# Patient Record
Sex: FEMALE | Race: WHITE | Employment: OTHER | URBAN - METROPOLITAN AREA
[De-identification: names, ages, dates, MRNs, and addresses within clinical notes are randomized per-mention and may not be internally consistent; named-entity substitution may affect disease eponyms.]

---

## 2017-01-16 ENCOUNTER — TELEPHONE (OUTPATIENT)
Dept: INTERNAL MEDICINE CLINIC | Age: 62
End: 2017-01-16

## 2017-01-18 ENCOUNTER — OFFICE VISIT (OUTPATIENT)
Dept: INTERNAL MEDICINE CLINIC | Age: 62
End: 2017-01-18

## 2017-01-18 VITALS
WEIGHT: 158 LBS | HEART RATE: 80 BPM | SYSTOLIC BLOOD PRESSURE: 110 MMHG | HEIGHT: 62 IN | DIASTOLIC BLOOD PRESSURE: 78 MMHG | BODY MASS INDEX: 29.08 KG/M2

## 2017-01-18 DIAGNOSIS — E11.42 TYPE 2 DIABETES MELLITUS WITH DIABETIC POLYNEUROPATHY, WITHOUT LONG-TERM CURRENT USE OF INSULIN (HCC): Primary | ICD-10-CM

## 2017-01-18 DIAGNOSIS — F33.41 MAJOR DEPRESSIVE DISORDER, RECURRENT, IN PARTIAL REMISSION (HCC): ICD-10-CM

## 2017-01-18 DIAGNOSIS — E78.2 MIXED HYPERLIPIDEMIA: ICD-10-CM

## 2017-01-18 PROCEDURE — 1036F TOBACCO NON-USER: CPT | Performed by: INTERNAL MEDICINE

## 2017-01-18 PROCEDURE — G8419 CALC BMI OUT NRM PARAM NOF/U: HCPCS | Performed by: INTERNAL MEDICINE

## 2017-01-18 PROCEDURE — 99214 OFFICE O/P EST MOD 30 MIN: CPT | Performed by: INTERNAL MEDICINE

## 2017-01-18 PROCEDURE — G8484 FLU IMMUNIZE NO ADMIN: HCPCS | Performed by: INTERNAL MEDICINE

## 2017-01-18 PROCEDURE — 3044F HG A1C LEVEL LT 7.0%: CPT | Performed by: INTERNAL MEDICINE

## 2017-01-18 PROCEDURE — 3017F COLORECTAL CA SCREEN DOC REV: CPT | Performed by: INTERNAL MEDICINE

## 2017-01-18 PROCEDURE — 3014F SCREEN MAMMO DOC REV: CPT | Performed by: INTERNAL MEDICINE

## 2017-01-18 PROCEDURE — G8427 DOCREV CUR MEDS BY ELIG CLIN: HCPCS | Performed by: INTERNAL MEDICINE

## 2017-01-18 RX ORDER — TRAZODONE HYDROCHLORIDE 100 MG/1
200 TABLET ORAL NIGHTLY
Status: SHIPPED | COMMUNITY
Start: 2017-01-18 | End: 2017-03-22 | Stop reason: CLARIF

## 2017-01-18 RX ORDER — LANOLIN ALCOHOL/MO/W.PET/CERES
6 CREAM (GRAM) TOPICAL DAILY
COMMUNITY
Start: 2017-01-18 | End: 2017-03-22 | Stop reason: CLARIF

## 2017-01-18 ASSESSMENT — ENCOUNTER SYMPTOMS: SHORTNESS OF BREATH: 0

## 2017-03-13 ENCOUNTER — TELEPHONE (OUTPATIENT)
Dept: INTERNAL MEDICINE CLINIC | Age: 62
End: 2017-03-13

## 2017-03-22 ENCOUNTER — OFFICE VISIT (OUTPATIENT)
Dept: INTERNAL MEDICINE CLINIC | Age: 62
End: 2017-03-22

## 2017-03-22 VITALS
WEIGHT: 172.8 LBS | OXYGEN SATURATION: 98 % | HEART RATE: 76 BPM | SYSTOLIC BLOOD PRESSURE: 110 MMHG | BODY MASS INDEX: 31.8 KG/M2 | HEIGHT: 62 IN | DIASTOLIC BLOOD PRESSURE: 80 MMHG

## 2017-03-22 DIAGNOSIS — E11.42 TYPE 2 DIABETES MELLITUS WITH DIABETIC POLYNEUROPATHY, WITHOUT LONG-TERM CURRENT USE OF INSULIN (HCC): Primary | ICD-10-CM

## 2017-03-22 DIAGNOSIS — E78.2 MIXED HYPERLIPIDEMIA: ICD-10-CM

## 2017-03-22 DIAGNOSIS — F33.1 MODERATE EPISODE OF RECURRENT MAJOR DEPRESSIVE DISORDER (HCC): ICD-10-CM

## 2017-03-22 PROCEDURE — 3044F HG A1C LEVEL LT 7.0%: CPT | Performed by: INTERNAL MEDICINE

## 2017-03-22 PROCEDURE — 1036F TOBACCO NON-USER: CPT | Performed by: INTERNAL MEDICINE

## 2017-03-22 PROCEDURE — 99214 OFFICE O/P EST MOD 30 MIN: CPT | Performed by: INTERNAL MEDICINE

## 2017-03-22 PROCEDURE — G8417 CALC BMI ABV UP PARAM F/U: HCPCS | Performed by: INTERNAL MEDICINE

## 2017-03-22 PROCEDURE — 3014F SCREEN MAMMO DOC REV: CPT | Performed by: INTERNAL MEDICINE

## 2017-03-22 PROCEDURE — 3017F COLORECTAL CA SCREEN DOC REV: CPT | Performed by: INTERNAL MEDICINE

## 2017-03-22 PROCEDURE — G8427 DOCREV CUR MEDS BY ELIG CLIN: HCPCS | Performed by: INTERNAL MEDICINE

## 2017-03-22 PROCEDURE — G8484 FLU IMMUNIZE NO ADMIN: HCPCS | Performed by: INTERNAL MEDICINE

## 2017-03-22 RX ORDER — TRAZODONE HYDROCHLORIDE 100 MG/1
200 TABLET ORAL NIGHTLY
COMMUNITY
Start: 2017-02-23 | End: 2017-08-24

## 2017-03-22 RX ORDER — LINACLOTIDE 145 UG/1
145 CAPSULE, GELATIN COATED ORAL DAILY
Refills: 0 | COMMUNITY
Start: 2017-02-17 | End: 2017-06-06

## 2017-03-22 RX ORDER — TOPIRAMATE 50 MG/1
50 TABLET, FILM COATED ORAL 2 TIMES DAILY
COMMUNITY
Start: 2017-03-22 | End: 2021-12-13

## 2017-03-22 RX ORDER — DIPHENHYDRAMINE HCL 25 MG
25 CAPSULE ORAL NIGHTLY PRN
COMMUNITY
End: 2017-06-06

## 2017-03-22 ASSESSMENT — PATIENT HEALTH QUESTIONNAIRE - PHQ9
SUM OF ALL RESPONSES TO PHQ QUESTIONS 1-9: 0
SUM OF ALL RESPONSES TO PHQ9 QUESTIONS 1 & 2: 0
2. FEELING DOWN, DEPRESSED OR HOPELESS: 0
2. FEELING DOWN, DEPRESSED OR HOPELESS: 0
SUM OF ALL RESPONSES TO PHQ QUESTIONS 1-9: 0
SUM OF ALL RESPONSES TO PHQ9 QUESTIONS 1 & 2: 0
1. LITTLE INTEREST OR PLEASURE IN DOING THINGS: 0
1. LITTLE INTEREST OR PLEASURE IN DOING THINGS: 0

## 2017-03-22 ASSESSMENT — ENCOUNTER SYMPTOMS
BACK PAIN: 1
SHORTNESS OF BREATH: 0

## 2017-03-23 DIAGNOSIS — E11.42 TYPE 2 DIABETES MELLITUS WITH DIABETIC POLYNEUROPATHY, WITHOUT LONG-TERM CURRENT USE OF INSULIN (HCC): ICD-10-CM

## 2017-03-23 DIAGNOSIS — E78.2 MIXED HYPERLIPIDEMIA: ICD-10-CM

## 2017-03-23 LAB
A/G RATIO: 2 (ref 1.1–2.2)
ALBUMIN SERPL-MCNC: 4.1 G/DL (ref 3.4–5)
ALP BLD-CCNC: 76 U/L (ref 40–129)
ALT SERPL-CCNC: 24 U/L (ref 10–40)
ANION GAP SERPL CALCULATED.3IONS-SCNC: 14 MMOL/L (ref 3–16)
AST SERPL-CCNC: 20 U/L (ref 15–37)
BILIRUB SERPL-MCNC: 0.4 MG/DL (ref 0–1)
BUN BLDV-MCNC: 18 MG/DL (ref 7–20)
CALCIUM SERPL-MCNC: 9.5 MG/DL (ref 8.3–10.6)
CHLORIDE BLD-SCNC: 110 MMOL/L (ref 99–110)
CHOLESTEROL, TOTAL: 181 MG/DL (ref 0–199)
CO2: 22 MMOL/L (ref 21–32)
CREAT SERPL-MCNC: 0.6 MG/DL (ref 0.6–1.2)
GFR AFRICAN AMERICAN: >60
GFR NON-AFRICAN AMERICAN: >60
GLOBULIN: 2.1 G/DL
GLUCOSE BLD-MCNC: 105 MG/DL (ref 70–99)
HDLC SERPL-MCNC: 66 MG/DL (ref 40–60)
LDL CHOLESTEROL CALCULATED: 92 MG/DL
POTASSIUM SERPL-SCNC: 4.3 MMOL/L (ref 3.5–5.1)
SODIUM BLD-SCNC: 146 MMOL/L (ref 136–145)
TOTAL PROTEIN: 6.2 G/DL (ref 6.4–8.2)
TRIGL SERPL-MCNC: 114 MG/DL (ref 0–150)
VLDLC SERPL CALC-MCNC: 23 MG/DL

## 2017-03-24 LAB
ESTIMATED AVERAGE GLUCOSE: 122.6 MG/DL
HBA1C MFR BLD: 5.9 %

## 2017-03-28 ENCOUNTER — TELEPHONE (OUTPATIENT)
Dept: INTERNAL MEDICINE CLINIC | Age: 62
End: 2017-03-28

## 2017-03-31 ENCOUNTER — TELEPHONE (OUTPATIENT)
Dept: INTERNAL MEDICINE CLINIC | Age: 62
End: 2017-03-31

## 2017-04-03 ENCOUNTER — OFFICE VISIT (OUTPATIENT)
Dept: INTERNAL MEDICINE CLINIC | Age: 62
End: 2017-04-03

## 2017-04-03 VITALS
DIASTOLIC BLOOD PRESSURE: 70 MMHG | SYSTOLIC BLOOD PRESSURE: 108 MMHG | OXYGEN SATURATION: 95 % | HEIGHT: 62 IN | HEART RATE: 85 BPM | TEMPERATURE: 98.1 F | BODY MASS INDEX: 30.91 KG/M2 | WEIGHT: 168 LBS

## 2017-04-03 DIAGNOSIS — F51.01 PRIMARY INSOMNIA: ICD-10-CM

## 2017-04-03 DIAGNOSIS — R42 DIZZINESS: Primary | ICD-10-CM

## 2017-04-03 PROCEDURE — 3017F COLORECTAL CA SCREEN DOC REV: CPT | Performed by: INTERNAL MEDICINE

## 2017-04-03 PROCEDURE — G8427 DOCREV CUR MEDS BY ELIG CLIN: HCPCS | Performed by: INTERNAL MEDICINE

## 2017-04-03 PROCEDURE — G8417 CALC BMI ABV UP PARAM F/U: HCPCS | Performed by: INTERNAL MEDICINE

## 2017-04-03 PROCEDURE — 3014F SCREEN MAMMO DOC REV: CPT | Performed by: INTERNAL MEDICINE

## 2017-04-03 PROCEDURE — 99213 OFFICE O/P EST LOW 20 MIN: CPT | Performed by: INTERNAL MEDICINE

## 2017-04-03 PROCEDURE — 1036F TOBACCO NON-USER: CPT | Performed by: INTERNAL MEDICINE

## 2017-04-03 RX ORDER — MECLIZINE HCL 12.5 MG/1
12.5-25 TABLET ORAL 3 TIMES DAILY PRN
Qty: 30 TABLET | Refills: 1 | Status: SHIPPED | OUTPATIENT
Start: 2017-04-03 | End: 2017-04-13

## 2017-04-03 ASSESSMENT — ENCOUNTER SYMPTOMS: SHORTNESS OF BREATH: 0

## 2017-04-20 ENCOUNTER — OFFICE VISIT (OUTPATIENT)
Dept: INTERNAL MEDICINE CLINIC | Age: 62
End: 2017-04-20

## 2017-04-20 VITALS
HEIGHT: 62 IN | DIASTOLIC BLOOD PRESSURE: 80 MMHG | HEART RATE: 72 BPM | SYSTOLIC BLOOD PRESSURE: 126 MMHG | WEIGHT: 166 LBS | BODY MASS INDEX: 30.55 KG/M2

## 2017-04-20 DIAGNOSIS — M54.2 NECK PAIN: Primary | ICD-10-CM

## 2017-04-20 DIAGNOSIS — G43.009 MIGRAINE WITHOUT AURA AND WITHOUT STATUS MIGRAINOSUS, NOT INTRACTABLE: ICD-10-CM

## 2017-04-20 PROCEDURE — 3014F SCREEN MAMMO DOC REV: CPT | Performed by: INTERNAL MEDICINE

## 2017-04-20 PROCEDURE — G8417 CALC BMI ABV UP PARAM F/U: HCPCS | Performed by: INTERNAL MEDICINE

## 2017-04-20 PROCEDURE — G8427 DOCREV CUR MEDS BY ELIG CLIN: HCPCS | Performed by: INTERNAL MEDICINE

## 2017-04-20 PROCEDURE — 3017F COLORECTAL CA SCREEN DOC REV: CPT | Performed by: INTERNAL MEDICINE

## 2017-04-20 PROCEDURE — 1036F TOBACCO NON-USER: CPT | Performed by: INTERNAL MEDICINE

## 2017-04-20 PROCEDURE — 99213 OFFICE O/P EST LOW 20 MIN: CPT | Performed by: INTERNAL MEDICINE

## 2017-04-20 RX ORDER — OMEPRAZOLE 20 MG/1
20 CAPSULE, DELAYED RELEASE ORAL 2 TIMES DAILY
COMMUNITY
End: 2017-06-06

## 2017-04-20 ASSESSMENT — ENCOUNTER SYMPTOMS
BACK PAIN: 1
SHORTNESS OF BREATH: 0

## 2017-04-24 ENCOUNTER — TELEPHONE (OUTPATIENT)
Dept: INTERNAL MEDICINE CLINIC | Age: 62
End: 2017-04-24

## 2017-05-02 DIAGNOSIS — M54.2 NECK PAIN: Primary | ICD-10-CM

## 2017-05-03 ENCOUNTER — OFFICE VISIT (OUTPATIENT)
Dept: INTERNAL MEDICINE CLINIC | Age: 62
End: 2017-05-03

## 2017-05-03 ENCOUNTER — TELEPHONE (OUTPATIENT)
Dept: INTERNAL MEDICINE CLINIC | Age: 62
End: 2017-05-03

## 2017-05-03 VITALS
SYSTOLIC BLOOD PRESSURE: 124 MMHG | OXYGEN SATURATION: 98 % | BODY MASS INDEX: 30.18 KG/M2 | DIASTOLIC BLOOD PRESSURE: 78 MMHG | TEMPERATURE: 98.3 F | WEIGHT: 164 LBS | HEART RATE: 80 BPM | HEIGHT: 62 IN

## 2017-05-03 DIAGNOSIS — Z01.818 PRE-OP EXAM: Primary | ICD-10-CM

## 2017-05-03 DIAGNOSIS — H26.9 CATARACT: ICD-10-CM

## 2017-05-03 PROCEDURE — 3014F SCREEN MAMMO DOC REV: CPT | Performed by: INTERNAL MEDICINE

## 2017-05-03 PROCEDURE — G8417 CALC BMI ABV UP PARAM F/U: HCPCS | Performed by: INTERNAL MEDICINE

## 2017-05-03 PROCEDURE — 99214 OFFICE O/P EST MOD 30 MIN: CPT | Performed by: INTERNAL MEDICINE

## 2017-05-03 PROCEDURE — 1036F TOBACCO NON-USER: CPT | Performed by: INTERNAL MEDICINE

## 2017-05-03 PROCEDURE — 93000 ELECTROCARDIOGRAM COMPLETE: CPT | Performed by: INTERNAL MEDICINE

## 2017-05-03 PROCEDURE — 3017F COLORECTAL CA SCREEN DOC REV: CPT | Performed by: INTERNAL MEDICINE

## 2017-05-03 PROCEDURE — G8427 DOCREV CUR MEDS BY ELIG CLIN: HCPCS | Performed by: INTERNAL MEDICINE

## 2017-05-03 RX ORDER — LOTEPREDNOL ETABONATE AND TOBRAMYCIN 5; 3 MG/ML; MG/ML
SUSPENSION/ DROPS OPHTHALMIC
COMMUNITY
Start: 2017-04-03 | End: 2017-06-06

## 2017-05-03 ASSESSMENT — ENCOUNTER SYMPTOMS
BACK PAIN: 1
SHORTNESS OF BREATH: 0

## 2017-05-09 ENCOUNTER — TELEPHONE (OUTPATIENT)
Dept: INTERNAL MEDICINE CLINIC | Age: 62
End: 2017-05-09

## 2017-05-19 ENCOUNTER — OFFICE VISIT (OUTPATIENT)
Dept: ORTHOPEDIC SURGERY | Age: 62
End: 2017-05-19

## 2017-05-19 VITALS — BODY MASS INDEX: 30.18 KG/M2 | WEIGHT: 164.02 LBS | HEIGHT: 62 IN

## 2017-05-19 DIAGNOSIS — M79.18 MYOFACIAL MUSCLE PAIN: ICD-10-CM

## 2017-05-19 DIAGNOSIS — G89.29 CHRONIC RIGHT SHOULDER PAIN: ICD-10-CM

## 2017-05-19 DIAGNOSIS — M25.511 CHRONIC RIGHT SHOULDER PAIN: ICD-10-CM

## 2017-05-19 DIAGNOSIS — M48.02 CERVICAL STENOSIS OF SPINE: ICD-10-CM

## 2017-05-19 DIAGNOSIS — M47.812 SPONDYLOSIS OF CERVICAL REGION WITHOUT MYELOPATHY OR RADICULOPATHY: Primary | ICD-10-CM

## 2017-05-19 DIAGNOSIS — R51.9 HEADACHE, UNSPECIFIED HEADACHE TYPE: ICD-10-CM

## 2017-05-19 PROCEDURE — 1036F TOBACCO NON-USER: CPT | Performed by: PHYSICAL MEDICINE & REHABILITATION

## 2017-05-19 PROCEDURE — 3017F COLORECTAL CA SCREEN DOC REV: CPT | Performed by: PHYSICAL MEDICINE & REHABILITATION

## 2017-05-19 PROCEDURE — G8417 CALC BMI ABV UP PARAM F/U: HCPCS | Performed by: PHYSICAL MEDICINE & REHABILITATION

## 2017-05-19 PROCEDURE — 20553 NJX 1/MLT TRIGGER POINTS 3/>: CPT | Performed by: PHYSICAL MEDICINE & REHABILITATION

## 2017-05-19 PROCEDURE — 99214 OFFICE O/P EST MOD 30 MIN: CPT | Performed by: PHYSICAL MEDICINE & REHABILITATION

## 2017-05-19 PROCEDURE — G8427 DOCREV CUR MEDS BY ELIG CLIN: HCPCS | Performed by: PHYSICAL MEDICINE & REHABILITATION

## 2017-05-19 PROCEDURE — 3014F SCREEN MAMMO DOC REV: CPT | Performed by: PHYSICAL MEDICINE & REHABILITATION

## 2017-05-19 RX ORDER — TIZANIDINE 4 MG/1
4 TABLET ORAL NIGHTLY
Qty: 30 TABLET | Refills: 0 | Status: SHIPPED | OUTPATIENT
Start: 2017-05-19 | End: 2017-06-18

## 2017-05-31 RX ORDER — PRAVASTATIN SODIUM 40 MG
TABLET ORAL
Qty: 180 TABLET | Refills: 0 | Status: SHIPPED | OUTPATIENT
Start: 2017-05-31 | End: 2017-08-24 | Stop reason: SDUPTHER

## 2017-05-31 RX ORDER — GABAPENTIN 300 MG/1
CAPSULE ORAL
Qty: 180 CAPSULE | Refills: 0 | Status: SHIPPED | OUTPATIENT
Start: 2017-05-31 | End: 2017-08-07 | Stop reason: SDUPTHER

## 2017-05-31 RX ORDER — PIOGLITAZONEHYDROCHLORIDE 30 MG/1
TABLET ORAL
Qty: 90 TABLET | Refills: 0 | Status: SHIPPED | OUTPATIENT
Start: 2017-05-31 | End: 2017-06-05 | Stop reason: SDUPTHER

## 2017-06-05 RX ORDER — PIOGLITAZONEHYDROCHLORIDE 30 MG/1
TABLET ORAL
Qty: 7 TABLET | Refills: 0 | Status: SHIPPED | OUTPATIENT
Start: 2017-06-05 | End: 2017-08-24 | Stop reason: SDUPTHER

## 2017-06-05 RX ORDER — OMEPRAZOLE 40 MG/1
CAPSULE, DELAYED RELEASE ORAL
Qty: 180 CAPSULE | Refills: 0 | Status: SHIPPED | OUTPATIENT
Start: 2017-06-05 | End: 2017-08-24 | Stop reason: SDUPTHER

## 2017-06-06 ENCOUNTER — OFFICE VISIT (OUTPATIENT)
Dept: ORTHOPEDIC SURGERY | Age: 62
End: 2017-06-06

## 2017-06-06 ENCOUNTER — HOSPITAL ENCOUNTER (OUTPATIENT)
Dept: GENERAL RADIOLOGY | Facility: MEDICAL CENTER | Age: 62
Discharge: HOME | End: 2017-06-06
Attending: ORTHOPAEDIC SURGERY | Admitting: ORTHOPAEDIC SURGERY

## 2017-06-06 VITALS
HEART RATE: 75 BPM | BODY MASS INDEX: 29.26 KG/M2 | DIASTOLIC BLOOD PRESSURE: 76 MMHG | WEIGHT: 159 LBS | SYSTOLIC BLOOD PRESSURE: 126 MMHG | HEIGHT: 62 IN

## 2017-06-06 DIAGNOSIS — G89.29 CHRONIC RIGHT SHOULDER PAIN: Primary | ICD-10-CM

## 2017-06-06 DIAGNOSIS — M75.81 TENDINITIS OF RIGHT ROTATOR CUFF: ICD-10-CM

## 2017-06-06 DIAGNOSIS — M54.2 PAIN, NECK: ICD-10-CM

## 2017-06-06 DIAGNOSIS — G89.29 CHRONIC RIGHT SHOULDER PAIN: ICD-10-CM

## 2017-06-06 DIAGNOSIS — M25.511 CHRONIC RIGHT SHOULDER PAIN: Primary | ICD-10-CM

## 2017-06-06 DIAGNOSIS — M19.011 ARTHRITIS OF RIGHT ACROMIOCLAVICULAR JOINT: ICD-10-CM

## 2017-06-06 DIAGNOSIS — Z98.890 HISTORY OF ARTHROSCOPY OF RIGHT SHOULDER: ICD-10-CM

## 2017-06-06 DIAGNOSIS — M48.02 CERVICAL STENOSIS OF SPINE: ICD-10-CM

## 2017-06-06 DIAGNOSIS — M75.51 BURSITIS OF RIGHT SHOULDER: ICD-10-CM

## 2017-06-06 DIAGNOSIS — M25.511 CHRONIC RIGHT SHOULDER PAIN: ICD-10-CM

## 2017-06-06 PROBLEM — M75.80 ROTATOR CUFF TENDINITIS: Status: ACTIVE | Noted: 2017-06-06

## 2017-06-06 PROCEDURE — 99214 OFFICE O/P EST MOD 30 MIN: CPT | Performed by: ORTHOPAEDIC SURGERY

## 2017-06-06 PROCEDURE — 3014F SCREEN MAMMO DOC REV: CPT | Performed by: ORTHOPAEDIC SURGERY

## 2017-06-06 PROCEDURE — G8427 DOCREV CUR MEDS BY ELIG CLIN: HCPCS | Performed by: ORTHOPAEDIC SURGERY

## 2017-06-06 PROCEDURE — 73030 X-RAY EXAM OF SHOULDER: CPT | Performed by: ORTHOPAEDIC SURGERY

## 2017-06-06 PROCEDURE — G8417 CALC BMI ABV UP PARAM F/U: HCPCS | Performed by: ORTHOPAEDIC SURGERY

## 2017-06-06 PROCEDURE — 3017F COLORECTAL CA SCREEN DOC REV: CPT | Performed by: ORTHOPAEDIC SURGERY

## 2017-06-06 PROCEDURE — 1036F TOBACCO NON-USER: CPT | Performed by: ORTHOPAEDIC SURGERY

## 2017-06-06 RX ORDER — MELOXICAM 15 MG/1
15 TABLET ORAL DAILY
Qty: 30 TABLET | Refills: 2 | Status: SHIPPED | OUTPATIENT
Start: 2017-06-06 | End: 2017-07-24 | Stop reason: ALTCHOICE

## 2017-06-19 ENCOUNTER — CARE COORDINATION (OUTPATIENT)
Dept: CASE MANAGEMENT | Age: 62
End: 2017-06-19

## 2017-06-20 ENCOUNTER — TELEPHONE (OUTPATIENT)
Dept: INTERNAL MEDICINE CLINIC | Age: 62
End: 2017-06-20

## 2017-07-13 ENCOUNTER — OFFICE VISIT (OUTPATIENT)
Dept: INTERNAL MEDICINE CLINIC | Age: 62
End: 2017-07-13

## 2017-07-13 VITALS
HEART RATE: 89 BPM | HEIGHT: 62 IN | RESPIRATION RATE: 12 BRPM | TEMPERATURE: 98.2 F | BODY MASS INDEX: 29.96 KG/M2 | SYSTOLIC BLOOD PRESSURE: 130 MMHG | WEIGHT: 162.8 LBS | OXYGEN SATURATION: 97 % | DIASTOLIC BLOOD PRESSURE: 70 MMHG

## 2017-07-13 DIAGNOSIS — D64.9 ANEMIA, UNSPECIFIED TYPE: ICD-10-CM

## 2017-07-13 DIAGNOSIS — R53.83 FATIGUE, UNSPECIFIED TYPE: ICD-10-CM

## 2017-07-13 DIAGNOSIS — R68.89 COLD INTOLERANCE: ICD-10-CM

## 2017-07-13 DIAGNOSIS — R51.9 FREQUENT HEADACHES: Primary | ICD-10-CM

## 2017-07-13 PROCEDURE — G8427 DOCREV CUR MEDS BY ELIG CLIN: HCPCS | Performed by: INTERNAL MEDICINE

## 2017-07-13 PROCEDURE — G8417 CALC BMI ABV UP PARAM F/U: HCPCS | Performed by: INTERNAL MEDICINE

## 2017-07-13 PROCEDURE — 1036F TOBACCO NON-USER: CPT | Performed by: INTERNAL MEDICINE

## 2017-07-13 PROCEDURE — 99214 OFFICE O/P EST MOD 30 MIN: CPT | Performed by: INTERNAL MEDICINE

## 2017-07-13 PROCEDURE — 3014F SCREEN MAMMO DOC REV: CPT | Performed by: INTERNAL MEDICINE

## 2017-07-13 PROCEDURE — 3017F COLORECTAL CA SCREEN DOC REV: CPT | Performed by: INTERNAL MEDICINE

## 2017-07-13 RX ORDER — TIZANIDINE 4 MG/1
4 TABLET ORAL
COMMUNITY
Start: 2017-05-19 | End: 2017-07-17 | Stop reason: ALTCHOICE

## 2017-07-13 RX ORDER — MELOXICAM 7.5 MG/1
7.5 TABLET ORAL DAILY
Qty: 15 TABLET | Refills: 0 | Status: SHIPPED | OUTPATIENT
Start: 2017-07-13 | End: 2017-07-17 | Stop reason: ALTCHOICE

## 2017-07-13 RX ORDER — BUTALBITAL, ACETAMINOPHEN AND CAFFEINE 50; 325; 40 MG/1; MG/1; MG/1
TABLET ORAL
Refills: 0 | COMMUNITY
Start: 2017-07-08 | End: 2021-12-13

## 2017-07-13 RX ORDER — AMITRIPTYLINE HYDROCHLORIDE 10 MG/1
10 TABLET, FILM COATED ORAL NIGHTLY
Qty: 15 TABLET | Refills: 0 | Status: SHIPPED | OUTPATIENT
Start: 2017-07-13 | End: 2017-07-24 | Stop reason: ALTCHOICE

## 2017-07-14 DIAGNOSIS — R53.83 FATIGUE, UNSPECIFIED TYPE: ICD-10-CM

## 2017-07-14 DIAGNOSIS — D64.9 ANEMIA, UNSPECIFIED TYPE: ICD-10-CM

## 2017-07-14 DIAGNOSIS — R68.89 COLD INTOLERANCE: ICD-10-CM

## 2017-07-14 LAB
A/G RATIO: 1.9 (ref 1.1–2.2)
ALBUMIN SERPL-MCNC: 4.3 G/DL (ref 3.4–5)
ALP BLD-CCNC: 64 U/L (ref 40–129)
ALT SERPL-CCNC: 17 U/L (ref 10–40)
ANION GAP SERPL CALCULATED.3IONS-SCNC: 13 MMOL/L (ref 3–16)
AST SERPL-CCNC: 16 U/L (ref 15–37)
BASOPHILS ABSOLUTE: 0 K/UL (ref 0–0.2)
BASOPHILS RELATIVE PERCENT: 0.8 %
BILIRUB SERPL-MCNC: 0.3 MG/DL (ref 0–1)
BUN BLDV-MCNC: 18 MG/DL (ref 7–20)
CALCIUM SERPL-MCNC: 9.6 MG/DL (ref 8.3–10.6)
CHLORIDE BLD-SCNC: 108 MMOL/L (ref 99–110)
CO2: 23 MMOL/L (ref 21–32)
CREAT SERPL-MCNC: 0.6 MG/DL (ref 0.6–1.2)
EOSINOPHILS ABSOLUTE: 0.1 K/UL (ref 0–0.6)
EOSINOPHILS RELATIVE PERCENT: 1.2 %
FERRITIN: 31.8 NG/ML (ref 15–150)
GFR AFRICAN AMERICAN: >60
GFR NON-AFRICAN AMERICAN: >60
GLOBULIN: 2.3 G/DL
GLUCOSE BLD-MCNC: 133 MG/DL (ref 70–99)
HCT VFR BLD CALC: 41.4 % (ref 36–48)
HEMOGLOBIN: 13.5 G/DL (ref 12–16)
IRON SATURATION: 25 % (ref 15–50)
IRON: 80 UG/DL (ref 37–145)
LYMPHOCYTES ABSOLUTE: 2.1 K/UL (ref 1–5.1)
LYMPHOCYTES RELATIVE PERCENT: 39.7 %
MCH RBC QN AUTO: 29.7 PG (ref 26–34)
MCHC RBC AUTO-ENTMCNC: 32.6 G/DL (ref 31–36)
MCV RBC AUTO: 91.2 FL (ref 80–100)
MONOCYTES ABSOLUTE: 0.4 K/UL (ref 0–1.3)
MONOCYTES RELATIVE PERCENT: 7.4 %
NEUTROPHILS ABSOLUTE: 2.7 K/UL (ref 1.7–7.7)
NEUTROPHILS RELATIVE PERCENT: 50.9 %
PDW BLD-RTO: 15.4 % (ref 12.4–15.4)
PLATELET # BLD: 176 K/UL (ref 135–450)
PMV BLD AUTO: 9.6 FL (ref 5–10.5)
POTASSIUM SERPL-SCNC: 4 MMOL/L (ref 3.5–5.1)
RBC # BLD: 4.53 M/UL (ref 4–5.2)
SODIUM BLD-SCNC: 144 MMOL/L (ref 136–145)
TOTAL IRON BINDING CAPACITY: 315 UG/DL (ref 260–445)
TOTAL PROTEIN: 6.6 G/DL (ref 6.4–8.2)
TSH SERPL DL<=0.05 MIU/L-ACNC: 1.13 UIU/ML (ref 0.27–4.2)
VITAMIN B-12: 504 PG/ML (ref 211–911)
WBC # BLD: 5.4 K/UL (ref 4–11)

## 2017-07-17 ENCOUNTER — TELEPHONE (OUTPATIENT)
Dept: INTERNAL MEDICINE CLINIC | Age: 62
End: 2017-07-17

## 2017-07-17 DIAGNOSIS — R51.9 FREQUENT HEADACHES: Primary | ICD-10-CM

## 2017-07-17 RX ORDER — OXYCODONE HYDROCHLORIDE 5 MG/1
5 TABLET ORAL EVERY 4 HOURS PRN
COMMUNITY
End: 2021-12-13

## 2017-07-18 ENCOUNTER — TELEPHONE (OUTPATIENT)
Dept: INTERNAL MEDICINE CLINIC | Age: 62
End: 2017-07-18

## 2017-07-18 ASSESSMENT — ENCOUNTER SYMPTOMS
COUGH: 0
SORE THROAT: 0
SHORTNESS OF BREATH: 0
CHEST TIGHTNESS: 0
RHINORRHEA: 0
WHEEZING: 0

## 2017-07-24 ENCOUNTER — OFFICE VISIT (OUTPATIENT)
Dept: INTERNAL MEDICINE CLINIC | Age: 62
End: 2017-07-24

## 2017-07-24 VITALS
SYSTOLIC BLOOD PRESSURE: 100 MMHG | HEIGHT: 62 IN | DIASTOLIC BLOOD PRESSURE: 60 MMHG | BODY MASS INDEX: 30.51 KG/M2 | WEIGHT: 165.8 LBS

## 2017-07-24 DIAGNOSIS — G43.009 MIGRAINE WITHOUT AURA AND WITHOUT STATUS MIGRAINOSUS, NOT INTRACTABLE: Primary | ICD-10-CM

## 2017-07-24 PROCEDURE — G8427 DOCREV CUR MEDS BY ELIG CLIN: HCPCS | Performed by: INTERNAL MEDICINE

## 2017-07-24 PROCEDURE — G8417 CALC BMI ABV UP PARAM F/U: HCPCS | Performed by: INTERNAL MEDICINE

## 2017-07-24 PROCEDURE — 3017F COLORECTAL CA SCREEN DOC REV: CPT | Performed by: INTERNAL MEDICINE

## 2017-07-24 PROCEDURE — 1036F TOBACCO NON-USER: CPT | Performed by: INTERNAL MEDICINE

## 2017-07-24 PROCEDURE — 3014F SCREEN MAMMO DOC REV: CPT | Performed by: INTERNAL MEDICINE

## 2017-07-24 PROCEDURE — 99213 OFFICE O/P EST LOW 20 MIN: CPT | Performed by: INTERNAL MEDICINE

## 2017-07-24 RX ORDER — LAMOTRIGINE 25 MG/1
TABLET ORAL
Refills: 0 | COMMUNITY
Start: 2017-07-21 | End: 2017-08-03 | Stop reason: SINTOL

## 2017-07-24 RX ORDER — RIZATRIPTAN BENZOATE 5 MG/1
5 TABLET ORAL
Qty: 12 TABLET | Refills: 0 | Status: SHIPPED | OUTPATIENT
Start: 2017-07-24 | End: 2021-12-13

## 2017-07-24 ASSESSMENT — ENCOUNTER SYMPTOMS
CHEST TIGHTNESS: 0
RHINORRHEA: 0
SORE THROAT: 0
SHORTNESS OF BREATH: 0
NAUSEA: 1
COUGH: 0
PHOTOPHOBIA: 1
WHEEZING: 0

## 2017-07-27 DIAGNOSIS — E11.42 TYPE 2 DIABETES MELLITUS WITH DIABETIC POLYNEUROPATHY, WITHOUT LONG-TERM CURRENT USE OF INSULIN (HCC): Primary | ICD-10-CM

## 2017-07-27 RX ORDER — GLUCOSAMINE HCL/CHONDROITIN SU 500-400 MG
1 CAPSULE ORAL DAILY
Qty: 100 STRIP | Refills: 3 | Status: SHIPPED | OUTPATIENT
Start: 2017-07-27 | End: 2021-12-01 | Stop reason: SDUPTHER

## 2017-07-27 RX ORDER — BLOOD-GLUCOSE METER
EACH MISCELLANEOUS
Qty: 1 DEVICE | Refills: 0 | Status: SHIPPED | OUTPATIENT
Start: 2017-07-27 | End: 2021-11-30

## 2017-07-27 RX ORDER — LANCETS 30 GAUGE
1 EACH MISCELLANEOUS DAILY
Qty: 100 EACH | Refills: 3 | Status: SHIPPED | OUTPATIENT
Start: 2017-07-27 | End: 2021-11-30 | Stop reason: SDUPTHER

## 2017-07-31 ENCOUNTER — TELEPHONE (OUTPATIENT)
Dept: INTERNAL MEDICINE CLINIC | Age: 62
End: 2017-07-31

## 2017-08-03 ENCOUNTER — PROCEDURE VISIT (OUTPATIENT)
Dept: INTERNAL MEDICINE CLINIC | Age: 62
End: 2017-08-03

## 2017-08-03 VITALS
BODY MASS INDEX: 29.45 KG/M2 | HEART RATE: 96 BPM | DIASTOLIC BLOOD PRESSURE: 80 MMHG | WEIGHT: 161 LBS | SYSTOLIC BLOOD PRESSURE: 122 MMHG

## 2017-08-03 DIAGNOSIS — R51.9 FREQUENT HEADACHES: Primary | ICD-10-CM

## 2017-08-03 PROCEDURE — 99213 OFFICE O/P EST LOW 20 MIN: CPT | Performed by: INTERNAL MEDICINE

## 2017-08-03 PROCEDURE — G8427 DOCREV CUR MEDS BY ELIG CLIN: HCPCS | Performed by: INTERNAL MEDICINE

## 2017-08-03 PROCEDURE — 1036F TOBACCO NON-USER: CPT | Performed by: INTERNAL MEDICINE

## 2017-08-03 PROCEDURE — G8417 CALC BMI ABV UP PARAM F/U: HCPCS | Performed by: INTERNAL MEDICINE

## 2017-08-03 PROCEDURE — 3017F COLORECTAL CA SCREEN DOC REV: CPT | Performed by: INTERNAL MEDICINE

## 2017-08-03 PROCEDURE — 3014F SCREEN MAMMO DOC REV: CPT | Performed by: INTERNAL MEDICINE

## 2017-08-03 RX ORDER — AMITRIPTYLINE HYDROCHLORIDE 25 MG/1
25 TABLET, FILM COATED ORAL NIGHTLY
COMMUNITY
Start: 2017-07-26 | End: 2017-08-17 | Stop reason: SDUPTHER

## 2017-08-07 ENCOUNTER — PROCEDURE VISIT (OUTPATIENT)
Dept: INTERNAL MEDICINE CLINIC | Age: 62
End: 2017-08-07

## 2017-08-07 ENCOUNTER — TELEPHONE (OUTPATIENT)
Dept: INTERNAL MEDICINE CLINIC | Age: 62
End: 2017-08-07

## 2017-08-07 VITALS
SYSTOLIC BLOOD PRESSURE: 118 MMHG | WEIGHT: 162 LBS | HEIGHT: 62 IN | BODY MASS INDEX: 29.81 KG/M2 | DIASTOLIC BLOOD PRESSURE: 64 MMHG | OXYGEN SATURATION: 98 % | HEART RATE: 96 BPM

## 2017-08-07 DIAGNOSIS — R51.9 FREQUENT HEADACHES: Primary | ICD-10-CM

## 2017-08-07 PROCEDURE — G8417 CALC BMI ABV UP PARAM F/U: HCPCS | Performed by: INTERNAL MEDICINE

## 2017-08-07 PROCEDURE — G8427 DOCREV CUR MEDS BY ELIG CLIN: HCPCS | Performed by: INTERNAL MEDICINE

## 2017-08-07 PROCEDURE — 99213 OFFICE O/P EST LOW 20 MIN: CPT | Performed by: INTERNAL MEDICINE

## 2017-08-07 PROCEDURE — 3014F SCREEN MAMMO DOC REV: CPT | Performed by: INTERNAL MEDICINE

## 2017-08-07 PROCEDURE — 1036F TOBACCO NON-USER: CPT | Performed by: INTERNAL MEDICINE

## 2017-08-07 PROCEDURE — 3017F COLORECTAL CA SCREEN DOC REV: CPT | Performed by: INTERNAL MEDICINE

## 2017-08-07 RX ORDER — GABAPENTIN 300 MG/1
300 CAPSULE ORAL 3 TIMES DAILY
Qty: 270 CAPSULE | Refills: 0 | Status: SHIPPED | OUTPATIENT
Start: 2017-08-07 | End: 2017-08-31 | Stop reason: SDUPTHER

## 2017-08-09 ENCOUNTER — TELEPHONE (OUTPATIENT)
Dept: INTERNAL MEDICINE CLINIC | Age: 62
End: 2017-08-09

## 2017-08-14 ENCOUNTER — OFFICE VISIT (OUTPATIENT)
Dept: INTERNAL MEDICINE CLINIC | Age: 62
End: 2017-08-14

## 2017-08-14 VITALS
HEART RATE: 86 BPM | DIASTOLIC BLOOD PRESSURE: 76 MMHG | OXYGEN SATURATION: 98 % | WEIGHT: 161.6 LBS | BODY MASS INDEX: 29.74 KG/M2 | TEMPERATURE: 98.1 F | RESPIRATION RATE: 12 BRPM | HEIGHT: 62 IN | SYSTOLIC BLOOD PRESSURE: 122 MMHG

## 2017-08-14 DIAGNOSIS — E11.42 TYPE 2 DIABETES MELLITUS WITH DIABETIC POLYNEUROPATHY, WITHOUT LONG-TERM CURRENT USE OF INSULIN (HCC): Primary | ICD-10-CM

## 2017-08-14 DIAGNOSIS — E78.2 MIXED HYPERLIPIDEMIA: ICD-10-CM

## 2017-08-14 DIAGNOSIS — R51.9 FREQUENT HEADACHES: ICD-10-CM

## 2017-08-14 DIAGNOSIS — Z23 NEED FOR ZOSTAVAX ADMINISTRATION: ICD-10-CM

## 2017-08-14 DIAGNOSIS — K21.9 GASTROESOPHAGEAL REFLUX DISEASE, ESOPHAGITIS PRESENCE NOT SPECIFIED: ICD-10-CM

## 2017-08-14 LAB
CREATININE URINE: 78.4 MG/DL (ref 28–259)
HBA1C MFR BLD: 5.3 %
MICROALBUMIN UR-MCNC: <1.2 MG/DL
MICROALBUMIN/CREAT UR-RTO: NORMAL MG/G (ref 0–30)

## 2017-08-14 PROCEDURE — 1036F TOBACCO NON-USER: CPT | Performed by: INTERNAL MEDICINE

## 2017-08-14 PROCEDURE — 99214 OFFICE O/P EST MOD 30 MIN: CPT | Performed by: INTERNAL MEDICINE

## 2017-08-14 PROCEDURE — 3017F COLORECTAL CA SCREEN DOC REV: CPT | Performed by: INTERNAL MEDICINE

## 2017-08-14 PROCEDURE — 3014F SCREEN MAMMO DOC REV: CPT | Performed by: INTERNAL MEDICINE

## 2017-08-14 PROCEDURE — 83036 HEMOGLOBIN GLYCOSYLATED A1C: CPT | Performed by: INTERNAL MEDICINE

## 2017-08-14 PROCEDURE — G8427 DOCREV CUR MEDS BY ELIG CLIN: HCPCS | Performed by: INTERNAL MEDICINE

## 2017-08-14 PROCEDURE — 3046F HEMOGLOBIN A1C LEVEL >9.0%: CPT | Performed by: INTERNAL MEDICINE

## 2017-08-14 PROCEDURE — G8417 CALC BMI ABV UP PARAM F/U: HCPCS | Performed by: INTERNAL MEDICINE

## 2017-08-14 RX ORDER — MULTIVITAMIN WITH IRON
250 TABLET ORAL DAILY
COMMUNITY
End: 2021-12-13

## 2017-08-15 ENCOUNTER — TELEPHONE (OUTPATIENT)
Dept: INTERNAL MEDICINE CLINIC | Age: 62
End: 2017-08-15

## 2017-08-16 ENCOUNTER — TELEPHONE (OUTPATIENT)
Dept: INTERNAL MEDICINE CLINIC | Age: 62
End: 2017-08-16

## 2017-08-16 DIAGNOSIS — R51.9 FREQUENT HEADACHES: Primary | ICD-10-CM

## 2017-08-17 ENCOUNTER — TELEPHONE (OUTPATIENT)
Dept: INTERNAL MEDICINE CLINIC | Age: 62
End: 2017-08-17

## 2017-08-17 RX ORDER — AMITRIPTYLINE HYDROCHLORIDE 25 MG/1
25 TABLET, FILM COATED ORAL NIGHTLY
Qty: 60 TABLET | Refills: 1 | Status: SHIPPED | OUTPATIENT
Start: 2017-08-17 | End: 2017-08-24 | Stop reason: DRUGHIGH

## 2017-08-17 ASSESSMENT — ENCOUNTER SYMPTOMS
ABDOMINAL PAIN: 0
WHEEZING: 0
SORE THROAT: 0
RHINORRHEA: 0
SHORTNESS OF BREATH: 0
CHEST TIGHTNESS: 0
VOMITING: 0
SINUS PRESSURE: 0
DIARRHEA: 0
COUGH: 0
CONSTIPATION: 0
BLOOD IN STOOL: 0
NAUSEA: 0

## 2017-08-24 ENCOUNTER — CARE COORDINATION (OUTPATIENT)
Dept: CARE COORDINATION | Age: 62
End: 2017-08-24

## 2017-08-24 ENCOUNTER — PROCEDURE VISIT (OUTPATIENT)
Dept: INTERNAL MEDICINE CLINIC | Age: 62
End: 2017-08-24

## 2017-08-24 VITALS
OXYGEN SATURATION: 97 % | HEART RATE: 82 BPM | DIASTOLIC BLOOD PRESSURE: 78 MMHG | WEIGHT: 156 LBS | HEIGHT: 62 IN | SYSTOLIC BLOOD PRESSURE: 124 MMHG | BODY MASS INDEX: 28.71 KG/M2

## 2017-08-24 DIAGNOSIS — R51.9 FREQUENT HEADACHES: ICD-10-CM

## 2017-08-24 PROCEDURE — 3014F SCREEN MAMMO DOC REV: CPT | Performed by: INTERNAL MEDICINE

## 2017-08-24 PROCEDURE — 1036F TOBACCO NON-USER: CPT | Performed by: INTERNAL MEDICINE

## 2017-08-24 PROCEDURE — 3017F COLORECTAL CA SCREEN DOC REV: CPT | Performed by: INTERNAL MEDICINE

## 2017-08-24 PROCEDURE — 99213 OFFICE O/P EST LOW 20 MIN: CPT | Performed by: INTERNAL MEDICINE

## 2017-08-24 PROCEDURE — G8427 DOCREV CUR MEDS BY ELIG CLIN: HCPCS | Performed by: INTERNAL MEDICINE

## 2017-08-24 PROCEDURE — G8417 CALC BMI ABV UP PARAM F/U: HCPCS | Performed by: INTERNAL MEDICINE

## 2017-08-24 RX ORDER — PRAVASTATIN SODIUM 40 MG
TABLET ORAL
Qty: 180 TABLET | Refills: 0 | Status: SHIPPED | OUTPATIENT
Start: 2017-08-24 | End: 2017-08-31 | Stop reason: SDUPTHER

## 2017-08-24 RX ORDER — OMEPRAZOLE 40 MG/1
CAPSULE, DELAYED RELEASE ORAL
Qty: 180 CAPSULE | Refills: 0 | Status: SHIPPED | OUTPATIENT
Start: 2017-08-24 | End: 2021-12-13

## 2017-08-24 RX ORDER — AMITRIPTYLINE HYDROCHLORIDE 25 MG/1
50 TABLET, FILM COATED ORAL NIGHTLY
Qty: 60 TABLET | Refills: 1 | Status: SHIPPED
Start: 2017-08-24 | End: 2017-08-31 | Stop reason: SDUPTHER

## 2017-08-24 RX ORDER — PIOGLITAZONEHYDROCHLORIDE 30 MG/1
TABLET ORAL
Qty: 90 TABLET | Refills: 0 | Status: SHIPPED | OUTPATIENT
Start: 2017-08-24 | End: 2017-08-31 | Stop reason: SDUPTHER

## 2017-08-24 RX ORDER — GABAPENTIN 300 MG/1
300 CAPSULE ORAL 3 TIMES DAILY
Qty: 270 CAPSULE | Refills: 0 | Status: CANCELLED | OUTPATIENT
Start: 2017-08-24

## 2017-08-24 RX ORDER — AMITRIPTYLINE HYDROCHLORIDE 25 MG/1
25 TABLET, FILM COATED ORAL NIGHTLY
Qty: 90 TABLET | Refills: 0 | Status: CANCELLED | OUTPATIENT
Start: 2017-08-24

## 2017-08-24 RX ORDER — BUTALBITAL, ACETAMINOPHEN AND CAFFEINE 50; 325; 40 MG/1; MG/1; MG/1
TABLET ORAL
Qty: 180 TABLET | Refills: 0 | Status: CANCELLED | OUTPATIENT
Start: 2017-08-24

## 2017-08-24 ASSESSMENT — ENCOUNTER SYMPTOMS: SHORTNESS OF BREATH: 0

## 2017-08-28 PROBLEM — T88.7XXA MEDICATION SIDE EFFECT: Status: ACTIVE | Noted: 2017-08-28

## 2017-08-28 PROBLEM — M48.02 CERVICAL SPINAL STENOSIS: Status: ACTIVE | Noted: 2017-08-28

## 2017-08-28 PROBLEM — M54.81 CERVICO-OCCIPITAL NEURALGIA: Status: ACTIVE | Noted: 2017-08-28

## 2017-08-28 PROBLEM — R51.9 CHRONIC DAILY HEADACHE: Status: ACTIVE | Noted: 2017-08-28

## 2017-08-28 PROBLEM — E55.9 VITAMIN D DEFICIENCY: Status: ACTIVE | Noted: 2017-08-28

## 2017-08-28 PROBLEM — G43.901 STATUS MIGRAINOSUS: Status: ACTIVE | Noted: 2017-08-28

## 2017-08-28 PROBLEM — F51.04 CHRONIC INSOMNIA: Status: ACTIVE | Noted: 2017-08-28

## 2017-08-28 PROBLEM — M47.22 OSTEOARTHRITIS OF SPINE WITH RADICULOPATHY, CERVICAL REGION: Status: ACTIVE | Noted: 2017-08-28

## 2017-08-28 PROBLEM — G44.86 CERVICOGENIC HEADACHE: Status: ACTIVE | Noted: 2017-08-28

## 2017-08-30 PROBLEM — G43.719 INTRACTABLE CHRONIC MIGRAINE WITHOUT AURA: Status: ACTIVE | Noted: 2017-08-30

## 2017-08-31 ENCOUNTER — PROCEDURE VISIT (OUTPATIENT)
Dept: INTERNAL MEDICINE CLINIC | Age: 62
End: 2017-08-31

## 2017-08-31 VITALS
SYSTOLIC BLOOD PRESSURE: 106 MMHG | WEIGHT: 159 LBS | DIASTOLIC BLOOD PRESSURE: 70 MMHG | HEIGHT: 62 IN | HEART RATE: 84 BPM | BODY MASS INDEX: 29.26 KG/M2

## 2017-08-31 DIAGNOSIS — M54.81 CERVICO-OCCIPITAL NEURALGIA: ICD-10-CM

## 2017-08-31 DIAGNOSIS — R51.9 FREQUENT HEADACHES: ICD-10-CM

## 2017-08-31 DIAGNOSIS — G43.901 STATUS MIGRAINOSUS: ICD-10-CM

## 2017-08-31 DIAGNOSIS — E78.2 MIXED HYPERLIPIDEMIA: ICD-10-CM

## 2017-08-31 DIAGNOSIS — E11.42 TYPE 2 DIABETES MELLITUS WITH DIABETIC POLYNEUROPATHY, WITHOUT LONG-TERM CURRENT USE OF INSULIN (HCC): Primary | ICD-10-CM

## 2017-08-31 DIAGNOSIS — R51.9 FREQUENT HEADACHES: Primary | ICD-10-CM

## 2017-08-31 PROCEDURE — 3017F COLORECTAL CA SCREEN DOC REV: CPT | Performed by: INTERNAL MEDICINE

## 2017-08-31 PROCEDURE — G8427 DOCREV CUR MEDS BY ELIG CLIN: HCPCS | Performed by: INTERNAL MEDICINE

## 2017-08-31 PROCEDURE — 1036F TOBACCO NON-USER: CPT | Performed by: INTERNAL MEDICINE

## 2017-08-31 PROCEDURE — G8417 CALC BMI ABV UP PARAM F/U: HCPCS | Performed by: INTERNAL MEDICINE

## 2017-08-31 PROCEDURE — 99213 OFFICE O/P EST LOW 20 MIN: CPT | Performed by: INTERNAL MEDICINE

## 2017-08-31 PROCEDURE — 3014F SCREEN MAMMO DOC REV: CPT | Performed by: INTERNAL MEDICINE

## 2017-08-31 RX ORDER — PIOGLITAZONEHYDROCHLORIDE 30 MG/1
TABLET ORAL
Qty: 90 TABLET | Refills: 0 | Status: SHIPPED | OUTPATIENT
Start: 2017-08-31 | End: 2021-12-13

## 2017-08-31 RX ORDER — GABAPENTIN 300 MG/1
300 CAPSULE ORAL 3 TIMES DAILY
Qty: 270 CAPSULE | Refills: 0 | Status: SHIPPED | OUTPATIENT
Start: 2017-08-31 | End: 2017-09-08 | Stop reason: SDUPTHER

## 2017-08-31 RX ORDER — AMITRIPTYLINE HYDROCHLORIDE 25 MG/1
50 TABLET, FILM COATED ORAL NIGHTLY
Qty: 180 TABLET | Refills: 0 | Status: SHIPPED | OUTPATIENT
Start: 2017-08-31 | End: 2021-12-13

## 2017-08-31 RX ORDER — METHOCARBAMOL 500 MG/1
250-500 TABLET, FILM COATED ORAL 4 TIMES DAILY
Qty: 360 TABLET | Refills: 0 | Status: CANCELLED | OUTPATIENT
Start: 2017-08-31

## 2017-08-31 RX ORDER — PRAVASTATIN SODIUM 40 MG
TABLET ORAL
Qty: 180 TABLET | Refills: 0 | Status: SHIPPED | OUTPATIENT
Start: 2017-08-31 | End: 2021-12-13

## 2017-09-07 ENCOUNTER — PROCEDURE VISIT (OUTPATIENT)
Dept: INTERNAL MEDICINE CLINIC | Age: 62
End: 2017-09-07

## 2017-09-07 VITALS
HEIGHT: 62 IN | DIASTOLIC BLOOD PRESSURE: 78 MMHG | SYSTOLIC BLOOD PRESSURE: 132 MMHG | WEIGHT: 156 LBS | HEART RATE: 91 BPM | BODY MASS INDEX: 28.71 KG/M2

## 2017-09-07 DIAGNOSIS — M54.81 CERVICO-OCCIPITAL NEURALGIA: ICD-10-CM

## 2017-09-07 DIAGNOSIS — R51.9 FREQUENT HEADACHES: Primary | ICD-10-CM

## 2017-09-07 PROCEDURE — 3014F SCREEN MAMMO DOC REV: CPT | Performed by: INTERNAL MEDICINE

## 2017-09-07 PROCEDURE — G8427 DOCREV CUR MEDS BY ELIG CLIN: HCPCS | Performed by: INTERNAL MEDICINE

## 2017-09-07 PROCEDURE — 99213 OFFICE O/P EST LOW 20 MIN: CPT | Performed by: INTERNAL MEDICINE

## 2017-09-07 PROCEDURE — 1036F TOBACCO NON-USER: CPT | Performed by: INTERNAL MEDICINE

## 2017-09-07 PROCEDURE — 3017F COLORECTAL CA SCREEN DOC REV: CPT | Performed by: INTERNAL MEDICINE

## 2017-09-07 PROCEDURE — G8417 CALC BMI ABV UP PARAM F/U: HCPCS | Performed by: INTERNAL MEDICINE

## 2017-09-07 ASSESSMENT — ENCOUNTER SYMPTOMS: SHORTNESS OF BREATH: 0

## 2017-09-11 ENCOUNTER — CARE COORDINATION (OUTPATIENT)
Dept: CARE COORDINATION | Age: 62
End: 2017-09-11

## 2017-09-14 ENCOUNTER — PROCEDURE VISIT (OUTPATIENT)
Dept: INTERNAL MEDICINE CLINIC | Age: 62
End: 2017-09-14

## 2017-09-14 VITALS
HEART RATE: 94 BPM | SYSTOLIC BLOOD PRESSURE: 132 MMHG | DIASTOLIC BLOOD PRESSURE: 68 MMHG | WEIGHT: 163 LBS | OXYGEN SATURATION: 98 % | BODY MASS INDEX: 30 KG/M2 | HEIGHT: 62 IN

## 2017-09-14 DIAGNOSIS — R51.9 FREQUENT HEADACHES: Primary | ICD-10-CM

## 2017-09-14 PROCEDURE — 3014F SCREEN MAMMO DOC REV: CPT | Performed by: INTERNAL MEDICINE

## 2017-09-14 PROCEDURE — G8417 CALC BMI ABV UP PARAM F/U: HCPCS | Performed by: INTERNAL MEDICINE

## 2017-09-14 PROCEDURE — 1036F TOBACCO NON-USER: CPT | Performed by: INTERNAL MEDICINE

## 2017-09-14 PROCEDURE — 99213 OFFICE O/P EST LOW 20 MIN: CPT | Performed by: INTERNAL MEDICINE

## 2017-09-14 PROCEDURE — G8427 DOCREV CUR MEDS BY ELIG CLIN: HCPCS | Performed by: INTERNAL MEDICINE

## 2017-09-14 PROCEDURE — 3017F COLORECTAL CA SCREEN DOC REV: CPT | Performed by: INTERNAL MEDICINE

## 2017-09-14 ASSESSMENT — ENCOUNTER SYMPTOMS: SHORTNESS OF BREATH: 0

## 2017-10-26 ENCOUNTER — TELEPHONE (OUTPATIENT)
Dept: INTERNAL MEDICINE CLINIC | Age: 62
End: 2017-10-26

## 2017-12-06 ENCOUNTER — TELEPHONE (OUTPATIENT)
Dept: INTERNAL MEDICINE CLINIC | Age: 62
End: 2017-12-06

## 2017-12-26 DIAGNOSIS — E11.42 TYPE 2 DIABETES MELLITUS WITH DIABETIC POLYNEUROPATHY, WITHOUT LONG-TERM CURRENT USE OF INSULIN (HCC): ICD-10-CM

## 2017-12-26 DIAGNOSIS — E78.2 MIXED HYPERLIPIDEMIA: ICD-10-CM

## 2017-12-26 RX ORDER — PIOGLITAZONEHYDROCHLORIDE 30 MG/1
TABLET ORAL
Qty: 90 TABLET | Refills: 0 | Status: CANCELLED | OUTPATIENT
Start: 2017-12-26

## 2017-12-26 RX ORDER — PRAVASTATIN SODIUM 40 MG
TABLET ORAL
Qty: 180 TABLET | Refills: 0 | Status: CANCELLED | OUTPATIENT
Start: 2017-12-26

## 2017-12-26 NOTE — TELEPHONE ENCOUNTER
Call [facility] pharmacy regarding refills. This patient moved to [facility].

## 2017-12-27 ENCOUNTER — TELEPHONE (OUTPATIENT)
Dept: INTERNAL MEDICINE CLINIC | Age: 62
End: 2017-12-27

## 2018-01-02 ENCOUNTER — TELEPHONE (OUTPATIENT)
Dept: INTERNAL MEDICINE CLINIC | Age: 63
End: 2018-01-02

## 2018-01-10 NOTE — TELEPHONE ENCOUNTER
Byron Jordan from [facility] on home health plan of care paperwork   Wants to talk to Jo-Ann

## 2018-01-22 ENCOUNTER — CARE COORDINATION (OUTPATIENT)
Dept: CARE COORDINATION | Age: 63
End: 2018-01-22

## 2018-03-16 ENCOUNTER — TELEPHONE (OUTPATIENT)
Dept: INTERNAL MEDICINE CLINIC | Age: 63
End: 2018-03-16

## 2018-03-16 DIAGNOSIS — M51.36 DEGENERATION, INTERVERTEBRAL DISC, LUMBAR: Primary | ICD-10-CM

## 2018-03-16 PROCEDURE — G0180 MD CERTIFICATION HHA PATIENT: HCPCS | Performed by: INTERNAL MEDICINE

## 2018-03-29 ENCOUNTER — TELEPHONE (OUTPATIENT)
Dept: INTERNAL MEDICINE CLINIC | Age: 63
End: 2018-03-29

## 2018-03-29 DIAGNOSIS — R26.2 DIFFICULTY WALKING: Primary | ICD-10-CM

## 2018-03-29 PROCEDURE — G0180 MD CERTIFICATION HHA PATIENT: HCPCS | Performed by: INTERNAL MEDICINE

## 2019-09-23 ENCOUNTER — OFFICE VISIT (OUTPATIENT)
Dept: PRIMARY CARE CLINIC | Age: 64
End: 2019-09-23
Payer: MEDICARE

## 2019-09-23 VITALS
HEART RATE: 72 BPM | OXYGEN SATURATION: 98 % | WEIGHT: 138 LBS | DIASTOLIC BLOOD PRESSURE: 76 MMHG | BODY MASS INDEX: 25.4 KG/M2 | SYSTOLIC BLOOD PRESSURE: 124 MMHG | HEIGHT: 62 IN

## 2019-09-23 DIAGNOSIS — K52.9 CHRONIC DIARRHEA: Chronic | ICD-10-CM

## 2019-09-23 DIAGNOSIS — R53.1 WEAKNESS: Primary | ICD-10-CM

## 2019-09-23 DIAGNOSIS — R53.1 WEAKNESS: ICD-10-CM

## 2019-09-23 DIAGNOSIS — F33.1 MODERATE EPISODE OF RECURRENT MAJOR DEPRESSIVE DISORDER (HCC): ICD-10-CM

## 2019-09-23 DIAGNOSIS — D50.9 IRON DEFICIENCY ANEMIA, UNSPECIFIED IRON DEFICIENCY ANEMIA TYPE: ICD-10-CM

## 2019-09-23 DIAGNOSIS — E11.42 TYPE 2 DIABETES MELLITUS WITH DIABETIC POLYNEUROPATHY, WITHOUT LONG-TERM CURRENT USE OF INSULIN (HCC): ICD-10-CM

## 2019-09-23 LAB
A/G RATIO: 2.4 (ref 1.1–2.2)
ALBUMIN SERPL-MCNC: 4.5 G/DL (ref 3.4–5)
ALP BLD-CCNC: 74 U/L (ref 40–129)
ALT SERPL-CCNC: 20 U/L (ref 10–40)
ANION GAP SERPL CALCULATED.3IONS-SCNC: 14 MMOL/L (ref 3–16)
AST SERPL-CCNC: 19 U/L (ref 15–37)
BASOPHILS ABSOLUTE: 0.1 K/UL (ref 0–0.2)
BASOPHILS RELATIVE PERCENT: 0.8 %
BILIRUB SERPL-MCNC: 0.5 MG/DL (ref 0–1)
BUN BLDV-MCNC: 16 MG/DL (ref 7–20)
CALCIUM SERPL-MCNC: 9.2 MG/DL (ref 8.3–10.6)
CHLORIDE BLD-SCNC: 105 MMOL/L (ref 99–110)
CO2: 24 MMOL/L (ref 21–32)
CREAT SERPL-MCNC: 0.8 MG/DL (ref 0.6–1.2)
EOSINOPHILS ABSOLUTE: 0.1 K/UL (ref 0–0.6)
EOSINOPHILS RELATIVE PERCENT: 0.9 %
GFR AFRICAN AMERICAN: >60
GFR NON-AFRICAN AMERICAN: >60
GLOBULIN: 1.9 G/DL
GLUCOSE BLD-MCNC: 154 MG/DL (ref 70–99)
HCT VFR BLD CALC: 39.4 % (ref 36–48)
HEMOGLOBIN: 13.1 G/DL (ref 12–16)
IRON SATURATION: 23 % (ref 15–50)
IRON: 87 UG/DL (ref 37–145)
LIPASE: 43 U/L (ref 13–60)
LYMPHOCYTES ABSOLUTE: 1.6 K/UL (ref 1–5.1)
LYMPHOCYTES RELATIVE PERCENT: 24.9 %
MCH RBC QN AUTO: 28.5 PG (ref 26–34)
MCHC RBC AUTO-ENTMCNC: 33.3 G/DL (ref 31–36)
MCV RBC AUTO: 85.7 FL (ref 80–100)
MONOCYTES ABSOLUTE: 0.4 K/UL (ref 0–1.3)
MONOCYTES RELATIVE PERCENT: 6.1 %
NEUTROPHILS ABSOLUTE: 4.4 K/UL (ref 1.7–7.7)
NEUTROPHILS RELATIVE PERCENT: 67.3 %
PDW BLD-RTO: 14.2 % (ref 12.4–15.4)
PLATELET # BLD: 196 K/UL (ref 135–450)
PMV BLD AUTO: 9.8 FL (ref 5–10.5)
POTASSIUM SERPL-SCNC: 4.1 MMOL/L (ref 3.5–5.1)
RBC # BLD: 4.59 M/UL (ref 4–5.2)
SODIUM BLD-SCNC: 143 MMOL/L (ref 136–145)
TOTAL IRON BINDING CAPACITY: 379 UG/DL (ref 260–445)
TOTAL PROTEIN: 6.4 G/DL (ref 6.4–8.2)
TSH SERPL DL<=0.05 MIU/L-ACNC: 1.67 UIU/ML (ref 0.27–4.2)
WBC # BLD: 6.5 K/UL (ref 4–11)

## 2019-09-23 PROCEDURE — G8419 CALC BMI OUT NRM PARAM NOF/U: HCPCS | Performed by: INTERNAL MEDICINE

## 2019-09-23 PROCEDURE — 4004F PT TOBACCO SCREEN RCVD TLK: CPT | Performed by: INTERNAL MEDICINE

## 2019-09-23 PROCEDURE — 3017F COLORECTAL CA SCREEN DOC REV: CPT | Performed by: INTERNAL MEDICINE

## 2019-09-23 PROCEDURE — 3046F HEMOGLOBIN A1C LEVEL >9.0%: CPT | Performed by: INTERNAL MEDICINE

## 2019-09-23 PROCEDURE — 99214 OFFICE O/P EST MOD 30 MIN: CPT | Performed by: INTERNAL MEDICINE

## 2019-09-23 PROCEDURE — G8427 DOCREV CUR MEDS BY ELIG CLIN: HCPCS | Performed by: INTERNAL MEDICINE

## 2019-09-23 PROCEDURE — 2022F DILAT RTA XM EVC RTNOPTHY: CPT | Performed by: INTERNAL MEDICINE

## 2019-09-23 RX ORDER — LORAZEPAM 2 MG/1
2 TABLET ORAL EVERY 6 HOURS PRN
COMMUNITY
End: 2021-12-13

## 2019-09-23 RX ORDER — ATORVASTATIN CALCIUM 20 MG/1
20 TABLET, FILM COATED ORAL DAILY
COMMUNITY
End: 2021-11-29 | Stop reason: SDUPTHER

## 2019-09-23 RX ORDER — PANTOPRAZOLE SODIUM 40 MG/1
40 TABLET, DELAYED RELEASE ORAL DAILY
COMMUNITY
End: 2021-11-29 | Stop reason: SDUPTHER

## 2019-09-23 RX ORDER — CLONIDINE 0.1 MG/24H
1 PATCH, EXTENDED RELEASE TRANSDERMAL WEEKLY
COMMUNITY
End: 2021-11-29 | Stop reason: SDUPTHER

## 2019-09-23 NOTE — ASSESSMENT & PLAN NOTE
Now on actos and trulicity,  Has been under good control,  Patient is compliant w medications, no side effects, effective, provides adequate symptom relief. No new symptoms or problems as noted by patient. The problem is stable, no changes noted by patient. Will consider monitoring labs and refill medications as appropriate. Patient counseled and will continue current plan.

## 2019-09-24 LAB
ESTIMATED AVERAGE GLUCOSE: 119.8 MG/DL
FOLATE: 6.91 NG/ML (ref 4.78–24.2)
HBA1C MFR BLD: 5.8 %
VITAMIN B-12: 314 PG/ML (ref 211–911)

## 2021-08-11 LAB — DIABETIC RETINOPATHY: NEGATIVE

## 2021-08-14 LAB
CHOLESTEROL, TOTAL: NORMAL
CHOLESTEROL/HDL RATIO: NORMAL
CREATININE, URINE: NORMAL
HDLC SERPL-MCNC: NORMAL MG/DL
LDL CHOLESTEROL CALCULATED: NORMAL
MICROALBUMIN/CREAT 24H UR: NORMAL MG/G{CREAT}
MICROALBUMIN/CREAT UR-RTO: NORMAL
NONHDLC SERPL-MCNC: NORMAL MG/DL
TRIGL SERPL-MCNC: NORMAL MG/DL
VLDLC SERPL CALC-MCNC: NORMAL MG/DL

## 2021-08-16 LAB
ALBUMIN SERPL-MCNC: NORMAL G/DL
ALP BLD-CCNC: NORMAL U/L
ALT SERPL-CCNC: NORMAL U/L
ANION GAP SERPL CALCULATED.3IONS-SCNC: NORMAL MMOL/L
ANTIBODY: NORMAL
AST SERPL-CCNC: NORMAL U/L
AVERAGE GLUCOSE: NORMAL
BASOPHILS ABSOLUTE: NORMAL
BASOPHILS RELATIVE PERCENT: NORMAL
BILIRUB SERPL-MCNC: NORMAL MG/DL
BUN BLDV-MCNC: NORMAL MG/DL
CALCIUM SERPL-MCNC: NORMAL MG/DL
CHLORIDE BLD-SCNC: NORMAL MMOL/L
CO2: NORMAL
CREAT SERPL-MCNC: NORMAL MG/DL
EOSINOPHILS ABSOLUTE: NORMAL
EOSINOPHILS RELATIVE PERCENT: NORMAL
GFR CALCULATED: NORMAL
GLUCOSE BLD-MCNC: NORMAL MG/DL
HBA1C MFR BLD: NORMAL %
HCT VFR BLD CALC: NORMAL %
HEMOGLOBIN: NORMAL
LYMPHOCYTES ABSOLUTE: NORMAL
LYMPHOCYTES RELATIVE PERCENT: NORMAL
MCH RBC QN AUTO: NORMAL PG
MCHC RBC AUTO-ENTMCNC: NORMAL G/DL
MCV RBC AUTO: NORMAL FL
MONOCYTES ABSOLUTE: NORMAL
MONOCYTES RELATIVE PERCENT: NORMAL
NEUTROPHILS ABSOLUTE: NORMAL
NEUTROPHILS RELATIVE PERCENT: NORMAL
PLATELET # BLD: NORMAL 10*3/UL
PMV BLD AUTO: NORMAL FL
POTASSIUM SERPL-SCNC: NORMAL MMOL/L
RBC # BLD: NORMAL 10*6/UL
SODIUM BLD-SCNC: NORMAL MMOL/L
TOTAL PROTEIN: NORMAL
WBC # BLD: NORMAL 10*3/UL

## 2021-11-15 ENCOUNTER — TELEPHONE (OUTPATIENT)
Dept: PRIMARY CARE CLINIC | Age: 66
End: 2021-11-15

## 2021-11-15 NOTE — TELEPHONE ENCOUNTER
----- Message from Hiren Gunter MA sent at 11/15/2021 12:12 PM EST -----  Subject: Appointment Request    Reason for Call: Routine Existing Condition Follow Up (Diabetes)    QUESTIONS  Type of Appointment? Established Patient  Reason for appointment request? No appointments available during search  Additional Information for Provider? Requesting appt for flu shot, med   refill and flu shot. First avail is 12/28/21. Please email. Pt is in [facility]   now and will not be home until 11/19/21 late. Will call 11/22/21 for appt   time. does not have a phone # yet.   ---------------------------------------------------------------------------  --------------  [address]  What is the best way for the office to contact you? Do not leave any   message, patient will call back for answer  Preferred Call Back Phone Number? [phone]  ---------------------------------------------------------------------------  --------------  SCRIPT ANSWERS  Relationship to Patient? Self  (Is the patient requesting to be seen urgently for their symptoms?)? No  Is this follow up request related to routine Diabetes Management? Yes  Are you having any new concerns about your existing condition? No  Have you been diagnosed with, awaiting test results for, or told that you   are suspected of having COVID-19 (Coronavirus)? (If patient has tested   negative or was tested as a requirement for work, school, or travel and   not based on symptoms, answer no)? No  Within the past two weeks have you developed any of the following symptoms   (answer no if symptoms have been present longer than 2 weeks or began   more than 2 weeks ago)? Fever or Chills, Cough, Shortness of breath or   difficulty breathing, Loss of taste or smell, Sore throat, Nasal   congestion, Sneezing or runny nose, Fatigue or generalized body aches   (answer no if pain is specific to a body part e.g. back pain), Diarrhea,   Headache?  No  Have you had close contact with someone with COVID-19 in the last 14 days? No  (Service Expert  click yes below to proceed with Etece As Usual   Scheduling)?  Yes

## 2021-11-22 ENCOUNTER — TELEPHONE (OUTPATIENT)
Dept: PRIMARY CARE CLINIC | Age: 66
End: 2021-11-22

## 2021-11-22 NOTE — TELEPHONE ENCOUNTER
----- Message from Magdaleno Wade sent at 11/22/2021 10:50 AM EST -----  Subject: Refill Request    QUESTIONS  Name of Medication? Other - Catapres patch 2.5 mg  Patient-reported dosage and instructions? once a week  How many days do you have left? 3  [facility]erred Pharmacy? [facility] phone number (if available)? [phone]  Additional Information for Provider? patient scheduled for next available   1/4/2022 for med refills. she would like to come sooner if possible to   discuss meds that were prescribed to her while in [facility]  ---------------------------------------------------------------------------  --------------,  Name of Medication? Other - Trulicity  Patient-reported dosage and instructions? once a week  How many days do you have left? 2  Preferred Pharmacy[facility] phone number (if available)? [phone]  ---------------------------------------------------------------------------  --------------  Corry KUMAR  What is the best way for the office to contact you? OK to leave message on   voicemail  Preferred Call Back Phone Number?  [phone]

## 2021-11-22 NOTE — TELEPHONE ENCOUNTER
Are you able to get her in sooner for a new patient appointment with Zeferino Bah.  She hasn't been seen since 2017

## 2021-11-23 NOTE — TELEPHONE ENCOUNTER
appt scheduled for 12/13/21 NP with Dr. Tracie Landeros, advised pt that that was the earliest we had available. If she wanted sooner, she would need to establish with an other provider. Pt does not want to do that. Pt states that she will be out of one of her medications by then. Pt states that the last time she was seen she seen Dr. Adonis Pemberton. I reviewed pt chart and she seen Dr. Adonis Pemberton in 2019.    I spoke to Dr. Adonis Pemberton and he states that he will see her next week to refill her medications and she can keep appt with Dr. Tracie Landeros to establish on 12.13.21    appt scheduled with Adonis Pemberton for 11/29/21

## 2021-11-29 ENCOUNTER — TELEPHONE (OUTPATIENT)
Dept: PRIMARY CARE CLINIC | Age: 66
End: 2021-11-29

## 2021-11-29 ENCOUNTER — OFFICE VISIT (OUTPATIENT)
Dept: PRIMARY CARE CLINIC | Age: 66
End: 2021-11-29
Payer: MEDICARE

## 2021-11-29 VITALS
SYSTOLIC BLOOD PRESSURE: 114 MMHG | DIASTOLIC BLOOD PRESSURE: 68 MMHG | WEIGHT: 138 LBS | BODY MASS INDEX: 25.24 KG/M2 | TEMPERATURE: 96.8 F | HEART RATE: 91 BPM | OXYGEN SATURATION: 99 % | RESPIRATION RATE: 14 BRPM

## 2021-11-29 DIAGNOSIS — E78.2 MIXED HYPERLIPIDEMIA: Chronic | ICD-10-CM

## 2021-11-29 DIAGNOSIS — E11.42 TYPE 2 DIABETES MELLITUS WITH DIABETIC POLYNEUROPATHY, WITHOUT LONG-TERM CURRENT USE OF INSULIN (HCC): Primary | ICD-10-CM

## 2021-11-29 DIAGNOSIS — Z23 NEEDS FLU SHOT: ICD-10-CM

## 2021-11-29 DIAGNOSIS — K21.00 GASTROESOPHAGEAL REFLUX DISEASE WITH ESOPHAGITIS WITHOUT HEMORRHAGE: ICD-10-CM

## 2021-11-29 DIAGNOSIS — Z23 NEED FOR VACCINATION FOR PNEUMOCOCCUS: ICD-10-CM

## 2021-11-29 DIAGNOSIS — M54.81 CERVICO-OCCIPITAL NEURALGIA: ICD-10-CM

## 2021-11-29 DIAGNOSIS — I10 ESSENTIAL HYPERTENSION: Chronic | ICD-10-CM

## 2021-11-29 DIAGNOSIS — F33.1 MODERATE EPISODE OF RECURRENT MAJOR DEPRESSIVE DISORDER (HCC): ICD-10-CM

## 2021-11-29 DIAGNOSIS — G43.719 INTRACTABLE CHRONIC MIGRAINE WITHOUT AURA AND WITHOUT STATUS MIGRAINOSUS: ICD-10-CM

## 2021-11-29 LAB — HBA1C MFR BLD: 5.8 %

## 2021-11-29 PROCEDURE — 90694 VACC AIIV4 NO PRSRV 0.5ML IM: CPT | Performed by: INTERNAL MEDICINE

## 2021-11-29 PROCEDURE — 83036 HEMOGLOBIN GLYCOSYLATED A1C: CPT | Performed by: INTERNAL MEDICINE

## 2021-11-29 PROCEDURE — 1036F TOBACCO NON-USER: CPT | Performed by: INTERNAL MEDICINE

## 2021-11-29 PROCEDURE — 99214 OFFICE O/P EST MOD 30 MIN: CPT | Performed by: INTERNAL MEDICINE

## 2021-11-29 PROCEDURE — G8484 FLU IMMUNIZE NO ADMIN: HCPCS | Performed by: INTERNAL MEDICINE

## 2021-11-29 PROCEDURE — G8417 CALC BMI ABV UP PARAM F/U: HCPCS | Performed by: INTERNAL MEDICINE

## 2021-11-29 PROCEDURE — G0008 ADMIN INFLUENZA VIRUS VAC: HCPCS | Performed by: INTERNAL MEDICINE

## 2021-11-29 PROCEDURE — 3046F HEMOGLOBIN A1C LEVEL >9.0%: CPT | Performed by: INTERNAL MEDICINE

## 2021-11-29 PROCEDURE — 1123F ACP DISCUSS/DSCN MKR DOCD: CPT | Performed by: INTERNAL MEDICINE

## 2021-11-29 PROCEDURE — 2022F DILAT RTA XM EVC RTNOPTHY: CPT | Performed by: INTERNAL MEDICINE

## 2021-11-29 PROCEDURE — G8399 PT W/DXA RESULTS DOCUMENT: HCPCS | Performed by: INTERNAL MEDICINE

## 2021-11-29 PROCEDURE — G0009 ADMIN PNEUMOCOCCAL VACCINE: HCPCS | Performed by: INTERNAL MEDICINE

## 2021-11-29 PROCEDURE — 4040F PNEUMOC VAC/ADMIN/RCVD: CPT | Performed by: INTERNAL MEDICINE

## 2021-11-29 PROCEDURE — 90732 PPSV23 VACC 2 YRS+ SUBQ/IM: CPT | Performed by: INTERNAL MEDICINE

## 2021-11-29 PROCEDURE — 3017F COLORECTAL CA SCREEN DOC REV: CPT | Performed by: INTERNAL MEDICINE

## 2021-11-29 PROCEDURE — 1090F PRES/ABSN URINE INCON ASSESS: CPT | Performed by: INTERNAL MEDICINE

## 2021-11-29 PROCEDURE — G8427 DOCREV CUR MEDS BY ELIG CLIN: HCPCS | Performed by: INTERNAL MEDICINE

## 2021-11-29 RX ORDER — GABAPENTIN 300 MG/1
300 CAPSULE ORAL 4 TIMES DAILY
Qty: 360 CAPSULE | Refills: 1 | Status: SHIPPED | OUTPATIENT
Start: 2021-11-29 | End: 2021-12-13

## 2021-11-29 RX ORDER — PANTOPRAZOLE SODIUM 40 MG/1
40 TABLET, DELAYED RELEASE ORAL DAILY
Qty: 30 TABLET | Refills: 3 | Status: SHIPPED | OUTPATIENT
Start: 2021-11-29 | End: 2021-12-23 | Stop reason: SDUPTHER

## 2021-11-29 RX ORDER — BLOOD SUGAR DIAGNOSTIC
100 STRIP MISCELLANEOUS 2 TIMES DAILY
COMMUNITY
End: 2021-11-29 | Stop reason: SDUPTHER

## 2021-11-29 RX ORDER — DULAGLUTIDE 1.5 MG/.5ML
1.5 INJECTION, SOLUTION SUBCUTANEOUS WEEKLY
Qty: 5 PEN | Refills: 3 | Status: SHIPPED | OUTPATIENT
Start: 2021-11-29 | End: 2021-12-13 | Stop reason: DRUGHIGH

## 2021-11-29 RX ORDER — ATORVASTATIN CALCIUM 20 MG/1
20 TABLET, FILM COATED ORAL DAILY
Qty: 30 TABLET | Refills: 3 | Status: SHIPPED | OUTPATIENT
Start: 2021-11-29 | End: 2021-12-23 | Stop reason: SDUPTHER

## 2021-11-29 RX ORDER — BLOOD-GLUCOSE METER
1 KIT MISCELLANEOUS DAILY
Qty: 1 KIT | Refills: 0 | Status: SHIPPED | OUTPATIENT
Start: 2021-11-29 | End: 2022-05-24

## 2021-11-29 RX ORDER — CLONIDINE 0.1 MG/24H
1 PATCH, EXTENDED RELEASE TRANSDERMAL WEEKLY
Qty: 4 PATCH | Refills: 5 | Status: SHIPPED | OUTPATIENT
Start: 2021-11-29 | End: 2021-12-13 | Stop reason: SDUPTHER

## 2021-11-29 SDOH — ECONOMIC STABILITY: HOUSING INSECURITY
IN THE LAST 12 MONTHS, WAS THERE A TIME WHEN YOU DID NOT HAVE A STEADY PLACE TO SLEEP OR SLEPT IN A SHELTER (INCLUDING NOW)?: NO

## 2021-11-29 SDOH — ECONOMIC STABILITY: TRANSPORTATION INSECURITY
IN THE PAST 12 MONTHS, HAS LACK OF TRANSPORTATION KEPT YOU FROM MEETINGS, WORK, OR FROM GETTING THINGS NEEDED FOR DAILY LIVING?: NO

## 2021-11-29 SDOH — ECONOMIC STABILITY: INCOME INSECURITY: IN THE LAST 12 MONTHS, WAS THERE A TIME WHEN YOU WERE NOT ABLE TO PAY THE MORTGAGE OR RENT ON TIME?: NO

## 2021-11-29 SDOH — HEALTH STABILITY: PHYSICAL HEALTH: ON AVERAGE, HOW MANY DAYS PER WEEK DO YOU ENGAGE IN MODERATE TO STRENUOUS EXERCISE (LIKE A BRISK WALK)?: 3 DAYS

## 2021-11-29 SDOH — ECONOMIC STABILITY: FOOD INSECURITY: WITHIN THE PAST 12 MONTHS, THE FOOD YOU BOUGHT JUST DIDN'T LAST AND YOU DIDN'T HAVE MONEY TO GET MORE.: NEVER TRUE

## 2021-11-29 SDOH — ECONOMIC STABILITY: HOUSING INSECURITY: IN THE LAST 12 MONTHS, HOW MANY PLACES HAVE YOU LIVED?: 1

## 2021-11-29 SDOH — ECONOMIC STABILITY: FOOD INSECURITY: WITHIN THE PAST 12 MONTHS, YOU WORRIED THAT YOUR FOOD WOULD RUN OUT BEFORE YOU GOT MONEY TO BUY MORE.: NEVER TRUE

## 2021-11-29 SDOH — HEALTH STABILITY: PHYSICAL HEALTH: ON AVERAGE, HOW MANY MINUTES DO YOU ENGAGE IN EXERCISE AT THIS LEVEL?: 30 MIN

## 2021-11-29 SDOH — ECONOMIC STABILITY: TRANSPORTATION INSECURITY
IN THE PAST 12 MONTHS, HAS THE LACK OF TRANSPORTATION KEPT YOU FROM MEDICAL APPOINTMENTS OR FROM GETTING MEDICATIONS?: NO

## 2021-11-29 ASSESSMENT — LIFESTYLE VARIABLES: HOW OFTEN DO YOU HAVE A DRINK CONTAINING ALCOHOL: NEVER

## 2021-11-29 ASSESSMENT — SOCIAL DETERMINANTS OF HEALTH (SDOH)
IN A TYPICAL WEEK, HOW MANY TIMES DO YOU TALK ON THE PHONE WITH FAMILY, FRIENDS, OR NEIGHBORS?: THREE TIMES A WEEK
HOW OFTEN DO YOU GET TOGETHER WITH FRIENDS OR RELATIVES?: THREE TIMES A WEEK
HOW HARD IS IT FOR YOU TO PAY FOR THE VERY BASICS LIKE FOOD, HOUSING, MEDICAL CARE, AND HEATING?: NOT HARD AT ALL
HOW OFTEN DO YOU ATTENT MEETINGS OF THE CLUB OR ORGANIZATION YOU BELONG TO?: NEVER
HOW OFTEN DO YOU ATTEND CHURCH OR RELIGIOUS SERVICES?: NEVER
DO YOU BELONG TO ANY CLUBS OR ORGANIZATIONS SUCH AS CHURCH GROUPS UNIONS, FRATERNAL OR ATHLETIC GROUPS, OR SCHOOL GROUPS?: NO

## 2021-11-29 NOTE — PROGRESS NOTES
Subjective:      Patient ID: Garvin Habermann is a 73 y.o. female. HPI  Established patient here for a visit to manage acute and chronic medical conditions as detailed below. Type 2 diabetes mellitus with diabetic polyneuropathy, without long-term current use of insulin ([facility] [address]  This problem was reviewed in detail. It is under control and stable. Will check blood work. Patient is compliant w medications, no side effects, effective, provides adequate symptom relief. No new symptoms or problems as noted by patient. The problem is stable, no changes noted by patient. Will consider monitoring labs and refill medications as appropriate. Patient counseled and will continue current plan. Essential hypertension  This is a chronic problem. The problem is well controlled. Patient monitors readings regularly. Pertinent negatives include no chest pain, focal sensory loss, focal weakness, leg pain, myalgias or shortness of breath. No headaches or chest pain. Takes medications regularly. Blood pressure has been stable, blood work was reviewed, and advised patient to continue the current instructions or medications. GERD (Gastroesophageal Reflux Disease)  ON OMEPRAZOLE,  Patient is compliant w medications, no side effects, effective, provides adequate symptom relief. No new symptoms or problems as noted by patient. The problem is stable, no changes noted by patient. Will consider monitoring labs and refill medications as appropriate. Patient counseled and will continue current plan. Mixed hyperlipidemia  This has been a long standing problem, takes lipitor      Monitors diet and tries to follow a low fat diet. Has  been reasonably  compliant w exercise. Lipids have been stable, The problem is controlled. Recent lipid tests were reviewed and are normal. Pertinent negatives include no chest pain, focal sensory loss, focal weakness, leg pain, myalgias or shortness of breath.   Advised patient to continue the current instructions or medications. Intractable chronic migraine without aura  On gabapentin,  Patient is compliant w medications, no side effects, effective, provides adequate symptom relief. No new symptoms or problems as noted by patient. The problem is stable, no changes noted by patient. Will consider monitoring labs and refill medications as appropriate. Patient counseled and will continue current plan. Review of Systems  ROS: No unusual headaches or allergy symptoms or blurred vision. No prolonged cough. No flushing or facial pain or chest pain,dizziness, dyspnea, palpitations, or chest pain on exertion. No syncope. No nausea or vommitting or diarrhea. No jaundice or abdominal pain, change in bowel habits, black or bloody stools. No dysuria or hematuria or frequency of urination. No myalgias or muscle pain. No numbness, weakness, or tingling. No falls, or loss of consciousness. No weight loss or back pain. No falls. No paresthesias. No joint swelling or redness. No joint pain. No recent weight loss. No focal weakness or sensory deficits or paresthesias, No confusion or altered sensorium. No hematemesis. No hearing loss. No siezures. All other systems were reviewed, and review was negative. Objective:   Physical Exam  /68 (Site: Left Upper Arm, Position: Sitting, Cuff Size: Medium Adult)   Pulse 91   Temp 96.8 °F (36 °C)   Resp 14   Wt 138 lb (62.6 kg)   SpO2 99%   BMI 25.24 kg/m²    The physical exam reveals a patient who appears well, alert and oriented x 3, pleasant, cooperative. Vitals are as noted. Head is atraumatic and normocephalic. Eyes reveal normal conjunctiva, cornea normal, pupils are equal and rective to light. Nasal mucosa is normal. Throat is normal without exudates. Ears reveal normal tympanic membranes. Neck is supple and free of adenopathy, or masses. No thyromegaly. No jugular venous distension. Lungs are clear to auscultation, no rales or rhonchi noted. Heart sounds are regular , no murmurs, clicks, gallops or rubs. Abdomen is soft, no tenderness, masses or organomegaly. Bowel sounds are normally heard. Pelvis: normal. Extremities are normal. Peripheral pulses are normal. Screening neurological exam is normal without focal findings. Cranial nerves are intact, reflexes are symmetrical and muscle strength eaqual. Skin is normal without suspicious lesions noted. Assessment:      Type 2 diabetes mellitus with diabetic polyneuropathy, without long-term current use of insulin ([address]  This problem was reviewed in detail. It is under control and stable. Will check blood work. Patient is compliant w medications, no side effects, effective, provides adequate symptom relief. No new symptoms or problems as noted by patient. The problem is stable, no changes noted by patient. Will consider monitoring labs and refill medications as appropriate. Patient counseled and will continue current plan. Essential hypertension  This is a chronic problem. The problem is well controlled. Patient monitors readings regularly. Pertinent negatives include no chest pain, focal sensory loss, focal weakness, leg pain, myalgias or shortness of breath. No headaches or chest pain. Takes medications regularly. Blood pressure has been stable, blood work was reviewed, and advised patient to continue the current instructions or medications. GERD (Gastroesophageal Reflux Disease)  ON OMEPRAZOLE,  Patient is compliant w medications, no side effects, effective, provides adequate symptom relief. No new symptoms or problems as noted by patient. The problem is stable, no changes noted by patient. Will consider monitoring labs and refill medications as appropriate. Patient counseled and will continue current plan. Mixed hyperlipidemia  This has been a long standing problem, takes lipitor      Monitors diet and tries to follow a low fat diet. Has  been reasonably  compliant w exercise. Lipids have been stable, The problem is controlled. Recent lipid tests were reviewed and are normal. Pertinent negatives include no chest pain, focal sensory loss, focal weakness, leg pain, myalgias or shortness of breath. Advised patient to continue the current instructions or medications. Intractable chronic migraine without aura  On gabapentin,  Patient is compliant w medications, no side effects, effective, provides adequate symptom relief. No new symptoms or problems as noted by patient. The problem is stable, no changes noted by patient. Will consider monitoring labs and refill medications as appropriate. Patient counseled and will continue current plan. Plan:      Refill meds,  She needs f/u with her PCP shortly.          Laurie Renee MD

## 2021-11-29 NOTE — ASSESSMENT & PLAN NOTE
This problem was reviewed in detail. It is under control and stable. Will check blood work. Patient is compliant w medications, no side effects, effective, provides adequate symptom relief. No new symptoms or problems as noted by patient. The problem is stable, no changes noted by patient. Will consider monitoring labs and refill medications as appropriate. Patient counseled and will continue current plan.

## 2021-11-29 NOTE — TELEPHONE ENCOUNTER
Medication:   Requested Prescriptions     Pending Prescriptions Disp Refills    Lancets MISC 100 each 3     Si each by Other route daily 250.00    blood glucose test strips (GLUCOSE METER TEST) strip 100 each 1     Si each by In Vitro route 2 times daily Diabetes   e11.9       Last Filled:      Patient Phone Number: [phone] (home)     Last appt: 2021   Next appt: 2021    Last Labs DM:   Lab Results   Component Value Date    LABA1C 5.8 2021       Last OARRS:   RX Monitoring 2017   Periodic Controlled Substance Monitoring Possible medication side effects, risk of tolerance and/or dependence, and alternative treatments discussed; No signs of potential drug abuse or diversion identified. Preferred Pharmacy: [facility] [address], [address] [facility] [phone]  [facility] [address] [place] [address]  Phone: [phone] Fax: [phone] [facility], [address] [place], [place] [address] [facility] [phone] [facility] [phone]  [facility]  [address] [place] [address]  Phone: [phone] Fax: [phone]    [facility] [address], [address] [facility] [phone] f [phone]  [address] [place] [address]  Phone: [phone] Fax: [phone]    [facility], [address] [facility] [phone] - f [phone]  [facility] [address]   [facility] [address]  Phone: [phone] Fax: [phone]

## 2021-11-29 NOTE — TELEPHONE ENCOUNTER
[facility]  Calling to inform patient needed    [employer] Brand: Meter, Lancets, Test Strips also mentioned patient is testing once a day

## 2021-11-29 NOTE — TELEPHONE ENCOUNTER
Pharmacy called and is still waiting for medication for the following for lancets, and test strips.  Please just send generic scripts because medicare won't cover the freestyle      Thank you

## 2021-11-29 NOTE — ASSESSMENT & PLAN NOTE
ON OMEPRAZOLE,  Patient is compliant w medications, no side effects, effective, provides adequate symptom relief. No new symptoms or problems as noted by patient. The problem is stable, no changes noted by patient. Will consider monitoring labs and refill medications as appropriate. Patient counseled and will continue current plan.

## 2021-11-30 ENCOUNTER — TELEPHONE (OUTPATIENT)
Dept: PRIMARY CARE CLINIC | Age: 66
End: 2021-11-30

## 2021-11-30 RX ORDER — LANCETS 30 GAUGE
1 EACH MISCELLANEOUS DAILY
Qty: 100 EACH | Refills: 3 | Status: SHIPPED | OUTPATIENT
Start: 2021-11-30 | End: 2021-12-01 | Stop reason: SDUPTHER

## 2021-11-30 RX ORDER — BLOOD-GLUCOSE METER
EACH MISCELLANEOUS
Qty: 1 EACH | Refills: 0 | Status: SHIPPED | OUTPATIENT
Start: 2021-11-30 | End: 2021-12-01 | Stop reason: SDUPTHER

## 2021-11-30 RX ORDER — BLOOD SUGAR DIAGNOSTIC
100 STRIP MISCELLANEOUS 2 TIMES DAILY
Qty: 100 EACH | Refills: 1 | Status: SHIPPED | OUTPATIENT
Start: 2021-11-30

## 2021-11-30 NOTE — TELEPHONE ENCOUNTER
Received a call from [facility] pharmacy on behalf of patient   They are trying to get a prescription equivalent to a medication pt was taking in [facility] called Monse Short for her blood pressure pt is also on a patch call cataplesanttsi 2.5mg     According to pharmacist the closes drug to the [facility]. with the same active metabolite is spironolactone     Please review

## 2021-11-30 NOTE — TELEPHONE ENCOUNTER
Already gave her a script for clonidine patches. Regarding Brandon Pierce, there is no equivalent here. I suggest she can figure that out with her PCP.

## 2021-12-01 ENCOUNTER — TELEPHONE (OUTPATIENT)
Dept: PRIMARY CARE CLINIC | Age: 66
End: 2021-12-01

## 2021-12-01 DIAGNOSIS — E11.42 TYPE 2 DIABETES MELLITUS WITH DIABETIC POLYNEUROPATHY, WITHOUT LONG-TERM CURRENT USE OF INSULIN (HCC): ICD-10-CM

## 2021-12-01 RX ORDER — BLOOD-GLUCOSE METER
EACH MISCELLANEOUS
Qty: 1 EACH | Refills: 0 | Status: SHIPPED | OUTPATIENT
Start: 2021-12-01

## 2021-12-01 RX ORDER — GLUCOSAMINE HCL/CHONDROITIN SU 500-400 MG
CAPSULE ORAL DAILY
Qty: 100 STRIP | Refills: 0 | Status: SHIPPED | OUTPATIENT
Start: 2021-12-01 | End: 2022-03-07 | Stop reason: SDUPTHER

## 2021-12-01 RX ORDER — LANCETS 30 GAUGE
1 EACH MISCELLANEOUS DAILY
Qty: 100 EACH | Refills: 0 | Status: SHIPPED | OUTPATIENT
Start: 2021-12-01 | End: 2022-03-07 | Stop reason: SDUPTHER

## 2021-12-01 NOTE — TELEPHONE ENCOUNTER
Pt voices concerns of a localize allergic reaction    Pt states she got a flu and Pneumonia vaccine yesterday, she is not sure which vacine she is having the allergic reaction to, but her R arm is currently red and painful to move, she also voices concerns of fatigue as well   Please advise

## 2021-12-01 NOTE — TELEPHONE ENCOUNTER
Reynold Mascorro needs new Rx's for diabetic supplies that has the Dx code on them. I have pended the order.

## 2021-12-13 ENCOUNTER — OFFICE VISIT (OUTPATIENT)
Dept: PRIMARY CARE CLINIC | Age: 66
End: 2021-12-13
Payer: MEDICARE

## 2021-12-13 VITALS
WEIGHT: 140.2 LBS | HEIGHT: 62 IN | HEART RATE: 86 BPM | SYSTOLIC BLOOD PRESSURE: 124 MMHG | DIASTOLIC BLOOD PRESSURE: 76 MMHG | BODY MASS INDEX: 25.8 KG/M2 | RESPIRATION RATE: 16 BRPM | OXYGEN SATURATION: 98 % | TEMPERATURE: 97.2 F

## 2021-12-13 DIAGNOSIS — J34.89 INTERNAL NASAL LESION: ICD-10-CM

## 2021-12-13 DIAGNOSIS — E11.42 TYPE 2 DIABETES MELLITUS WITH DIABETIC POLYNEUROPATHY, WITHOUT LONG-TERM CURRENT USE OF INSULIN (HCC): Primary | ICD-10-CM

## 2021-12-13 DIAGNOSIS — I10 ESSENTIAL HYPERTENSION: ICD-10-CM

## 2021-12-13 DIAGNOSIS — J45.30 MILD PERSISTENT ASTHMA WITHOUT COMPLICATION: ICD-10-CM

## 2021-12-13 DIAGNOSIS — Z12.31 ENCOUNTER FOR SCREENING MAMMOGRAM FOR BREAST CANCER: ICD-10-CM

## 2021-12-13 DIAGNOSIS — E78.2 MIXED HYPERLIPIDEMIA: ICD-10-CM

## 2021-12-13 DIAGNOSIS — H53.8 BLURRY VISION, LEFT EYE: ICD-10-CM

## 2021-12-13 DIAGNOSIS — M54.81 CERVICO-OCCIPITAL NEURALGIA: ICD-10-CM

## 2021-12-13 PROCEDURE — 2022F DILAT RTA XM EVC RTNOPTHY: CPT | Performed by: INTERNAL MEDICINE

## 2021-12-13 PROCEDURE — 1036F TOBACCO NON-USER: CPT | Performed by: INTERNAL MEDICINE

## 2021-12-13 PROCEDURE — 3017F COLORECTAL CA SCREEN DOC REV: CPT | Performed by: INTERNAL MEDICINE

## 2021-12-13 PROCEDURE — 1123F ACP DISCUSS/DSCN MKR DOCD: CPT | Performed by: INTERNAL MEDICINE

## 2021-12-13 PROCEDURE — 4040F PNEUMOC VAC/ADMIN/RCVD: CPT | Performed by: INTERNAL MEDICINE

## 2021-12-13 PROCEDURE — G8484 FLU IMMUNIZE NO ADMIN: HCPCS | Performed by: INTERNAL MEDICINE

## 2021-12-13 PROCEDURE — G8427 DOCREV CUR MEDS BY ELIG CLIN: HCPCS | Performed by: INTERNAL MEDICINE

## 2021-12-13 PROCEDURE — 99214 OFFICE O/P EST MOD 30 MIN: CPT | Performed by: INTERNAL MEDICINE

## 2021-12-13 PROCEDURE — 1090F PRES/ABSN URINE INCON ASSESS: CPT | Performed by: INTERNAL MEDICINE

## 2021-12-13 PROCEDURE — G8417 CALC BMI ABV UP PARAM F/U: HCPCS | Performed by: INTERNAL MEDICINE

## 2021-12-13 PROCEDURE — G8399 PT W/DXA RESULTS DOCUMENT: HCPCS | Performed by: INTERNAL MEDICINE

## 2021-12-13 RX ORDER — CLONIDINE 0.1 MG/24H
1 PATCH, EXTENDED RELEASE TRANSDERMAL WEEKLY
Qty: 12 PATCH | Refills: 1 | Status: SHIPPED | OUTPATIENT
Start: 2021-12-13 | End: 2022-05-09

## 2021-12-13 RX ORDER — DULAGLUTIDE 0.75 MG/.5ML
0.75 INJECTION, SOLUTION SUBCUTANEOUS WEEKLY
Qty: 2 ML | Refills: 0 | Status: SHIPPED | OUTPATIENT
Start: 2021-12-13 | End: 2022-01-05

## 2021-12-13 RX ORDER — BUDESONIDE AND FORMOTEROL FUMARATE DIHYDRATE 160; 4.5 UG/1; UG/1
2 AEROSOL RESPIRATORY (INHALATION) 2 TIMES DAILY
Qty: 30.6 G | Refills: 1 | Status: SHIPPED | OUTPATIENT
Start: 2021-12-13 | End: 2022-05-16 | Stop reason: SDUPTHER

## 2021-12-13 RX ORDER — GABAPENTIN 300 MG/1
300 CAPSULE ORAL DAILY
COMMUNITY
Start: 2021-12-13 | End: 2022-05-24 | Stop reason: SDUPTHER

## 2021-12-13 NOTE — PROGRESS NOTES
Yasemin Gonzalez   Date ofBirth:  1955    Date of Visit:  12/13/2021    Chief Complaint   Patient presents with    Establish Care    Headache    Diabetes    Other     Requesting Blood work       HPI  Patient decreases salt   No added salt but used to  Decrease carbohydrates  Pasta no more than 70 gram per day  Bread 70-80- gram 2 times per day      Chronic headaches    Wound in nose for a little while  Left nostril  Vision blurry  Neosporin doesn't hep  Sob with hurrying  Review of Systems    Allergies   Allergen Reactions    Aspirin      bruising    Darvocet [Propoxyphene N-Acetaminophen] Hives    Effexor [Venlafaxine Hydrochloride] Hives    Hydrocodone Hives and Itching    Keflex [Cephalexin] Itching     Face redness.  Paxil Cr [Paroxetine Hcl Er] Itching     Outpatient Medications Marked as Taking for the 12/13/21 encounter (Office Visit) with Gary Sinhg MD   Medication Sig Dispense Refill    Blood Glucose Monitoring Suppl (TRUE METRIX METER) DMITRIY Test blood sugar daily and PRN E11.9 1 each 0    blood glucose monitor strips by Other route daily E11.9 100 strip 0    Lancets MISC 1 each by Other route daily E11.9 100 each 0    blood glucose test strips (GLUCOSE METER TEST) strip 100 each by In Vitro route 2 times daily Diabetes   e11.9 100 each 1    Dulaglutide (TRULICITY) 1.5 MP/3.0TG SOPN Inject 1.5 mg into the skin once a week (Patient taking differently: Inject 0.75 mg into the skin once a week ) 5 pen 3    cloNIDine (CATAPRES-TTS-1) 0.1 MG/24HR PTWK Place 1 patch onto the skin once a week 4 patch 5    gabapentin (NEURONTIN) 300 MG capsule Take 1 capsule by mouth 4 times daily for 30 days. Take 1 capsule by mouth 4 times daily (Patient taking differently: Take 300 mg by mouth.  Take 1 capsule by mouth daily) 360 capsule 1    pantoprazole (PROTONIX) 40 MG tablet Take 1 tablet by mouth daily Take 40 mg by mouth daily 30 tablet 3    atorvastatin (LIPITOR) 20 MG tablet Take 1 tablet by mouth daily Take 20 mg by mouth daily 30 tablet 3    diclofenac sodium (VOLTAREN) 1 % GEL Apply 2 g topically 4 times daily 45 g 0    amitriptyline (ELAVIL) 25 MG tablet Take 2 tablets by mouth nightly 180 tablet 0    Bisacodyl (DULCOLAX PO) Take 1 tablet by mouth      butalbital-acetaminophen-caffeine (FIORICET, ESGIC) -40 MG per tablet TAKE 1 TO 2 TABLETS BY MOUTH EVERY 6 HOURS AS NEEDED  0    beclomethasone (QVAR) 80 MCG/ACT inhaler Inhale 2 puffs into the lungs 2 times daily. Vitals:    12/13/21 1646   BP: 124/76   Pulse: 86   Resp: 16   Temp: 97.2 °F (36.2 °C)   SpO2: 98%   Weight: 140 lb 3.2 oz (63.6 kg)   Height: 5' 2\" (1.575 m)     Body mass index is 25.64 kg/m². Physical Exam      No results found for this visit on 12/13/21. Lab Review   Office Visit on 11/29/2021   Component Date Value    Hemoglobin A1C 11/29/2021 5.8          Assessment/Plan     1. Type 2 diabetes mellitus with diabetic polyneuropathy, without long-term current use of insulin (HCC)    - Dulaglutide (TRULICITY) 7.77 XW/7.6WH SOPN; Inject 0.75 mg into the skin once a week  Dispense: 2 mL; Refill: 0  - Comprehensive Metabolic Panel; Future  - CEFERINO - Yfn Vigil MD, (Comprehensive Ophthalmology, Cataract Surgery) [facility]    2. Cervico-occipital neuralgia   gabapentin (NEURONTIN) 300 MG capsule; Take 1 capsule by mouth daily. Take 1 capsule by mouth daily    3. Essential hypertension    - cloNIDine (CATAPRES-TTS-1) 0.1 MG/24HR PTWK; Place 1 patch onto the skin once a week  Dispense: 12 patch; Refill: 1  - Comprehensive Metabolic Panel; Future    4. Mixed hyperlipidemia    - Comprehensive Metabolic Panel; Future  - Lipid Panel; Future    5. Mild persistent asthma without complication      6. Encounter for screening mammogram for breast cancer    - STACI DIGITAL SCREEN W OR WO CAD BILATERAL; Future    7.  Blurry vision, left eye    - CEFERINO Vigil MD, (Comprehensive Ophthalmology, Cataract Surgery) Ophthalmology, [facility]    8. Internal nasal lesion    - Britta Burnett DO, Otolaryngology, [facility]      Discussedmedications with patient, who voiced understanding of their use and indications. All questions answered. No follow-ups on file.

## 2021-12-13 NOTE — PATIENT INSTRUCTIONS
-Continue same medications  -Low sodium diet  -Low fat, low cholesterol diet  -low carbohydrate diet  -Regular aerobic exercise  -Have a mammogram done

## 2021-12-14 ENCOUNTER — TELEPHONE (OUTPATIENT)
Dept: PRIMARY CARE CLINIC | Age: 66
End: 2021-12-14

## 2021-12-14 NOTE — TELEPHONE ENCOUNTER
Patient called wanted to verify that her rx were sent to [facility] because they advised her they did not receive anything

## 2021-12-15 DIAGNOSIS — E78.2 MIXED HYPERLIPIDEMIA: ICD-10-CM

## 2021-12-15 DIAGNOSIS — I10 ESSENTIAL HYPERTENSION: ICD-10-CM

## 2021-12-15 DIAGNOSIS — E11.42 TYPE 2 DIABETES MELLITUS WITH DIABETIC POLYNEUROPATHY, WITHOUT LONG-TERM CURRENT USE OF INSULIN (HCC): ICD-10-CM

## 2021-12-15 LAB
A/G RATIO: 2.2 (ref 1.1–2.2)
ALBUMIN SERPL-MCNC: 4.6 G/DL (ref 3.4–5)
ALP BLD-CCNC: 77 U/L (ref 40–129)
ALT SERPL-CCNC: 22 U/L (ref 10–40)
ANION GAP SERPL CALCULATED.3IONS-SCNC: 14 MMOL/L (ref 3–16)
AST SERPL-CCNC: 20 U/L (ref 15–37)
BILIRUB SERPL-MCNC: 0.9 MG/DL (ref 0–1)
BUN BLDV-MCNC: 17 MG/DL (ref 7–20)
CALCIUM SERPL-MCNC: 9.8 MG/DL (ref 8.3–10.6)
CHLORIDE BLD-SCNC: 101 MMOL/L (ref 99–110)
CHOLESTEROL, TOTAL: 146 MG/DL (ref 0–199)
CO2: 23 MMOL/L (ref 21–32)
CREAT SERPL-MCNC: 0.6 MG/DL (ref 0.6–1.2)
GFR AFRICAN AMERICAN: >60
GFR NON-AFRICAN AMERICAN: >60
GLUCOSE BLD-MCNC: 103 MG/DL (ref 70–99)
HDLC SERPL-MCNC: 51 MG/DL (ref 40–60)
LDL CHOLESTEROL CALCULATED: 67 MG/DL
POTASSIUM SERPL-SCNC: 4.7 MMOL/L (ref 3.5–5.1)
SODIUM BLD-SCNC: 138 MMOL/L (ref 136–145)
TOTAL PROTEIN: 6.7 G/DL (ref 6.4–8.2)
TRIGL SERPL-MCNC: 140 MG/DL (ref 0–150)
VLDLC SERPL CALC-MCNC: 28 MG/DL

## 2021-12-20 ENCOUNTER — TELEPHONE (OUTPATIENT)
Dept: PRIMARY CARE CLINIC | Age: 66
End: 2021-12-20

## 2021-12-20 NOTE — TELEPHONE ENCOUNTER
----- Message from Saba Nilda sent at 12/20/2021  3:00 PM EST -----  Subject: Message to Provider    QUESTIONS  Information for Provider? pt is calling stating that she had an   appointment for lab work on Monday and would like to know her result for   her blood work   ---------------------------------------------------------------------------  --------------  [address]  What is the best way for the office to contact you? OK to leave message on   voicemail  Preferred Call Back Phone Number?  [phone]  ---------------------------------------------------------------------------  --------------  SCRIPT ANSWERS  undefined

## 2021-12-21 DIAGNOSIS — K21.00 GASTROESOPHAGEAL REFLUX DISEASE WITH ESOPHAGITIS WITHOUT HEMORRHAGE: ICD-10-CM

## 2021-12-21 DIAGNOSIS — E78.2 MIXED HYPERLIPIDEMIA: Primary | ICD-10-CM

## 2021-12-21 NOTE — TELEPHONE ENCOUNTER
Medication:   Requested Prescriptions     Pending Prescriptions Disp Refills    atorvastatin (LIPITOR) 20 MG tablet 30 tablet 3     Sig: Take 1 tablet by mouth daily Take 20 mg by mouth daily    pantoprazole (PROTONIX) 40 MG tablet 30 tablet 3     Sig: Take 1 tablet by mouth daily Take 40 mg by mouth daily     Last Filled: 11.29.21 11.29.21    Last appt: 12/13/2021   Next appt: 3/14/2022    Last OARRS:   RX Monitoring 8/7/2017   Periodic Controlled Substance Monitoring Possible medication side effects, risk of tolerance and/or dependence, and alternative treatments discussed; No signs of potential drug abuse or diversion identified.

## 2021-12-23 ENCOUNTER — HOSPITAL ENCOUNTER (OUTPATIENT)
Dept: WOMENS IMAGING | Age: 66
Discharge: HOME | End: 2021-12-23
Payer: MEDICARE

## 2021-12-23 DIAGNOSIS — Z12.31 ENCOUNTER FOR SCREENING MAMMOGRAM FOR BREAST CANCER: ICD-10-CM

## 2021-12-23 PROCEDURE — 77067 SCR MAMMO BI INCL CAD: CPT

## 2021-12-23 RX ORDER — ATORVASTATIN CALCIUM 20 MG/1
20 TABLET, FILM COATED ORAL DAILY
Qty: 90 TABLET | Refills: 1 | Status: SHIPPED | OUTPATIENT
Start: 2021-12-23 | End: 2022-01-30 | Stop reason: SDUPTHER

## 2021-12-23 RX ORDER — PANTOPRAZOLE SODIUM 40 MG/1
40 TABLET, DELAYED RELEASE ORAL DAILY
Qty: 90 TABLET | Refills: 1 | Status: SHIPPED | OUTPATIENT
Start: 2021-12-23 | End: 2022-01-30 | Stop reason: SDUPTHER

## 2021-12-26 ENCOUNTER — TELEPHONE (OUTPATIENT)
Dept: PRIMARY CARE CLINIC | Age: 66
End: 2021-12-26

## 2021-12-26 DIAGNOSIS — R82.90 MALODOROUS URINE: ICD-10-CM

## 2021-12-26 DIAGNOSIS — R35.1 NOCTURIA: Primary | ICD-10-CM

## 2021-12-27 NOTE — TELEPHONE ENCOUNTER
Spoke with patient. She states she has urinary frequency multiple times per night and urine odor. Patient has had symptoms for a while. I recommend a urinalysis and urine culture. Patient to have labs done.

## 2021-12-29 ENCOUNTER — TELEPHONE (OUTPATIENT)
Dept: PRIMARY CARE CLINIC | Age: 66
End: 2021-12-29

## 2021-12-29 NOTE — TELEPHONE ENCOUNTER
Pt voices concerns of Ear pain. Pain in her throat , red suzanne around her lips and nose congestion.  No appt available please advise

## 2021-12-30 ENCOUNTER — VIRTUAL VISIT (OUTPATIENT)
Dept: PRIMARY CARE CLINIC | Age: 66
End: 2021-12-30
Payer: MEDICARE

## 2021-12-30 ENCOUNTER — NURSE ONLY (OUTPATIENT)
Dept: PRIMARY CARE CLINIC | Age: 66
End: 2021-12-30

## 2021-12-30 DIAGNOSIS — Z20.822 ENCOUNTER FOR LABORATORY TESTING FOR COVID-19 VIRUS: Primary | ICD-10-CM

## 2021-12-30 DIAGNOSIS — J06.9 UPPER RESPIRATORY TRACT INFECTION, UNSPECIFIED TYPE: Primary | ICD-10-CM

## 2021-12-30 DIAGNOSIS — B00.1 COLD SORE: ICD-10-CM

## 2021-12-30 PROCEDURE — 3017F COLORECTAL CA SCREEN DOC REV: CPT | Performed by: INTERNAL MEDICINE

## 2021-12-30 PROCEDURE — G8399 PT W/DXA RESULTS DOCUMENT: HCPCS | Performed by: INTERNAL MEDICINE

## 2021-12-30 PROCEDURE — 1123F ACP DISCUSS/DSCN MKR DOCD: CPT | Performed by: INTERNAL MEDICINE

## 2021-12-30 PROCEDURE — 1090F PRES/ABSN URINE INCON ASSESS: CPT | Performed by: INTERNAL MEDICINE

## 2021-12-30 PROCEDURE — 4040F PNEUMOC VAC/ADMIN/RCVD: CPT | Performed by: INTERNAL MEDICINE

## 2021-12-30 PROCEDURE — 99213 OFFICE O/P EST LOW 20 MIN: CPT | Performed by: INTERNAL MEDICINE

## 2021-12-30 PROCEDURE — G8427 DOCREV CUR MEDS BY ELIG CLIN: HCPCS | Performed by: INTERNAL MEDICINE

## 2021-12-30 RX ORDER — ACYCLOVIR 400 MG/1
400 TABLET ORAL 2 TIMES DAILY
Qty: 10 TABLET | Refills: 0 | Status: SHIPPED | OUTPATIENT
Start: 2021-12-30 | End: 2022-01-04

## 2021-12-30 RX ORDER — IBUPROFEN 600 MG/1
600 TABLET ORAL 3 TIMES DAILY PRN
Qty: 15 TABLET | Refills: 0 | Status: SHIPPED | OUTPATIENT
Start: 2021-12-30 | End: 2022-03-22 | Stop reason: ALTCHOICE

## 2021-12-30 RX ORDER — FLUTICASONE PROPIONATE 50 MCG
2 SPRAY, SUSPENSION (ML) NASAL DAILY
Qty: 16 G | Refills: 0 | Status: SHIPPED | OUTPATIENT
Start: 2021-12-30 | End: 2022-02-17

## 2021-12-30 ASSESSMENT — ENCOUNTER SYMPTOMS
SHORTNESS OF BREATH: 0
WHEEZING: 0
RHINORRHEA: 1
SORE THROAT: 1
SINUS PAIN: 1
COUGH: 0

## 2021-12-30 NOTE — PROGRESS NOTES
2021    TELEHEALTH EVALUATION -- Audio/Visual (During [address] emergency)    Chief Complaint   Patient presents with    Pharyngitis     begining of last week    Otalgia     yesterday right side       HPI:    Adilia Sutheralnd (:  1955) has requested an audio/video evaluation for the following concern(s):    Cold sore since . Patient has [facility]. Patient states it burned a lot yesterday. Patient states water came out the blister yesterday. Patient states 1 week ago she had throat burning 2 times in the middle of the night. Yesterday she woke up with right ear pain, stuffy nose, and sore throat. Patient states she had runny nose. Arm redness and pain for 1 week after vaccine in right arm on 21. Patient states she had a lot of chills. Cold sore left upper lip        Review of Systems   Constitutional: Positive for chills (on Monday). Negative for fever. HENT: Positive for congestion, ear pain, postnasal drip, rhinorrhea, sinus pain (that is not new) and sore throat. Respiratory: Negative for cough, shortness of breath and wheezing. Prior to Visit Medications    Medication Sig Taking?  Authorizing Provider   fluticasone (FLONASE) 50 MCG/ACT nasal spray 2 sprays by Nasal route daily Yes Mary Erazo MD   ibuprofen (ADVIL;MOTRIN) 600 MG tablet Take 1 tablet by mouth 3 times daily as needed for Pain Yes Mary Erazo MD   acyclovir (ZOVIRAX) 400 MG tablet Take 1 tablet by mouth 2 times daily for 5 days Yes Mary Erazo MD   atorvastatin (LIPITOR) 20 MG tablet Take 1 tablet by mouth daily Take 20 mg by mouth daily Yes Mary Erazo MD   pantoprazole (PROTONIX) 40 MG tablet Take 1 tablet by mouth daily Take 40 mg by mouth daily Yes Mary Erazo MD   Dulaglutide (TRULICITY) 3.83 UX/1.8PY SOPN Inject 0.75 mg into the skin once a week Yes Mary Erazo MD   gabapentin (NEURONTIN) 300 MG capsule Take 1 capsule by mouth daily. Take 1 capsule by mouth daily Yes French Tejeda MD   budesonide-formoterol (SYMBICORT) 160-4.5 MCG/ACT AERO Inhale 2 puffs into the lungs 2 times daily Yes French Tejeda MD   cloNIDine (CATAPRES-TTS-1) 0.1 MG/24HR PTWK Place 1 patch onto the skin once a week Yes French Tejeda MD   Blood Glucose Monitoring Suppl (TRUE METRIX METER) DMITRIY Test blood sugar daily and PRN E11.9 Yes Rk Hayward MD   blood glucose monitor strips by Other route daily E11.9 Yes Rk Hayward MD   Lancets MISC 1 each by Other route daily E11.9 Yes Rk Hayward MD   blood glucose test strips (GLUCOSE METER TEST) strip 100 each by In Vitro route 2 times daily Diabetes   e11.9 Yes French Tejeda MD   diclofenac sodium (VOLTAREN) 1 % GEL Apply 2 g topically 4 times daily Yes Rk Hayward MD   glucose monitoring (FREESTYLE FREEDOM) kit 1 kit by Does not apply route daily Yes Rk Hayward MD   Bisacodyl (DULCOLAX PO) Take 1 tablet by mouth Yes Historical Provider, MD   beclomethasone (QVAR) 80 MCG/ACT inhaler Inhale 2 puffs into the lungs 2 times daily. Yes Historical Provider, MD       Social History     Tobacco Use    Smoking status: Never Smoker    Smokeless tobacco: Never Used   Substance Use Topics    Alcohol use: No    Drug use: No        Allergies   Allergen Reactions    Aspirin      bruising    Darvocet [Propoxyphene N-Acetaminophen] Hives    Effexor [Venlafaxine Hydrochloride] Hives    Hydrocodone Hives and Itching    Keflex [Cephalexin] Itching     Face redness.     Paxil Cr [Paroxetine Hcl Er] Itching   ,   Past Medical History:   Diagnosis Date    Abnormal involuntary movements(781.0)     Anal fissure     Anemia, unspecified     Anxiety     Chronic back pain     Chronic diarrhea 9/23/2019    Depression     Diffuse cystic mastopathy     Encopresis(307.7)     Esophageal reflux     Headache(784.0)     Hepatitis, unspecified     Herpes simplex without mention of complication     Hypertension     Hypogammaglobulinaemia, unspecified     Insomnia, unspecified     Lateral epicondylitis  of elbow     Migraine, unspecified, without mention of intractable migraine without mention of status migrainosus     Mixed hyperlipidemia 7/30/2010    Osteopenia     Pure hypercholesterolemia     Symptomatic menopausal or female climacteric states     Type 2 diabetes mellitus without complication ([facility] [address]     Type II or unspecified type diabetes mellitus without mention of complication, not stated as uncontrolled     Unspecified asthma(493.90)        PHYSICAL EXAMINATION:  [ INSTRUCTIONS:  \"[x]\" Indicates a positive item  \"[]\" Indicates a negative item  -- DELETE ALL ITEMS NOT EXAMINED]  Vital Signs: (As obtained by patient/caregiver or practitioner observation)    Blood pressure-  Heart rate-    Respiratory rate-    Temperature-  Pulse oximetry-   Patient-Reported Vitals 12/30/2021   Patient-Reported Weight 140   Patient-Reported Height 62\"   Patient-Reported Temperature 98.5      Constitutional: [x] Appears well-developed and well-nourished [x] No apparent distress      [] Abnormal-   Mental status  [x] Alert and awake  [x] Oriented to person/place/time [x]Able to follow commands      Eyes:  EOM    [x]  Normal  [] Abnormal-  Sclera  [x]  Normal  [] Abnormal -         Discharge [x]  None visible  [] Abnormal -    HENT:   [x] Normocephalic, atraumatic.   [] Abnormal   [] Mouth/Throat: Mucous membranes are moist.     External Ears [x] Normal  [] Abnormal-     Neck: [x] No visualized mass     Pulmonary/Chest: [x] Respiratory effort normal.  [x] No visualized signs of difficulty breathing or respiratory distress        [] Abnormal-      Musculoskeletal:   [] Normal gait with no signs of ataxia         [x] Normal range of motion of neck        [] Abnormal-       Neurological:        [x] No Facial Asymmetry (Cranial nerve 7 motor function) (limited exam to video visit) [] No gaze palsy        [] Abnormal-         Skin:        [x] No significant exanthematous lesions or discoloration noted on facial skin         [] Abnormal-            Psychiatric:       [x] Normal Affect [] No Hallucinations        [] Abnormal-     Other pertinent observable physical exam findings-     ASSESSMENT/PLAN:  1. Upper respiratory tract infection, unspecified type  - fluticasone (FLONASE) 50 MCG/ACT nasal spray; 2 sprays by Nasal route daily  Dispense: 16 g; Refill: 0  - ibuprofen (ADVIL;MOTRIN) 600 MG tablet; Take 1 tablet by mouth 3 times daily as needed for Pain  Dispense: 15 tablet; Refill: 0   - salt water gargles 3-4 times daily  -Covid-19 test    2. Cold sore  - acyclovir (ZOVIRAX) 400 MG tablet; Take 1 tablet by mouth 2 times daily for 5 days  Dispense: 10 tablet; Refill: 0      No follow-ups on file. Mauriciophu Mario, was evaluated through a synchronous (real-time) audio-video encounter. The patient (or guardian if applicable) is aware that this is a billable service. Verbal consent to proceed has been obtained within the past 12 months. The visit was conducted pursuant to the emergency declaration under the [address] [place] [address] authority and the [employer] and [employer] General Act. Patient identification was verified, and a caregiver was present when appropriate. The patient was located in a state where the provider was credentialed to provide care. Total time spent on this encounter: Not billed by time    --Marylen Life, MD on 12/31/2021 at 9:48 PM    An electronic signature was used to authenticate this note.

## 2021-12-31 PROBLEM — B00.1 COLD SORE: Status: ACTIVE | Noted: 2021-12-31

## 2021-12-31 PROBLEM — J06.9 UPPER RESPIRATORY TRACT INFECTION: Status: ACTIVE | Noted: 2021-12-31

## 2021-12-31 LAB — SARS-COV-2: NOT DETECTED

## 2022-01-01 NOTE — PATIENT INSTRUCTIONS
1. Upper respiratory tract infection, unspecified type  - fluticasone (FLONASE) 50 MCG/ACT nasal spray; 2 sprays by Nasal route daily  Dispense: 16 g; Refill: 0  - ibuprofen (ADVIL;MOTRIN) 600 MG tablet; Take 1 tablet by mouth 3 times daily as needed for Pain  Dispense: 15 tablet; Refill: 0   - salt water gargles 3-4 times daily  -Covid-19 test    2. Cold sore  - acyclovir (ZOVIRAX) 400 MG tablet; Take 1 tablet by mouth 2 times daily for 5 days  Dispense: 10 tablet;  Refill: 0

## 2022-01-14 ENCOUNTER — OFFICE VISIT (OUTPATIENT)
Dept: ENT CLINIC | Age: 67
End: 2022-01-14
Payer: MEDICARE

## 2022-01-14 VITALS — TEMPERATURE: 97.5 F | HEART RATE: 83 BPM | DIASTOLIC BLOOD PRESSURE: 75 MMHG | SYSTOLIC BLOOD PRESSURE: 126 MMHG

## 2022-01-14 DIAGNOSIS — M54.2 NECK PAIN: ICD-10-CM

## 2022-01-14 DIAGNOSIS — R35.1 NOCTURIA: ICD-10-CM

## 2022-01-14 DIAGNOSIS — R82.90 MALODOROUS URINE: ICD-10-CM

## 2022-01-14 DIAGNOSIS — R04.0 EPISTAXIS: ICD-10-CM

## 2022-01-14 DIAGNOSIS — J30.9 ALLERGIC RHINITIS, UNSPECIFIED SEASONALITY, UNSPECIFIED TRIGGER: ICD-10-CM

## 2022-01-14 DIAGNOSIS — J34.2 DNS (DEVIATED NASAL SEPTUM): ICD-10-CM

## 2022-01-14 DIAGNOSIS — J34.89 LESION OF NASAL CAVITY: Primary | ICD-10-CM

## 2022-01-14 LAB
BILIRUBIN URINE: NEGATIVE
BLOOD, URINE: NEGATIVE
CLARITY: CLEAR
COLOR: YELLOW
EPITHELIAL CELLS, UA: 4 /HPF (ref 0–5)
GLUCOSE URINE: NEGATIVE MG/DL
HYALINE CASTS: 2 /LPF (ref 0–8)
KETONES, URINE: NEGATIVE MG/DL
LEUKOCYTE ESTERASE, URINE: ABNORMAL
MICROSCOPIC EXAMINATION: YES
NITRITE, URINE: NEGATIVE
PH UA: 6 (ref 5–8)
PROTEIN UA: NEGATIVE MG/DL
RBC UA: 4 /HPF (ref 0–4)
SPECIFIC GRAVITY UA: 1.02 (ref 1–1.03)
URINE TYPE: ABNORMAL
UROBILINOGEN, URINE: 0.2 E.U./DL
WBC UA: 4 /HPF (ref 0–5)

## 2022-01-14 PROCEDURE — G8417 CALC BMI ABV UP PARAM F/U: HCPCS | Performed by: STUDENT IN AN ORGANIZED HEALTH CARE EDUCATION/TRAINING PROGRAM

## 2022-01-14 PROCEDURE — 3017F COLORECTAL CA SCREEN DOC REV: CPT | Performed by: STUDENT IN AN ORGANIZED HEALTH CARE EDUCATION/TRAINING PROGRAM

## 2022-01-14 PROCEDURE — 99203 OFFICE O/P NEW LOW 30 MIN: CPT | Performed by: STUDENT IN AN ORGANIZED HEALTH CARE EDUCATION/TRAINING PROGRAM

## 2022-01-14 PROCEDURE — 1090F PRES/ABSN URINE INCON ASSESS: CPT | Performed by: STUDENT IN AN ORGANIZED HEALTH CARE EDUCATION/TRAINING PROGRAM

## 2022-01-14 PROCEDURE — 1123F ACP DISCUSS/DSCN MKR DOCD: CPT | Performed by: STUDENT IN AN ORGANIZED HEALTH CARE EDUCATION/TRAINING PROGRAM

## 2022-01-14 PROCEDURE — G8484 FLU IMMUNIZE NO ADMIN: HCPCS | Performed by: STUDENT IN AN ORGANIZED HEALTH CARE EDUCATION/TRAINING PROGRAM

## 2022-01-14 PROCEDURE — 4040F PNEUMOC VAC/ADMIN/RCVD: CPT | Performed by: STUDENT IN AN ORGANIZED HEALTH CARE EDUCATION/TRAINING PROGRAM

## 2022-01-14 PROCEDURE — 1036F TOBACCO NON-USER: CPT | Performed by: STUDENT IN AN ORGANIZED HEALTH CARE EDUCATION/TRAINING PROGRAM

## 2022-01-14 PROCEDURE — G8399 PT W/DXA RESULTS DOCUMENT: HCPCS | Performed by: STUDENT IN AN ORGANIZED HEALTH CARE EDUCATION/TRAINING PROGRAM

## 2022-01-14 PROCEDURE — G8427 DOCREV CUR MEDS BY ELIG CLIN: HCPCS | Performed by: STUDENT IN AN ORGANIZED HEALTH CARE EDUCATION/TRAINING PROGRAM

## 2022-01-14 NOTE — PROGRESS NOTES
[facility] PATIENT HISTORY AND PHYSICAL NOTE      Patient Name: Joana Cifuentes  Medical Record Number:  [id number]  Primary Care Physician:  Shanti Buchanan MD    ChiefComplaint     Chief Complaint   Patient presents with    Other     patient states she had used neosporin on the area in the left nostril with improvement, pain in right ear since she came back from [place] in november, especially with weather change from cold. History of Present Illness     Joana Cifuentes is an 73 y.o. female presenting with nasal issues. Noted left-sided nasal pain approximately 2 weeks ago soon after getting back from [facility]. With some bleeding from the left nasal passage initially. Was seen by PCP who recommend Flonase and placing Neosporin on it. After placing Neosporin on it the pain resolved and she has no longer had any episodes of nosebleeds. .  Has not used Neosporin for the last 1 week. Also had right ear pain a couple weeks ago as well. PCP gave her antibiotics and this is since resolved. No hearing loss. No tinnitus. + seasonal environmental allergies, worse in the fall. + asthma. Former smoker in remote past.     Right neck pain. Set to see spine specialist next week. H&P performed with benefit of in person [facility] .     Past Medical History     Past Medical History:   Diagnosis Date    Abnormal involuntary movements(781.0)     Anal fissure     Anemia, unspecified     Anxiety     Chronic back pain     Chronic diarrhea 9/23/2019    Depression     Diffuse cystic mastopathy     Encopresis(307.7)     Esophageal reflux     Headache(784.0)     Hepatitis, unspecified     Herpes simplex without mention of complication     Hypertension     Hypogammaglobulinaemia, unspecified     Insomnia, unspecified     Lateral epicondylitis  of elbow     Migraine, unspecified, without mention of intractable migraine without mention of status migrainosus     Mixed hyperlipidemia 7/30/2010    Osteopenia     Pure hypercholesterolemia     Symptomatic menopausal or female climacteric states     Type 2 diabetes mellitus without complication (HCC)     Type II or unspecified type diabetes mellitus without mention of complication, not stated as uncontrolled     Unspecified asthma(493.90)        Past Surgical History     Past Surgical History:   Procedure Laterality Date    BREAST BIOPSY      CATARACT REMOVAL Bilateral     DILATION AND CURETTAGE OF UTERUS      x2    ELBOW SURGERY  1/18/11    right    FOOT SURGERY  10/2009,11/2010    right foot    HAND SURGERY  2009    left hand    HYSTERECTOMY  1998    RECTAL SURGERY      SHOULDER ARTHROSCOPY Right 7/07    by Dr. Barbra Savage       Family History     Family History   Problem Relation Age of Onset    Obesity Mother     Stroke Father     Cancer Other     Breast Cancer Daughter        Social History     Social History     Tobacco Use    Smoking status: Never Smoker    Smokeless tobacco: Never Used   Substance Use Topics    Alcohol use: No    Drug use: No        Allergies     Allergies   Allergen Reactions    Aspirin      bruising    Darvocet [Propoxyphene N-Acetaminophen] Hives    Effexor [Venlafaxine Hydrochloride] Hives    Hydrocodone Hives and Itching    Keflex [Cephalexin] Itching     Face redness.     Paxil Cr [Paroxetine Hcl Er] Itching       Medications     Current Outpatient Medications   Medication Sig Dispense Refill    TRULICITY 2.93 MF/6.3GI SOPN INJECT 0.75mg SUBCUTANEOUSLY (UNDER THE SKIN) every week 2 mL 5    fluticasone (FLONASE) 50 MCG/ACT nasal spray 2 sprays by Nasal route daily 16 g 0    ibuprofen (ADVIL;MOTRIN) 600 MG tablet Take 1 tablet by mouth 3 times daily as needed for Pain 15 tablet 0    atorvastatin (LIPITOR) 20 MG tablet Take 1 tablet by mouth daily Take 20 mg by mouth daily 90 tablet 1    pantoprazole (PROTONIX) 40 MG tablet Take 1 tablet by mouth daily Take 40 mg by mouth daily 90 tablet 1    gabapentin (NEURONTIN) 300 MG capsule Take 1 capsule by mouth daily. Take 1 capsule by mouth daily      budesonide-formoterol (SYMBICORT) 160-4.5 MCG/ACT AERO Inhale 2 puffs into the lungs 2 times daily 30.6 g 1    cloNIDine (CATAPRES-TTS-1) 0.1 MG/24HR PTWK Place 1 patch onto the skin once a week 12 patch 1    Blood Glucose Monitoring Suppl (TRUE METRIX METER) DMITRIY Test blood sugar daily and PRN E11.9 1 each 0    blood glucose monitor strips by Other route daily E11.9 100 strip 0    Lancets MISC 1 each by Other route daily E11.9 100 each 0    blood glucose test strips (GLUCOSE METER TEST) strip 100 each by In Vitro route 2 times daily Diabetes   e11.9 100 each 1    diclofenac sodium (VOLTAREN) 1 % GEL Apply 2 g topically 4 times daily 45 g 0    glucose monitoring (FREESTYLE FREEDOM) kit 1 kit by Does not apply route daily 1 kit 0    Bisacodyl (DULCOLAX PO) Take 1 tablet by mouth      beclomethasone (QVAR) 80 MCG/ACT inhaler Inhale 2 puffs into the lungs 2 times daily. No current facility-administered medications for this visit.        Review of Systems     REVIEW OF SYSTEMS  The following systems were reviewed and revealed the following in addition to any already discussed in the HPI:    CONSTITUTIONAL: no weight loss, no fever, no night sweats, no chills  EYES: no vision changes, no blurry vision  EARS: no hearing loss, no otalgia  NOSE: +epistaxis, no rhinorrhea  THROAT: No voice changes, no sore throat, no dysphagia      PhysicalExam     Vitals:    01/14/22 0952   BP: 126/75   Site: Left Upper Arm   Position: Sitting   Cuff Size: Medium Adult   Pulse: 83   Temp: 97.5 °F (36.4 °C)   TempSrc: Temporal       PHYSICAL EXAM  /75 (Site: Left Upper Arm, Position: Sitting, Cuff Size: Medium Adult)   Pulse 83   Temp 97.5 °F (36.4 °C) (Temporal)     GENERAL: No acute distress, alert and oriented, no hoarseness  EYES: EOMI, Anti-icteric  NOSE: On anterior rhinoscopy there is no epistaxis, nasal mucosa moist and normal appearing, no purulent drainage. Nasal septum deviated to the left. No nasal vestibular lesions. No nasal tenderness. EARS: Normal external appearance; on portable otomicroscopy:     -Ad: External auditory canal without stenosis, tympanic membrane clear, no middle ear effusions or retractions.      -As: External auditory canal without stenosis, tympanic membrane clear, no middle ear effusions or retractions. Pneumatic otoscopy: Bilateral tympanic membranes mobile pneumatic otoscopy  FACE: HB 1/6 bilaterally, symmetric appearing, sensation equal bilaterally  ORAL CAVITY: No masses or lesions visualized or palpated, uvula is midline, moist mucous membranes, absent tonsils  NECK: Normal range of motion, no thyromegaly, trachea is midline, no palpable lymphadenopathy or neck masses, no crepitus. Moderate tenderness palpation of right neck musculature. NEURO: Cranial Nerves 2, 3, 4, 5, 6, 7, 11, 12 grossly intact bilaterally     I have performed a head and neck physical exam personally or was physically present during the key or critical portions of the service. Assessment and Plan     1. Lesion of nasal cavity  2. Epistaxis  -Resolved after topical antibiotic use. Patient reassured. 3. DNS (deviated nasal septum)  -Continue Flonase daily    4. Allergic rhinitis, unspecified seasonality, unspecified trigger  -Continue Flonase daily    5. Neck pain  - Likely musculoskeletal.  She has an appointment to see a spine specialist next week. Will defer further work-up. Follow Up     Return if symptoms worsen or fail to improve. Junior Caseydenise   Department of Otolaryngology/Head & Neck Surgery  1/14/22    Medical Decision Making:   The following items were considered in medical decision making:  Independent review of images  Review / order clinical lab tests  Review / order radiology tests  Decision to obtain old records    This note was generated completely or in part utilizing Dragon dictation speech recognition software. Occasionally, words are mistranscribed and despite editing, the text may contain inaccuracies due to incorrect word recognition. If further clarification is needed please contact the office at [phone].

## 2022-01-16 LAB — URINE CULTURE, ROUTINE: NORMAL

## 2022-01-20 ENCOUNTER — TELEPHONE (OUTPATIENT)
Dept: PRIMARY CARE CLINIC | Age: 67
End: 2022-01-20

## 2022-01-20 DIAGNOSIS — R35.1 NOCTURIA: Primary | ICD-10-CM

## 2022-01-20 DIAGNOSIS — R82.90 MALODOROUS URINE: ICD-10-CM

## 2022-01-20 NOTE — TELEPHONE ENCOUNTER
----- Message from Elsy Power sent at 1/20/2022 10:54 AM EST -----  Subject: Results Request    QUESTIONS  Which lab or imaging result is the patient calling about? urine test  Which provider ordered the test?   At what location was the test performed? Date the test was performed? 2022-01-13  Additional Information for Provider? Patient wants to know the results of   the urine test. She also has a headache and pain in her eyes. Please reach   out to patient. She didn't want to schedule an appointment.  ---------------------------------------------------------------------------  --------------  China KUMAR  What is the best way for the office to contact you? OK to leave message on   voicemail  Preferred Call Back Phone Number?  [phone]

## 2022-01-20 NOTE — TELEPHONE ENCOUNTER
----- Message from Johnson Lisa sent at 1/20/2022 10:58 AM EST -----  Subject: Message to Provider    QUESTIONS  Information for Provider? Patient has headache, congestion and chills but   she wants to schedule for the 3rd dose covid vaccine. She received the   [facility] for the 1st and 2nd shots in July. Please reach out to patient.  ---------------------------------------------------------------------------  --------------  CALL BACK INFO  What is the best way for the office to contact you? OK to leave message on   voicemail  Preferred Call Back Phone Number? [phone]  ---------------------------------------------------------------------------  --------------  SCRIPT ANSWERS  Relationship to Patient?  Self

## 2022-01-24 NOTE — TELEPHONE ENCOUNTER
Call patient with results. See result notes. She should not get the vaccine yet if she has symptoms. What symptoms of Covid is she having?  Has she had a Covid test?

## 2022-01-24 NOTE — TELEPHONE ENCOUNTER
Called patient with results. She verbalized understanding. As for covid symptoms she said she only get a headache every once in awhile. Told her to call [facility] to get her booster. Patient states she still gets up in the middle of the night to urinate and urine had an odor. Will call patient back with referral information.

## 2022-01-25 NOTE — TELEPHONE ENCOUNTER
Call patient. I recommend referral to Urology, Dr. Marcie Sharma. Also It is ok to get the Covid booster and she can get it from a pharmacy.

## 2022-01-25 NOTE — TELEPHONE ENCOUNTER
Called patient and told her about the referral to urology. She requested it be mailed.  Also patient notified about covid vaccine yesterday 1.24.22

## 2022-01-28 DIAGNOSIS — E78.2 MIXED HYPERLIPIDEMIA: ICD-10-CM

## 2022-01-28 DIAGNOSIS — E11.42 TYPE 2 DIABETES MELLITUS WITH DIABETIC POLYNEUROPATHY, WITHOUT LONG-TERM CURRENT USE OF INSULIN (HCC): ICD-10-CM

## 2022-01-28 DIAGNOSIS — K21.00 GASTROESOPHAGEAL REFLUX DISEASE WITH ESOPHAGITIS WITHOUT HEMORRHAGE: ICD-10-CM

## 2022-01-28 NOTE — TELEPHONE ENCOUNTER
----- Message from Bernard [place], [place] sent at 1/28/2022  9:03 AM EST -----  Subject: Refill Request    QUESTIONS  Name of Medication? atorvastatin (LIPITOR) 20 MG tablet  Patient-reported dosage and instructions? Take 1 tab daily at night  How many days do you have left? 20  Preferred Pharmacy? [facility] phone number (if available)? [phone]  ---------------------------------------------------------------------------  --------------,  Name of Medication? pantoprazole (PROTONIX) 40 MG tablet  Patient-reported dosage and instructions? Take 1 tab daily in am  How many days do you have left? 20  Preferred Pharmacy? [facility] phone number (if available)? [phone]  ---------------------------------------------------------------------------  --------------,  Name of Medication? TRULICITY 6.98 PV/8.2GM SOPN  Patient-reported dosage and instructions? Take once a week  How many days do you have left? 1  Preferred Pharmacy? [facility] phone number (if available)? [phone]  ---------------------------------------------------------------------------  --------------,  Name of Medication? diclofenac sodium (VOLTAREN) 1 % GEL  Patient-reported dosage and instructions? once a day for muscle pain  How many days do you have left? 20  Preferred Pharmacy? [facility] phone number (if available)? [phone]  ---------------------------------------------------------------------------  --------------  Mariola KUMAR  What is the best way for the office to contact you? OK to leave message on   voicemail  Preferred Call Back Phone Number?  [phone]

## 2022-01-28 NOTE — TELEPHONE ENCOUNTER
Medication:   Requested Prescriptions     Pending Prescriptions Disp Refills    atorvastatin (LIPITOR) 20 MG tablet 90 tablet 1     Sig: Take 1 tablet by mouth daily Take 20 mg by mouth daily    pantoprazole (PROTONIX) 40 MG tablet 90 tablet 1     Sig: Take 1 tablet by mouth daily Take 40 mg by mouth daily    Dulaglutide (TRULICITY) 0.91 VQ/4.3UD SOPN 2 mL 5    diclofenac sodium (VOLTAREN) 1 % GEL 45 g 0     Sig: Apply 2 g topically 4 times daily     Last Filled: 12.23.21 12.23.21 1.5.22 11.29.21    Last appt: 12/13/2021   Next appt: 3/14/2022    Last OARRS:   RX Monitoring 8/7/2017   Periodic Controlled Substance Monitoring Possible medication side effects, risk of tolerance and/or dependence, and alternative treatments discussed; No signs of potential drug abuse or diversion identified.

## 2022-01-30 RX ORDER — PANTOPRAZOLE SODIUM 40 MG/1
40 TABLET, DELAYED RELEASE ORAL DAILY
Qty: 90 TABLET | Refills: 1 | Status: SHIPPED | OUTPATIENT
Start: 2022-01-30 | End: 2022-03-14 | Stop reason: SDUPTHER

## 2022-01-30 RX ORDER — ATORVASTATIN CALCIUM 20 MG/1
20 TABLET, FILM COATED ORAL DAILY
Qty: 90 TABLET | Refills: 1 | Status: SHIPPED | OUTPATIENT
Start: 2022-01-30 | End: 2022-09-06 | Stop reason: SDUPTHER

## 2022-01-30 RX ORDER — DULAGLUTIDE 0.75 MG/.5ML
INJECTION, SOLUTION SUBCUTANEOUS
Qty: 6 ML | Refills: 1 | Status: SHIPPED | OUTPATIENT
Start: 2022-01-30 | End: 2022-05-09

## 2022-01-31 RX ORDER — CETIRIZINE HYDROCHLORIDE 10 MG/1
10 TABLET ORAL DAILY
Qty: 30 TABLET | Refills: 1 | Status: SHIPPED | OUTPATIENT
Start: 2022-01-31 | End: 2022-06-21 | Stop reason: SDUPTHER

## 2022-01-31 NOTE — TELEPHONE ENCOUNTER
----- Message from Husam Erickson sent at 1/31/2022  9:56 AM EST -----  Subject: Message to Provider    QUESTIONS  Information for Provider? pt. needs a Rx for Kestrine 10mg. for her   allergies. Please call pt. and advise  ---------------------------------------------------------------------------  --------------  CALL BACK INFO  What is the best way for the office to contact you? OK to leave message on   voicemail  Preferred Call Back Phone Number? [phone]  ---------------------------------------------------------------------------  --------------  SCRIPT ANSWERS  Relationship to Patient?  Self

## 2022-01-31 NOTE — TELEPHONE ENCOUNTER
Medication:   Requested Prescriptions     Pending Prescriptions Disp Refills    cetirizine (ZYRTEC) 10 MG tablet       Sig: Take 1 tablet by mouth daily       Last appt: 12/30/2021   Next appt: 3/14/2022    Last OARRS:   RX Monitoring 8/7/2017   Periodic Controlled Substance Monitoring Possible medication side effects, risk of tolerance and/or dependence, and alternative treatments discussed; No signs of potential drug abuse or diversion identified.

## 2022-02-09 ENCOUNTER — HOSPITAL ENCOUNTER (OUTPATIENT)
Dept: GENERAL RADIOLOGY | Age: 67
Discharge: HOME | End: 2022-02-09
Payer: MEDICARE

## 2022-02-09 ENCOUNTER — OFFICE VISIT (OUTPATIENT)
Dept: PRIMARY CARE CLINIC | Age: 67
End: 2022-02-09
Payer: MEDICARE

## 2022-02-09 VITALS
SYSTOLIC BLOOD PRESSURE: 100 MMHG | BODY MASS INDEX: 26.68 KG/M2 | WEIGHT: 145 LBS | OXYGEN SATURATION: 97 % | HEIGHT: 62 IN | HEART RATE: 82 BPM | DIASTOLIC BLOOD PRESSURE: 68 MMHG | TEMPERATURE: 98.1 F

## 2022-02-09 DIAGNOSIS — M25.551 RIGHT HIP PAIN: ICD-10-CM

## 2022-02-09 DIAGNOSIS — M48.02 CERVICAL SPINAL STENOSIS: ICD-10-CM

## 2022-02-09 DIAGNOSIS — M47.22 OSTEOARTHRITIS OF SPINE WITH RADICULOPATHY, CERVICAL REGION: ICD-10-CM

## 2022-02-09 DIAGNOSIS — M54.41 CHRONIC RIGHT-SIDED LOW BACK PAIN WITH RIGHT-SIDED SCIATICA: ICD-10-CM

## 2022-02-09 DIAGNOSIS — M81.0 OSTEOPOROSIS, UNSPECIFIED OSTEOPOROSIS TYPE, UNSPECIFIED PATHOLOGICAL FRACTURE PRESENCE: ICD-10-CM

## 2022-02-09 DIAGNOSIS — E11.42 TYPE 2 DIABETES MELLITUS WITH DIABETIC POLYNEUROPATHY, WITHOUT LONG-TERM CURRENT USE OF INSULIN (HCC): ICD-10-CM

## 2022-02-09 DIAGNOSIS — R10.9 FLANK PAIN: ICD-10-CM

## 2022-02-09 DIAGNOSIS — G89.29 CHRONIC RIGHT-SIDED LOW BACK PAIN WITH RIGHT-SIDED SCIATICA: ICD-10-CM

## 2022-02-09 DIAGNOSIS — R82.998 LEUKOCYTES IN URINE: ICD-10-CM

## 2022-02-09 DIAGNOSIS — M25.551 RIGHT HIP PAIN: Primary | ICD-10-CM

## 2022-02-09 PROBLEM — J06.9 UPPER RESPIRATORY TRACT INFECTION: Status: RESOLVED | Noted: 2021-12-31 | Resolved: 2022-02-09

## 2022-02-09 LAB
A/G RATIO: 2 (ref 1.1–2.2)
ALBUMIN SERPL-MCNC: 4.3 G/DL (ref 3.4–5)
ALP BLD-CCNC: 79 U/L (ref 40–129)
ALT SERPL-CCNC: 16 U/L (ref 10–40)
ANION GAP SERPL CALCULATED.3IONS-SCNC: 16 MMOL/L (ref 3–16)
AST SERPL-CCNC: 17 U/L (ref 15–37)
BILIRUB SERPL-MCNC: 0.6 MG/DL (ref 0–1)
BILIRUBIN, POC: NORMAL
BLOOD URINE, POC: NORMAL
BUN BLDV-MCNC: 18 MG/DL (ref 7–20)
CALCIUM SERPL-MCNC: 9.7 MG/DL (ref 8.3–10.6)
CHLORIDE BLD-SCNC: 103 MMOL/L (ref 99–110)
CLARITY, POC: CLEAR
CO2: 22 MMOL/L (ref 21–32)
COLOR, POC: YELLOW
CREAT SERPL-MCNC: 0.7 MG/DL (ref 0.6–1.2)
GFR AFRICAN AMERICAN: >60
GFR NON-AFRICAN AMERICAN: >60
GLUCOSE BLD-MCNC: 95 MG/DL (ref 70–99)
GLUCOSE URINE, POC: NORMAL
HCT VFR BLD CALC: 41.4 % (ref 36–48)
HEMOGLOBIN: 14 G/DL (ref 12–16)
KETONES, POC: NORMAL
LEUKOCYTE EST, POC: NORMAL
MCH RBC QN AUTO: 29.4 PG (ref 26–34)
MCHC RBC AUTO-ENTMCNC: 33.8 G/DL (ref 31–36)
MCV RBC AUTO: 87 FL (ref 80–100)
NITRITE, POC: NORMAL
PDW BLD-RTO: 12.7 % (ref 12.4–15.4)
PH, POC: 5.5
PLATELET # BLD: 205 K/UL (ref 135–450)
PMV BLD AUTO: 9 FL (ref 5–10.5)
POTASSIUM SERPL-SCNC: 4.4 MMOL/L (ref 3.5–5.1)
PROTEIN, POC: NORMAL
RBC # BLD: 4.76 M/UL (ref 4–5.2)
SODIUM BLD-SCNC: 141 MMOL/L (ref 136–145)
SPECIFIC GRAVITY, POC: >=1.03
TOTAL PROTEIN: 6.4 G/DL (ref 6.4–8.2)
UROBILINOGEN, POC: 0.2
WBC # BLD: 5.9 K/UL (ref 4–11)

## 2022-02-09 PROCEDURE — 1036F TOBACCO NON-USER: CPT | Performed by: NURSE PRACTITIONER

## 2022-02-09 PROCEDURE — 73502 X-RAY EXAM HIP UNI 2-3 VIEWS: CPT

## 2022-02-09 PROCEDURE — G8427 DOCREV CUR MEDS BY ELIG CLIN: HCPCS | Performed by: NURSE PRACTITIONER

## 2022-02-09 PROCEDURE — 72100 X-RAY EXAM L-S SPINE 2/3 VWS: CPT

## 2022-02-09 PROCEDURE — 99214 OFFICE O/P EST MOD 30 MIN: CPT | Performed by: NURSE PRACTITIONER

## 2022-02-09 PROCEDURE — 81002 URINALYSIS NONAUTO W/O SCOPE: CPT | Performed by: NURSE PRACTITIONER

## 2022-02-09 PROCEDURE — 3046F HEMOGLOBIN A1C LEVEL >9.0%: CPT | Performed by: NURSE PRACTITIONER

## 2022-02-09 PROCEDURE — G8417 CALC BMI ABV UP PARAM F/U: HCPCS | Performed by: NURSE PRACTITIONER

## 2022-02-09 PROCEDURE — 1090F PRES/ABSN URINE INCON ASSESS: CPT | Performed by: NURSE PRACTITIONER

## 2022-02-09 PROCEDURE — 1123F ACP DISCUSS/DSCN MKR DOCD: CPT | Performed by: NURSE PRACTITIONER

## 2022-02-09 PROCEDURE — 3017F COLORECTAL CA SCREEN DOC REV: CPT | Performed by: NURSE PRACTITIONER

## 2022-02-09 PROCEDURE — 2022F DILAT RTA XM EVC RTNOPTHY: CPT | Performed by: NURSE PRACTITIONER

## 2022-02-09 PROCEDURE — G8399 PT W/DXA RESULTS DOCUMENT: HCPCS | Performed by: NURSE PRACTITIONER

## 2022-02-09 PROCEDURE — G8484 FLU IMMUNIZE NO ADMIN: HCPCS | Performed by: NURSE PRACTITIONER

## 2022-02-09 PROCEDURE — 4040F PNEUMOC VAC/ADMIN/RCVD: CPT | Performed by: NURSE PRACTITIONER

## 2022-02-09 ASSESSMENT — PATIENT HEALTH QUESTIONNAIRE - PHQ9
SUM OF ALL RESPONSES TO PHQ QUESTIONS 1-9: 0
1. LITTLE INTEREST OR PLEASURE IN DOING THINGS: 0
SUM OF ALL RESPONSES TO PHQ QUESTIONS 1-9: 0
9. THOUGHTS THAT YOU WOULD BE BETTER OFF DEAD, OR OF HURTING YOURSELF: 0
SUM OF ALL RESPONSES TO PHQ9 QUESTIONS 1 & 2: 0
5. POOR APPETITE OR OVEREATING: 0
SUM OF ALL RESPONSES TO PHQ QUESTIONS 1-9: 0
2. FEELING DOWN, DEPRESSED OR HOPELESS: 0
4. FEELING TIRED OR HAVING LITTLE ENERGY: 0
SUM OF ALL RESPONSES TO PHQ QUESTIONS 1-9: 0
10. IF YOU CHECKED OFF ANY PROBLEMS, HOW DIFFICULT HAVE THESE PROBLEMS MADE IT FOR YOU TO DO YOUR WORK, TAKE CARE OF THINGS AT HOME, OR GET ALONG WITH OTHER PEOPLE: 0
8. MOVING OR SPEAKING SO SLOWLY THAT OTHER PEOPLE COULD HAVE NOTICED. OR THE OPPOSITE, BEING SO FIGETY OR RESTLESS THAT YOU HAVE BEEN MOVING AROUND A LOT MORE THAN USUAL: 0
6. FEELING BAD ABOUT YOURSELF - OR THAT YOU ARE A FAILURE OR HAVE LET YOURSELF OR YOUR FAMILY DOWN: 0
3. TROUBLE FALLING OR STAYING ASLEEP: 0
7. TROUBLE CONCENTRATING ON THINGS, SUCH AS READING THE NEWSPAPER OR WATCHING TELEVISION: 0

## 2022-02-09 ASSESSMENT — ENCOUNTER SYMPTOMS
COUGH: 0
ABDOMINAL DISTENTION: 0
VOMITING: 0
COLOR CHANGE: 0
RHINORRHEA: 1
SINUS PRESSURE: 0
SORE THROAT: 0
BACK PAIN: 1
ANAL BLEEDING: 0
BLOOD IN STOOL: 0
RECTAL PAIN: 0
SHORTNESS OF BREATH: 0
CHEST TIGHTNESS: 0
TROUBLE SWALLOWING: 0
CONSTIPATION: 0
SINUS PAIN: 0
ABDOMINAL PAIN: 1
NAUSEA: 0
DIARRHEA: 0
WHEEZING: 0

## 2022-02-09 NOTE — PROGRESS NOTES
ENCOUNTER DATE: 2/9/2022     NAME: Eli Ramos   AGE: 73 y.o.    GENDER: female   YOB: 1955    Patient Active Problem List   Diagnosis    Mixed hyperlipidemia    Asthma    Osteoporosis    Lateral epicondylitis    Arthralgia    Type 2 diabetes mellitus with diabetic polyneuropathy, without long-term current use of insulin (HCC)    Hepatitis    Leg pain    GERD (gastroesophageal reflux disease)    Hand pain    B12 deficiency anemia    Hemorrhoids, internal    Severe headache    Encopresis    Tremor    Hot flashes    Fibrocystic disease of breast    Insomnia    Shoulder pain    Malaise and fatigue    Migraine    Neck pain    Chronic back pain    Neuropathy    Major depression ([address]    History of arthroscopy of right shoulder 2008    Removal of ganglion cyst in left wrist    History of elbow surgery Right    Adhesive capsulitis of left shoulder    Tendinitis of left rotator cuff    Left rotator cuff tear    Pain, neck    Cervical stenosis of spinal canal    Arthritis of left acromioclavicular joint    Bursitis of right shoulder    Throat dryness    Right-sided chest wall pain    Dysuria    Acute cystitis without hematuria    Weight loss, abnormal    Moderate episode of recurrent major depressive disorder (HCC)    Arthritis of right acromioclavicular joint    Tendinitis of right rotator cuff    Cervical stenosis of spine    Chronic daily headache    Status migrainosus    Cervico-occipital neuralgia    Cervicogenic headache    Medication side effect    Chronic insomnia    Cervical spinal stenosis    Osteoarthritis of spine with radiculopathy, cervical region    Vitamin D deficiency    Intractable chronic migraine without aura    Chronic diarrhea    Essential hypertension    Cold sore      Allergies   Allergen Reactions    Aspirin      bruising    Darvocet [Propoxyphene N-Acetaminophen] Hives    Effexor [Venlafaxine Hydrochloride] Hives    Hydrocodone Hives and Itching    Keflex [Cephalexin] Itching     Face redness.  Paxil Cr [Paroxetine Hcl Er] Itching     Current Outpatient Medications on File Prior to Visit   Medication Sig Dispense Refill    cetirizine (ZYRTEC) 10 MG tablet Take 1 tablet by mouth daily 30 tablet 1    atorvastatin (LIPITOR) 20 MG tablet Take 1 tablet by mouth daily Take 20 mg by mouth daily 90 tablet 1    pantoprazole (PROTONIX) 40 MG tablet Take 1 tablet by mouth daily Take 40 mg by mouth daily 90 tablet 1    Dulaglutide (TRULICITY) 1.97 PH/8.5WZ SOPN INJECT 0.75mg SUBCUTANEOUSLY (UNDER THE SKIN) every week 6 mL 1    diclofenac sodium (VOLTAREN) 1 % GEL Apply 2 g topically 4 times daily 350 g 1    fluticasone (FLONASE) 50 MCG/ACT nasal spray 2 sprays by Nasal route daily 16 g 0    ibuprofen (ADVIL;MOTRIN) 600 MG tablet Take 1 tablet by mouth 3 times daily as needed for Pain 15 tablet 0    gabapentin (NEURONTIN) 300 MG capsule Take 1 capsule by mouth daily. Take 1 capsule by mouth daily      budesonide-formoterol (SYMBICORT) 160-4.5 MCG/ACT AERO Inhale 2 puffs into the lungs 2 times daily 30.6 g 1    cloNIDine (CATAPRES-TTS-1) 0.1 MG/24HR PTWK Place 1 patch onto the skin once a week 12 patch 1    Blood Glucose Monitoring Suppl (TRUE METRIX METER) DMITRIY Test blood sugar daily and PRN E11.9 1 each 0    blood glucose monitor strips by Other route daily E11.9 100 strip 0    Lancets MISC 1 each by Other route daily E11.9 100 each 0    blood glucose test strips (GLUCOSE METER TEST) strip 100 each by In Vitro route 2 times daily Diabetes   e11.9 100 each 1    glucose monitoring (FREESTYLE FREEDOM) kit 1 kit by Does not apply route daily 1 kit 0    Bisacodyl (DULCOLAX PO) Take 1 tablet by mouth      beclomethasone (QVAR) 80 MCG/ACT inhaler Inhale 2 puffs into the lungs 2 times daily. No current facility-administered medications on file prior to visit.         Social History     Tobacco Use    Smoking status: Never Smoker    Smokeless tobacco: Never Used   Substance Use Topics    Alcohol use: No      Past Surgical History:   Procedure Laterality Date    BREAST BIOPSY      CATARACT REMOVAL Bilateral     DILATION AND CURETTAGE OF UTERUS      x2    ELBOW SURGERY  1/18/11    right    FOOT SURGERY  10/2009,11/2010    right foot    HAND SURGERY  2009    left hand    HYSTERECTOMY  1998    RECTAL SURGERY      SHOULDER ARTHROSCOPY Right 7/07    by Dr. Adali Pritchard  Patient Care Team:  Juan Daniel Amaro MD as PCP - General (Internal Medicine)  Juan Daniel Amaro MD as PCP - [facility] Empaneled Provider    Chief Complaint   Patient presents with    Groin Injury     right  hand  side    Hip Pain        HPI:   Patient is here for a problem visit. Complains of right groin/hip pain intermittently for a few years. Recently worsening. Hurts in right groin, worse this am. Every now and then can feel a lump in her groin and can be tender. Pain extends down right leg and up into right abdomen and around to her right back. Pain is worse with movement. If she lays still with her leg straight, can get relief. Unable to fully extend right leg, but this has been present for years. Has to lift right leg with assistance. Overall feels weak. Hard to get out of bed this am. Hasn't been feeling well the past few days. Glu was low (77) yesterday. No fevers. No n/v/d. No bowel changes. No blood in stool/urine. Does have some slight dysuria. Has chronic neck and shoulder pain. Known spinal stenosis in c spine. Saw ortho in past. Worsening lately. Doesn't want ortho referral at this time. Takes ibp prn, but not often. Also uses voltaren gel prn on neck/shoulder. Does take gabapentin 300mg daily    Has apt with urology next month due to nocturia  No known kidney stones. S/p hysterectomy. ROS:  Review of Systems   Constitutional: Positive for chills and fatigue.  Negative for activity change, appetite change and fever. HENT: Positive for rhinorrhea. Negative for congestion, ear pain, postnasal drip, sinus pressure, sinus pain, sore throat and trouble swallowing. Respiratory: Negative for cough, chest tightness, shortness of breath and wheezing. Cardiovascular: Negative for chest pain, palpitations and leg swelling. Gastrointestinal: Positive for abdominal pain. Negative for abdominal distention, anal bleeding, blood in stool, constipation, diarrhea, nausea, rectal pain and vomiting. Genitourinary: Positive for flank pain. Negative for decreased urine volume, difficulty urinating, dysuria, frequency, hematuria, pelvic pain and urgency. Musculoskeletal: Positive for arthralgias, back pain, gait problem, neck pain and neck stiffness. Negative for myalgias. Skin: Negative for color change, pallor and rash. Neurological: Negative for dizziness, weakness, light-headedness, numbness and headaches. Hematological: Negative for adenopathy. VITALS:  /68   Pulse 82   Temp 98.1 °F (36.7 °C) (Oral)   Ht 5' 2\" (1.575 m)   Wt 145 lb (65.8 kg)   SpO2 97%   BMI 26.52 kg/m²    BP Readings from Last 3 Encounters:   02/09/22 100/68   01/14/22 126/75   12/13/21 124/76     Wt Readings from Last 3 Encounters:   02/09/22 145 lb (65.8 kg)   12/13/21 140 lb 3.2 oz (63.6 kg)   11/29/21 138 lb (62.6 kg)     PE:  Physical Exam  Vitals and nursing note reviewed. Constitutional:       General: She is not in acute distress. Appearance: Normal appearance. She is well-developed and normal weight. She is not diaphoretic. Cardiovascular:      Rate and Rhythm: Normal rate and regular rhythm. Heart sounds: Normal heart sounds. No murmur heard. No friction rub. Pulmonary:      Effort: Pulmonary effort is normal. No respiratory distress. Breath sounds: Normal breath sounds. No wheezing, rhonchi or rales. Abdominal:      General: Abdomen is flat. Bowel sounds are normal. There is no distension. Palpations: Abdomen is soft. There is no mass. Tenderness: There is no abdominal tenderness. There is no right CVA tenderness, left CVA tenderness, guarding or rebound. Hernia: No hernia is present. There is no hernia in the right inguinal area. Musculoskeletal:      Lumbar back: No deformity, spasms, tenderness or bony tenderness. Decreased range of motion. Right hip: Deformity (unable to fully extend leg (pt states this is chronic)) and tenderness present. Decreased range of motion. Decreased strength. Lymphadenopathy:      Lower Body: No right inguinal adenopathy. Skin:     General: Skin is warm and dry. Coloration: Skin is not pale. Findings: No erythema or rash. Neurological:      Mental Status: She is alert and oriented to person, place, and time. Motor: No weakness. Gait: Gait normal.   Psychiatric:         Mood and Affect: Mood normal.         Behavior: Behavior normal.        Results for POC orders placed in visit on 02/09/22   POCT Urinalysis no Micro   Result Value Ref Range    Color, UA yellow     Clarity, UA clear     Glucose, UA POC neg     Bilirubin, UA neg     Ketones, UA neg     Spec Grav, UA >=1.030     Blood, UA POC trace     pH, UA 5.5     Protein, UA POC neg     Urobilinogen, UA 0.2     Leukocytes, UA small     Nitrite, UA neg        ASSESSMENT/PLAN:  1. Right hip pain  Discussed differentials. May be originating from lumbar spine. Check XR  May take IBP. Has rx at home. Declines ortho referral at this time due to lack of transportation. - XR HIP 2-3 VW W PELVIS RIGHT; Future    2. Flank pain  Check urine dip. Trace blood. - POCT Urinalysis no Micro    3. Chronic right-sided low back pain with right-sided sciatica  Check XR. Proceed pending results. - XR LUMBAR SPINE (2-3 VIEWS); Future    4. Osteoporosis, unspecified osteoporosis type, unspecified pathological fracture presence  Check XRs. - XR LUMBAR SPINE (2-3 VIEWS);  Future  - XR HIP 2-3 VW W PELVIS RIGHT; Future    5. Osteoarthritis of spine with radiculopathy, cervical region  Continue current regimen. Ortho referral offered, patient declines at this time. 6. Cervical spinal stenosis  Continue current regimen. Ortho referral offered, patient declines at this time. 7. Type 2 diabetes mellitus with diabetic polyneuropathy, without long-term current use of insulin ([facility] [address]  Check labs due to Complains of malaise and recent lower glu readings. Increase fluids.   - CBC; Future  - Comprehensive Metabolic Panel; Future    8. Leukocytes in urine  - Culture, Urine      Return for apt as scheduled with PCP.      Electronically signed by NIA Donohue CNP on 2/9/2022 at 12:34 PM

## 2022-02-10 LAB — URINE CULTURE, ROUTINE: NORMAL

## 2022-02-15 ENCOUNTER — OFFICE VISIT (OUTPATIENT)
Dept: ORTHOPEDIC SURGERY | Age: 67
End: 2022-02-15
Payer: MEDICARE

## 2022-02-15 VITALS — BODY MASS INDEX: 26.68 KG/M2 | WEIGHT: 145 LBS | HEIGHT: 62 IN

## 2022-02-15 DIAGNOSIS — G89.29 CHRONIC NONINTRACTABLE HEADACHE, UNSPECIFIED HEADACHE TYPE: ICD-10-CM

## 2022-02-15 DIAGNOSIS — M50.30 DDD (DEGENERATIVE DISC DISEASE), CERVICAL: ICD-10-CM

## 2022-02-15 DIAGNOSIS — R51.9 CHRONIC NONINTRACTABLE HEADACHE, UNSPECIFIED HEADACHE TYPE: ICD-10-CM

## 2022-02-15 DIAGNOSIS — M54.12 CERVICAL RADICULITIS: ICD-10-CM

## 2022-02-15 DIAGNOSIS — M54.2 CERVICAL PAIN: Primary | ICD-10-CM

## 2022-02-15 PROCEDURE — 3017F COLORECTAL CA SCREEN DOC REV: CPT | Performed by: PHYSICIAN ASSISTANT

## 2022-02-15 PROCEDURE — 99204 OFFICE O/P NEW MOD 45 MIN: CPT | Performed by: PHYSICIAN ASSISTANT

## 2022-02-15 PROCEDURE — 1090F PRES/ABSN URINE INCON ASSESS: CPT | Performed by: PHYSICIAN ASSISTANT

## 2022-02-15 PROCEDURE — 1036F TOBACCO NON-USER: CPT | Performed by: PHYSICIAN ASSISTANT

## 2022-02-15 PROCEDURE — G8427 DOCREV CUR MEDS BY ELIG CLIN: HCPCS | Performed by: PHYSICIAN ASSISTANT

## 2022-02-15 PROCEDURE — 1123F ACP DISCUSS/DSCN MKR DOCD: CPT | Performed by: PHYSICIAN ASSISTANT

## 2022-02-15 PROCEDURE — 4040F PNEUMOC VAC/ADMIN/RCVD: CPT | Performed by: PHYSICIAN ASSISTANT

## 2022-02-15 PROCEDURE — G8484 FLU IMMUNIZE NO ADMIN: HCPCS | Performed by: PHYSICIAN ASSISTANT

## 2022-02-15 PROCEDURE — G8399 PT W/DXA RESULTS DOCUMENT: HCPCS | Performed by: PHYSICIAN ASSISTANT

## 2022-02-15 PROCEDURE — G8417 CALC BMI ABV UP PARAM F/U: HCPCS | Performed by: PHYSICIAN ASSISTANT

## 2022-02-15 RX ORDER — METHYLPREDNISOLONE 4 MG/1
TABLET ORAL
Qty: 1 KIT | Refills: 0 | Status: SHIPPED | OUTPATIENT
Start: 2022-02-15 | End: 2022-03-14 | Stop reason: CLARIF

## 2022-02-15 NOTE — PROGRESS NOTES
[facility]  Medications:            NSAIDS: Advil            Muscle relaxer:              Steriods:              Neuropathic medications:   Gabapentin 300 daily            Opioids:            Other: Voltaren gel  · Surgery/Consult: No      Past Medical History: Medical history form was reviewed today & scanned into the media tab  Past Medical History:   Diagnosis Date    Abnormal involuntary movements(781.0)     Anal fissure     Anemia, unspecified     Anxiety     Chronic back pain     Chronic diarrhea 9/23/2019    Depression     Diffuse cystic mastopathy     Encopresis(307.7)     Esophageal reflux     Headache(784.0)     Hepatitis, unspecified     Herpes simplex without mention of complication     Hypertension     Hypogammaglobulinaemia, unspecified     Insomnia, unspecified     Lateral epicondylitis  of elbow     Migraine, unspecified, without mention of intractable migraine without mention of status migrainosus     Mixed hyperlipidemia 7/30/2010    Osteopenia     Pure hypercholesterolemia     Symptomatic menopausal or female climacteric states     Type 2 diabetes mellitus without complication ([facility] [address]     Type II or unspecified type diabetes mellitus without mention of complication, not stated as uncontrolled     Unspecified asthma(493.90)       Past Surgical History:     Past Surgical History:   Procedure Laterality Date    BREAST BIOPSY      CATARACT REMOVAL Bilateral     DILATION AND CURETTAGE OF UTERUS      x2    ELBOW SURGERY  1/18/11    right    FOOT SURGERY  10/2009,11/2010    right foot    HAND SURGERY  2009    left hand    HYSTERECTOMY  1998    RECTAL SURGERY      SHOULDER ARTHROSCOPY Right 7/07    by  [facility] [place] [facility]     Current Medications:     Current Outpatient Medications:     cetirizine (ZYRTEC) 10 MG tablet, Take 1 tablet by mouth daily, Disp: 30 tablet, Rfl: 1    atorvastatin (LIPITOR) 20 MG tablet, Take 1 tablet by mouth daily Take 20 mg by mouth daily, Disp: 90 tablet, Rfl: 1    pantoprazole (PROTONIX) 40 MG tablet, Take 1 tablet by mouth daily Take 40 mg by mouth daily, Disp: 90 tablet, Rfl: 1    Dulaglutide (TRULICITY) 7.40 TRAVIS/9.8EQ SOPN, INJECT 0.75mg SUBCUTANEOUSLY (UNDER THE SKIN) every week, Disp: 6 mL, Rfl: 1    diclofenac sodium (VOLTAREN) 1 % GEL, Apply 2 g topically 4 times daily, Disp: 350 g, Rfl: 1    fluticasone (FLONASE) 50 MCG/ACT nasal spray, 2 sprays by Nasal route daily, Disp: 16 g, Rfl: 0    ibuprofen (ADVIL;MOTRIN) 600 MG tablet, Take 1 tablet by mouth 3 times daily as needed for Pain, Disp: 15 tablet, Rfl: 0    gabapentin (NEURONTIN) 300 MG capsule, Take 1 capsule by mouth daily. Take 1 capsule by mouth daily, Disp: , Rfl:     budesonide-formoterol (SYMBICORT) 160-4.5 MCG/ACT AERO, Inhale 2 puffs into the lungs 2 times daily, Disp: 30.6 g, Rfl: 1    cloNIDine (CATAPRES-TTS-1) 0.1 MG/24HR PTWK, Place 1 patch onto the skin once a week, Disp: 12 patch, Rfl: 1    Blood Glucose Monitoring Suppl (TRUE METRIX METER) DMITRIY, Test blood sugar daily and PRN E11.9, Disp: 1 each, Rfl: 0    blood glucose monitor strips, by Other route daily E11.9, Disp: 100 strip, Rfl: 0    Lancets MISC, 1 each by Other route daily E11.9, Disp: 100 each, Rfl: 0    blood glucose test strips (GLUCOSE METER TEST) strip, 100 each by In Vitro route 2 times daily Diabetes   e11.9, Disp: 100 each, Rfl: 1    glucose monitoring (FREESTYLE FREEDOM) kit, 1 kit by Does not apply route daily, Disp: 1 kit, Rfl: 0    Bisacodyl (DULCOLAX PO), Take 1 tablet by mouth, Disp: , Rfl:     beclomethasone (QVAR) 80 MCG/ACT inhaler, Inhale 2 puffs into the lungs 2 times daily. , Disp: , Rfl:   Allergies:  Aspirin, Darvocet [propoxyphene n-acetaminophen], Effexor [venlafaxine hydrochloride], Hydrocodone, Keflex [cephalexin], and Paxil cr [paroxetine hcl er]  Social History:    reports that she has never smoked.  She has never used smokeless tobacco. She reports that she does not drink alcohol and does not extremities. · Reflexes: Bilaterally triceps, biceps and brachioradialis are 1-2+. Clonus absent bilaterally at the feet. · Additional Examinations:       · RIGHT UPPER EXTREMITY:  Inspection/examination of the right upper extremity does not show any tenderness, deformity or injury. Range of motion is full. There is no gross instability. There are no rashes, ulcerations or lesions. Strength and tone are normal.  · LEFT UPPER EXTREMITY: Inspection/examination of the left upper extremity does not show any tenderness, deformity or injury. Range of motion is full. There is no gross instability. There are no rashes, ulcerations or lesions. Strength and tone are normal.    LUMBAR/SACRAL EXAMINATION:      · Range of Motion: Able to sit forward flexed  · Strength:   Strength testing is 5/5 in all muscle groups tested. · Special Tests:   Straight leg raise and crossed SLR negative. Leg length and pelvis level.  0 out of 5 Chay's signs. · Skin: There are no rashes, ulcerations or lesions. · Reflexes: Reflexes are symmetrically 1+ at the patellar and ankle tendons. Clonus absent bilaterally at the feet. · Gait & station: Slightly forward flexed unassisted    Diagnostic Testin view cervical spine 2/15/2022 C5-6 and C6-7 DDD with anterior spurring    Labs reviewed 2022 normal BUN and creatinine    Cervical MRI 2017  Multilevel degenerative changes. C3-C4 small left subarticular disc extrusion indenting the left ventral cord, similar to the    prior study. Mild right C5 foraminal narrowing. Mild-to-moderate C5-C6 central narrowing     MRI brain 2017 no acute intracranial abnormality  MRA neck 2017      Negative MRA of the neck. No MR evidence of underlying flow-limiting stenosis, dissection or    aneurysm.          Impression:  1) Chronic worsening neck pain, right cervical radiculitis  2) C5-6, C6-7 DDD, h/o mild-mod CS C5-6  3) H/o BERNABE  4) Chronic headaches, prior neuro eval  5) DM, diana rodgers      Plan:   1) 4 views cervical spine were obtained today and discussed in detail  2) Cervical MRI WO--assess for progressive stenosis C5-6  3) MDP--DC NSAIDs during, side effects discussed  4) PT for above  5) Neurology referral for h/a--ED if any new or worsening sx  6) F/u to review C MRI.  May consider UE EMG if warranted            Geneva Segura PA-C, MPAS  Board Certified by the [facility] W Dyllan Arroyo

## 2022-02-16 ENCOUNTER — TELEPHONE (OUTPATIENT)
Dept: ORTHOPEDIC SURGERY | Age: 67
End: 2022-02-16

## 2022-02-16 DIAGNOSIS — J06.9 UPPER RESPIRATORY TRACT INFECTION, UNSPECIFIED TYPE: ICD-10-CM

## 2022-02-16 NOTE — TELEPHONE ENCOUNTER
Called & spoke with the patient informing her that I saw she was scheduled for her MRI on 2/17/2022. I informed her I wanted to schedule a f/u for the patient. Patient states this was fine, Patient was scheduled on 2/22/2022 with Missy Lazaro PA-C at the [place] office at 1:30PM. Patient voiced understanding.

## 2022-02-17 ENCOUNTER — HOSPITAL ENCOUNTER (OUTPATIENT)
Dept: MRI IMAGING | Age: 67
Discharge: HOME | End: 2022-02-17
Payer: MEDICARE

## 2022-02-17 DIAGNOSIS — M54.12 CERVICAL RADICULITIS: ICD-10-CM

## 2022-02-17 DIAGNOSIS — M50.30 DDD (DEGENERATIVE DISC DISEASE), CERVICAL: ICD-10-CM

## 2022-02-17 PROCEDURE — 72141 MRI NECK SPINE W/O DYE: CPT

## 2022-02-17 RX ORDER — FLUTICASONE PROPIONATE 50 MCG
SPRAY, SUSPENSION (ML) NASAL
Qty: 16 G | Refills: 2 | Status: SHIPPED | OUTPATIENT
Start: 2022-02-17 | End: 2022-05-24 | Stop reason: SDUPTHER

## 2022-02-17 NOTE — TELEPHONE ENCOUNTER
Medication:   Requested Prescriptions     Pending Prescriptions Disp Refills    fluticasone (FLONASE) 50 MCG/ACT nasal spray [Pharmacy Med Name: fluticasone propionate 50 mcg/actuation nasal spray,suspension] 16 g 0     Sig: INSTILL 2 SPRAYS IN EACH NOSTRIL EVERY DAY     Last Filled: 12.30.21    Last appt: 12.30.21  Next appt: 3/14/2022    Last OARRS:   RX Monitoring 8/7/2017   Periodic Controlled Substance Monitoring Possible medication side effects, risk of tolerance and/or dependence, and alternative treatments discussed; No signs of potential drug abuse or diversion identified.

## 2022-02-18 ENCOUNTER — TELEMEDICINE (OUTPATIENT)
Dept: PRIMARY CARE CLINIC | Age: 67
End: 2022-02-18
Payer: MEDICARE

## 2022-02-18 DIAGNOSIS — R19.7 DIARRHEA, UNSPECIFIED TYPE: Primary | ICD-10-CM

## 2022-02-18 PROCEDURE — 1090F PRES/ABSN URINE INCON ASSESS: CPT | Performed by: INTERNAL MEDICINE

## 2022-02-18 PROCEDURE — 3017F COLORECTAL CA SCREEN DOC REV: CPT | Performed by: INTERNAL MEDICINE

## 2022-02-18 PROCEDURE — 4040F PNEUMOC VAC/ADMIN/RCVD: CPT | Performed by: INTERNAL MEDICINE

## 2022-02-18 PROCEDURE — G8427 DOCREV CUR MEDS BY ELIG CLIN: HCPCS | Performed by: INTERNAL MEDICINE

## 2022-02-18 PROCEDURE — 99213 OFFICE O/P EST LOW 20 MIN: CPT | Performed by: INTERNAL MEDICINE

## 2022-02-18 PROCEDURE — 1123F ACP DISCUSS/DSCN MKR DOCD: CPT | Performed by: INTERNAL MEDICINE

## 2022-02-18 PROCEDURE — G8399 PT W/DXA RESULTS DOCUMENT: HCPCS | Performed by: INTERNAL MEDICINE

## 2022-02-18 NOTE — PROGRESS NOTES
2022    TELEHEALTH EVALUATION -- Audio/Visual (During DKOVZ-04 public health emergency)    Chief Complaint   Patient presents with    Diarrhea     Started about 4am this morning between then and 8am she had 10 episodes. Patient wants you to know that she did start a medrol dose pack recently.  Other     [facility] [address]       HPI:    Karo Galvan (:  1955) has requested an audio/video evaluation for the following concern(s):    Patient states she woke up at 4:00am and felt like she was going to have diarrhea. Patient states she has had 10 BM's between 4:00am and 10:00am. No blood in stool. Patient denies fever, nausea, vomiting, and abdominal pain. Patient states stool is watery only this morning. Patient states she ate spaghetti with sauce, bagel with cream cheese for dinner last night. Patient states for lunch yesterday she had 1 orange at 4:00 clock and 1 orange for breakfast. Patient states the only thing different is she started a steroid pack on 22. Review of Systems   Constitutional: Negative for appetite change, chills and fever. Respiratory: Negative for shortness of breath. Cardiovascular: Negative for chest pain. Gastrointestinal: Positive for diarrhea. Negative for abdominal distention, abdominal pain, blood in stool, constipation, nausea and vomiting. Genitourinary: Negative for dysuria, frequency and hematuria. Musculoskeletal: Negative for back pain. Neurological: Negative for dizziness. Prior to Visit Medications    Medication Sig Taking? Authorizing Provider   fluticasone (FLONASE) 50 MCG/ACT nasal spray INSTILL 2 SPRAYS IN EACH NOSTRIL EVERY DAY Yes Indiana Moss MD   methylPREDNISolone (MEDROL, WHIT,) 4 MG tablet Take by mouth.  Yes Magdalena Cooper PA-C   cetirizine (ZYRTEC) 10 MG tablet Take 1 tablet by mouth daily Yes Indiana Moss MD   atorvastatin (LIPITOR) 20 MG tablet Take 1 tablet by mouth daily Take 20 mg by mouth daily Yes Ana Kirby MD   pantoprazole (PROTONIX) 40 MG tablet Take 1 tablet by mouth daily Take 40 mg by mouth daily Yes Ana Kirby MD   Dulaglutide (TRULICITY) 7.53 GQ/6.5OD SOPN INJECT 0.75mg SUBCUTANEOUSLY (UNDER THE SKIN) every week Yes Ana Kirby MD   diclofenac sodium (VOLTAREN) 1 % GEL Apply 2 g topically 4 times daily Yes Ana Kirby MD   ibuprofen (ADVIL;MOTRIN) 600 MG tablet Take 1 tablet by mouth 3 times daily as needed for Pain Yes Ana Kirby MD   gabapentin (NEURONTIN) 300 MG capsule Take 1 capsule by mouth daily. Take 1 capsule by mouth daily Yes Ana Kirby MD   budesonide-formoterol (SYMBICORT) 160-4.5 MCG/ACT AERO Inhale 2 puffs into the lungs 2 times daily Yes Ana Kirby MD   cloNIDine (CATAPRES-TTS-1) 0.1 MG/24HR PTWK Place 1 patch onto the skin once a week Yes Ana Kirby MD   Blood Glucose Monitoring Suppl (TRUE METRIX METER) DMITRIY Test blood sugar daily and PRN E11.9 Yes Cintia Chandler MD   blood glucose monitor strips by Other route daily E11.9 Yes Cintia Chandler MD   Lancets MISC 1 each by Other route daily E11.9 Yes Cintia Chandler MD   blood glucose test strips (GLUCOSE METER TEST) strip 100 each by In Vitro route 2 times daily Diabetes   e11.9 Yes Ana Kirby MD   glucose monitoring (FREESTYLE FREEDOM) kit 1 kit by Does not apply route daily Yes Cintia Chandler MD   Bisacodyl (DULCOLAX PO) Take 1 tablet by mouth Yes Historical Provider, MD   beclomethasone (QVAR) 80 MCG/ACT inhaler Inhale 2 puffs into the lungs 2 times daily.    Yes Historical Provider, MD       Social History     Tobacco Use    Smoking status: Never Smoker    Smokeless tobacco: Never Used   Substance Use Topics    Alcohol use: No    Drug use: No        Allergies   Allergen Reactions    Aspirin      bruising    Darvocet [Propoxyphene N-Acetaminophen] Hives    Effexor [Venlafaxine Hydrochloride] Hives    Hydrocodone Hives and Itching  Keflex [Cephalexin] Itching     Face redness.  Paxil Cr [Paroxetine Hcl Er] Itching   ,   Past Medical History:   Diagnosis Date    Abnormal involuntary movements(781.0)     Anal fissure     Anemia, unspecified     Anxiety     Chronic back pain     Chronic diarrhea 9/23/2019    Depression     Diffuse cystic mastopathy     Encopresis(307.7)     Esophageal reflux     Headache(784.0)     Hepatitis, unspecified     Herpes simplex without mention of complication     Hypertension     Hypogammaglobulinaemia, unspecified     Insomnia, unspecified     Lateral epicondylitis  of elbow     Migraine, unspecified, without mention of intractable migraine without mention of status migrainosus     Mixed hyperlipidemia 7/30/2010    Osteopenia     Pure hypercholesterolemia     Symptomatic menopausal or female climacteric states     Type 2 diabetes mellitus without complication ([facility] [address]     Type II or unspecified type diabetes mellitus without mention of complication, not stated as uncontrolled     Unspecified asthma(493.90)        PHYSICAL EXAMINATION:  [ INSTRUCTIONS:  \"[x]\" Indicates a positive item  \"[]\" Indicates a negative item  -- DELETE ALL ITEMS NOT EXAMINED]  Vital Signs: (As obtained by patient/caregiver or practitioner observation)    Blood pressure-  Heart rate-    Respiratory rate-    Temperature-  Pulse oximetry-   Patient-Reported Vitals 12/30/2021   Patient-Reported Weight 140   Patient-Reported Height 62\"   Patient-Reported Temperature 98.5        Constitutional: [x] Appears well-developed and well-nourished [x] No apparent distress      [] Abnormal-   Mental status  [x] Alert and awake  [x] Oriented to person/place/time [x]Able to follow commands      Eyes:  EOM    [x]  Normal  [] Abnormal-  Sclera  [x]  Normal  [] Abnormal -         Discharge [x]  None visible  [] Abnormal -    HENT:   [x] Normocephalic, atraumatic.   [] Abnormal   [] Mouth/Throat: Mucous membranes are moist.     External Ears [x] Normal  [] Abnormal-     Neck: [x] No visualized mass     Pulmonary/Chest: [x] Respiratory effort normal.  [x] No visualized signs of difficulty breathing or respiratory distress        [] Abnormal-      Musculoskeletal:   [] Normal gait with no signs of ataxia         [x] Normal range of motion of neck        [] Abnormal-       Neurological:        [] No Facial Asymmetry (Cranial nerve 7 motor function) (limited exam to video visit)          [x] No gaze palsy        [] Abnormal-         Skin:        [x] No significant exanthematous lesions or discoloration noted on facial skin         [] Abnormal-            Psychiatric:       [x] Normal Affect [] No Hallucinations        [] Abnormal-     Other pertinent observable physical exam findings-     ASSESSMENT/PLAN:  1. Diarrhea, unspecified type  -may be due to Medrol dose pack  -stop Medrol dose pack and follow up with Orthopedics regarding and alternative medication  -Over the counter Immodium AD as needed  -stay hydrated  -BRAT (bananas, rice, applesauce, and toast) diet  -call if no improvement      Return if symptoms worsen or fail to improve. Sri De Leon, was evaluated through a synchronous (real-time) audio-video encounter. The patient (or guardian if applicable) is aware that this is a billable service. Verbal consent to proceed has been obtained within the past 12 months. The visit was conducted pursuant to the emergency declaration under the [address] [place] [address] authority and the [employer] Act. Patient identification was verified, and a caregiver was present when appropriate. The patient was located in a state where the provider was credentialed to provide care. Total time spent on this encounter: Not billed by time    --David Cruz MD on 2/28/2022 at 10:46 AM    An electronic signature was used to authenticate this note.

## 2022-02-22 ENCOUNTER — OFFICE VISIT (OUTPATIENT)
Dept: ORTHOPEDIC SURGERY | Age: 67
End: 2022-02-22
Payer: MEDICARE

## 2022-02-22 VITALS — BODY MASS INDEX: 26.68 KG/M2 | HEIGHT: 62 IN | WEIGHT: 145 LBS

## 2022-02-22 DIAGNOSIS — M54.12 CERVICAL RADICULITIS: ICD-10-CM

## 2022-02-22 DIAGNOSIS — Q76.49: Primary | ICD-10-CM

## 2022-02-22 DIAGNOSIS — G89.29 CHRONIC NECK PAIN: ICD-10-CM

## 2022-02-22 DIAGNOSIS — M54.2 CHRONIC NECK PAIN: ICD-10-CM

## 2022-02-22 PROCEDURE — 1036F TOBACCO NON-USER: CPT | Performed by: PHYSICIAN ASSISTANT

## 2022-02-22 PROCEDURE — 3017F COLORECTAL CA SCREEN DOC REV: CPT | Performed by: PHYSICIAN ASSISTANT

## 2022-02-22 PROCEDURE — G8484 FLU IMMUNIZE NO ADMIN: HCPCS | Performed by: PHYSICIAN ASSISTANT

## 2022-02-22 PROCEDURE — G8427 DOCREV CUR MEDS BY ELIG CLIN: HCPCS | Performed by: PHYSICIAN ASSISTANT

## 2022-02-22 PROCEDURE — G8417 CALC BMI ABV UP PARAM F/U: HCPCS | Performed by: PHYSICIAN ASSISTANT

## 2022-02-22 PROCEDURE — 1090F PRES/ABSN URINE INCON ASSESS: CPT | Performed by: PHYSICIAN ASSISTANT

## 2022-02-22 PROCEDURE — 4040F PNEUMOC VAC/ADMIN/RCVD: CPT | Performed by: PHYSICIAN ASSISTANT

## 2022-02-22 PROCEDURE — 99214 OFFICE O/P EST MOD 30 MIN: CPT | Performed by: PHYSICIAN ASSISTANT

## 2022-02-22 PROCEDURE — 1123F ACP DISCUSS/DSCN MKR DOCD: CPT | Performed by: PHYSICIAN ASSISTANT

## 2022-02-22 PROCEDURE — G8399 PT W/DXA RESULTS DOCUMENT: HCPCS | Performed by: PHYSICIAN ASSISTANT
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
## 2022-02-28 ASSESSMENT — ENCOUNTER SYMPTOMS
ABDOMINAL DISTENTION: 0
SHORTNESS OF BREATH: 0
BLOOD IN STOOL: 0
CONSTIPATION: 0
BACK PAIN: 0
DIARRHEA: 1
NAUSEA: 0
ABDOMINAL PAIN: 0
VOMITING: 0

## 2022-02-28 NOTE — PATIENT INSTRUCTIONS
1. Diarrhea, unspecified type  -may be due to Medrol dose pack  -stop Medrol dose pack and follow up with Orthopedics regarding and alternative medication  -Over the counter Immodium AD as needed  -stay hydrated  -BRAT (bananas, rice, applesauce, and toast) diet  -call if no improvement

## 2022-03-07 DIAGNOSIS — E11.42 TYPE 2 DIABETES MELLITUS WITH DIABETIC POLYNEUROPATHY, WITHOUT LONG-TERM CURRENT USE OF INSULIN (HCC): ICD-10-CM

## 2022-03-07 RX ORDER — GLUCOSAMINE HCL/CHONDROITIN SU 500-400 MG
CAPSULE ORAL DAILY
Qty: 100 STRIP | Refills: 0 | Status: SHIPPED | OUTPATIENT
Start: 2022-03-07 | End: 2022-05-24

## 2022-03-07 RX ORDER — LANCETS 30 GAUGE
1 EACH MISCELLANEOUS DAILY
Qty: 100 EACH | Refills: 0 | Status: SHIPPED | OUTPATIENT
Start: 2022-03-07

## 2022-03-07 NOTE — TELEPHONE ENCOUNTER
----- Message from [facility] sent at 3/7/2022  8:45 AM EST -----  Subject: Refill Request    QUESTIONS  Name of Medication? blood glucose monitor strips  Patient-reported dosage and instructions? by Other route daily E11.9  How many days do you have left? 2  Preferred Pharmacy? [address] phone number (if available)? [phone]  ---------------------------------------------------------------------------  --------------,  Name of Medication? Lancets MISC  Patient-reported dosage and instructions? 1 each by Other route daily   E11.9  How many days do you have left? 2  Preferred Pharmacy? [address] phone number (if available)? [phone]  ---------------------------------------------------------------------------  --------------  José Luis KUMAR  What is the best way for the office to contact you? OK to leave message on   voicemail  Preferred Call Back Phone Number?  [phone]

## 2022-03-07 NOTE — TELEPHONE ENCOUNTER
----- Message from [facility] sent at 3/7/2022  8:45 AM EST -----  Subject: Refill Request    QUESTIONS  Name of Medication? blood glucose monitor strips  Patient-reported dosage and instructions? by Other route daily E11.9  How many days do you have left? 2  Preferred Pharmacy? [address] phone number (if available)? [phone]  ---------------------------------------------------------------------------  --------------,  Name of Medication? Lancets MISC  Patient-reported dosage and instructions? 1 each by Other route daily   E11.9  How many days do you have left? 2  Preferred Pharmacy? [address] phone number (if available)? [phone]  ---------------------------------------------------------------------------  --------------  Emiliano KUMAR  What is the best way for the office to contact you? OK to leave message on   voicemail  Preferred Call Back Phone Number?  [phone]

## 2022-03-07 NOTE — TELEPHONE ENCOUNTER
Patient uses truemetrix meter    Medication:   Requested Prescriptions     Pending Prescriptions Disp Refills    blood glucose monitor strips 100 strip 0     Sig: by Other route daily E11.9    Lancets MISC 100 each 0     Si each by Other route daily E11.9     Last Filled: 21    Last appt: 2022   Next appt: 3/14/2022    Last [place]:   RX Monitoring 2017   Periodic Controlled Substance Monitoring Possible medication side effects, risk of tolerance and/or dependence, and alternative treatments discussed; No signs of potential drug abuse or diversion identified.

## 2022-03-10 ENCOUNTER — OFFICE VISIT (OUTPATIENT)
Dept: ORTHOPEDIC SURGERY | Age: 67
End: 2022-03-10
Payer: MEDICARE

## 2022-03-10 VITALS — HEIGHT: 62 IN | BODY MASS INDEX: 26.68 KG/M2 | WEIGHT: 145 LBS

## 2022-03-10 DIAGNOSIS — M47.812 CERVICAL SPONDYLOSIS WITHOUT MYELOPATHY: Primary | ICD-10-CM

## 2022-03-10 PROCEDURE — 4040F PNEUMOC VAC/ADMIN/RCVD: CPT | Performed by: ORTHOPAEDIC SURGERY

## 2022-03-10 PROCEDURE — G8417 CALC BMI ABV UP PARAM F/U: HCPCS | Performed by: ORTHOPAEDIC SURGERY

## 2022-03-10 PROCEDURE — G8484 FLU IMMUNIZE NO ADMIN: HCPCS | Performed by: ORTHOPAEDIC SURGERY

## 2022-03-10 PROCEDURE — 1036F TOBACCO NON-USER: CPT | Performed by: ORTHOPAEDIC SURGERY

## 2022-03-10 PROCEDURE — 3017F COLORECTAL CA SCREEN DOC REV: CPT | Performed by: ORTHOPAEDIC SURGERY

## 2022-03-10 PROCEDURE — 1090F PRES/ABSN URINE INCON ASSESS: CPT | Performed by: ORTHOPAEDIC SURGERY

## 2022-03-10 PROCEDURE — 1123F ACP DISCUSS/DSCN MKR DOCD: CPT | Performed by: ORTHOPAEDIC SURGERY

## 2022-03-10 PROCEDURE — 99214 OFFICE O/P EST MOD 30 MIN: CPT | Performed by: ORTHOPAEDIC SURGERY

## 2022-03-10 PROCEDURE — G8399 PT W/DXA RESULTS DOCUMENT: HCPCS | Performed by: ORTHOPAEDIC SURGERY

## 2022-03-10 PROCEDURE — G8427 DOCREV CUR MEDS BY ELIG CLIN: HCPCS | Performed by: ORTHOPAEDIC SURGERY

## 2022-03-10 NOTE — PROGRESS NOTES
New Patient: CERVICAL SPINE    Referring Provider:  Tim Tai    CHIEF COMPLAINT:    Chief Complaint   Patient presents with    Neck Pain     Patient is referred by Nazanin Nicole. Patient has had neck pain since 1996. Patient has had numerous injections with little to no relief. She has neck pain, right shoulder and bicep pain. HISTORY OF PRESENT ILLNESS:   Ms. Sweta Moran is a pleasant 73 y.o. old male presents today for the evaluation of neck and right arm pain. She notes her symptoms began decades ago. They increased substantially substantially about 2 months ago. She rates the intensity of her pain 7/10. She also notes 7/10 low back and leg pain. Her pain radiates to her right hand. She notes numbness and weakness of her right upper extremity. She denies loss of fine motor control and gait abnormality. Current/Past Treatment:   · Physical Therapy: In the past  · Chiropractic:  In the past  · Injection: Years ago  · Medications: Oral steroids, NSAIDs and gabapentin    Past Medical History:   Past Medical History:   Diagnosis Date    Abnormal involuntary movements(781.0)     Anal fissure     Anemia, unspecified     Anxiety     Chronic back pain     Chronic diarrhea 9/23/2019    Depression     Diffuse cystic mastopathy     Encopresis(307.7)     Esophageal reflux     Headache(784.0)     Hepatitis, unspecified     Herpes simplex without mention of complication     Hypertension     Hypogammaglobulinaemia, unspecified     Insomnia, unspecified     Lateral epicondylitis  of elbow     Migraine, unspecified, without mention of intractable migraine without mention of status migrainosus     Mixed hyperlipidemia 7/30/2010    Osteopenia     Pure hypercholesterolemia     Symptomatic menopausal or female climacteric states     Type 2 diabetes mellitus without complication ([facility] [address]     Type II or unspecified type diabetes mellitus without mention of complication, not stated as uncontrolled     Unspecified asthma(493.90)       Past Surgical History:     Past Surgical History:   Procedure Laterality Date    BREAST BIOPSY      CATARACT REMOVAL Bilateral     DILATION AND CURETTAGE OF UTERUS      x2    ELBOW SURGERY  1/18/11    right    FOOT SURGERY  10/2009,11/2010    right foot    HAND SURGERY  2009    left hand    HYSTERECTOMY  1998    RECTAL SURGERY      SHOULDER ARTHROSCOPY Right 7/07    by Dr. Compa Hall     Current Medications:     Current Outpatient Medications:     blood glucose monitor strips, by Other route daily E11.9, Disp: 100 strip, Rfl: 0    Lancets MISC, 1 each by Other route daily E11.9, Disp: 100 each, Rfl: 0    fluticasone (FLONASE) 50 MCG/ACT nasal spray, INSTILL 2 SPRAYS IN EACH NOSTRIL EVERY DAY, Disp: 16 g, Rfl: 2    methylPREDNISolone (MEDROL, WHIT,) 4 MG tablet, Take by mouth., Disp: 1 kit, Rfl: 0    cetirizine (ZYRTEC) 10 MG tablet, Take 1 tablet by mouth daily, Disp: 30 tablet, Rfl: 1    atorvastatin (LIPITOR) 20 MG tablet, Take 1 tablet by mouth daily Take 20 mg by mouth daily, Disp: 90 tablet, Rfl: 1    pantoprazole (PROTONIX) 40 MG tablet, Take 1 tablet by mouth daily Take 40 mg by mouth daily, Disp: 90 tablet, Rfl: 1    Dulaglutide (TRULICITY) 4.46 JL/6.9GD SOPN, INJECT 0.75mg SUBCUTANEOUSLY (UNDER THE SKIN) every week, Disp: 6 mL, Rfl: 1    diclofenac sodium (VOLTAREN) 1 % GEL, Apply 2 g topically 4 times daily, Disp: 350 g, Rfl: 1    ibuprofen (ADVIL;MOTRIN) 600 MG tablet, Take 1 tablet by mouth 3 times daily as needed for Pain, Disp: 15 tablet, Rfl: 0    gabapentin (NEURONTIN) 300 MG capsule, Take 1 capsule by mouth daily.  Take 1 capsule by mouth daily, Disp: , Rfl:     budesonide-formoterol (SYMBICORT) 160-4.5 MCG/ACT AERO, Inhale 2 puffs into the lungs 2 times daily, Disp: 30.6 g, Rfl: 1    cloNIDine (CATAPRES-TTS-1) 0.1 MG/24HR PTWK, Place 1 patch onto the skin once a week, Disp: 12 patch, Rfl: 1    Blood Glucose Monitoring Suppl (TRUE METRIX METER) DMITRIY, Test blood sugar daily and PRN E11.9, Disp: 1 each, Rfl: 0    blood glucose test strips (GLUCOSE METER TEST) strip, 100 each by In Vitro route 2 times daily Diabetes   e11.9, Disp: 100 each, Rfl: 1    glucose monitoring (FREESTYLE FREEDOM) kit, 1 kit by Does not apply route daily, Disp: 1 kit, Rfl: 0    Bisacodyl (DULCOLAX PO), Take 1 tablet by mouth, Disp: , Rfl:     beclomethasone (QVAR) 80 MCG/ACT inhaler, Inhale 2 puffs into the lungs 2 times daily. , Disp: , Rfl:   Allergies:  Aspirin, Darvocet [propoxyphene n-acetaminophen], Effexor [venlafaxine hydrochloride], Hydrocodone, Keflex [cephalexin], and Paxil cr [paroxetine hcl er]  Social History:    reports that she has never smoked. She has never used smokeless tobacco. She reports that she does not drink alcohol and does not use drugs. Family History:   Family History   Problem Relation Age of Onset    Obesity Mother     Stroke Father     Cancer Other     Breast Cancer Daughter        REVIEW OF SYSTEMS: Full ROS noted & scanned   CONSTITUTIONAL: Denies unexplained weight loss, fevers, chills or fatigue  NEUROLOGICAL: Denies unsteady gait or progressive weakness  MUSCULOSKELETAL: Denies joint swelling or redness  PSYCHOLOGICAL: Denies anxiety, depression   SKIN: Denies skin changes, delayed healing, rash, itching   HEMATOLOGIC: Denies easy bleeding or bruising  ENDOCRINE: Denies excessive thirst, urination, heat/cold  RESPIRATORY: Denies current dyspnea, cough  GI: Denies nausea, vomiting, diarrhea   : Denies bowel or bladder issues       PHYSICAL EXAM:    Vitals: Height 5' 2\" (1.575 m), weight 145 lb (65.8 kg). GENERAL EXAM:  · General Apparence: Patient is adequately groomed with no evidence of malnutrition. · Orientation: The patient is oriented to time, place and person.    · Mood & Affect:The patient's mood and affect are appropriate   · Vascular: Examination reveals no swelling tenderness in upper or lower extremities. Good capillary refill  · Lymphatic: The lymphatic examination bilaterally reveals all areas to be without enlargement or induration  · Sensation: Sensation is intact without deficit  · Coordination/Balance: Good coordination     CERVICAL EXAMINATION:  · Inspection: Local inspection shows no step-off or bruising. Cervical alignment is normal.     · Palpation: No evidence of tenderness at the midline, and trapezius. Paraspinal tenderness is present. There is no step-off or paraspinal spasm. · Range of Motion: Cervical flexion, extension, and rotation are mildly reduced with pain. · Strength: 5/5 bilateral upper extremities   · Special Tests:    ·  Sanchez's negative bilaterally. ·      Cubital and Carpal tunnel Tinel's negative bilaterally. · Skin:There are no rashes, ulcerations or lesions in right & left upper extremities. · Reflexes: Bilaterally triceps, biceps and brachioradialis are 2+. Clonus absent bilaterally at the feet. · Gait & station: normal, patient ambulates without assistance     · Additional Examinations:       · RIGHT UPPER EXTREMITY:  Inspection/examination of the right upper extremity does not show any tenderness, deformity or injury. Range of motion is unremarkable. There is no gross instability. There are no rashes, ulcerations or lesions. Strength and tone are normal.  · LEFT UPPER EXTREMITY: Inspection/examination of the left upper extremity does not show any tenderness, deformity or injury. Range of motion is unremarkable. There is no gross instability. There are no rashes, ulcerations or lesions. Strength and tone are normal.    Diagnostic Testing:  I reviewed MRI images of her cervical spine from 2/17/2022 in the office today.   Those show degenerative changes and OPLL without significant central foraminal or stenosis stenosis    Impression:   Cervical spondylosis, OPLL    Plan:    Discussed treatment options including observation, physical therapy, medications, epidural injections and additional testing. She would like to proceed with possible injections. I do not believe spinal surgery is indicated at this time.

## 2022-03-14 ENCOUNTER — OFFICE VISIT (OUTPATIENT)
Dept: PRIMARY CARE CLINIC | Age: 67
End: 2022-03-14
Payer: MEDICARE

## 2022-03-14 VITALS
HEART RATE: 81 BPM | TEMPERATURE: 98.2 F | RESPIRATION RATE: 16 BRPM | WEIGHT: 146 LBS | BODY MASS INDEX: 26.87 KG/M2 | HEIGHT: 62 IN | OXYGEN SATURATION: 98 % | DIASTOLIC BLOOD PRESSURE: 86 MMHG | SYSTOLIC BLOOD PRESSURE: 130 MMHG

## 2022-03-14 DIAGNOSIS — E11.9 TYPE 2 DIABETES MELLITUS WITHOUT COMPLICATION, WITHOUT LONG-TERM CURRENT USE OF INSULIN (HCC): Primary | ICD-10-CM

## 2022-03-14 DIAGNOSIS — M79.604 CHRONIC PAIN OF RIGHT LOWER EXTREMITY: ICD-10-CM

## 2022-03-14 DIAGNOSIS — K21.00 GASTROESOPHAGEAL REFLUX DISEASE WITH ESOPHAGITIS WITHOUT HEMORRHAGE: ICD-10-CM

## 2022-03-14 DIAGNOSIS — G89.29 CHRONIC PAIN OF RIGHT LOWER EXTREMITY: ICD-10-CM

## 2022-03-14 DIAGNOSIS — F33.1 MODERATE EPISODE OF RECURRENT MAJOR DEPRESSIVE DISORDER (HCC): ICD-10-CM

## 2022-03-14 DIAGNOSIS — I10 ESSENTIAL HYPERTENSION: ICD-10-CM

## 2022-03-14 DIAGNOSIS — E78.2 MIXED HYPERLIPIDEMIA: ICD-10-CM

## 2022-03-14 DIAGNOSIS — Z23 NEED FOR SHINGLES VACCINE: ICD-10-CM

## 2022-03-14 LAB — HBA1C MFR BLD: 5.7 %

## 2022-03-14 PROCEDURE — 83036 HEMOGLOBIN GLYCOSYLATED A1C: CPT | Performed by: INTERNAL MEDICINE

## 2022-03-14 PROCEDURE — 3044F HG A1C LEVEL LT 7.0%: CPT | Performed by: INTERNAL MEDICINE

## 2022-03-14 PROCEDURE — G8427 DOCREV CUR MEDS BY ELIG CLIN: HCPCS | Performed by: INTERNAL MEDICINE

## 2022-03-14 PROCEDURE — 1036F TOBACCO NON-USER: CPT | Performed by: INTERNAL MEDICINE

## 2022-03-14 PROCEDURE — 2022F DILAT RTA XM EVC RTNOPTHY: CPT | Performed by: INTERNAL MEDICINE

## 2022-03-14 PROCEDURE — 1090F PRES/ABSN URINE INCON ASSESS: CPT | Performed by: INTERNAL MEDICINE

## 2022-03-14 PROCEDURE — G8399 PT W/DXA RESULTS DOCUMENT: HCPCS | Performed by: INTERNAL MEDICINE

## 2022-03-14 PROCEDURE — 99214 OFFICE O/P EST MOD 30 MIN: CPT | Performed by: INTERNAL MEDICINE

## 2022-03-14 PROCEDURE — 3017F COLORECTAL CA SCREEN DOC REV: CPT | Performed by: INTERNAL MEDICINE

## 2022-03-14 PROCEDURE — 1123F ACP DISCUSS/DSCN MKR DOCD: CPT | Performed by: INTERNAL MEDICINE

## 2022-03-14 PROCEDURE — 4040F PNEUMOC VAC/ADMIN/RCVD: CPT | Performed by: INTERNAL MEDICINE

## 2022-03-14 PROCEDURE — G8417 CALC BMI ABV UP PARAM F/U: HCPCS | Performed by: INTERNAL MEDICINE

## 2022-03-14 PROCEDURE — G8484 FLU IMMUNIZE NO ADMIN: HCPCS | Performed by: INTERNAL MEDICINE

## 2022-03-14 RX ORDER — ZOSTER VACCINE RECOMBINANT, ADJUVANTED 50 MCG/0.5
0.5 KIT INTRAMUSCULAR SEE ADMIN INSTRUCTIONS
Qty: 0.5 ML | Refills: 0 | Status: SHIPPED | OUTPATIENT
Start: 2022-03-14 | End: 2022-09-10

## 2022-03-14 RX ORDER — PANTOPRAZOLE SODIUM 40 MG/1
40 TABLET, DELAYED RELEASE ORAL DAILY
Qty: 90 TABLET | Refills: 1 | Status: SHIPPED | OUTPATIENT
Start: 2022-03-14 | End: 2022-09-06 | Stop reason: SDUPTHER

## 2022-03-14 RX ORDER — GABAPENTIN 300 MG
300 CAPSULE ORAL NIGHTLY
Qty: 90 CAPSULE | Refills: 0 | Status: SHIPPED | OUTPATIENT
Start: 2022-03-14 | End: 2022-05-09 | Stop reason: SDUPTHER

## 2022-03-14 ASSESSMENT — PATIENT HEALTH QUESTIONNAIRE - PHQ9
2. FEELING DOWN, DEPRESSED OR HOPELESS: 0
6. FEELING BAD ABOUT YOURSELF - OR THAT YOU ARE A FAILURE OR HAVE LET YOURSELF OR YOUR FAMILY DOWN: 0
5. POOR APPETITE OR OVEREATING: 0
SUM OF ALL RESPONSES TO PHQ QUESTIONS 1-9: 0
SUM OF ALL RESPONSES TO PHQ QUESTIONS 1-9: 0
4. FEELING TIRED OR HAVING LITTLE ENERGY: 0
1. LITTLE INTEREST OR PLEASURE IN DOING THINGS: 0
SUM OF ALL RESPONSES TO PHQ QUESTIONS 1-9: 0
8. MOVING OR SPEAKING SO SLOWLY THAT OTHER PEOPLE COULD HAVE NOTICED. OR THE OPPOSITE, BEING SO FIGETY OR RESTLESS THAT YOU HAVE BEEN MOVING AROUND A LOT MORE THAN USUAL: 0
9. THOUGHTS THAT YOU WOULD BE BETTER OFF DEAD, OR OF HURTING YOURSELF: 0
SUM OF ALL RESPONSES TO PHQ9 QUESTIONS 1 & 2: 0
SUM OF ALL RESPONSES TO PHQ QUESTIONS 1-9: 0
10. IF YOU CHECKED OFF ANY PROBLEMS, HOW DIFFICULT HAVE THESE PROBLEMS MADE IT FOR YOU TO DO YOUR WORK, TAKE CARE OF THINGS AT HOME, OR GET ALONG WITH OTHER PEOPLE: 0
SUM OF ALL RESPONSES TO PHQ QUESTIONS 1-9: 0
SUM OF ALL RESPONSES TO PHQ QUESTIONS 1-9: 0
2. FEELING DOWN, DEPRESSED OR HOPELESS: 0
SUM OF ALL RESPONSES TO PHQ QUESTIONS 1-9: 0
SUM OF ALL RESPONSES TO PHQ QUESTIONS 1-9: 0
3. TROUBLE FALLING OR STAYING ASLEEP: 0
7. TROUBLE CONCENTRATING ON THINGS, SUCH AS READING THE NEWSPAPER OR WATCHING TELEVISION: 0

## 2022-03-14 NOTE — PATIENT INSTRUCTIONS
-Continue same medications  -Low fat, low cholesterol diet  -Low sodium diet  -Limit carbohydrates to 45 grams with meals and 15 grams with snacks  -monitor blood sugars  -goal for blood sugar fasting or pre-meal  is   -goal for blood sugar 2 hours after a meal is less than 180  -goal for blood sugar at bedtime is less than 150  -Regular aerobic exercise

## 2022-03-22 PROBLEM — G89.29 CHRONIC PAIN OF RIGHT LOWER EXTREMITY: Status: ACTIVE | Noted: 2022-03-22

## 2022-03-22 PROBLEM — E11.9 TYPE 2 DIABETES MELLITUS WITHOUT COMPLICATION, WITHOUT LONG-TERM CURRENT USE OF INSULIN (HCC): Status: ACTIVE | Noted: 2022-03-22

## 2022-03-22 PROBLEM — M79.604 CHRONIC PAIN OF RIGHT LOWER EXTREMITY: Status: ACTIVE | Noted: 2022-03-22

## 2022-03-22 ASSESSMENT — ENCOUNTER SYMPTOMS
CHEST TIGHTNESS: 0
CONSTIPATION: 0
DIARRHEA: 0
COUGH: 0
SORE THROAT: 0
SHORTNESS OF BREATH: 0
VOMITING: 0
BLOOD IN STOOL: 0
TROUBLE SWALLOWING: 0
ABDOMINAL PAIN: 0
RHINORRHEA: 0
NAUSEA: 0
SINUS PRESSURE: 0
WHEEZING: 0

## 2022-03-23 ENCOUNTER — OFFICE VISIT (OUTPATIENT)
Dept: ORTHOPEDIC SURGERY | Age: 67
End: 2022-03-23
Payer: MEDICARE

## 2022-03-23 ENCOUNTER — TELEPHONE (OUTPATIENT)
Dept: ORTHOPEDIC SURGERY | Age: 67
End: 2022-03-23

## 2022-03-23 VITALS — HEIGHT: 62 IN | BODY MASS INDEX: 26.87 KG/M2 | WEIGHT: 146 LBS

## 2022-03-23 DIAGNOSIS — M54.12 CERVICAL RADICULITIS: ICD-10-CM

## 2022-03-23 DIAGNOSIS — M79.18 CERVICAL MYOFASCIAL PAIN SYNDROME: ICD-10-CM

## 2022-03-23 DIAGNOSIS — M47.812 CERVICAL SPONDYLOSIS WITHOUT MYELOPATHY: ICD-10-CM

## 2022-03-23 DIAGNOSIS — G89.29 CHRONIC NECK PAIN: ICD-10-CM

## 2022-03-23 DIAGNOSIS — M54.2 CHRONIC NECK PAIN: ICD-10-CM

## 2022-03-23 DIAGNOSIS — Q76.49: Primary | ICD-10-CM

## 2022-03-23 PROCEDURE — 4040F PNEUMOC VAC/ADMIN/RCVD: CPT | Performed by: PHYSICIAN ASSISTANT

## 2022-03-23 PROCEDURE — 1123F ACP DISCUSS/DSCN MKR DOCD: CPT | Performed by: PHYSICIAN ASSISTANT

## 2022-03-23 PROCEDURE — G8427 DOCREV CUR MEDS BY ELIG CLIN: HCPCS | Performed by: PHYSICIAN ASSISTANT

## 2022-03-23 PROCEDURE — G8399 PT W/DXA RESULTS DOCUMENT: HCPCS | Performed by: PHYSICIAN ASSISTANT

## 2022-03-23 PROCEDURE — G8484 FLU IMMUNIZE NO ADMIN: HCPCS | Performed by: PHYSICIAN ASSISTANT

## 2022-03-23 PROCEDURE — 1036F TOBACCO NON-USER: CPT | Performed by: PHYSICIAN ASSISTANT

## 2022-03-23 PROCEDURE — 99214 OFFICE O/P EST MOD 30 MIN: CPT | Performed by: PHYSICIAN ASSISTANT

## 2022-03-23 PROCEDURE — 3017F COLORECTAL CA SCREEN DOC REV: CPT | Performed by: PHYSICIAN ASSISTANT

## 2022-03-23 PROCEDURE — 1090F PRES/ABSN URINE INCON ASSESS: CPT | Performed by: PHYSICIAN ASSISTANT

## 2022-03-23 PROCEDURE — G8417 CALC BMI ABV UP PARAM F/U: HCPCS | Performed by: PHYSICIAN ASSISTANT
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
## 2022-03-23 NOTE — TELEPHONE ENCOUNTER
Called & spoke with the patient informing her I was returning her call in regards to getting injections done for her neck. Patient states this is what Dr. Josephine Garcia recommend she do first. Patient was informed that Bo He would like to see her in the office to discuss it further with her.  She was scheduled for a same day appointment at 2:30PM on 3/23/2022 with Vazquez Gray PA-C.

## 2022-03-23 NOTE — TELEPHONE ENCOUNTER
General Question     Subject: PATIENT IS CHECKING ON STATUS OF CORTISONE INJECTION.  PLEASE ADVISE   Patient: Mouna Seats Number: [phone]

## 2022-04-06 ENCOUNTER — TELEPHONE (OUTPATIENT)
Dept: ORTHOPEDIC SURGERY | Age: 67
End: 2022-04-06

## 2022-04-06 NOTE — TELEPHONE ENCOUNTER
Ned Brown from Dr. Daniel Ortega office called me this morning in regards to the patient. She informed me that the patient is declining an epidural injection with Dr. Rossi Swanson. I informed her this would be documented on our end through a telephone encounter. Candis Estrella voiced understanding.

## 2022-05-08 DIAGNOSIS — I10 ESSENTIAL HYPERTENSION: ICD-10-CM

## 2022-05-08 DIAGNOSIS — E11.42 TYPE 2 DIABETES MELLITUS WITH DIABETIC POLYNEUROPATHY, WITHOUT LONG-TERM CURRENT USE OF INSULIN (HCC): ICD-10-CM

## 2022-05-09 DIAGNOSIS — G89.29 CHRONIC PAIN OF RIGHT LOWER EXTREMITY: ICD-10-CM

## 2022-05-09 DIAGNOSIS — M79.604 CHRONIC PAIN OF RIGHT LOWER EXTREMITY: ICD-10-CM

## 2022-05-09 RX ORDER — GABAPENTIN 300 MG
300 CAPSULE ORAL NIGHTLY
Qty: 90 CAPSULE | Refills: 0 | Status: SHIPPED | OUTPATIENT
Start: 2022-05-09 | End: 2022-05-12

## 2022-05-09 RX ORDER — DULAGLUTIDE 0.75 MG/.5ML
INJECTION, SOLUTION SUBCUTANEOUS
Qty: 6 ML | Refills: 1 | Status: SHIPPED | OUTPATIENT
Start: 2022-05-09 | End: 2022-08-22

## 2022-05-09 RX ORDER — CLONIDINE 0.1 MG/24H
PATCH, EXTENDED RELEASE TRANSDERMAL
Qty: 12 PATCH | Refills: 1 | Status: SHIPPED | OUTPATIENT
Start: 2022-05-09 | End: 2022-10-28

## 2022-05-09 NOTE — TELEPHONE ENCOUNTER
Medication:   Requested Prescriptions     Pending Prescriptions Disp Refills    NEURONTIN 300 MG capsule 90 capsule 0     Sig: Take 1 capsule by mouth nightly for 90 days. Last Filled: 3.14.22    Last appt: 3/14/2022   Next appt: 6/1/2022    Last OARRS:   RX Monitoring 3/22/2022   Periodic Controlled Substance Monitoring No signs of potential drug abuse or diversion identified.

## 2022-05-09 NOTE — TELEPHONE ENCOUNTER
Medication:   Requested Prescriptions     Pending Prescriptions Disp Refills    cloNIDine (CATAPRES) 0.1 MG/24HR PTWK [Pharmacy Med Name: CLONIDIN-TTS DIS 0.1/24HR] 12 patch 1     Sig: APPLY 1 PATCH ONTO THE SKINONCE A WEEK    Dulaglutide (TRULICITY) 8.69 NX/4.6CI SOPN [Pharmacy Med Name: TRULICITY(4) PEN 7.94/0.1] 6 mL 1     Sig: INJECT 0.75 MG             SUBCUTANEOUSLY EVERY WEEK     Last Filled: 12.13.21 1.30.22    Last appt: 3/14/2022   Next appt: 6/1/2022    Last OARRS:   RX Monitoring 3/22/2022   Periodic Controlled Substance Monitoring No signs of potential drug abuse or diversion identified.

## 2022-05-10 DIAGNOSIS — G89.29 CHRONIC PAIN OF RIGHT LOWER EXTREMITY: Primary | ICD-10-CM

## 2022-05-10 DIAGNOSIS — M79.604 CHRONIC PAIN OF RIGHT LOWER EXTREMITY: Primary | ICD-10-CM

## 2022-05-10 NOTE — TELEPHONE ENCOUNTER
Pt states that the Neurontin is too expensive. Pt is requesting gabapentin instead. Please call her to discuss.

## 2022-05-12 RX ORDER — GABAPENTIN 300 MG/1
300 CAPSULE ORAL NIGHTLY
Qty: 90 CAPSULE | Refills: 0 | Status: SHIPPED | OUTPATIENT
Start: 2022-05-12 | End: 2022-10-20

## 2022-05-12 NOTE — TELEPHONE ENCOUNTER
Pt needs medication refill of Gabapentin, not Neurontin.    [facility] [address], [place] - [phone]

## 2022-05-12 NOTE — TELEPHONE ENCOUNTER
Call patient. I sent a prescription for gabapentin to [facility] [address]

## 2022-05-16 RX ORDER — BUDESONIDE AND FORMOTEROL FUMARATE DIHYDRATE 160; 4.5 UG/1; UG/1
2 AEROSOL RESPIRATORY (INHALATION) 2 TIMES DAILY
Qty: 30.6 G | Refills: 1 | Status: SHIPPED | OUTPATIENT
Start: 2022-05-16 | End: 2022-11-02

## 2022-05-16 NOTE — TELEPHONE ENCOUNTER
Medication:   Requested Prescriptions     Pending Prescriptions Disp Refills    budesonide-formoterol (SYMBICORT) 160-4.5 MCG/ACT AERO 30.6 g 1     Sig: Inhale 2 puffs into the lungs 2 times daily     Last Filled: 12.13.21    Last appt: 3/14/2022   Next appt: 6/1/2022    Last OARRS:   RX Monitoring 3/22/2022   Periodic Controlled Substance Monitoring No signs of potential drug abuse or diversion identified.

## 2022-05-16 NOTE — TELEPHONE ENCOUNTER
Patient needs a refill on the following medication         budesonide-formoterol (SYMBICORT) 160-4.5 MCG/ACT AERO [[id number]]     Order Details  Dose: 2 puff Route: Inhalation Frequency: 2 TIMES DAILY   Dispense Quantity: 30.6 g Refills: 1          Sig: Inhale 2 puffs into the lungs 2 times daily           Pharmacy    [facility] [address], [address] [facility] [phone] - f [phone]   [address] [address]   Phone:  [phone]  Fax:  [phone]     Thank you

## 2022-05-23 ENCOUNTER — NURSE TRIAGE (OUTPATIENT)
Dept: OTHER | Facility: CLINIC | Age: 67
End: 2022-05-23

## 2022-05-23 ENCOUNTER — TELEPHONE (OUTPATIENT)
Dept: PRIMARY CARE CLINIC | Age: 67
End: 2022-05-23

## 2022-05-23 NOTE — TELEPHONE ENCOUNTER
Patient put in for VV with Dr. Ariadne Chambers 5/24/22. Also told her if symptoms get too bad she can go to urgent care or ER.

## 2022-05-23 NOTE — TELEPHONE ENCOUNTER
Received call from kimberly [facility] Red Flag Complaint. Subjective: Caller states \"coughing, sweating, weakness, throat pain, covid positive on home test, pain in the back and shoulders and legs\"     Current Symptoms: coughing, sweating, weakness, throat pain, covid positive on home test, pain in the back and shoulders and legs    Onset: 2 days ago; worsening    Associated Symptoms: reduced activity, reduced appetite, reduced fluid intake, decreased urination, constipation    Pain Severity: 8/10; aching; constant    Temperature: 97.6   orally    What has been tried: tylenol    LMP: NA Pregnant: NA    Recommended disposition: Go to ED Now    Care advice provided, patient verbalizes understanding; denies any other questions or concerns; instructed to call back for any new or worsening symptoms. Patient/caller agrees to proceed to [facility]     Attention Provider: Thank you for allowing me to participate in the care of your patient. The patient was connected to triage in response to information provided to the ECC/PSC. Please do not respond through this encounter as the response is not directed to a shared pool.           Reason for Disposition   Headache and stiff neck (can't touch chin to chest)    Protocols used: CORONAVIRUS (COVID-19) DIAGNOSED OR SUSPECTED-ADULT-[place]

## 2022-05-23 NOTE — TELEPHONE ENCOUNTER
----- Message from [facility] sent at 5/23/2022  3:44 PM EDT -----  Subject: Message to Provider    QUESTIONS  Information for Provider? patient is requesting pcp call her. she has   covid. Mag Dickinson would like a call asap. Mag Dickinson ---------------------------------------------------------------------------  --------------  Alex KUMAR  What is the best way for the office to contact you? OK to leave message on   voicemail  Preferred Call Back Phone Number? [phone]  ---------------------------------------------------------------------------  --------------  SCRIPT ANSWERS  Relationship to Patient?  Self

## 2022-05-24 ENCOUNTER — TELEMEDICINE (OUTPATIENT)
Dept: PRIMARY CARE CLINIC | Age: 67
End: 2022-05-24
Payer: MEDICARE

## 2022-05-24 DIAGNOSIS — H92.01 RIGHT EAR PAIN: ICD-10-CM

## 2022-05-24 DIAGNOSIS — U07.1 COVID-19: Primary | ICD-10-CM

## 2022-05-24 PROCEDURE — 3017F COLORECTAL CA SCREEN DOC REV: CPT | Performed by: INTERNAL MEDICINE

## 2022-05-24 PROCEDURE — G8399 PT W/DXA RESULTS DOCUMENT: HCPCS | Performed by: INTERNAL MEDICINE

## 2022-05-24 PROCEDURE — 99213 OFFICE O/P EST LOW 20 MIN: CPT | Performed by: INTERNAL MEDICINE

## 2022-05-24 PROCEDURE — 1123F ACP DISCUSS/DSCN MKR DOCD: CPT | Performed by: INTERNAL MEDICINE

## 2022-05-24 PROCEDURE — G8427 DOCREV CUR MEDS BY ELIG CLIN: HCPCS | Performed by: INTERNAL MEDICINE

## 2022-05-24 PROCEDURE — 1090F PRES/ABSN URINE INCON ASSESS: CPT | Performed by: INTERNAL MEDICINE

## 2022-05-24 RX ORDER — FLUTICASONE PROPIONATE 50 MCG
2 SPRAY, SUSPENSION (ML) NASAL DAILY
Qty: 48 G | Refills: 1 | Status: SHIPPED | OUTPATIENT
Start: 2022-05-24

## 2022-05-24 RX ORDER — IBUPROFEN 600 MG/1
600 TABLET ORAL 3 TIMES DAILY PRN
Qty: 60 TABLET | Refills: 0 | Status: SHIPPED | OUTPATIENT
Start: 2022-05-24 | End: 2022-09-01 | Stop reason: SDUPTHER

## 2022-05-24 NOTE — PROGRESS NOTES
2022    TELEHEALTH EVALUATION -- Audio/Visual (During [id number] public health emergency)    Chief Complaint   Patient presents with    Positive For Covid-19     At home test 22 positive.  Chest Pain     Comes and goes    Fever     No more than 100 between 4-430am. Starts to sweat.  Cough     clear mucous comes up with cough, when blows nose it is a tinge yellow    Headache     Especially in forehead    Otalgia     Right ear    Pharyngitis       HPI:    Fabiola Calderon (:  1955) has requested an audio/video evaluation for the following concern(s):    Patient states she had sleepiness, sore throat, and right ear pain on 2022. Patient states she also she continued to have sore throat on Saturday and went to Urgent Care on Saturday afternoon 2022. Patient was given prescriptions for Tessalon Perles, Mucinex DM, and Zofran. Patient states she had a COVID test done and it was negative. Patient states on 2022 she felt worse and bought a home COVID test kit and it was positive on 2022. Patient has had a terrible headache, feeling hot and cold, little cough, stuffy nose, runny nose, postnasal drainage, and body aches. Patient states she has a little chest pain that is better and shortness of breath once in a while. Patient has a fever of 100 degrees. Patient states she feels better with the medications. .  Review of Systems   Constitutional: Positive for fatigue and fever. Negative for chills. HENT: Positive for congestion, ear pain, postnasal drip, rhinorrhea and sore throat. Negative for sinus pressure, sinus pain and sneezing. Respiratory: Positive for cough and shortness of breath. Negative for chest tightness and wheezing. Cardiovascular: Positive for chest pain. Musculoskeletal: Positive for myalgias. Neurological: Positive for headaches. Prior to Visit Medications    Medication Sig Taking?  Authorizing Provider budesonide-formoterol (SYMBICORT) 160-4.5 MCG/ACT AERO Inhale 2 puffs into the lungs 2 times daily Yes Katelyn Barriga MD   gabapentin (NEURONTIN) 300 MG capsule Take 1 capsule by mouth nightly for 90 days.  Yes Katelyn Barriga MD   cloNIDine (CATAPRES) 0.1 MG/24HR PTWK APPLY 1 PATCH ONTO THE SKINONCE A WEEK Yes Katelyn Barriga MD   Dulaglutide (TRULICITY) 9.50 PM/2.0EL SOPN INJECT 0.75 MG             SUBCUTANEOUSLY EVERY WEEK Yes Katelyn Barriga MD   pantoprazole (PROTONIX) 40 MG tablet Take 1 tablet by mouth daily Take 40 mg by mouth daily Yes Katelyn Barriga MD   zoster recombinant adjuvanted vaccine [facility]) 50 MCG/0.5ML SUSR injection Inject 0.5 mLs into the muscle See Admin Instructions 1 dose now and repeat in 2-6 months Yes Katelyn Barriga MD   Lancets MISC 1 each by Other route daily E11.9 Yes Katelyn Barriga MD   cetirizine (ZYRTEC) 10 MG tablet Take 1 tablet by mouth daily  Patient taking differently: Take 10 mg by mouth as needed  Yes Katelyn Barriga MD   atorvastatin (LIPITOR) 20 MG tablet Take 1 tablet by mouth daily Take 20 mg by mouth daily Yes Katelyn Barriga MD   diclofenac sodium (VOLTAREN) 1 % GEL Apply 2 g topically 4 times daily  Patient taking differently: Apply 2 g topically 4 times daily as needed  Yes Katelyn Barriga MD   Blood Glucose Monitoring Suppl (TRUE METRIX METER) DMITRIY Test blood sugar daily and PRN E11.9 Yes Chuck Morales MD   blood glucose test strips (GLUCOSE METER TEST) strip 100 each by In Vitro route 2 times daily Diabetes   e11.9 Yes Katelyn Brariga MD   Bisacodyl (DULCOLAX PO) Take 1 tablet by mouth as needed  Yes Historical Provider, MD       Social History     Tobacco Use    Smoking status: Never Smoker    Smokeless tobacco: Never Used   Substance Use Topics    Alcohol use: No    Drug use: No        Allergies   Allergen Reactions    Aspirin      bruising    Darvocet [Propoxyphene N-Acetaminophen] Hives    Effexor [employer] Hydrochloride] Hives    Hydrocodone Hives and Itching    Keflex [Cephalexin] Itching     Face redness.  Paxil Cr [Paroxetine Hcl Er] Itching       PHYSICAL EXAMINATION:  [ INSTRUCTIONS:  \"[x]\" Indicates a positive item  \"[]\" Indicates a negative item  -- DELETE ALL ITEMS NOT EXAMINED]  Vital Signs: (As obtained by patient/caregiver or practitioner observation)    Blood pressure-  Heart rate-    Respiratory rate-    Temperature-  Pulse oximetry-   Patient-Reported Vitals 5/24/2022   Patient-Reported Weight -   Patient-Reported Height -   Patient-Reported Temperature 100        Constitutional: [x] Appears well-developed and well-nourished [x] No apparent distress      [] Abnormal-   Mental status  [x] Alert and awake  [x] Oriented to person/place/time [x]Able to follow commands      Eyes:  EOM    [x]  Normal  [] Abnormal-  Sclera  [x]  Normal  [] Abnormal -         Discharge [x]  None visible  [] Abnormal -    HENT:   [x] Normocephalic, atraumatic. [] Abnormal   [] Mouth/Throat: Mucous membranes are moist.     External Ears [x] Normal  [] Abnormal-     Neck: [x] No visualized mass     Pulmonary/Chest: [x] Respiratory effort normal.  [x] No visualized signs of difficulty breathing or respiratory distress        [] Abnormal-      Musculoskeletal:   [] Normal gait with no signs of ataxia         [x] Normal range of motion of neck        [] Abnormal-       Neurological:        [] No Facial Asymmetry (Cranial nerve 7 motor function) (limited exam to video visit)          [x] No gaze palsy        [] Abnormal-         Skin:        [x] No significant exanthematous lesions or discoloration noted on facial skin         [] Abnormal-            Psychiatric:       [x] Normal Affect [] No Hallucinations        [] Abnormal-     Other pertinent observable physical exam findings-     ASSESSMENT/PLAN:  1.  COVID-19  -COVID-positive on 5/23/2022  -Start fluticasone (FLONASE) 50 MCG/ACT nasal spray; 2 sprays by Nasal route daily Dispense: 48 g; Refill: 1  -Start ibuprofen (ADVIL;MOTRIN) 600 MG tablet; Take 1 tablet by mouth 3 times daily as needed for Pain  Dispense: 60 tablet; Refill: 0  -Continue Zyrtec 10 mg once daily  -Continue Mucinex DM 1 tablet 2 times daily as needed  -Continue Tessalon Perles 100 mg 3 times daily as needed  -Continue Symbicort inhaler  Vitamin D 1,000 IU once daily  Vitamin C 500mg once daily  Zinc 50mg once daily    2. Right ear pain  - ibuprofen (ADVIL;MOTRIN) 600 MG tablet; Take 1 tablet by mouth 3 times daily as needed for Pain  Dispense: 60 tablet; Refill: 0      Return if symptoms worsen or fail to improve. Zeynep Salcedo, was evaluated through a synchronous (real-time) audio-video encounter. The patient (or guardian if applicable) is aware that this is a billable service. Verbal consent to proceed has been obtained within the past 12 months. The visit was conducted pursuant to the emergency declaration under the [address] [place] [address], [address] [place] [address] authority and the [employer] General Act. Patient identification was verified, and a caregiver was present when appropriate. The patient was located in a state where the provider was credentialed to provide care. Total time spent on this encounter: Not billed by time    --Serafin Gill MD on 5/30/2022 at 1:26 PM    An electronic signature was used to authenticate this note.

## 2022-05-25 ENCOUNTER — TELEPHONE (OUTPATIENT)
Dept: PRIMARY CARE CLINIC | Age: 67
End: 2022-05-25

## 2022-05-25 NOTE — TELEPHONE ENCOUNTER
Patient called stating that she hasn't had a BM since Friday and wanted to know if that is normal, asked if she wanted to set up an appointment she told me know she just wants to speak to Dr. Vickie Mayorga MA     Thank you

## 2022-05-25 NOTE — TELEPHONE ENCOUNTER
Called  And  ytalked   With  Her  She is going  To try  An  Enema  And call and let us know  If that helped

## 2022-05-30 PROBLEM — U07.1 COVID-19: Status: ACTIVE | Noted: 2022-05-30

## 2022-05-30 PROBLEM — H92.01 RIGHT EAR PAIN: Status: ACTIVE | Noted: 2022-05-30

## 2022-05-30 PROBLEM — J06.9 UPPER RESPIRATORY TRACT INFECTION: Status: ACTIVE | Noted: 2022-05-30

## 2022-05-30 ASSESSMENT — ENCOUNTER SYMPTOMS
WHEEZING: 0
SORE THROAT: 1
CHEST TIGHTNESS: 0
RHINORRHEA: 1
SHORTNESS OF BREATH: 1
SINUS PRESSURE: 0
COUGH: 1
SINUS PAIN: 0

## 2022-05-30 NOTE — PATIENT INSTRUCTIONS
1. COVID-19  -COVID-positive on 5/23/2022  -Start fluticasone (FLONASE) 50 MCG/ACT nasal spray; 2 sprays by Nasal route daily  Dispense: 48 g; Refill: 1  -Start ibuprofen (ADVIL;MOTRIN) 600 MG tablet; Take 1 tablet by mouth 3 times daily as needed for Pain  Dispense: 60 tablet; Refill: 0  -Continue Zyrtec 10 mg once daily  -Continue Mucinex DM 1 tablet 2 times daily as needed  -Continue Tessalon Perles 100 mg 3 times daily as needed  -Continue Symbicort inhaler  Vitamin D 1,000 IU once daily  Vitamin C 500mg once daily  Zinc 50mg once daily    2. Right ear pain  - ibuprofen (ADVIL;MOTRIN) 600 MG tablet; Take 1 tablet by mouth 3 times daily as needed for Pain  Dispense: 60 tablet;  Refill: 0

## 2022-06-01 ENCOUNTER — TELEPHONE (OUTPATIENT)
Dept: PRIMARY CARE CLINIC | Age: 67
End: 2022-06-01

## 2022-06-01 ENCOUNTER — NURSE TRIAGE (OUTPATIENT)
Dept: OTHER | Facility: CLINIC | Age: 67
End: 2022-06-01

## 2022-06-01 NOTE — TELEPHONE ENCOUNTER
Call patient. I needs to know the name of airline and the dates of her flight she is missing to do the letter.

## 2022-06-01 NOTE — LETTER
[facility]  [place] [address] [address] [address]  Phone: [phone]  Fax: [phone]    Leeanna Modi MD         June 2, 2022     Patient: Eder Chakraborty   YOB: 1955       To Whom It May Concern at THE [facility] [place]:    Please refund the airline ticket for Eder Chakraborty for an upcoming flight on 6/10/22. She is unable to fly due to Covid-19 illness. If you have any questions or concerns, please don't hesitate to call.     Sincerely,        Leeanna Modi MD

## 2022-06-01 NOTE — TELEPHONE ENCOUNTER
Received call from [address] at [facility] with Red Flag Complaint. Subjective: Caller states \" I have not been able to have a BM in more than 4 days. This morning I went to go, I started to sweat with pushing. Im in so much pain because it is really hard. I had to lay down because I felt like I was going to faint. I did have lots of trouble and sweats but I was able to go and got out a decent amount. Four days ago or more I went after taking the bottle of stuff the nurse told me to take. I don't like water so I use lemon but I cant finish it. I tried coke too and I dumped it. The pain is terrible and I cant pass the stool and I start to sweat and I finally passed the stool and I felt a little better. my neck and throat doesn't feel good. I feel something is choking my throat. I feel SOB even when resting. When I was on hold for you I got up and was doing a few things and my oxygen dropped to 91% so I sat back down.  \"     Pt has COVID currently - diagnosed with home test and went to [facility] and was positive there as well     O2 is 97-98% - checked while on the phone and it is 94%       Current Symptoms: constipation is severe, dry cough with coughing spells, Moderate SOB, feels like something is in throat, HR is elevated at 107 when on phone     Onset: 4 days ago; worsening    Associated Symptoms: reduced fluid intake, decreased urination, constipation- pt is not drinking due to not liking the taste of anything and states she is not urinating much     Pain Severity:\" It is terrible and makes me sweat \"     Temperature: denies     What has been tried: feet enema - helped have a BM when she took it but constipated again after     Recommended disposition: Go to ED Now- pt is declining at this time due to having a  coming to check her air conditioning at 2pm.     Care advice provided, patient verbalizes understanding; denies any other questions or concerns; instructed to call back for any new or worsening symptoms. Writer attempted to reach the office x2 to notify of refusal of ED and was unable to reach the office- going straight to hold music and no answer after several minutes. Writer called [facility] in [address] for assistance and she was not able to reach them either. Pt was agreeable to a VV for today and once her  leaves she will go to hospital later today. Attention Provider: Thank you for allowing me to participate in the care of your patient. The patient was connected to triage in response to information provided to the ECC/PSC. Please do not respond through this encounter as the response is not directed to a shared pool.       Reason for Disposition   MODERATE difficulty breathing (e.g., speaks in phrases, SOB even at rest, pulse 100-120)    Protocols used: CORONAVIRUS (COVID-19) DIAGNOSED OR SUSPECTED-ADULT-AH

## 2022-06-01 NOTE — LETTER
[facility] [address] [facility] [place] [address]  Phone: [phone]  Fax: [phone]    Ozzy Wise MD        June 2, 2022     Patient: Dustin Samuels   YOB: 1955   Date of Visit: 6/1/2022       To Whom It May Concern: It is my medical opinion that Dustin Samuels {Work release (duty restriction):93250}. If you have any questions or concerns, please don't hesitate to call.     Sincerely,        Ozzy Wise MD

## 2022-06-01 NOTE — TELEPHONE ENCOUNTER
----- Message from [facility] sent at 6/1/2022  2:24 PM EDT -----  Subject: Message to Provider    QUESTIONS  Information for Provider? pt needs a letter for the airlines, so she can   get her ticket refunded since she is covid pos and can't fly. please call   with questions.   ---------------------------------------------------------------------------  --------------  CALL BACK INFO  What is the best way for the office to contact you? OK to leave message on   voicemail, OK to respond with electronic message via [employer] portal (only   for patients who have registered [employer] account)  Preferred Call Back Phone Number? [phone]  ---------------------------------------------------------------------------  --------------  SCRIPT ANSWERS  Relationship to Patient?  Self

## 2022-06-03 NOTE — TELEPHONE ENCOUNTER
Voicemail left for patient that letter is ready for  at . Also asked her to return call about her VV at 130 today if it can be cancelled since she cannot get on the plane.

## 2022-06-15 ENCOUNTER — OFFICE VISIT (OUTPATIENT)
Dept: PRIMARY CARE CLINIC | Age: 67
End: 2022-06-15
Payer: MEDICARE

## 2022-06-15 VITALS
SYSTOLIC BLOOD PRESSURE: 138 MMHG | BODY MASS INDEX: 26.85 KG/M2 | WEIGHT: 146.8 LBS | RESPIRATION RATE: 16 BRPM | DIASTOLIC BLOOD PRESSURE: 86 MMHG | OXYGEN SATURATION: 97 % | HEART RATE: 82 BPM | TEMPERATURE: 97.3 F

## 2022-06-15 DIAGNOSIS — K21.9 GASTROESOPHAGEAL REFLUX DISEASE, UNSPECIFIED WHETHER ESOPHAGITIS PRESENT: ICD-10-CM

## 2022-06-15 DIAGNOSIS — I10 ESSENTIAL HYPERTENSION: ICD-10-CM

## 2022-06-15 DIAGNOSIS — G89.29 CHRONIC PAIN OF RIGHT LOWER EXTREMITY: ICD-10-CM

## 2022-06-15 DIAGNOSIS — M79.604 CHRONIC PAIN OF RIGHT LOWER EXTREMITY: ICD-10-CM

## 2022-06-15 DIAGNOSIS — J45.30 MILD PERSISTENT ASTHMA WITHOUT COMPLICATION: ICD-10-CM

## 2022-06-15 DIAGNOSIS — E11.9 TYPE 2 DIABETES MELLITUS WITHOUT COMPLICATION, WITHOUT LONG-TERM CURRENT USE OF INSULIN (HCC): Primary | ICD-10-CM

## 2022-06-15 DIAGNOSIS — E78.2 MIXED HYPERLIPIDEMIA: ICD-10-CM

## 2022-06-15 LAB — HBA1C MFR BLD: 6.2 %

## 2022-06-15 PROCEDURE — 3017F COLORECTAL CA SCREEN DOC REV: CPT | Performed by: INTERNAL MEDICINE

## 2022-06-15 PROCEDURE — G8399 PT W/DXA RESULTS DOCUMENT: HCPCS | Performed by: INTERNAL MEDICINE

## 2022-06-15 PROCEDURE — 99214 OFFICE O/P EST MOD 30 MIN: CPT | Performed by: INTERNAL MEDICINE

## 2022-06-15 PROCEDURE — G8427 DOCREV CUR MEDS BY ELIG CLIN: HCPCS | Performed by: INTERNAL MEDICINE

## 2022-06-15 PROCEDURE — 1123F ACP DISCUSS/DSCN MKR DOCD: CPT | Performed by: INTERNAL MEDICINE

## 2022-06-15 PROCEDURE — G8417 CALC BMI ABV UP PARAM F/U: HCPCS | Performed by: INTERNAL MEDICINE

## 2022-06-15 PROCEDURE — 2022F DILAT RTA XM EVC RTNOPTHY: CPT | Performed by: INTERNAL MEDICINE

## 2022-06-15 PROCEDURE — 1036F TOBACCO NON-USER: CPT | Performed by: INTERNAL MEDICINE

## 2022-06-15 PROCEDURE — 1090F PRES/ABSN URINE INCON ASSESS: CPT | Performed by: INTERNAL MEDICINE

## 2022-06-15 PROCEDURE — 83036 HEMOGLOBIN GLYCOSYLATED A1C: CPT | Performed by: INTERNAL MEDICINE

## 2022-06-15 PROCEDURE — 3044F HG A1C LEVEL LT 7.0%: CPT | Performed by: INTERNAL MEDICINE

## 2022-06-15 RX ORDER — PSEUDOEPHEDRINE HCL 30 MG
100 TABLET ORAL 2 TIMES DAILY
COMMUNITY
Start: 2022-06-09

## 2022-06-15 NOTE — PROGRESS NOTES
Tashi Arechiga   Date ofBirth:  1955    Date of Visit:  6/15/2022    Chief Complaint   Patient presents with    Diabetes    Hypertension    Cholesterol Problem    Gastroesophageal Reflux    Leg Pain     Chronic       HPI  Patient has Type 2 Diabetes. Patient takes Trulicity 4.37 mg subcutaneously once a week. Patient monitors her blood sugar fasting and it averaged 80-95 prior to having Covid and has been up to 160 after taking steroids. Patient states this morning her blood sugar was 105. Patient decreases carbohydrates. Patient states she walks a little for exercise.     Patient has hyperlipidemia. Patient takes Atorvastatin 20 mg nightly. Patient decreases fat and cholesterol.     Patient has hypertension. Patient takes clonidine 0.1 mg/24hr patch weekly. Patient decreases salt.     Patient has GERD. Patient takes pantoprazole 40 mg once daily. Patient denies heartburn and reflux. Patient decreases spicy food. Patient states she loves tomatoes. Patient states she drinks 2 espressos per day.     Patient has chronic right leg pain. Patient states leg pain is burning and dull. Patient takes Gabapentin 300mg nightly. Patient has asthma. Patient takes Symbicort 160-4.5 mcg 2 puffs 2 times daily. Patient's asthma has been stable. Patient denies shortness of breath and wheezing. Review of Systems   Constitutional: Negative for activity change, chills, fatigue, fever and unexpected weight change. HENT: Negative for congestion, ear pain, postnasal drip, rhinorrhea, sinus pressure, sinus pain, sneezing, sore throat and trouble swallowing. Eyes: Negative for visual disturbance. Respiratory: Negative for cough, chest tightness, shortness of breath and wheezing. Cardiovascular: Negative for chest pain, palpitations and leg swelling. Gastrointestinal: Negative for abdominal pain, blood in stool, constipation, diarrhea, nausea and vomiting.    Genitourinary: Negative for Take 10 mg by mouth as needed ) 30 tablet 1    atorvastatin (LIPITOR) 20 MG tablet Take 1 tablet by mouth daily Take 20 mg by mouth daily 90 tablet 1    diclofenac sodium (VOLTAREN) 1 % GEL Apply 2 g topically 4 times daily (Patient taking differently: Apply 2 g topically 4 times daily as needed ) 350 g 1    Blood Glucose Monitoring Suppl (TRUE METRIX METER) DMITRIY Test blood sugar daily and PRN E11.9 1 each 0    blood glucose test strips (GLUCOSE METER TEST) strip 100 each by In Vitro route 2 times daily Diabetes   e11.9 100 each 1    Bisacodyl (DULCOLAX PO) Take 1 tablet by mouth as needed            Vitals:    06/15/22 1050   BP: 138/86   Pulse: 82   Resp: 16   Temp: 97.3 °F (36.3 °C)   SpO2: 97%   Weight: 146 lb 12.8 oz (66.6 kg)     Body mass index is 26.85 kg/m². Physical Exam  Nursing note reviewed. Constitutional:       General: She is not in acute distress. Appearance: Normal appearance. She is well-developed. Eyes:      General: Lids are normal.      Extraocular Movements: Extraocular movements intact. Conjunctiva/sclera: Conjunctivae normal.      Pupils: Pupils are equal, round, and reactive to light. Neck:      Thyroid: No thyromegaly. Vascular: No carotid bruit. Cardiovascular:      Rate and Rhythm: Normal rate and regular rhythm. Heart sounds: Normal heart sounds, S1 normal and S2 normal. No murmur heard. No friction rub. No gallop. Pulmonary:      Effort: Pulmonary effort is normal. No respiratory distress. Breath sounds: Normal breath sounds. No wheezing, rhonchi or rales. Abdominal:      General: Bowel sounds are normal. There is no distension. Palpations: Abdomen is soft. Tenderness: There is no abdominal tenderness. Musculoskeletal:      Cervical back: Neck supple. Right lower leg: No edema. Left lower leg: No edema. Lymphadenopathy:      Head:      Right side of head: No submandibular adenopathy.       Left side of head: No submandibular adenopathy. Neurological:      Mental Status: She is alert and oriented to person, place, and time. Psychiatric:         Mood and Affect: Mood normal.           Results for POC orders placed in visit on 06/15/22   POCT glycosylated hemoglobin (Hb A1C)   Result Value Ref Range    Hemoglobin A1C 6.2 %     Lab Review   Office Visit on 03/14/2022   Component Date Value    Hemoglobin A1C 03/14/2022 5.7    Orders Only on 02/09/2022   Component Date Value    Sodium 02/09/2022 141     Potassium 02/09/2022 4.4     Chloride 02/09/2022 103     CO2 02/09/2022 22     Anion Gap 02/09/2022 16     Glucose 02/09/2022 95     BUN 02/09/2022 18     CREATININE 02/09/2022 0.7     GFR Non- 02/09/2022 >60     GFR  02/09/2022 >60     Calcium 02/09/2022 9.7     Total Protein 02/09/2022 6.4     Albumin 02/09/2022 4.3     Albumin/Globulin Ratio 02/09/2022 2.0     Total Bilirubin 02/09/2022 0.6     Alkaline Phosphatase 02/09/2022 79     ALT 02/09/2022 16     AST 02/09/2022 17     WBC 02/09/2022 5.9     RBC 02/09/2022 4.76     Hemoglobin 02/09/2022 14.0     Hematocrit 02/09/2022 41.4     MCV 02/09/2022 87.0     MCH 02/09/2022 29.4     MCHC 02/09/2022 33.8     RDW 02/09/2022 12.7     Platelets 92/79/6876 205     MPV 02/09/2022 9.0    Office Visit on 02/09/2022   Component Date Value    Color, UA 02/09/2022 yellow     Clarity, UA 02/09/2022 clear     Glucose, UA POC 02/09/2022 neg     Bilirubin, UA 02/09/2022 neg     Ketones, UA 02/09/2022 neg     Spec Grav, UA 02/09/2022 >=1.030     Blood, UA POC 02/09/2022 trace     pH, UA 02/09/2022 5.5     Protein, UA POC 02/09/2022 neg     Urobilinogen, UA 02/09/2022 0.2     Leukocytes, UA 02/09/2022 small     Nitrite, UA 02/09/2022 neg     Urine Culture, Routine 02/09/2022 <10,000 CFU/ml mixed skin/urogenital james.  No further workup    Orders Only on 01/14/2022   Component Date Value    Urine Culture, Routine 01/14/2022 No growth at 18 to 36 hours     Color, UA 01/14/2022 YELLOW     Clarity, UA 01/14/2022 Clear     Glucose, Ur 01/14/2022 Negative     Bilirubin Urine 01/14/2022 Negative     Ketones, Urine 01/14/2022 Negative     Specific Gravity, UA 01/14/2022 1.023     Blood, Urine 01/14/2022 Negative     pH, UA 01/14/2022 6.0     Protein, UA 01/14/2022 Negative     Urobilinogen, Urine 01/14/2022 0.2     Nitrite, Urine 01/14/2022 Negative     Leukocyte Esterase, Urine 01/14/2022 SMALL*    Microscopic Examination 01/14/2022 YES     Urine Type 01/14/2022 Cleancatch     Hyaline Casts, UA 01/14/2022 2     WBC, UA 01/14/2022 4     RBC, UA 01/14/2022 4     Epithelial Cells, UA 01/14/2022 4    Nurse Only on 12/30/2021   Component Date Value    SARS-CoV-2 12/30/2021 Not Detected          Assessment/Plan     1. Type 2 diabetes mellitus without complication, without long-term current use of insulin ([facility])  -Hemoglobin A1c of 6.2% shows diabetes is well controlled  -Continue Trulicity 3.57 mg SQ once a week  -Limit carbohydrates to 45 grams with meals and 15 grams with snacks  -monitor blood sugars  -goal for blood sugar fasting or pre-meal  is   -goal for blood sugar 2 hours after a meal is less than 180  -goal for blood sugar at bedtime is less than 150  -Regular aerobic exercise  - POCT glycosylated hemoglobin (Hb A1C)  - Comprehensive Metabolic Panel; Future    2. Essential hypertension  -stable  -Continue  clonidine 0.1 mg/24hr patch weekly  -Low sodium diet  -Regular aerobic exercise  - Comprehensive Metabolic Panel; Future    3. Mixed hyperlipidemia  -stable  -Continue Atorvastatin 20 mg nightly  -Low fat, low cholesterol diet  -Regular aerobic exercise  - Lipid Panel; Future  - Comprehensive Metabolic Panel; Future    4.  Gastroesophageal reflux disease, unspecified whether esophagitis present  -stable  -Continue pantoprazole 40 mg once daily  -Decrease caffeine, avoid spicy foods, avoid tomato based foods  -Eat small meals instead of large meals  -Wait 2-3 hours after eating before lying down     5. Chronic pain of right lower extremity  -stable  -Continue Gabapentin 300mg nightl    6. Mild persistent asthma without complication  -stable  -Continue Symbicort 160-4.5 mcg 2 puffs 2 times daily      Discussed medications with patient, who voiced understanding of their use and indications. All questions answered. Return in about 3 months (around 9/15/2022) for Medicare AWV.

## 2022-06-16 DIAGNOSIS — E11.9 TYPE 2 DIABETES MELLITUS WITHOUT COMPLICATION, WITHOUT LONG-TERM CURRENT USE OF INSULIN (HCC): ICD-10-CM

## 2022-06-16 DIAGNOSIS — I10 ESSENTIAL HYPERTENSION: ICD-10-CM

## 2022-06-16 DIAGNOSIS — E78.2 MIXED HYPERLIPIDEMIA: ICD-10-CM

## 2022-06-16 LAB
A/G RATIO: 1.8 (ref 1.1–2.2)
ALBUMIN SERPL-MCNC: 4.1 G/DL (ref 3.4–5)
ALP BLD-CCNC: 99 U/L (ref 40–129)
ALT SERPL-CCNC: 35 U/L (ref 10–40)
ANION GAP SERPL CALCULATED.3IONS-SCNC: 13 MMOL/L (ref 3–16)
AST SERPL-CCNC: 24 U/L (ref 15–37)
BILIRUB SERPL-MCNC: 0.9 MG/DL (ref 0–1)
BUN BLDV-MCNC: 12 MG/DL (ref 7–20)
CALCIUM SERPL-MCNC: 9.2 MG/DL (ref 8.3–10.6)
CHLORIDE BLD-SCNC: 103 MMOL/L (ref 99–110)
CHOLESTEROL, TOTAL: 158 MG/DL (ref 0–199)
CO2: 25 MMOL/L (ref 21–32)
CREAT SERPL-MCNC: 0.6 MG/DL (ref 0.6–1.2)
GFR AFRICAN AMERICAN: >60
GFR NON-AFRICAN AMERICAN: >60
GLUCOSE BLD-MCNC: 108 MG/DL (ref 70–99)
HDLC SERPL-MCNC: 50 MG/DL (ref 40–60)
LDL CHOLESTEROL CALCULATED: 80 MG/DL
POTASSIUM SERPL-SCNC: 4 MMOL/L (ref 3.5–5.1)
SODIUM BLD-SCNC: 141 MMOL/L (ref 136–145)
TOTAL PROTEIN: 6.4 G/DL (ref 6.4–8.2)
TRIGL SERPL-MCNC: 138 MG/DL (ref 0–150)
VLDLC SERPL CALC-MCNC: 28 MG/DL

## 2022-06-21 ENCOUNTER — NURSE TRIAGE (OUTPATIENT)
Dept: OTHER | Facility: CLINIC | Age: 67
End: 2022-06-21

## 2022-06-21 ENCOUNTER — OFFICE VISIT (OUTPATIENT)
Dept: PRIMARY CARE CLINIC | Age: 67
End: 2022-06-21
Payer: MEDICARE

## 2022-06-21 VITALS
TEMPERATURE: 97.6 F | RESPIRATION RATE: 16 BRPM | DIASTOLIC BLOOD PRESSURE: 72 MMHG | BODY MASS INDEX: 26.7 KG/M2 | WEIGHT: 146 LBS | SYSTOLIC BLOOD PRESSURE: 108 MMHG | HEART RATE: 81 BPM | OXYGEN SATURATION: 97 %

## 2022-06-21 DIAGNOSIS — H10.9 CONJUNCTIVITIS OF LEFT EYE, UNSPECIFIED CONJUNCTIVITIS TYPE: Primary | ICD-10-CM

## 2022-06-21 DIAGNOSIS — H02.846 SWELLING OF EYELID, LEFT: ICD-10-CM

## 2022-06-21 DIAGNOSIS — R05.8 DRY COUGH: ICD-10-CM

## 2022-06-21 PROCEDURE — G8399 PT W/DXA RESULTS DOCUMENT: HCPCS | Performed by: INTERNAL MEDICINE

## 2022-06-21 PROCEDURE — 1123F ACP DISCUSS/DSCN MKR DOCD: CPT | Performed by: INTERNAL MEDICINE

## 2022-06-21 PROCEDURE — G8417 CALC BMI ABV UP PARAM F/U: HCPCS | Performed by: INTERNAL MEDICINE

## 2022-06-21 PROCEDURE — 1090F PRES/ABSN URINE INCON ASSESS: CPT | Performed by: INTERNAL MEDICINE

## 2022-06-21 PROCEDURE — 3017F COLORECTAL CA SCREEN DOC REV: CPT | Performed by: INTERNAL MEDICINE

## 2022-06-21 PROCEDURE — 99213 OFFICE O/P EST LOW 20 MIN: CPT | Performed by: INTERNAL MEDICINE

## 2022-06-21 PROCEDURE — 1036F TOBACCO NON-USER: CPT | Performed by: INTERNAL MEDICINE

## 2022-06-21 PROCEDURE — G8427 DOCREV CUR MEDS BY ELIG CLIN: HCPCS | Performed by: INTERNAL MEDICINE

## 2022-06-21 RX ORDER — CETIRIZINE HYDROCHLORIDE 10 MG/1
10 TABLET ORAL DAILY
Qty: 30 TABLET | Refills: 0 | Status: SHIPPED | OUTPATIENT
Start: 2022-06-21 | End: 2022-09-01 | Stop reason: SDUPTHER

## 2022-06-21 RX ORDER — POLYMYXIN B SULFATE AND TRIMETHOPRIM 1; 10000 MG/ML; [USP'U]/ML
1 SOLUTION OPHTHALMIC EVERY 4 HOURS
Qty: 10 ML | Refills: 0 | Status: SHIPPED | OUTPATIENT
Start: 2022-06-21 | End: 2022-06-28

## 2022-06-21 RX ORDER — BENZONATATE 100 MG/1
100 CAPSULE ORAL 3 TIMES DAILY PRN
Qty: 30 CAPSULE | Refills: 0 | Status: SHIPPED | OUTPATIENT
Start: 2022-06-21 | End: 2022-06-28

## 2022-06-21 NOTE — TELEPHONE ENCOUNTER
Received call from Peg at [facility] with Red Flag Complaint. Subjective: Caller states \"woke up this morning with my left eye swollen and a big bag under my eye\"     Current Symptoms: left eye swelling, \"when I blink it feels sticky\",     Onset: 0 day ago; rapid    Associated Symptoms: NA    Pain Severity: a little tender to touch; Temperature: Denies     What has been tried: nothing    LMP: NA Pregnant: NA    Recommended disposition: See HCP within 4 Hours (or PCP triage)    Care advice provided, patient verbalizes understanding; denies any other questions or concerns; instructed to call back for any new or worsening symptoms. Patient/Caller agrees with recommended disposition; writer provided warm transfer to [facility] at [facility] for appointment scheduling     Attention Provider: Thank you for allowing me to participate in the care of your patient. The patient was connected to triage in response to information provided to the ECC/PSC. Please do not respond through this encounter as the response is not directed to a shared pool.           Reason for Disposition   [1] SEVERE eyelid swelling on one side AND [2] red and painful (or tender to touch)    Protocols used: EYE - [facility]

## 2022-06-21 NOTE — PROGRESS NOTES
Chandni Kurtz   Date ofBirth:  1955    Date of Visit:  6/21/2022    Chief Complaint   Patient presents with    Eye Problem     Left eye swelling and redness. Pt states her eye when she blinks feels \"sticky\"    Other     Feels like something stuck in her throat, states like she suffocating. HPI  Patient she woke up and felt like something was in her eye this morning. Patient states she wiped her eye and had sticky fluid on her fingers. Patient states her left eyelid was swollen. Patient states the puffiness under her left eye was bigger this morning. Patient states she put ice on it and the swelling has gone down. Patient states she still feels like a burn inner eye and when blinks she feels like something sticky is in her eye. Patient has had eye redness. Patient denies itching. Patient uses Systane eye drop. Last night was first night she slept with air conditioning on. Patient states she feels like something is in her throat and chest when she starts talking and causes dry cough. Patient states she feels a tickle in her throat and then she coughs. Review of Systems   Constitutional: Negative for chills and fever. HENT: Negative for congestion, ear pain, postnasal drip, rhinorrhea, sinus pressure, sinus pain, sneezing and sore throat. Eyes: Positive for discharge and redness. Negative for photophobia, pain, itching and visual disturbance. Respiratory: Positive for cough. Negative for chest tightness, shortness of breath and wheezing. Neurological: Negative for headaches. Allergies   Allergen Reactions    Aspirin      bruising    Darvocet [Propoxyphene N-Acetaminophen] Hives    Effexor [Venlafaxine Hydrochloride] Hives    Hydrocodone Hives and Itching    Keflex [Cephalexin] Itching     Face redness.     Paxil Cr [Paroxetine Hcl Er] Itching     Outpatient Medications Marked as Taking for the 6/21/22 encounter (Office Visit) with Mac Jorge MD Medication Sig Dispense Refill    docusate (COLACE, DULCOLAX) 100 MG CAPS Take 100 mg by mouth 2 times daily      fluticasone (FLONASE) 50 MCG/ACT nasal spray 2 sprays by Nasal route daily 48 g 1    ibuprofen (ADVIL;MOTRIN) 600 MG tablet Take 1 tablet by mouth 3 times daily as needed for Pain 60 tablet 0    budesonide-formoterol (SYMBICORT) 160-4.5 MCG/ACT AERO Inhale 2 puffs into the lungs 2 times daily 30.6 g 1    gabapentin (NEURONTIN) 300 MG capsule Take 1 capsule by mouth nightly for 90 days. 90 capsule 0    cloNIDine (CATAPRES) 0.1 MG/24HR PTWK APPLY 1 PATCH ONTO THE SKINONCE A WEEK 12 patch 1    Dulaglutide (TRULICITY) 7.75 AJ/1.2SL SOPN INJECT 0.75 MG             SUBCUTANEOUSLY EVERY WEEK 6 mL 1    pantoprazole (PROTONIX) 40 MG tablet Take 1 tablet by mouth daily Take 40 mg by mouth daily 90 tablet 1    zoster recombinant adjuvanted vaccine (SHINGRIX) 50 MCG/0.5ML SUSR injection Inject 0.5 mLs into the muscle See Admin Instructions 1 dose now and repeat in 2-6 months 0.5 mL 0    Lancets MISC 1 each by Other route daily E11.9 100 each 0    atorvastatin (LIPITOR) 20 MG tablet Take 1 tablet by mouth daily Take 20 mg by mouth daily 90 tablet 1    diclofenac sodium (VOLTAREN) 1 % GEL Apply 2 g topically 4 times daily (Patient taking differently: Apply 2 g topically 4 times daily as needed ) 350 g 1    Blood Glucose Monitoring Suppl (TRUE METRIX METER) DMITRIY Test blood sugar daily and PRN E11.9 1 each 0    blood glucose test strips (GLUCOSE METER TEST) strip 100 each by In Vitro route 2 times daily Diabetes   e11.9 100 each 1    Bisacodyl (DULCOLAX PO) Take 1 tablet by mouth as needed            Vitals:    06/21/22 1429   BP: 108/72   Pulse: 81   Resp: 16   Temp: 97.6 °F (36.4 °C)   SpO2: 97%   Weight: 146 lb (66.2 kg)     Body mass index is 26.7 kg/m². Physical Exam  Nursing note reviewed. Constitutional:       General: She is not in acute distress. Appearance: Normal appearance.  She is well-developed. HENT:      Right Ear: Tympanic membrane and ear canal normal.      Left Ear: Tympanic membrane and ear canal normal.   Eyes:      General:         Right eye: No discharge. Left eye: No discharge. Extraocular Movements: Extraocular movements intact. Conjunctiva/sclera:      Right eye: Right conjunctiva is not injected. No exudate. Left eye: Left conjunctiva is injected. No exudate. Pupils: Pupils are equal, round, and reactive to light. Comments: Mild puffiness left lower eyelid   Cardiovascular:      Rate and Rhythm: Normal rate and regular rhythm. Heart sounds: Normal heart sounds, S1 normal and S2 normal. No murmur heard. Pulmonary:      Effort: Pulmonary effort is normal.      Breath sounds: Normal breath sounds. No wheezing, rhonchi or rales. Neurological:      Mental Status: She is alert. No results found for this visit on 06/21/22.   Lab Review   Orders Only on 06/16/2022   Component Date Value    Sodium 06/16/2022 141     Potassium 06/16/2022 4.0     Chloride 06/16/2022 103     CO2 06/16/2022 25     Anion Gap 06/16/2022 13     Glucose 06/16/2022 108*    BUN 06/16/2022 12     CREATININE 06/16/2022 0.6     GFR Non- 06/16/2022 >60     GFR  06/16/2022 >60     Calcium 06/16/2022 9.2     Total Protein 06/16/2022 6.4     Albumin 06/16/2022 4.1     Albumin/Globulin Ratio 06/16/2022 1.8     Total Bilirubin 06/16/2022 0.9     Alkaline Phosphatase 06/16/2022 99     ALT 06/16/2022 35     AST 06/16/2022 24     Cholesterol, Total 06/16/2022 158     Triglycerides 06/16/2022 138     HDL 06/16/2022 50     LDL Calculated 06/16/2022 80     VLDL Cholesterol Calcula* 06/16/2022 28    Abstract on 06/15/2022   Component Date Value    Antibody Screen 08/14/2021 Non-Reactive     Diabetic Retinopathy 08/11/2021 Negative    Office Visit on 06/15/2022   Component Date Value    Hemoglobin A1C 06/15/2022 6.2 Office Visit on 03/14/2022   Component Date Value    Hemoglobin A1C 03/14/2022 5.7    Orders Only on 02/09/2022   Component Date Value    Sodium 02/09/2022 141     Potassium 02/09/2022 4.4     Chloride 02/09/2022 103     CO2 02/09/2022 22     Anion Gap 02/09/2022 16     Glucose 02/09/2022 95     BUN 02/09/2022 18     CREATININE 02/09/2022 0.7     GFR Non- 02/09/2022 >60     GFR  02/09/2022 >60     Calcium 02/09/2022 9.7     Total Protein 02/09/2022 6.4     Albumin 02/09/2022 4.3     Albumin/Globulin Ratio 02/09/2022 2.0     Total Bilirubin 02/09/2022 0.6     Alkaline Phosphatase 02/09/2022 79     ALT 02/09/2022 16     AST 02/09/2022 17     WBC 02/09/2022 5.9     RBC 02/09/2022 4.76     Hemoglobin 02/09/2022 14.0     Hematocrit 02/09/2022 41.4     MCV 02/09/2022 87.0     MCH 02/09/2022 29.4     MCHC 02/09/2022 33.8     RDW 02/09/2022 12.7     Platelets 46/83/0901 205     MPV 02/09/2022 9.0    Office Visit on 02/09/2022   Component Date Value    Color, UA 02/09/2022 yellow     Clarity, UA 02/09/2022 clear     Glucose, UA POC 02/09/2022 neg     Bilirubin, UA 02/09/2022 neg     Ketones, UA 02/09/2022 neg     Spec Grav, UA 02/09/2022 >=1.030     Blood, UA POC 02/09/2022 trace     pH, UA 02/09/2022 5.5     Protein, UA POC 02/09/2022 neg     Urobilinogen, UA 02/09/2022 0.2     Leukocytes, UA 02/09/2022 small     Nitrite, UA 02/09/2022 neg     Urine Culture, Routine 02/09/2022 <10,000 CFU/ml mixed skin/urogenital james.  No further workup    Orders Only on 01/14/2022   Component Date Value    Urine Culture, Routine 01/14/2022 No growth at 18 to 36 hours     Color, UA 01/14/2022 YELLOW     Clarity, UA 01/14/2022 Clear     Glucose, Ur 01/14/2022 Negative     Bilirubin Urine 01/14/2022 Negative     Ketones, Urine 01/14/2022 Negative     Specific Gravity, UA 01/14/2022 1.023     Blood, Urine 01/14/2022 Negative     pH, UA 01/14/2022 6.0     Protein, UA 01/14/2022 Negative     Urobilinogen, Urine 01/14/2022 0.2     Nitrite, Urine 01/14/2022 Negative     Leukocyte Esterase, Urine 01/14/2022 SMALL*    Microscopic Examination 01/14/2022 YES     Urine Type 01/14/2022 Cleancatch     Hyaline Casts, UA 01/14/2022 2     WBC, UA 01/14/2022 4     RBC, UA 01/14/2022 4     Epithelial Cells, UA 01/14/2022 4    Nurse Only on 12/30/2021   Component Date Value    SARS-CoV-2 12/30/2021 Not Detected          Assessment/Plan     1. Conjunctivitis of left eye, unspecified conjunctivitis type  - trimethoprim-polymyxin b (POLYTRIM) 24335-2.1 UNIT/ML-% ophthalmic solution; Place 1 drop into the left eye every 4 hours for 7 days  Dispense: 10 mL; Refill: 0    2. Dry cough  - benzonatate (TESSALON PERLES) 100 MG capsule; Take 1 capsule by mouth 3 times daily as needed for Cough  Dispense: 30 capsule; Refill: 0    3. Swelling of eyelid, left  - cetirizine (ZYRTEC) 10 MG tablet; Take 1 tablet by mouth daily  Dispense: 30 tablet; Refill: 0  - warm compresses    Discussed medications with patient, who voiced understanding of their use and indications. All questions answered. Return if symptoms worsen or fail to improve.

## 2022-06-26 ASSESSMENT — ENCOUNTER SYMPTOMS
BLOOD IN STOOL: 0
SORE THROAT: 0
COUGH: 0
RHINORRHEA: 0
CONSTIPATION: 0
CHEST TIGHTNESS: 0
DIARRHEA: 0
SINUS PRESSURE: 0
NAUSEA: 0
VOMITING: 0
TROUBLE SWALLOWING: 0
SINUS PAIN: 0
SHORTNESS OF BREATH: 0
ABDOMINAL PAIN: 0
WHEEZING: 0

## 2022-06-29 PROBLEM — R05.8 DRY COUGH: Status: ACTIVE | Noted: 2022-06-29

## 2022-06-29 PROBLEM — H02.846 SWELLING OF EYELID, LEFT: Status: ACTIVE | Noted: 2022-06-29

## 2022-06-29 PROBLEM — H10.9 CONJUNCTIVITIS OF LEFT EYE: Status: ACTIVE | Noted: 2022-06-29

## 2022-06-29 ASSESSMENT — ENCOUNTER SYMPTOMS
CHEST TIGHTNESS: 0
PHOTOPHOBIA: 0
EYE DISCHARGE: 1
SORE THROAT: 0
COUGH: 1
SINUS PAIN: 0
RHINORRHEA: 0
EYE ITCHING: 0
EYE PAIN: 0
SHORTNESS OF BREATH: 0
SINUS PRESSURE: 0
EYE REDNESS: 1
WHEEZING: 0

## 2022-07-27 ENCOUNTER — TELEPHONE (OUTPATIENT)
Dept: PRIMARY CARE CLINIC | Age: 67
End: 2022-07-27

## 2022-08-21 DIAGNOSIS — E11.42 TYPE 2 DIABETES MELLITUS WITH DIABETIC POLYNEUROPATHY, WITHOUT LONG-TERM CURRENT USE OF INSULIN (HCC): ICD-10-CM

## 2022-08-22 RX ORDER — DULAGLUTIDE 0.75 MG/.5ML
INJECTION, SOLUTION SUBCUTANEOUS
Qty: 6 ML | Refills: 1 | Status: SHIPPED | OUTPATIENT
Start: 2022-08-22

## 2022-08-22 NOTE — TELEPHONE ENCOUNTER
Medication:   Requested Prescriptions     Pending Prescriptions Disp Refills    Dulaglutide (TRULICITY) 5.94 OA/6.2XO SOPN [Pharmacy Med Name: TRULICITY(4) PEN 6.42/0.3] 6 mL 1     Sig: INJECT 0.5ML (=0.75 MG)    SUBCUTANEOUSLY ONCE WEEKLY     Last Filled:  5.9.22    Last appt: 6/21/2022   Next appt: 9/15/2022    Last Labs DM:   Lab Results   Component Value Date/Time    LABA1C 6.2 06/15/2022 11:07 AM No complaints

## 2022-08-24 NOTE — TELEPHONE ENCOUNTER
Medication:   Requested Prescriptions     Pending Prescriptions Disp Refills    diclofenac sodium (VOLTAREN) 1 %  g 1     Sig: Apply 2 g topically 4 times daily     Last Filled: 1.30.22    Last appt: 6/15/2022   Next appt: 9/1/2022    Last OARRS:   RX Monitoring 3/22/2022   Periodic Controlled Substance Monitoring No signs of potential drug abuse or diversion identified.

## 2022-09-01 ENCOUNTER — TELEPHONE (OUTPATIENT)
Dept: PRIMARY CARE CLINIC | Age: 67
End: 2022-09-01

## 2022-09-01 ENCOUNTER — OFFICE VISIT (OUTPATIENT)
Dept: PRIMARY CARE CLINIC | Age: 67
End: 2022-09-01
Payer: MEDICARE

## 2022-09-01 VITALS
DIASTOLIC BLOOD PRESSURE: 88 MMHG | WEIGHT: 151 LBS | SYSTOLIC BLOOD PRESSURE: 126 MMHG | HEART RATE: 71 BPM | BODY MASS INDEX: 27.62 KG/M2 | OXYGEN SATURATION: 98 %

## 2022-09-01 DIAGNOSIS — M79.675 PAIN IN TOES OF BOTH FEET: Primary | ICD-10-CM

## 2022-09-01 DIAGNOSIS — M79.674 PAIN IN TOES OF BOTH FEET: Primary | ICD-10-CM

## 2022-09-01 DIAGNOSIS — J30.9 ALLERGIC RHINITIS, UNSPECIFIED SEASONALITY, UNSPECIFIED TRIGGER: ICD-10-CM

## 2022-09-01 DIAGNOSIS — M26.621 ARTHRALGIA OF RIGHT TEMPOROMANDIBULAR JOINT: ICD-10-CM

## 2022-09-01 PROCEDURE — 99214 OFFICE O/P EST MOD 30 MIN: CPT | Performed by: INTERNAL MEDICINE

## 2022-09-01 PROCEDURE — G8399 PT W/DXA RESULTS DOCUMENT: HCPCS | Performed by: INTERNAL MEDICINE

## 2022-09-01 PROCEDURE — 3017F COLORECTAL CA SCREEN DOC REV: CPT | Performed by: INTERNAL MEDICINE

## 2022-09-01 PROCEDURE — 1090F PRES/ABSN URINE INCON ASSESS: CPT | Performed by: INTERNAL MEDICINE

## 2022-09-01 PROCEDURE — G8427 DOCREV CUR MEDS BY ELIG CLIN: HCPCS | Performed by: INTERNAL MEDICINE

## 2022-09-01 PROCEDURE — G8417 CALC BMI ABV UP PARAM F/U: HCPCS | Performed by: INTERNAL MEDICINE

## 2022-09-01 PROCEDURE — 1036F TOBACCO NON-USER: CPT | Performed by: INTERNAL MEDICINE

## 2022-09-01 PROCEDURE — 1123F ACP DISCUSS/DSCN MKR DOCD: CPT | Performed by: INTERNAL MEDICINE

## 2022-09-01 RX ORDER — IBUPROFEN 600 MG/1
600 TABLET ORAL 3 TIMES DAILY PRN
Qty: 60 TABLET | Refills: 0 | Status: SHIPPED | OUTPATIENT
Start: 2022-09-01

## 2022-09-01 RX ORDER — CETIRIZINE HYDROCHLORIDE 10 MG/1
10 TABLET ORAL DAILY
Qty: 30 TABLET | Refills: 2 | Status: SHIPPED | OUTPATIENT
Start: 2022-09-01

## 2022-09-01 NOTE — TELEPHONE ENCOUNTER
[address] [facility] per Dr. Susan Greer request they said Dr. Suzanne Bowling takes workmans comp.

## 2022-09-01 NOTE — PROGRESS NOTES
Aidan Bermeo   Date ofBirth:  1955    Date of Visit:  9/1/2022    Chief Complaint   Patient presents with    Toe Pain     Bilateral pinky toe     Forms       HPI  Patient states underneath small toes she has dry skin feels better with using hand cream. Patient states her right foot has dry skin and pain and pulled skin up. Patient states her left foot baby toe burns and hurts to walk. Patient states she was in [facility] from 7/4/22-8/17/22. . Patient states she felt a lump, pain, and click in her right jaw. Patient states it is worse when she is upset and grinds her teeth. Patient states it is hard to eat. Patient states she saw a Doctor while in [facility] and was given liquid Klonopin 2.5mg drops which helped while she was there. Patient states she also had same symptoms in July 2019. Patient states Ibuprofen also helps and she needs a refill. Patient has allergic rhinitis. Patient takes Flonase nasal spray. Patient complains of runny nose and post nasal drainage. Patient needs paperwork for insurance. Review of Systems   Constitutional:  Negative for chills and fever. HENT:  Positive for postnasal drip and rhinorrhea. Negative for congestion, ear pain, facial swelling, sinus pressure, sinus pain, sneezing and sore throat. Jaw pain   Eyes:  Negative for visual disturbance. Respiratory:  Negative for cough, chest tightness, shortness of breath and wheezing. Cardiovascular:  Negative for chest pain, palpitations and leg swelling. Gastrointestinal:  Negative for abdominal pain, constipation, diarrhea, nausea and vomiting. Genitourinary:  Negative for frequency and hematuria. Musculoskeletal:  Positive for arthralgias. Negative for joint swelling. Skin:  Negative for color change, rash and wound. Neurological:  Negative for dizziness, syncope, light-headedness and headaches.      Allergies   Allergen Reactions    Aspirin      bruising    Darvocet [Propoxyphene N-Acetaminophen] Hives    Effexor [Venlafaxine Hydrochloride] Hives    Hydrocodone Hives and Itching    Keflex [Cephalexin] Itching     Face redness. Paxil Cr [Paroxetine Hcl Er] Itching     Outpatient Medications Marked as Taking for the 22 encounter (Office Visit) with Shelli Chin MD   Medication Sig Dispense Refill    diclofenac sodium (VOLTAREN) 1 % GEL Apply 2 g topically 4 times daily 350 g 1    Dulaglutide (TRULICITY) 1.48 IB/8.3ZA SOPN INJECT 0.5ML (=0.75 MG)    SUBCUTANEOUSLY ONCE WEEKLY 6 mL 1    docusate (COLACE, DULCOLAX) 100 MG CAPS Take 100 mg by mouth 2 times daily      fluticasone (FLONASE) 50 MCG/ACT nasal spray 2 sprays by Nasal route daily 48 g 1    budesonide-formoterol (SYMBICORT) 160-4.5 MCG/ACT AERO Inhale 2 puffs into the lungs 2 times daily 30.6 g 1    gabapentin (NEURONTIN) 300 MG capsule Take 1 capsule by mouth nightly for 90 days. 90 capsule 0    cloNIDine (CATAPRES) 0.1 MG/24HR PTWK APPLY 1 PATCH ONTO THE SKINONCE A WEEK 12 patch 1    [] zoster recombinant adjuvanted vaccine (SHINGRIX) 50 MCG/0.5ML SUSR injection Inject 0.5 mLs into the muscle See Admin Instructions 1 dose now and repeat in 2-6 months 0.5 mL 0    [DISCONTINUED] pantoprazole (PROTONIX) 40 MG tablet Take 1 tablet by mouth daily Take 40 mg by mouth daily 90 tablet 1    Lancets MISC 1 each by Other route daily E11.9 100 each 0    [DISCONTINUED] atorvastatin (LIPITOR) 20 MG tablet Take 1 tablet by mouth daily Take 20 mg by mouth daily 90 tablet 1    Blood Glucose Monitoring Suppl (TRUE METRIX METER) DMITRIY Test blood sugar daily and PRN E11.9 1 each 0    blood glucose test strips (GLUCOSE METER TEST) strip 100 each by In Vitro route 2 times daily Diabetes   e11.9 100 each 1    Bisacodyl (DULCOLAX PO) Take 1 tablet by mouth as needed            Vitals:    22 0945   BP: 126/88   Pulse: 71   SpO2: 98%   Weight: 151 lb (68.5 kg)     Body mass index is 27.62 kg/m². Physical Exam  Nursing note reviewed. Constitutional:       General: She is not in acute distress. Appearance: Normal appearance. She is well-developed. HENT:      Head:      Jaw: Tenderness (right TMJ tender) and pain on movement (right TMJ pain with opening and closing mouth) present. No swelling. Right Ear: Tympanic membrane and ear canal normal.      Left Ear: Tympanic membrane and ear canal normal.   Eyes:      General: Lids are normal.      Extraocular Movements: Extraocular movements intact. Conjunctiva/sclera: Conjunctivae normal.      Pupils: Pupils are equal, round, and reactive to light. Neck:      Thyroid: No thyromegaly. Vascular: No carotid bruit. Cardiovascular:      Rate and Rhythm: Normal rate and regular rhythm. Heart sounds: S1 normal and S2 normal.   Pulmonary:      Effort: Pulmonary effort is normal.      Breath sounds: Normal breath sounds. No wheezing, rhonchi or rales. Musculoskeletal:      Cervical back: Neck supple. Right lower leg: No edema. Left lower leg: No edema. Right foot: Tenderness (right posterior 5th toe) present. No swelling or deformity. Left foot: Tenderness (left 5th toe) present. No swelling or deformity. Skin:     Findings: No rash. Comments: Dry skin posterior crease of toes   Neurological:      Mental Status: She is alert. No results found for this visit on 09/01/22.   Lab Review   Orders Only on 06/16/2022   Component Date Value    Sodium 06/16/2022 141     Potassium 06/16/2022 4.0     Chloride 06/16/2022 103     CO2 06/16/2022 25     Anion Gap 06/16/2022 13     Glucose 06/16/2022 108 (A)    BUN 06/16/2022 12     Creatinine 06/16/2022 0.6     GFR Non- 06/16/2022 >60     GFR  06/16/2022 >60     Calcium 06/16/2022 9.2     Total Protein 06/16/2022 6.4     Albumin 06/16/2022 4.1     Albumin/Globulin Ratio 06/16/2022 1.8     Total Bilirubin 06/16/2022 0.9     Alkaline Phosphatase 06/16/2022 99     ALT 06/16/2022 35 AST 06/16/2022 24     Cholesterol, Total 06/16/2022 158     Triglycerides 06/16/2022 138     HDL 06/16/2022 50     LDL Calculated 06/16/2022 80     VLDL Cholesterol Calcula* 06/16/2022 28    Abstract on 06/15/2022   Component Date Value    Antibody Screen 08/14/2021 Non-Reactive     Diabetic Retinopathy 08/11/2021 Negative    Office Visit on 06/15/2022   Component Date Value    Hemoglobin A1C 06/15/2022 6.2    Office Visit on 03/14/2022   Component Date Value    Hemoglobin A1C 03/14/2022 5.7          Assessment/Plan     1. Pain in toes of both feet  - ibuprofen (ADVIL;MOTRIN) 600 MG tablet; Take 1 tablet by mouth 3 times daily as needed for Pain  Dispense: 60 tablet; Refill: 0  Referral to Monty Mo DPM, Podiatry, [facility]    2. Arthralgia of right temporomandibular joint  - ibuprofen (ADVIL;MOTRIN) 600 MG tablet; Take 1 tablet by mouth 3 times daily as needed for Pain  Dispense: 60 tablet; Refill: 0  Referral to  Brook Guerra MD, Otolaryngology, [facility]    3. Allergic rhinitis, unspecified seasonality, unspecified trigger  -not controlled  -cetirizine (ZYRTEC) 10 MG tablet; Take 1 tablet by mouth daily  Dispense: 30 tablet; Refill: 2  -Flonase nasal spray 2 sprays each nostril once daily       Discussed medications with patient, who voiced understanding of their use and indications. All questions answered. Discussed paperwork and I will need to contact the requesting insurance to find out what else is needed since patient was recently completed. Return if symptoms worsen or fail to improve.

## 2022-09-06 DIAGNOSIS — K21.00 GASTROESOPHAGEAL REFLUX DISEASE WITH ESOPHAGITIS WITHOUT HEMORRHAGE: ICD-10-CM

## 2022-09-06 DIAGNOSIS — E78.2 MIXED HYPERLIPIDEMIA: ICD-10-CM

## 2022-09-06 RX ORDER — PANTOPRAZOLE SODIUM 40 MG/1
40 TABLET, DELAYED RELEASE ORAL DAILY
Qty: 90 TABLET | Refills: 1 | Status: SHIPPED | OUTPATIENT
Start: 2022-09-06 | End: 2022-09-13 | Stop reason: SDUPTHER

## 2022-09-06 RX ORDER — ATORVASTATIN CALCIUM 20 MG/1
20 TABLET, FILM COATED ORAL DAILY
Qty: 90 TABLET | Refills: 1 | Status: SHIPPED | OUTPATIENT
Start: 2022-09-06

## 2022-09-06 NOTE — TELEPHONE ENCOUNTER
Medication:   Requested Prescriptions     Pending Prescriptions Disp Refills    atorvastatin (LIPITOR) 20 MG tablet 90 tablet 1     Sig: Take 1 tablet by mouth daily Take 20 mg by mouth daily    pantoprazole (PROTONIX) 40 MG tablet 90 tablet 1     Sig: Take 1 tablet by mouth daily Take 40 mg by mouth daily     Last Filled:  1.30.22 3.14.22    Last appt: 6/15/2022   Next appt: 9/15/2022    Last Labs DM:   Lab Results   Component Value Date/Time    LABA1C 6.2 06/15/2022 11:07 AM     Last Lipid:   Lab Results   Component Value Date/Time    CHOL 158 06/16/2022 09:39 AM    TRIG 138 06/16/2022 09:39 AM    HDL 50 06/16/2022 09:39 AM    HDL 58 10/27/2011 08:44 AM    LDLCALC 80 06/16/2022 09:39 AM     Last PSA: No results found for: PSA  Last Thyroid:   Lab Results   Component Value Date/Time    TSH 1.67 09/23/2019 01:58 PM    T4FREE 1.21 09/13/2012 08:50 AM

## 2022-09-07 ENCOUNTER — TELEPHONE (OUTPATIENT)
Dept: PRIMARY CARE CLINIC | Age: 67
End: 2022-09-07

## 2022-09-07 NOTE — TELEPHONE ENCOUNTER
Patient called asking about the new Disability forms that she dropped off last week to see if they were completed yet?        Thank you

## 2022-09-07 NOTE — TELEPHONE ENCOUNTER
Spoke with patient, let her know that the office will give her a call once the forms have been completed.

## 2022-09-12 DIAGNOSIS — K21.00 GASTROESOPHAGEAL REFLUX DISEASE WITH ESOPHAGITIS WITHOUT HEMORRHAGE: ICD-10-CM

## 2022-09-12 NOTE — TELEPHONE ENCOUNTER
Medication:   Requested Prescriptions     Pending Prescriptions Disp Refills    pantoprazole (PROTONIX) 40 MG tablet 90 tablet 1     Sig: Take 1 tablet by mouth daily Take 40 mg by mouth daily     Last Filled: 9.6.22 Please resend pharmacy never received. Last appt: 9/1/2022   Next appt: 9/15/2022    Last OARRS:   RX Monitoring 3/22/2022   Periodic Controlled Substance Monitoring No signs of potential drug abuse or diversion identified.

## 2022-09-12 NOTE — TELEPHONE ENCOUNTER
----- Message from Fabian Lang sent at 9/12/2022  9:54 AM EDT -----  Subject: Refill Request    QUESTIONS  Name of Medication? pantoprazole (PROTONIX) 40 MG tablet  Patient-reported dosage and instructions? 40MG Once a day   How many days do you have left? 2  Preferred Pharmacy? [facility] phone number (if available)? [phone]  Additional Information for Provider? Patient advised that her pharmacy has   not received this refill script and that a new script does need to be sent   to the pharmacy. Please call patient patient to confirm. ---------------------------------------------------------------------------  --------------  Suzy Ling INFO  What is the best way for the office to contact you? OK to leave message on   voicemail  Preferred Call Back Phone Number? [phone]  ---------------------------------------------------------------------------  --------------  SCRIPT ANSWERS  Relationship to Patient?  Self

## 2022-09-13 RX ORDER — PANTOPRAZOLE SODIUM 40 MG/1
40 TABLET, DELAYED RELEASE ORAL DAILY
Qty: 90 TABLET | Refills: 1 | Status: SHIPPED | OUTPATIENT
Start: 2022-09-13

## 2022-09-13 NOTE — TELEPHONE ENCOUNTER
Spoke with patient. She needs prescription resent to [facility] without brand name only on it. Patient's insurance will only cover the generic. New prescription sent to [facility].

## 2022-09-14 PROBLEM — M79.674 PAIN IN TOES OF BOTH FEET: Status: ACTIVE | Noted: 2022-09-14

## 2022-09-14 PROBLEM — J30.9 ALLERGIC RHINITIS: Status: ACTIVE | Noted: 2022-09-14

## 2022-09-14 PROBLEM — M79.675 PAIN IN TOES OF BOTH FEET: Status: ACTIVE | Noted: 2022-09-14

## 2022-09-14 ASSESSMENT — ENCOUNTER SYMPTOMS
SORE THROAT: 0
CONSTIPATION: 0
COLOR CHANGE: 0
NAUSEA: 0
SHORTNESS OF BREATH: 0
COUGH: 0
RHINORRHEA: 1
VOMITING: 0
WHEEZING: 0
SINUS PAIN: 0
DIARRHEA: 0
SINUS PRESSURE: 0
FACIAL SWELLING: 0
CHEST TIGHTNESS: 0
ABDOMINAL PAIN: 0

## 2022-09-15 ENCOUNTER — OFFICE VISIT (OUTPATIENT)
Dept: PRIMARY CARE CLINIC | Age: 67
End: 2022-09-15
Payer: MEDICARE

## 2022-09-15 VITALS
WEIGHT: 149.6 LBS | BODY MASS INDEX: 27.53 KG/M2 | HEIGHT: 62 IN | TEMPERATURE: 97.2 F | OXYGEN SATURATION: 98 % | HEART RATE: 76 BPM | RESPIRATION RATE: 16 BRPM | DIASTOLIC BLOOD PRESSURE: 84 MMHG | SYSTOLIC BLOOD PRESSURE: 104 MMHG

## 2022-09-15 DIAGNOSIS — R10.31 RIGHT GROIN PAIN: ICD-10-CM

## 2022-09-15 DIAGNOSIS — Z00.00 INITIAL MEDICARE ANNUAL WELLNESS VISIT: Primary | ICD-10-CM

## 2022-09-15 DIAGNOSIS — L98.9 SKIN LESION OF RIGHT ARM: ICD-10-CM

## 2022-09-15 DIAGNOSIS — M25.551 RIGHT HIP PAIN: ICD-10-CM

## 2022-09-15 PROCEDURE — G0438 PPPS, INITIAL VISIT: HCPCS | Performed by: INTERNAL MEDICINE

## 2022-09-15 PROCEDURE — 1123F ACP DISCUSS/DSCN MKR DOCD: CPT | Performed by: INTERNAL MEDICINE

## 2022-09-15 PROCEDURE — 3017F COLORECTAL CA SCREEN DOC REV: CPT | Performed by: INTERNAL MEDICINE

## 2022-09-15 ASSESSMENT — LIFESTYLE VARIABLES
HOW MANY STANDARD DRINKS CONTAINING ALCOHOL DO YOU HAVE ON A TYPICAL DAY: PATIENT DOES NOT DRINK
HOW OFTEN DO YOU HAVE A DRINK CONTAINING ALCOHOL: NEVER

## 2022-09-15 ASSESSMENT — PATIENT HEALTH QUESTIONNAIRE - PHQ9
7. TROUBLE CONCENTRATING ON THINGS, SUCH AS READING THE NEWSPAPER OR WATCHING TELEVISION: 0
8. MOVING OR SPEAKING SO SLOWLY THAT OTHER PEOPLE COULD HAVE NOTICED. OR THE OPPOSITE, BEING SO FIGETY OR RESTLESS THAT YOU HAVE BEEN MOVING AROUND A LOT MORE THAN USUAL: 0
2. FEELING DOWN, DEPRESSED OR HOPELESS: 2
3. TROUBLE FALLING OR STAYING ASLEEP: 0
1. LITTLE INTEREST OR PLEASURE IN DOING THINGS: 0
SUM OF ALL RESPONSES TO PHQ9 QUESTIONS 1 & 2: 2
SUM OF ALL RESPONSES TO PHQ QUESTIONS 1-9: 2
SUM OF ALL RESPONSES TO PHQ QUESTIONS 1-9: 2
10. IF YOU CHECKED OFF ANY PROBLEMS, HOW DIFFICULT HAVE THESE PROBLEMS MADE IT FOR YOU TO DO YOUR WORK, TAKE CARE OF THINGS AT HOME, OR GET ALONG WITH OTHER PEOPLE: 0
9. THOUGHTS THAT YOU WOULD BE BETTER OFF DEAD, OR OF HURTING YOURSELF: 0
4. FEELING TIRED OR HAVING LITTLE ENERGY: 0
5. POOR APPETITE OR OVEREATING: 0
SUM OF ALL RESPONSES TO PHQ QUESTIONS 1-9: 2
SUM OF ALL RESPONSES TO PHQ QUESTIONS 1-9: 2
6. FEELING BAD ABOUT YOURSELF - OR THAT YOU ARE A FAILURE OR HAVE LET YOURSELF OR YOUR FAMILY DOWN: 0

## 2022-09-15 NOTE — PROGRESS NOTES
Medicare Annual Wellness Visit    Regla [place] is here for Medicare AWV    Assessment & Plan   1. Initial Medicare annual wellness visit  -Medicare AWV done    2. Skin lesion of right arm  -Follow up with Dermatology    3. Right groin pain  -Ibuprofen 600mg 3 times daily as needed  - Referral to [facility] MD ELLEN, Orthopedics and Sports Medicine (Shoulder; Hip; Knee), [place]    4. Right hip pain  -Ibuprofen 600mg 3 times daily as needed  -Referral to [facility] MD ELLEN, Orthopedics and Sports Medicine (Shoulder; Hip; Knee), [place]        Recommendations for Preventive Services Due: see orders and patient instructions/AVS.  Recommended screening schedule for the next 5-10 years is provided to the patient in written form: see Patient Instructions/AVS.     Return in 3 weeks (on 10/6/2022) for  diabetes, hyperlipidemia, hypertension, GERD, and chronic leg pain. Subjective   The following acute and/or chronic problems were also addressed today:    Patient complains of skin lesion right arm since July that will not heal.  Patient states she gets a little scab in bleeds but does not heal.  Patient has a Dermatologist, Dr. Danny Andino    Patient states she has an appointment with Podiatry scheduled for next week. Patient complains of right hip pain and right groin pain. Patient's complete Health Risk Assessment and screening values have been reviewed and are found in Flowsheets. The following problems were reviewed today and where indicated follow up appointments were made and/or referrals ordered. Positive Risk Factor Screenings with Interventions:    Fall Risk:  Do you feel unsteady or are you worried about falling? : (!) yes  2 or more falls in past year?: (!) yes  Fall with injury in past year?: no   Fall Risk Interventions:    Patient declines intervention.  Patient states she has no transportation for Therapy              General Health and ACP:  General  In shopping, telephone use, food preparation, transportation, or taking medications?: (!) Yes  Select all that apply: (!) Transportation  ADL Interventions:  Patient declines any further evaluation/treatment for this issue  Bought a car and is waiting for it          Objective   Vitals:    09/15/22 1308   BP: 104/84   Pulse: 76   Resp: 16   Temp: 97.2 °F (36.2 °C)   SpO2: 98%   Weight: 149 lb 9.6 oz (67.9 kg)   Height: 5' 2\" (1.575 m)      Body mass index is 27.36 kg/m². General Appearance: alert and oriented to person, place and time, well-developed and well-nourished, in no acute distress  Head: normocephalic and atraumatic  Eyes: pupils equal, round, and reactive to light, extraocular eye movements intact, conjunctivae normal  Neck: neck supple and non tender without mass, no thyromegaly or thyroid nodules, no cervical lymphadenopathy   Pulmonary/Chest: clear to auscultation bilaterally- no wheezes, rales or rhonchi, normal air movement, no respiratory distress  Cardiovascular: normal rate, regular rhythm, normal S1 and S2, no murmurs, and no carotid bruits  Abdomen: soft, non-tender, non-distended, normal bowel sounds, no masses or organomegaly  Extremities: no edema  Musculoskeletal: tenderness right hip  Neurologic: gait and coordination normal and speech normal       Allergies   Allergen Reactions    Aspirin      bruising    Darvocet [Propoxyphene N-Acetaminophen] Hives    Effexor [Venlafaxine Hydrochloride] Hives    Hydrocodone Hives and Itching    Keflex [Cephalexin] Itching     Face redness. Paxil Cr [Paroxetine Hcl Er] Itching     Prior to Visit Medications    Medication Sig Taking?  Authorizing Provider   pantoprazole (PROTONIX) 40 MG tablet Take 1 tablet by mouth daily Yes Noe Milligan MD   atorvastatin (LIPITOR) 20 MG tablet Take 1 tablet by mouth daily Take 20 mg by mouth daily Yes Noe Milligan MD   ibuprofen (ADVIL;MOTRIN) 600 MG tablet Take 1 tablet by mouth 3 times daily as needed for Pain Yes Feliciano Oliva MD   cetirizine (ZYRTEC) 10 MG tablet Take 1 tablet by mouth daily Yes Feliciano Oliva MD   diclofenac sodium (VOLTAREN) 1 % GEL Apply 2 g topically 4 times daily Yes Feliciano Oliva MD   Dulaglutide (TRULICITY) 8.51 CA/4.5KE SOPN INJECT 0.5ML (=0.75 MG)    SUBCUTANEOUSLY ONCE WEEKLY Yes Feliciano Oliva MD   docusate (COLACE, DULCOLAX) 100 MG CAPS Take 100 mg by mouth 2 times daily Yes Historical Provider, MD   fluticasone (FLONASE) 50 MCG/ACT nasal spray 2 sprays by Nasal route daily Yes Feliciano Oliva MD   budesonide-formoterol (SYMBICORT) 160-4.5 MCG/ACT AERO Inhale 2 puffs into the lungs 2 times daily Yes Feliciano Oliva MD   cloNIDine (CATAPRES) 0.1 MG/24HR [address] A WEEK Yes Feliciano Oliva MD   Lancets MISC 1 each by Other route daily E11.9 Yes Feliciano Oliva MD   Blood Glucose Monitoring Suppl (TRUE METRIX METER) DMITRIY Test blood sugar daily and PRN E11.9 Yes Lavon Negron MD   blood glucose test strips (GLUCOSE METER TEST) strip 100 each by In Vitro route 2 times daily Diabetes   e11.9 Yes Feliciano Oliva MD   Bisacodyl (DULCOLAX PO) Take 1 tablet by mouth as needed  Yes Historical Provider, MD   gabapentin (NEURONTIN) 300 MG capsule Take 1 capsule by mouth nightly for 90 days.   Feliciano Oliva MD       Lab Review   Orders Only on 06/16/2022   Component Date Value    Sodium 06/16/2022 141     Potassium 06/16/2022 4.0     Chloride 06/16/2022 103     CO2 06/16/2022 25     Anion Gap 06/16/2022 13     Glucose 06/16/2022 108 (A)     BUN 06/16/2022 12     Creatinine 06/16/2022 0.6     GFR Non- 06/16/2022 >60     GFR  06/16/2022 >60     Calcium 06/16/2022 9.2     Total Protein 06/16/2022 6.4     Albumin 06/16/2022 4.1     Albumin/Globulin Ratio 06/16/2022 1.8     Total Bilirubin 06/16/2022 0.9     Alkaline Phosphatase 06/16/2022 99     ALT 06/16/2022 35     AST 06/16/2022 24 Cholesterol, Total 06/16/2022 158     Triglycerides 06/16/2022 138     HDL 06/16/2022 50     LDL Calculated 06/16/2022 80     VLDL Cholesterol Calcula* 06/16/2022 28    Abstract on 06/15/2022   Component Date Value    Antibody Screen 08/14/2021 Non-Reactive     Diabetic Retinopathy 08/11/2021 Negative    Office Visit on 06/15/2022   Component Date Value    Hemoglobin A1C 06/15/2022 6.2        CareTeam (Including outside providers/suppliers regularly involved in providing care):   Patient Care Team:  Sarah Mg MD as PCP - General (Internal Medicine)  Sarah Mg MD as PCP - [facility] Empaneled Provider     Reviewed and updated this visit:  Tobacco  Allergies  Meds  Med Hx  Surg Hx  Soc Hx  Fam Hx

## 2022-09-15 NOTE — PATIENT INSTRUCTIONS
Personalized Preventive Plan for Peggy Adame - 9/15/2022  Medicare offers a range of preventive health benefits. Some of the tests and screenings are paid in full while other may be subject to a deductible, co-insurance, and/or copay. Some of these benefits include a comprehensive review of your medical history including lifestyle, illnesses that may run in your family, and various assessments and screenings as appropriate. After reviewing your medical record and screening and assessments performed today your provider may have ordered immunizations, labs, imaging, and/or referrals for you. A list of these orders (if applicable) as well as your Preventive Care list are included within your After Visit Summary for your review. Other Preventive Recommendations:    A preventive eye exam performed by an eye specialist is recommended every 1-2 years to screen for glaucoma; cataracts, macular degeneration, and other eye disorders. A preventive dental visit is recommended every 6 months. Try to get at least 150 minutes of exercise per week or 10,000 steps per day on a pedometer . Order or download the FREE \"Exercise & Physical Activity: Your Everyday Guide\" from [employer]. Call 6-803.464.3677 or search [employer] online. You need 3317-1831 mg of calcium and 5691-9028 IU of vitamin D per day. It is possible to meet your calcium requirement with diet alone, but a vitamin D supplement is usually necessary to meet this goal.  When exposed to the sun, use a sunscreen that protects against both UVA and UVB radiation with an SPF of 30 or greater. Reapply every 2 to 3 hours or after sweating, drying off with a towel, or swimming. Always wear a seat belt when traveling in a car. Always wear a helmet when riding a bicycle or motorcycle.

## 2022-09-16 ENCOUNTER — TELEPHONE (OUTPATIENT)
Dept: ORTHOPEDIC SURGERY | Age: 67
End: 2022-09-16

## 2022-09-16 ENCOUNTER — TELEPHONE (OUTPATIENT)
Dept: PRIMARY CARE CLINIC | Age: 67
End: 2022-09-16

## 2022-09-16 NOTE — TELEPHONE ENCOUNTER
Pt called about the referral you sent for her for workers comp. She tried to get an appointment and was told that the workers comp case was too old. Can you send her to someone else?     [phone]

## 2022-09-19 ENCOUNTER — TELEPHONE (OUTPATIENT)
Dept: ORTHOPEDIC SURGERY | Age: 67
End: 2022-09-19

## 2022-09-19 NOTE — TELEPHONE ENCOUNTER
Called and left a voicemail for the patient informing her I was returning her call in regards to her request to be seen for her back [facility] case. Patient was informed that we will not take her [facility] low back claim due to the case being too old. Adrian Reyes PA-C does not see [facility] that is older than 6 months. Patient was reminded that we did put in a PM Consult with Dr. Leala Frankel. If I need to put in that referral again I can and she can be seen with them for a consult. If she has any other questions or concerns she may call back and discuss at [phone].

## 2022-09-28 PROBLEM — M25.551 RIGHT HIP PAIN: Status: ACTIVE | Noted: 2022-09-28

## 2022-09-28 PROBLEM — R10.31 RIGHT GROIN PAIN: Status: ACTIVE | Noted: 2022-09-28

## 2022-09-28 PROBLEM — L98.9 SKIN LESION OF RIGHT ARM: Status: ACTIVE | Noted: 2022-09-28

## 2022-10-04 ENCOUNTER — OFFICE VISIT (OUTPATIENT)
Dept: ENT CLINIC | Age: 67
End: 2022-10-04
Payer: MEDICARE

## 2022-10-04 VITALS — BODY MASS INDEX: 28.17 KG/M2 | WEIGHT: 154 LBS

## 2022-10-04 DIAGNOSIS — M54.2 NECK PAIN: Primary | ICD-10-CM

## 2022-10-04 PROCEDURE — G8417 CALC BMI ABV UP PARAM F/U: HCPCS | Performed by: OTOLARYNGOLOGY

## 2022-10-04 PROCEDURE — 99203 OFFICE O/P NEW LOW 30 MIN: CPT | Performed by: OTOLARYNGOLOGY

## 2022-10-04 PROCEDURE — G8399 PT W/DXA RESULTS DOCUMENT: HCPCS | Performed by: OTOLARYNGOLOGY

## 2022-10-04 PROCEDURE — G8428 CUR MEDS NOT DOCUMENT: HCPCS | Performed by: OTOLARYNGOLOGY

## 2022-10-04 PROCEDURE — 3017F COLORECTAL CA SCREEN DOC REV: CPT | Performed by: OTOLARYNGOLOGY

## 2022-10-04 PROCEDURE — 1036F TOBACCO NON-USER: CPT | Performed by: OTOLARYNGOLOGY

## 2022-10-04 PROCEDURE — 1090F PRES/ABSN URINE INCON ASSESS: CPT | Performed by: OTOLARYNGOLOGY

## 2022-10-04 PROCEDURE — 1123F ACP DISCUSS/DSCN MKR DOCD: CPT | Performed by: OTOLARYNGOLOGY

## 2022-10-04 PROCEDURE — G8484 FLU IMMUNIZE NO ADMIN: HCPCS | Performed by: OTOLARYNGOLOGY

## 2022-10-04 ASSESSMENT — ENCOUNTER SYMPTOMS
VOICE CHANGE: 0
FACIAL SWELLING: 0
SHORTNESS OF BREATH: 0
COUGH: 0
EYE PAIN: 0
TROUBLE SWALLOWING: 0
EYE REDNESS: 0
SINUS PRESSURE: 0
CHOKING: 0
EYE ITCHING: 0
DIARRHEA: 0
SORE THROAT: 0
SINUS PAIN: 0
NAUSEA: 0
RHINORRHEA: 0

## 2022-10-04 NOTE — PROGRESS NOTES
Subjective:      Patient ID: Xavier Constantino is a 78 y.o. female. HPI  Chief Complaint   Patient presents with    neck pain       History of Present Illness  Head/Neck    Saba Raines is a(n) 78 y.o. female who presents with a known history of tmj arthralgia treated with oral medication from doctor in [facility]. Klonopin. Right sided. Has not seen dentist or oral surgeon in the [place]> Also with right sided neck pain. Sore to touch. No dysphagia or odynphagia. No hoarseness. Lifetime non smoker.  No otalgia      Patient Active Problem List   Diagnosis    Mixed hyperlipidemia    Asthma    Osteoporosis    Lateral epicondylitis    Arthralgia    Type 2 diabetes mellitus with diabetic polyneuropathy, without long-term current use of insulin (HCC)    Hepatitis    Leg pain    GERD (gastroesophageal reflux disease)    Hand pain    B12 deficiency anemia    Hemorrhoids, internal    Severe headache    Encopresis    Tremor    Hot flashes    Fibrocystic disease of breast    Insomnia    Shoulder pain    Malaise and fatigue    Migraine    Neck pain    Chronic back pain    Neuropathy    Major depression ([address]    History of arthroscopy of right shoulder 2008    Removal of ganglion cyst in left wrist    History of elbow surgery Right    Adhesive capsulitis of left shoulder    Tendinitis of left rotator cuff    Left rotator cuff tear    Pain, neck    Cervical stenosis of spinal canal    Arthritis of left acromioclavicular joint    Bursitis of right shoulder    Throat dryness    Right-sided chest wall pain    Dysuria    Acute cystitis without hematuria    Weight loss, abnormal    Moderate episode of recurrent major depressive disorder (HCC)    Arthritis of right acromioclavicular joint    Tendinitis of right rotator cuff    Cervical stenosis of spine    Chronic daily headache    Status migrainosus    Cervico-occipital neuralgia    Cervicogenic headache    Medication side effect    Chronic insomnia    Cervical spinal stenosis    Osteoarthritis of spine with radiculopathy, cervical region    Vitamin D deficiency    Intractable chronic migraine without aura    Chronic diarrhea    Essential hypertension    Cold sore    Chronic pain of right lower extremity    Type 2 diabetes mellitus without complication, without long-term current use of insulin ([address]    COVID-19    Upper respiratory tract infection    Right ear pain    Conjunctivitis of left eye    Dry cough    Swelling of eyelid, left    Pain in toes of both feet    Allergic rhinitis    Skin lesion of right arm    Right groin pain    Right hip pain     Past Surgical History:   Procedure Laterality Date    BREAST BIOPSY      CATARACT REMOVAL Bilateral     DILATION AND CURETTAGE OF UTERUS      x2    ELBOW SURGERY  1/18/11    right    FOOT SURGERY  10/2009,11/2010    right foot    HAND SURGERY  2009    left hand    HYSTERECTOMY (CERVIX STATUS UNKNOWN)  1998    RECTAL SURGERY      SHOULDER ARTHROSCOPY Right 7/07    by Dr. Flex Puga     Family History   Problem Relation Age of Onset    Obesity Mother     Stroke Father     Cancer Other     Breast Cancer Daughter      Social History     Socioeconomic History    Marital status:      Spouse name: Not on file    Number of children: Not on file    Years of education: Not on file    Highest education level: Not on file   Occupational History    Not on file   Tobacco Use    Smoking status: Never    Smokeless tobacco: Never   Substance and Sexual Activity    Alcohol use: No    Drug use: No    Sexual activity: Not Currently   Other Topics Concern    Not on file   Social History Narrative    Not on file     Social Determinants of Health     Financial Resource Strain: Low Risk     Difficulty of Paying Living Expenses: Not hard at all   Food Insecurity: No Food Insecurity    Worried About Running Out of Food in the Last Year: Never true    Ran Out of Food in the Last Year: Never true   Transportation Needs: No Transportation Needs    Lack of Transportation (Medical):  No Lack of Transportation (Non-Medical): No   Physical Activity: Insufficiently Active    Days of Exercise per Week: 3 days    Minutes of Exercise per Session: 20 min   Stress: No Stress Concern Present    Feeling of Stress : Not at all   Social Connections: Socially Isolated    Frequency of Communication with Friends and Family: Three times a week    Frequency of Social Gatherings with Friends and Family: Three times a week    Attends Worship Services: Never    Active Member of Clubs or Organizations: No    Attends Club or Organization Meetings: Never    Marital Status:    Intimate Partner Violence: Not on file   Housing Stability: Low Risk     Unable to Pay for Housing in the Last Year: No    Number of [place] in the Last Year: 1    Unstable Housing in the Last Year: No       DRUG/FOOD ALLERGIES: Aspirin, Darvocet [propoxyphene n-acetaminophen], Effexor [venlafaxine hydrochloride], Hydrocodone, Keflex [cephalexin], and Paxil cr [paroxetine hcl er]    CURRENT MEDICATIONS  Prior to Admission medications    Medication Sig Start Date End Date Taking?  Authorizing Provider   pantoprazole (PROTONIX) 40 MG tablet Take 1 tablet by mouth daily 9/13/22   Basia Ram MD   atorvastatin (LIPITOR) 20 MG tablet Take 1 tablet by mouth daily Take 20 mg by mouth daily 9/6/22   Basia Ram MD   ibuprofen (ADVIL;MOTRIN) 600 MG tablet Take 1 tablet by mouth 3 times daily as needed for Pain 9/1/22   Basia Ram MD   cetirizine (ZYRTEC) 10 MG tablet Take 1 tablet by mouth daily 9/1/22   Basia Ram MD   diclofenac sodium (VOLTAREN) 1 % GEL Apply 2 g topically 4 times daily 8/24/22   Basia Ram MD   Dulaglutide (TRULICITY) 9.73 OT/7.2FJ SOPN INJECT 0.5ML (=0.75 MG)    SUBCUTANEOUSLY ONCE WEEKLY 8/22/22   Basia Ram MD   docusate (COLACE, DULCOLAX) 100 MG CAPS Take 100 mg by mouth 2 times daily 6/9/22   Historical Provider, MD   fluticasone (FLONASE) 50 MCG/ACT nasal spray 2 sprays by Nasal route daily 5/24/22   En Yepez MD   budesonide-formoterol [facility]) 160-4.5 MCG/ACT AERO Inhale 2 puffs into the lungs 2 times daily 5/16/22   En Yepez MD   gabapentin (NEURONTIN) 300 MG capsule Take 1 capsule by mouth nightly for 90 days. 5/12/22 9/1/22  En Yepez MD   cloNIDine (CATAPRES) 0.1 MG/24HR PTWK APPLY 1 PATCH ONTO THE SKINONCE A WEEK 5/9/22   En Yepez MD   Lancets MISC 1 each by Other route daily E11.9 3/7/22   En Yepez MD   Blood Glucose Monitoring Suppl (TRUE METRIX METER) DMITRIY Test blood sugar daily and PRN E11.9 12/1/21   Germán Gruber MD   blood glucose test strips (GLUCOSE METER TEST) strip 100 each by In Vitro route 2 times daily Diabetes   e11.9 11/30/21   En Yepez MD   Bisacodyl (DULCOLAX PO) Take 1 tablet by mouth as needed     Historical Provider, MD       Lab Studies:  Lab Results   Component Value Date    WBC 5.9 02/09/2022    HGB 14.0 02/09/2022    HCT 41.4 02/09/2022    MCV 87.0 02/09/2022     02/09/2022     Lab Results   Component Value Date    GLUCOSE 108 (H) 06/16/2022    BUN 12 06/16/2022    CREATININE 0.6 06/16/2022    K 4.0 06/16/2022     06/16/2022     06/16/2022    CALCIUM 9.2 06/16/2022     Lab Results   Component Value Date    MG 2.2 06/21/2016     Lab Results   Component Value Date    PHOS 2.7 02/22/2012     Lab Results   Component Value Date    ALKPHOS 99 06/16/2022    ALT 35 06/16/2022    AST 24 06/16/2022    BILITOT 0.9 06/16/2022    PROT 6.4 06/16/2022        Review of Systems   Constitutional:  Negative for activity change, appetite change, chills, fatigue and fever. HENT:  Negative for congestion, ear discharge, ear pain, facial swelling, hearing loss, nosebleeds, postnasal drip, rhinorrhea, sinus pressure, sinus pain, sneezing, sore throat, tinnitus, trouble swallowing and voice change. Eyes:  Negative for pain, redness, itching and visual disturbance.    Respiratory: Negative for cough, choking and shortness of breath. Gastrointestinal:  Negative for diarrhea and nausea. Endocrine: Negative for cold intolerance and heat intolerance. Objective:   Physical Exam  Constitutional:       General: She is not in acute distress. Appearance: She is well-developed. HENT:      Head: Not macrocephalic and not microcephalic. No abrasion or contusion. Mouth/Throat:      Lips: No lesions. Mouth: No oral lesions. Dentition: Normal dentition. Tongue: No lesions. Palate: No mass. Pharynx: No oropharyngeal exudate, posterior oropharyngeal erythema or uvula swelling. Tonsils: No tonsillar abscesses. Neck:      Thyroid: No thyroid mass or thyromegaly. Trachea: No tracheal tenderness or tracheal deviation. Cardiovascular:      Rate and Rhythm: Normal rate and regular rhythm. Pulmonary:      Breath sounds: No stridor. Musculoskeletal:      Cervical back: Normal range of motion and neck supple. No edema or erythema. No muscular tenderness. Lymphadenopathy:      Head:      Right side of head: No submental, submandibular, tonsillar, preauricular, posterior auricular or occipital adenopathy. Left side of head: No submental, submandibular, tonsillar, preauricular or occipital adenopathy. Cervical: No cervical adenopathy. Right cervical: No superficial, deep or posterior cervical adenopathy. Left cervical: No superficial, deep or posterior cervical adenopathy. Skin:     General: Skin is warm and dry. Findings: No lesion. Neurological:      Mental Status: She is alert and oriented to person, place, and time. Psychiatric:         Mood and Affect: Mood is not anxious or depressed. Assessment:       Diagnosis Orders   1. Neck pain                Plan:      No Otolaryngological disease. Recommend dentist/oral surgery for tmj arthralgia. Recommend physical therapy for musculoskeletal pain.    Follow up as needed.          Bakari Dotson MD

## 2022-10-11 ENCOUNTER — OFFICE VISIT (OUTPATIENT)
Dept: PRIMARY CARE CLINIC | Age: 67
End: 2022-10-11
Payer: MEDICARE

## 2022-10-11 VITALS
RESPIRATION RATE: 16 BRPM | BODY MASS INDEX: 27.79 KG/M2 | WEIGHT: 151 LBS | DIASTOLIC BLOOD PRESSURE: 70 MMHG | OXYGEN SATURATION: 95 % | SYSTOLIC BLOOD PRESSURE: 138 MMHG | TEMPERATURE: 97.3 F | HEIGHT: 62 IN | HEART RATE: 82 BPM

## 2022-10-11 DIAGNOSIS — M79.604 CHRONIC PAIN OF RIGHT LOWER EXTREMITY: ICD-10-CM

## 2022-10-11 DIAGNOSIS — Z78.0 POSTMENOPAUSE: ICD-10-CM

## 2022-10-11 DIAGNOSIS — I10 ESSENTIAL HYPERTENSION: ICD-10-CM

## 2022-10-11 DIAGNOSIS — Z23 NEED FOR INFLUENZA VACCINATION: ICD-10-CM

## 2022-10-11 DIAGNOSIS — E11.42 TYPE 2 DIABETES MELLITUS WITH DIABETIC POLYNEUROPATHY, WITHOUT LONG-TERM CURRENT USE OF INSULIN (HCC): ICD-10-CM

## 2022-10-11 DIAGNOSIS — K21.9 GASTROESOPHAGEAL REFLUX DISEASE, UNSPECIFIED WHETHER ESOPHAGITIS PRESENT: ICD-10-CM

## 2022-10-11 DIAGNOSIS — G89.29 CHRONIC PAIN OF RIGHT LOWER EXTREMITY: ICD-10-CM

## 2022-10-11 DIAGNOSIS — E78.2 MIXED HYPERLIPIDEMIA: ICD-10-CM

## 2022-10-11 DIAGNOSIS — E11.42 TYPE 2 DIABETES MELLITUS WITH DIABETIC POLYNEUROPATHY, WITHOUT LONG-TERM CURRENT USE OF INSULIN (HCC): Primary | ICD-10-CM

## 2022-10-11 LAB — HBA1C MFR BLD: 6 %

## 2022-10-11 PROCEDURE — 99214 OFFICE O/P EST MOD 30 MIN: CPT | Performed by: INTERNAL MEDICINE

## 2022-10-11 PROCEDURE — 1123F ACP DISCUSS/DSCN MKR DOCD: CPT | Performed by: INTERNAL MEDICINE

## 2022-10-11 PROCEDURE — 90694 VACC AIIV4 NO PRSRV 0.5ML IM: CPT | Performed by: INTERNAL MEDICINE

## 2022-10-11 PROCEDURE — 3078F DIAST BP <80 MM HG: CPT | Performed by: INTERNAL MEDICINE

## 2022-10-11 PROCEDURE — 83036 HEMOGLOBIN GLYCOSYLATED A1C: CPT | Performed by: INTERNAL MEDICINE

## 2022-10-11 PROCEDURE — G8399 PT W/DXA RESULTS DOCUMENT: HCPCS | Performed by: INTERNAL MEDICINE

## 2022-10-11 PROCEDURE — 1036F TOBACCO NON-USER: CPT | Performed by: INTERNAL MEDICINE

## 2022-10-11 PROCEDURE — G0008 ADMIN INFLUENZA VIRUS VAC: HCPCS | Performed by: INTERNAL MEDICINE

## 2022-10-11 PROCEDURE — G8427 DOCREV CUR MEDS BY ELIG CLIN: HCPCS | Performed by: INTERNAL MEDICINE

## 2022-10-11 PROCEDURE — G8484 FLU IMMUNIZE NO ADMIN: HCPCS | Performed by: INTERNAL MEDICINE

## 2022-10-11 PROCEDURE — 3044F HG A1C LEVEL LT 7.0%: CPT | Performed by: INTERNAL MEDICINE

## 2022-10-11 PROCEDURE — 2022F DILAT RTA XM EVC RTNOPTHY: CPT | Performed by: INTERNAL MEDICINE

## 2022-10-11 PROCEDURE — 3017F COLORECTAL CA SCREEN DOC REV: CPT | Performed by: INTERNAL MEDICINE

## 2022-10-11 PROCEDURE — 1090F PRES/ABSN URINE INCON ASSESS: CPT | Performed by: INTERNAL MEDICINE

## 2022-10-11 PROCEDURE — 3074F SYST BP LT 130 MM HG: CPT | Performed by: INTERNAL MEDICINE

## 2022-10-11 PROCEDURE — G8417 CALC BMI ABV UP PARAM F/U: HCPCS | Performed by: INTERNAL MEDICINE

## 2022-10-11 RX ORDER — KETOCONAZOLE 20 MG/G
CREAM TOPICAL
COMMUNITY
Start: 2022-09-19

## 2022-10-11 NOTE — PROGRESS NOTES
Franz Mohs   Date ofBirth:  1955    Date of Visit:  10/11/2022    Chief Complaint   Patient presents with    Diabetes    Hyperlipidemia    Hypertension    Other     GERD, Chronic Leg       HPI    Patient has Type 2 Diabetes. Patient takes Trulicity 4.02 mg subcutaneously once a week. Patient monitors her blood sugar fasting and it averages . Patient decreases carbohydrates. Patient states she walks a little for exercise. Patient has hyperlipidemia. Patient takes Atorvastatin 20 mg nightly. Patient decreases fat and cholesterol. Patient has hypertension. Patient takes clonidine 0.1 mg/24hr patch weekly. Patient decreases salt. Patient states she walks a little for exercise. Patient has GERD. Patient takes pantoprazole 40 mg once daily. Patient denies heartburn and reflux. Patient decreases spicy food. Patient eats tomato based food. Patient states she drinks 2 espressos per day. Patient has chronic right leg pain. Patient takes Gabapentin 300mg nightly. Patient states leg pain is burning and dull achy. Review of Systems   Constitutional:  Negative for activity change, chills, fatigue, fever and unexpected weight change. HENT:  Negative for congestion, postnasal drip, rhinorrhea, sinus pressure, sore throat and trouble swallowing. Eyes:  Negative for visual disturbance. Respiratory:  Negative for cough, chest tightness, shortness of breath and wheezing. Cardiovascular:  Negative for chest pain, palpitations and leg swelling. Gastrointestinal:  Negative for abdominal pain, blood in stool, constipation, diarrhea, nausea and vomiting. Genitourinary:  Negative for dysuria, frequency, hematuria and urgency. Musculoskeletal:  Positive for myalgias. Negative for arthralgias. Skin:  Negative for rash and wound. Neurological:  Negative for dizziness, tremors, syncope, weakness, light-headedness, numbness and headaches.    Psychiatric/Behavioral: Negative for dysphoric mood and sleep disturbance. The patient is not nervous/anxious. Allergies   Allergen Reactions    Aspirin      bruising    Darvocet [Propoxyphene N-Acetaminophen] Hives    Effexor [Venlafaxine Hydrochloride] Hives    Hydrocodone Hives and Itching    Keflex [Cephalexin] Itching     Face redness.     Paxil Cr [Paroxetine Hcl Er] Itching     Outpatient Medications Marked as Taking for the 10/11/22 encounter (Office Visit) with Ashlee Escamilla MD   Medication Sig Dispense Refill    ketoconazole (NIZORAL) 2 % cream APPLY topically TO bottom of BOTH FEET AND between TOES DAILY FOR 3 TO 4 WEEKS      pantoprazole (PROTONIX) 40 MG tablet Take 1 tablet by mouth daily 90 tablet 1    atorvastatin (LIPITOR) 20 MG tablet Take 1 tablet by mouth daily Take 20 mg by mouth daily 90 tablet 1    ibuprofen (ADVIL;MOTRIN) 600 MG tablet Take 1 tablet by mouth 3 times daily as needed for Pain 60 tablet 0    cetirizine (ZYRTEC) 10 MG tablet Take 1 tablet by mouth daily 30 tablet 2    diclofenac sodium (VOLTAREN) 1 % GEL Apply 2 g topically 4 times daily 350 g 1    Dulaglutide (TRULICITY) 4.62 DC/7.3GS SOPN INJECT 0.5ML (=0.75 MG)    SUBCUTANEOUSLY ONCE WEEKLY 6 mL 1    docusate (COLACE, DULCOLAX) 100 MG CAPS Take 100 mg by mouth 2 times daily      fluticasone (FLONASE) 50 MCG/ACT nasal spray 2 sprays by Nasal route daily 48 g 1    budesonide-formoterol (SYMBICORT) 160-4.5 MCG/ACT AERO Inhale 2 puffs into the lungs 2 times daily 30.6 g 1    cloNIDine (CATAPRES) 0.1 MG/24HR PTWK APPLY 1 PATCH ONTO THE SKINONCE A WEEK 12 patch 1    Lancets MISC 1 each by Other route daily E11.9 100 each 0    Blood Glucose Monitoring Suppl (TRUE METRIX METER) DMITRIY Test blood sugar daily and PRN E11.9 1 each 0    blood glucose test strips (GLUCOSE METER TEST) strip 100 each by In Vitro route 2 times daily Diabetes   e11.9 100 each 1    Bisacodyl (DULCOLAX PO) Take 1 tablet by mouth as needed            Vitals:    10/11/22 1052   BP: 138/70   Pulse: 82   Resp: 16   Temp: 97.3 °F (36.3 °C)   SpO2: 95%   Weight: 151 lb (68.5 kg)   Height: 5' 2\" (1.575 m)     Body mass index is 27.62 kg/m². Physical Exam  Nursing note reviewed. Constitutional:       General: She is not in acute distress. Appearance: Normal appearance. She is well-developed. Eyes:      General: Lids are normal.      Extraocular Movements: Extraocular movements intact. Conjunctiva/sclera: Conjunctivae normal.      Pupils: Pupils are equal, round, and reactive to light. Neck:      Thyroid: No thyromegaly. Vascular: No carotid bruit. Cardiovascular:      Rate and Rhythm: Normal rate and regular rhythm. Heart sounds: Normal heart sounds, S1 normal and S2 normal. No murmur heard. No friction rub. No gallop. Pulmonary:      Effort: Pulmonary effort is normal. No respiratory distress. Breath sounds: Normal breath sounds. No wheezing, rhonchi or rales. Abdominal:      General: Bowel sounds are normal. There is no distension. Palpations: Abdomen is soft. Tenderness: There is no abdominal tenderness. Musculoskeletal:      Cervical back: Neck supple. Right lower leg: Tenderness present. No edema. Left lower leg: No edema. Lymphadenopathy:      Head:      Right side of head: No submandibular adenopathy. Left side of head: No submandibular adenopathy. Neurological:      Mental Status: She is alert and oriented to person, place, and time.       Gait: Gait normal.   Psychiatric:         Mood and Affect: Mood normal.         Results for POC orders placed in visit on 10/11/22   POCT glycosylated hemoglobin (Hb A1C)   Result Value Ref Range    Hemoglobin A1C 6.0 %     Lab Review   Orders Only on 06/16/2022   Component Date Value    Sodium 06/16/2022 141     Potassium 06/16/2022 4.0     Chloride 06/16/2022 103     CO2 06/16/2022 25     Anion Gap 06/16/2022 13     Glucose 06/16/2022 108 (A)     BUN 06/16/2022 12     Creatinine 06/16/2022 0.6     GFR Non- 06/16/2022 >60     GFR  06/16/2022 >60     Calcium 06/16/2022 9.2     Total Protein 06/16/2022 6.4     Albumin 06/16/2022 4.1     Albumin/Globulin Ratio 06/16/2022 1.8     Total Bilirubin 06/16/2022 0.9     Alkaline Phosphatase 06/16/2022 99     ALT 06/16/2022 35     AST 06/16/2022 24     Cholesterol, Total 06/16/2022 158     Triglycerides 06/16/2022 138     HDL 06/16/2022 50     LDL Calculated 06/16/2022 80     VLDL Cholesterol Calcula* 06/16/2022 28    Abstract on 06/15/2022   Component Date Value    Antibody Screen 08/14/2021 Non-Reactive     Diabetic Retinopathy 08/11/2021 Negative    Office Visit on 06/15/2022   Component Date Value    Hemoglobin A1C 06/15/2022 6.2          Assessment/Plan     1. Type 2 diabetes mellitus with diabetic polyneuropathy, without long-term current use of insulin ([facility])  -Hemoglobin A1c of 6.0 shows diabetes is well controlled  -Continue Trulicity 1.63 mg SQ once a week  -Limit carbohydrates to 45 grams with meals and 15 grams with snacks  -monitor blood sugars  -goal for blood sugar fasting or pre-meal  is   -goal for blood sugar 2 hours after a meal is less than 180  -goal for blood sugar at bedtime is less than 150  -Regular aerobic exercise  - POCT glycosylated hemoglobin (Hb A1C)  - Microalbumin / Creatinine Urine Ratio; Future    2. Essential hypertension  -stable  -Continue  clonidine 0.1 mg/24hr patch weekly  -Low sodium diet  -Regular aerobic exercise    3. Mixed hyperlipidemia  -stable  -Continue Atorvastatin 20 mg nightly  -Low fat, low cholesterol diet  -Regular aerobic exercise    4. Gastroesophageal reflux disease, unspecified whether esophagitis present  -stable  -Continue pantoprazole 40 mg once daily  -Decrease caffeine, avoid spicy foods, avoid tomato based foods  -Eat small meals instead of large meals  -Wait 2-3 hours after eating before lying down     5.  Chronic pain of right lower extremity  -stable  -Continue Gabapentin 300mg nightly    6. Need for influenza vaccination  - Influenza, FLUAD, (age 72 y+), IM, Preservative Free, 0.5 mL given    7. Postmenopause  - DEXA BONE DENSITY AXIAL SKELETON; Future      Discussed medications with patient, who voiced understanding of their use and indications. All questions answered. Return in about 3 months (around 1/11/2023) for diabetes, hypertension, GERD, chronic leg pain, and hyperlipidemia.

## 2022-10-11 NOTE — PATIENT INSTRUCTIONS
-Continue same medications  -Limit carbohydrates to 45 grams with meals and 15 grams with snacks  -monitor blood sugars  -goal for blood sugar fasting or pre-meal  is   -goal for blood sugar 2 hours after a meal is less than 180  -goal for blood sugar at bedtime is less than 150  -Regular aerobic exercise

## 2022-10-12 LAB
CREATININE URINE: 102.1 MG/DL (ref 28–259)
MICROALBUMIN UR-MCNC: <1.2 MG/DL
MICROALBUMIN/CREAT UR-RTO: NORMAL MG/G (ref 0–30)

## 2022-10-19 ENCOUNTER — NURSE TRIAGE (OUTPATIENT)
Dept: OTHER | Facility: CLINIC | Age: 67
End: 2022-10-19

## 2022-10-19 NOTE — TELEPHONE ENCOUNTER
Location of patient: [address] call from [place] at [place] with [employer]. Subjective: Caller states \"extreme back pain and fatigue\"     Current Symptoms: see above, lower back pain shooting into right leg, feels weak, headache, fatigue    Onset: 4 days ago; worsening    Associated Symptoms: reduced activity, constipation, burning with urination today    Pain Severity: 9/10; burning; constant    Temperature: denies fever     What has been tried: ibuprofen, ice    LMP: NA Pregnant: NA    Recommended disposition: Go to ED Now, patient refused, office is closed. Writer transferred to [facility] ([facility]) for Urgent appointment scheduling, but ECC had already closed as it was after 1800. Writer sent message to the [facility] ([facility]) chat. Care advice provided, patient verbalizes understanding; denies any other questions or concerns; instructed to call back for any new or worsening symptoms. See above, routing high priority to PCP, as ECC is closed for the day    Attention Provider: Thank you for allowing me to participate in the care of your patient. The patient was connected to triage in response to information provided to the ECC/[facility]. Please do not respond through this encounter as the response is not directed to a shared pool.        Reason for Disposition   Weakness of a leg or foot (e.g., unable to bear weight, dragging foot)    Protocols used: Back Pain-ADULT-AH

## 2022-10-20 ENCOUNTER — OFFICE VISIT (OUTPATIENT)
Dept: PRIMARY CARE CLINIC | Age: 67
End: 2022-10-20
Payer: MEDICARE

## 2022-10-20 VITALS
SYSTOLIC BLOOD PRESSURE: 104 MMHG | DIASTOLIC BLOOD PRESSURE: 76 MMHG | BODY MASS INDEX: 27.62 KG/M2 | OXYGEN SATURATION: 98 % | HEART RATE: 82 BPM | WEIGHT: 151 LBS

## 2022-10-20 DIAGNOSIS — M79.604 CHRONIC PAIN OF RIGHT LOWER EXTREMITY: ICD-10-CM

## 2022-10-20 DIAGNOSIS — G89.29 CHRONIC PAIN OF RIGHT LOWER EXTREMITY: ICD-10-CM

## 2022-10-20 DIAGNOSIS — R39.89 SUSPECTED URINARY TRACT INFECTION: ICD-10-CM

## 2022-10-20 DIAGNOSIS — K59.00 CONSTIPATION, UNSPECIFIED CONSTIPATION TYPE: ICD-10-CM

## 2022-10-20 DIAGNOSIS — R51.9 NONINTRACTABLE HEADACHE, UNSPECIFIED CHRONICITY PATTERN, UNSPECIFIED HEADACHE TYPE: ICD-10-CM

## 2022-10-20 DIAGNOSIS — R53.1 FEELING WEAK: ICD-10-CM

## 2022-10-20 DIAGNOSIS — R30.0 DYSURIA: ICD-10-CM

## 2022-10-20 DIAGNOSIS — R52 BURNING PAIN: ICD-10-CM

## 2022-10-20 DIAGNOSIS — H57.89 EYE IRRITATION: ICD-10-CM

## 2022-10-20 DIAGNOSIS — M54.50 LOW BACK PAIN, UNSPECIFIED BACK PAIN LATERALITY, UNSPECIFIED CHRONICITY, UNSPECIFIED WHETHER SCIATICA PRESENT: Primary | ICD-10-CM

## 2022-10-20 DIAGNOSIS — R82.998 LEUKOCYTES IN URINE: ICD-10-CM

## 2022-10-20 DIAGNOSIS — Z77.29 EXPOSURE TO CARBON MONOXIDE: ICD-10-CM

## 2022-10-20 LAB
A/G RATIO: 2.6 (ref 1.1–2.2)
ALBUMIN SERPL-MCNC: 4.7 G/DL (ref 3.4–5)
ALP BLD-CCNC: 104 U/L (ref 40–129)
ALT SERPL-CCNC: 19 U/L (ref 10–40)
ANION GAP SERPL CALCULATED.3IONS-SCNC: 10 MMOL/L (ref 3–16)
AST SERPL-CCNC: 18 U/L (ref 15–37)
BASOPHILS ABSOLUTE: 0 K/UL (ref 0–0.2)
BASOPHILS RELATIVE PERCENT: 0.5 %
BILIRUB SERPL-MCNC: 0.6 MG/DL (ref 0–1)
BILIRUBIN, POC: NORMAL
BLOOD URINE, POC: NORMAL
BUN BLDV-MCNC: 17 MG/DL (ref 7–20)
CALCIUM SERPL-MCNC: 9.3 MG/DL (ref 8.3–10.6)
CHLORIDE BLD-SCNC: 102 MMOL/L (ref 99–110)
CLARITY, POC: CLEAR
CO2: 27 MMOL/L (ref 21–32)
COLOR, POC: YELLOW
CREAT SERPL-MCNC: 0.7 MG/DL (ref 0.6–1.2)
EOSINOPHILS ABSOLUTE: 0.1 K/UL (ref 0–0.6)
EOSINOPHILS RELATIVE PERCENT: 1.7 %
GFR SERPL CREATININE-BSD FRML MDRD: >60 ML/MIN/{1.73_M2}
GLUCOSE BLD-MCNC: 104 MG/DL (ref 70–99)
GLUCOSE URINE, POC: NORMAL
HCT VFR BLD CALC: 44.3 % (ref 36–48)
HEMOGLOBIN: 14.7 G/DL (ref 12–16)
KETONES, POC: NORMAL
LEUKOCYTE EST, POC: NORMAL
LYMPHOCYTES ABSOLUTE: 1.6 K/UL (ref 1–5.1)
LYMPHOCYTES RELATIVE PERCENT: 31.2 %
MAGNESIUM: 2.3 MG/DL (ref 1.8–2.4)
MCH RBC QN AUTO: 28.5 PG (ref 26–34)
MCHC RBC AUTO-ENTMCNC: 33.1 G/DL (ref 31–36)
MCV RBC AUTO: 86 FL (ref 80–100)
MONOCYTES ABSOLUTE: 0.4 K/UL (ref 0–1.3)
MONOCYTES RELATIVE PERCENT: 7.4 %
NEUTROPHILS ABSOLUTE: 3 K/UL (ref 1.7–7.7)
NEUTROPHILS RELATIVE PERCENT: 59.2 %
NITRITE, POC: NORMAL
PDW BLD-RTO: 13 % (ref 12.4–15.4)
PH, POC: 5.5
PLATELET # BLD: 206 K/UL (ref 135–450)
PMV BLD AUTO: 8.9 FL (ref 5–10.5)
POTASSIUM SERPL-SCNC: 4.6 MMOL/L (ref 3.5–5.1)
PROTEIN, POC: NORMAL
RBC # BLD: 5.16 M/UL (ref 4–5.2)
SODIUM BLD-SCNC: 139 MMOL/L (ref 136–145)
SPECIFIC GRAVITY, POC: 1.03
TOTAL PROTEIN: 6.5 G/DL (ref 6.4–8.2)
TSH SERPL DL<=0.05 MIU/L-ACNC: 1.31 UIU/ML (ref 0.27–4.2)
UROBILINOGEN, POC: 0.2
WBC # BLD: 5.1 K/UL (ref 4–11)

## 2022-10-20 PROCEDURE — G8427 DOCREV CUR MEDS BY ELIG CLIN: HCPCS | Performed by: INTERNAL MEDICINE

## 2022-10-20 PROCEDURE — 1090F PRES/ABSN URINE INCON ASSESS: CPT | Performed by: INTERNAL MEDICINE

## 2022-10-20 PROCEDURE — G8399 PT W/DXA RESULTS DOCUMENT: HCPCS | Performed by: INTERNAL MEDICINE

## 2022-10-20 PROCEDURE — 81002 URINALYSIS NONAUTO W/O SCOPE: CPT | Performed by: INTERNAL MEDICINE

## 2022-10-20 PROCEDURE — 3017F COLORECTAL CA SCREEN DOC REV: CPT | Performed by: INTERNAL MEDICINE

## 2022-10-20 PROCEDURE — G8484 FLU IMMUNIZE NO ADMIN: HCPCS | Performed by: INTERNAL MEDICINE

## 2022-10-20 PROCEDURE — 3078F DIAST BP <80 MM HG: CPT | Performed by: INTERNAL MEDICINE

## 2022-10-20 PROCEDURE — 1036F TOBACCO NON-USER: CPT | Performed by: INTERNAL MEDICINE

## 2022-10-20 PROCEDURE — 99214 OFFICE O/P EST MOD 30 MIN: CPT | Performed by: INTERNAL MEDICINE

## 2022-10-20 PROCEDURE — 3074F SYST BP LT 130 MM HG: CPT | Performed by: INTERNAL MEDICINE

## 2022-10-20 PROCEDURE — 1123F ACP DISCUSS/DSCN MKR DOCD: CPT | Performed by: INTERNAL MEDICINE

## 2022-10-20 PROCEDURE — G8417 CALC BMI ABV UP PARAM F/U: HCPCS | Performed by: INTERNAL MEDICINE

## 2022-10-20 RX ORDER — GABAPENTIN 300 MG/1
300 CAPSULE ORAL 2 TIMES DAILY
COMMUNITY
Start: 2022-10-20 | End: 2022-11-11 | Stop reason: SDUPTHER

## 2022-10-20 RX ORDER — DOCUSATE SODIUM 100 MG/1
100 CAPSULE, LIQUID FILLED ORAL 2 TIMES DAILY
Qty: 60 CAPSULE | Refills: 1 | Status: SHIPPED | OUTPATIENT
Start: 2022-10-20

## 2022-10-20 RX ORDER — CIPROFLOXACIN 250 MG/1
250 TABLET, FILM COATED ORAL 2 TIMES DAILY
Qty: 14 TABLET | Refills: 0 | Status: SHIPPED | OUTPATIENT
Start: 2022-10-20 | End: 2022-10-27

## 2022-10-20 RX ORDER — BISACODYL 5 MG
TABLET, DELAYED RELEASE (ENTERIC COATED) ORAL
Qty: 30 TABLET | Refills: 0 | Status: SHIPPED | OUTPATIENT
Start: 2022-10-20

## 2022-10-20 NOTE — PROGRESS NOTES
Moraima Barker   Date ofBirth:  1955    Date of Visit:  10/20/2022    Chief Complaint   Patient presents with    Back Pain    Headache       HPI  Patient has back pain and states it feels like her back and legs are on fire with turning in her bed and sitting too long. Patient states she has pain and burning. Patient hs chronic right leg pain. Patient states she has a headache that is a little better. Patient states she feels weak and not herself. Patient complains of little burning with urination since yesterday. Patient states she blew her nose this morning and  had 3 tiny spots of blood in mucus. Patient states she has been constipated since Friday or Saturday. Patient states her stools are hard. Patient states she doesn't like Miralax. Patient states it smelled like a car engine running in her bedroom on Saturday, Sunday, and Monday. Patient states she did have a gas leak last year from her stove. Patient states she had a Heating and cooling person come. Patient states her eyes burn. Patient states she is using eye drops that she buys over the counter 2 times daily. Review of Systems   Constitutional:  Negative for activity change, chills, fatigue and fever. HENT:  Negative for congestion, ear pain, postnasal drip, rhinorrhea, sinus pressure, sinus pain, sneezing and sore throat. Eyes:  Negative for photophobia and visual disturbance. Respiratory:  Negative for cough, chest tightness, shortness of breath and wheezing. Cardiovascular:  Negative for chest pain, palpitations and leg swelling. Gastrointestinal:  Positive for constipation. Negative for abdominal pain, blood in stool, diarrhea, nausea and vomiting. Genitourinary:  Positive for dysuria. Negative for decreased urine volume, difficulty urinating, flank pain, frequency, hematuria and urgency. Musculoskeletal:  Positive for back pain and myalgias.  Negative for arthralgias, gait problem and joint swelling. Skin:  Negative for rash. Neurological:  Positive for weakness and headaches. Negative for dizziness, tremors, syncope, facial asymmetry, light-headedness and numbness. Psychiatric/Behavioral:  Negative for dysphoric mood and sleep disturbance. The patient is not nervous/anxious. Allergies   Allergen Reactions    Aspirin      bruising    Darvocet [Propoxyphene N-Acetaminophen] Hives    Effexor [Venlafaxine Hydrochloride] Hives    Hydrocodone Hives and Itching    Keflex [Cephalexin] Itching     Face redness.     Paxil Cr [Paroxetine Hcl Er] Itching     Outpatient Medications Marked as Taking for the 10/20/22 encounter (Office Visit) with Otila Sandhoff, MD   Medication Sig Dispense Refill    ketoconazole (NIZORAL) 2 % cream APPLY topically TO bottom of BOTH FEET AND between TOES DAILY FOR 3 TO 4 WEEKS      pantoprazole (PROTONIX) 40 MG tablet Take 1 tablet by mouth daily 90 tablet 1    atorvastatin (LIPITOR) 20 MG tablet Take 1 tablet by mouth daily Take 20 mg by mouth daily 90 tablet 1    ibuprofen (ADVIL;MOTRIN) 600 MG tablet Take 1 tablet by mouth 3 times daily as needed for Pain 60 tablet 0    cetirizine (ZYRTEC) 10 MG tablet Take 1 tablet by mouth daily 30 tablet 2    diclofenac sodium (VOLTAREN) 1 % GEL Apply 2 g topically 4 times daily 350 g 1    Dulaglutide (TRULICITY) 0.40 VY/1.9VG SOPN INJECT 0.5ML (=0.75 MG)    SUBCUTANEOUSLY ONCE WEEKLY 6 mL 1    docusate (COLACE, DULCOLAX) 100 MG CAPS Take 100 mg by mouth 2 times daily      fluticasone (FLONASE) 50 MCG/ACT nasal spray 2 sprays by Nasal route daily 48 g 1    budesonide-formoterol (SYMBICORT) 160-4.5 MCG/ACT AERO Inhale 2 puffs into the lungs 2 times daily 30.6 g 1    cloNIDine (CATAPRES) 0.1 MG/24HR PTWK APPLY 1 PATCH ONTO THE SKINONCE A WEEK 12 patch 1    Lancets MISC 1 each by Other route daily E11.9 100 each 0    Blood Glucose Monitoring Suppl (TRUE METRIX METER) DMITRIY Test blood sugar daily and PRN E11.9 1 each 0    blood glucose test strips (GLUCOSE METER TEST) strip 100 each by In Vitro route 2 times daily Diabetes   e11.9 100 each 1    Bisacodyl (DULCOLAX PO) Take 1 tablet by mouth as needed            Vitals:    10/20/22 0911   BP: 104/76   Pulse: 82   SpO2: 98%   Weight: 151 lb (68.5 kg)     Body mass index is 27.62 kg/m². Physical Exam  Nursing note reviewed. Constitutional:       General: She is not in acute distress. Appearance: Normal appearance. She is well-developed. HENT:      Mouth/Throat:      Pharynx: Oropharynx is clear. Eyes:      General: Lids are normal.      Extraocular Movements: Extraocular movements intact. Conjunctiva/sclera: Conjunctivae normal.      Pupils: Pupils are equal, round, and reactive to light. Neck:      Thyroid: No thyromegaly. Vascular: No carotid bruit. Cardiovascular:      Rate and Rhythm: Normal rate and regular rhythm. Heart sounds: Normal heart sounds, S1 normal and S2 normal. No murmur heard. No friction rub. No gallop. Pulmonary:      Effort: Pulmonary effort is normal. No respiratory distress. Breath sounds: Normal breath sounds. No wheezing, rhonchi or rales. Abdominal:      General: Bowel sounds are normal. There is no distension. Palpations: Abdomen is soft. Tenderness: There is no abdominal tenderness. Musculoskeletal:      Cervical back: Neck supple. Lumbar back: Tenderness present. No spasms. Normal range of motion. Right lower leg: Tenderness present. No edema. Left lower leg: No edema. Lymphadenopathy:      Head:      Right side of head: No submandibular adenopathy. Left side of head: No submandibular adenopathy. Neurological:      Mental Status: She is alert and oriented to person, place, and time. Gait: Gait normal.      Deep Tendon Reflexes: Reflexes are normal and symmetric.    Psychiatric:         Mood and Affect: Mood normal.         Results for POC orders placed in visit on 10/20/22   POCT Urinalysis no Micro   Result Value Ref Range    Color, UA yellow     Clarity, UA clear     Glucose, UA POC neg     Bilirubin, UA neg     Ketones, UA neg     Spec Grav, UA 1.030     Blood, UA POC trace     pH, UA 5.5     Protein, UA POC neg     Urobilinogen, UA 0.2     Leukocytes, UA trace     Nitrite, UA neg      Lab Review   Orders Only on 10/11/2022   Component Date Value    Microalbumin, Random Uri* 10/11/2022 <1.20     Creatinine, Ur 10/11/2022 102.1     Microalbumin Creatinine * 10/11/2022 see below    Office Visit on 10/11/2022   Component Date Value    Hemoglobin A1C 10/11/2022 6.0    Orders Only on 06/16/2022   Component Date Value    Sodium 06/16/2022 141     Potassium 06/16/2022 4.0     Chloride 06/16/2022 103     CO2 06/16/2022 25     Anion Gap 06/16/2022 13     Glucose 06/16/2022 108 (A)     BUN 06/16/2022 12     Creatinine 06/16/2022 0.6     GFR Non- 06/16/2022 >60     GFR  06/16/2022 >60     Calcium 06/16/2022 9.2     Total Protein 06/16/2022 6.4     Albumin 06/16/2022 4.1     Albumin/Globulin Ratio 06/16/2022 1.8     Total Bilirubin 06/16/2022 0.9     Alkaline Phosphatase 06/16/2022 99     ALT 06/16/2022 35     AST 06/16/2022 24     Cholesterol, Total 06/16/2022 158     Triglycerides 06/16/2022 138     HDL 06/16/2022 50     LDL Calculated 06/16/2022 80     VLDL Cholesterol Calcula* 06/16/2022 28    Abstract on 06/15/2022   Component Date Value    Antibody Screen 08/14/2021 Non-Reactive     Diabetic Retinopathy 08/11/2021 Negative    Office Visit on 06/15/2022   Component Date Value    Hemoglobin A1C 06/15/2022 6.2          Assessment/Plan     1. Low back pain, unspecified back pain laterality, unspecified chronicity, unspecified whether sciatica present  - Ibuprofen 600mg 3 times daily as needed  - Increase gabapentin (NEURONTIN) 300 MG capsule; Take 1 capsule by mouth 2 times daily    2.  Chronic pain of right lower extremity  - Ibuprofen 600mg 3 times daily as needed  - Increase gabapentin (NEURONTIN) 300 MG capsule; Take 1 capsule by mouth 2 times daily    3. Nonintractable headache, unspecified chronicity pattern, unspecified headache type  -Ibuprofen 600mg 3 times daily as needed    4. Feeling weak  - Comprehensive Metabolic Panel; Future  - CBC with Auto Differential; Future  - TSH; Future  - Magnesium; Future    5. Dysuria  - POCT Urinalysis no Micro  - Urine Culture  ciprofloxacin (CIPRO) 250 MG tablet; Take 1 tablet by mouth 2 times daily for 7 days  Dispense: 14 tablet; Refill: 0  -increase water intake    6. Suspected urinary tract infection  - POCT Urinalysis no Micro  - Urine Culture  - ciprofloxacin (CIPRO) 250 MG tablet; Take 1 tablet by mouth 2 times daily for 7 days  Dispense: 14 tablet; Refill: 0  -increase water intake    7. Burning pain  -Increase gabapentin (NEURONTIN) 300 MG capsule; Take 1 capsule by mouth 2 times daily. 8. Constipation, unspecified constipation type  - docusate sodium (COLACE) 100 MG capsule; Take 1 capsule by mouth 2 times daily  Dispense: 60 capsule; Refill: 1  - bisacodyl 5 MG EC tablet; Take 1-2 tablets daily as needed  Dispense: 30 tablet; Refill: 0  -high fiber diet    9. Exposure to carbon monoxide  - CARBOXYHEMOGLOBIN; Future    10. Leukocytes in urine  - Culture, Urine    11. Eye irritation  -continue over the counter eye drops    Discussed medications with patient, who voiced understanding of their use and indications. All questions answered. Return in about 10 days (around 10/30/2022) for back pain, leg pain, constipation, and feeling weak.

## 2022-10-22 LAB — Lab: 2.2 % (ref 0–3.6)

## 2022-10-23 LAB
ORGANISM: ABNORMAL
URINE CULTURE, ROUTINE: ABNORMAL
URINE CULTURE, ROUTINE: ABNORMAL

## 2022-10-24 DIAGNOSIS — Z77.29 EXPOSURE TO CARBON MONOXIDE: ICD-10-CM

## 2022-10-24 DIAGNOSIS — N39.0 URINARY TRACT INFECTION WITHOUT HEMATURIA, SITE UNSPECIFIED: Primary | ICD-10-CM

## 2022-10-24 RX ORDER — NITROFURANTOIN 25; 75 MG/1; MG/1
100 CAPSULE ORAL 2 TIMES DAILY
Qty: 14 CAPSULE | Refills: 0 | Status: SHIPPED | OUTPATIENT
Start: 2022-10-24 | End: 2022-10-31

## 2022-10-26 ASSESSMENT — ENCOUNTER SYMPTOMS
COUGH: 0
NAUSEA: 0
BLOOD IN STOOL: 0
SHORTNESS OF BREATH: 0
CHEST TIGHTNESS: 0
CONSTIPATION: 0
TROUBLE SWALLOWING: 0
VOMITING: 0
WHEEZING: 0
SINUS PRESSURE: 0
ABDOMINAL PAIN: 0
RHINORRHEA: 0
SORE THROAT: 0
DIARRHEA: 0

## 2022-10-27 DIAGNOSIS — I10 ESSENTIAL HYPERTENSION: ICD-10-CM

## 2022-10-28 RX ORDER — CLONIDINE 0.1 MG/24H
PATCH, EXTENDED RELEASE TRANSDERMAL
Qty: 12 PATCH | Refills: 1 | Status: SHIPPED | OUTPATIENT
Start: 2022-10-28

## 2022-10-31 ENCOUNTER — OFFICE VISIT (OUTPATIENT)
Dept: PRIMARY CARE CLINIC | Age: 67
End: 2022-10-31
Payer: MEDICARE

## 2022-10-31 VITALS
OXYGEN SATURATION: 90 % | TEMPERATURE: 97.1 F | WEIGHT: 153 LBS | BODY MASS INDEX: 28.16 KG/M2 | DIASTOLIC BLOOD PRESSURE: 70 MMHG | RESPIRATION RATE: 16 BRPM | HEART RATE: 85 BPM | SYSTOLIC BLOOD PRESSURE: 122 MMHG | HEIGHT: 62 IN

## 2022-10-31 DIAGNOSIS — K59.00 CONSTIPATION, UNSPECIFIED CONSTIPATION TYPE: ICD-10-CM

## 2022-10-31 DIAGNOSIS — H57.89 BURNING SENSATION OF EYE: ICD-10-CM

## 2022-10-31 DIAGNOSIS — H53.8 BLURRY VISION, LEFT EYE: ICD-10-CM

## 2022-10-31 DIAGNOSIS — N39.0 URINARY TRACT INFECTION WITHOUT HEMATURIA, SITE UNSPECIFIED: Primary | ICD-10-CM

## 2022-10-31 PROCEDURE — 1036F TOBACCO NON-USER: CPT | Performed by: INTERNAL MEDICINE

## 2022-10-31 PROCEDURE — G8427 DOCREV CUR MEDS BY ELIG CLIN: HCPCS | Performed by: INTERNAL MEDICINE

## 2022-10-31 PROCEDURE — 3017F COLORECTAL CA SCREEN DOC REV: CPT | Performed by: INTERNAL MEDICINE

## 2022-10-31 PROCEDURE — G8399 PT W/DXA RESULTS DOCUMENT: HCPCS | Performed by: INTERNAL MEDICINE

## 2022-10-31 PROCEDURE — 3074F SYST BP LT 130 MM HG: CPT | Performed by: INTERNAL MEDICINE

## 2022-10-31 PROCEDURE — 99213 OFFICE O/P EST LOW 20 MIN: CPT | Performed by: INTERNAL MEDICINE

## 2022-10-31 PROCEDURE — 3078F DIAST BP <80 MM HG: CPT | Performed by: INTERNAL MEDICINE

## 2022-10-31 PROCEDURE — 1090F PRES/ABSN URINE INCON ASSESS: CPT | Performed by: INTERNAL MEDICINE

## 2022-10-31 PROCEDURE — 1123F ACP DISCUSS/DSCN MKR DOCD: CPT | Performed by: INTERNAL MEDICINE

## 2022-10-31 PROCEDURE — G8417 CALC BMI ABV UP PARAM F/U: HCPCS | Performed by: INTERNAL MEDICINE

## 2022-10-31 PROCEDURE — G8484 FLU IMMUNIZE NO ADMIN: HCPCS | Performed by: INTERNAL MEDICINE

## 2022-10-31 NOTE — PROGRESS NOTES
Haley Izaguirre   Date ofBirth:  1955    Date of Visit:  10/31/2022    Chief Complaint   Patient presents with    Other     Was put on antibiotics, patient was told to return in one week. HPI  Patient states her urinary symptoms are better. Patient states she is feeling better. Patient has one more pill of antibiotic left. Patient states her constipation is better. Patient states she had a BM today, 2 days ago, and 4 days ago. Patient has BM every other day which is improved. Stools not hard. Patient states she only took the constipation medications twice. Patient states her eyes still burn and left eye is foggy once in awhile. Patient states she wants to see a different  Ophthalmologist in [place]. Patient states her sister  from a stroke last week in [facility]. Review of Systems   Constitutional:  Negative for chills and fever. Eyes:  Positive for visual disturbance. Negative for photophobia, pain, discharge, redness and itching. Gastrointestinal:  Positive for constipation. Negative for abdominal pain, blood in stool, diarrhea, nausea and vomiting. Genitourinary:  Negative for decreased urine volume, difficulty urinating, dysuria, flank pain, frequency, hematuria and urgency. Musculoskeletal:  Negative for back pain. Allergies   Allergen Reactions    Aspirin      bruising    Darvocet [Propoxyphene N-Acetaminophen] Hives    Effexor [Venlafaxine Hydrochloride] Hives    Hydrocodone Hives and Itching    Keflex [Cephalexin] Itching     Face redness.     Paxil Cr [Paroxetine Hcl Er] Itching     Outpatient Medications Marked as Taking for the 10/31/22 encounter (Office Visit) with Anitra Fitzgerald MD   Medication Sig Dispense Refill    cloNIDine (CATAPRES) 0.1 MG/24HR PTWK APPLY 1 PATCH ONTO THE SKINONCE A WEEK 12 patch 1    nitrofurantoin, macrocrystal-monohydrate, (MACROBID) 100 MG capsule Take 1 capsule by mouth 2 times daily for 7 days 14 capsule 0    docusate sodium (COLACE) 100 MG capsule Take 1 capsule by mouth 2 times daily 60 capsule 1    bisacodyl 5 MG EC tablet Take 1-2 tablets daily as needed 30 tablet 0    gabapentin (NEURONTIN) 300 MG capsule Take 1 capsule by mouth 2 times daily. ketoconazole (NIZORAL) 2 % cream APPLY topically TO bottom of BOTH FEET AND between TOES DAILY FOR 3 TO 4 WEEKS      pantoprazole (PROTONIX) 40 MG tablet Take 1 tablet by mouth daily 90 tablet 1    atorvastatin (LIPITOR) 20 MG tablet Take 1 tablet by mouth daily Take 20 mg by mouth daily 90 tablet 1    ibuprofen (ADVIL;MOTRIN) 600 MG tablet Take 1 tablet by mouth 3 times daily as needed for Pain 60 tablet 0    cetirizine (ZYRTEC) 10 MG tablet Take 1 tablet by mouth daily 30 tablet 2    diclofenac sodium (VOLTAREN) 1 % GEL Apply 2 g topically 4 times daily 350 g 1    Dulaglutide (TRULICITY) 8.56 EK/2.6EB SOPN INJECT 0.5ML (=0.75 MG)    SUBCUTANEOUSLY ONCE WEEKLY 6 mL 1    docusate (COLACE, DULCOLAX) 100 MG CAPS Take 100 mg by mouth 2 times daily      fluticasone (FLONASE) 50 MCG/ACT nasal spray 2 sprays by Nasal route daily 48 g 1    [DISCONTINUED] budesonide-formoterol (SYMBICORT) 160-4.5 MCG/ACT AERO Inhale 2 puffs into the lungs 2 times daily 30.6 g 1    Lancets MISC 1 each by Other route daily E11.9 100 each 0    Blood Glucose Monitoring Suppl (TRUE METRIX METER) DMITRIY Test blood sugar daily and PRN E11.9 1 each 0    blood glucose test strips (GLUCOSE METER TEST) strip 100 each by In Vitro route 2 times daily Diabetes   e11.9 100 each 1    Bisacodyl (DULCOLAX PO) Take 1 tablet by mouth as needed            Vitals:    10/31/22 1023   BP: 122/70   Pulse: 85   Resp: 16   Temp: 97.1 °F (36.2 °C)   SpO2: 90%   Weight: 153 lb (69.4 kg)   Height: 5' 2\" (1.575 m)     Body mass index is 27.98 kg/m². Physical Exam  Nursing note reviewed. Constitutional:       General: She is not in acute distress. Appearance: Normal appearance. She is well-developed.    HENT:      Mouth/Throat: Pharynx: Oropharynx is clear. Eyes:      Extraocular Movements: Extraocular movements intact. Pupils: Pupils are equal, round, and reactive to light. Cardiovascular:      Rate and Rhythm: Normal rate and regular rhythm. Heart sounds: Normal heart sounds, S1 normal and S2 normal. No murmur heard. Pulmonary:      Effort: Pulmonary effort is normal. No respiratory distress. Breath sounds: Normal breath sounds. Abdominal:      General: Bowel sounds are normal. There is no distension. Palpations: Abdomen is soft. Tenderness: There is no abdominal tenderness. Neurological:      Mental Status: She is alert. No results found for this visit on 10/31/22.   Lab Review   Orders Only on 10/20/2022   Component Date Value    Carboxyhemoglobin/Hemogl* 10/20/2022 2.2    Orders Only on 10/20/2022   Component Date Value    Magnesium 10/20/2022 2.30     TSH 10/20/2022 1.31     WBC 10/20/2022 5.1     RBC 10/20/2022 5.16     Hemoglobin 10/20/2022 14.7     Hematocrit 10/20/2022 44.3     MCV 10/20/2022 86.0     MCH 10/20/2022 28.5     MCHC 10/20/2022 33.1     RDW 10/20/2022 13.0     Platelets 85/54/8504 206     MPV 10/20/2022 8.9     Neutrophils % 10/20/2022 59.2     Lymphocytes % 10/20/2022 31.2     Monocytes % 10/20/2022 7.4     Eosinophils % 10/20/2022 1.7     Basophils % 10/20/2022 0.5     Neutrophils Absolute 10/20/2022 3.0     Lymphocytes Absolute 10/20/2022 1.6     Monocytes Absolute 10/20/2022 0.4     Eosinophils Absolute 10/20/2022 0.1     Basophils Absolute 10/20/2022 0.0     Sodium 10/20/2022 139     Potassium 10/20/2022 4.6     Chloride 10/20/2022 102     CO2 10/20/2022 27     Anion Gap 10/20/2022 10     Glucose 10/20/2022 104 (A)     BUN 10/20/2022 17     Creatinine 10/20/2022 0.7     Est, Glom Filt Rate 10/20/2022 >60     Calcium 10/20/2022 9.3     Total Protein 10/20/2022 6.5     Albumin 10/20/2022 4.7     Albumin/Globulin Ratio 10/20/2022 2.6 (A)     Total Bilirubin 10/20/2022 0.6     Alkaline Phosphatase 10/20/2022 104     ALT 10/20/2022 19     AST 10/20/2022 18    Office Visit on 10/20/2022   Component Date Value    Color, UA 10/20/2022 yellow     Clarity, UA 10/20/2022 clear     Glucose, UA POC 10/20/2022 neg     Bilirubin, UA 10/20/2022 neg     Ketones, UA 10/20/2022 neg     Spec Grav, UA 10/20/2022 1.030     Blood, UA POC 10/20/2022 trace     pH, UA 10/20/2022 5.5     Protein, UA POC 10/20/2022 neg     Urobilinogen, UA 10/20/2022 0.2     Leukocytes, UA 10/20/2022 trace     Nitrite, UA 10/20/2022 neg     Urine Culture, Routine 10/20/2022 <50,000 CFU/ml mixed skin/urogenital james.  No further workup (A)     Organism 10/20/2022 Enterococcus faecalis (A)     Urine Culture, Routine 10/20/2022 50,000 CFU/ml    Orders Only on 10/11/2022   Component Date Value    Microalbumin, Random Uri* 10/11/2022 <1.20     Creatinine, Ur 10/11/2022 102.1     Microalbumin Creatinine * 10/11/2022 see below    Office Visit on 10/11/2022   Component Date Value    Hemoglobin A1C 10/11/2022 6.0    Orders Only on 06/16/2022   Component Date Value    Sodium 06/16/2022 141     Potassium 06/16/2022 4.0     Chloride 06/16/2022 103     CO2 06/16/2022 25     Anion Gap 06/16/2022 13     Glucose 06/16/2022 108 (A)     BUN 06/16/2022 12     Creatinine 06/16/2022 0.6     GFR Non- 06/16/2022 >60     GFR  06/16/2022 >60     Calcium 06/16/2022 9.2     Total Protein 06/16/2022 6.4     Albumin 06/16/2022 4.1     Albumin/Globulin Ratio 06/16/2022 1.8     Total Bilirubin 06/16/2022 0.9     Alkaline Phosphatase 06/16/2022 99     ALT 06/16/2022 35     AST 06/16/2022 24     Cholesterol, Total 06/16/2022 158     Triglycerides 06/16/2022 138     HDL 06/16/2022 50     LDL Calculated 06/16/2022 80     VLDL Cholesterol Calcula* 06/16/2022 28    Abstract on 06/15/2022   Component Date Value    Antibody Screen 08/14/2021 Non-Reactive     Diabetic Retinopathy 08/11/2021 Negative    Office Visit on 06/15/2022 Component Date Value    Hemoglobin A1C 06/15/2022 6.2          Assessment/Plan     1. Urinary tract infection without hematuria, site unspecified  -Urinary symptoms improved with antibiotics  -Culture, Urine; Future for follow-up    2. Constipation, unspecified constipation type  -Improved  -High fiber diet  -Continue Colace as needed  -Continue bisacodyl as needed    3. Burning sensation of eye  -Referral to CEFERINO Salas MD, (Comprehensive Ophthalmology, Cataract Surgery) Ophthalmology, [facility]    4. Blurry vision, left eye  -Referral to Parker Fletcher MD, (Comprehensive Ophthalmology, Cataract Surgery) Ophthalmology, [facility]      Discussed medications with patient, who voiced understanding of their use and indications. All questions answered. Return in about 2 months (around 1/11/2023) for diabetes, hypertension, GERD, chronic leg pain, and hyperlipidemia.

## 2022-11-01 ENCOUNTER — OFFICE VISIT (OUTPATIENT)
Dept: ORTHOPEDIC SURGERY | Age: 67
End: 2022-11-01
Payer: MEDICARE

## 2022-11-01 VITALS — HEIGHT: 62 IN | WEIGHT: 153 LBS | BODY MASS INDEX: 28.16 KG/M2

## 2022-11-01 DIAGNOSIS — M75.81 ROTATOR CUFF TENDINITIS, RIGHT: Primary | ICD-10-CM

## 2022-11-01 DIAGNOSIS — M54.2 CERVICAL PAIN: ICD-10-CM

## 2022-11-01 DIAGNOSIS — M25.552 BILATERAL HIP PAIN: ICD-10-CM

## 2022-11-01 DIAGNOSIS — G89.29 CHRONIC RIGHT SHOULDER PAIN: ICD-10-CM

## 2022-11-01 DIAGNOSIS — M25.551 BILATERAL HIP PAIN: ICD-10-CM

## 2022-11-01 DIAGNOSIS — M25.511 CHRONIC RIGHT SHOULDER PAIN: ICD-10-CM

## 2022-11-01 PROCEDURE — G8427 DOCREV CUR MEDS BY ELIG CLIN: HCPCS | Performed by: ORTHOPAEDIC SURGERY

## 2022-11-01 PROCEDURE — G8417 CALC BMI ABV UP PARAM F/U: HCPCS | Performed by: ORTHOPAEDIC SURGERY

## 2022-11-01 PROCEDURE — 1036F TOBACCO NON-USER: CPT | Performed by: ORTHOPAEDIC SURGERY

## 2022-11-01 PROCEDURE — G8484 FLU IMMUNIZE NO ADMIN: HCPCS | Performed by: ORTHOPAEDIC SURGERY

## 2022-11-01 PROCEDURE — 99214 OFFICE O/P EST MOD 30 MIN: CPT | Performed by: ORTHOPAEDIC SURGERY

## 2022-11-01 PROCEDURE — 1123F ACP DISCUSS/DSCN MKR DOCD: CPT | Performed by: ORTHOPAEDIC SURGERY

## 2022-11-01 PROCEDURE — 3017F COLORECTAL CA SCREEN DOC REV: CPT | Performed by: ORTHOPAEDIC SURGERY

## 2022-11-01 PROCEDURE — 20610 DRAIN/INJ JOINT/BURSA W/O US: CPT | Performed by: ORTHOPAEDIC SURGERY

## 2022-11-01 PROCEDURE — G8399 PT W/DXA RESULTS DOCUMENT: HCPCS | Performed by: ORTHOPAEDIC SURGERY

## 2022-11-01 PROCEDURE — 1090F PRES/ABSN URINE INCON ASSESS: CPT | Performed by: ORTHOPAEDIC SURGERY

## 2022-11-01 RX ORDER — TIZANIDINE 2 MG/1
2 TABLET ORAL NIGHTLY PRN
Qty: 30 TABLET | Refills: 0 | Status: SHIPPED | OUTPATIENT
Start: 2022-11-01 | End: 2022-11-08 | Stop reason: SINTOL

## 2022-11-01 RX ORDER — BUPIVACAINE HYDROCHLORIDE 2.5 MG/ML
4 INJECTION, SOLUTION INFILTRATION; PERINEURAL ONCE
Status: COMPLETED | OUTPATIENT
Start: 2022-11-01 | End: 2022-11-01

## 2022-11-01 RX ORDER — TRIAMCINOLONE ACETONIDE 40 MG/ML
40 INJECTION, SUSPENSION INTRA-ARTICULAR; INTRAMUSCULAR ONCE
Status: COMPLETED | OUTPATIENT
Start: 2022-11-01 | End: 2022-11-01

## 2022-11-01 RX ADMIN — BUPIVACAINE HYDROCHLORIDE 10 MG: 2.5 INJECTION, SOLUTION INFILTRATION; PERINEURAL at 11:25

## 2022-11-01 RX ADMIN — TRIAMCINOLONE ACETONIDE 40 MG: 40 INJECTION, SUSPENSION INTRA-ARTICULAR; INTRAMUSCULAR at 11:26

## 2022-11-01 NOTE — PROGRESS NOTES
Patient: Ba Sarmiento  : 1955    MRN: [id number]    Date of Visit: 22    Attending Physician: Danisha Meredith MD    History of Present Illness  Ms. Chanell Santiago is a very pleasant 78 y.o. patient with a several week history of right shoulder pain she describes pain with overhead motion of the shoulder as well as forward flexion of the shoulder. She denies any radiating pain is localized to the shoulder area however at times she does have pain extending up into the neck and the shoulder girdle muscles. She previously has tried anti-inflammatories has not had prior injections or physical therapy. She denies radicular symptoms denies numbness weakness in the right upper extremity.     PMH/PSH:  Past Medical History:   Diagnosis Date    Abnormal involuntary movements(781.0)     Anal fissure     Anemia, unspecified     Anxiety     Chronic back pain     Chronic diarrhea 2019    Depression     Diffuse cystic mastopathy     Encopresis(307.7)     Esophageal reflux     Headache(784.0)     Hepatitis, unspecified     Herpes simplex without mention of complication     Hypertension     Hypogammaglobulinaemia, unspecified     Insomnia, unspecified     Lateral epicondylitis  of elbow     Migraine, unspecified, without mention of intractable migraine without mention of status migrainosus     Mixed hyperlipidemia 2010    Osteopenia     Pure hypercholesterolemia     Symptomatic menopausal or female climacteric states     Type 2 diabetes mellitus without complication (HCC)     Type II or unspecified type diabetes mellitus without mention of complication, not stated as uncontrolled     Unspecified asthma(493.90)      Patient Active Problem List   Diagnosis    Mixed hyperlipidemia    Asthma    Osteoporosis    Lateral epicondylitis    Arthralgia    Type 2 diabetes mellitus with diabetic polyneuropathy, without long-term current use of insulin (HCC)    Hepatitis    Leg pain    GERD (gastroesophageal reflux disease)    Hand pain    B12 deficiency anemia    Hemorrhoids, internal    Severe headache    Encopresis    Tremor    Hot flashes    Fibrocystic disease of breast    Insomnia    Shoulder pain    Malaise and fatigue    Migraine    Neck pain    Chronic back pain    Neuropathy    Major depression ([address]    History of arthroscopy of right shoulder 2008    Removal of ganglion cyst in left wrist    History of elbow surgery Right    Adhesive capsulitis of left shoulder    Tendinitis of left rotator cuff    Left rotator cuff tear    Pain, neck    Cervical stenosis of spinal canal    Arthritis of left acromioclavicular joint    Bursitis of right shoulder    Throat dryness    Right-sided chest wall pain    Dysuria    Acute cystitis without hematuria    Weight loss, abnormal    Moderate episode of recurrent major depressive disorder (HCC)    Arthritis of right acromioclavicular joint    Tendinitis of right rotator cuff    Cervical stenosis of spine    Chronic daily headache    Status migrainosus    Cervico-occipital neuralgia    Cervicogenic headache    Medication side effect    Chronic insomnia    Cervical spinal stenosis    Osteoarthritis of spine with radiculopathy, cervical region    Vitamin D deficiency    Intractable chronic migraine without aura    Chronic diarrhea    Essential hypertension    Cold sore    Chronic pain of right lower extremity    Type 2 diabetes mellitus without complication, without long-term current use of insulin (HCC)    COVID-19    Upper respiratory tract infection    Right ear pain    Conjunctivitis of left eye    Dry cough    Swelling of eyelid, left    Pain in toes of both feet    Allergic rhinitis    Skin lesion of right arm    Right groin pain    Right hip pain    Urinary tract infection without hematuria    Constipation     Past Surgical History:   Procedure Laterality Date    BREAST BIOPSY      CATARACT REMOVAL Bilateral     DILATION AND CURETTAGE OF UTERUS      x2    ELBOW SURGERY 1/18/11    right    FOOT SURGERY  10/2009,11/2010    right foot    HAND SURGERY  2009    left hand    HYSTERECTOMY (CERVIX STATUS UNKNOWN)  1998    RECTAL SURGERY      SHOULDER ARTHROSCOPY Right 7/07    by Dr. Nii Benitez       MEDS:  Scheduled Meds:  Continuous Infusions:  PRN Meds:  Current Meds:No current outpatient medications on file. ALLERGIES:  No Known Allergies      Social History:   Social History     Socioeconomic History    Marital status: Single     Spouse name: Not on file    Number of children: Not on file    Years of education: Not on file    Highest education level: Not on file   Social Needs    Financial resource strain: Not on file    Food insecurity - worry: Not on file    Food insecurity - inability: Not on file    Transportation needs - medical: Not on file    Transportation needs - non-medical: Not on file   Occupational History    Not on file   Tobacco Use    Smoking status: Never Smoker    Smokeless tobacco: Never Used   Substance and Sexual Activity    Alcohol use: No     Frequency: Never    Drug use: No    Sexual activity: Not on file   Other Topics Concern    Not on file   Social History Narrative    Not on file         Family History:   No family history on file. Review of Systems:  No personal history of DVT, PE. 12 point ROS otherwise negative other than reported in HPI. Physical Examination:  Patient is alert and oriented x 3 and appears well nourished and appropriate for today's visit. Gait: The patient walks with an antalgic gait. Patient is alert and oriented x 3 and appears well nourished and appropriate for today's visit.    Neck: the patient is tender throughout the cervical spine, does report pain across the back of the neck with neck extension, no radiation to the arm, hand   RUE: no pain along, clavicle, AC joint, ttp along trapezius, PROM 120 FF, 120 abduction, ER 45, IR 30, 5/5 strength to FF, abduction, IR/ER, + empty can, SILT distally   LUE: no pain along, clavicle, AC joint, trapezius, PROM 120 FF, 120 abduction, ER 45, IR 30, 5/5 strength to FF, abduction, IR/ER, negative empty can, SILT distally     Peripheral vasculature: Bilateral 2+ dorsal pedis pulses; bilateral 2+ posterior tibial pulses; No lower extremity edema  Skin: Skin appears to be intact in both upper and lower extremities. There does not appear to be any ulceration or other non-healing wounds. Radiographs: Multiple views of the right shoulder including AP true AP lateral and outlet, there are no signs of joint space narrowing subchondral sclerosis or osteophyte formation there is well-maintained joint space as well as subchondral space there are some mild degenerative changes in the [facility] joint. Assessment and Plan?: The patient has right shoulder pain consistent with rotator cuff tendinitis she also has cervical neck pain without signs of radiculopathy  . We discussed the diagnosis and treatment options in detail with the patient. Had a long discussion with the patient about the combination of neck and shoulder discomfort I do believe that a's subacromial injection would be helpful for her additionally we discussed NSAIDs as well as physical therapy   She also has chronic as well as diffuse axial muscle discomfort more consistent with a diagnosis of fibromyalgia or chronic pain syndrome as opposed to any specific etiology    3. Lastly I did prescribe her a muscle relaxant as she has difficulty at nighttime with muscle spasms. We will see her back in 6 months or sooner if symptoms worsen    Joint Injection: risks and benefits were discussed with the patient, after preparation of the injection site with alcohol, a combination of 1cc kenelog and 4cc marcaine totaling 5 cc was injected into the right subacromial shoulder joint for tendinits. The procedure was tolerated well without adverse reaction.  The patient was counseled on potential reactions to the injection and given information on follow up and when to seek medical attention. The patient will monitor how long relief persists.

## 2022-11-01 NOTE — PROGRESS NOTES
Administrations This Visit       bupivacaine (MARCAINE) 0.25 % injection 10 mg       Admin Date  11/01/2022  11:25 Action  Given Dose  10 mg Route  Intra-artICUlar Site  Shoulder Right Administered By  Leny Perez    Ordering Provider: Jael Gomez MD    NDC: 6620-3231-20    Lot#: [id number]    : HOSPIRA    Patient Supplied?: No              triamcinolone acetonide (KENALOG-40) injection 40 mg       Admin Date  11/01/2022  11:26 Action  Given Dose  40 mg Route  IntraMUSCular Site  Shoulder Right Administered By  Leny Perez    Ordering Provider: Jael Gomez MD    NDC: 8491-3810-34    Lot#: [id number]    : [facility] [place] (PRIMARY CARE)    Patient Supplied?: No

## 2022-11-02 DIAGNOSIS — N39.0 URINARY TRACT INFECTION WITHOUT HEMATURIA, SITE UNSPECIFIED: ICD-10-CM

## 2022-11-02 RX ORDER — BUDESONIDE AND FORMOTEROL FUMARATE DIHYDRATE 160; 4.5 UG/1; UG/1
AEROSOL RESPIRATORY (INHALATION)
Qty: 30.6 G | Refills: 1 | Status: SHIPPED | OUTPATIENT
Start: 2022-11-02

## 2022-11-02 NOTE — TELEPHONE ENCOUNTER
Medication:   Requested Prescriptions     Pending Prescriptions Disp Refills    budesonide-formoterol (SYMBICORT) 160-4.5 MCG/ACT AERO [Pharmacy Med Name: BUDES/FORMOT -4.5] 30.6 g 1     Sig: USE 2 INHALATIONS ORALLY   TWICE DAILY     Last Filled:  5/16/2022    Last appt: 10/31/2022   Next appt: 1/11/2023    Last OARRS:   RX Monitoring 9/15/2022   Periodic Controlled Substance Monitoring No signs of potential drug abuse or diversion identified.

## 2022-11-04 LAB
ORGANISM: ABNORMAL
URINE CULTURE, ROUTINE: ABNORMAL

## 2022-11-06 PROBLEM — R53.1 FEELING WEAK: Status: ACTIVE | Noted: 2022-11-06

## 2022-11-06 PROBLEM — R52 BURNING PAIN: Status: ACTIVE | Noted: 2022-11-06

## 2022-11-06 PROBLEM — Z77.29 EXPOSURE TO CARBON MONOXIDE: Status: ACTIVE | Noted: 2022-11-06

## 2022-11-06 PROBLEM — R51.9 NONINTRACTABLE HEADACHE: Status: ACTIVE | Noted: 2022-11-06

## 2022-11-06 PROBLEM — M54.50 LOW BACK PAIN: Status: ACTIVE | Noted: 2022-11-06

## 2022-11-06 ASSESSMENT — ENCOUNTER SYMPTOMS
DIARRHEA: 0
SHORTNESS OF BREATH: 0
SORE THROAT: 0
WHEEZING: 0
NAUSEA: 0
SINUS PAIN: 0
SINUS PRESSURE: 0
ABDOMINAL PAIN: 0
CHEST TIGHTNESS: 0
RHINORRHEA: 0
VOMITING: 0
PHOTOPHOBIA: 0
BACK PAIN: 1
COUGH: 0
CONSTIPATION: 1
BLOOD IN STOOL: 0

## 2022-11-07 ENCOUNTER — TELEPHONE (OUTPATIENT)
Dept: PRIMARY CARE CLINIC | Age: 67
End: 2022-11-07

## 2022-11-07 NOTE — TELEPHONE ENCOUNTER
Patient would like to talk to [address] assistant regarding something that is wrong in her chart.      Thank you

## 2022-11-08 DIAGNOSIS — N39.0 URINARY TRACT INFECTION WITHOUT HEMATURIA, SITE UNSPECIFIED: Primary | ICD-10-CM

## 2022-11-08 RX ORDER — CIPROFLOXACIN 250 MG/1
250 TABLET, FILM COATED ORAL 2 TIMES DAILY
Qty: 6 TABLET | Refills: 0 | Status: SHIPPED | OUTPATIENT
Start: 2022-11-08 | End: 2022-11-11

## 2022-11-09 PROBLEM — H53.8 BLURRY VISION, LEFT EYE: Status: ACTIVE | Noted: 2022-11-09

## 2022-11-09 PROBLEM — H57.89 BURNING SENSATION OF EYE: Status: ACTIVE | Noted: 2022-11-09

## 2022-11-09 ASSESSMENT — ENCOUNTER SYMPTOMS
EYE DISCHARGE: 0
EYE ITCHING: 0
CONSTIPATION: 1
BLOOD IN STOOL: 0
EYE REDNESS: 0
VOMITING: 0
NAUSEA: 0
EYE PAIN: 0
ABDOMINAL PAIN: 0
DIARRHEA: 0
BACK PAIN: 0
PHOTOPHOBIA: 0

## 2022-11-10 ENCOUNTER — TELEPHONE (OUTPATIENT)
Dept: PRIMARY CARE CLINIC | Age: 67
End: 2022-11-10

## 2022-11-10 DIAGNOSIS — R52 BURNING PAIN: ICD-10-CM

## 2022-11-10 DIAGNOSIS — M54.50 LOW BACK PAIN, UNSPECIFIED BACK PAIN LATERALITY, UNSPECIFIED CHRONICITY, UNSPECIFIED WHETHER SCIATICA PRESENT: ICD-10-CM

## 2022-11-10 NOTE — TELEPHONE ENCOUNTER
----- Message from Eva Isaac sent at 11/10/2022 11:18 AM EST -----  Subject: Refill Request    QUESTIONS  Name of Medication? gabapentin (NEURONTIN) 300 MG capsule  Patient-reported dosage and instructions? Take 1 capsule by mouth 2 times   daily. How many days do you have left? 0  Preferred Pharmacy? [facility] phone number (if available)? [phone]  ---------------------------------------------------------------------------  --------------  Shari KUMAR  What is the best way for the office to contact you? OK to leave message on   voicemail  Preferred Call Back Phone Number? [phone]  ---------------------------------------------------------------------------  --------------  SCRIPT ANSWERS  Relationship to Patient? Third Party  Third Party Type? Pharmacy?    Representative Name? Mariely walker

## 2022-11-10 NOTE — TELEPHONE ENCOUNTER
Please send the following script to       gabapentin (NEURONTIN) 300 MG capsule [[id number]]     Order Details  Dose: 300 mg Route: Oral Frequency: 2 TIMES DAILY   Dispense Quantity: -- Refills: --          Sig: Take 1 capsule by mouth 2 times daily. [facility] [phone] Yun Millard [phone]      Thank you

## 2022-11-10 NOTE — TELEPHONE ENCOUNTER
Medication:   Requested Prescriptions     Pending Prescriptions Disp Refills    gabapentin (NEURONTIN) 300 MG capsule 90 capsule      Sig: Take 1 capsule by mouth 2 times daily. Last Filled:  10/20/22    Last appt: 10/31/2022   Next appt: 1/11/2023    Last OARRS:   RX Monitoring 9/15/2022   Periodic Controlled Substance Monitoring No signs of potential drug abuse or diversion identified.

## 2022-11-10 NOTE — TELEPHONE ENCOUNTER
Patient is requesting a short supply sent to [facility] because she is completely and mail order will not get to her in time. Requesting her normal supply sent to [facility].

## 2022-11-11 ENCOUNTER — HOSPITAL ENCOUNTER (OUTPATIENT)
Dept: PHYSICAL THERAPY | Age: 67
Setting detail: THERAPIES SERIES
Discharge: HOME | End: 2022-11-11
Payer: MEDICARE

## 2022-11-11 PROCEDURE — 97161 PT EVAL LOW COMPLEX 20 MIN: CPT

## 2022-11-11 PROCEDURE — 97110 THERAPEUTIC EXERCISES: CPT

## 2022-11-11 RX ORDER — GABAPENTIN 300 MG/1
300 CAPSULE ORAL 2 TIMES DAILY
Qty: 30 CAPSULE | Refills: 0 | Status: SHIPPED | OUTPATIENT
Start: 2022-11-11 | End: 2022-11-29 | Stop reason: SDUPTHER

## 2022-11-11 RX ORDER — GABAPENTIN 300 MG/1
300 CAPSULE ORAL 2 TIMES DAILY
Qty: 180 CAPSULE | Refills: 0 | Status: SHIPPED | OUTPATIENT
Start: 2022-11-11 | End: 2023-02-09

## 2022-11-11 NOTE — FLOWSHEET NOTE
The [facility] and [facility] [employer], [address] [place] [address] [address], [address] [facility]  Phone: [phone]   Fax:     [phone]    Physical Therapy Daily Treatment Note  Date:  2022    Patient Name:  Evie Campbell    :  1955  MRN: [id number]  Restrictions/Precautions:    Medical/Treatment Diagnosis Information:  Diagnosis: M75.81 (ICD-10-CM) - Rotator cuff tendinitis, right  Treatment Diagnosis: M25.511 Right shoulder pain, M54.2 Cervicalgia  Insurance/Certification information:  PT Insurance Information: Medicare  Physician Information:   Narayan Guy MD  Has the plan of care been signed (Y/N):        []  Yes  [x]  No     Date of Patient follow up with Physician:       Is this a Progress Report:     []  Yes  [x]  No        If Yes:  Date Range for reporting period:  Beginning 22  Ending    Progress report will be due (10 Rx or 30 days whichever is less):        Recertification will be due (POC Duration  / 90 days whichever is less): 23         Visit # Insurance Allowable Auth Required   1 Medicare  []  Yes []  No        Functional Scale: FOTO shoulder 43  Date assessed:  22     Latex Allergy:  [x]NO      []YES  Preferred Language for Healthcare:   [x]English       []other:    Pain level:  4-5/10     SUBJECTIVE:  See eval    OBJECTIVE: See eval  Observation:   Test measurements:      RESTRICTIONS/PRECAUTIONS: HTN, Type 2 diabetes    Exercises/Interventions:   Exercises:  Exercise/Equipment Resistance/Repetitions Other comments   Stretching/PROM     UT stretch 10\"hx5 R  seated    Levator stretch 10\"hx5 R  seated   Table slide flexion 10\"hx5 R  seated   Table slide scaptino 10\"hx seated             Isometrics     Retraction 5\"hx10   seated        Weight shift     Flexion     Abduction     External Rotation     Internal Rotation     Biceps     Triceps          PRE's     Flexion     Abduction External Rotation     Internal Rotation     Shrugs     EXT     Reverse Flys     Serratus     Horizontal Abd with ER     Biceps     Triceps     Retraction          Cable Column/Theraband     External Rotation     Internal Rotation     Shrugs     Lats     Ext     Flex     Scapular Retraction     BIC     TRIC     PNF          Dynamic Stability          Plyoback          Manual interventions                     Therapeutic Exercise and NMR EXR  [x] (19982) Provided verbal/tactile cueing for activities related to strengthening, flexibility, endurance, ROM  for improvements in scapular, scapulothoracic and UE control with self care, reaching, carrying, lifting, house/yardwork, driving/computer work.    [] (39577) Provided verbal/tactile cueing for activities related to improving balance, coordination, kinesthetic sense, posture, motor skill, proprioception  to assist with  scapular, scapulothoracic and UE control with self care, reaching, carrying, lifting, house/yardwork, driving/computer work. Therapeutic Activities:    [] (98994 or 52298) Provided verbal/tactile cueing for activities related to improving balance, coordination, kinesthetic sense, posture, motor skill, proprioception and motor activation to allow for proper function of scapular, scapulothoracic and UE control with self care, carrying, lifting, driving/computer work.      Home Exercise Program:    [x] (68812) Reviewed/Progressed HEP activities related to strengthening, flexibility, endurance, ROM of scapular, scapulothoracic and UE control with self care, reaching, carrying, lifting, house/yardwork, driving/computer work  [] (43571) Reviewed/Progressed HEP activities related to improving balance, coordination, kinesthetic sense, posture, motor skill, proprioception of scapular, scapulothoracic and UE control with self care, reaching, carrying, lifting, house/yardwork, driving/computer work      Manual Treatments:  PROM / STM / Oscillations-Mobs:  G-I, II, III, IV (Ramsey, Inf., Post.)  [] (30679) Provided manual therapy to mobilize soft tissue/joints of cervical/CT, scapular GHJ and UE for the purpose of modulating pain, promoting relaxation,  increasing ROM, reducing/eliminating soft tissue swelling/inflammation/restriction, improving soft tissue extensibility and allowing for proper ROM for normal function with self care, reaching, carrying, lifting, house/yardwork, driving/computer work    Modalities:      Charges:  Timed Code Treatment Minutes: 15'+eval   Total Treatment Minutes: 2:15-3:05  50'       [x] EVAL (LOW) 50036 (typically 20 minutes face-to-face)  [] EVAL (MOD) 72918 (typically 30 minutes face-to-face)  [] EVAL (HIGH) 48294 (typically 45 minutes face-to-face)  [] RE-EVAL     [x] SQ(52705) x     [] IONTO  [] NMR (06191) x     [] VASO  [] Manual (08036) x      [] Other:  [] TA x      [] Mech Traction (18479)  [] ES(attended) (83465)      [] ES (un) (08244):     GOALS:  Patient stated goal: Move R UE and turn head without pain or limitations    [] Progressing: [] Met: [] Not Met: [] Adjusted     Therapist goals for Patient:   Short Term Goals: To be achieved in: 4 weeks  1. Independent in HEP and progression per patient tolerance, in order to prevent re-injury. [] Progressing: [] Met: [] Not Met: [] Adjusted   2. Patient will have a decrease in pain to facilitate improvement in movement, function, and ADLs as indicated by Functional Deficits. [] Progressing: [] Met: [] Not Met: [] Adjusted     Long Term Goals: To be achieved in: 8 weeks  1. Patient will score 58 or higher on FOTO shoulder assessment indicating subjective improvement in function. [] Progressing: [] Met: [] Not Met: [] Adjusted  2. Patient will demonstrate increased R shoulder AROM to 130 deg flexion and abduction in order to reach over to get something off a shelf without pain or limitations. [] Progressing: [] Met: [] Not Met: [] Adjusted  3.  Patient will demonstrate an weekly - 1 sets - 5 reps - 10 hold  Seated Scapular Retraction - 1-3 x daily - 7 x weekly - 1 sets - 10 reps - 5 hold  Seated Bilateral Shoulder Flexion Towel Slide at Table Top - 1-3 x daily - 7 x weekly - 1 sets - 5 reps - 10 hold  Seated Shoulder Scaption Slide at Table Top with Forearm in Neutral - 1-3 x daily - 7 x weekly - 1 sets - 5 reps - 10 hold    PLAN: See eval  [] Continue per plan of care [] Alter current plan (see comments above)  [x] Plan of care initiated [] Hold pending MD visit [] Discharge      Electronically signed by:  Charlie Ochoa PT    Note: If patient does not return for scheduled/ recommended follow up visits, this note will serve as a discharge from care along with most recent update on progress.
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
## 2022-11-11 NOTE — TELEPHONE ENCOUNTER
Prescription for Gabapentin 2 week supply sent to [facility]. Prescription for 90 day supply sent to [facility]. Controlled Substance Monitoring:    Acute and Chronic Pain Monitoring:   RX Monitoring 11/11/2022   Periodic Controlled Substance Monitoring No signs of potential drug abuse or diversion identified.

## 2022-11-11 NOTE — TELEPHONE ENCOUNTER
[facility] called and is requesting status on short supply of med refill.

## 2022-11-14 ENCOUNTER — HOSPITAL ENCOUNTER (OUTPATIENT)
Dept: PHYSICAL THERAPY | Age: 67
Setting detail: THERAPIES SERIES
Discharge: HOME | End: 2022-11-14
Payer: MEDICARE

## 2022-11-14 PROCEDURE — 97112 NEUROMUSCULAR REEDUCATION: CPT

## 2022-11-14 PROCEDURE — 97140 MANUAL THERAPY 1/> REGIONS: CPT

## 2022-11-14 PROCEDURE — 97110 THERAPEUTIC EXERCISES: CPT

## 2022-11-14 NOTE — FLOWSHEET NOTE
[facility], [address] [place] [address], [address] [place] [address]  Phone: [phone]   Fax:     [phone]    Physical Therapy Daily Treatment Note  Date:  2022    Patient Name:  Marc Hampton    :  1955  MRN: [id number]  Restrictions/Precautions:    Medical/Treatment Diagnosis Information:  Diagnosis: M75.81 (ICD-10-CM) - Rotator cuff tendinitis, right  Treatment Diagnosis: M25.511 Right shoulder pain, M54.2 Cervicalgia  Insurance/Certification information:  PT Insurance Information: Medicare  Physician Information:   aSri Meza MD  Has the plan of care been signed (Y/N):        [x]  Yes  []  No     Date of Patient follow up with Physician:       Is this a Progress Report:     []  Yes  [x]  No        If Yes:  Date Range for reporting period:  Beginning 22  Ending    Progress report will be due (10 Rx or 30 days whichever is less):        Recertification will be due (POC Duration  / 90 days whichever is less): 23         Visit # Insurance Allowable Auth Required   2   Medicare  []  Yes []  No        Functional Scale: FOTO shoulder 43  Date assessed:  22     Latex Allergy:  [x]NO      []YES  Preferred Language for Healthcare:   [x]English       []other:    Pain level:  3-4/10 11/14     SUBJECTIVE:   Pt she has been completing HEP consistently with no adverse effects. Noticing mild relief in symptoms.       OBJECTIVE: See eval  Observation:   Test measurements:   R shoulder PROM 163 deg flexion, 175 deg abd       RESTRICTIONS/PRECAUTIONS: HTN, Type 2 diabetes    Exercises/Interventions:   Exercises:  Exercise/Equipment Resistance/Repetitions Other comments   Stretching/PROM     UT stretch 20\"hx3 R ^ seated with MHP   Levator stretch 20\"hx3 R ^ seated with MHP   Pulleys Flexion x30 R  Scaption x30 R Start     Pec stretch  10\"hx10  Start  corner 60 deg abd   Table slide flexion 10\"hx10 R ^11/14 seated, withheld from HEP based on progress   Table slide scaption 10\"hx10 R ^11/14 seated    Cane flexion 10\"hx10 bilat Start 11/14 spine                  Isometrics     Retraction Progressed with resisted rows and extension below 11/14        Weight shift     Flexion     Abduction     External Rotation     Internal Rotation     Biceps     Triceps          PRE's     Flexion     Abduction     External Rotation 0# 3x10 R Start 11/14 side lying    Internal Rotation     Shrugs     EXT     Reverse Flys     Serratus     Horizontal Abd with ER     Biceps     Triceps     Retraction          Cable Column/Theraband     External Rotation     Internal Rotation     Rows with scapular activation Green TB 3x10  Start 11/14   Shoulder Ext with scapular activation  Green TB 3x10 Start 11/14 cramping in 2nd and 3rd finger on L that improved with repositioning back around hand   Flex     Scapular Retraction     BIC     TRIC     PNF          Dynamic Stability          Plyoback          Manual interventions     Joint mobilization Grade 2-3 Sup-inf humerus on glenoid 10\"hx10 R Start 11/14 supine 90 deg abd   PROM R shoulder flexion and abd x6' Start 11/14          Therapeutic Exercise and NMR EXR  [x] (31695) Provided verbal/tactile cueing for activities related to strengthening, flexibility, endurance, ROM  for improvements in scapular, scapulothoracic and UE control with self care, reaching, carrying, lifting, house/yardwork, driving/computer work. [x] (47948) Provided verbal/tactile cueing for activities related to improving balance, coordination, kinesthetic sense, posture, motor skill, proprioception  to assist with  scapular, scapulothoracic and UE control with self care, reaching, carrying, lifting, house/yardwork, driving/computer work.     Therapeutic Activities:    [] (20123 or 32130) Provided verbal/tactile cueing for activities related to improving balance, coordination, kinesthetic sense, posture, motor skill, proprioception and motor activation to allow for proper function of scapular, scapulothoracic and UE control with self care, carrying, lifting, driving/computer work. Home Exercise Program:    [x] (48427) Reviewed/Progressed HEP activities related to strengthening, flexibility, endurance, ROM of scapular, scapulothoracic and UE control with self care, reaching, carrying, lifting, house/yardwork, driving/computer work  [] (95727) Reviewed/Progressed HEP activities related to improving balance, coordination, kinesthetic sense, posture, motor skill, proprioception of scapular, scapulothoracic and UE control with self care, reaching, carrying, lifting, house/yardwork, driving/computer work      Manual Treatments:  PROM / STM / Oscillations-Mobs:  G-I, II, III, IV (PA's, Inf., Post.)  [x] (49248) Provided manual therapy to mobilize soft tissue/joints of cervical/CT, scapular GHJ and UE for the purpose of modulating pain, promoting relaxation,  increasing ROM, reducing/eliminating soft tissue swelling/inflammation/restriction, improving soft tissue extensibility and allowing for proper ROM for normal function with self care, reaching, carrying, lifting, house/yardwork, driving/computer work    Modalities:      Charges:  Timed Code Treatment Minutes: 42'   Total Treatment Minutes: 12:56-1:41  45'       [] EVAL (LOW) 21111 (typically 20 minutes face-to-face)  [] EVAL (MOD) 34048 (typically 30 minutes face-to-face)  [] EVAL (HIGH) 47979 (typically 45 minutes face-to-face)  [] RE-EVAL     [x] FG(93775) x 1    [] IONTO  [x] NMR (16182) x 1    [] VASO  [x] Manual (39924) x  1    [] Other:  [] TA x      [] Mech Traction (08175)  [] ES(attended) (75743)      [] ES (un) (04169):     GOALS:  Patient stated goal: Move R UE and turn head without pain or limitations    [] Progressing: [] Met: [] Not Met: [] Adjusted     Therapist goals for Patient:   Short Term Goals: To be achieved in: 4 weeks  1. Independent in HEP and progression per patient tolerance, in order to prevent re-injury. [] Progressing: [] Met: [] Not Met: [] Adjusted   2. Patient will have a decrease in pain to facilitate improvement in movement, function, and ADLs as indicated by Functional Deficits. [] Progressing: [] Met: [] Not Met: [] Adjusted     Long Term Goals: To be achieved in: 8 weeks  1. Patient will score 58 or higher on FOTO shoulder assessment indicating subjective improvement in function. [] Progressing: [] Met: [] Not Met: [] Adjusted  2. Patient will demonstrate increased R shoulder AROM to 130 deg flexion and abduction in order to reach over to get something off a shelf without pain or limitations. [] Progressing: [] Met: [] Not Met: [] Adjusted  3. Patient will demonstrate an increase in R UE strength that is equal to L and pain free in order to lift groceries over 5 lbs without pain or limitations. [] Progressing: [] Met: [] Not Met: [] Adjusted  4. Patient will return have increase in cervical ROM in order to look over her shoulder while driving without pain or limitations. [] Progressing: [] Met: [] Not Met: [] Adjusted  5. Patient will have decrease in symptoms allowing her to lay on R side and sleep through the night without increase in symptoms. [] Progressing: [] Met: [] Not Met: [] Adjusted         Overall Progression Towards Functional goals/ Treatment Progress Update:  [] Patient is progressing as expected towards functional goals listed. [] Progression is slowed due to complexities/Impairments listed. [] Progression has been slowed due to co-morbidities.   [x] Plan just implemented, too soon to assess goals progression <30days   [] Goals require adjustment due to lack of progress  [] Patient is not progressing as expected and requires additional follow up with physician  [] Other    Prognosis for POC: [x] Good [] Fair  [] Poor      Patient requires continued skilled intervention: [x] Yes  [] No    Treatment/Activity Tolerance:  [x] Patient able to complete treatment  [] Patient limited by fatigue  [] Patient limited by pain     [] Patient limited by other medical complications  [] Other: 11/14: Pt presents with increase in R shoulder PROM into flexion and abduction. Continues to have tenderness R UT and levator that responded well to MHP with stretches. Tolerated posterior RTC and scapular exercises with appropriate fatigue and no adverse effects. Patient Education:              11/14 Updated HEP based on ROM progress and tolerance to treatment. 11/11 Educated on objective findings and POC. Reviewed proper posture, precautions and use of ice for symptom control. HEP instruction:   Access Code: [id number] [facility]      PLAN: See eval  [x] Continue per plan of care [] Annabel Noble current plan (see comments above)  [] Plan of care initiated [] Hold pending MD visit [] Discharge  11/14 Monitor symptoms and continue to progress shoulder ROM as tolerated as well as progress scapular and posterior RTC    Electronically signed by:  Dania Fleming PT    Note: If patient does not return for scheduled/ recommended follow up visits, this note will serve as a discharge from care along with most recent update on progress.

## 2022-11-17 ENCOUNTER — HOSPITAL ENCOUNTER (OUTPATIENT)
Dept: PHYSICAL THERAPY | Age: 67
Setting detail: THERAPIES SERIES
Discharge: HOME | End: 2022-11-17
Payer: MEDICARE

## 2022-11-17 PROCEDURE — 97140 MANUAL THERAPY 1/> REGIONS: CPT

## 2022-11-17 PROCEDURE — 97112 NEUROMUSCULAR REEDUCATION: CPT

## 2022-11-17 PROCEDURE — 97110 THERAPEUTIC EXERCISES: CPT

## 2022-11-17 NOTE — FLOWSHEET NOTE
[facility], [address] [place] [address], [address] [place] [address]  Phone: [phone]   Fax:     [phone]    Physical Therapy Daily Treatment Note  Date:  2022    Patient Name:  Royal Huston    :  1955  MRN: [id number]  Restrictions/Precautions:    Medical/Treatment Diagnosis Information:  Diagnosis: M75.81 (ICD-10-CM) - Rotator cuff tendinitis, right  Treatment Diagnosis: M25.511 Right shoulder pain, M54.2 Cervicalgia  Insurance/Certification information:  PT Insurance Information: Medicare  Physician Information:   Amrita Saldana MD  Has the plan of care been signed (Y/N):        [x]  Yes  []  No     Date of Patient follow up with Physician:       Is this a Progress Report:     []  Yes  [x]  No        If Yes:  Date Range for reporting period:  Beginning 22  Ending    Progress report will be due (10 Rx or 30 days whichever is less):        Recertification will be due (POC Duration  / 90 days whichever is less): 23         Visit # Insurance Allowable Auth Required   3   Medicare  []  Yes []  No        Functional Scale: FOTO shoulder 43  Date assessed:  22     Latex Allergy:  [x]NO      []YES  Preferred Language for Healthcare:   [x]English       []other:    Pain level:  3-4/10 11/17     SUBJECTIVE:  : Pt states she completed exercises two times on 11/15 and when completing cane flexion for 2nd time she felt a pop a neck. Pt had significant pain in R shoulder and pinch L side of neck yesterday. She rested yesterday and feeling better today. Stopped taking medication prescribed by MD d/t making her feel dizzy. No longer feeling dizzy since she stopped taking medication two days ago. Takes Ibuprofen as needed for pain.     OBJECTIVE: See eval  Observation:   Test measurements:   R shoulder PROM 165 deg flexion, 180 deg abd       RESTRICTIONS/PRECAUTIONS: HTN, Type 2 diabetes    Exercises/Interventions:   Exercises:  Exercise/Equipment Resistance/Repetitions Other comments   Stretching/PROM     UT stretch 5\"hx10 R ^11/17 decreased hold times d/t reported increase symptoms yesterday. seated with MHP. Cues to maintain upright posture    Levator stretch 5\"hx10 R ^11/17 decreased hold times d/t reported increase symptoms yesterday.  seated with MHP   Pulleys Flexion x30 R  Scaption x30 R Start 11/14    Pec stretch  10\"hx10  Start 11/14 corner 60 deg abd   Table slide flexion 10\"hx10 bilat 11/17 seated    Table slide scaption 10\"hx10 R ^11/14 seated    Cane flexion Withheld d/t symptoms when completing on her own 11/17                  Isometrics     Retraction Progressed with resisted rows and extension below 11/14        Weight shift     Flexion     Abduction     External Rotation     Internal Rotation     Biceps     Triceps          PRE's     Flexion     Abduction     Scapular retraction/depression 3x10 R Start 11/17 side lying   External Rotation 0# 3x10 R Start 11/14 side lying    Internal Rotation     Shrugs     EXT     Reverse Flys     Serratus     Horizontal Abd with ER     Biceps     Triceps     Retraction          Cable Column/Theraband     External Rotation     Internal Rotation     Rows with scapular activation Withheld d/t symptoms and focused on scapular activation in side lying 11/17   Shoulder Ext with scapular activation  Withheld d/t symptoms and focused on scapular activation in side lying 11/17   Flex     Scapular Retraction     BIC     TRIC     PNF          Dynamic Stability          Plyoback          Manual interventions     STM R UT and levator with shoulder stabilization and mild cervical PROM x4' Start 11/17 supine    Joint mobilization Grade 3 Sup-inf humerus on glenoid 10\"hx10 R  Grade 3 A-P humerus on glenoid 10\"hx10 R ^11/17 supine 90 deg abd   PROM R shoulder flexion and abd x2' 11/17 decrease time d/t positive response to joint mobilization and decrease guarding and improvement in ROM         Therapeutic Exercise and NMR EXR  [x] (98140) Provided verbal/tactile cueing for activities related to strengthening, flexibility, endurance, ROM  for improvements in scapular, scapulothoracic and UE control with self care, reaching, carrying, lifting, house/yardwork, driving/computer work. [x] (64446) Provided verbal/tactile cueing for activities related to improving balance, coordination, kinesthetic sense, posture, motor skill, proprioception  to assist with  scapular, scapulothoracic and UE control with self care, reaching, carrying, lifting, house/yardwork, driving/computer work. Therapeutic Activities:    [] (66386 or 19112) Provided verbal/tactile cueing for activities related to improving balance, coordination, kinesthetic sense, posture, motor skill, proprioception and motor activation to allow for proper function of scapular, scapulothoracic and UE control with self care, carrying, lifting, driving/computer work.      Home Exercise Program:    [x] (96877) Reviewed/Progressed HEP activities related to strengthening, flexibility, endurance, ROM of scapular, scapulothoracic and UE control with self care, reaching, carrying, lifting, house/yardwork, driving/computer work  [] (19779) Reviewed/Progressed HEP activities related to improving balance, coordination, kinesthetic sense, posture, motor skill, proprioception of scapular, scapulothoracic and UE control with self care, reaching, carrying, lifting, house/yardwork, driving/computer work      Manual Treatments:  PROM / STM / Oscillations-Mobs:  G-I, II, III, IV (PA's, Inf., Post.)  [x] (14034) Provided manual therapy to mobilize soft tissue/joints of cervical/CT, scapular GHJ and UE for the purpose of modulating pain, promoting relaxation,  increasing ROM, reducing/eliminating soft tissue swelling/inflammation/restriction, improving soft tissue extensibility and allowing for proper ROM for normal function with self care, reaching, carrying, lifting, house/yardwork, driving/computer work    Modalities:      Charges:  Timed Code Treatment Minutes: 44'   Total Treatment Minutes: 12:01-12:42  41'       [] EVAL (LOW) 39833 (typically 20 minutes face-to-face)  [] EVAL (MOD) 90621 (typically 30 minutes face-to-face)  [] EVAL (HIGH) 30778 (typically 45 minutes face-to-face)  [] RE-EVAL     [x] KP(48914) x 1    [] IONTO  [x] NMR (68776) x 1    [] VASO  [x] Manual (04142) x  1    [] Other:  [] TA x      [] Mech Traction (70289)  [] ES(attended) (41996)      [] ES (un) (10358):     GOALS:  Patient stated goal: Move R UE and turn head without pain or limitations    [] Progressing: [] Met: [] Not Met: [] Adjusted     Therapist goals for Patient:   Short Term Goals: To be achieved in: 4 weeks  1. Independent in HEP and progression per patient tolerance, in order to prevent re-injury. [] Progressing: [] Met: [] Not Met: [] Adjusted   2. Patient will have a decrease in pain to facilitate improvement in movement, function, and ADLs as indicated by Functional Deficits. [] Progressing: [] Met: [] Not Met: [] Adjusted     Long Term Goals: To be achieved in: 8 weeks  1. Patient will score 58 or higher on FOTO shoulder assessment indicating subjective improvement in function. [] Progressing: [] Met: [] Not Met: [] Adjusted  2. Patient will demonstrate increased R shoulder AROM to 130 deg flexion and abduction in order to reach over to get something off a shelf without pain or limitations. [] Progressing: [] Met: [] Not Met: [] Adjusted  3. Patient will demonstrate an increase in R UE strength that is equal to L and pain free in order to lift groceries over 5 lbs without pain or limitations. [] Progressing: [] Met: [] Not Met: [] Adjusted  4. Patient will return have increase in cervical ROM in order to look over her shoulder while driving without pain or limitations. [] Progressing: [] Met: [] Not Met: [] Adjusted  5. Patient will have decrease in symptoms allowing her to lay on R side and sleep through the night without increase in symptoms. [] Progressing: [] Met: [] Not Met: [] Adjusted         Overall Progression Towards Functional goals/ Treatment Progress Update:  [] Patient is progressing as expected towards functional goals listed. [] Progression is slowed due to complexities/Impairments listed. [] Progression has been slowed due to co-morbidities. [x] Plan just implemented, too soon to assess goals progression <30days   [] Goals require adjustment due to lack of progress  [] Patient is not progressing as expected and requires additional follow up with physician  [] Other    Prognosis for POC: [x] Good [] Fair  [] Poor      Patient requires continued skilled intervention: [x] Yes  [] No    Treatment/Activity Tolerance:  [x] Patient able to complete treatment  [] Patient limited by fatigue  [] Patient limited by pain     [] Patient limited by other medical complications  [] Other: 11/17 Pt arrived with tenderness bilateral cervical muscles R>L. Adjusted exercises d/t symptoms and responded well. Responded well to STM to cervical muscles and shoulder joint mobilization demonstrating increase in R shoulder PROM. Pt reported improvement in symptoms by end of treatment                   Patient Education:              11/17 Updated HEP based on presentation of symptoms and tolerance to treatment   11/14 Updated HEP based on ROM progress and tolerance to treatment. 11/11 Educated on objective findings and POC. Reviewed proper posture, precautions and use of ice for symptom control.      HEP instruction:   Access Code: [id number]      PLAN: See yohan  [x] Continue per plan of care [] Drew Montoya current plan (see comments above)  [] Plan of care initiated [] Hold pending MD visit [] Discharge  11/17 Monitor symptoms and continue to progress shoulder ROM as tolerated as well as  scapular and posterior RTC strength to improve function and decrease symptoms. Electronically signed by:  Jean Paul Jefferson PT    Note: If patient does not return for scheduled/ recommended follow up visits, this note will serve as a discharge from care along with most recent update on progress.

## 2022-11-23 ENCOUNTER — HOSPITAL ENCOUNTER (OUTPATIENT)
Dept: PHYSICAL THERAPY | Age: 67
Setting detail: THERAPIES SERIES
Discharge: HOME | End: 2022-11-23
Payer: MEDICARE

## 2022-11-23 PROCEDURE — 97140 MANUAL THERAPY 1/> REGIONS: CPT

## 2022-11-23 PROCEDURE — 97110 THERAPEUTIC EXERCISES: CPT

## 2022-11-23 PROCEDURE — 97112 NEUROMUSCULAR REEDUCATION: CPT

## 2022-11-23 NOTE — FLOWSHEET NOTE
Linda Vasquez 84, [address] [place] [address], [address] [place] [address]  Phone: [phone]   Fax:     [phone]    Physical Therapy Daily Treatment Note  Date:  2022    Patient Name:  Saroj Blount    :  1955  MRN: [id number]  Restrictions/Precautions:    Medical/Treatment Diagnosis Information:  Diagnosis: M75.81 (ICD-10-CM) - Rotator cuff tendinitis, right  Treatment Diagnosis: M25.511 Right shoulder pain, M54.2 Cervicalgia  Insurance/Certification information:  PT Insurance Information: Medicare  Physician Information:   Branden Stephenson MD  Has the plan of care been signed (Y/N):        [x]  Yes  []  No     Date of Patient follow up with Physician:       Is this a Progress Report:     []  Yes  [x]  No        If Yes:  Date Range for reporting period:  Beginning 22  Ending    Progress report will be due (10 Rx or 30 days whichever is less): 17/15/84       Recertification will be due (POC Duration  / 90 days whichever is less): 23         Visit # Insurance Allowable Auth Required   4   Medicare  []  Yes []  No        Functional Scale: FOTO shoulder 43  Date assessed:  22     Latex Allergy:  [x]NO      []YES  Preferred Language for Healthcare:   [x]English       []other:    Pain level:  4-510      SUBJECTIVE:  : Pt states she attempted to work on her sink in her kitchen on  causing pain all over her body. She has experienced R side headache since . Has been treating symptoms with Tylenol which has helped slightly. Having R leg pain since work on thigh.  Felt relief after last visit that lasted until   OBJECTIVE: See eval  Observation:   Test measurements:   R shoulder AROM 152 deg flexion, 180 deg abd      RESTRICTIONS/PRECAUTIONS: HTN, Type 2 diabetes    Exercises/Interventions:   Exercises:  Exercise/Equipment Resistance/Repetitions Other comments   Stretching/PROM     UT stretch 10\"hx10 R ^11/23 MHP   Levator stretch 10\"hx10 R ^11/23 MHPO   Pulleys Flexion x30 R  Scaption x30 R Start 11/14    Pec stretch  10\"hx10  Start 11/14 corner 60 deg abd   Table slide flexion Withheld d/t ROM improvements 11/23   Table slide scaption Withheld d/t ROM improvements 11/23    Cane flexion X20 bilat 11/23 supine, reintroduced d/t ROM improvements                  Isometrics     Retraction 10\"hx10  11/23 seated, cues to avoid compensatory movement         Weight shift     Flexion     Abduction     External Rotation     Internal Rotation     Biceps     Triceps          PRE's     Flexion     Abduction     Scapular retraction Progressed with exercises below 11/23   External Rotation Progressed with exercises below 11/23   Internal Rotation     Shrugs     EXT     Reverse Flys     Serratus     Horizontal Abd with ER     Biceps     Triceps     Retraction          Cable Column/Theraband     External Rotation     Internal Rotation     Rows with scapular activation Withheld d/t symptoms and focused on scapular activation in side lying 11/17   Shoulder Ext with scapular activation  Withheld d/t symptoms and focused on scapular activation in side lying 11/17   Flex     Scapular Retraction with ER Red TB 2x10 bilat Start 11/23   BIC     TRIC     PNF          Dynamic Stability          Plyoback          Manual interventions     STM R sub occipitals and R proximal SCM x4'  R UT and levator with shoulder stabilization and mild cervical PROM x4' 11/23   Joint mobilization Withheld d/t R shoulder ROM improvement 11/23   PROM Withheld d/t R shoulder ROM improvement 11/23         Therapeutic Exercise and NMR EXR  [x] (47121) Provided verbal/tactile cueing for activities related to strengthening, flexibility, endurance, ROM  for improvements in scapular, scapulothoracic and UE control with self care, reaching, carrying, lifting, house/yardwork, driving/computer work.     [x] (81917) Provided verbal/tactile cueing for VR(94148) x 1    [] IONTO  [x] NMR (23370) x 1    [] VASO  [x] Manual (41602) x  1    [] Other:  [] TA x      [] Mech Traction (39286)  [] ES(attended) (05289)      [] ES (un) (79391):     GOALS:  Patient stated goal: Move R UE and turn head without pain or limitations    [] Progressing: [] Met: [] Not Met: [] Adjusted     Therapist goals for Patient:   Short Term Goals: To be achieved in: 4 weeks  1. Independent in HEP and progression per patient tolerance, in order to prevent re-injury. [] Progressing: [] Met: [] Not Met: [] Adjusted   2. Patient will have a decrease in pain to facilitate improvement in movement, function, and ADLs as indicated by Functional Deficits. [] Progressing: [] Met: [] Not Met: [] Adjusted     Long Term Goals: To be achieved in: 8 weeks  1. Patient will score 58 or higher on FOTO shoulder assessment indicating subjective improvement in function. [] Progressing: [] Met: [] Not Met: [] Adjusted  2. Patient will demonstrate increased R shoulder AROM to 130 deg flexion and abduction in order to reach over to get something off a shelf without pain or limitations. [] Progressing: [] Met: [] Not Met: [] Adjusted  3. Patient will demonstrate an increase in R UE strength that is equal to L and pain free in order to lift groceries over 5 lbs without pain or limitations. [] Progressing: [] Met: [] Not Met: [] Adjusted  4. Patient will return have increase in cervical ROM in order to look over her shoulder while driving without pain or limitations. [] Progressing: [] Met: [] Not Met: [] Adjusted  5. Patient will have decrease in symptoms allowing her to lay on R side and sleep through the night without increase in symptoms. [] Progressing: [] Met: [] Not Met: [] Adjusted         Overall Progression Towards Functional goals/ Treatment Progress Update:  [] Patient is progressing as expected towards functional goals listed.     [] Progression is slowed due to complexities/Impairments listed. [] Progression has been slowed due to co-morbidities. [x] Plan just implemented, too soon to assess goals progression <30days   [] Goals require adjustment due to lack of progress  [] Patient is not progressing as expected and requires additional follow up with physician  [] Other    Prognosis for POC: [x] Good [] Fair  [] Poor      Patient requires continued skilled intervention: [x] Yes  [] No    Treatment/Activity Tolerance:  [x] Patient able to complete treatment  [] Patient limited by fatigue  [] Patient limited by pain     [] Patient limited by other medical complications  [] Other: 11/23 Improvement noted R shoulder AROM. Tension and tenderness at R cervical muscles that responded well to STM. Limited with progression of scapular exercises d/t pain in R leg and therefore withheld exercises in standing. Overall tolerated treatment well with no adverse effects. Patient Education:              11/23 Updated HEP  11/17 Updated HEP based on presentation of symptoms and tolerance to treatment   11/14 Updated HEP based on ROM progress and tolerance to treatment. 11/11 Educated on objective findings and POC. Reviewed proper posture, precautions and use of ice for symptom control. HEP instruction:   Access Code: [id number] [facility]      PLAN: See eval  [x] Continue per plan of care [] Alter current plan (see comments above)  [] Plan of care initiated [] Hold pending MD visit [] Discharge  11/23 Monitor symptoms and progress scapular strengthening to improve posture and overall function. Electronically signed by:  Jennifer Moreno PT    Note: If patient does not return for scheduled/ recommended follow up visits, this note will serve as a discharge from care along with most recent update on progress.

## 2022-11-29 ENCOUNTER — OFFICE VISIT (OUTPATIENT)
Dept: PRIMARY CARE CLINIC | Age: 67
End: 2022-11-29
Payer: MEDICARE

## 2022-11-29 VITALS
HEART RATE: 85 BPM | RESPIRATION RATE: 16 BRPM | DIASTOLIC BLOOD PRESSURE: 86 MMHG | OXYGEN SATURATION: 97 % | WEIGHT: 152 LBS | HEIGHT: 62 IN | TEMPERATURE: 97.1 F | SYSTOLIC BLOOD PRESSURE: 140 MMHG | BODY MASS INDEX: 27.97 KG/M2

## 2022-11-29 DIAGNOSIS — T14.8XXA BRUISING: ICD-10-CM

## 2022-11-29 DIAGNOSIS — M54.42 LOW BACK PAIN WITH BILATERAL SCIATICA, UNSPECIFIED BACK PAIN LATERALITY, UNSPECIFIED CHRONICITY: Primary | ICD-10-CM

## 2022-11-29 DIAGNOSIS — R04.0 NASAL BLEEDING: ICD-10-CM

## 2022-11-29 DIAGNOSIS — R51.9 NONINTRACTABLE HEADACHE, UNSPECIFIED CHRONICITY PATTERN, UNSPECIFIED HEADACHE TYPE: ICD-10-CM

## 2022-11-29 DIAGNOSIS — M54.41 LOW BACK PAIN WITH BILATERAL SCIATICA, UNSPECIFIED BACK PAIN LATERALITY, UNSPECIFIED CHRONICITY: Primary | ICD-10-CM

## 2022-11-29 LAB
HCT VFR BLD CALC: 44.2 % (ref 36–48)
HEMOGLOBIN: 14.3 G/DL (ref 12–16)
MCH RBC QN AUTO: 28.5 PG (ref 26–34)
MCHC RBC AUTO-ENTMCNC: 32.4 G/DL (ref 31–36)
MCV RBC AUTO: 88.1 FL (ref 80–100)
PDW BLD-RTO: 14.4 % (ref 12.4–15.4)
PLATELET # BLD: 251 K/UL (ref 135–450)
PMV BLD AUTO: 8.8 FL (ref 5–10.5)
RBC # BLD: 5.02 M/UL (ref 4–5.2)
WBC # BLD: 6.9 K/UL (ref 4–11)

## 2022-11-29 PROCEDURE — 3074F SYST BP LT 130 MM HG: CPT | Performed by: INTERNAL MEDICINE

## 2022-11-29 PROCEDURE — 1036F TOBACCO NON-USER: CPT | Performed by: INTERNAL MEDICINE

## 2022-11-29 PROCEDURE — 3017F COLORECTAL CA SCREEN DOC REV: CPT | Performed by: INTERNAL MEDICINE

## 2022-11-29 PROCEDURE — G8399 PT W/DXA RESULTS DOCUMENT: HCPCS | Performed by: INTERNAL MEDICINE

## 2022-11-29 PROCEDURE — G8427 DOCREV CUR MEDS BY ELIG CLIN: HCPCS | Performed by: INTERNAL MEDICINE

## 2022-11-29 PROCEDURE — 1090F PRES/ABSN URINE INCON ASSESS: CPT | Performed by: INTERNAL MEDICINE

## 2022-11-29 PROCEDURE — 3078F DIAST BP <80 MM HG: CPT | Performed by: INTERNAL MEDICINE

## 2022-11-29 PROCEDURE — 1123F ACP DISCUSS/DSCN MKR DOCD: CPT | Performed by: INTERNAL MEDICINE

## 2022-11-29 PROCEDURE — G8484 FLU IMMUNIZE NO ADMIN: HCPCS | Performed by: INTERNAL MEDICINE

## 2022-11-29 PROCEDURE — 99214 OFFICE O/P EST MOD 30 MIN: CPT | Performed by: INTERNAL MEDICINE

## 2022-11-29 PROCEDURE — G8417 CALC BMI ABV UP PARAM F/U: HCPCS | Performed by: INTERNAL MEDICINE

## 2022-11-29 RX ORDER — LIDOCAINE 50 MG/G
1 PATCH TOPICAL DAILY
Qty: 30 PATCH | Refills: 0 | Status: SHIPPED | OUTPATIENT
Start: 2022-11-29

## 2022-11-29 RX ORDER — ACETAMINOPHEN 500 MG
500 TABLET ORAL 4 TIMES DAILY PRN
Qty: 120 TABLET | Refills: 0 | Status: SHIPPED | OUTPATIENT
Start: 2022-11-29

## 2022-11-29 NOTE — PROGRESS NOTES
Claude Pin   Date ofBirth:  1955    Date of Visit:  11/29/2022    Chief Complaint   Patient presents with    Headache     Having severe headaches and having nose bleeding when blowing nose, finding blood in mucus. Back Pain     Lower back pain radiates to the lower back and buttocks also has burning sensation in the areas. Burning sensation on opposite side of legs when sitting on the opposite side. HPI  Patient complains low back pain flared up for a few weeks. patient states when she lies down on back she has a lot of burning down buttocks and back of legs. Patient takes Gabapentin  300mg 2 times daily. Patient states when she takes it in the morning she feels funny. Patient states she has difficulty walking at times. Patient states with standing the pain in her leg is worse. Patient states with sleeping she has a burn on her back on either side. Patient states she has been using ice and a heating pad. Patient states she is taking Tylenol  2 tablets 2-3 times per day. Patient states she had a headache last week Friday to Monday. Patient states she did not sleep on Saturday night and could not concentrate on Sunday due to headache. Patient states on Sunday she went to sleep at 4:00 PM or 5:00 PM and slept all night and woke up yesterday and slept all day. Patient states she has had a lot of blood from her nose and the mucus. Patient states she still has a headache but it is a lot better. Patient states ibuprofen has not helped. Patient states she is taking Tylenol  2 tablets 2-3 times per day. Patient complaints of bruise on back of left leg and right hand. Patient denies injury. Review of Systems   Constitutional:  Negative for chills, fatigue and fever. HENT:  Positive for nosebleeds. Negative for congestion, ear pain, postnasal drip, rhinorrhea, sinus pressure, sinus pain, sneezing and sore throat. Eyes:  Negative for photophobia and visual disturbance. Respiratory:  Negative for cough, chest tightness, shortness of breath and wheezing. Cardiovascular:  Negative for chest pain, palpitations and leg swelling. Gastrointestinal:  Negative for abdominal pain, blood in stool, constipation, diarrhea, nausea and vomiting. Genitourinary:  Negative for dysuria, frequency and hematuria. Musculoskeletal:  Positive for back pain, gait problem and myalgias. Negative for arthralgias. Skin:  Negative for rash. Neurological:  Positive for headaches. Negative for dizziness, tremors, syncope, facial asymmetry, weakness, light-headedness and numbness. Hematological:  Bruises/bleeds easily. Psychiatric/Behavioral:  Negative for dysphoric mood and sleep disturbance. The patient is not nervous/anxious. Allergies   Allergen Reactions    Aspirin      bruising    Darvocet [Propoxyphene N-Acetaminophen] Hives    Effexor [Venlafaxine Hydrochloride] Hives    Hydrocodone Hives and Itching    Keflex [Cephalexin] Itching     Face redness. Paxil Cr [Paroxetine Hcl Er] Itching     Outpatient Medications Marked as Taking for the 11/29/22 encounter (Office Visit) with Eliza Peraza MD   Medication Sig Dispense Refill    gabapentin (NEURONTIN) 300 MG capsule Take 1 capsule by mouth 2 times daily for 90 days.  180 capsule 0    budesonide-formoterol (SYMBICORT) 160-4.5 MCG/ACT AERO USE 2 INHALATIONS ORALLY   TWICE DAILY 30.6 g 1    cloNIDine (CATAPRES) 0.1 MG/24HR PTWK APPLY 1 PATCH ONTO THE SKINONCE A WEEK 12 patch 1    docusate sodium (COLACE) 100 MG capsule Take 1 capsule by mouth 2 times daily 60 capsule 1    bisacodyl 5 MG EC tablet Take 1-2 tablets daily as needed 30 tablet 0    ketoconazole (NIZORAL) 2 % cream APPLY topically TO bottom of BOTH FEET AND between TOES DAILY FOR 3 TO 4 WEEKS      pantoprazole (PROTONIX) 40 MG tablet Take 1 tablet by mouth daily 90 tablet 1    atorvastatin (LIPITOR) 20 MG tablet Take 1 tablet by mouth daily Take 20 mg by mouth daily 90 tablet 1    ibuprofen (ADVIL;MOTRIN) 600 MG tablet Take 1 tablet by mouth 3 times daily as needed for Pain 60 tablet 0    cetirizine (ZYRTEC) 10 MG tablet Take 1 tablet by mouth daily 30 tablet 2    diclofenac sodium (VOLTAREN) 1 % GEL Apply 2 g topically 4 times daily 350 g 1    Dulaglutide (TRULICITY) 6.15 DD/3.2PH SOPN INJECT 0.5ML (=0.75 MG)    SUBCUTANEOUSLY ONCE WEEKLY 6 mL 1    fluticasone (FLONASE) 50 MCG/ACT nasal spray 2 sprays by Nasal route daily 48 g 1    Lancets MISC 1 each by Other route daily E11.9 100 each 0    Blood Glucose Monitoring Suppl (TRUE METRIX METER) DMITRIY Test blood sugar daily and PRN E11.9 1 each 0    blood glucose test strips (GLUCOSE METER TEST) strip 100 each by In Vitro route 2 times daily Diabetes   e11.9 100 each 1         Vitals:    11/29/22 1039 11/29/22 1042   BP: (!) 144/82 (!) 140/86   Pulse: 85    Resp: 16    Temp: 97.1 °F (36.2 °C)    SpO2: 97%    Weight: 152 lb (68.9 kg)    Height: 5' 2\" (1.575 m)      Body mass index is 27.8 kg/m². Physical Exam  Nursing note reviewed. Constitutional:       General: She is not in acute distress. Appearance: Normal appearance. She is well-developed. HENT:      Right Ear: Tympanic membrane and ear canal normal.      Left Ear: Tympanic membrane and ear canal normal.      Mouth/Throat:      Pharynx: Oropharynx is clear. Eyes:      General: Lids are normal.      Extraocular Movements: Extraocular movements intact. Conjunctiva/sclera: Conjunctivae normal.      Pupils: Pupils are equal, round, and reactive to light. Neck:      Thyroid: No thyromegaly. Vascular: No carotid bruit. Cardiovascular:      Rate and Rhythm: Normal rate and regular rhythm. Heart sounds: Normal heart sounds, S1 normal and S2 normal. No murmur heard. No friction rub. No gallop. Pulmonary:      Effort: Pulmonary effort is normal. No respiratory distress. Breath sounds: Normal breath sounds. No wheezing, rhonchi or rales. Abdominal:      General: Bowel sounds are normal. There is no distension. Palpations: Abdomen is soft. Tenderness: There is no abdominal tenderness. Musculoskeletal:      Cervical back: Neck supple. Lumbar back: Tenderness present. No spasms. Normal range of motion. Right lower leg: Tenderness present. No edema. Left lower leg: No edema. Lymphadenopathy:      Head:      Right side of head: No submandibular adenopathy. Left side of head: No submandibular adenopathy. Skin:     Findings: Bruising (posterior left leg and right hand) present. Neurological:      Mental Status: She is alert and oriented to person, place, and time. Gait: Gait normal.      Deep Tendon Reflexes: Reflexes are normal and symmetric. Psychiatric:         Mood and Affect: Mood normal.         No results found for this visit on 11/29/22.   Lab Review   Orders Only on 11/02/2022   Component Date Value    Organism 11/02/2022 Proteus mirabilis (A)     Urine Culture, Routine 11/02/2022                      Value:<10,000 CFU/ml  No further workup     Orders Only on 10/20/2022   Component Date Value    Carboxyhemoglobin/Hemogl* 10/20/2022 2.2    Orders Only on 10/20/2022   Component Date Value    Magnesium 10/20/2022 2.30     TSH 10/20/2022 1.31     WBC 10/20/2022 5.1     RBC 10/20/2022 5.16     Hemoglobin 10/20/2022 14.7     Hematocrit 10/20/2022 44.3     MCV 10/20/2022 86.0     MCH 10/20/2022 28.5     MCHC 10/20/2022 33.1     RDW 10/20/2022 13.0     Platelets 20/06/5527 206     MPV 10/20/2022 8.9     Neutrophils % 10/20/2022 59.2     Lymphocytes % 10/20/2022 31.2     Monocytes % 10/20/2022 7.4     Eosinophils % 10/20/2022 1.7     Basophils % 10/20/2022 0.5     Neutrophils Absolute 10/20/2022 3.0     Lymphocytes Absolute 10/20/2022 1.6     Monocytes Absolute 10/20/2022 0.4     Eosinophils Absolute 10/20/2022 0.1     Basophils Absolute 10/20/2022 0.0     Sodium 10/20/2022 139     Potassium 10/20/2022 4.6 Chloride 10/20/2022 102     CO2 10/20/2022 27     Anion Gap 10/20/2022 10     Glucose 10/20/2022 104 (A)     BUN 10/20/2022 17     Creatinine 10/20/2022 0.7     Est, Glom Filt Rate 10/20/2022 >60     Calcium 10/20/2022 9.3     Total Protein 10/20/2022 6.5     Albumin 10/20/2022 4.7     Albumin/Globulin Ratio 10/20/2022 2.6 (A)     Total Bilirubin 10/20/2022 0.6     Alkaline Phosphatase 10/20/2022 104     ALT 10/20/2022 19     AST 10/20/2022 18    Office Visit on 10/20/2022   Component Date Value    Color, UA 10/20/2022 yellow     Clarity, UA 10/20/2022 clear     Glucose, UA POC 10/20/2022 neg     Bilirubin, UA 10/20/2022 neg     Ketones, UA 10/20/2022 neg     Spec Grav, UA 10/20/2022 1.030     Blood, UA POC 10/20/2022 trace     pH, UA 10/20/2022 5.5     Protein, UA POC 10/20/2022 neg     Urobilinogen, UA 10/20/2022 0.2     Leukocytes, UA 10/20/2022 trace     Nitrite, UA 10/20/2022 neg     Urine Culture, Routine 10/20/2022 <50,000 CFU/ml mixed skin/urogenital james.  No further workup (A)     Organism 10/20/2022 Enterococcus faecalis (A)     Urine Culture, Routine 10/20/2022 50,000 CFU/ml    Orders Only on 10/11/2022   Component Date Value    Microalbumin, Random Uri* 10/11/2022 <1.20     Creatinine, Ur 10/11/2022 102.1     Microalbumin Creatinine * 10/11/2022 see below    Office Visit on 10/11/2022   Component Date Value    Hemoglobin A1C 10/11/2022 6.0    Orders Only on 06/16/2022   Component Date Value    Sodium 06/16/2022 141     Potassium 06/16/2022 4.0     Chloride 06/16/2022 103     CO2 06/16/2022 25     Anion Gap 06/16/2022 13     Glucose 06/16/2022 108 (A)     BUN 06/16/2022 12     Creatinine 06/16/2022 0.6     GFR Non- 06/16/2022 >60     GFR  06/16/2022 >60     Calcium 06/16/2022 9.2     Total Protein 06/16/2022 6.4     Albumin 06/16/2022 4.1     Albumin/Globulin Ratio 06/16/2022 1.8     Total Bilirubin 06/16/2022 0.9     Alkaline Phosphatase 06/16/2022 99     ALT 06/16/2022 35     AST 06/16/2022 24     Cholesterol, Total 06/16/2022 158     Triglycerides 06/16/2022 138     HDL 06/16/2022 50     LDL Calculated 06/16/2022 80     VLDL Cholesterol Calcula* 06/16/2022 28    Abstract on 06/15/2022   Component Date Value    Antibody Screen 08/14/2021 Non-Reactive     Diabetic Retinopathy 08/11/2021 Negative    Office Visit on 06/15/2022   Component Date Value    Hemoglobin A1C 06/15/2022 6.2          Assessment/Plan     1. Low back pain with bilateral sciatica, unspecified back pain laterality, unspecified chronicity  -Flared up  -Increase acetaminophen (TYLENOL) 500 MG tablet; Take 1 tablet by mouth 4 times daily as needed for Pain  Dispense: 120 tablet; Refill: 0  -Continue gabapentin 300 mg 3 times daily  -Start lidocaine (LIDODERM) 5 %; Place 1 patch onto the skin daily 12 hours on, 12 hours off. Dispense: 30 patch; Refill: 0  -Referral to [facility] [place], Lior Dickinson PA-C, Orthopedic Surgery (Spine), [place]  -Back exercises    2. Nonintractable headache, unspecified chronicity pattern, unspecified headache type  - persistent headache  - CT HEAD WO CONTRAST; Future  - acetaminophen (TYLENOL) 500 MG tablet; Take 1 tablet by mouth 4 times daily as needed for Pain  Dispense: 120 tablet; Refill: 0    3. Nasal bleeding  -Referral to Stan Jimenez MD, Otolaryngology, [facility]  - [facility]; Future    4. Bruising  -No injury  - [facility]; Future    Discussed medications with patient, who voiced understanding of their use and indications. All questions answered. Return in about 2 weeks (around 12/13/2022) for headache and bruising.

## 2022-12-02 ENCOUNTER — HOSPITAL ENCOUNTER (OUTPATIENT)
Dept: PHYSICAL THERAPY | Age: 67
Setting detail: THERAPIES SERIES
Discharge: HOME | End: 2022-12-02
Payer: MEDICARE

## 2022-12-02 PROCEDURE — 97140 MANUAL THERAPY 1/> REGIONS: CPT

## 2022-12-02 PROCEDURE — 97110 THERAPEUTIC EXERCISES: CPT

## 2022-12-02 PROCEDURE — 97112 NEUROMUSCULAR REEDUCATION: CPT

## 2022-12-02 NOTE — FLOWSHEET NOTE
Linda Vasquez 84, [address] [facility] [address] [place] [address], [address] [place] [address]  Phone: [phone]   Fax:     [phone]    Physical Therapy Daily Treatment Note  Date:  2022    Patient Name:  Haley Izaguirre    :  1955  MRN: [id number]  Restrictions/Precautions:    Medical/Treatment Diagnosis Information:  Diagnosis: M75.81 (ICD-10-CM) - Rotator cuff tendinitis, right  Treatment Diagnosis: M25.511 Right shoulder pain, M54.2 Cervicalgia  Insurance/Certification information:  PT Insurance Information: Medicare  Physician Information:   Almaz Macias MD  Has the plan of care been signed (Y/N):        [x]  Yes  []  No     Date of Patient follow up with Physician:       Is this a Progress Report:     []  Yes  [x]  No        If Yes:  Date Range for reporting period:  Beginning 22  Ending    Progress report will be due (10 Rx or 30 days whichever is less):        Recertification will be due (POC Duration  / 90 days whichever is less): 23         Visit # Insurance Allowable Auth Required   5   Medicare  []  Yes []  No        Functional Scale: FOTO shoulder 43  Date assessed:  22     Latex Allergy:  [x]NO      []YES  Preferred Language for Healthcare:   [x]English       []other:    Pain level:  /10 R side of neck      SUBJECTIVE:   Pt saw primary care MD on  d/t increase low back pain. Scheduled to see Dr. Misa Olmos for lumbar spine . Pt was prescribed acetaminophen and lidocaine patches for back. R shoulder is feeing better with no current issues when she is reaching overhead. Having increase R side of neck pain that increases with movement.    OBJECTIVE: See eval  Observation:   Test measurements:   R shoulder AROM 160 deg flexion, 180 deg abd     RESTRICTIONS/PRECAUTIONS: HTN, Type 2 diabetes    Exercises/Interventions:   Exercises:  Exercise/Equipment Resistance/Repetitions Other comments Stretching/PROM     UT stretch 10\"hx10 R ^11/23 MHP   Levator stretch 10\"hx10 R ^11/23 MHP   Pulleys Withheld d/t progress 12/2   Pec stretch  10\"hx10  ^12/2 supine T position with MHP R side of neck    Table slide flexion Withheld d/t ROM improvements 11/23   Table slide scaption Withheld d/t ROM improvements 11/23    Cane flexion 5\"hx10 bilat ^12/2 supine                  Isometrics     Retraction 10\"hx10  11/23 seated, cues to avoid compensatory movement         Weight shift     Flexion     Abduction     External Rotation     Internal Rotation     Biceps     Triceps          PRE's     Flexion     Abduction     Scapular retraction Progressed with exercises below 11/23   External Rotation Progressed with exercises below 11/23   Internal Rotation     PNF D2  X20 R 12/2 supine cues for proper scapular activation    EXT     Reverse Flys     Serratus     Horizontal Abd with ER     Biceps     Triceps     Retraction          Cable Column/Theraband     External Rotation     Internal Rotation     Rows with scapular activation Withheld d/t symptoms and focused on scapular activation in side lying 11/17   Shoulder Ext with scapular activation  Withheld d/t symptoms and focused on scapular activation in side lying 11/17   Supine HABD Red TB 3x10 bilat Start 12/2    Scapular Retraction with ER Red TB 3x10 bilat ^12/1 seated   Low abd Red TB 2x10 bilat Start 12/1 standing    TRIC     PNF          Dynamic Stability          Plyoback          Manual interventions     STM R sub occipitals and R proximal SCM, UT and levator x8'   12/2   PROM Cervical lateral flexion to L with R shoulder stabilization  12/2         Therapeutic Exercise and NMR EXR  [x] (60169) Provided verbal/tactile cueing for activities related to strengthening, flexibility, endurance, ROM  for improvements in scapular, scapulothoracic and UE control with self care, reaching, carrying, lifting, house/yardwork, driving/computer work.     [x] (33848) Provided verbal/tactile cueing for activities related to improving balance, coordination, kinesthetic sense, posture, motor skill, proprioception  to assist with  scapular, scapulothoracic and UE control with self care, reaching, carrying, lifting, house/yardwork, driving/computer work. Therapeutic Activities:    [] (40648 or 73822) Provided verbal/tactile cueing for activities related to improving balance, coordination, kinesthetic sense, posture, motor skill, proprioception and motor activation to allow for proper function of scapular, scapulothoracic and UE control with self care, carrying, lifting, driving/computer work.      Home Exercise Program:    [x] (38312) Reviewed/Progressed HEP activities related to strengthening, flexibility, endurance, ROM of scapular, scapulothoracic and UE control with self care, reaching, carrying, lifting, house/yardwork, driving/computer work  [] (45259) Reviewed/Progressed HEP activities related to improving balance, coordination, kinesthetic sense, posture, motor skill, proprioception of scapular, scapulothoracic and UE control with self care, reaching, carrying, lifting, house/yardwork, driving/computer work      Manual Treatments:  PROM / STM / Oscillations-Mobs:  G-I, II, III, IV (PA's, Inf., Post.)  [x] (81990) Provided manual therapy to mobilize soft tissue/joints of cervical/CT, scapular GHJ and UE for the purpose of modulating pain, promoting relaxation,  increasing ROM, reducing/eliminating soft tissue swelling/inflammation/restriction, improving soft tissue extensibility and allowing for proper ROM for normal function with self care, reaching, carrying, lifting, house/yardwork, driving/computer work    Modalities:      Charges:  Timed Code Treatment Minutes: 40'   Total Treatment Minutes: 2:  40'       [] EVAL (LOW) 65984 (typically 20 minutes face-to-face)  [] EVAL (MOD) 18838 (typically 30 minutes face-to-face)  [] EVAL (HIGH) 85368 (typically 45 minutes face-to-face)  [] RE-EVAL     [x] UR(04544) x 1    [] IONTO  [x] NMR (08183) x 1    [] VASO  [x] Manual (61227) x  1    [] Other:  [] TA x      [] Mech Traction (79735)  [] ES(attended) (60230)      [] ES (un) (97300):     GOALS:  Patient stated goal: Move R UE and turn head without pain or limitations    [] Progressing: [] Met: [] Not Met: [] Adjusted     Therapist goals for Patient:   Short Term Goals: To be achieved in: 4 weeks  1. Independent in HEP and progression per patient tolerance, in order to prevent re-injury. [] Progressing: [] Met: [] Not Met: [] Adjusted   2. Patient will have a decrease in pain to facilitate improvement in movement, function, and ADLs as indicated by Functional Deficits. [] Progressing: [] Met: [] Not Met: [] Adjusted     Long Term Goals: To be achieved in: 8 weeks  1. Patient will score 58 or higher on FOTO shoulder assessment indicating subjective improvement in function. [] Progressing: [] Met: [] Not Met: [] Adjusted  2. Patient will demonstrate increased R shoulder AROM to 130 deg flexion and abduction in order to reach over to get something off a shelf without pain or limitations. [] Progressing: [] Met: [] Not Met: [] Adjusted  3. Patient will demonstrate an increase in R UE strength that is equal to L and pain free in order to lift groceries over 5 lbs without pain or limitations. [] Progressing: [] Met: [] Not Met: [] Adjusted  4. Patient will return have increase in cervical ROM in order to look over her shoulder while driving without pain or limitations. [] Progressing: [] Met: [] Not Met: [] Adjusted  5. Patient will have decrease in symptoms allowing her to lay on R side and sleep through the night without increase in symptoms. [] Progressing: [] Met: [] Not Met: [] Adjusted         Overall Progression Towards Functional goals/ Treatment Progress Update:  [] Patient is progressing as expected towards functional goals listed.     [] Progression is slowed due to complexities/Impairments listed. [] Progression has been slowed due to co-morbidities. [x] Plan just implemented, too soon to assess goals progression <30days   [] Goals require adjustment due to lack of progress  [] Patient is not progressing as expected and requires additional follow up with physician  [] Other    Prognosis for POC: [x] Good [] Fair  [] Poor      Patient requires continued skilled intervention: [x] Yes  [] No    Treatment/Activity Tolerance:  [x] Patient able to complete treatment  [] Patient limited by fatigue  [] Patient limited by pain     [] Patient limited by other medical complications  [] Other: 12/2 R shoulder AROM continues to improve but still has tension at R cervical muscles that causes excessive shoulder elevation especially at rest. Tenderness and tightness noted at cervical muscles that responded well to manual therapy. Able to progress intensity of scapular strengthening and postural exercises with no adverse effects. Patient Education:              12/2 Updated HEP   11/23 Updated HEP  11/17 Updated HEP based on presentation of symptoms and tolerance to treatment   11/14 Updated HEP based on ROM progress and tolerance to treatment. 11/11 Educated on objective findings and POC. Reviewed proper posture, precautions and use of ice for symptom control. HEP instruction:   Access Code: [id number]      PLAN: See eval  [x] Continue per plan of care [] Alter current plan (see comments above)  [] Plan of care initiated [] Hold pending MD visit [] Discharge  12/2 Complete progress note NPV     Electronically signed by:  Amanda Candelaria PT    Note: If patient does not return for scheduled/ recommended follow up visits, this note will serve as a discharge from care along with most recent update on progress.

## 2022-12-05 ENCOUNTER — OFFICE VISIT (OUTPATIENT)
Dept: ORTHOPEDIC SURGERY | Age: 67
End: 2022-12-05
Payer: MEDICARE

## 2022-12-05 VITALS — HEIGHT: 62 IN | WEIGHT: 155 LBS | BODY MASS INDEX: 28.52 KG/M2

## 2022-12-05 DIAGNOSIS — M51.36 DDD (DEGENERATIVE DISC DISEASE), LUMBAR: ICD-10-CM

## 2022-12-05 DIAGNOSIS — M54.10 RADICULAR PAIN OF RIGHT LOWER EXTREMITY: ICD-10-CM

## 2022-12-05 DIAGNOSIS — M47.816 LUMBAR SPONDYLOSIS: ICD-10-CM

## 2022-12-05 DIAGNOSIS — M54.10 RADICULAR PAIN OF LEFT LOWER EXTREMITY: ICD-10-CM

## 2022-12-05 DIAGNOSIS — M51.26 LUMBAR DISCOGENIC PAIN SYNDROME: ICD-10-CM

## 2022-12-05 PROCEDURE — G8427 DOCREV CUR MEDS BY ELIG CLIN: HCPCS | Performed by: INTERNAL MEDICINE

## 2022-12-05 PROCEDURE — 1123F ACP DISCUSS/DSCN MKR DOCD: CPT | Performed by: INTERNAL MEDICINE

## 2022-12-05 PROCEDURE — 1090F PRES/ABSN URINE INCON ASSESS: CPT | Performed by: INTERNAL MEDICINE

## 2022-12-05 PROCEDURE — G8417 CALC BMI ABV UP PARAM F/U: HCPCS | Performed by: INTERNAL MEDICINE

## 2022-12-05 PROCEDURE — G8399 PT W/DXA RESULTS DOCUMENT: HCPCS | Performed by: INTERNAL MEDICINE

## 2022-12-05 PROCEDURE — G8484 FLU IMMUNIZE NO ADMIN: HCPCS | Performed by: INTERNAL MEDICINE

## 2022-12-05 PROCEDURE — 99204 OFFICE O/P NEW MOD 45 MIN: CPT | Performed by: INTERNAL MEDICINE

## 2022-12-05 PROCEDURE — 3017F COLORECTAL CA SCREEN DOC REV: CPT | Performed by: INTERNAL MEDICINE

## 2022-12-05 PROCEDURE — 1036F TOBACCO NON-USER: CPT | Performed by: INTERNAL MEDICINE

## 2022-12-05 RX ORDER — CELECOXIB 200 MG/1
200 CAPSULE ORAL DAILY
Qty: 30 CAPSULE | Refills: 1 | Status: SHIPPED | OUTPATIENT
Start: 2022-12-05

## 2022-12-05 RX ORDER — TRAMADOL HYDROCHLORIDE 50 MG/1
50 TABLET ORAL EVERY 8 HOURS PRN
Qty: 21 TABLET | Refills: 0 | Status: SHIPPED | OUTPATIENT
Start: 2022-12-05 | End: 2022-12-12

## 2022-12-05 NOTE — PROGRESS NOTES
Chief Complaint:   Chief Complaint   Patient presents with    Lower Back Pain     NP lumbar:  was injured back in 1996 when patient slipped and fell. Has been getting treatment ever since with physical therapy and epidural injections. Patient is complaining of pain and burning in lower back radiating down into both legs. History of Present Illness:       Patient is a 78 y.o. female presents with the above complaint. The symptoms began  in 1996 as reported a work related injury. [facility] case number has been verified. The symptoms have been worsening over the past 3 weeks and she is seen in consultation at the request of Dr. Mino Mccollum. The patient describes a aching, burning pain that does radiate. The symptoms are constant  and are are worsening since the onset. The symptoms of back pain doshow a discogenic provacative pattern and are worsened by sitting and improved with standing. There is not new onset weakness or progressive weakness of the lower extremities that has developed. The patient denies new onset bowel or bladder dysfunction. There is no history of recent spinal trauma. The patient does not have history or orthopaedic lumbar spine surgery. Pain localizes to the lumbar region    Pain levels: 7    There is lower limb pain . Lower limb pain involves the flexor and extensor surfaces of the right lower extremity diffusely and on the left follows an L5/S1 dermatomal distribution radiating into the buttock and posterior thigh. Work-up to date has included: X-ray  Prior treatment has included  \"injections\" NOS in the remote past. This patient reports some improvement with this treatment. She was recently prescribed Tylenol and Motrin was discontinued. The patient has no history or autoimmune disease, inflammatory arthropathy or crystal arthropathy.       Past Medical History:        Past Medical History:   Diagnosis Date    Abnormal involuntary movements(781.0) Anal fissure     Anemia, unspecified     Anxiety     Chronic back pain     Chronic diarrhea 9/23/2019    Depression     Diffuse cystic mastopathy     Encopresis(307.7)     Esophageal reflux     Headache(784.0)     Hepatitis, unspecified     Herpes simplex without mention of complication     Hypertension     Hypogammaglobulinaemia, unspecified     Insomnia, unspecified     Lateral epicondylitis  of elbow     Migraine, unspecified, without mention of intractable migraine without mention of status migrainosus     Mixed hyperlipidemia 7/30/2010    Osteopenia     Pure hypercholesterolemia     Symptomatic menopausal or female climacteric states     Type 2 diabetes mellitus without complication ([facility] [address]     Type II or unspecified type diabetes mellitus without mention of complication, not stated as uncontrolled     Unspecified asthma(493.90)          Past Surgical History:   Procedure Laterality Date    BREAST BIOPSY      CATARACT REMOVAL Bilateral     DILATION AND CURETTAGE OF UTERUS      x2    ELBOW SURGERY  1/18/11    right    FOOT SURGERY  10/2009,11/2010    right foot    HAND SURGERY  2009    left hand    HYSTERECTOMY (CERVIX STATUS UNKNOWN)  1998    RECTAL SURGERY      SHOULDER ARTHROSCOPY Right 7/07    by Dr. Priti Mccray         Present Medications:         Current Outpatient Medications   Medication Sig Dispense Refill    traMADol (ULTRAM) 50 MG tablet Take 1 tablet by mouth every 8 hours as needed for Pain for up to 7 days. 21 tablet 0    celecoxib (CELEBREX) 200 MG capsule Take 1 capsule by mouth daily 30 capsule 1    lidocaine (LIDODERM) 5 % Place 1 patch onto the skin daily 12 hours on, 12 hours off. 30 patch 0    acetaminophen (TYLENOL) 500 MG tablet Take 1 tablet by mouth 4 times daily as needed for Pain 120 tablet 0    gabapentin (NEURONTIN) 300 MG capsule Take 1 capsule by mouth 2 times daily for 90 days.  180 capsule 0    budesonide-formoterol (SYMBICORT) 160-4.5 MCG/ACT AERO USE 2 INHALATIONS ORALLY   TWICE DAILY 30.6 g 1    cloNIDine (CATAPRES) 0.1 MG/24HR PTWK APPLY 1 PATCH ONTO THE SKINONCE A WEEK 12 patch 1    docusate sodium (COLACE) 100 MG capsule Take 1 capsule by mouth 2 times daily 60 capsule 1    bisacodyl 5 MG EC tablet Take 1-2 tablets daily as needed 30 tablet 0    ketoconazole (NIZORAL) 2 % cream APPLY topically TO bottom of BOTH FEET AND between TOES DAILY FOR 3 TO 4 WEEKS      pantoprazole (PROTONIX) 40 MG tablet Take 1 tablet by mouth daily 90 tablet 1    atorvastatin (LIPITOR) 20 MG tablet Take 1 tablet by mouth daily Take 20 mg by mouth daily 90 tablet 1    ibuprofen (ADVIL;MOTRIN) 600 MG tablet Take 1 tablet by mouth 3 times daily as needed for Pain 60 tablet 0    cetirizine (ZYRTEC) 10 MG tablet Take 1 tablet by mouth daily 30 tablet 2    diclofenac sodium (VOLTAREN) 1 % GEL Apply 2 g topically 4 times daily 350 g 1    Dulaglutide (TRULICITY) 5.06 VK/4.2WP SOPN INJECT 0.5ML (=0.75 MG)    SUBCUTANEOUSLY ONCE WEEKLY 6 mL 1    fluticasone (FLONASE) 50 MCG/ACT nasal spray 2 sprays by Nasal route daily 48 g 1    Lancets MISC 1 each by Other route daily E11.9 100 each 0    Blood Glucose Monitoring Suppl (TRUE METRIX METER) DMITRIY Test blood sugar daily and PRN E11.9 1 each 0    blood glucose test strips (GLUCOSE METER TEST) strip 100 each by In Vitro route 2 times daily Diabetes   e11.9 100 each 1     No current facility-administered medications for this visit. Allergies: Allergies   Allergen Reactions    Aspirin      bruising    Darvocet [Propoxyphene N-Acetaminophen] Hives    Effexor [Venlafaxine Hydrochloride] Hives    Hydrocodone Hives and Itching    Keflex [Cephalexin] Itching     Face redness.     Paxil Cr [Paroxetine Hcl Er] Itching        Social History:         Social History     Socioeconomic History    Marital status:      Spouse name: Not on file    Number of children: Not on file    Years of education: Not on file    Highest education level: Not on file   Occupational History Not on file   Tobacco Use    Smoking status: Never    Smokeless tobacco: Never   Substance and Sexual Activity    Alcohol use: No    Drug use: No    Sexual activity: Not Currently   Other Topics Concern    Not on file   Social History Narrative    Not on file     Social Determinants of Health     Financial Resource Strain: Not on file   Food Insecurity: Not on file   Transportation Needs: Not on file   Physical Activity: Insufficiently Active    Days of Exercise per Week: 3 days    Minutes of Exercise per Session: 20 min   Stress: Not on file   Social Connections: Not on file   Intimate Partner Violence: Not on file   Housing Stability: Not on file        Review of Symptoms:    Pertinent items are noted in HPI    Review of systems reviewed from Patient History Form dated on today's date and   available in the patient's chart under the Media tab. Vital Signs: There were no vitals filed for this visit. General Exam:     Constitutional: Patient is adequately groomed with no evidence of malnutrition  Mental Status: The patient is oriented to time, place and person. The patient's mood and affect are appropriate. Vascular: Examination reveals no swelling or calf tenderness. Peripheral pulses are palpable and 2+. Lymphatics: no lymphadenopathy of the inguinal region or lower extremity      Physical Exam: lower back      Primary Exam:    Inspection: No deformity atrophy appreciable curvature      Palpation: No focal trigger point tenderness      Range of Motion: 45/5 pain with flexion and extension      Strength: Normal lower extremity      Special Tests: SLR negative bilaterally      Skin: There are no rashes, ulcerations or lesions. Gait: Mildly antalgic      Reflex intact lower     Additional Comments:        Additional Examinations:           Neurolgic -Light touch sensation and manual muscle testing normal L2-S1. No fasiculations. Pattella tendon and Achilles tendon reflexes +2 bilaterally. foraminal    disc protrusion contribute to mild to moderate narrowing of the right L5 foramen which is new    since 2012. Distal thoracic cord and conus show no abnormality. No suspicious marrow signal alterations are    present. Mild osteophytes are identified at L4-5 and L5-S1. Limited evaluation of extraspinal structures demonstrates a large right renal cyst which has    increased since 2012 measuring at least 7.2 cm in diameter currently and 5.6 cm previously. IMPRESSION:       Multilevel lumbar degenerative disc disease and facet arthrosis with interval mild to moderate    narrowing of the right L5 foramen compared with the prior study. The previous anterior disc    extrusion at L5-S1 has resolved. Enlarging right renal cyst incompletely included. Report Verified by: Sandy Ta M.D. at 7/7/2017 2:28 PM EDT           Assessment   Impression: . Encounter Diagnoses   Name Primary? Lumbar discogenic pain syndrome     Radicular pain of right lower extremity     Radicular pain of left lower extremity     DDD (degenerative disc disease), lumbar     Lumbar spondylosis               Plan:     MRI lumbar spine evaluate severity of discopathy/ stenosis suspected clinically  Consider Her a candidate for spine intervention injection  Activity modification, lumbar spine precautions  lumbar spinestabilization home exercise program  Medical pain management: NSAID: Celebrex trial and Ultram.  Short-term minimize use of narcotics continue current dose of Neurontin  Cane assisted weightbearing unload right lower extremity  Consider EMG evaluation of the right and/or left lower extremities       The nature of the finding, probable diagnosis and likely treatment was thoroughly discussed with the patient. The options, risks, complications, alternative treatment as well as some of the differential diagnosis was discussed. The patient was thoroughly informed and all questions were answered.  the patient indicated understanding and satisfaction with the discussion. Orders:      No orders of the defined types were placed in this encounter. Disclaimer: \"This note was dictated with voice recognition software. Though review and correction are routine, we apologize for any errors. \"

## 2022-12-08 ENCOUNTER — HOSPITAL ENCOUNTER (OUTPATIENT)
Dept: CT IMAGING | Age: 67
Discharge: HOME | End: 2022-12-08
Payer: MEDICARE

## 2022-12-08 DIAGNOSIS — R51.9 NONINTRACTABLE HEADACHE, UNSPECIFIED CHRONICITY PATTERN, UNSPECIFIED HEADACHE TYPE: ICD-10-CM

## 2022-12-08 PROCEDURE — 70450 CT HEAD/BRAIN W/O DYE: CPT

## 2022-12-09 ENCOUNTER — HOSPITAL ENCOUNTER (OUTPATIENT)
Dept: PHYSICAL THERAPY | Age: 67
Setting detail: THERAPIES SERIES
Discharge: HOME | End: 2022-12-09
Payer: MEDICARE

## 2022-12-09 ENCOUNTER — TELEPHONE (OUTPATIENT)
Dept: PRIMARY CARE CLINIC | Age: 67
End: 2022-12-09

## 2022-12-09 NOTE — TELEPHONE ENCOUNTER
Pt is calling stating she is feeling very nauseous and she would like a call back    Please advise pt

## 2022-12-09 NOTE — TELEPHONE ENCOUNTER
Pt called and stated she is not feeling well and nausea. Pt did not want to make appt. Pt states she wants to talk to Dr. Rayray Cadena. Pt would not give me anymore information.

## 2022-12-09 NOTE — FLOWSHEET NOTE
Physical Therapy  Cancellation/No-show Note  Patient Name:  Haley Izaguirre  :  1955   Date:  2022  Cancelled visits to date: 1  No-shows to date: 0    For today's appointment patient:  [x]  Cancelled  []  Rescheduled appointment  []  No-show     Reason given by patient:  [x]  Patient ill  []  Conflicting appointment  []  No transportation    []  Conflict with work  []  No reason given  []  Other:     Comments:      Electronically signed by:  Tamie Sandra PT, PT

## 2022-12-12 PROBLEM — T14.8XXA BRUISING: Status: ACTIVE | Noted: 2022-12-12

## 2022-12-12 PROBLEM — R04.0 NASAL BLEEDING: Status: ACTIVE | Noted: 2022-12-12

## 2022-12-12 ASSESSMENT — ENCOUNTER SYMPTOMS
SHORTNESS OF BREATH: 0
BLOOD IN STOOL: 0
COUGH: 0
RHINORRHEA: 0
DIARRHEA: 0
SORE THROAT: 0
NAUSEA: 0
CHEST TIGHTNESS: 0
CONSTIPATION: 0
BACK PAIN: 1
SINUS PAIN: 0
VOMITING: 0
SINUS PRESSURE: 0
ABDOMINAL PAIN: 0
WHEEZING: 0
PHOTOPHOBIA: 0

## 2022-12-14 ENCOUNTER — TELEMEDICINE (OUTPATIENT)
Dept: PRIMARY CARE CLINIC | Age: 67
End: 2022-12-14
Payer: MEDICARE

## 2022-12-14 DIAGNOSIS — J06.9 UPPER RESPIRATORY TRACT INFECTION, UNSPECIFIED TYPE: Primary | ICD-10-CM

## 2022-12-14 DIAGNOSIS — R11.0 NAUSEA: ICD-10-CM

## 2022-12-14 PROCEDURE — 3017F COLORECTAL CA SCREEN DOC REV: CPT | Performed by: NURSE PRACTITIONER

## 2022-12-14 PROCEDURE — 1090F PRES/ABSN URINE INCON ASSESS: CPT | Performed by: NURSE PRACTITIONER

## 2022-12-14 PROCEDURE — G8417 CALC BMI ABV UP PARAM F/U: HCPCS | Performed by: NURSE PRACTITIONER

## 2022-12-14 PROCEDURE — G8399 PT W/DXA RESULTS DOCUMENT: HCPCS | Performed by: NURSE PRACTITIONER

## 2022-12-14 PROCEDURE — 1036F TOBACCO NON-USER: CPT | Performed by: NURSE PRACTITIONER

## 2022-12-14 PROCEDURE — 1123F ACP DISCUSS/DSCN MKR DOCD: CPT | Performed by: NURSE PRACTITIONER

## 2022-12-14 PROCEDURE — 99214 OFFICE O/P EST MOD 30 MIN: CPT | Performed by: NURSE PRACTITIONER

## 2022-12-14 PROCEDURE — G8484 FLU IMMUNIZE NO ADMIN: HCPCS | Performed by: NURSE PRACTITIONER

## 2022-12-14 PROCEDURE — G8427 DOCREV CUR MEDS BY ELIG CLIN: HCPCS | Performed by: NURSE PRACTITIONER

## 2022-12-14 RX ORDER — ONDANSETRON 4 MG/1
4 TABLET, ORALLY DISINTEGRATING ORAL 3 TIMES DAILY PRN
Qty: 21 TABLET | Refills: 0 | Status: SHIPPED | OUTPATIENT
Start: 2022-12-14

## 2022-12-14 ASSESSMENT — ENCOUNTER SYMPTOMS
SINUS PAIN: 0
COLOR CHANGE: 0
DIARRHEA: 0
COUGH: 0
WHEEZING: 0
RHINORRHEA: 1
TROUBLE SWALLOWING: 0
SHORTNESS OF BREATH: 0
SORE THROAT: 0
ABDOMINAL PAIN: 0
CHEST TIGHTNESS: 0
NAUSEA: 1
VOMITING: 0
SINUS PRESSURE: 1

## 2022-12-14 NOTE — PROGRESS NOTES
2022    TELEHEALTH EVALUATION -- Audio/Visual (During ATQAN-23 public health emergency)    HPI:    Klaudia Peace (:  1955) has requested an audio/video evaluation for the following concern(s):    Patient seen virtually for a sick visit    Onset: 22 with nausea  Felt better over the wknd. Now nausea has returned. Comes and goes. Abd pain intermittent. No vomiting. Will feel dizzy with head movements, especially looking up  Has sweats. No fevers. No diarrhea. No cough. Nausea is worse with dizziness episodes. Has headache at times. Nasal drainage out of right nostril  Some congestion. Some sinus pressure. Drainage is clear. Felt \"noise\" and pain in left ear last wk. Removed wax over the wknd and feels better. Feels heavy in her stomach. Appetite has been decreased. Covid test negative    Taking tylenol  Using flonase daily  Takes zyrtec only prn. ORTHO:  Saw a new ortho and really likes him. Started on tramadol 50mg and celebrex 200mg last wk. Taking pantoprazole daily    Has apt with PCP 22    Review of Systems   Constitutional:  Positive for chills and fatigue. Negative for activity change, appetite change and fever. HENT:  Positive for congestion, rhinorrhea and sinus pressure. Negative for ear pain, postnasal drip, sinus pain, sore throat and trouble swallowing. Respiratory:  Negative for cough, chest tightness, shortness of breath and wheezing. Cardiovascular:  Negative for chest pain, palpitations and leg swelling. Gastrointestinal:  Positive for nausea. Negative for abdominal pain, diarrhea and vomiting. Genitourinary:  Negative for difficulty urinating. Musculoskeletal:  Negative for myalgias. Skin:  Negative for color change, pallor and rash. Neurological:  Positive for dizziness and headaches. Negative for weakness and light-headedness. Hematological:  Negative for adenopathy. Prior to Visit Medications    Medication Sig Taking? Sammy Maria MD   blood glucose test strips (GLUCOSE METER TEST) strip 100 each by In [employer] route 2 times daily Diabetes   e11.9  Manuel Jose MD       Social History     Tobacco Use    Smoking status: Never    Smokeless tobacco: Never   Substance Use Topics    Alcohol use: No    Drug use: No        Allergies   Allergen Reactions    Aspirin      bruising    Darvocet [Propoxyphene N-Acetaminophen] Hives    Effexor [Venlafaxine Hydrochloride] Hives    Hydrocodone Hives and Itching    Keflex [Cephalexin] Itching     Face redness.     Paxil Cr [Paroxetine Hcl Er] Itching   ,   Past Medical History:   Diagnosis Date    Abnormal involuntary movements(781.0)     Anal fissure     Anemia, unspecified     Anxiety     Chronic back pain     Chronic diarrhea 9/23/2019    Depression     Diffuse cystic mastopathy     Encopresis(307.7)     Esophageal reflux     Headache(784.0)     Hepatitis, unspecified     Herpes simplex without mention of complication     Hypertension     Hypogammaglobulinaemia, unspecified     Insomnia, unspecified     Lateral epicondylitis  of elbow     Migraine, unspecified, without mention of intractable migraine without mention of status migrainosus     Mixed hyperlipidemia 7/30/2010    Osteopenia     Pure hypercholesterolemia     Symptomatic menopausal or female climacteric states     Type 2 diabetes mellitus without complication (HCC)     Type II or unspecified type diabetes mellitus without mention of complication, not stated as uncontrolled     Unspecified asthma(493.90)        PHYSICAL EXAMINATION:  [ INSTRUCTIONS:  \"[x]\" Indicates a positive item  \"[]\" Indicates a negative item  -- DELETE ALL ITEMS NOT EXAMINED]  Vital Signs: (As obtained by patient/caregiver or practitioner observation)    Blood pressure-  Heart rate-    Respiratory rate-    Temperature-  Pulse oximetry-     Constitutional: [x] Appears well-developed and well-nourished [x] No apparent distress      [] Abnormal-   Mental status  [x] Alert and awake  [x] Oriented to person/place/time [x]Able to follow commands      Eyes:  EOM    []  Normal  [] Abnormal-  Sclera  []  Normal  [] Abnormal -         Discharge []  None visible  [] Abnormal -    HENT:   [x] Normocephalic, atraumatic. [] Abnormal   [x] Mouth/Throat: Mucous membranes are moist.     External Ears [x] Normal  [] Abnormal-     Neck: [x] No visualized mass     Pulmonary/Chest: [x] Respiratory effort normal.  [x] No visualized signs of difficulty breathing or respiratory distress        [] Abnormal-      Musculoskeletal:   [] Normal gait with no signs of ataxia         [x] Normal range of motion of neck        [] Abnormal-       Neurological:        [x] No Facial Asymmetry (Cranial nerve 7 motor function) (limited exam to video visit)          [x] No gaze palsy        [] Abnormal-         Skin:        [x] No significant exanthematous lesions or discoloration noted on facial skin         [] Abnormal-            Psychiatric:       [x] Normal Affect [x] No Hallucinations        [] Abnormal-     Other pertinent observable physical exam findings-     ASSESSMENT/PLAN:  1. Upper respiratory tract infection, unspecified type  Likely viral. Discussed in detail. No anbx needed at this time. Discussed symptomatic treatment with otc meds  Recommend consistent use of flonase and zyrtec. Increase water intake. [place] diet. Dizziness could be related to inner ear fluid. If no improvement by next wk, should be seen in office or if dizziness worsens, can go to urgent care or ED    2. Nausea  Zofran prn  [place] diet. BRAT diet discussed  - ondansetron (ZOFRAN-ODT) 4 MG disintegrating tablet; Take 1 tablet by mouth 3 times daily as needed for Nausea or Vomiting  Dispense: 21 tablet; Refill: 0    Return if symptoms worsen or fail to improve. Melissa Vega, was evaluated through a synchronous (real-time) audio-video encounter.  The patient (or guardian if applicable) is aware that this is a billable service, which includes applicable co-pays. This Virtual Visit was conducted with patient's (and/or legal guardian's) consent. The visit was conducted pursuant to the emergency declaration under the [facility], [address] [place] [address] authority and the [employer] General Act. Patient identification was verified, and a caregiver was present when appropriate. The patient was located at Home: [address] [place] [address]. Provider was located at [facility] (Appt Dept): [address] [facility]   [address] [facility]. Total time spent on this encounter: Not billed by time    --NIA Scherer CNP on 12/14/2022 at 3:56 PM    An electronic signature was used to authenticate this note.

## 2022-12-19 ENCOUNTER — HOSPITAL ENCOUNTER (OUTPATIENT)
Dept: PHYSICAL THERAPY | Age: 67
Setting detail: THERAPIES SERIES
Discharge: HOME | End: 2022-12-19
Payer: MEDICARE

## 2022-12-19 PROCEDURE — 97110 THERAPEUTIC EXERCISES: CPT

## 2022-12-19 PROCEDURE — 97140 MANUAL THERAPY 1/> REGIONS: CPT

## 2022-12-19 NOTE — PROGRESS NOTES
The [address] [place] [address],[address], [address], [address] [place] [address]  Phone: [phone]   Fax:     [phone]    Physical Therapy Daily Treatment Note  Date:  2022    Patient Name:  Mayra Hill    :  1955  MRN: [id number]  Restrictions/Precautions:    Medical/Treatment Diagnosis Information:  Diagnosis: M75.81 (ICD-10-CM) - Rotator cuff tendinitis, right  Treatment Diagnosis: M25.511 Right shoulder pain, M54.2 Cervicalgia  Insurance/Certification information:  PT Insurance Information: Medicare  Physician Information:   Maryelizabeth Holstein MD  Has the plan of care been signed (Y/N):        [x]  Yes  []  No     Date of Patient follow up with Physician:       Is this a Progress Report:     [x]  Yes  []  No        If Yes:  Date Range for reporting period:  Beginning 22  Ending 22    Progress report will be due (10 Rx or 30 days whichever is less):        Recertification will be due (POC Duration  / 90 days whichever is less): 23         Visit # Insurance Allowable Auth Required   6   Medicare  []  Yes []  No        Functional Scale:   FOTO shoulder 57  Date assessed: 22  FOTO shoulder 43  Date assessed:  22     Latex Allergy:  [x]NO      []YES  Preferred Language for Healthcare:   [x]English       []other:    Pain level:  0/10 R shoulder, 5/10 R neck       SUBJECTIVE:   Pt sates R shoulder is feeling a lot better but continues to have R sided neck pain. Had to cancel d/t having vertigo symptoms. Contacted PCP who instructed her to take allergy medication which improved symptoms. Saw MD of low back and was instructed to use cane on L side but pt states she is unable to do so.  Waiting to get MRI for lumbar spine    OBJECTIVE: Updated below on    Observation:    Functional Mobility/Transfers: Independent and no excessive compensatory movement with AROM of R shoulder      Posture: Forward head, rounded shoulders, bilateral scapular abduction 12/19     Test measurements:   Joint mobility:               [x]Normal   R glenohumeral joint 12/19              []Hypo              []Hyper    Palpation: Tenderness R UT and SCM.  Tenderness R AC joint, proximal biceps tendon and lateral deltoid 12/19   ROM PROM AROM  Comment     L R 12/19 L R 12/19     Flexion 152 172 pain 130 deg 152 deg pain     Abduction 180 180  166 deg  169 deg pain      ER 97 100         IR 60 73 T7 T8      Cervical flexion     40 deg       Cervical ext     40 deg pain        Cervical lateral flexion     28 deg pain on R  32 deg pain      Cervical rotation      62 deg  55 deg            Strength L R 12/19 Comment   Flexion 4- 4 pain     Abduction 4 4- pain      ER 5 5     IR 5 5     Biceps 5 5     Triceps 5 5         RESTRICTIONS/PRECAUTIONS: HTN, Type 2 diabetes    Exercises/Interventions:   Exercises:  Exercise/Equipment Resistance/Repetitions Other comments   Stretching/PROM     UT stretch Cont with HEP 12/19   Levator stretch Cont with HEP 12/19    Pulleys Withheld d/t progress 12/2   Pec stretch  D/C    Cane flexion Cont with HEP 12/19                   Isometrics     Retraction D/c 12/19        Weight shift     Flexion     Abduction     External Rotation     Internal Rotation     Biceps     Triceps          PRE's     Standing shoulder Flexion 0# 2x10 bilat Start 12/19 45 deg scaption, 90 deg flexion   Standing Abduction 0# 2x10 bilat Start 12/19 palm down, 90 deg abd   Side lying abduction 1# 3x10 R Start 12/19 0<>160 deg    External Rotation 1# 3x10 R Start 12/19 side lying    Internal Rotation     PNF D2  3x10 R ^12/19 supine    EXT     Reverse Flys     Serratus     Horizontal Abd with ER     Biceps     Triceps     Retraction          Cable Column/Theraband     External Rotation     Internal Rotation     Rows with scapular activation Green TB 3x10 bilat 12/19   Shoulder Ext with scapular activation  Green TB 3x10 bilat 12/19    Supine HABD D/c based on progress, completed scapular exercises in more functional positions 12/189   Scapular Retraction with ER     Low abd     TRIC     PNF          Dynamic Stability          Plyoback          Manual interventions     STM R sub occipitals and R proximal ,UT and levator x6'   12/19   PROM Cervical lateral flexion to L with R shoulder stabilization x2 12/19         Therapeutic Exercise and NMR EXR  [x] (36176) Provided verbal/tactile cueing for activities related to strengthening, flexibility, endurance, ROM  for improvements in scapular, scapulothoracic and UE control with self care, reaching, carrying, lifting, house/yardwork, driving/computer work.    [] (31102) Provided verbal/tactile cueing for activities related to improving balance, coordination, kinesthetic sense, posture, motor skill, proprioception  to assist with  scapular, scapulothoracic and UE control with self care, reaching, carrying, lifting, house/yardwork, driving/computer work. Therapeutic Activities:    [] (34730 or 88806) Provided verbal/tactile cueing for activities related to improving balance, coordination, kinesthetic sense, posture, motor skill, proprioception and motor activation to allow for proper function of scapular, scapulothoracic and UE control with self care, carrying, lifting, driving/computer work.      Home Exercise Program:    [x] (49444) Reviewed/Progressed HEP activities related to strengthening, flexibility, endurance, ROM of scapular, scapulothoracic and UE control with self care, reaching, carrying, lifting, house/yardwork, driving/computer work  [] (77536) Reviewed/Progressed HEP activities related to improving balance, coordination, kinesthetic sense, posture, motor skill, proprioception of scapular, scapulothoracic and UE control with self care, reaching, carrying, lifting, house/yardwork, driving/computer work      Manual Treatments:  PROM / STM / Oscillations-Mobs:  G-I, II, III, IV UE strength that is equal to L and pain free in order to lift groceries over 5 lbs without pain or limitations. [x] Progressing: [] Met: [] Not Met: [] Adjusted  4. Patient will return have increase in cervical ROM in order to look over her shoulder while driving without pain or limitations. [x] Progressing: [] Met: [] Not Met: [] Adjusted  5. Patient will have decrease in symptoms allowing her to lay on R side and sleep through the night without increase in symptoms. [x] Progressing: [] Met: [] Not Met: [] Adjusted         Overall Progression Towards Functional goals/ Treatment Progress Update:  [] Patient is progressing as expected towards functional goals listed. [] Progression is slowed due to complexities/Impairments listed. [] Progression has been slowed due to co-morbidities. [] Plan just implemented, too soon to assess goals progression <30days   [] Goals require adjustment due to lack of progress  [] Patient is not progressing as expected and requires additional follow up with physician  [x] Patient has missed last 2 weeks d/t being ill but no regression noted. She is progressing well with increase in R shoulder and cervical mobility as well as R UE strength allowing for decrease in severity of symptoms and increase in function. Prognosis for POC: [x] Good [] Fair  [] Poor      Patient requires continued skilled intervention: [x] Yes  [] No    Treatment/Activity Tolerance:  [x] Patient able to complete treatment  [] Patient limited by fatigue  [] Patient limited by pain     [] Patient limited by other medical complications  [] Other: 12/19: R shoulder ROM and strength continues to progress but still has strength deficits and pain limiting function. Pt also continues to have restrictions at R cervical spine and muscle tightness contributing to pain and limitations. D/t symptoms continued with manual therapy which she responded well to.  Based on progress increased intensity of strengthening exercises with no adverse effects. Patient Education:              12/19 Updated HEP. After treatment educated pt on how to use unilateral cane on L based on R LE pain. 12/2 Updated HEP   11/23 Updated HEP  11/17 Updated HEP based on presentation of symptoms and tolerance to treatment   11/14 Updated HEP based on ROM progress and tolerance to treatment. 11/11 Educated on objective findings and POC. Reviewed proper posture, precautions and use of ice for symptom control. HEP instruction:   Access Code: [id number]      PLAN: See yohan  [x] Continue per plan of care [] Cheyenne Guzmán current plan (see comments above)  [] Plan of care initiated [] Hold pending MD visit [] Discharge  12/19 Based on progress and continued limitations she would benefit from continuation of PT 1x a week for 4 weeks to further increase shoulder strength and cervical mobility in order to improve function and further decrease symptoms. Electronically signed by:  Layla Sagastume PT    Note: If patient does not return for scheduled/ recommended follow up visits, this note will serve as a discharge from care along with most recent update on progress.

## 2022-12-27 DIAGNOSIS — J30.9 ALLERGIC RHINITIS, UNSPECIFIED SEASONALITY, UNSPECIFIED TRIGGER: ICD-10-CM

## 2022-12-27 NOTE — TELEPHONE ENCOUNTER
Medication:   Requested Prescriptions     Pending Prescriptions Disp Refills    cetirizine (ZYRTEC) 10 MG tablet 30 tablet 2     Sig: Take 1 tablet by mouth daily     Last Filled:  9/1/22    Last appt: 10/11/2022   Next appt: 1/11/2023    Last OARRS:   RX Monitoring 11/11/2022   Periodic Controlled Substance Monitoring No signs of potential drug abuse or diversion identified.

## 2022-12-28 RX ORDER — CETIRIZINE HYDROCHLORIDE 10 MG/1
10 TABLET ORAL DAILY
Qty: 30 TABLET | Refills: 2 | Status: SHIPPED | OUTPATIENT
Start: 2022-12-28

## 2022-12-29 ENCOUNTER — HOSPITAL ENCOUNTER (OUTPATIENT)
Dept: PHYSICAL THERAPY | Age: 67
Setting detail: THERAPIES SERIES
Discharge: HOME | End: 2022-12-29
Payer: MEDICARE

## 2022-12-29 PROCEDURE — 97110 THERAPEUTIC EXERCISES: CPT

## 2022-12-29 PROCEDURE — 97140 MANUAL THERAPY 1/> REGIONS: CPT

## 2022-12-29 NOTE — FLOWSHEET NOTE
[facility] 09, [address] [place] [address], [address] [place] [address]  Phone: [phone]   Fax:     [phone]    Physical Therapy Daily Treatment Note  Date:  2022    Patient Name:  Natalia Song    :  1955  MRN: [id number]  Restrictions/Precautions:    Medical/Treatment Diagnosis Information:  Diagnosis: M75.81 (ICD-10-CM) - Rotator cuff tendinitis, right  Treatment Diagnosis: M25.511 Right shoulder pain, M54.2 Cervicalgia  Insurance/Certification information:  PT Insurance Information: Medicare  Physician Information:   Gregg Rodriguez MD  Has the plan of care been signed (Y/N):        [x]  Yes  []  No     Date of Patient follow up with Physician:       Is this a Progress Report:     []  Yes  [x]  No        If Yes:  Date Range for reporting period:  Beginning 22  Ending 22    Progress report will be due (10 Rx or 30 days whichever is less): 39       Recertification will be due (POC Duration  / 90 days whichever is less): 23         Visit # Insurance Allowable Auth Required   7   Medicare  []  Yes []  No        Functional Scale:   FOTO shoulder 57  Date assessed: 22  FOTO shoulder 43  Date assessed:  22     Latex Allergy:  [x]NO      []YES  Preferred Language for Healthcare:   [x]English       []other:    Pain level:  0/10 R shoulder, 5/10 R neck       SUBJECTIVE:   Pt states she was very active yesterday causing pain all over. Symptoms have improved. No pain in R shoulder but does have pain at neck with movement. Has difficulty using cane on L side and reports she almost fell yesterday     OBJECTIVE: Updated below on    Observation:    Functional Mobility/Transfers: Independent and no excessive compensatory movement with AROM of R shoulder      Posture:  Forward head, rounded shoulders, bilateral scapular abduction      Test measurements:   Joint mobility: [x]Normal   R glenohumeral joint 12/19              []Hypo              []Hyper    Palpation: Tenderness R UT and SCM.  Tenderness R AC joint, proximal biceps tendon and lateral deltoid 12/19   ROM PROM AROM  Comment     L R 12/19 L R 12/19     Flexion 152 172 pain 130 deg 152 deg pain     Abduction 180 180  166 deg  169 deg pain      ER 97 100         IR 60 73 T7 T8      Cervical flexion     40 deg       Cervical ext     40 deg pain        Cervical lateral flexion     28 deg pain on R  32 deg pain      Cervical rotation      62 deg  55 deg            Strength L R 12/19 Comment   Flexion 4- 4 pain     Abduction 4 4- pain      ER 5 5     IR 5 5     Biceps 5 5     Triceps 5 5         RESTRICTIONS/PRECAUTIONS: HTN, Type 2 diabetes    Exercises/Interventions:   Exercises:  Exercise/Equipment Resistance/Repetitions Other comments   Stretching/PROM     UT stretch 10\"hx10 R with MHP 12/29 reintroduced d/t tension R compared to L    Levator stretch 10\"hx10 R with MHP 12/29 reintroduced d/t tension R compared to L   Pulleys Withheld d/t progress 12/2   Pec stretch  D/C    Cane flexion Cont with HEP 12/19                   Isometrics     Retraction D/c 12/19        Weight shift     Flexion     Abduction     External Rotation     Internal Rotation     Biceps     Triceps          PRE's     Standing shoulder Flexion 1# 2x10 bilat ^12/29 45 deg scaption, 90 deg flexion, cues to avoid shoulder hike    Standing Abduction 1# 2x10 bilat Start 12/29 palm down, 90 deg abd   Side lying abduction 2# 3x10 R ^12/29  0<>160 deg    External Rotation 3# 3x10 R ^12/29 side lying    Internal Rotation     PNF D2  1# 3x10 R ^12/29 supine    EXT     Reverse Flys     Serratus     Horizontal Abd with ER     Biceps     Triceps     Retraction          Cable Column/Theraband     External Rotation Green TB 3x10 R Start 12/29   Internal Rotation Green TB 3x10 R  Start 12/29    Rows with scapular activation BlueTB 3x10 bilat ^12/29    Shoulder Ext with scapular activation  Green TB 3x10 bilat 12/19    Supine HABD D/c based on progress, completed scapular exercises in more functional positions 12/189   Scapular Retraction with ER     Low abd     TRIC     PNF          Dynamic Stability          P[place]oback          Manual interventions     STM R UT and levator x4' 12/29   PROM Cervical lateral flexion to L with R shoulder stabilization x4' 12/29         Therapeutic Exercise and NMR EXR  [x] (68097) Provided verbal/tactile cueing for activities related to strengthening, flexibility, endurance, ROM  for improvements in scapular, scapulothoracic and UE control with self care, reaching, carrying, lifting, house/yardwork, driving/computer work.    [] (02423) Provided verbal/tactile cueing for activities related to improving balance, coordination, kinesthetic sense, posture, motor skill, proprioception  to assist with  scapular, scapulothoracic and UE control with self care, reaching, carrying, lifting, house/yardwork, driving/computer work. Therapeutic Activities:    [] (41211 or 64384) Provided verbal/tactile cueing for activities related to improving balance, coordination, kinesthetic sense, posture, motor skill, proprioception and motor activation to allow for proper function of scapular, scapulothoracic and UE control with self care, carrying, lifting, driving/computer work.      Home Exercise Program:    [x] (29786) Reviewed/Progressed HEP activities related to strengthening, flexibility, endurance, ROM of scapular, scapulothoracic and UE control with self care, reaching, carrying, lifting, house/yardwork, driving/computer work  [] (41358) Reviewed/Progressed HEP activities related to improving balance, coordination, kinesthetic sense, posture, motor skill, proprioception of scapular, scapulothoracic and UE control with self care, reaching, carrying, lifting, house/yardwork, driving/computer work      Manual Treatments:  PROM / STM / Oscillations-Mobs:  G-I, II, III, IV (Ramsey, Inf., Post.)  [x] (52163) Provided manual therapy to mobilize soft tissue/joints of cervical/CT, scapular GHJ and UE for the purpose of modulating pain, promoting relaxation,  increasing ROM, reducing/eliminating soft tissue swelling/inflammation/restriction, improving soft tissue extensibility and allowing for proper ROM for normal function with self care, reaching, carrying, lifting, house/yardwork, driving/computer work    Modalities:      Charges:  Timed Code Treatment Minutes: 44'   Total Treatment Minutes: 10:18-10:58  40'       [] EVAL (LOW) 44592 (typically 20 minutes face-to-face)  [] EVAL (MOD) 01881 (typically 30 minutes face-to-face)  [] EVAL (HIGH) 74961 (typically 45 minutes face-to-face)  [] RE-EVAL     [x] LM(71733) x 2    [] IONTO  [] NMR (58635) x     [] VASO  [x] Manual (14890) x  1    [] Other:  [] TA x      [] Mech Traction (59409)  [] ES(attended) (66732)      [] ES (un) (64663):     GOALS:  Patient stated goal: Move R UE and turn head without pain or limitations    [x] Progressing: [] Met: [] Not Met: [] Adjusted     Therapist goals for Patient:   Short Term Goals: To be achieved in: 4 weeks  1. Independent in HEP and progression per patient tolerance, in order to prevent re-injury. [] Progressing: [x] Met: [] Not Met: [] Adjusted   2. Patient will have a decrease in pain to facilitate improvement in movement, function, and ADLs as indicated by Functional Deficits. [] Progressing: [x] Met: [] Not Met: [] Adjusted     Long Term Goals: To be achieved in: 8 weeks  1. Patient will score 58 or higher on FOTO shoulder assessment indicating subjective improvement in function. [x] Progressing: [] Met: [] Not Met: [] Adjusted  2. Patient will demonstrate increased R shoulder AROM to 130 deg flexion and abduction in order to reach over to get something off a shelf without pain or limitations. [] Progressing: [x] Met: [] Not Met: [] Adjusted  3.  Patient will demonstrate an increase in R UE strength that is equal to L and pain free in order to lift groceries over 5 lbs without pain or limitations. [x] Progressing: [] Met: [] Not Met: [] Adjusted  4. Patient will return have increase in cervical ROM in order to look over her shoulder while driving without pain or limitations. [x] Progressing: [] Met: [] Not Met: [] Adjusted  5. Patient will have decrease in symptoms allowing her to lay on R side and sleep through the night without increase in symptoms. [x] Progressing: [] Met: [] Not Met: [] Adjusted         Overall Progression Towards Functional goals/ Treatment Progress Update:  [] Patient is progressing as expected towards functional goals listed. [] Progression is slowed due to complexities/Impairments listed. [] Progression has been slowed due to co-morbidities. [] Plan just implemented, too soon to assess goals progression <30days   [] Goals require adjustment due to lack of progress  [] Patient is not progressing as expected and requires additional follow up with physician  [x] Patient has missed last 2 weeks d/t being ill but no regression noted. She is progressing well with increase in R shoulder and cervical mobility as well as R UE strength allowing for decrease in severity of symptoms and increase in function. Prognosis for POC: [x] Good [] Fair  [] Poor      Patient requires continued skilled intervention: [x] Yes  [] No    Treatment/Activity Tolerance:  [x] Patient able to complete treatment  [] Patient limited by fatigue  [] Patient limited by pain     [] Patient limited by other medical complications  [] Other: 12/29 Pt arrived with mild tension @ R cervical muscles that responded well to manual therapy. Pt reported dizziness with laying supine that improved with drinking water and sitting up. Able to progress intensity of strengthening with shoulder demonstrating appropriate fatigue and no adverse effects.  Mild cues required during treatment to avoid compensatory activation of R cervical muscles. Patient Education:              12/19 Updated HEP. After treatment educated pt on how to use unilateral cane on L based on R LE pain. 12/2 Updated HEP   11/23 Updated HEP  11/17 Updated HEP based on presentation of symptoms and tolerance to treatment   11/14 Updated HEP based on ROM progress and tolerance to treatment. 11/11 Educated on objective findings and POC. Reviewed proper posture, precautions and use of ice for symptom control. HEP instruction:   Access Code: [id number]      PLAN: See yohan  [x] Continue per plan of care [] Anderson Martin current plan (see comments above)  [] Plan of care initiated [] Hold pending MD visit [] Discharge  12/29 Monitor symptoms R neck and continue to progress strengthening R shoulder as tolerated. 12/19 Based on progress and continued limitations she would benefit from continuation of PT 1x a week for 4 weeks to further increase shoulder strength and cervical mobility in order to improve function and further decrease symptoms. Electronically signed by:  Suznane Rodriguez PT    Note: If patient does not return for scheduled/ recommended follow up visits, this note will serve as a discharge from care along with most recent update on progress.

## 2023-01-03 ENCOUNTER — TELEPHONE (OUTPATIENT)
Dept: ORTHOPEDIC SURGERY | Age: 68
End: 2023-01-03

## 2023-01-03 NOTE — TELEPHONE ENCOUNTER
Received a call from [address] [place] [address]. She is wanting to know if she can get a refill on her medication? Also what is the plan for her care?

## 2023-01-04 DIAGNOSIS — M54.59 OTHER LOW BACK PAIN: ICD-10-CM

## 2023-01-05 ENCOUNTER — HOSPITAL ENCOUNTER (OUTPATIENT)
Dept: PHYSICAL THERAPY | Age: 68
Setting detail: THERAPIES SERIES
Discharge: HOME | End: 2023-01-05
Payer: MEDICARE

## 2023-01-05 PROCEDURE — 97112 NEUROMUSCULAR REEDUCATION: CPT

## 2023-01-05 PROCEDURE — 97140 MANUAL THERAPY 1/> REGIONS: CPT

## 2023-01-05 PROCEDURE — 97110 THERAPEUTIC EXERCISES: CPT

## 2023-01-05 RX ORDER — TRAMADOL HYDROCHLORIDE 50 MG/1
50 TABLET ORAL EVERY 8 HOURS PRN
Qty: 21 TABLET | Refills: 0 | Status: SHIPPED | OUTPATIENT
Start: 2023-01-05 | End: 2023-01-12

## 2023-01-05 NOTE — FLOWSHEET NOTE
The [address] [place], [place] [address], [address] [place] [address], [address] [place] [address]  Phone: [phone]   Fax:     [phone]    Physical Therapy Daily Treatment Note  Date:  2023    Patient Name:  Regla Washington    :  1955  MRN: [id number]  Restrictions/Precautions:    Medical/Treatment Diagnosis Information:  Diagnosis: M75.81 (ICD-10-CM) - Rotator cuff tendinitis, right  Treatment Diagnosis: M25.511 Right shoulder pain, M54.2 Cervicalgia  Insurance/Certification information:  PT Insurance Information: Medicare  Physician Information:   Calvin Bacon MD  Has the plan of care been signed (Y/N):        [x]  Yes  []  No     Date of Patient follow up with Physician:       Is this a Progress Report:     []  Yes  [x]  No        If Yes:  Date Range for reporting period:  Beginning 22  Ending 22    Progress report will be due (10 Rx or 30 days whichever is less):        Recertification will be due (POC Duration  / 90 days whichever is less): 23         Visit # Insurance Allowable Auth Required   8   Medicare  []  Yes []  No        Functional Scale:   FOTO shoulder 57  Date assessed: 22  FOTO shoulder 43  Date assessed:  22     Latex Allergy:  [x]NO      []YES  Preferred Language for Healthcare:   [x]English       []other:    Pain level:  0/10 R shoulder, 3-4/10 R neck 1/5     SUBJECTIVE:   Pt states she experienced increased R neck pain  that she woke up with. Pt also was experiencing headache on R side. Pt rested and symptoms improved by neck day. R shoulder continues to do well but still has mild pain with reaching back and end range overhead motion. States d/t shoulder improvement she has been sleeping better. Pt arrived with cane on R that she has been using per MD recommendation d/t R LE pain.      OBJECTIVE: Updated below on    Observation:    Functional Mobility/Transfers: Independent and no excessive compensatory movement with AROM of R shoulder 12/19     Posture:  Forward head, rounded shoulders, bilateral scapular abduction 12/19     Test measurements:   Joint mobility:               [x]Normal   R glenohumeral joint 12/19              []Hypo              []Hyper    Palpation: Tenderness/ tightness R suboccipitals, R cervical paraspinals and proximal SCM 1/5  ROM PROM AROM  Comment     L R 12/19 L R 12/19     Flexion 152 172 pain 130 deg 152 deg pain     Abduction 180 180  166 deg  169 deg pain      ER 97 100         IR 60 73 T7 T8      Cervical flexion     40 deg       Cervical ext     40 deg pain        Cervical lateral flexion     28 deg pain on R  32 deg pain      Cervical rotation      62 deg  55 deg            Strength L R 12/19 Comment   Flexion 4- 4 pain     Abduction 4 4- pain      ER 5 5     IR 5 5     Biceps 5 5     Triceps 5 5         RESTRICTIONS/PRECAUTIONS: HTN, Type 2 diabetes    Exercises/Interventions:   Exercises:  Exercise/Equipment Resistance/Repetitions Other comments   Stretching/PROM     UT stretch Cont with HEP, withheld d/t no excessive tightness nor guarding noted 1/5   Levator stretch Cont with HEP, withheld d/t no excessive tightness nor guarding noted 1/5   Pulleys Withheld d/t progress 12/2   Pec stretch  D/C    Cane flexion Cont with HEP 12/19    Chin tuck  2x10 Start 1/5              Isometrics     Retraction D/c 12/19        Weight shift     Flexion     Abduction     External Rotation     Internal Rotation     Biceps     Triceps          PRE's     Standing shoulder Flexion 1# 3x10 bilat ^1/5 45 deg scaption, 90 deg flexion, cues to avoid shoulder hike    Standing Abduction 1# 3x10 bilat ^1/5 palm down, 90 deg abd   Side lying abduction Progressed with activities and weight in standing 1/5    External Rotation 2# 3x10 R 1/5 side lying, attempted 3# but withheld d/t pt reporting too heavy    Internal Rotation     PNF D2  2# 2x10 R ^1/5 supine    EXT     Reverse Flys Serratus     Horizontal Abd with ER     Biceps     Triceps     Retraction          Cable Column/Theraband     External Rotation Withheld d/t progress and focus on scapular exercises below 1/5   Internal Rotation Withheld d/t progress and focus on scapular exercises below 1/5   Rows with scapular activation Black TB 3x10 bilat ^1/5    Shoulder Ext with scapular activation  Blue TB 3x10 bilat ^1/5    Supine HABD D/c based on progress, completed scapular exercises in more functional positions 12/189   Scapular Retraction with ER Red TB 2x10 bilat Start 1/5 standing against wall with towel roll between scapula   Low abd Red TB 2x10 bilat Start 1/5 standing against wall with towel roll between scapula    TRIC     PNF          Dynamic Stability          Postural feedback Reviewed proper posture in unsupported seated posture x5' Start 1/5         Manual interventions     STM R suboccipitals, R proximal SCM, R cervical paraspinals x8'   1/5   PROM Cervical lateral flexion to L with R shoulder stabilization x2' 1/5   Adjusted cane and educated pt to use on L side d/t R LE while walking around clinic 1/5       Therapeutic Exercise and NMR EXR  [x] (78144) Provided verbal/tactile cueing for activities related to strengthening, flexibility, endurance, ROM  for improvements in scapular, scapulothoracic and UE control with self care, reaching, carrying, lifting, house/yardwork, driving/computer work.    [] (77219) Provided verbal/tactile cueing for activities related to improving balance, coordination, kinesthetic sense, posture, motor skill, proprioception  to assist with  scapular, scapulothoracic and UE control with self care, reaching, carrying, lifting, house/yardwork, driving/computer work.     Therapeutic Activities:    [] (54301 or 15221) Provided verbal/tactile cueing for activities related to improving balance, coordination, kinesthetic sense, posture, motor skill, proprioception and motor activation to allow for proper function of scapular, scapulothoracic and UE control with self care, carrying, lifting, driving/computer work. Home Exercise Program:    [x] (75426) Reviewed/Progressed HEP activities related to strengthening, flexibility, endurance, ROM of scapular, scapulothoracic and UE control with self care, reaching, carrying, lifting, house/yardwork, driving/computer work  [] (96579) Reviewed/Progressed HEP activities related to improving balance, coordination, kinesthetic sense, posture, motor skill, proprioception of scapular, scapulothoracic and UE control with self care, reaching, carrying, lifting, house/yardwork, driving/computer work      Manual Treatments:  PROM / STM / Oscillations-Mobs:  G-I, II, III, IV (PA's, Inf., Post.)  [x] (65896) Provided manual therapy to mobilize soft tissue/joints of cervical/CT, scapular GHJ and UE for the purpose of modulating pain, promoting relaxation,  increasing ROM, reducing/eliminating soft tissue swelling/inflammation/restriction, improving soft tissue extensibility and allowing for proper ROM for normal function with self care, reaching, carrying, lifting, house/yardwork, driving/computer work    Modalities:      Charges:  Timed Code Treatment Minutes: 42'   Total Treatment Minutes: 11:42-11:28  46'       [] EVAL (LOW) 67415 (typically 20 minutes face-to-face)  [] EVAL (MOD) 77127 (typically 30 minutes face-to-face)  [] EVAL (HIGH) 04446 (typically 45 minutes face-to-face)  [] RE-EVAL     [x] PG(38157) x 1    [] IONTO  [x] NMR (48061) x 1    [] VASO  [x] Manual (21354) x  1    [] Other:  [] TA x      [] Mech Traction (53140)  [] ES(attended) (23171)      [] ES (un) (68407):     GOALS:  Patient stated goal: Move R UE and turn head without pain or limitations    [x] Progressing: [] Met: [] Not Met: [] Adjusted     Therapist goals for Patient:   Short Term Goals: To be achieved in: 4 weeks  1.  Independent in HEP and progression per patient tolerance, in order to prevent re-injury. [] Progressing: [x] Met: [] Not Met: [] Adjusted   2. Patient will have a decrease in pain to facilitate improvement in movement, function, and ADLs as indicated by Functional Deficits. [] Progressing: [x] Met: [] Not Met: [] Adjusted     Long Term Goals: To be achieved in: 8 weeks  1. Patient will score 58 or higher on FOTO shoulder assessment indicating subjective improvement in function. [x] Progressing: [] Met: [] Not Met: [] Adjusted  2. Patient will demonstrate increased R shoulder AROM to 130 deg flexion and abduction in order to reach over to get something off a shelf without pain or limitations. [] Progressing: [x] Met: [] Not Met: [] Adjusted  3. Patient will demonstrate an increase in R UE strength that is equal to L and pain free in order to lift groceries over 5 lbs without pain or limitations. [x] Progressing: [] Met: [] Not Met: [] Adjusted  4. Patient will return have increase in cervical ROM in order to look over her shoulder while driving without pain or limitations. [x] Progressing: [] Met: [] Not Met: [] Adjusted  5. Patient will have decrease in symptoms allowing her to lay on R side and sleep through the night without increase in symptoms. [x] Progressing: [] Met: [] Not Met: [] Adjusted         Overall Progression Towards Functional goals/ Treatment Progress Update:  [] Patient is progressing as expected towards functional goals listed. [] Progression is slowed due to complexities/Impairments listed. [] Progression has been slowed due to co-morbidities. [] Plan just implemented, too soon to assess goals progression <30days   [] Goals require adjustment due to lack of progress  [] Patient is not progressing as expected and requires additional follow up with physician  [x] Patient has missed last 2 weeks d/t being ill but no regression noted.   She is progressing well with increase in R shoulder and cervical mobility as well as R UE strength allowing for decrease in severity of symptoms and increase in function. Prognosis for POC: [x] Good [] Fair  [] Poor      Patient requires continued skilled intervention: [x] Yes  [] No    Treatment/Activity Tolerance:  [x] Patient able to complete treatment  [] Patient limited by fatigue  [] Patient limited by pain     [] Patient limited by other medical complications  [] Other: 1/5 Pt presented with decrease tension and tenderness at R UT and levator compared to last pt visit however she continues to have tenderness and tightness at R suboccipitals and proximal SCM contributing to continued neck pain and headaches. Based on presentation of symptoms continued with manual therapy which she responded well to. Emphasis placed on activation of cervical flexors and scapular muscles to improve posture and decrease stress placed on cervical muscles. Pt responded well to treatment with appropriate fatigue and no adverse effects. Patient Education:              1/5 Time spent reviewing proper scapular activation and seated posture to decrease stress placed on cervical muscles and improve symptoms. Educated on proper height of cane and use of cane on L side. Updated HEP   12/19 Updated HEP. After treatment educated pt on how to use unilateral cane on L based on R LE pain. 12/2 Updated HEP   11/23 Updated HEP  11/17 Updated HEP based on presentation of symptoms and tolerance to treatment   11/14 Updated HEP based on ROM progress and tolerance to treatment. 11/11 Educated on objective findings and POC. Reviewed proper posture, precautions and use of ice for symptom control.      HEP instruction:   Access Code: [id number]      PLAN: See eval  [x] Continue per plan of care [] Alter current plan (see comments above)  [] Plan of care initiated [] Hold pending MD visit [] Discharge  1/5 Complete progress note NPV    Electronically signed by:  Edwina Thao PT    Note: If patient does not return for scheduled/ recommended follow up visits, this note will serve as a discharge from care along with most recent update on progress.

## 2023-01-11 ENCOUNTER — OFFICE VISIT (OUTPATIENT)
Dept: PRIMARY CARE CLINIC | Age: 68
End: 2023-01-11
Payer: MEDICARE

## 2023-01-11 ENCOUNTER — HOSPITAL ENCOUNTER (OUTPATIENT)
Dept: WOMENS IMAGING | Age: 68
Discharge: HOME | End: 2023-01-11
Payer: MEDICARE

## 2023-01-11 VITALS
DIASTOLIC BLOOD PRESSURE: 72 MMHG | OXYGEN SATURATION: 98 % | SYSTOLIC BLOOD PRESSURE: 116 MMHG | BODY MASS INDEX: 28.35 KG/M2 | HEART RATE: 68 BPM | WEIGHT: 155 LBS

## 2023-01-11 VITALS — WEIGHT: 153 LBS | BODY MASS INDEX: 28.16 KG/M2 | HEIGHT: 62 IN

## 2023-01-11 DIAGNOSIS — M81.0 OSTEOPOROSIS, UNSPECIFIED OSTEOPOROSIS TYPE, UNSPECIFIED PATHOLOGICAL FRACTURE PRESENCE: ICD-10-CM

## 2023-01-11 DIAGNOSIS — F33.1 MODERATE EPISODE OF RECURRENT MAJOR DEPRESSIVE DISORDER (HCC): ICD-10-CM

## 2023-01-11 DIAGNOSIS — G89.29 CHRONIC PAIN OF RIGHT LOWER EXTREMITY: ICD-10-CM

## 2023-01-11 DIAGNOSIS — Z23 NEED FOR PNEUMOCOCCAL VACCINATION: ICD-10-CM

## 2023-01-11 DIAGNOSIS — Z12.31 VISIT FOR SCREENING MAMMOGRAM: ICD-10-CM

## 2023-01-11 DIAGNOSIS — R51.9 FREQUENT HEADACHES: ICD-10-CM

## 2023-01-11 DIAGNOSIS — E78.2 MIXED HYPERLIPIDEMIA: ICD-10-CM

## 2023-01-11 DIAGNOSIS — M79.604 CHRONIC PAIN OF RIGHT LOWER EXTREMITY: ICD-10-CM

## 2023-01-11 DIAGNOSIS — E11.42 TYPE 2 DIABETES MELLITUS WITH DIABETIC POLYNEUROPATHY, WITHOUT LONG-TERM CURRENT USE OF INSULIN (HCC): Primary | ICD-10-CM

## 2023-01-11 DIAGNOSIS — I10 ESSENTIAL HYPERTENSION: ICD-10-CM

## 2023-01-11 DIAGNOSIS — K21.9 GASTROESOPHAGEAL REFLUX DISEASE, UNSPECIFIED WHETHER ESOPHAGITIS PRESENT: ICD-10-CM

## 2023-01-11 LAB — HBA1C MFR BLD: 6.1 %

## 2023-01-11 PROCEDURE — 1036F TOBACCO NON-USER: CPT | Performed by: INTERNAL MEDICINE

## 2023-01-11 PROCEDURE — 90677 PCV20 VACCINE IM: CPT | Performed by: INTERNAL MEDICINE

## 2023-01-11 PROCEDURE — 1123F ACP DISCUSS/DSCN MKR DOCD: CPT | Performed by: INTERNAL MEDICINE

## 2023-01-11 PROCEDURE — 3044F HG A1C LEVEL LT 7.0%: CPT | Performed by: INTERNAL MEDICINE

## 2023-01-11 PROCEDURE — G8417 CALC BMI ABV UP PARAM F/U: HCPCS | Performed by: INTERNAL MEDICINE

## 2023-01-11 PROCEDURE — G8399 PT W/DXA RESULTS DOCUMENT: HCPCS | Performed by: INTERNAL MEDICINE

## 2023-01-11 PROCEDURE — G8484 FLU IMMUNIZE NO ADMIN: HCPCS | Performed by: INTERNAL MEDICINE

## 2023-01-11 PROCEDURE — 99214 OFFICE O/P EST MOD 30 MIN: CPT | Performed by: INTERNAL MEDICINE

## 2023-01-11 PROCEDURE — 2022F DILAT RTA XM EVC RTNOPTHY: CPT | Performed by: INTERNAL MEDICINE

## 2023-01-11 PROCEDURE — 3017F COLORECTAL CA SCREEN DOC REV: CPT | Performed by: INTERNAL MEDICINE

## 2023-01-11 PROCEDURE — 3078F DIAST BP <80 MM HG: CPT | Performed by: INTERNAL MEDICINE

## 2023-01-11 PROCEDURE — G8427 DOCREV CUR MEDS BY ELIG CLIN: HCPCS | Performed by: INTERNAL MEDICINE

## 2023-01-11 PROCEDURE — 3074F SYST BP LT 130 MM HG: CPT | Performed by: INTERNAL MEDICINE

## 2023-01-11 PROCEDURE — 83036 HEMOGLOBIN GLYCOSYLATED A1C: CPT | Performed by: INTERNAL MEDICINE

## 2023-01-11 PROCEDURE — G0009 ADMIN PNEUMOCOCCAL VACCINE: HCPCS | Performed by: INTERNAL MEDICINE

## 2023-01-11 PROCEDURE — 77067 SCR MAMMO BI INCL CAD: CPT

## 2023-01-11 PROCEDURE — 1090F PRES/ABSN URINE INCON ASSESS: CPT | Performed by: INTERNAL MEDICINE

## 2023-01-11 SDOH — ECONOMIC STABILITY: FOOD INSECURITY: WITHIN THE PAST 12 MONTHS, YOU WORRIED THAT YOUR FOOD WOULD RUN OUT BEFORE YOU GOT MONEY TO BUY MORE.: NEVER TRUE

## 2023-01-11 SDOH — ECONOMIC STABILITY: FOOD INSECURITY: WITHIN THE PAST 12 MONTHS, THE FOOD YOU BOUGHT JUST DIDN'T LAST AND YOU DIDN'T HAVE MONEY TO GET MORE.: NEVER TRUE

## 2023-01-11 ASSESSMENT — PATIENT HEALTH QUESTIONNAIRE - PHQ9
8. MOVING OR SPEAKING SO SLOWLY THAT OTHER PEOPLE COULD HAVE NOTICED. OR THE OPPOSITE, BEING SO FIGETY OR RESTLESS THAT YOU HAVE BEEN MOVING AROUND A LOT MORE THAN USUAL: 0
SUM OF ALL RESPONSES TO PHQ QUESTIONS 1-9: 0
5. POOR APPETITE OR OVEREATING: 0
9. THOUGHTS THAT YOU WOULD BE BETTER OFF DEAD, OR OF HURTING YOURSELF: 0
SUM OF ALL RESPONSES TO PHQ QUESTIONS 1-9: 0
3. TROUBLE FALLING OR STAYING ASLEEP: 0
SUM OF ALL RESPONSES TO PHQ9 QUESTIONS 1 & 2: 0
6. FEELING BAD ABOUT YOURSELF - OR THAT YOU ARE A FAILURE OR HAVE LET YOURSELF OR YOUR FAMILY DOWN: 0
1. LITTLE INTEREST OR PLEASURE IN DOING THINGS: 0
SUM OF ALL RESPONSES TO PHQ QUESTIONS 1-9: 0
2. FEELING DOWN, DEPRESSED OR HOPELESS: 0
4. FEELING TIRED OR HAVING LITTLE ENERGY: 0
SUM OF ALL RESPONSES TO PHQ QUESTIONS 1-9: 0
10. IF YOU CHECKED OFF ANY PROBLEMS, HOW DIFFICULT HAVE THESE PROBLEMS MADE IT FOR YOU TO DO YOUR WORK, TAKE CARE OF THINGS AT HOME, OR GET ALONG WITH OTHER PEOPLE: 0
7. TROUBLE CONCENTRATING ON THINGS, SUCH AS READING THE NEWSPAPER OR WATCHING TELEVISION: 0

## 2023-01-11 ASSESSMENT — SOCIAL DETERMINANTS OF HEALTH (SDOH): HOW HARD IS IT FOR YOU TO PAY FOR THE VERY BASICS LIKE FOOD, HOUSING, MEDICAL CARE, AND HEATING?: NOT HARD AT ALL

## 2023-01-11 NOTE — PROGRESS NOTES
Xavier Constantino   Date ofBirth:  1955    Date of Visit:  1/11/2023    Chief Complaint   Patient presents with    Diabetes    Hypertension    Gastroesophageal Reflux    Leg Pain     Patient states Tramadol makes her nauseous. Cholesterol Problem    Other     Handicap placard letter       HPI  Patient has Type 2 Diabetes. Patient takes Trulicity 6.52 mg subcutaneously once a week. Patient monitors her blood sugar fasting and states this morning it was 111 and averages 100. Patient decreases carbohydrates. Patient is not exercising. Patient has hyperlipidemia. Patient states she stopped cholesterol medication 2 weeks ago and wanted to see what labs are. Patient decreases fat and cholesterol. Patient has hypertension. Patient takes clonidine 0.1 mg/24hr patch weekly. Patient decreases salt. Patient has GERD. Patient takes pantoprazole 40 mg once daily. Patient denies heartburn and reflux. Patient states she likes spicy food but watches. Patient eats tomato based food. Patient states she drinks 2 espressos per day. Patient has chronic right leg pain. Patient takes Gabapentin 300mg nightly. Patient states leg pain is burning and dull achy. Patient is using a cane. Patient has osteoporosis. Patient had Dexa Scan done on 12/12/22. Patient states she has taken Alendronate once a week in the past and thought it was a pain to take it. Patient states she feels a lot of depression. Patient states she has crying spells. Patient declines medication. Patient states she had side effects with Tramadol and it made her nauseated. Patient states it helps her pain and she is sleeping better. Patient also takes Celebrex once daily. Patient states she has had headaches for many years. Headaches are a little better. Headaches on side and back of head feels like tingling in the back of head and start stabbing on right side of head.  Headaches back of head daily and right side of head once a week. Patient states she likes to stay in dark when headache right side and stay still. Patient states she has done Botox injections in the past for headaches. Review of Systems   Constitutional:  Negative for chills, fatigue and fever. HENT:  Negative for congestion, ear pain, postnasal drip, rhinorrhea, sinus pressure, sinus pain, sneezing and sore throat. Eyes:  Negative for photophobia and visual disturbance. Respiratory:  Negative for cough, chest tightness, shortness of breath and wheezing. Cardiovascular:  Negative for chest pain, palpitations and leg swelling. Gastrointestinal:  Positive for nausea. Negative for abdominal pain, blood in stool, constipation, diarrhea and vomiting. Genitourinary:  Negative for dysuria, frequency and hematuria. Musculoskeletal:  Positive for back pain and myalgias. Negative for arthralgias and gait problem. Skin:  Negative for rash. Neurological:  Positive for numbness and headaches. Negative for dizziness, tremors, syncope, facial asymmetry, weakness and light-headedness. Psychiatric/Behavioral:  Positive for dysphoric mood. Negative for sleep disturbance. The patient is not nervous/anxious. Allergies   Allergen Reactions    Aspirin      bruising    Darvocet [Propoxyphene N-Acetaminophen] Hives    Effexor [Venlafaxine Hydrochloride] Hives    Hydrocodone Hives and Itching    Ibuprofen     Keflex [Cephalexin] Itching     Face redness. Paxil Cr [Paroxetine Hcl Er] Itching     Outpatient Medications Marked as Taking for the 1/11/23 encounter (Office Visit) with Gian Hall MD   Medication Sig Dispense Refill    traMADol (ULTRAM) 50 MG tablet Take 1 tablet by mouth every 8 hours as needed for Pain for up to 7 days.  Max Daily Amount: 150 mg 21 tablet 0    cetirizine (ZYRTEC) 10 MG tablet Take 1 tablet by mouth daily 30 tablet 2    ondansetron (ZOFRAN-ODT) 4 MG disintegrating tablet Take 1 tablet by mouth 3 times daily as needed for Nausea or Vomiting 21 tablet 0    celecoxib (CELEBREX) 200 MG capsule Take 1 capsule by mouth daily 30 capsule 1    lidocaine (LIDODERM) 5 % Place 1 patch onto the skin daily 12 hours on, 12 hours off. 30 patch 0    acetaminophen (TYLENOL) 500 MG tablet Take 1 tablet by mouth 4 times daily as needed for Pain 120 tablet 0    budesonide-formoterol (SYMBICORT) 160-4.5 MCG/ACT AERO USE 2 INHALATIONS ORALLY   TWICE DAILY 30.6 g 1    cloNIDine (CATAPRES) 0.1 MG/24HR PTWK APPLY 1 PATCH ONTO THE SKINONCE A WEEK 12 patch 1    docusate sodium (COLACE) 100 MG capsule Take 1 capsule by mouth 2 times daily 60 capsule 1    bisacodyl 5 MG EC tablet Take 1-2 tablets daily as needed 30 tablet 0    ketoconazole (NIZORAL) 2 % cream APPLY topically TO bottom of BOTH FEET AND between TOES DAILY FOR 3 TO 4 WEEKS      pantoprazole (PROTONIX) 40 MG tablet Take 1 tablet by mouth daily 90 tablet 1    atorvastatin (LIPITOR) 20 MG tablet Take 1 tablet by mouth daily Take 20 mg by mouth daily 90 tablet 1    diclofenac sodium (VOLTAREN) 1 % GEL Apply 2 g topically 4 times daily 350 g 1    Dulaglutide (TRULICITY) 6.65 FT/4.7YP SOPN INJECT 0.5ML (=0.75 MG)    SUBCUTANEOUSLY ONCE WEEKLY 6 mL 1    fluticasone (FLONASE) 50 MCG/ACT nasal spray 2 sprays by Nasal route daily 48 g 1    Lancets MISC 1 each by Other route daily E11.9 100 each 0    Blood Glucose Monitoring Suppl (TRUE METRIX METER) DMITRIY Test blood sugar daily and PRN E11.9 1 each 0    blood glucose test strips (GLUCOSE METER TEST) strip 100 each by In Vitro route 2 times daily Diabetes   e11.9 100 each 1         Vitals:    01/11/23 1111   BP: 116/72   Pulse: 68   SpO2: 98%   Weight: 155 lb (70.3 kg)     Body mass index is 28.35 kg/m². Physical Exam  Nursing note reviewed. Constitutional:       General: She is not in acute distress. Appearance: Normal appearance. She is well-developed.    HENT:      Right Ear: Tympanic membrane normal.      Left Ear: Tympanic membrane normal. Mouth/Throat:      Pharynx: Oropharynx is clear. Eyes:      General: Lids are normal.      Extraocular Movements: Extraocular movements intact. Conjunctiva/sclera: Conjunctivae normal.      Pupils: Pupils are equal, round, and reactive to light. Neck:      Thyroid: No thyromegaly. Vascular: No carotid bruit. Cardiovascular:      Rate and Rhythm: Normal rate and regular rhythm. Heart sounds: Normal heart sounds, S1 normal and S2 normal. No murmur heard. No friction rub. No gallop. Pulmonary:      Effort: Pulmonary effort is normal. No respiratory distress. Breath sounds: Normal breath sounds. No wheezing, rhonchi or rales. Abdominal:      General: Bowel sounds are normal. There is no distension. Palpations: Abdomen is soft. Tenderness: There is no abdominal tenderness. Musculoskeletal:      Cervical back: Neck supple. Right lower leg: Tenderness present. No edema. Left lower leg: No edema. Lymphadenopathy:      Head:      Right side of head: No submandibular adenopathy. Left side of head: No submandibular adenopathy. Neurological:      Mental Status: She is alert and oriented to person, place, and time. Cranial Nerves: No cranial nerve deficit.    Psychiatric:         Mood and Affect: Mood normal.         Results for POC orders placed in visit on 01/11/23   POCT glycosylated hemoglobin (Hb A1C)   Result Value Ref Range    Hemoglobin A1C 6.1 %     Lab Review   Orders Only on 11/29/2022   Component Date Value    WBC 11/29/2022 6.9     RBC 11/29/2022 5.02     Hemoglobin 11/29/2022 14.3     Hematocrit 11/29/2022 44.2     MCV 11/29/2022 88.1     MCH 11/29/2022 28.5     MCHC 11/29/2022 32.4     RDW 11/29/2022 14.4     Platelets 74/54/0196 251     MPV 11/29/2022 8.8    Orders Only on 11/02/2022   Component Date Value    Organism 11/02/2022 Proteus mirabilis (A)     Urine Culture, Routine 11/02/2022                      Value:<10,000 CFU/ml  No further workup     Orders Only on 10/20/2022   Component Date Value    Carboxyhemoglobin/Hemogl* 10/20/2022 2.2    Orders Only on 10/20/2022   Component Date Value    Magnesium 10/20/2022 2.30     TSH 10/20/2022 1.31     WBC 10/20/2022 5.1     RBC 10/20/2022 5.16     Hemoglobin 10/20/2022 14.7     Hematocrit 10/20/2022 44.3     MCV 10/20/2022 86.0     MCH 10/20/2022 28.5     MCHC 10/20/2022 33.1     RDW 10/20/2022 13.0     Platelets 19/73/5500 206     MPV 10/20/2022 8.9     Neutrophils % 10/20/2022 59.2     Lymphocytes % 10/20/2022 31.2     Monocytes % 10/20/2022 7.4     Eosinophils % 10/20/2022 1.7     Basophils % 10/20/2022 0.5     Neutrophils Absolute 10/20/2022 3.0     Lymphocytes Absolute 10/20/2022 1.6     Monocytes Absolute 10/20/2022 0.4     Eosinophils Absolute 10/20/2022 0.1     Basophils Absolute 10/20/2022 0.0     Sodium 10/20/2022 139     Potassium 10/20/2022 4.6     Chloride 10/20/2022 102     CO2 10/20/2022 27     Anion Gap 10/20/2022 10     Glucose 10/20/2022 104 (A)     BUN 10/20/2022 17     Creatinine 10/20/2022 0.7     Est, Glom Filt Rate 10/20/2022 >60     Calcium 10/20/2022 9.3     Total Protein 10/20/2022 6.5     Albumin 10/20/2022 4.7     Albumin/Globulin Ratio 10/20/2022 2.6 (A)     Total Bilirubin 10/20/2022 0.6     Alkaline Phosphatase 10/20/2022 104     ALT 10/20/2022 19     AST 10/20/2022 18    Office Visit on 10/20/2022   Component Date Value    Color, UA 10/20/2022 yellow     Clarity, UA 10/20/2022 clear     Glucose, UA POC 10/20/2022 neg     Bilirubin, UA 10/20/2022 neg     Ketones, UA 10/20/2022 neg     Spec Grav, UA 10/20/2022 1.030     Blood, UA POC 10/20/2022 trace     pH, UA 10/20/2022 5.5     Protein, UA POC 10/20/2022 neg     Urobilinogen, UA 10/20/2022 0.2     Leukocytes, UA 10/20/2022 trace     Nitrite, UA 10/20/2022 neg     Urine Culture, Routine 10/20/2022 <50,000 CFU/ml mixed skin/urogenital james.  No further workup (A)     Organism 10/20/2022 Enterococcus faecalis (A)     Urine Culture, Routine 10/20/2022 50,000 CFU/ml    Orders Only on 10/11/2022   Component Date Value    Microalbumin, Random Uri* 10/11/2022 <1.20     Creatinine, Ur 10/11/2022 102.1     Microalbumin Creatinine * 10/11/2022 see below    Office Visit on 10/11/2022   Component Date Value    Hemoglobin A1C 10/11/2022 6.0      EXAM: DXA BONE DENSITY AXIAL SKELETON  12/12/22       INDICATION: Postmenopause       TECHNICAL: Bone mineral density analysis was performed on a [employer] scanner. ARTIFACTS: There is lumbar spondylosis which may spuriously elevate the measured bone mineral    density. COMPARISON: No prior studies on this scanner. DXA dated 4/23/2015 was performed on a [employer]    scanner. FINDINGS:       Spine (L1-L4):   BMD:  0.920 g/cm2   T-score: -2.2 SD    Z-score: -0.8 SD        Left femoral neck:    BMD:0.697 g/cm2   T-score: -2.4 SD   Z-score: -1.1 SD       Left total hip:    BMD: 0.699 g/cm2   T-score: -2.4 SD    Z-score: -1.3 SD        Right femoral neck:   BMD: 0.714 g/cm2   T-score: -2.3 SD    Z-score: -1.0 SD        Right total hip:   BMD: 0.699 g/cm2   T-score: -2.5 SD    Z-score: -1.3 SD       The following scores are defined as follows:    (1) The T-score is a comparison using standard deviation of the patient versus normal healthy    controls. (2) The Z-score is a comparison using standard deviation of the patient versus normal controls    matched for sex, race and age. Age: 78 years   Gender: Female       IMPRESSION:       Osteoporosis. Fracture risk is increased. FRAX not reported because one or more T scores are at or below -2.5. Recommendations for followup testing   Intervals between BMD testing should be determined according to the clinical status of each    patient; typically one year after initiation or change of therapy is appropriate, with longer    intervals once therapeutic effect is established.        In conditions associated with rapid bone loss, such as glucocorticoid therapy, testing more    frequently is appropriate. Report Verified by: George Stone MD at 12/12/2022 7:38 PM EST         CT head without contrast 12/8/22       HISTORY: Headaches   COMPARISON: none   CONTRAST:  none     TECHNIQUE: Individualized dose optimization technique was used in order to meet ALARA standards for radiation dose reduction. In addition to vendor specific dose reduction algorithms, the dose reduction techniques vary based on the specific scanner    utilized but frequently include automated exposure control, adjustment of the mA and/or kV according to patient size, and use of iterative reconstruction technique. COMMENTS:        Calcification of the basal ganglia. Mild periventricular microangiopathy. No evidence of acute infarction, hemorrhage, hydrocephalus, or intracranial mass. Retention cyst in the right sphenoid sinus. Mastoid air cells are clear. Impression       No acute intracranial abnormality. Assessment/Plan     1. Type 2 diabetes mellitus with diabetic polyneuropathy, without long-term current use of insulin (HCC)  -Hemoglobin A1c of 6.1% shows diabetes is well controlled  -Continue Trulicity 8.95 mg subcutaneously once a week  -Limit carbohydrates to 45 grams with meals and 15 grams with snacks  -monitor blood sugars  -goal for blood sugar fasting or pre-meal  is   -goal for blood sugar 2 hours after a meal is less than 180  -goal for blood sugar at bedtime is less than 150  -Regular aerobic exercise  -POCT glycosylated hemoglobin (Hb A1C)    2. Essential hypertension  -stable  -Continue clonidine 0.1 mg/24hr patch weekly  -Low sodium diet  -Regular aerobic exercise    3. Mixed hyperlipidemia  -patient stopped Atorvastatin 2 weeks ago and will need to restart a statin if LDL is not at goal off medication  -Low fat, low cholesterol diet  -Regular aerobic exercise  - Lipid Panel; Future    4.  Gastroesophageal reflux disease, unspecified whether esophagitis present  -stable  -Continue pantoprazole 40 mg once daily  -Decrease caffeine, avoid spicy foods, avoid tomato based foods  -Eat small meals instead of large meals  -Wait 2-3 hours after eating before lying down     5. Chronic pain of right lower extremity  -same   -continue Gabapentin 300mg nightly  -Discontinue Tramadol due to side effects  -continue care per Orthopedics    6. Osteoporosis, unspecified osteoporosis type, unspecified pathological fracture presence  -Dexa scan done on 12/12/22 shows osteoporosis  -patient doesn't want to take Alendronate  -Calcium with vitamin D 600mg-400 IU twice daily  - Bailee Servin MD, Endocrinology, [facility]    7. Moderate episode of recurrent major depressive disorder [facility])  -patient declines medication  -Referral to Psychology, Dr. Chay Suh     8. Frequent headaches  - Head CT scan done on 12/8/22 is normal  - patient takes chronic pain medication  - Referral to  [place] [id number], Alice Rinaldi MD, Neurology, [facility]    9. Need for pneumococcal vaccination  - Pneumococcal, PCV20, PREVNAR 21, (age 25 yrs+), IM, PF given      Discussed medications with patient, who voiced understanding of their use and indications. All questions answered. Patient given letter for handicap parking placard. Return in about 3 months (around 4/11/2023) for diabetes, hypertension, hyperlipidemia, GERD, and chronic leg pain.

## 2023-01-11 NOTE — LETTER
[address] [place] [address]  Phone: [phone]  Fax: [phone]    Fabricio Glaser MD         January 11, 2023     Patient: Tootie Neves   YOB: 1955   Date of Visit: 1/11/2023       To Whom It May Concern: It is my medical opinion that Tootie Neves requires a disability parking placard for the following reasons:  She has limited walking ability due to an orthopedic condition. Duration of need: 5 years    If you have any questions or concerns, please don't hesitate to call.     Sincerely,        Fabricio Glaser MD

## 2023-01-12 ENCOUNTER — HOSPITAL ENCOUNTER (OUTPATIENT)
Dept: PHYSICAL THERAPY | Age: 68
Setting detail: THERAPIES SERIES
Discharge: HOME | End: 2023-01-12
Payer: MEDICARE

## 2023-01-12 ENCOUNTER — TELEPHONE (OUTPATIENT)
Dept: ENDOCRINOLOGY | Age: 68
End: 2023-01-12

## 2023-01-12 PROCEDURE — 97110 THERAPEUTIC EXERCISES: CPT

## 2023-01-12 PROCEDURE — 97112 NEUROMUSCULAR REEDUCATION: CPT

## 2023-01-12 NOTE — TELEPHONE ENCOUNTER
Patient is scheduled for a NP appointment with Dr. Willow Jovel on 3/16/23 @ 10:30 am.    Patient has been advised to bring any vitamins and/or supplements to the appointment.

## 2023-01-12 NOTE — DISCHARGE SUMMARY
[facility] and [address] [place] [address], [address] [place] [address] 3360 [facility], [address] [place] [address]  Phone: [phone]   Fax:     [phone]   Physical Therapy Discharge Summary    Dear Dr. Nicholas Castellanos,    We had the pleasure of treating the following patient for physical therapy services at [address] [place] [address]. A summary of our findings can be found in the discharge summary below. If you have any questions or concerns regarding these findings, please do not hesitate to contact me at the office phone number checked above. Thank you for the referral.     Physician Signature:________________________________Date:__________________  By signing above (or electronic signature), therapists plan is approved by physician      Overall Response to Treatment:   [x]Patient is responding well to treatment and improvement is noted with regards  to increase in shoulder mobility and strength. Based on objective and subjective improvements as well as competence with HEP she is appropriate for discharge from PT for R shoulder pain.     []Patient should continue to improve in reasonable time if they continue HEP   []Patient has plateaued and is no longer responding to skilled PT intervention    []Patient is getting worse and would benefit from return to referring MD   []Patient unable to adhere to initial POC   []Other:     Physical Therapy Daily Treatment Note  Date:  2023    Patient Name:  Brina Medina    :  1955  MRN: [id number]  Restrictions/Precautions:    Medical/Treatment Diagnosis Information:  Diagnosis: M75.81 (ICD-10-CM) - Rotator cuff tendinitis, right  Treatment Diagnosis: M25.511 Right shoulder pain, M54.2 Cervicalgia  Insurance/Certification information:  PT Insurance Information: Medicare  Physician Information:   Christy Purcell MD  Has the plan of care been signed (Y/N):        [x]  Yes  []  No     Date of Patient follow up with Physician:       Is this a Progress Report:     [x]  Yes  []  No        If Yes:  Date Range for reporting period:  Beginning 11/11/22  Ending 1/12/23    Progress report will be due (10 Rx or 30 days whichever is less):       Recertification will be due (POC Duration  / 90 days whichever is less):          Visit # Insurance Allowable Auth Required   9 1/12 Medicare  []  Yes []  No        Functional Scale:   FOTO shoulder 81  Date assessed: 1/12/23  FOTO shoulder 57  Date assessed: 12/19/22  FOTO shoulder 43  Date assessed:  11/11/22     Latex Allergy:  [x]NO      []YES  Preferred Language for Healthcare:   [x]English       []other:    Pain level:  0/10 R shoulder, 3/10 R neck 1/12     SUBJECTIVE:  1/12: Pt states shoulder is doing well and not having any pain. Once a week will have R sided neck pain that will cause headache. Pt reports having headache 1/9 causing her to call PCP who is referring her to neurologist.     OBJECTIVE: Updated below on 12/19   Observation:    Functional Mobility/Transfers: Independent and no excessive compensatory movement with AROM of R shoulder 12/19     Posture:  Forward head, rounded shoulders, bilateral scapular abduction, R shoulder mild protraction and depression compared to L 1/12     Test measurements:   Joint mobility:               [x]Normal   R glenohumeral joint 1/12              [x]Hypo R cervical facets with P-A pt supine 1/12               []Hyper    Palpation: Tenderness R suboccipitals, R proximal SCM 1/12   ROM PROM AROM  Comment     L R 1/12 L R 1/12     Flexion 152 172 pain 130 deg 152 deg mild pain R nec     Abduction 180 180  166 deg  175 deg      ER 97 105         IR 60 75 T7 T7     Cervical flexion     45 deg       Cervical ext     42 deg pain        Cervical lateral flexion     32 deg pain on R  30 deg pain R      Cervical rotation      52 deg pain Left  62 deg pain R and dizziness reported            Strength L R 1/12 Comment   Flexion 4- 4+ pain R side of neck      Abduction 4 4 pain shoulder     ER 5 5     IR 5 5     Biceps 5 5     Triceps 5 5         RESTRICTIONS/PRECAUTIONS: HTN, Type 2 diabetes    Exercises/Interventions:   Exercises:  Exercise/Equipment Resistance/Repetitions Other comments   Stretching/PROM     UT stretch 30\"hx3 R 1/12 seaeted   Levator stretch 30\"hx3 R 1/12 seated   Pulleys Withheld d/t progress 12/2   Pec stretch  D/C    Cane flexion Cont with HEP 12/19    Chin tuck  10\"hx10 ^1/12             Isometrics     Retraction D/c 12/19        Weight shift     Flexion     Abduction     External Rotation     Internal Rotation     Biceps     Triceps          PRE's     Standing shoulder Flexion 2# 2x10 bilat ^1/1245 deg scaption, 90 deg flexion,    Standing Abduction 2# 2x10 bilat ^1/12 palm down, 90 deg abd   Side lying abduction Progressed with activities and weight in standing 1/5    External Rotation 2# 3x10 R 1/5 side lying, attempted 3# but withheld d/t pt reporting too heavy    Internal Rotation     PNF D2  Withheld d/t progress 1/12    EXT     Reverse Flys     Serratus     Horizontal Abd with ER     Biceps     Triceps     Retraction          Cable Column/Theraband     Rows with scapular activation Black TB 3x10 bilat ^1/5    Shoulder Ext with scapular activation  Black TB 3x10 bilat ^1/12   Scapular Retraction with ER Green TB 2x10 bilat ^1/12 seated unsupported   Low abd Green TB 2x10 bilat ^1/12 standing unsupported    TRIC     PNF          Dynamic Stability          Postural feedback          Manual interventions     STM     PROM           Therapeutic Exercise and NMR EXR  [x] (80908) Provided verbal/tactile cueing for activities related to strengthening, flexibility, endurance, ROM  for improvements in scapular, scapulothoracic and UE control with self care, reaching, carrying, lifting, house/yardwork, driving/computer work.    [] (54433) Provided verbal/tactile cueing for activities related to improving balance, coordination, kinesthetic sense, posture, motor skill, proprioception  to assist with  scapular, scapulothoracic and UE control with self care, reaching, carrying, lifting, house/yardwork, driving/computer work. Therapeutic Activities:    [] (35033 or 24517) Provided verbal/tactile cueing for activities related to improving balance, coordination, kinesthetic sense, posture, motor skill, proprioception and motor activation to allow for proper function of scapular, scapulothoracic and UE control with self care, carrying, lifting, driving/computer work.      Home Exercise Program:    [x] (50993) Reviewed/Progressed HEP activities related to strengthening, flexibility, endurance, ROM of scapular, scapulothoracic and UE control with self care, reaching, carrying, lifting, house/yardwork, driving/computer work  [] (04858) Reviewed/Progressed HEP activities related to improving balance, coordination, kinesthetic sense, posture, motor skill, proprioception of scapular, scapulothoracic and UE control with self care, reaching, carrying, lifting, house/yardwork, driving/computer work      Manual Treatments:  PROM / STM / Oscillations-Mobs:  G-I, II, III, IV (PA's, Inf., Post.)  [x] (03102) Provided manual therapy to mobilize soft tissue/joints of cervical/CT, scapular GHJ and UE for the purpose of modulating pain, promoting relaxation,  increasing ROM, reducing/eliminating soft tissue swelling/inflammation/restriction, improving soft tissue extensibility and allowing for proper ROM for normal function with self care, reaching, carrying, lifting, house/yardwork, driving/computer work    Modalities:      Charges:  Timed Code Treatment Minutes: 38'   Total Treatment Minutes: 11:35-12:21  46'       [] EVAL (LOW) 01047 (typically 20 minutes face-to-face)  [] EVAL (MOD) 44760 (typically 30 minutes face-to-face)  [] EVAL (HIGH) 69685 (typically 45 minutes face-to-face)  [] RE-EVAL     [x] KM(09357) x 2    [] IONTO  [x] NMR (65694) x 1    [] VASO  [] Manual ([id number]) x     [] Other:  [] TA x      [] [employer] [employer] (69552)  [] ES(attended) (34750)      [] ES (un) (87303):     GOALS:  Patient stated goal: Move R UE and turn head without pain or limitations    [x] Progressing: [] Met: [] Not Met: [] Adjusted     Therapist goals for Patient:   Short Term Goals: To be achieved in: 4 weeks  1. Independent in HEP and progression per patient tolerance, in order to prevent re-injury. [] Progressing: [x] Met: [] Not Met: [] Adjusted   2. Patient will have a decrease in pain to facilitate improvement in movement, function, and ADLs as indicated by Functional Deficits. [] Progressing: [x] Met: [] Not Met: [] Adjusted     Long Term Goals: To be achieved in: 8 weeks  1. Patient will score 58 or higher on FOTO shoulder assessment indicating subjective improvement in function. [x] Progressing: [] Met: [] Not Met: [] Adjusted  2. Patient will demonstrate increased R shoulder AROM to 130 deg flexion and abduction in order to reach over to get something off a shelf without pain or limitations. [] Progressing: [x] Met: [] Not Met: [] Adjusted  3. Patient will demonstrate an increase in R UE strength that is equal to L and pain free in order to lift groceries over 5 lbs without pain or limitations. [x] Progressing: [] Met: [] Not Met: [] Adjusted  4. Patient will return have increase in cervical ROM in order to look over her shoulder while driving without pain or limitations. [x] Progressing: [] Met: [] Not Met: [] Adjusted  5. Patient will have decrease in symptoms allowing her to lay on R side and sleep through the night without increase in symptoms. [] Progressing: [x] Met: [] Not Met: [] Adjusted         Overall Progression Towards Functional goals/ Treatment Progress Update:  [x] Patient is progressing as expected towards functional goals listed with regards to R shoulder ROM and strength allowing for decrease in pain and increase in function.  She continues to have cervical restrictions and pain that are as not as severe as they were originally however d/t continued symptoms she has been referred to neurologist by PCP. Based on improvements at R shoulder and being referred to neurologist for neck she is appropriate for discharge at this time. [] Progression is slowed due to complexities/Impairments listed. [] Progression has been slowed due to co-morbidities. [] Plan just implemented, too soon to assess goals progression <30days   [] Goals require adjustment due to lack of progress  [] Patient is not progressing as expected and requires additional follow up with physician  [] Patient has missed last 2 weeks d/t being ill but no regression noted. She is progressing well with increase in R shoulder and cervical mobility as well as R UE strength allowing for decrease in severity of symptoms and increase in function. Prognosis for POC: [x] Good [] Fair  [] Poor      Patient requires continued skilled intervention: [x] Yes  [] No    Treatment/Activity Tolerance:  [x] Patient able to complete treatment  [] Patient limited by fatigue  [] Patient limited by pain     [] Patient limited by other medical complications  [] Other: 1/12: Pt tolerated progression of exercises with appropriate fatigue and no adverse effects. Based on progress and competence with HEP she is appropriate for discharge. Patient Education:              1/12 Updated HEP and reviewed importance of completing consistently to maintain progress made. 1/5 Time spent reviewing proper scapular activation and seated posture to decrease stress placed on cervical muscles and improve symptoms. Educated on proper height of cane and use of cane on L side. Updated HEP   12/19 Updated HEP. After treatment educated pt on how to use unilateral cane on L based on R LE pain.    12/2 Updated HEP   11/23 Updated HEP  11/17 Updated HEP based on presentation of symptoms and tolerance to treatment   11/14 Updated HEP based on ROM progress and tolerance to treatment. 11/11 Educated on objective findings and POC. Reviewed proper posture, precautions and use of ice for symptom control. HEP instruction:   Access Code: [id number]      PLAN: See eval  [] Continue per plan of care [] Alter current plan (see comments above)  [] Plan of care initiated [] Hold pending MD visit [x] Discharge  1/12: Based on improvements at R shoulder and being referred to neurologist for neck she is appropriate for discharge at this time. Pt will return to PT if MD wants to address limitations at cervical spine. Electronically signed by:  Layla Sagastume PT    Note: If patient does not return for scheduled/ recommended follow up visits, this note will serve as a discharge from care along with most recent update on progress.

## 2023-01-13 ENCOUNTER — TELEPHONE (OUTPATIENT)
Dept: ORTHOPEDIC SURGERY | Age: 68
End: 2023-01-13

## 2023-01-13 NOTE — TELEPHONE ENCOUNTER
No C9 form has been scanned into [employer], only webpage with documentation of approval.  I have sent this to Sylvester. I let pt know that this was sent, and have inquired with [facility] dept if there is a C9 form I can send. Pt will try calling [employer] tomorrow to see if they will schedule her.

## 2023-01-13 NOTE — TELEPHONE ENCOUNTER
General Question     Subject: MRI   Patient and /or Facility Request: Melonie Babsin  Contact Number: [phone]    PATIENT CALLED IN TO SEE IF SHE CAN GET THE C-9 FORMS COMPLETED FOR SHE CAN GET HER MRI TEST FOR HER BACK. .. PATIENT IS NOT SURE OF WHAT LOCATION CALLED HER ABOUT THE TEST. .. SHE LEFT AN PHONE NUMBER [phone]. .. PLEASE PATIENT BACK AT ABOVE NUMBER. ..

## 2023-01-16 DIAGNOSIS — E78.2 MIXED HYPERLIPIDEMIA: ICD-10-CM

## 2023-01-16 LAB
CHOLESTEROL, TOTAL: 181 MG/DL (ref 0–199)
HDLC SERPL-MCNC: 50 MG/DL (ref 40–60)
LDL CHOLESTEROL CALCULATED: 110 MG/DL
TRIGL SERPL-MCNC: 103 MG/DL (ref 0–150)
VLDLC SERPL CALC-MCNC: 21 MG/DL

## 2023-01-19 ENCOUNTER — OFFICE VISIT (OUTPATIENT)
Dept: PRIMARY CARE CLINIC | Age: 68
End: 2023-01-19

## 2023-01-19 VITALS
DIASTOLIC BLOOD PRESSURE: 80 MMHG | SYSTOLIC BLOOD PRESSURE: 130 MMHG | WEIGHT: 155.8 LBS | TEMPERATURE: 98.3 F | OXYGEN SATURATION: 98 % | HEIGHT: 62 IN | BODY MASS INDEX: 28.67 KG/M2 | HEART RATE: 78 BPM

## 2023-01-19 DIAGNOSIS — R10.31 RIGHT LOWER QUADRANT ABDOMINAL PAIN: ICD-10-CM

## 2023-01-19 DIAGNOSIS — R10.11 RUQ ABDOMINAL PAIN: Primary | ICD-10-CM

## 2023-01-19 DIAGNOSIS — R11.0 NAUSEA: ICD-10-CM

## 2023-01-19 DIAGNOSIS — R10.9 RIGHT FLANK PAIN: ICD-10-CM

## 2023-01-19 LAB
BILIRUBIN, POC: NORMAL
BLOOD URINE, POC: NORMAL
CLARITY, POC: CLEAR
COLOR, POC: YELLOW
GLUCOSE URINE, POC: NORMAL
KETONES, POC: NORMAL
LEUKOCYTE EST, POC: NORMAL
NITRITE, POC: NORMAL
PH, POC: 6
PROTEIN, POC: NORMAL
SPECIFIC GRAVITY, POC: 1.02
UROBILINOGEN, POC: 0.2

## 2023-01-19 ASSESSMENT — ENCOUNTER SYMPTOMS
VOMITING: 0
WHEEZING: 0
NAUSEA: 1
BACK PAIN: 1
COLOR CHANGE: 0
BLOOD IN STOOL: 0
ANAL BLEEDING: 0
DIARRHEA: 0
ABDOMINAL DISTENTION: 1
SHORTNESS OF BREATH: 0
CONSTIPATION: 1
COUGH: 0
ABDOMINAL PAIN: 1

## 2023-01-19 NOTE — PROGRESS NOTES
ENCOUNTER DATE: 1/19/2023     NAME: Verónica Rabago   AGE: 78 y.o.    GENDER: female   YOB: 1955    Patient Active Problem List   Diagnosis    Mixed hyperlipidemia    Asthma    Osteoporosis    Lateral epicondylitis    Arthralgia    Type 2 diabetes mellitus with diabetic polyneuropathy, without long-term current use of insulin (HCC)    Hepatitis    Leg pain    GERD (gastroesophageal reflux disease)    Hand pain    B12 deficiency anemia    Hemorrhoids, internal    Severe headache    Encopresis    Tremor    Hot flashes    Fibrocystic disease of breast    Insomnia    Shoulder pain    Malaise and fatigue    Migraine    Neck pain    Chronic back pain    Neuropathy    Major depression ([address]    History of arthroscopy of right shoulder 2008    Removal of ganglion cyst in left wrist    History of elbow surgery Right    Adhesive capsulitis of left shoulder    Tendinitis of left rotator cuff    Left rotator cuff tear    Pain, neck    Cervical stenosis of spinal canal    Arthritis of left acromioclavicular joint    Bursitis of right shoulder    Throat dryness    Right-sided chest wall pain    Dysuria    Acute cystitis without hematuria    Weight loss, abnormal    Moderate episode of recurrent major depressive disorder (HCC)    Arthritis of right acromioclavicular joint    Tendinitis of right rotator cuff    Cervical stenosis of spine    Chronic daily headache    Status migrainosus    Cervico-occipital neuralgia    Cervicogenic headache    Medication side effect    Chronic insomnia    Cervical spinal stenosis    Osteoarthritis of spine with radiculopathy, cervical region    Vitamin D deficiency    Intractable chronic migraine without aura    Chronic diarrhea    Essential hypertension    Cold sore    Chronic pain of right lower extremity    Type 2 diabetes mellitus without complication, without long-term current use of insulin ([address]    COVID-19    Upper respiratory tract infection    Right ear pain Conjunctivitis of left eye    Dry cough    Swelling of eyelid, left    Pain in toes of both feet    Allergic rhinitis    Skin lesion of right arm    Right groin pain    Right hip pain    Urinary tract infection without hematuria    Constipation    Feeling weak    Nonintractable headache    Burning pain    Exposure to carbon monoxide    Low back pain    Burning sensation of eye    Blurry vision, left eye    Nasal bleeding    Bruising      Allergies   Allergen Reactions    Aspirin      bruising    Darvocet [Propoxyphene N-Acetaminophen] Hives    Effexor [Venlafaxine Hydrochloride] Hives    Hydrocodone Hives and Itching    Ibuprofen     Keflex [Cephalexin] Itching     Face redness. Paxil Cr [Paroxetine Hcl Er] Itching     Current Outpatient Medications on File Prior to Visit   Medication Sig Dispense Refill    cetirizine (ZYRTEC) 10 MG tablet Take 1 tablet by mouth daily 30 tablet 2    ondansetron (ZOFRAN-ODT) 4 MG disintegrating tablet Take 1 tablet by mouth 3 times daily as needed for Nausea or Vomiting 21 tablet 0    celecoxib (CELEBREX) 200 MG capsule Take 1 capsule by mouth daily 30 capsule 1    lidocaine (LIDODERM) 5 % Place 1 patch onto the skin daily 12 hours on, 12 hours off.  30 patch 0    acetaminophen (TYLENOL) 500 MG tablet Take 1 tablet by mouth 4 times daily as needed for Pain 120 tablet 0    budesonide-formoterol (SYMBICORT) 160-4.5 MCG/ACT AERO USE 2 INHALATIONS ORALLY   TWICE DAILY 30.6 g 1    cloNIDine (CATAPRES) 0.1 MG/24HR PTWK APPLY 1 PATCH ONTO THE SKINONCE A WEEK 12 patch 1    docusate sodium (COLACE) 100 MG capsule Take 1 capsule by mouth 2 times daily 60 capsule 1    bisacodyl 5 MG EC tablet Take 1-2 tablets daily as needed 30 tablet 0    ketoconazole (NIZORAL) 2 % cream APPLY topically TO bottom of BOTH FEET AND between TOES DAILY FOR 3 TO 4 WEEKS      pantoprazole (PROTONIX) 40 MG tablet Take 1 tablet by mouth daily 90 tablet 1    atorvastatin (LIPITOR) 20 MG tablet Take 1 tablet by mouth daily Take 20 mg by mouth daily 90 tablet 1    diclofenac sodium (VOLTAREN) 1 % GEL Apply 2 g topically 4 times daily 350 g 1    Dulaglutide (TRULICITY) 8.41 DJ/0.8NW SOPN INJECT 0.5ML (=0.75 MG)    SUBCUTANEOUSLY ONCE WEEKLY 6 mL 1    fluticasone (FLONASE) 50 MCG/ACT nasal spray 2 sprays by Nasal route daily 48 g 1    Lancets MISC 1 each by Other route daily E11.9 100 each 0    Blood Glucose Monitoring Suppl (TRUE METRIX METER) DMITRIY Test blood sugar daily and PRN E11.9 1 each 0    blood glucose test strips (GLUCOSE METER TEST) strip 100 each by In Vitro route 2 times daily Diabetes   e11.9 100 each 1     No current facility-administered medications on file prior to visit. Social History     Tobacco Use    Smoking status: Never    Smokeless tobacco: Never   Substance Use Topics    Alcohol use: No      CARE TEAM  Patient Care Team:  Vanessa Cheng MD as PCP - General (Internal Medicine)  Vanessa Cheng MD as PCP - [facility] Empaneled Provider    Chief Complaint   Patient presents with    Pain     Pain  on    right   side   from  back  to front        HPI:   Patient is here for a problem visit    Complains of right sided abd pain. Started Tuesday. Woke up with pain that morning. Right flank, radiates to RUQ. Some nausea. No vomiting. Has been constant since Tuesday and seems to be worsening. Feels like she has trouble breathing at times. Hurts worse with movement. Abd is tender. Forcing herself to eat and drink, which is not much. No h/o kidney stones. Has known cyst on one of her kidneys. No fevers. Has some abd bloating and belching, which is new for her. Last BM Monday. No blood or mucous in stool    Still has GB. On celebrex 200mg daily per ortho. Takes Tramadol. Follows with Dr Cinthia Carlson for back pain. MRI ordered. In Physical Therapy     ROS:  Review of Systems   Constitutional:  Negative for chills, diaphoresis, fatigue and fever.    Respiratory:  Negative for cough, shortness of breath and wheezing.    Cardiovascular:  Negative for chest pain, palpitations and leg swelling.   Gastrointestinal:  Positive for abdominal distention, abdominal pain, constipation and nausea. Negative for anal bleeding, blood in stool, diarrhea and vomiting.   Genitourinary:  Negative for difficulty urinating.   Musculoskeletal:  Positive for back pain (chronic). Negative for myalgias.   Skin:  Negative for color change and rash.   Neurological:  Negative for dizziness, weakness, light-headedness and headaches.   Hematological:  Negative for adenopathy.      VITALS:  /80   Pulse 78   Temp 98.3 °F (36.8 °C) (Oral)   Ht 5' 2\" (1.575 m)   Wt 155 lb 12.8 oz (70.7 kg)   SpO2 98%   BMI 28.50 kg/m²      PE:  Physical Exam  Vitals and nursing note reviewed.   Constitutional:       General: She is not in acute distress.     Appearance: Normal appearance. She is well-developed and normal weight. She is not diaphoretic.   Cardiovascular:      Rate and Rhythm: Normal rate and regular rhythm.      Heart sounds: Normal heart sounds. No murmur heard.    No friction rub.   Pulmonary:      Effort: Pulmonary effort is normal. No respiratory distress.      Breath sounds: Normal breath sounds. No wheezing, rhonchi or rales.   Abdominal:      General: Abdomen is flat. Bowel sounds are normal. There is no distension.      Palpations: Abdomen is soft. There is no mass.      Tenderness: There is abdominal tenderness (mod RUQ and right flank tenderness, mild RLQ tenderness). There is no guarding or rebound.      Hernia: No hernia is present.   Skin:     General: Skin is warm and dry.      Findings: No rash.   Neurological:      Mental Status: She is alert and oriented to person, place, and time.      Motor: No weakness.      Gait: Gait abnormal (secondary to chronic back pain).   Psychiatric:         Mood and Affect: Mood normal.         Behavior: Behavior normal.      Results for POC orders placed in visit on 01/19/23  POCT Urinalysis no Micro   Result Value Ref Range    Color, UA yellow     Clarity, UA clear     Glucose, UA POC neg     Bilirubin, UA nnegg     Ketones, UA neg     Spec Grav, UA 1.025     Blood, UA POC trace     pH, UA 6.0     Protein, UA POC neg     Urobilinogen, UA 0.2     Leukocytes, UA neg     Nitrite, UA neg      ASSESSMENT/PLAN:  1. RUQ abdominal pain  Discussed differentials. Concern for kidney stone or GB etiology  Discussed ED vs outpatient workup. Originally ordered labs and stat CT, unfortunately was unable to schedule at 3 different facilities for today. Discussed with patient. States her pain is significant and she is feeling worse, so I advised to go to ED for further evaluation. Patient agrees and states she will go to [facility]. - Culture, Urine    2. Right lower quadrant abdominal pain  - Culture, Urine    3. Right flank pain  - POCT Urinalysis no Micro    4. Nausea      Return if symptoms worsen or fail to improve.      Electronically signed by NIA Mai CNP on 1/19/2023 at 11:53 AM

## 2023-01-20 ENCOUNTER — TELEPHONE (OUTPATIENT)
Dept: PRIMARY CARE CLINIC | Age: 68
End: 2023-01-20

## 2023-01-20 LAB — URINE CULTURE, ROUTINE: NORMAL

## 2023-01-20 NOTE — TELEPHONE ENCOUNTER
Spoke with patient, she has been scheduled for 1/23/23 for ED follow up. And recommended she call Urology group to follow up as well.

## 2023-01-20 NOTE — TELEPHONE ENCOUNTER
----- Message from Gail Trinidad Dr sent at 1/19/2023  4:25 PM EST -----  Subject: Appointment Request    Reason for Call: Established Patient Appointment needed: Urgent (Patient   Request) ED Follow Up Visit    QUESTIONS    Reason for appointment request? Other - ED follow-up, Appt needed within 2   weeks     Additional Information for Provider? Patient calling for ED follow-up. Seen at [facility] ED today and found RT Kidney issues. would like an   appt asap w/ Dr Loi Camara, but would also like her opinion on Kidney   specialist. She was referred to \"[facility]" to be seen asap by the ED But would like your opinion Prior to   calling.  Please call to book ED Follow-up and also advise on specialist.   ---------------------------------------------------------------------------  --------------  [facility] [facility]  [phone]; OK to leave message on voicemail  ---------------------------------------------------------------------------  --------------  SCRIPT ANSWERS  COVID Screen: Bhavik Rojas

## 2023-01-23 ENCOUNTER — OFFICE VISIT (OUTPATIENT)
Dept: PRIMARY CARE CLINIC | Age: 68
End: 2023-01-23

## 2023-01-23 VITALS
WEIGHT: 156 LBS | HEART RATE: 86 BPM | OXYGEN SATURATION: 98 % | DIASTOLIC BLOOD PRESSURE: 86 MMHG | SYSTOLIC BLOOD PRESSURE: 118 MMHG | BODY MASS INDEX: 28.53 KG/M2

## 2023-01-23 DIAGNOSIS — R10.9 RIGHT FLANK PAIN: Primary | ICD-10-CM

## 2023-01-23 DIAGNOSIS — N28.1 CYST OF RIGHT KIDNEY: ICD-10-CM

## 2023-01-23 RX ORDER — MULTIVIT-MIN/IRON/FOLIC ACID/K 18-600-40
CAPSULE ORAL
COMMUNITY
End: 2023-01-23

## 2023-01-23 RX ORDER — BENZONATATE 100 MG/1
100 CAPSULE ORAL 3 TIMES DAILY PRN
COMMUNITY
Start: 2022-05-23

## 2023-01-23 RX ORDER — OMEPRAZOLE 40 MG/1
40 CAPSULE, DELAYED RELEASE ORAL DAILY
COMMUNITY

## 2023-01-23 RX ORDER — AZELASTINE HCL 205.5 UG/1
SPRAY NASAL
COMMUNITY

## 2023-01-23 RX ORDER — GABAPENTIN 300 MG/1
300 CAPSULE ORAL 2 TIMES DAILY
COMMUNITY

## 2023-01-23 RX ORDER — PIOGLITAZONEHYDROCHLORIDE 30 MG/1
30 TABLET ORAL DAILY
COMMUNITY

## 2023-01-23 RX ORDER — CALCIUM CARBONATE 500(1250)
500 TABLET ORAL DAILY
COMMUNITY
End: 2023-01-23

## 2023-01-23 RX ORDER — OMEGA-3/DHA/EPA/FISH OIL 60 MG-90MG
CAPSULE ORAL
COMMUNITY
End: 2023-01-23

## 2023-01-23 RX ORDER — TOPIRAMATE 50 MG/1
50 TABLET, FILM COATED ORAL 2 TIMES DAILY
COMMUNITY
End: 2023-01-23

## 2023-01-23 RX ORDER — ONDANSETRON 4 MG/1
4 TABLET, FILM COATED ORAL EVERY 8 HOURS PRN
COMMUNITY
End: 2023-01-23

## 2023-01-23 RX ORDER — AMITRIPTYLINE HYDROCHLORIDE 25 MG/1
25 TABLET, FILM COATED ORAL NIGHTLY
COMMUNITY

## 2023-01-23 RX ORDER — ALBUTEROL SULFATE 90 UG/1
2 AEROSOL, METERED RESPIRATORY (INHALATION) EVERY 6 HOURS PRN
COMMUNITY

## 2023-01-23 RX ORDER — TRAZODONE HYDROCHLORIDE 100 MG/1
100 TABLET ORAL NIGHTLY
COMMUNITY
End: 2023-01-23

## 2023-01-23 NOTE — PROGRESS NOTES
cristy Real   Date ofBirth:  1955    Date of Visit:  1/23/2023    Chief Complaint   Patient presents with    ED Follow-up       HPI  Patient states when started a medication by Orthopedics she started with nausea and decrease in appetite. Patient states the medication is Celebrex. Patient states nausea and appetite are better with stopping Celebrex. Patient states she had right side pain that she has had for a long time but it is worse. Patient states pain wakes her up at night. Side pain can be sharp or dull. Patient states sometimes at night she has to get up and walk a little. Patient complains of urinary frequency but has been drinking a lot of fluids. Patient denies dysuria and hematuria. Patient went to the ER on 1/19/23. Patient had a CT abdomen pelvis done which shows a large right kidney cyst. Patient states she has had the cyst for a long time but is was smaller. Patient states in April 2018 in [facility] the cyst was 6 cm. Patient states she has been taking Tylenol which helps a little. Review of Systems   Constitutional:  Positive for appetite change. Negative for chills and fever. Respiratory:  Negative for shortness of breath. Cardiovascular:  Negative for chest pain. Gastrointestinal:  Positive for abdominal pain and nausea. Negative for abdominal distention, blood in stool, constipation, diarrhea and vomiting. Genitourinary:  Positive for frequency. Negative for dysuria and hematuria. Musculoskeletal:  Negative for back pain. Neurological:  Negative for dizziness. Allergies   Allergen Reactions    Aspirin      bruising    Darvocet [Propoxyphene N-Acetaminophen] Hives    Effexor [Venlafaxine Hydrochloride] Hives    Hydrocodone Hives and Itching    Ibuprofen     Keflex [Cephalexin] Itching     Face redness.     Paxil Cr [Paroxetine Hcl Er] Itching     Outpatient Medications Marked as Taking for the 1/23/23 encounter (Office Visit) with Zoe Villaseñor MD Medication Sig Dispense Refill    albuterol sulfate HFA (PROVENTIL;VENTOLIN;PROAIR) 108 (90 Base) MCG/ACT inhaler Inhale 2 puffs into the lungs every 6 hours as needed for Wheezing      amitriptyline (ELAVIL) 25 MG tablet Take 25 mg by mouth nightly      azelastine HCl 0.15 % SOLN by Nasal route      benzonatate (TESSALON) 100 MG capsule Take 100 mg by mouth 3 times daily as needed      gabapentin (NEURONTIN) 300 MG capsule Take 300 mg by mouth in the morning and at bedtime. omeprazole (PRILOSEC) 40 MG delayed release capsule Take 40 mg by mouth daily      pioglitazone (ACTOS) 30 MG tablet Take 30 mg by mouth daily      cetirizine (ZYRTEC) 10 MG tablet Take 1 tablet by mouth daily 30 tablet 2    ondansetron (ZOFRAN-ODT) 4 MG disintegrating tablet Take 1 tablet by mouth 3 times daily as needed for Nausea or Vomiting 21 tablet 0    lidocaine (LIDODERM) 5 % Place 1 patch onto the skin daily 12 hours on, 12 hours off.  30 patch 0    acetaminophen (TYLENOL) 500 MG tablet Take 1 tablet by mouth 4 times daily as needed for Pain 120 tablet 0    budesonide-formoterol (SYMBICORT) 160-4.5 MCG/ACT AERO USE 2 INHALATIONS ORALLY   TWICE DAILY 30.6 g 1    cloNIDine (CATAPRES) 0.1 MG/24HR PTWK APPLY 1 PATCH ONTO THE SKINONCE A WEEK 12 patch 1    docusate sodium (COLACE) 100 MG capsule Take 1 capsule by mouth 2 times daily 60 capsule 1    bisacodyl 5 MG EC tablet Take 1-2 tablets daily as needed 30 tablet 0    ketoconazole (NIZORAL) 2 % cream APPLY topically TO bottom of BOTH FEET AND between TOES DAILY FOR 3 TO 4 WEEKS      [DISCONTINUED] pantoprazole (PROTONIX) 40 MG tablet Take 1 tablet by mouth daily 90 tablet 1    [DISCONTINUED] atorvastatin (LIPITOR) 20 MG tablet Take 1 tablet by mouth daily Take 20 mg by mouth daily 90 tablet 1    diclofenac sodium (VOLTAREN) 1 % GEL Apply 2 g topically 4 times daily 350 g 1    [DISCONTINUED] Dulaglutide (TRULICITY) 3.34 AN/6.3RX SOPN INJECT 0.5ML (=0.75 MG)    SUBCUTANEOUSLY ONCE WEEKLY 6 mL 1    fluticasone (FLONASE) 50 MCG/ACT nasal spray 2 sprays by Nasal route daily 48 g 1    Lancets MISC 1 each by Other route daily E11.9 100 each 0    Blood Glucose Monitoring Suppl (TRUE METRIX METER) DMITRIY Test blood sugar daily and PRN E11.9 1 each 0    blood glucose test strips (GLUCOSE METER TEST) strip 100 each by In Vitro route 2 times daily Diabetes   e11.9 100 each 1         Vitals:    01/23/23 1029   BP: 118/86   Pulse: 86   SpO2: 98%   Weight: 156 lb (70.8 kg)     Body mass index is 28.53 kg/m². Physical Exam  Nursing note reviewed. Constitutional:       General: She is not in acute distress. Appearance: Normal appearance. She is well-developed. HENT:      Mouth/Throat:      Pharynx: Oropharynx is clear. Eyes:      Extraocular Movements: Extraocular movements intact. Pupils: Pupils are equal, round, and reactive to light. Neck:      Thyroid: No thyromegaly. Cardiovascular:      Rate and Rhythm: Normal rate and regular rhythm. Heart sounds: Normal heart sounds, S1 normal and S2 normal. No murmur heard. No friction rub. No gallop. Pulmonary:      Effort: Pulmonary effort is normal. No respiratory distress. Breath sounds: Normal breath sounds. No wheezing. Abdominal:      General: Bowel sounds are normal. There is no distension. Palpations: Abdomen is soft. Tenderness: There is no abdominal tenderness. There is no guarding or rebound. Musculoskeletal:      Cervical back: Neck supple. Neurological:      Mental Status: She is alert. No results found for this visit on 01/23/23.   Lab Review   Office Visit on 01/19/2023   Component Date Value    Color, UA 01/19/2023 yellow     Clarity, UA 01/19/2023 clear     Glucose, UA POC 01/19/2023 neg     Bilirubin, UA 01/19/2023 nnegg     Ketones, UA 01/19/2023 neg     Spec Grav, UA 01/19/2023 1.025     Blood, UA POC 01/19/2023 trace     pH, UA 01/19/2023 6.0     Protein, UA POC 01/19/2023 neg Urobilinogen, UA 01/19/2023 0.2     Leukocytes, UA 01/19/2023 neg     Nitrite, UA 01/19/2023 neg     Urine Culture, Routine 01/19/2023 No growth at 18 to 36 hours    Orders Only on 01/16/2023   Component Date Value    Cholesterol, Total 01/16/2023 181     Triglycerides 01/16/2023 103     HDL 01/16/2023 50     LDL Calculated 01/16/2023 110 (A)     VLDL Cholesterol Calcula* 01/16/2023 [facility]   Component Date Value    Visual Acuity Distance R* 01/16/2023 20/30     Intraocular Pressure Eye 01/16/2023 14     Visual Acuity Distance L* 01/16/2023 20/40     Intraocular Pressure Eye 01/16/2023 14     Diabetic Retinopathy 01/16/2023 NEGATIVE     Cataracts 01/16/2023 NEGATIVE     Glaucoma 01/16/2023 NEGATIVE    Office Visit on 01/11/2023   Component Date Value    Hemoglobin A1C 01/11/2023 6.1    Orders Only on 11/29/2022   Component Date Value    WBC 11/29/2022 6.9     RBC 11/29/2022 5.02     Hemoglobin 11/29/2022 14.3     Hematocrit 11/29/2022 44.2     MCV 11/29/2022 88.1     MCH 11/29/2022 28.5     MCHC 11/29/2022 32.4     RDW 11/29/2022 14.4     Platelets 28/51/7619 251     MPV 11/29/2022 8.8    Orders Only on 11/02/2022   Component Date Value    Organism 11/02/2022 Proteus mirabilis (A)     Urine Culture, Routine 11/02/2022                      Value:<10,000 CFU/ml  No further workup     Orders Only on 10/20/2022   Component Date Value    Carboxyhemoglobin/Hemogl* 10/20/2022 2.2    Orders Only on 10/20/2022   Component Date Value    Magnesium 10/20/2022 2.30     TSH 10/20/2022 1.31     WBC 10/20/2022 5.1     RBC 10/20/2022 5.16     Hemoglobin 10/20/2022 14.7     Hematocrit 10/20/2022 44.3     MCV 10/20/2022 86.0     MCH 10/20/2022 28.5     MCHC 10/20/2022 33.1     RDW 10/20/2022 13.0     Platelets 75/85/5971 206     MPV 10/20/2022 8.9     Neutrophils % 10/20/2022 59.2     Lymphocytes % 10/20/2022 31.2     Monocytes % 10/20/2022 7.4     Eosinophils % 10/20/2022 1.7     Basophils % 10/20/2022 0.5     Neutrophils Absolute 10/20/2022 3.0     Lymphocytes Absolute 10/20/2022 1.6     Monocytes Absolute 10/20/2022 0.4     Eosinophils Absolute 10/20/2022 0.1     Basophils Absolute 10/20/2022 0.0     Sodium 10/20/2022 139     Potassium 10/20/2022 4.6     Chloride 10/20/2022 102     CO2 10/20/2022 27     Anion Gap 10/20/2022 10     Glucose 10/20/2022 104 (A)     BUN 10/20/2022 17     Creatinine 10/20/2022 0.7     Est, Glom Filt Rate 10/20/2022 >60     Calcium 10/20/2022 9.3     Total Protein 10/20/2022 6.5     Albumin 10/20/2022 4.7     Albumin/Globulin Ratio 10/20/2022 2.6 (A)     Total Bilirubin 10/20/2022 0.6     Alkaline Phosphatase 10/20/2022 104     ALT 10/20/2022 19     AST 10/20/2022 18    Office Visit on 10/20/2022   Component Date Value    Color, UA 10/20/2022 yellow     Clarity, UA 10/20/2022 clear     Glucose, UA POC 10/20/2022 neg     Bilirubin, UA 10/20/2022 neg     Ketones, UA 10/20/2022 neg     Spec Grav, UA 10/20/2022 1.030     Blood, UA POC 10/20/2022 trace     pH, UA 10/20/2022 5.5     Protein, UA POC 10/20/2022 neg     Urobilinogen, UA 10/20/2022 0.2     Leukocytes, UA 10/20/2022 trace     Nitrite, UA 10/20/2022 neg     Urine Culture, Routine 10/20/2022 <50,000 CFU/ml mixed skin/urogenital james. No further workup (A)     Organism 10/20/2022 Enterococcus faecalis (A)     Urine Culture, Routine 10/20/2022 50,000 CFU/ml    Orders Only on 10/11/2022   Component Date Value    Microalbumin, Random Uri* 10/11/2022 <1.20     Creatinine, Ur 10/11/2022 102.1     Microalbumin Creatinine * 10/11/2022 see below    There may be more visits with results that are not included.      Site: [facility] #: [id number] #: [id number]: BNEDAccount #: [de-identified] #: [id number]: [id number]: CT ABDOMEN PELVIS W CONTRASTExam Date/Time: 01/19/2023 01:35 PMAdmitting Diagnosis: Nausea/vomitingReason for Exam:    Nausea/vomiting       Dictated by: Meño Tatum MEKA: 01/19/2023 02:04 PMT: This document is confidential medical information. Unauthorized disclosure or use of this information is prohibited by law. If you are not the intended recipient of this document, please advise us by    calling immediately [phone]. Impression/Conclusion below       75   HISTORY:   Nausea/vomiting, RUQ pain   COMPARISON:  CT abdomen and pelvis 9/26/2006   TECHNIQUE: Post IV contrast multiplanar CT images of the abdomen and pelvis   NOTE:  If there are questions about the content of this report, please contact [facility]    radiology by calling [phone]       FINDINGS:   LOWER CHEST:  Unremarkable   LIVER:  Unremarkable   GALLBLADDER/BILE DUCTS:  Unremarkable. No opaque gallstones   PANCREAS:  Unremarkable. No mass or duct dilation   SPLEEN:  Unremarkable   ADRENALS:  Unremarkable     KIDNEYS/URETERS:  Large lower pole right renal cyst measuring 7.9 x 8.8 x 8.3 cm (Trv x AP x    CC). No septation or mural nodularity identified. This is significantly increased from prior of    2006 Smaller left renal cortical cysts. No hydronephrosis,    stone, or suspicious mass. GI TRACT:  Unremarkable. No obstruction, wall thickening, or pneumatosis. Normal appendix   VESSELS:  Mild atherosclerotic calcifications without aneurysm or dissection   LYMPH NODES: Unremarkable. No enlarged lymph nodes   ABD WALL:  Unremarkable   PELVIS:  Uterus is surgically absent   BONES:  Unremarkable   OTHER:  None           IMPRESSION:           Large lower pole right renal cystic process, measuring up to 8.8 cm. No septations or mural    nodularity identified to suggest neoplasia. Large renal cysts can be symptomatic in some    patients       No acute abnormality on CT of abdomen and pelvis. SIGNED BY: Cherrie Lackey MD on 1/19/2023  2:00 PM         [address] Located within [facility] [phone] -  [facility]: [phone]         Assessment/Plan     1.  Right flank pain  -CT abdomen pelvis done and shows kidney cyst  -lab results reviewed  -ER visit reviewed  -Tylenol 650mg 3 times daily as needed    2. Cyst of right kidney  -CT abdomen pelvis shows kidney cyst   Follow up with Urology as scheduled          Return if symptoms worsen or fail to improve.

## 2023-01-27 ENCOUNTER — OFFICE VISIT (OUTPATIENT)
Dept: PSYCHOLOGY | Age: 68
End: 2023-01-27

## 2023-01-27 DIAGNOSIS — F33.1 MODERATE EPISODE OF RECURRENT MAJOR DEPRESSIVE DISORDER (HCC): Primary | ICD-10-CM

## 2023-01-27 DIAGNOSIS — F41.9 ANXIETY DISORDER, UNSPECIFIED TYPE: ICD-10-CM

## 2023-01-27 ASSESSMENT — ANXIETY QUESTIONNAIRES
IF YOU CHECKED OFF ANY PROBLEMS ON THIS QUESTIONNAIRE, HOW DIFFICULT HAVE THESE PROBLEMS MADE IT FOR YOU TO DO YOUR WORK, TAKE CARE OF THINGS AT HOME, OR GET ALONG WITH OTHER PEOPLE: VERY DIFFICULT
3. WORRYING TOO MUCH ABOUT DIFFERENT THINGS: 3
2. NOT BEING ABLE TO STOP OR CONTROL WORRYING: 3
7. FEELING AFRAID AS IF SOMETHING AWFUL MIGHT HAPPEN: 3
6. BECOMING EASILY ANNOYED OR IRRITABLE: 3
1. FEELING NERVOUS, ANXIOUS, OR ON EDGE: 3
5. BEING SO RESTLESS THAT IT IS HARD TO SIT STILL: 3
GAD7 TOTAL SCORE: 21
4. TROUBLE RELAXING: 3

## 2023-01-27 ASSESSMENT — PATIENT HEALTH QUESTIONNAIRE - PHQ9
8. MOVING OR SPEAKING SO SLOWLY THAT OTHER PEOPLE COULD HAVE NOTICED. OR THE OPPOSITE, BEING SO FIGETY OR RESTLESS THAT YOU HAVE BEEN MOVING AROUND A LOT MORE THAN USUAL: 1
4. FEELING TIRED OR HAVING LITTLE ENERGY: 3
SUM OF ALL RESPONSES TO PHQ QUESTIONS 1-9: 14
7. TROUBLE CONCENTRATING ON THINGS, SUCH AS READING THE NEWSPAPER OR WATCHING TELEVISION: 2
SUM OF ALL RESPONSES TO PHQ QUESTIONS 1-9: 14
SUM OF ALL RESPONSES TO PHQ9 QUESTIONS 1 & 2: 5
1. LITTLE INTEREST OR PLEASURE IN DOING THINGS: 2
9. THOUGHTS THAT YOU WOULD BE BETTER OFF DEAD, OR OF HURTING YOURSELF: 0
SUM OF ALL RESPONSES TO PHQ QUESTIONS 1-9: 14
3. TROUBLE FALLING OR STAYING ASLEEP: 1
2. FEELING DOWN, DEPRESSED OR HOPELESS: 3
10. IF YOU CHECKED OFF ANY PROBLEMS, HOW DIFFICULT HAVE THESE PROBLEMS MADE IT FOR YOU TO DO YOUR WORK, TAKE CARE OF THINGS AT HOME, OR GET ALONG WITH OTHER PEOPLE: 0
5. POOR APPETITE OR OVEREATING: 2
SUM OF ALL RESPONSES TO PHQ QUESTIONS 1-9: 14

## 2023-01-27 NOTE — PATIENT INSTRUCTIONS
Return to see Dr. Keisha Fam in 2 weeks (2/10 @ 2pm)  Review handout on anxiety      The Physiology of Anxiety    When you sense danger, your brain activates your autonomic nervous system. The two branches of your autonomic nervous system, the sympathetic and the parasympathetic, control your body's energy level in order to prepare you for action. The sympathetic nervous system controls your fight or flight response and releases energy to prepare you for action. The parasympathetic nervous system is your bodys relaxation/recovery system:  it returns your body to a normal state when the danger is over. The sympathetic nervous system is an all-or-none system. That means that when its activated it quickly turns on all of its component parts (which is a great way for an emergency response system to operate):    Rapid Heart Rate, Rapid Breathing:  The alarm reaction increases the heart rate and breathing rate so that we are alert and our muscles are ready for action. These changes also help insure that the muscles and brain will have enough oxygen and energy for defense. At the same time, blood flow to the skin decreases, which prevents us from losing as much blood if we are wounded. Sweating:  Sweating helps to cool the body during exertion, making it more efficient. Cloretta Slice is what some people feel when sweating occurs at the same time that blood flow to the skin decreases. Tight Chest, Tingling, Numbness, Hot Flushes, Trembling:  Hyperventilation occurs when we breathe rapidly but do not expend the energy with muscle action, like revving a car while holding down the brake. This can lead to feelings of tingling and numbness, hot flushes, and increased sweating. When rapid chest breathing and muscle tension occur at the same time, people feel chest pain, breathlessness, and choking.       Upset Stomach, Diarrhea:  Digestion isnt needed during times of danger, and the sympathetic nervous system shuts it down, leading to dry mouth and an upset stomach. Since excess weight isnt needed in times of acute danger, the body may eliminate the lower digestive track, which causes diarrhea. Blurred Vision, Derealization, Depersonalization: It is common for our pupils to dilate during times of danger. Although this improves night vision by increasing the amount of light that can enter the eye, it can also lead to blurred or brighter vision during the day. These changes in visual perception, when combined with the other unusual physical sensations mentioned above, can contribute to feelings of unreality, such as derealization and depersonalization. When this alarm system activates the brain automatically takes in, like a flashbulb camera, everything that can be taken in by the five senses (sights, sounds, smells, tastes, and touch). The goal is to keep us alive in the future. If I can remember everything about this situation then I can prevent myself from ever being in a similar situation. While the goal is survival, a natural byproduct is that the brain takes in EVERYTHING which means that certain things about our environment that were once neutral (i.e., the smell of car exhaust, a crowded restaurant, etc.) now have a negative association. For example, in a classic scientific experiment, scientists were measuring the salivation of dogs to the presentation of dog food. In the process of measuring the salivation of dogs they found, unexpectedly that dogs began to salivate not only when food was present, but at the site of researchers in white coats and the ring of bell caused by the opening of a door. It was concluded that the dogs began to associate these previously neutral stimuli with getting food and, as a result, they would salivate if these were present even without the presence of food. WHY WONT IT GO AWAY? Avoidance is the key.   If you stay away from those things that illicit the fear response your brain gets to maintain that it is dangerous and if you leave a situation because it illicits the fear response your brain convinces you that without leaving you would have never survived. If we never got back on the bike after we fell for the first time that would be our last memory of riding bikes and none of us would be riding bikes today. WHAT DO I DO NOW? The answer is simple, but the act is difficult. We have to go towards those things our brain tells us we must avoid. The goal of in-vivo exposure is to relearn through gradual exposure the following: These uncomfortable feelings will decrease with time. I do not have to leave or avoid them all together as they will decrease. There is nothing to be afraid of. You will see that the things you have been avoiding are not inherently dangerous, but you cannot learn that without exposure. I can do it. No longer do your actions have to be dictated by anxiety or fear. Personal Thought  Control. Our thinking often creates anxiety for us. Getting better control of our thinking can go a long way in helping us cope. The following steps can be useful. Let yourself become aware of thoughts you have when you are anxious. What are the words that you are saying to yourself at that moment? Sometimes it takes a little practice before we become aware of our thoughts. Some examples might be:  I know something bad is going to happen, or This is horrible or Ailyn Hippo is this happening to me!?  Write your thoughts down. Its much easier to work with our thoughts, analyze them, and replace them if they are in black and white.   Ask yourself the following questions about your thoughts:  Is it true? (Is it logically correct? Where is the evidence to support the truth of that thought? Are there alternative ways of thinking that would be more correct?).   If a thought is not as true as it could be, replace it with a more realistic and helpful one. The majority of thoughts we have that generate anxiety are not the most realistic appraisals of the situation. So what? (If this is logically correct, what does it mean to me? Is there anything I can do about the situation? Is it in my best interest to get anxious about this?). Use coping self-statements. When feeling anxious, you may be able to tell yourself automatic phrases without thinking too much about it. A couple of examples would be phrases such as Its OK, I can handle it, or Ive been through things like this before and have done all right.   Notice that these statements tend to be true for all of us. Notice a change in your emotional state as you change your thinking. As your thoughts become more realistic, you will probably notice a decrease in anxiety and tension, and an increase in your ability to cope. The Stress Response and How It Can Affect You   The stress response, or fight or flight response is the emergency reaction system of the body. It is there to keep you safe in emergencies. The stress response includes physical and thought responses to your perception of various situations. When the stress response is turned on, your body may release substances like adrenaline and cortisol. Your organs are programmed to respond in certain ways to situations that are viewed as challenging or threatening. The stress response can work against you. You can turn it on when you dont really need it and, as a result, perceive something as an emergency when its really not. It can turn on when you are just thinking about past or future events. Harmless, chronic conditions can be intensified by the stress response activating too often, with too much intensity, or for too long. Stress responses can be different for different individuals. Below is a list of some common stress related responses people have.  ([place] the responses you have had in the last 2 weeks.)     Physical Responses   Muscle aches   Insomnia   ? Heart rate   Headache   Weight gain   Nausea   Constipation   Dry mouth   Muscle twitching  Weight loss   Low energy   Weakness   Tight chest   Diarrhea   Dizziness   Trembling   Stomach cramps  Chills    Hot flashes   Sweating   Pounding heart  Choking feeling  Chest pain   Leg cramps   Numb hands/feet Dry throat   Appetite change  Face flushing   ? Blood pressure  Light-headedness  Feeling faint       Troubleswallowing   Rash ? Urination   Neck pain     Tingling hands/feet     Emotional and Thought Responses   Restlessness   Agitation   Insecurity            Worthlessness   Anxiety   Stress    Depression            Hopelessness   Guilt    Defensiveness  Anger           Racing thoughts   Nightmares   Intense thinking  Sensitivity          Expecting the worst   Numbness   Lack of motivation  Mood swings             Forgetfulness   ? Concentration  Rigidity              Preoccupation  Intolerance     Behavioral Responses   Avoidance   Withdrawal   Neglect   ? Alcohol use    Smoking   ? Eating   Arguing       Poor appearance   ? Spending   Poor hygiene   ? Eating  Seeking reassurance   Nail biting   Skin picking   ? Talking        ? Body checking   Sexual problems  Foot tapping  Fidgeting Rapid walking    ? Exercise   Teeth clenching           Multitasking  Aggressive speaking       ? Fun activities  ? Sleeping      ? Relaxing activities     Seeking information     The parasympathetic nervous system in your body is designed to turn on your bodys relaxation response. Your behaviors and thinking can keep your bodys natural relaxation response from operating at its best.   Getting your body to relax on a daily basis for at least brief periods can help decrease unpleasant stress responses. Learning to relax your body, through specific breathing and relaxation exercises as well as by minimizing stressful thinking, can help your bodys natural relaxation system be more effective.

## 2023-01-27 NOTE — PROGRESS NOTES
Behavioral Health Consultation  RADHA ESCALANTE Psy.D. Psychologist  1/27/2023  11:23 AM EST      Time spent with Patient: 45 minutes  [facility]. Reason for Consult: depression/anxiety  Referring Provider: Ash Emmanuel MD  [place] [address] [address]  Pete [place]t  [phone]    Pt provided informed consent for the behavioral health program. Discussed with patient model of service to include the limits of confidentiality (i.e. abuse reporting, suicide intervention, etc.) and short-term intervention focused approach. Pt indicated understanding. Feedback given to PCP. S:  Patient presents with concerns about depression, anxiety, sleep difficulties. Pt described depressive sx as feeling depressed, \"not liking anything,\" decreased interest, low motivation, wanting to eat all day long and sometimes not wanting to go to the kitchen. Anxious sx include frequent worry. Noted she has been better able to control worry over time. Plays games, cooks, distracts self. 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 Reported hx of depression and anxiety since 1996. Noted she accidentally fell at work. Was disabled after fall. Pt reported she has been dealing with stress related to someone using her telephone number to call other people. She has an idea of who this is and plans to go to law enforcement. Goals for treatment include improving depression and anxiety. Patient lives alone in an apartment. Patient previously worked as . Daily caffeine use includes two espressos/AM and after lunch. Denied cigarette use, denied alcohol use, denied illegal drug use. There is no regular exercise. Seeing orthopedic doctor for back. Patient describes approximately 2 hrs sleep/night. Reported sleeping about 7 hrs last night. Noted some nights she has problems falling and staying asleep.  Reported anxiety interferes with sleep. Patient enjoys the following hobbies: talking with friends every once in a while. She describes limited social support. Daughter lives in [place] but does not want to talk to her. Patient previously identified with Catholicism. Familial MH history is positive for depression (biological sister, nephew) and anxiety (biological daughter). Mental Health History  Psychotropic medications: none; not interested at this time  Psychiatric hospitalizations: 3x for depression, thinks last time 2015  Suicidal ideation/homicidal ideation: denied  Suicide attempts: tried to overdose on medication, last time 2015; reported she lost daughter in 2014 to breast CA   Previous outpatient treatment: previous therapy, last saw in 2017    Discussed safety interventions to deal with suicidal thoughts or if suicidal thoughts worsen including going to ER, calling 911, calling crisis hotlines.      O:  MSE:    Attitude:  overall cooperative, somewhat guarded at times  Consciousness: alert  Orientation: oriented to person, place, time, general circumstance  Memory: recent and remote memory intact  Attention/Concentration: intact during session  Speech: normal rate and volume, well-articulated  Mood: \"not good\"  Affect:  mildly dysphoric  Perception: within normal limits  Thought Content: within normal limits  Thought Process: logical, coherent and goal-directed  Insight:  fair to good  Judgment: intact  Ability to understand instructions: Yes  Ability to respond meaningfully: Yes  Morbid Ideation: no   Suicide Assessment: no suicidal ideation, plan, or intent  Homicidal Ideation: no    History:    Medications:   Current Outpatient Medications   Medication Sig Dispense Refill    albuterol sulfate HFA (PROVENTIL;VENTOLIN;PROAIR) 108 (90 Base) MCG/ACT inhaler Inhale 2 puffs into the lungs every 6 hours as needed for Wheezing      amitriptyline (ELAVIL) 25 MG tablet Take 25 mg by mouth nightly      azelastine HCl 0.15 % SOLN by Nasal route benzonatate (TESSALON) 100 MG capsule Take 100 mg by mouth 3 times daily as needed      gabapentin (NEURONTIN) 300 MG capsule Take 300 mg by mouth in the morning and at bedtime. omeprazole (PRILOSEC) 40 MG delayed release capsule Take 40 mg by mouth daily      pioglitazone (ACTOS) 30 MG tablet Take 30 mg by mouth daily      cetirizine (ZYRTEC) 10 MG tablet Take 1 tablet by mouth daily 30 tablet 2    ondansetron (ZOFRAN-ODT) 4 MG disintegrating tablet Take 1 tablet by mouth 3 times daily as needed for Nausea or Vomiting 21 tablet 0    lidocaine (LIDODERM) 5 % Place 1 patch onto the skin daily 12 hours on, 12 hours off.  30 patch 0    acetaminophen (TYLENOL) 500 MG tablet Take 1 tablet by mouth 4 times daily as needed for Pain 120 tablet 0    budesonide-formoterol (SYMBICORT) 160-4.5 MCG/ACT AERO USE 2 INHALATIONS ORALLY   TWICE DAILY 30.6 g 1    cloNIDine (CATAPRES) 0.1 MG/24HR PTWK APPLY 1 PATCH ONTO THE SKINONCE A WEEK 12 patch 1    docusate sodium (COLACE) 100 MG capsule Take 1 capsule by mouth 2 times daily 60 capsule 1    bisacodyl 5 MG EC tablet Take 1-2 tablets daily as needed 30 tablet 0    ketoconazole (NIZORAL) 2 % cream APPLY topically TO bottom of BOTH FEET AND between TOES DAILY FOR 3 TO 4 WEEKS      pantoprazole (PROTONIX) 40 MG tablet Take 1 tablet by mouth daily 90 tablet 1    atorvastatin (LIPITOR) 20 MG tablet Take 1 tablet by mouth daily Take 20 mg by mouth daily 90 tablet 1    diclofenac sodium (VOLTAREN) 1 % GEL Apply 2 g topically 4 times daily 350 g 1    Dulaglutide (TRULICITY) 6.25 NR/0.3CP SOPN INJECT 0.5ML (=0.75 MG)    SUBCUTANEOUSLY ONCE WEEKLY 6 mL 1    fluticasone (FLONASE) 50 MCG/ACT nasal spray 2 sprays by Nasal route daily 48 g 1    Lancets MISC 1 each by Other route daily E11.9 100 each 0    Blood Glucose Monitoring Suppl (TRUE METRIX METER) DMITRIY Test blood sugar daily and PRN E11.9 1 each 0    blood glucose test strips (GLUCOSE METER TEST) strip 100 each by In Vitro route 2 times daily Diabetes   e11.9 100 each 1     No current facility-administered medications for this visit. Social History:   Social History     Socioeconomic History    Marital status:      Spouse name: Not on file    Number of children: Not on file    Years of education: Not on file    Highest education level: Not on file   Occupational History    Not on file   Tobacco Use    Smoking status: Never    Smokeless tobacco: Never   Substance and Sexual Activity    Alcohol use: No    Drug use: No    Sexual activity: Not Currently   Other Topics Concern    Not on file   Social History Narrative    Not on file     Social Determinants of Health     Financial Resource Strain: Low Risk     Difficulty of Paying Living Expenses: Not hard at all   Food Insecurity: No Food Insecurity    Worried About Running Out of Food in the Last Year: Never true    Ran Out of Food in the Last Year: Never true   Transportation Needs: Not on file   Physical Activity: Insufficiently Active    Days of Exercise per Week: 3 days    Minutes of Exercise per Session: 20 min   Stress: Not on file   Social Connections: Not on file   Intimate Partner Violence: Not on file   Housing Stability: Not on file     TOBACCO:   reports that she has never smoked. She has never used smokeless tobacco.  ETOH:   reports no history of alcohol use. Family History:   Family History   Problem Relation Age of Onset    Obesity Mother     Osteoporosis Mother     Stroke Father     Stroke Sister 76         from stroke    Breast Cancer Daughter     Cancer Other        A:  Administered PHQ-9 and SHIN-7 (see below). Patient endorses Moderate symptoms of depression and Severe symptoms of anxiety. Denied suicidal ideation. Insight and motivation are fair.       PHQ-9 Questionaire 2023 2023 9/15/2022 3/14/2022 3/14/2022 2022 3/22/2017   Little interest or pleasure in doing things 2 0 0 - 0 0 0   Feeling down, depressed, or hopeless 3 0 2 0 0 0 0   Trouble falling or staying asleep, or sleeping too much 1 0 0 0 - 0 -   Feeling tired or having little energy 3 0 0 0 - 0 -   Poor appetite or overeating 2 0 0 0 - 0 -   Feeling bad about yourself - or that you are a failure or have let yourself or your family down - 0 0 0 - 0 -   Trouble concentrating on things, such as reading the newspaper or watching television 2 0 0 0 - 0 -   Moving or speaking so slowly that other people could have noticed. Or the opposite - being so fidgety or restless that you have been moving around a lot more than usual 1 0 0 0 - 0 -   Thoughts that you would be better off dead, or of hurting yourself in some way 0 0 0 0 - 0 -   PHQ-9 Total Score 14 0 2 0 0 0 0   If you checked off any problems, how difficult have these problems made it for you to do your work, take care of things at home, or get along with other people? 0 0 0 0 - 0 -     PHQ Scores 1/27/2023 1/11/2023 9/15/2022 3/14/2022 3/14/2022 2/9/2022 3/22/2017   PHQ2 Score 5 0 2 - 0 0 0   PHQ9 Score 14 0 2 0 0 0 0       Interpretation of Total Score Depression Severity: 1-4 = Minimal depression, 5-9 = Mild depression, 10-14 = Moderate depression, 15-19 = Moderately severe depression, 20-27 = Severe depression     SHIN-7 SCREENING 1/27/2023   Feeling nervous, anxious, or on edge Nearly every day   Not being able to stop or control worrying Nearly every day   Worrying too much about different things Nearly every day   Trouble relaxing Nearly every day   Being so restless that it is hard to sit still Nearly every day   Becoming easily annoyed or irritable Nearly every day   Feeling afraid as if something awful might happen Nearly every day   SHIN-7 Total Score 21   How difficult have these problems made it for you to do your work, take care of things at home, or get along with other people? Very difficult     SHIN 7 SCORE 1/27/2023   SHIN-7 Total Score 21       Interpretation of Total Score. Anxiety Severity: Score 0-4: Minimal Anxiety.  Score 5-9: Mild Anxiety. Score 10-14: Moderate Anxiety. Score greater than 15: Severe Anxiety. Diagnosis:  1. Moderate episode of recurrent major depressive disorder ([address]    2. Anxiety disorder, unspecified type        Past Medical History:      Diagnosis Date    Abnormal involuntary movements(781.0)     Anal fissure     Anemia, unspecified     Anxiety     Chronic back pain     Chronic diarrhea 09/23/2019    Depression     Diffuse cystic mastopathy     Encopresis(307.7)     Esophageal reflux     Foot fracture, left 1989    drop something on foot    Headache(784.0)     Hepatitis, unspecified     Herpes simplex without mention of complication     Hypertension     Hypogammaglobulinaemia, unspecified     Insomnia, unspecified     Lateral epicondylitis  of elbow     Migraine, unspecified, without mention of intractable migraine without mention of status migrainosus     Mixed hyperlipidemia 07/30/2010    Osteopenia     Postmenopausal     Pure hypercholesterolemia     Symptomatic menopausal or female climacteric states     Type 2 diabetes mellitus without complication (HCC)     Type II or unspecified type diabetes mellitus without mention of complication, not stated as uncontrolled     Unspecified asthma(493.90)        Plan:  Pt interventions:  Discussed benefits of referral for specialty care, Established rapport, Conducted functional assessment, [place]-setting to identify pt's primary goals for [facility] visit / overall health, Supportive techniques, and Emphasized self-care as important for managing overall health    Pt Behavioral Change Plan:   See Pt Instructions.

## 2023-01-29 DIAGNOSIS — E78.2 MIXED HYPERLIPIDEMIA: ICD-10-CM

## 2023-01-30 PROBLEM — R51.9 FREQUENT HEADACHES: Status: ACTIVE | Noted: 2023-01-30

## 2023-01-30 RX ORDER — ATORVASTATIN CALCIUM 20 MG/1
TABLET, FILM COATED ORAL
Qty: 90 TABLET | Refills: 1 | Status: SHIPPED | OUTPATIENT
Start: 2023-01-30

## 2023-01-30 ASSESSMENT — ENCOUNTER SYMPTOMS
WHEEZING: 0
VOMITING: 0
SORE THROAT: 0
BLOOD IN STOOL: 0
SINUS PAIN: 0
BACK PAIN: 1
SHORTNESS OF BREATH: 0
NAUSEA: 1
SINUS PRESSURE: 0
CONSTIPATION: 0
RHINORRHEA: 0
DIARRHEA: 0
COUGH: 0
ABDOMINAL PAIN: 0
PHOTOPHOBIA: 0
CHEST TIGHTNESS: 0

## 2023-01-30 NOTE — TELEPHONE ENCOUNTER
Medication:   Requested Prescriptions     Pending Prescriptions Disp Refills    atorvastatin (LIPITOR) 20 MG tablet [Pharmacy Med Name: ATORVASTATIN TAB 20MG] 90 tablet 1     Sig: TAKE 1 TABLET DAILY     Last Filled:  09/06/2022    Last appt: 1/23/2023   Next appt: 4/11/2023    Last Lipid:   Lab Results   Component Value Date/Time    CHOL 181 01/16/2023 09:37 AM    TRIG 103 01/16/2023 09:37 AM    HDL 50 01/16/2023 09:37 AM    HDL 58 10/27/2011 08:44 AM    LDLCALC 110 01/16/2023 09:37 AM

## 2023-02-02 ENCOUNTER — OFFICE VISIT (OUTPATIENT)
Dept: ORTHOPEDIC SURGERY | Age: 68
End: 2023-02-02
Payer: MEDICARE

## 2023-02-02 VITALS — BODY MASS INDEX: 28.72 KG/M2 | HEIGHT: 62 IN | WEIGHT: 156.09 LBS

## 2023-02-02 DIAGNOSIS — M48.061 LUMBAR FORAMINAL STENOSIS: Primary | ICD-10-CM

## 2023-02-02 DIAGNOSIS — M54.10 RADICULAR PAIN OF RIGHT LOWER EXTREMITY: ICD-10-CM

## 2023-02-02 DIAGNOSIS — Z78.9 DRUG INTOLERANCE: ICD-10-CM

## 2023-02-02 DIAGNOSIS — Z86.39 HISTORY OF DIABETES MELLITUS: ICD-10-CM

## 2023-02-02 DIAGNOSIS — M54.10 RADICULAR PAIN OF LEFT LOWER EXTREMITY: ICD-10-CM

## 2023-02-02 DIAGNOSIS — M47.816 LUMBAR SPONDYLOSIS: ICD-10-CM

## 2023-02-02 DIAGNOSIS — M51.36 DDD (DEGENERATIVE DISC DISEASE), LUMBAR: ICD-10-CM

## 2023-02-02 PROCEDURE — G8399 PT W/DXA RESULTS DOCUMENT: HCPCS | Performed by: INTERNAL MEDICINE

## 2023-02-02 PROCEDURE — G8484 FLU IMMUNIZE NO ADMIN: HCPCS | Performed by: INTERNAL MEDICINE

## 2023-02-02 PROCEDURE — 1123F ACP DISCUSS/DSCN MKR DOCD: CPT | Performed by: INTERNAL MEDICINE

## 2023-02-02 PROCEDURE — 1090F PRES/ABSN URINE INCON ASSESS: CPT | Performed by: INTERNAL MEDICINE

## 2023-02-02 PROCEDURE — G8427 DOCREV CUR MEDS BY ELIG CLIN: HCPCS | Performed by: INTERNAL MEDICINE

## 2023-02-02 PROCEDURE — 99214 OFFICE O/P EST MOD 30 MIN: CPT | Performed by: INTERNAL MEDICINE

## 2023-02-02 PROCEDURE — 3017F COLORECTAL CA SCREEN DOC REV: CPT | Performed by: INTERNAL MEDICINE

## 2023-02-02 PROCEDURE — G8417 CALC BMI ABV UP PARAM F/U: HCPCS | Performed by: INTERNAL MEDICINE

## 2023-02-02 PROCEDURE — 1036F TOBACCO NON-USER: CPT | Performed by: INTERNAL MEDICINE

## 2023-02-02 RX ORDER — OXYCODONE HYDROCHLORIDE AND ACETAMINOPHEN 5; 325 MG/1; MG/1
.5-1 TABLET ORAL EVERY 8 HOURS PRN
Qty: 20 TABLET | Refills: 0 | Status: SHIPPED | OUTPATIENT
Start: 2023-02-02 | End: 2023-02-09

## 2023-02-02 RX ORDER — CELECOXIB 100 MG/1
100 CAPSULE ORAL DAILY PRN
Qty: 30 CAPSULE | Refills: 1 | Status: SHIPPED | OUTPATIENT
Start: 2023-02-02

## 2023-02-02 NOTE — LETTER
[address]  Surgery Precert & Billing Form:    DEMOGRAPHICS:                                                                                                       Patient Name:  Niru Garnett  Patient :  1955   Patient SS#:      Patient Phone:  [phone] (home)  Alt.  Patient Phone:    Patient Address:  [facility] [address]    PCP:  Ailyn Seth MD  Insurance: [facility]    DIAGNOSIS & PROCEDURE:                                                                                       Diagnosis: Lumbar foraminal stenosis, M48.061     Radicular pain of right lower extremity, M54.10     Radicular pain of left lower extremity, M54.10     DDD (degenerative disc disease), lumbar, M51.36     Lumbar spondylosis, M47.816    Operation: bilateral L5-S1 transforaminal INGRID    SURGERY  INFORMATION  Date of Surgery:   TBD  Location:    [facility] [place]  Type:    Outpatient  23 hour hold:  No  Surgeon:     Fred Sanches MD.      23     BILLING INFORMATION:                                                                                                Physician Procedure                                            CPT Codes        Bilateral L5-S1 transforaminal INGRID   [id number]

## 2023-02-02 NOTE — LETTER
[facility] and [address] [place] [id number]  Phone: [phone]  Fax: [phone]           Angela Paige MD      February 2, 2023     Patient: Saroj Blount   MR Number: [id number]   YOB: 1955   Date of Visit: 2/2/2023       Dear Dr. Giron Quivers ref. provider found: Thank you for referring Saroj Blount to me for evaluation/treatment. Below are the relevant portions of my assessment and plan of care. Impression: . Encounter Diagnoses   Name Primary?  Lumbar foraminal stenosis Yes    Radicular pain of right lower extremity     Radicular pain of left lower extremity     DDD (degenerative disc disease), lumbar     Lumbar spondylosis     History of diabetes mellitus     Drug intolerance               Plan:     Proceed withL JENNY bilateral L5/S1 transforaminal  Continue maintenance lumbar stabilization home exercises  Consider formal course of PT once pain is under better control  Decrease Celebrex to 100 mg daily with GI precaution as tolerated, discontinue Ultram for Norco short-term  Activity modification lumbar spine precautions  Consider EMG evaluation bilateral lower extremities as needed    I do not believe her back pain and lower limb pain is facet mediated as the primary generator of pain at this time. Proceed as outlined above. Orders:      No orders of the defined types were placed in this encounter. Davidson Fernández MD.      Disclaimer: \"This note was dictated with voice recognition software. Though review and correction are routine, we apologize for any errors. \"       If you have questions, please do not hesitate to call me. I look forward to following [employer] [place] along with you.     Sincerely,        Angela Paige MD    CC providers:  Chito Vargas ATC  [facility]

## 2023-02-02 NOTE — PROGRESS NOTES
Chief Complaint:   Chief Complaint   Patient presents with    Lower Back Pain     BWC F/U Lumbar, MRI TR, increase of pain in low back and stabbing/burning pain in R leg/thigh. Pain in back is burning. History of Present Illness:       Patient is a 78 y.o. female returns follow up for the above complaint. The patient was last seen approximately 2 monthsago. The symptoms show no change since the last visit. The patient has had further testing for the problem. MRI completed in the interim. She was intolerant of Celebrex to 200 mg even when taken with food. She is unable to tolerate tramadol related to nausea. She remains on Neurontin as prescribed by Dr. Elida Braxton. Back:Bilateral leg pain 50:50. Pain in back and legs is stabbing or burning in quality. The lower limb symptoms following L5 dermatomal distribution more prominent on the right than left. Sitting and standing can be equally problematic however she experiences weakness of the right lower extremity when standing which represents an additional problem. She denies any progressive weakness of the right lower limb. Pain levels:8. She continues with HEP      The patient denies new onset or progressive weakness of the lower extremities. The patient denies new onset bowel or bladder dysfunction.     She continues on medical pain management as per previous  inclusive of gabapentin     Past Medical History:        Past Medical History:   Diagnosis Date    Abnormal involuntary movements(781.0)     Anal fissure     Anemia, unspecified     Anxiety     Chronic back pain     Chronic diarrhea 09/23/2019    Depression     Diffuse cystic mastopathy     Encopresis(307.7)     Esophageal reflux     Foot fracture, left 1989    drop something on foot    Headache(784.0)     Hepatitis, unspecified     Herpes simplex without mention of complication     Hypertension     Hypogammaglobulinaemia, unspecified     Insomnia, unspecified     Lateral epicondylitis  of elbow     Migraine, unspecified, without mention of intractable migraine without mention of status migrainosus     Mixed hyperlipidemia 07/30/2010    Osteopenia     Postmenopausal     Pure hypercholesterolemia     Symptomatic menopausal or female climacteric states     Type 2 diabetes mellitus without complication (HCC)     Type II or unspecified type diabetes mellitus without mention of complication, not stated as uncontrolled     Unspecified asthma(493.90)         Present Medications:         Current Outpatient Medications   Medication Sig Dispense Refill    oxyCODONE-acetaminophen (PERCOCET) 5-325 MG per tablet Take 0.5-1 tablets by mouth every 8 hours as needed for Pain for up to 7 days. Intended supply: 7 days. Take lowest dose possible to manage pain Max Daily Amount: 3 tablets 20 tablet 0    celecoxib (CELEBREX) 100 MG capsule Take 1 capsule by mouth daily as needed for Pain 30 capsule 1    atorvastatin (LIPITOR) 20 MG tablet TAKE 1 TABLET DAILY 90 tablet 1    albuterol sulfate HFA (PROVENTIL;VENTOLIN;PROAIR) 108 (90 Base) MCG/ACT inhaler Inhale 2 puffs into the lungs every 6 hours as needed for Wheezing      amitriptyline (ELAVIL) 25 MG tablet Take 25 mg by mouth nightly      azelastine HCl 0.15 % SOLN by Nasal route      benzonatate (TESSALON) 100 MG capsule Take 100 mg by mouth 3 times daily as needed      gabapentin (NEURONTIN) 300 MG capsule Take 300 mg by mouth in the morning and at bedtime. omeprazole (PRILOSEC) 40 MG delayed release capsule Take 40 mg by mouth daily      pioglitazone (ACTOS) 30 MG tablet Take 30 mg by mouth daily      cetirizine (ZYRTEC) 10 MG tablet Take 1 tablet by mouth daily 30 tablet 2    ondansetron (ZOFRAN-ODT) 4 MG disintegrating tablet Take 1 tablet by mouth 3 times daily as needed for Nausea or Vomiting 21 tablet 0    lidocaine (LIDODERM) 5 % Place 1 patch onto the skin daily 12 hours on, 12 hours off.  30 patch 0    acetaminophen (TYLENOL) 500 MG tablet Take 1 tablet by mouth 4 times daily as needed for Pain 120 tablet 0    budesonide-formoterol (SYMBICORT) 160-4.5 MCG/ACT AERO USE 2 INHALATIONS ORALLY   TWICE DAILY 30.6 g 1    cloNIDine (CATAPRES) 0.1 MG/24HR PTWK APPLY 1 PATCH ONTO THE SKINONCE A WEEK 12 patch 1    docusate sodium (COLACE) 100 MG capsule Take 1 capsule by mouth 2 times daily 60 capsule 1    bisacodyl 5 MG EC tablet Take 1-2 tablets daily as needed 30 tablet 0    ketoconazole (NIZORAL) 2 % cream APPLY topically TO bottom of BOTH FEET AND between TOES DAILY FOR 3 TO 4 WEEKS      pantoprazole (PROTONIX) 40 MG tablet Take 1 tablet by mouth daily 90 tablet 1    diclofenac sodium (VOLTAREN) 1 % GEL Apply 2 g topically 4 times daily 350 g 1    Dulaglutide (TRULICITY) 4.46 GW/6.8ML SOPN INJECT 0.5ML (=0.75 MG)    SUBCUTANEOUSLY ONCE WEEKLY 6 mL 1    fluticasone (FLONASE) 50 MCG/ACT nasal spray 2 sprays by Nasal route daily 48 g 1    Lancets MISC 1 each by Other route daily E11.9 100 each 0    Blood Glucose Monitoring Suppl (TRUE METRIX METER) DMITRIY Test blood sugar daily and PRN E11.9 1 each 0    blood glucose test strips (GLUCOSE METER TEST) strip 100 each by In Vitro route 2 times daily Diabetes   e11.9 100 each 1     No current facility-administered medications for this visit. Allergies: Allergies   Allergen Reactions    Aspirin      bruising    Darvocet [Propoxyphene N-Acetaminophen] Hives    Effexor [Venlafaxine Hydrochloride] Hives    Hydrocodone Hives and Itching    Ibuprofen     Keflex [Cephalexin] Itching     Face redness. Paxil Cr [Paroxetine Hcl Er] Itching           Review of Systems:    Pertinent items are noted in HPI        Vital Signs: There were no vitals filed for this visit.      General Exam:     Constitutional: Patient is adequately groomed with no evidence of malnutrition    Physical Exam: lower back      Primary Exam:    Inspection: No deformity atrophy appreciable curvature      Palpation: No focal trigger point tenderness      Range of Motion: 40/15 pain with flexion and extension      Strength: Normal lower extremity      Special Tests: SLR positive right greater than left      Skin: There are no rashes, ulcerations or lesions. Gait: Cane assisted     Neurovascular - non focal and intact       Additional Comments:        Additional Examinations:                   Office Imaging Results/Procedures PerformedToday:            Office Procedures:     Orders Placed This Encounter   Procedures    Ambulatory epidural steroid injection     Bilateral-L5/S1 transforaminal     Standing Status:   Future     Standing Expiration Date:   2024     Scheduling Instructions:      Dr. Mac Blackmon, [address] [place] [address]           Patient Name: Rogers Rosa   Case ID: [id number]   Patient : 1955   Referring Physician: Honorio Sarmiento MD   Exam Date: 2023   Exam Description: MR Lumbar Spine w/o Contrast            HISTORY:  Low back pain for 3 months radiating to right hip. TECHNICAL FACTORS:  Long- and short-axis fat- and water-weighted images were performed. COMPARISON:  None. FINDINGS:  No evidence of lumbar spine fracture and vertebral body heights are normal.       Thoracolumbar junction is intact and lumbar spine alignment is anatomic. Anterior and middle    columns are intact as well as the anterior and posterior longitudinal ligaments. No focal    ligamentous disruption or epidural fluid collection is appreciated. Vertebral marrow signal appears normal.  L1 vertebral body hemangioma. Conus medullaris is normal and visualized spinal cord and cauda equina nerve roots appear    normal.  No evidence of an intradural or extradural mass is present. T12-L1: No focal disc herniation, spinal canal stenosis, or foraminal narrowing.    Facet joints    are normal.       L1-L2: No focal disc herniation, spinal canal stenosis, or foraminal narrowing. Facet joints    are normal.       L2-L3: Bulging annulus and bilateral facet and ligamentum flavum hypertrophy and bilateral    facet joint effusions without compressive sequelae. L3-L4: Bulging annulus and bilateral facet and ligamentum flavum hypertrophy with mild central    canal stenosis. L4-L5: Bulging annulus and bilateral facet and ligamentum flavum hypertrophy and bilateral    facet joint effusions with mild bilateral foraminal stenosis. L5-S1: Disc space narrowing and desiccation with anterior osteophytes. Bulging annulus and    bilateral facet hypertrophy and small left facet joint effusion with moderate right and mild    left foraminal stenosis. Modic type 2 endplate changes. No significant paraspinal soft tissue abnormalities are seen. Bilateral renal cysts, the    largest measuring up to 9 cm on the right. CONCLUSION:   1. Multilevel degenerative changes. The following compressive sequelae are noted:   2. Moderate right foraminal stenosis at the L5-S1 level. 3. Mild foraminal stenoses bilaterally at L4-5 and on the left at L5-S1.   4. Mild central canal stenosis at L3-4.   5. Normal alignment and no evidence of acute compression fracture. Thank you for the opportunity to provide your interpretation. Michael Ortega MD       A: KEITH 01/26/2023 7:33 AM         Other Outside Imaging and Testing Personally Reviewed:    No results found.       Latest Reference Range & Units Most Recent   Sodium 136 - 145 mmol/L 139  10/20/22 10:25   Potassium 3.5 - 5.1 mmol/L 4.6  10/20/22 10:25   Chloride 99 - 110 mmol/L 102  10/20/22 10:25   CO2 21 - 32 mmol/L 27  10/20/22 10:25   BUN,BUNPL 7 - 20 mg/dL 17  10/20/22 10:25   Creatinine 0.6 - 1.2 mg/dL 0.7  10/20/22 10:25   Anion Gap 3 - 16  10  10/20/22 10:25   Est, Glom Filt Rate >60  >60  10/20/22 10:25   GFR Est, /Amer SEE BELOW  >60  4/19/13 08:50   GFR Non- >60  >60  6/16/22 09:39        Latest Reference Range & Units Most Recent   Hemoglobin A1C % 6.1  1/11/23 11:54     Assessment   Impression: . Encounter Diagnoses   Name Primary? Lumbar foraminal stenosis Yes    Radicular pain of right lower extremity     Radicular pain of left lower extremity     DDD (degenerative disc disease), lumbar     Lumbar spondylosis     History of diabetes mellitus     Drug intolerance               Plan:     Proceed withL JENNY bilateral L5/S1 transforaminal  Continue maintenance lumbar stabilization home exercises  Consider formal course of PT once pain is under better control  Decrease Celebrex to 100 mg daily with GI precaution as tolerated, discontinue Ultram for oxycodone short-term 2.5 to 5 mg minimize use of narcotics and caution relative to allergic reaction with hydrocodone-hives  Activity modification lumbar spine precautions  Consider EMG evaluation bilateral lower extremities as needed    I do not believe her back pain and lower limb pain is facet mediated as the primary generator of pain at this time. Proceed as outlined above. Orders:        Orders Placed This Encounter   Procedures    Ambulatory epidural steroid injection     Bilateral-L5/S1 transforaminal     Standing Status:   Future     Standing Expiration Date:   2/2/2024     Scheduling Instructions:      Dr. Heber Dickinson MD.      Gay Long: \"This note was dictated with voice recognition software. Though review and correction are routine, we apologize for any errors. \"

## 2023-02-05 DIAGNOSIS — K21.00 GASTROESOPHAGEAL REFLUX DISEASE WITH ESOPHAGITIS WITHOUT HEMORRHAGE: ICD-10-CM

## 2023-02-06 DIAGNOSIS — E11.42 TYPE 2 DIABETES MELLITUS WITH DIABETIC POLYNEUROPATHY, WITHOUT LONG-TERM CURRENT USE OF INSULIN (HCC): ICD-10-CM

## 2023-02-06 RX ORDER — PANTOPRAZOLE SODIUM 40 MG/1
TABLET, DELAYED RELEASE ORAL
Qty: 90 TABLET | Refills: 1 | Status: SHIPPED | OUTPATIENT
Start: 2023-02-06

## 2023-02-06 RX ORDER — DULAGLUTIDE 0.75 MG/.5ML
INJECTION, SOLUTION SUBCUTANEOUS
Qty: 6 ML | Refills: 1 | Status: SHIPPED | OUTPATIENT
Start: 2023-02-06

## 2023-02-06 NOTE — TELEPHONE ENCOUNTER
Medication:   Requested Prescriptions     Pending Prescriptions Disp Refills    Dulaglutide (TRULICITY) 5.05 PT/8.6NP SOPN 6 mL 1     Sig: INJECT 0.5ML (=0.75 MG)    SUBCUTANEOUSLY ONCE WEEKLY       Last Filled:      Patient Phone Number: [phone] (home)     Last appt: 1/23/2023   Next appt: 4/11/2023    Last Labs DM:   Lab Results   Component Value Date/Time    LABA1C 6.1 01/11/2023 11:54 AM       Last OARRS:   RX Monitoring 11/11/2022   Periodic Controlled Substance Monitoring No signs of potential drug abuse or diversion identified. Preferred Pharmacy: [facility], [address] [facility] [phone]  [facility] [address] [place] [address]  Phone: [phone] Fax: [phone] [facility], [address] [place] [address] [phone] Terryl Flick [phone]  [facility] [address] [place] [address]  Phone: [phone] Fax: [phone]    [facility] [address] [facility], [address] [place] [address] [phone] Terryl Flick [phone]  [place] [address] [address] [facility] [place] [address]  Phone: [phone] Fax: [phone]    [facility]/pharmacy [address] [facility], [address] [facility] [phone] - F [phone]  [facility]..   [facility] [place] [address]  Phone: [phone] Fax: [phone]

## 2023-02-06 NOTE — TELEPHONE ENCOUNTER
Pt needs  meds refill.  Lov 1/11/23      Dulaglutide (TRULICITY) 4.63 GT/6.4CS SOPN  [id number] [address] Ryne Wilson [phone] - F [phone]

## 2023-02-06 NOTE — TELEPHONE ENCOUNTER
Medication:   Requested Prescriptions     Pending Prescriptions Disp Refills    pantoprazole (PROTONIX) 40 MG tablet [Pharmacy Med Name: PANTOPRAZOLE TAB 40MG DR] 90 tablet 1     Sig: TAKE 1 TABLET DAILY     Last Filled:  9/13/22    Last appt: 1/23/2023   Next appt: 4/11/2023    Last Lipid:   Lab Results   Component Value Date/Time    CHOL 181 01/16/2023 09:37 AM    TRIG 103 01/16/2023 09:37 AM    HDL 50 01/16/2023 09:37 AM    HDL 58 10/27/2011 08:44 AM    LDLCALC 110 01/16/2023 09:37 AM

## 2023-02-09 ENCOUNTER — TELEPHONE (OUTPATIENT)
Dept: ORTHOPEDIC SURGERY | Age: 68
End: 2023-02-09

## 2023-02-09 NOTE — TELEPHONE ENCOUNTER
Patient called to let Kashmir Desai know she treated with Dr. Eduin Mercer when he was with [facility]. But there seems to be Lumbar testing in Care Everywhere with [address] as far back as 2004.

## 2023-02-10 ENCOUNTER — OFFICE VISIT (OUTPATIENT)
Dept: PSYCHOLOGY | Age: 68
End: 2023-02-10
Payer: MEDICARE

## 2023-02-10 DIAGNOSIS — F41.9 ANXIETY DISORDER, UNSPECIFIED TYPE: ICD-10-CM

## 2023-02-10 DIAGNOSIS — F33.1 MODERATE EPISODE OF RECURRENT MAJOR DEPRESSIVE DISORDER (HCC): Primary | ICD-10-CM

## 2023-02-10 PROCEDURE — 90832 PSYTX W PT 30 MINUTES: CPT

## 2023-02-10 PROCEDURE — 1123F ACP DISCUSS/DSCN MKR DOCD: CPT

## 2023-02-10 PROCEDURE — 1036F TOBACCO NON-USER: CPT

## 2023-02-10 ASSESSMENT — PATIENT HEALTH QUESTIONNAIRE - PHQ9
8. MOVING OR SPEAKING SO SLOWLY THAT OTHER PEOPLE COULD HAVE NOTICED. OR THE OPPOSITE, BEING SO FIGETY OR RESTLESS THAT YOU HAVE BEEN MOVING AROUND A LOT MORE THAN USUAL: 2
6. FEELING BAD ABOUT YOURSELF - OR THAT YOU ARE A FAILURE OR HAVE LET YOURSELF OR YOUR FAMILY DOWN: 3
5. POOR APPETITE OR OVEREATING: 2
4. FEELING TIRED OR HAVING LITTLE ENERGY: 2
SUM OF ALL RESPONSES TO PHQ9 QUESTIONS 1 & 2: 5
SUM OF ALL RESPONSES TO PHQ QUESTIONS 1-9: 19
1. LITTLE INTEREST OR PLEASURE IN DOING THINGS: 2
9. THOUGHTS THAT YOU WOULD BE BETTER OFF DEAD, OR OF HURTING YOURSELF: 0
SUM OF ALL RESPONSES TO PHQ QUESTIONS 1-9: 19
2. FEELING DOWN, DEPRESSED OR HOPELESS: 3
SUM OF ALL RESPONSES TO PHQ QUESTIONS 1-9: 19
7. TROUBLE CONCENTRATING ON THINGS, SUCH AS READING THE NEWSPAPER OR WATCHING TELEVISION: 2
SUM OF ALL RESPONSES TO PHQ QUESTIONS 1-9: 19
3. TROUBLE FALLING OR STAYING ASLEEP: 3

## 2023-02-10 ASSESSMENT — ANXIETY QUESTIONNAIRES
6. BECOMING EASILY ANNOYED OR IRRITABLE: 2
IF YOU CHECKED OFF ANY PROBLEMS ON THIS QUESTIONNAIRE, HOW DIFFICULT HAVE THESE PROBLEMS MADE IT FOR YOU TO DO YOUR WORK, TAKE CARE OF THINGS AT HOME, OR GET ALONG WITH OTHER PEOPLE: VERY DIFFICULT
5. BEING SO RESTLESS THAT IT IS HARD TO SIT STILL: 2
4. TROUBLE RELAXING: 3
2. NOT BEING ABLE TO STOP OR CONTROL WORRYING: 2
3. WORRYING TOO MUCH ABOUT DIFFERENT THINGS: 2
7. FEELING AFRAID AS IF SOMETHING AWFUL MIGHT HAPPEN: 3
1. FEELING NERVOUS, ANXIOUS, OR ON EDGE: 2
GAD7 TOTAL SCORE: 16

## 2023-02-10 NOTE — PATIENT INSTRUCTIONS
Return to see Dr. Patricia Gong in 2 weeks   Follow up with referrals provided for ongoing therapy - I will bridge care until pt is connected  Follow behavioral activation plan - incorporate and schedule more pleasurable activities/responsibilities into routine to aid in mood  Practice box breathing daily    How to do Box Breathing  Step 1: Breathe in counting to four slowly. Feel the air enter your lungs. Step 2: Hold your breath for 4 seconds. Try to avoid inhaling or exhaling for 4 seconds. Step 3: Slowly exhale through your mouth for 4 seconds. Step 4: Repeat steps 1 to 3 until you feel re-centered. Leisure Activities Catalogue: The following is a list of activities that might be fun and pleasurable for you. Feel free to add your own fun activities to the list.    1.  Soaking in the bathtub  2. Planning my career  3. Collecting things (coins, shells, etc.)  4. Going on a vacation  5. Recycling old items  6. Relaxing  7. Going on a date  6. Going to a movie  9.  Jogging, walking  10. Listening to music  11. Thinking I have done a full day's work  15. Recalling past parties  15. Buying household gadgets  14. Lying in the sun  15. Planning a career change  16. Laughing  17. Thinking about my past trips  18. Listening to others  23. Reading magazines or newspapers  20. Hobbies (stamp collecting, model building, etc.)  21. Spending an evening with good friends  25. Planning a day's activities  21. Meeting new people  24. Remembering beautiful scenery   22. Saving money  26. Card and board games  27. Going to the gym, doing aerobics  28. Eating  29. Thinking how it will be when I finish school  30. Getting out of debt/paying debts  31. Practicing karate, judo, yoga  32. Thinking about long term  35. Playing with Legos  34. Working on my car (bicycle)  35. Remembering the words and deeds of loving people  39. Wearing sexy clothes  37. Having quiet evenings  38.   Taking care of my plants  39. Buying, selling stocks and shares  40. Going swimming  41. Doodling, coloring  42. Exercising  43. Collecting old things  40. Going to a party  45. Thinking about buying things  46. Playing golf  47. Playing soccer  48. Flying kites  49. Having discussions with friends  48. Having family get-togethers  46. Riding a motor bike or motorcycle  52. Sex  48. Playing or learning a musical instrument  54. Going camping  55. Singing around the house  64. Arranging flowers  57. Going to Holiness, praying (practicing Judaism)  62. Losing weight   59. Going to the beach  60. Thinking I am an OK person  64. A day with nothing to do  62. Having class reunions  61. Going ice skating, roller skating  64. Going sailing  65. Traveling abroad, interstate, or within the state  77. Sketching, painting  79. Doing something spontaneously  68. Doing embroidery, cross stitching  69. Sleeping  70. Driving  71. Entertaining  72. Going to clubs (garden, selling, etc.)  68. Thinking about getting   76. Going bird watching  75. Singing with groups  76. Flirting  77. Playing musical instruments  78. Doing arts and crafts  78. Making a gift for someone  [de-identified]. Buying CDs, tapes, records  81. Watching boxing, wrestling  82. Planning parties  80. Cooking, baking  84. Going hiking, walking  85. Writing books (pollens, articles)  80. Sewing  87. Buying close  88. Working  89. Going out to dinner  90. Discussing box  91. Sight seeing  80. Gardening  93. Getting a manicure/pedicure  94. Early morning coffee and newspaper  95. Playing tennis  96. Kissing  97. Watching my children play  98. Going to plays and concerts  99. Daydreaming  100. Planning to go to school  101. Thinking about sex  80. Going for a drive  183. Listening to the stereo  104. Refurbishing furniture  105. Watching TV, videos  106. Making lists of tasks  107. Going bike riding  108.   Walks on the riverQuantopian  109. Buying gifts  110. Traveling to [place]  111. Completing a task  112. Thinking about my achievements  113. Going to a sports game  114. Eating delicious food  2:70. Exchanging emails, chatting on the Internet  116. Photography  117. Going fishing  118. Thinking about pleasant events  119. Staying on a diet  120. Start gazing  121. Flying a plane  122. Reading fiction  123. Acting  124. Being alone  125. Writing a diary, journal entry or letter  126. Cleaning  127. Reading non-fiction  128. Taking children places  129. Dancing  1:30. Going on a picnic  131. Thinking \"I did that pretty well\" after doing something  132. Meditating  133. Playing volleyball  134. Having lunch with a friend  135. Going to the hills  136. Thinking about having a family  80. Thoughts about happy moments in my childhood  138. Splurging  139. Playing cards  140. Solving riddles mentally  141. Having a political discussion  142. Exploring a new area of town  143. Seeing and/or showing photos or slides  144. Knitting/crocheting/quilting  145. Doing crossword puzzles  146. Shooting pool / playing billiards  147. Dressing up and looking nice  148. Reflecting on how I've improved  149. Buying things for myself  150. Talking on the phone  151. Going to museums, [employer]  152. Thinking Mormonism thoughts  153. Surfing the Internet  154. Lighting candles  155. Listening to the radio  156. Watching FARZAD Talks  157. Having coffee at a café  158. Listening to the radio  159. Getting/giving a massage  160. Saying \"I love you\"  161. Thinking about my good qualities  162. Buying books  163. Taking a sauna or steam bath  164. Going skiing  165. Going canoeing or white-water rafting  166. Going bowling  167. Doing woodworking  168. Fantasizing about the future  169. Doing ballet, jazz, tap dancing  170. Debating  171. Playing computer games  172.   Having an aquarium  173. Erotica (sex books, movies)  80. Going horseback riding  175. Going rockclimbing  176. Thinking about becoming active in the community  177. Doing something new  178. Making jigsaw puzzles  179. Thinking I'm a person who can cope  180. Playing with my pets  181. Having a barbecue  182. Rearranging the furniture in my house  183. Buying new furniture  184. Going windowshopping  185. Thinking I have a lot more going for me than most people  186. Gardening       Medicare Psychotherapy Referrals  Osker Inch. [employer] and medicare  [address] [facility], [address] [place] [address]  [phone]  [employer]  Accepts many insurances and also offers self-pay discounts  Many different locations across [address] [place]  www. [email or web address]. [facility]  [address] [facility]  [phone]  www.[employer]    [facility]  Appears to have a [address] [place] [address] affiliation  [facility]35, [address] location as well  [phone]  [employer]    Yasmani Constantino, Ph.D.  Accepts medicare  [address], [address] [address]  [facility] many private insurances and Medicare  [address]  [phone] ext. [phone]    Grecia Gomez  Accepts Medicare  [facility] [place], [address] [place] [address]  Nirmal Suero [address] Medicare   [address]  [phone]    Elizabeth Tamayo Medicare and other private insurances  [address], [address]  [phone]  [email or web address]    Benjamín Murdock  Accepts medicare and many private insurances  [address] [address] [phone]

## 2023-02-10 NOTE — Clinical Note
From my note - pt would like Trulicity to be sent to [facility] from now on due to cost  \"Pt reported frustration, anxiety and feeling upset as her Trulicity was sent to the wrong pharmacy. Reported that she wanted this medication sent to [facility] on [address] [place], but instead the medication was sent to [facility]. \"

## 2023-02-10 NOTE — PROGRESS NOTES
Behavioral Health Consultation  RADHA ESCALANTE Psy.D. Psychologist  2/10/2023  1:50 PM EST      Time spent with Patient: 30 minutes  This is patient's second  [facility] appointment. Reason for Consult:    Chief Complaint   Patient presents with    Depression    Anxiety     Referring Provider: Rachelle Bush MD  [facility] [address] [facility] [place]  [phone]    Feedback given to PCP. S:  Pt reported frustration, anxiety and feeling upset as her Trulicity was sent to the wrong pharmacy. Reported that she wanted this medication sent to [facility] due to cost on [facility] [address] [place], but instead the medication was sent to [facility]. Pt reported her mood has been a little better in the morning. Noted increased stress related to medication issue. Noted she experienced increased anxiety a couple of nights ago, reporting sx of heart racing, sweating that lasted for a period then improved. Reported she gets out of the house and gets fresh air which aids in anxiety. Pt also noted that she felt down yesterday and stayed in bed all day. Discussed bx activation, box breathing to aid in anxiety/relaxation.      O:  MSE:    Appearance: good hygiene   Attitude: cooperative  Consciousness: alert  Orientation: oriented to person, place, time, general circumstance  Memory: recent and remote memory intact  Attention/Concentration: intact during session  Psychomotor Activity:normal  Eye Contact: normal  Speech: normal rate and volume, well-articulated  Mood: \"sometimes easy days\"  Affect:  mildly dysphoric , frustrated  Perception: within normal limits  Thought Content: within normal limits  Thought Process: logical, coherent and goal-directed  Insight: fair  Judgment: intact  Ability to understand instructions: Yes  Ability to respond meaningfully: Yes  Morbid Ideation: no   Suicide Assessment: no suicidal ideation, plan, or intent  Homicidal Ideation: no    History:    Medications:   Current Outpatient Medications   Medication Sig Dispense Refill    pantoprazole (PROTONIX) 40 MG tablet TAKE 1 TABLET DAILY 90 tablet 1    Dulaglutide (TRULICITY) 2.48 SO/6.3YL SOPN INJECT 0.5ML (=0.75 MG)    SUBCUTANEOUSLY ONCE WEEKLY 6 mL 1    celecoxib (CELEBREX) 100 MG capsule Take 1 capsule by mouth daily as needed for Pain 30 capsule 1    atorvastatin (LIPITOR) 20 MG tablet TAKE 1 TABLET DAILY 90 tablet 1    albuterol sulfate HFA (PROVENTIL;VENTOLIN;PROAIR) 108 (90 Base) MCG/ACT inhaler Inhale 2 puffs into the lungs every 6 hours as needed for Wheezing      amitriptyline (ELAVIL) 25 MG tablet Take 25 mg by mouth nightly      azelastine HCl 0.15 % SOLN by Nasal route      benzonatate (TESSALON) 100 MG capsule Take 100 mg by mouth 3 times daily as needed      gabapentin (NEURONTIN) 300 MG capsule Take 300 mg by mouth in the morning and at bedtime. omeprazole (PRILOSEC) 40 MG delayed release capsule Take 40 mg by mouth daily      pioglitazone (ACTOS) 30 MG tablet Take 30 mg by mouth daily      cetirizine (ZYRTEC) 10 MG tablet Take 1 tablet by mouth daily 30 tablet 2    ondansetron (ZOFRAN-ODT) 4 MG disintegrating tablet Take 1 tablet by mouth 3 times daily as needed for Nausea or Vomiting 21 tablet 0    lidocaine (LIDODERM) 5 % Place 1 patch onto the skin daily 12 hours on, 12 hours off.  30 patch 0    acetaminophen (TYLENOL) 500 MG tablet Take 1 tablet by mouth 4 times daily as needed for Pain 120 tablet 0    budesonide-formoterol (SYMBICORT) 160-4.5 MCG/ACT AERO USE 2 INHALATIONS ORALLY   TWICE DAILY 30.6 g 1    cloNIDine (CATAPRES) 0.1 MG/24HR PTWK APPLY 1 PATCH ONTO THE SKINONCE A WEEK 12 patch 1    docusate sodium (COLACE) 100 MG capsule Take 1 capsule by mouth 2 times daily 60 capsule 1    bisacodyl 5 MG EC tablet Take 1-2 tablets daily as needed 30 tablet 0    ketoconazole (NIZORAL) 2 % cream APPLY topically TO bottom of BOTH FEET AND between TOES DAILY FOR 3 TO 4 WEEKS      diclofenac sodium (VOLTAREN) 1 % GEL Apply 2 g topically 4 times daily 350 g 1    fluticasone (FLONASE) 50 MCG/ACT nasal spray 2 sprays by Nasal route daily 48 g 1    Lancets MISC 1 each by Other route daily E11.9 100 each 0    Blood Glucose Monitoring Suppl (TRUE METRIX METER) DMITRIY Test blood sugar daily and PRN E11.9 1 each 0    blood glucose test strips (GLUCOSE METER TEST) strip 100 each by In Vitro route 2 times daily Diabetes   e11.9 100 each 1     No current facility-administered medications for this visit. Social History:   Social History     Socioeconomic History    Marital status:      Spouse name: Not on file    Number of children: Not on file    Years of education: Not on file    Highest education level: Not on file   Occupational History    Not on file   Tobacco Use    Smoking status: Never    Smokeless tobacco: Never   Substance and Sexual Activity    Alcohol use: No    Drug use: No    Sexual activity: Not Currently   Other Topics Concern    Not on file   Social History Narrative    Not on file     Social Determinants of Health     Financial Resource Strain: Low Risk     Difficulty of Paying Living Expenses: Not hard at all   Food Insecurity: No Food Insecurity    Worried About Running Out of Food in the Last Year: Never true    Ran Out of Food in the Last Year: Never true   Transportation Needs: Not on file   Physical Activity: Insufficiently Active    Days of Exercise per Week: 3 days    Minutes of Exercise per Session: 20 min   Stress: Not on file   Social Connections: Not on file   Intimate Partner Violence: Not on file   Housing Stability: Not on file     TOBACCO:   reports that she has never smoked. She has never used smokeless tobacco.  ETOH:   reports no history of alcohol use.   Family History:   Family History   Problem Relation Age of Onset    Obesity Mother     Osteoporosis Mother     Stroke Father     Stroke Sister 76         from stroke    Breast Cancer Daughter     Cancer Other        A:  Patient engaged and cooperative. Denied suicidal ideation. Insight and motivation are fair. Re-administered PHQ-9 and SHIN-7 (see below). Patient endorses Moderately Severe symptoms of depression and Severe symptoms of anxiety. PHQ-9 Questionaire 2/10/2023 1/27/2023 1/11/2023 9/15/2022 3/14/2022 3/14/2022 2/9/2022   Little interest or pleasure in doing things 2 2 0 0 - 0 0   Feeling down, depressed, or hopeless 3 3 0 2 0 0 0   Trouble falling or staying asleep, or sleeping too much 3 1 0 0 0 - 0   Feeling tired or having little energy 2 3 0 0 0 - 0   Poor appetite or overeating 2 2 0 0 0 - 0   Feeling bad about yourself - or that you are a failure or have let yourself or your family down 3 - 0 0 0 - 0   Trouble concentrating on things, such as reading the newspaper or watching television 2 2 0 0 0 - 0   Moving or speaking so slowly that other people could have noticed.  Or the opposite - being so fidgety or restless that you have been moving around a lot more than usual 2 1 0 0 0 - 0   Thoughts that you would be better off dead, or of hurting yourself in some way 0 0 0 0 0 - 0   PHQ-9 Total Score 19 14 0 2 0 0 0   If you checked off any problems, how difficult have these problems made it for you to do your work, take care of things at home, or get along with other people? - 0 0 0 0 - 0     PHQ Scores 2/10/2023 1/27/2023 1/11/2023 9/15/2022 3/14/2022 3/14/2022 2/9/2022   PHQ2 Score 5 5 0 2 - 0 0   PHQ9 Score 19 14 0 2 0 0 0       Interpretation of Total Score Depression Severity: 1-4 = Minimal depression, 5-9 = Mild depression, 10-14 = Moderate depression, 15-19 = Moderately severe depression, 20-27 = Severe depression     SHIN-7 SCREENING 2/10/2023 1/27/2023   Feeling nervous, anxious, or on edge More than half the days Nearly every day   Not being able to stop or control worrying More than half the days Nearly every day   Worrying too much about different things More than half the days Nearly every day   Trouble relaxing Nearly every day Nearly every day   Being so restless that it is hard to sit still More than half the days Nearly every day   Becoming easily annoyed or irritable More than half the days Nearly every day   Feeling afraid as if something awful might happen Nearly every day Nearly every day   SHIN-7 Total Score 16 21   How difficult have these problems made it for you to do your work, take care of things at home, or get along with other people? Very difficult Very difficult     SHIN 7 SCORE 2/10/2023 1/27/2023   SHIN-7 Total Score 16 21       Interpretation of Total Score. Anxiety Severity: Score 0-4: Minimal Anxiety. Score 5-9: Mild Anxiety. Score 10-14: Moderate Anxiety. Score greater than 15: Severe Anxiety. Diagnosis:    1. Moderate episode of recurrent major depressive disorder ([facility] [address]    2.  Anxiety disorder, unspecified type        Past Medical History      Diagnosis Date    Abnormal involuntary movements(781.0)     Anal fissure     Anemia, unspecified     Anxiety     Chronic back pain     Chronic diarrhea 09/23/2019    Depression     Diffuse cystic mastopathy     Encopresis(307.7)     Esophageal reflux     Foot fracture, left 1989    drop something on foot    Headache(784.0)     Hepatitis, unspecified     Herpes simplex without mention of complication     Hypertension     Hypogammaglobulinaemia, unspecified     Insomnia, unspecified     Lateral epicondylitis  of elbow     Migraine, unspecified, without mention of intractable migraine without mention of status migrainosus     Mixed hyperlipidemia 07/30/2010    Osteopenia     Postmenopausal     Pure hypercholesterolemia     Symptomatic menopausal or female climacteric states     Type 2 diabetes mellitus without complication (HCC)     Type II or unspecified type diabetes mellitus without mention of complication, not stated as uncontrolled     Unspecified asthma(493.90)        Plan:  Pt interventions:  Trained in relaxation strategies, Discussed self-care (sleep, nutrition, rewarding activities, social support, exercise), Discussed benefits of referral for specialty care, Supportive techniques, Emphasized self-care as important for managing overall health, Provided Psychoeducation re: bx activation, and Emphasized importance of regular practice of relaxation strategies to target / promote anxiety management    Pt Behavioral Change Plan:   See Pt Instructions. Due to chronicity and intensity of pt's sx, encouraged traditional/specialty mental health care. She is not open to psychotropic medication at this time.

## 2023-02-12 ASSESSMENT — ENCOUNTER SYMPTOMS
DIARRHEA: 0
VOMITING: 0
SHORTNESS OF BREATH: 0
CONSTIPATION: 0
ABDOMINAL DISTENTION: 0
BACK PAIN: 0
BLOOD IN STOOL: 0
ABDOMINAL PAIN: 1
NAUSEA: 1

## 2023-02-14 DIAGNOSIS — E11.42 TYPE 2 DIABETES MELLITUS WITH DIABETIC POLYNEUROPATHY, WITHOUT LONG-TERM CURRENT USE OF INSULIN (HCC): ICD-10-CM

## 2023-02-14 RX ORDER — DULAGLUTIDE 0.75 MG/.5ML
INJECTION, SOLUTION SUBCUTANEOUS
Qty: 6 ML | Refills: 1 | Status: SHIPPED | OUTPATIENT
Start: 2023-02-14

## 2023-02-14 NOTE — TELEPHONE ENCOUNTER
Spoke to [employer] on 2/13/23. Prescription for Trulicity sent  to [facility] on 2/6/23 in error. Patient wanted prescription to go to [facility]. Pharmacist states patient picked up prescription and cost was $30.00. Patient got a 30 day supply. I cancelled all remaining refills  at [facility]. Sent a new prescription to [address] [place] [address]

## 2023-02-16 ENCOUNTER — HOSPITAL ENCOUNTER (OUTPATIENT)
Dept: INTERVENTIONAL RADIOLOGY/VASCULAR | Age: 68
Discharge: HOME | End: 2023-02-16
Payer: COMMERCIAL

## 2023-02-16 DIAGNOSIS — M48.061 SPINAL STENOSIS, LUMBAR REGION, WITHOUT NEUROGENIC CLAUDICATION: ICD-10-CM

## 2023-02-16 PROCEDURE — 2709999900 HC NON-CHARGEABLE SUPPLY

## 2023-02-16 PROCEDURE — 64483 NJX AA&/STRD TFRM EPI L/S 1: CPT

## 2023-02-16 PROCEDURE — 6360000004 HC RX CONTRAST MEDICATION: Performed by: STUDENT IN AN ORGANIZED HEALTH CARE EDUCATION/TRAINING PROGRAM

## 2023-02-16 RX ADMIN — IOHEXOL 20 ML: 180 INJECTION INTRAVENOUS at 14:27

## 2023-02-16 NOTE — DISCHARGE INSTRUCTIONS
Lumbar Epidural Steroid Injection  Patient Discharge Instructions      When [address] [facility] should not drive the day of the procedure. You may experience leg weakness during the first 24 hours following the procedure. To prevent yourself from falling, it is important to have someone help you walk. However, you do not need to stay in bed when you get home. In fact, it is best to walk around if you feel up to it, but you will need assistance during the first 24 hours following the epidural steroid injection. Even if you feel better right away, avoid activities that may strain your back. Keep in mind that most patients feel increased pain for the first 24 hours. You should start feeling some pain relief 2-3 days following the injection. This is because the steroid will start working within three days of the injection with maximal effect by one week. At that time, we will evaluate your pain level to determine the need for another steroid injection. Remove bandaid(s) within 24 hours. When to Call Your Doctor    Call right away if you notice any of the following symptoms:    Severe pain or headache;  Fever or chills; Redness or swelling around the injection site. Loss of bladder or bowel control. You may contact [address] [place] [address] [facility]. for any questions or problems that may occur at [phone] during the hours of 9am-5pm Monday-Friday, or the hospital  after hours at [phone], to have the interventional radiologist on call paged. [facility]  Cardiovascular Special Procedures  General Discharge Instructions      __x__ You may be drowsy or lightheaded after receiving sedation.  DO NOT operate a vehicle (automobile, bicycle, motorcycle, machinery, or power tools), make any important decisions or sign any important/legal documents, or drink alcoholic beverages for the next 24 hours  __x__ We strongly suggest that a responsible adult be with you for the She has to be seen   next 24 hours for your protection and safety  ____ If the intravenous catheter site is painful, apply warm wet compresses on the site until the soreness is relieved and elevate the arm above the heart. Call your physician if no improvement in 2 to 3 days    DIETARY INSTRUCTIONS:    ____ Drink extra fluids over the next 24 hours (If not contraindicated by illness or by                   physician order)  ____ Start with clear liquids and progress to normal diet as you feel like eating. If you experience nausea or repeated episodes of vomiting, which persist beyond 12-24 hours, notify your doctor        ___x_ Resume your previous diet    ACTIVITY INSTRUCTIONS:    ____ See other instructions  ____ No special instructions  ____ Rest for 24 hours    __x__ Up as tolerated  __x__ Increase activity as tolerated    Wound/Dressing Instructions:  ____ See other instructions  ___x_ May shower, tomorrow  __x__ Remove bandage within 24 hours    MEDICATION INSTRUCTIONS:    _x___ See Medication Reconciliation Sheet      SPECIAL INSTRUCTIONS:  __________________________________________________________________________________________________________________________________________________________________________________________________________________________________________                                                                                                                                                                                                                                                                                                  Please make sure that you follow-up with your doctors office for your results. Call: _________________________________     FOLLOW-UP APPOINTMENT    Follow up with MD in 2 weeks. Belongings returned to patient and/or family: Yes. The Discharge Instructions have been explained to me. I understand and can verbalize these instructions.

## 2023-02-17 DIAGNOSIS — I10 ESSENTIAL HYPERTENSION: ICD-10-CM

## 2023-02-17 RX ORDER — BUDESONIDE AND FORMOTEROL FUMARATE DIHYDRATE 160; 4.5 UG/1; UG/1
AEROSOL RESPIRATORY (INHALATION)
Qty: 30.6 G | Refills: 1 | Status: SHIPPED | OUTPATIENT
Start: 2023-02-17

## 2023-02-17 NOTE — TELEPHONE ENCOUNTER
Medication:   Requested Prescriptions     Pending Prescriptions Disp Refills    budesonide-formoterol (SYMBICORT) 160-4.5 MCG/ACT AERO [Pharmacy Med Name: BUDES/FORMOT -4.5] 30.6 g 1     Sig: USE 2 INHALATIONS ORALLY   TWICE DAILY     Last Filled:  11/02/2022    Last appt: 1/11/2023   Next appt: 04/11/2023    Last OARRS:   RX Monitoring 11/11/2022   Periodic Controlled Substance Monitoring No signs of potential drug abuse or diversion identified.

## 2023-02-20 RX ORDER — CLONIDINE 0.1 MG/24H
PATCH, EXTENDED RELEASE TRANSDERMAL
Qty: 12 PATCH | Refills: 1 | Status: SHIPPED | OUTPATIENT
Start: 2023-02-20

## 2023-02-20 NOTE — TELEPHONE ENCOUNTER
Medication:   Requested Prescriptions     Pending Prescriptions Disp Refills    cloNIDine (CATAPRES) 0.1 MG/24HR PTWK [Pharmacy Med Name: CLONIDIN-TTS DIS 0.1/24HR] 12 patch 1     Sig: APPLY 1 PATCH ONTO THE SKINONCE A WEEK     Last Filled:  10.28.22    Last appt: 1/11/2023   Next appt: 4/11/2023    Last OARRS:   RX Monitoring 11/11/2022   Periodic Controlled Substance Monitoring No signs of potential drug abuse or diversion identified.

## 2023-02-21 ENCOUNTER — TELEPHONE (OUTPATIENT)
Dept: PRIMARY CARE CLINIC | Age: 68
End: 2023-02-21

## 2023-02-21 ENCOUNTER — TELEPHONE (OUTPATIENT)
Dept: ORTHOPEDIC SURGERY | Age: 68
End: 2023-02-21

## 2023-02-21 NOTE — TELEPHONE ENCOUNTER
Pt called and stated she is feeling very tired and doesn't have no energy.  She also stated that she fees like her heartbeat is fast.    Please give pt a call

## 2023-02-21 NOTE — TELEPHONE ENCOUNTER
RUBEN and scheduled her for a follow up for this Thursday due to her symptoms. Pt was happy with the phone call.  Scheduled for Thursday at 1:10pm at [facility]

## 2023-02-21 NOTE — TELEPHONE ENCOUNTER
POST OP PROCEDURE:    Procedure:  INGRID DONE 2/16/2023    Patient Contact Number: [phone]     Patient has C/O weakness and problems lifting right leg she is requesting a call soon. Also, she needs 1st Post Op appointment scheduled.

## 2023-02-22 ENCOUNTER — HOSPITAL ENCOUNTER (INPATIENT)
Age: 68
LOS: 2 days | Discharge: HOME | DRG: 287 | End: 2023-02-24
Attending: EMERGENCY MEDICINE | Admitting: INTERNAL MEDICINE
Payer: MEDICARE

## 2023-02-22 ENCOUNTER — HOSPITAL ENCOUNTER (OUTPATIENT)
Dept: GENERAL RADIOLOGY | Age: 68
Discharge: HOME | End: 2023-02-22
Payer: MEDICARE

## 2023-02-22 ENCOUNTER — OFFICE VISIT (OUTPATIENT)
Dept: CARDIOLOGY CLINIC | Age: 68
End: 2023-02-22
Payer: MEDICARE

## 2023-02-22 ENCOUNTER — APPOINTMENT (OUTPATIENT)
Dept: GENERAL RADIOLOGY | Age: 68
DRG: 287 | End: 2023-02-22
Payer: MEDICARE

## 2023-02-22 ENCOUNTER — OFFICE VISIT (OUTPATIENT)
Dept: PRIMARY CARE CLINIC | Age: 68
End: 2023-02-22

## 2023-02-22 VITALS
SYSTOLIC BLOOD PRESSURE: 128 MMHG | BODY MASS INDEX: 28.5 KG/M2 | HEART RATE: 89 BPM | WEIGHT: 155.8 LBS | DIASTOLIC BLOOD PRESSURE: 74 MMHG

## 2023-02-22 VITALS
RESPIRATION RATE: 16 BRPM | TEMPERATURE: 98.1 F | HEART RATE: 80 BPM | DIASTOLIC BLOOD PRESSURE: 80 MMHG | BODY MASS INDEX: 28.52 KG/M2 | SYSTOLIC BLOOD PRESSURE: 120 MMHG | OXYGEN SATURATION: 96 % | WEIGHT: 155 LBS | HEIGHT: 62 IN

## 2023-02-22 DIAGNOSIS — R53.83 FATIGUE, UNSPECIFIED TYPE: ICD-10-CM

## 2023-02-22 DIAGNOSIS — R07.9 CHEST PAIN, UNSPECIFIED TYPE: ICD-10-CM

## 2023-02-22 DIAGNOSIS — R06.02 SHORTNESS OF BREATH: ICD-10-CM

## 2023-02-22 DIAGNOSIS — R00.2 PALPITATIONS: ICD-10-CM

## 2023-02-22 DIAGNOSIS — I25.10 CORONARY ARTERY DISEASE INVOLVING NATIVE CORONARY ARTERY OF NATIVE HEART WITHOUT ANGINA PECTORIS: ICD-10-CM

## 2023-02-22 DIAGNOSIS — R07.9 CHEST PAIN, UNSPECIFIED TYPE: Primary | ICD-10-CM

## 2023-02-22 DIAGNOSIS — I10 HTN (HYPERTENSION), BENIGN: Primary | ICD-10-CM

## 2023-02-22 DIAGNOSIS — R94.31 ABNORMAL EKG: ICD-10-CM

## 2023-02-22 DIAGNOSIS — I21.4 NSTEMI (NON-ST ELEVATED MYOCARDIAL INFARCTION) (HCC): Primary | ICD-10-CM

## 2023-02-22 PROBLEM — I20.0 UNSTABLE ANGINA (HCC): Status: ACTIVE | Noted: 2023-02-22

## 2023-02-22 LAB
ANION GAP SERPL CALCULATED.3IONS-SCNC: 11 MMOL/L (ref 3–16)
ANION GAP SERPL CALCULATED.3IONS-SCNC: 13 MMOL/L (ref 3–16)
ANTI-XA UNFRAC HEPARIN: 0.93 IU/ML (ref 0.3–0.7)
BASOPHILS ABSOLUTE: 0 K/UL (ref 0–0.2)
BASOPHILS ABSOLUTE: 0.1 K/UL (ref 0–0.2)
BASOPHILS RELATIVE PERCENT: 0.3 %
BASOPHILS RELATIVE PERCENT: 0.5 %
BUN BLDV-MCNC: 17 MG/DL (ref 7–20)
BUN BLDV-MCNC: 18 MG/DL (ref 7–20)
CALCIUM SERPL-MCNC: 9.6 MG/DL (ref 8.3–10.6)
CALCIUM SERPL-MCNC: 9.9 MG/DL (ref 8.3–10.6)
CHLORIDE BLD-SCNC: 100 MMOL/L (ref 99–110)
CHLORIDE BLD-SCNC: 102 MMOL/L (ref 99–110)
CO2: 25 MMOL/L (ref 21–32)
CO2: 26 MMOL/L (ref 21–32)
CREAT SERPL-MCNC: 0.7 MG/DL (ref 0.6–1.2)
CREAT SERPL-MCNC: 0.7 MG/DL (ref 0.6–1.2)
EKG ATRIAL RATE: 75 BPM
EKG DIAGNOSIS: NORMAL
EKG P AXIS: 23 DEGREES
EKG P-R INTERVAL: 138 MS
EKG Q-T INTERVAL: 374 MS
EKG QRS DURATION: 80 MS
EKG QTC CALCULATION (BAZETT): 417 MS
EKG R AXIS: 4 DEGREES
EKG T AXIS: 25 DEGREES
EKG VENTRICULAR RATE: 75 BPM
EOSINOPHILS ABSOLUTE: 0.1 K/UL (ref 0–0.6)
EOSINOPHILS ABSOLUTE: 0.1 K/UL (ref 0–0.6)
EOSINOPHILS RELATIVE PERCENT: 0.8 %
EOSINOPHILS RELATIVE PERCENT: 1.1 %
GFR SERPL CREATININE-BSD FRML MDRD: >60 ML/MIN/{1.73_M2}
GFR SERPL CREATININE-BSD FRML MDRD: >60 ML/MIN/{1.73_M2}
GLUCOSE BLD-MCNC: 101 MG/DL (ref 70–99)
GLUCOSE BLD-MCNC: 98 MG/DL (ref 70–99)
GLUCOSE BLD-MCNC: 99 MG/DL (ref 70–99)
HCT VFR BLD CALC: 44.8 % (ref 36–48)
HCT VFR BLD CALC: 45 % (ref 36–48)
HEMOGLOBIN: 14.8 G/DL (ref 12–16)
HEMOGLOBIN: 15.2 G/DL (ref 12–16)
LYMPHOCYTES ABSOLUTE: 2.7 K/UL (ref 1–5.1)
LYMPHOCYTES ABSOLUTE: 2.8 K/UL (ref 1–5.1)
LYMPHOCYTES RELATIVE PERCENT: 25.7 %
LYMPHOCYTES RELATIVE PERCENT: 27.3 %
MCH RBC QN AUTO: 28.4 PG (ref 26–34)
MCH RBC QN AUTO: 29.2 PG (ref 26–34)
MCHC RBC AUTO-ENTMCNC: 33 G/DL (ref 31–36)
MCHC RBC AUTO-ENTMCNC: 33.8 G/DL (ref 31–36)
MCV RBC AUTO: 86.1 FL (ref 80–100)
MCV RBC AUTO: 86.4 FL (ref 80–100)
MONOCYTES ABSOLUTE: 0.8 K/UL (ref 0–1.3)
MONOCYTES ABSOLUTE: 0.9 K/UL (ref 0–1.3)
MONOCYTES RELATIVE PERCENT: 8.1 %
MONOCYTES RELATIVE PERCENT: 8.6 %
NEUTROPHILS ABSOLUTE: 6.2 K/UL (ref 1.7–7.7)
NEUTROPHILS ABSOLUTE: 6.9 K/UL (ref 1.7–7.7)
NEUTROPHILS RELATIVE PERCENT: 62.7 %
NEUTROPHILS RELATIVE PERCENT: 64.9 %
PDW BLD-RTO: 12.6 % (ref 12.4–15.4)
PDW BLD-RTO: 13.1 % (ref 12.4–15.4)
PERFORMED ON: NORMAL
PLATELET # BLD: 243 K/UL (ref 135–450)
PLATELET # BLD: 255 K/UL (ref 135–450)
PMV BLD AUTO: 8.8 FL (ref 5–10.5)
PMV BLD AUTO: 9.3 FL (ref 5–10.5)
POTASSIUM SERPL-SCNC: 4.2 MMOL/L (ref 3.5–5.1)
POTASSIUM SERPL-SCNC: 4.5 MMOL/L (ref 3.5–5.1)
RBC # BLD: 5.2 M/UL (ref 4–5.2)
RBC # BLD: 5.2 M/UL (ref 4–5.2)
SODIUM BLD-SCNC: 138 MMOL/L (ref 136–145)
SODIUM BLD-SCNC: 139 MMOL/L (ref 136–145)
TROPONIN: <0.01 NG/ML
TROPONIN: <0.01 NG/ML
TSH SERPL DL<=0.05 MIU/L-ACNC: 2.25 UIU/ML (ref 0.27–4.2)
WBC # BLD: 10.7 K/UL (ref 4–11)
WBC # BLD: 9.8 K/UL (ref 4–11)

## 2023-02-22 PROCEDURE — 3017F COLORECTAL CA SCREEN DOC REV: CPT | Performed by: INTERNAL MEDICINE

## 2023-02-22 PROCEDURE — G8427 DOCREV CUR MEDS BY ELIG CLIN: HCPCS | Performed by: INTERNAL MEDICINE

## 2023-02-22 PROCEDURE — 2060000000 HC ICU INTERMEDIATE R&B

## 2023-02-22 PROCEDURE — 71046 X-RAY EXAM CHEST 2 VIEWS: CPT

## 2023-02-22 PROCEDURE — 84484 ASSAY OF TROPONIN QUANT: CPT

## 2023-02-22 PROCEDURE — 80048 BASIC METABOLIC PNL TOTAL CA: CPT

## 2023-02-22 PROCEDURE — 6360000002 HC RX W HCPCS: Performed by: EMERGENCY MEDICINE

## 2023-02-22 PROCEDURE — 6370000000 HC RX 637 (ALT 250 FOR IP): Performed by: EMERGENCY MEDICINE

## 2023-02-22 PROCEDURE — G8484 FLU IMMUNIZE NO ADMIN: HCPCS | Performed by: INTERNAL MEDICINE

## 2023-02-22 PROCEDURE — 2580000003 HC RX 258: Performed by: INTERNAL MEDICINE

## 2023-02-22 PROCEDURE — 3078F DIAST BP <80 MM HG: CPT | Performed by: INTERNAL MEDICINE

## 2023-02-22 PROCEDURE — 2500000003 HC RX 250 WO HCPCS

## 2023-02-22 PROCEDURE — 3074F SYST BP LT 130 MM HG: CPT | Performed by: INTERNAL MEDICINE

## 2023-02-22 PROCEDURE — 1090F PRES/ABSN URINE INCON ASSESS: CPT | Performed by: INTERNAL MEDICINE

## 2023-02-22 PROCEDURE — 6360000002 HC RX W HCPCS

## 2023-02-22 PROCEDURE — 99214 OFFICE O/P EST MOD 30 MIN: CPT | Performed by: INTERNAL MEDICINE

## 2023-02-22 PROCEDURE — 85520 HEPARIN ASSAY: CPT

## 2023-02-22 PROCEDURE — 99285 EMERGENCY DEPT VISIT HI MDM: CPT

## 2023-02-22 PROCEDURE — 1123F ACP DISCUSS/DSCN MKR DOCD: CPT | Performed by: INTERNAL MEDICINE

## 2023-02-22 PROCEDURE — 85025 COMPLETE CBC W/AUTO DIFF WBC: CPT

## 2023-02-22 PROCEDURE — 96374 THER/PROPH/DIAG INJ IV PUSH: CPT

## 2023-02-22 PROCEDURE — 36415 COLL VENOUS BLD VENIPUNCTURE: CPT

## 2023-02-22 PROCEDURE — G8399 PT W/DXA RESULTS DOCUMENT: HCPCS | Performed by: INTERNAL MEDICINE

## 2023-02-22 PROCEDURE — 1036F TOBACCO NON-USER: CPT | Performed by: INTERNAL MEDICINE

## 2023-02-22 PROCEDURE — 93005 ELECTROCARDIOGRAM TRACING: CPT | Performed by: STUDENT IN AN ORGANIZED HEALTH CARE EDUCATION/TRAINING PROGRAM

## 2023-02-22 PROCEDURE — G8417 CALC BMI ABV UP PARAM F/U: HCPCS | Performed by: INTERNAL MEDICINE

## 2023-02-22 RX ORDER — INSULIN LISPRO 100 [IU]/ML
0-4 INJECTION, SOLUTION INTRAVENOUS; SUBCUTANEOUS
Status: DISCONTINUED | OUTPATIENT
Start: 2023-02-23 | End: 2023-02-24 | Stop reason: HOSPADM

## 2023-02-22 RX ORDER — HEPARIN SODIUM 1000 [USP'U]/ML
2000 INJECTION, SOLUTION INTRAVENOUS; SUBCUTANEOUS PRN
Status: DISCONTINUED | OUTPATIENT
Start: 2023-02-22 | End: 2023-02-23 | Stop reason: ALTCHOICE

## 2023-02-22 RX ORDER — SODIUM CHLORIDE 9 MG/ML
INJECTION, SOLUTION INTRAVENOUS PRN
Status: DISCONTINUED | OUTPATIENT
Start: 2023-02-22 | End: 2023-02-24 | Stop reason: HOSPADM

## 2023-02-22 RX ORDER — CLOPIDOGREL BISULFATE 75 MG/1
150 TABLET ORAL ONCE
Status: COMPLETED | OUTPATIENT
Start: 2023-02-22 | End: 2023-02-22

## 2023-02-22 RX ORDER — HEPARIN SODIUM 1000 [USP'U]/ML
4000 INJECTION, SOLUTION INTRAVENOUS; SUBCUTANEOUS ONCE
Status: COMPLETED | OUTPATIENT
Start: 2023-02-22 | End: 2023-02-22

## 2023-02-22 RX ORDER — NITROGLYCERIN 0.4 MG/1
0.4 TABLET SUBLINGUAL EVERY 5 MIN PRN
Status: DISCONTINUED | OUTPATIENT
Start: 2023-02-22 | End: 2023-02-24 | Stop reason: HOSPADM

## 2023-02-22 RX ORDER — GLUCAGON 1 MG/ML
1 KIT INJECTION PRN
Status: DISCONTINUED | OUTPATIENT
Start: 2023-02-22 | End: 2023-02-24 | Stop reason: HOSPADM

## 2023-02-22 RX ORDER — ONDANSETRON 4 MG/1
4 TABLET, ORALLY DISINTEGRATING ORAL EVERY 8 HOURS PRN
Status: DISCONTINUED | OUTPATIENT
Start: 2023-02-22 | End: 2023-02-24 | Stop reason: HOSPADM

## 2023-02-22 RX ORDER — ASPIRIN 81 MG/1
324 TABLET, CHEWABLE ORAL ONCE
Status: COMPLETED | OUTPATIENT
Start: 2023-02-22 | End: 2023-02-22

## 2023-02-22 RX ORDER — CLONIDINE HYDROCHLORIDE 0.1 MG/1
0.1 TABLET ORAL 2 TIMES DAILY
COMMUNITY
End: 2023-03-08

## 2023-02-22 RX ORDER — POLYETHYLENE GLYCOL 3350 17 G/17G
17 POWDER, FOR SOLUTION ORAL DAILY PRN
Status: DISCONTINUED | OUTPATIENT
Start: 2023-02-22 | End: 2023-02-24 | Stop reason: HOSPADM

## 2023-02-22 RX ORDER — INSULIN LISPRO 100 [IU]/ML
0-4 INJECTION, SOLUTION INTRAVENOUS; SUBCUTANEOUS NIGHTLY
Status: DISCONTINUED | OUTPATIENT
Start: 2023-02-22 | End: 2023-02-24 | Stop reason: HOSPADM

## 2023-02-22 RX ORDER — ASPIRIN 81 MG/1
324 TABLET, CHEWABLE ORAL ONCE
Status: DISCONTINUED | OUTPATIENT
Start: 2023-02-22 | End: 2023-02-22 | Stop reason: SDUPTHER

## 2023-02-22 RX ORDER — DEXTROSE MONOHYDRATE 100 MG/ML
INJECTION, SOLUTION INTRAVENOUS CONTINUOUS PRN
Status: DISCONTINUED | OUTPATIENT
Start: 2023-02-22 | End: 2023-02-24 | Stop reason: HOSPADM

## 2023-02-22 RX ORDER — HEPARIN SODIUM 1000 [USP'U]/ML
4000 INJECTION, SOLUTION INTRAVENOUS; SUBCUTANEOUS PRN
Status: DISCONTINUED | OUTPATIENT
Start: 2023-02-22 | End: 2023-02-23 | Stop reason: ALTCHOICE

## 2023-02-22 RX ORDER — HEPARIN SODIUM 10000 [USP'U]/100ML
5-30 INJECTION, SOLUTION INTRAVENOUS CONTINUOUS
Status: DISCONTINUED | OUTPATIENT
Start: 2023-02-22 | End: 2023-02-23

## 2023-02-22 RX ORDER — ONDANSETRON 2 MG/ML
4 INJECTION INTRAMUSCULAR; INTRAVENOUS EVERY 6 HOURS PRN
Status: DISCONTINUED | OUTPATIENT
Start: 2023-02-22 | End: 2023-02-24 | Stop reason: HOSPADM

## 2023-02-22 RX ORDER — ACETAMINOPHEN 325 MG/1
650 TABLET ORAL EVERY 6 HOURS PRN
Status: DISCONTINUED | OUTPATIENT
Start: 2023-02-22 | End: 2023-02-24 | Stop reason: HOSPADM

## 2023-02-22 RX ORDER — ACETAMINOPHEN 650 MG/1
650 SUPPOSITORY RECTAL EVERY 6 HOURS PRN
Status: DISCONTINUED | OUTPATIENT
Start: 2023-02-22 | End: 2023-02-24 | Stop reason: HOSPADM

## 2023-02-22 RX ORDER — SODIUM CHLORIDE 0.9 % (FLUSH) 0.9 %
5-40 SYRINGE (ML) INJECTION EVERY 12 HOURS SCHEDULED
Status: DISCONTINUED | OUTPATIENT
Start: 2023-02-22 | End: 2023-02-24 | Stop reason: HOSPADM

## 2023-02-22 RX ORDER — SODIUM CHLORIDE 0.9 % (FLUSH) 0.9 %
5-40 SYRINGE (ML) INJECTION PRN
Status: DISCONTINUED | OUTPATIENT
Start: 2023-02-22 | End: 2023-02-24 | Stop reason: HOSPADM

## 2023-02-22 RX ADMIN — ASPIRIN 81 MG 324 MG: 81 TABLET ORAL at 16:34

## 2023-02-22 RX ADMIN — SODIUM CHLORIDE, PRESERVATIVE FREE 10 ML: 5 INJECTION INTRAVENOUS at 22:37

## 2023-02-22 RX ADMIN — HEPARIN SODIUM AND DEXTROSE 12 UNITS/KG/HR: 10000; 5 INJECTION INTRAVENOUS at 16:51

## 2023-02-22 RX ADMIN — CLOPIDOGREL BISULFATE 150 MG: 75 TABLET ORAL at 16:34

## 2023-02-22 RX ADMIN — NITROGLYCERIN 0.5 INCH: 20 OINTMENT TOPICAL at 16:37

## 2023-02-22 RX ADMIN — HEPARIN SODIUM 4000 UNITS: 1000 INJECTION INTRAVENOUS; SUBCUTANEOUS at 16:39

## 2023-02-22 SDOH — ECONOMIC STABILITY: FOOD INSECURITY: WITHIN THE PAST 12 MONTHS, THE FOOD YOU BOUGHT JUST DIDN'T LAST AND YOU DIDN'T HAVE MONEY TO GET MORE.: NEVER TRUE

## 2023-02-22 SDOH — ECONOMIC STABILITY: FOOD INSECURITY: WITHIN THE PAST 12 MONTHS, YOU WORRIED THAT YOUR FOOD WOULD RUN OUT BEFORE YOU GOT MONEY TO BUY MORE.: NEVER TRUE

## 2023-02-22 ASSESSMENT — PAIN - FUNCTIONAL ASSESSMENT
PAIN_FUNCTIONAL_ASSESSMENT: 0-10
PAIN_FUNCTIONAL_ASSESSMENT: NONE - DENIES PAIN

## 2023-02-22 ASSESSMENT — PAIN DESCRIPTION - ORIENTATION: ORIENTATION: MID

## 2023-02-22 ASSESSMENT — PAIN SCALES - GENERAL
PAINLEVEL_OUTOF10: 3
PAINLEVEL_OUTOF10: 1

## 2023-02-22 ASSESSMENT — PAIN DESCRIPTION - DESCRIPTORS: DESCRIPTORS: SQUEEZING

## 2023-02-22 ASSESSMENT — PAIN DESCRIPTION - FREQUENCY: FREQUENCY: CONTINUOUS

## 2023-02-22 ASSESSMENT — PAIN DESCRIPTION - LOCATION: LOCATION: CHEST

## 2023-02-22 ASSESSMENT — PAIN DESCRIPTION - PAIN TYPE: TYPE: ACUTE PAIN

## 2023-02-22 NOTE — ED NOTES
ED TO INPATIENT SBAR HANDOFF    Patient Name: Moraima Barker   :  1955  78 y.o. MRN:  [id number]  Preferred Name: Bianca Gregory  ED Room #:  Q97/K67-97  Family/Caregiver Present no   Restraints no   Sitter no   Sepsis Risk Score Sepsis Risk Score: 0.78    Situation  Code Status: No Order No additional code details. Allergies: Effexor [venlafaxine hydrochloride], Hydrocodone, Ibuprofen, Propoxyphene, Aspirin, Keflex [cephalexin], and Paxil cr [paroxetine hcl er]  Weight: No data found. Arrived from: home  Chief Complaint:   Chief Complaint   Patient presents with    Chest Pain     Pt states chest pain started Friday, squeezing pain in middle of chest when walking or doing activity. While sitting pain only 2-3/10. Hospital Problem/Diagnosis:  Active Problems:    * No active hospital problems. *  Resolved Problems:    * No resolved hospital problems. *    Imaging:   XR CHEST (2 VW)   Final Result      No acute cardiopulmonary disease        Abnormal labs:   Abnormal Labs Reviewed   BASIC METABOLIC PANEL - Abnormal; Notable for the following components:       Result Value    Glucose 101 (*)     All other components within normal limits     Critical values: no     Abnormal Assessment Findings: Pt verbalizes CP on exertion or when talking. States that she has a hx of CP in the past but has stopped taking medicine and the pain had also improved. Pt states that after the nitro, ASA, plavix, and heparin administered, pain has improved.     Background  History:   Past Medical History:   Diagnosis Date    Abnormal involuntary movements(781.0)     Anal fissure     Anemia, unspecified     Anxiety     Chronic back pain     Chronic diarrhea 2019    Depression     Diffuse cystic mastopathy     Encopresis(307.7)     Esophageal reflux     Foot fracture, left 1989    drop something on foot    Headache(784.0)     Hepatitis, unspecified     Herpes simplex without mention of complication     Hypertension     Hypogammaglobulinaemia, unspecified     Insomnia, unspecified     Lateral epicondylitis  of elbow     Migraine, unspecified, without mention of intractable migraine without mention of status migrainosus     Mixed hyperlipidemia 07/30/2010    Osteopenia     Postmenopausal     Pure hypercholesterolemia     Symptomatic menopausal or female climacteric states     Type 2 diabetes mellitus without complication ([facility] [address]     Type II or unspecified type diabetes mellitus without mention of complication, not stated as uncontrolled     Unspecified asthma(493.90)        Assessment    Vitals/MEWS: MEWS Score: 1  Level of Consciousness: Alert (0)   Vitals:    02/22/23 1431 02/22/23 1637 02/22/23 1800   BP: 135/74 (!) 143/87 138/88   Pulse: 78 74 66   Resp: 18  14   Temp: 98.2 °F (36.8 °C)     TempSrc: Oral     SpO2: 99%  98%     FiO2 (%): Room air  O2 Flow Rate: O2 Device: None (Room air)    Cardiac Rhythm:    Pain Assessment: improved with medications administered [x] Verbal [] Hugo Bonus Scale  Pain Scale: Pain Assessment  Pain Assessment: 0-10  Pain Level: 1  Patient's Stated Pain Goal: 0 - No pain  Pain Location: Chest  Pain Orientation: Mid  Pain Descriptors: Squeezing  Pain Type: Acute pain  Pain Frequency: Continuous  Last documented pain score (0-10 scale) Pain Level: 1  Last documented pain medication administered: ASA  Mental Status: oriented and alert  NIH Score:    C-SSRS: Risk of Suicide: No Risk  Bedside swallow:    [place] Coma Scale (GCS): Aubrey Coma Scale  Eye Opening: Spontaneous  Best Verbal Response: Oriented  Best Motor Response: Obeys commands  Aubrey Coma Scale Score: 15  Active LDA's:   Peripheral IV 02/22/23 Left Antecubital (Active)   Site Assessment Clean, dry & intact 02/22/23 1448   Line Status Blood return noted;Specimen collected; Flushed;Normal saline locked 02/22/23 1448   Phlebitis Assessment No symptoms 02/22/23 1448   Infiltration Assessment 0 02/22/23 1448 Dressing Status Clean, dry & intact 02/22/23 1448   Dressing Type Transparent 02/22/23 1448   Dressing Intervention New 02/22/23 1448     PO Status: Regular  Pertinent or High Risk Medications/Drips: yes   o If Yes, please provide details: Heparin  Pending Blood Product Administration: no     You may also review the ED PT Care Timeline found under the Summary Nursing Index tab. Recommendation    Pending orders: Admission orders  Plan for Discharge (if known):    Additional Comments: Pt standby assist   If any further questions, please call Sending RN at [phone]    Electronically signed by: Electronically signed by Kori Murray RN on 2/22/2023 at [address]  02/22/23 3784

## 2023-02-22 NOTE — ED PROVIDER NOTES
[address] [facility]          ATTENDING PHYSICIAN NOTE       Date of evaluation: 2/22/2023    Chief Complaint     Chest Pain (Pt states chest pain started Friday, squeezing pain in middle of chest when walking or doing activity. While sitting pain only 2-3/10.)      History of Present Illness     Felipa Lamb is a 78 y.o. female who presents to the emergency department she been having chest pain. She is seen by cardiology and he called me to tell me that she has 2 inversions in V2 and monitor heparinized and Nitropaste. She is allergic to aspirin. Her troponin was negative as was already drawn in triage. She has chest pain for the last several days. Worse when she ambulates. It was so bad that she could not sleep the kitchen because she became short of breath with chest pain. Modifying factors included exertion. Otherwise no associated signs or symptoms other than the pain and shortness of breath. She does have a history of high cholesterol hypertension and a family history on her mom side for cardiac disease and strokes on mother side of family. ASSESSMENT / PLAN  (MEDICAL DECISION MAKING)     INITIAL VITALS: BP: 135/74, Temp: 98.2 °F (36.8 °C), Heart Rate: 78, Resp: 18, SpO2: 99 %      Felipa Lamb is a 78 y.o. female resents to the emergency department for admission secondary to symptoms along with cardiology findings of EKG changes. Patient is in the lobby at this time. Being brought back to the next available room. Immediately notified nursing as to patient being in the lobby with these findings after cardiology called me    Medical Decision Making  Amount and/or Complexity of Data Reviewed  Labs: ordered. Radiology: ordered. ECG/medicine tests: ordered. Risk  OTC drugs. Prescription drug management. Decision regarding hospitalization. Critical Care  Total time providing critical care: 30-74 minutes  Troponin was negative.   EKG did show T wave inversions in leads V2 and V3. Also V1. No elevation. Pain is better at rest.  Heparin started. Aspirin held due to allergy. Nitropaste placed. Critical Care:  Due to the immediate potential for life-threatening deterioration due to non-STEMI, I spent 35 minutes providing critical care. This time excludes time spent performing procedures but includes time spent on direct patient care, history retrieval, review of the chart, and discussions with patient, family, and consultant(s). Clinical Impression     No diagnosis found. Disposition     PATIENT REFERRED TO:  No follow-up provider specified.     DISCHARGE MEDICATIONS:  New Prescriptions    No medications on file       DISPOSITION          Diagnostic Results and Other Data       RADIOLOGY:  XR CHEST (2 VW)   Final Result      No acute cardiopulmonary disease          LABS:   Results for orders placed or performed during the hospital encounter of 56/28/86   Basic Metabolic Panel   Result Value Ref Range    Sodium 138 136 - 145 mmol/L    Potassium 4.2 3.5 - 5.1 mmol/L    Chloride 100 99 - 110 mmol/L    CO2 25 21 - 32 mmol/L    Anion Gap 13 3 - 16    Glucose 101 (H) 70 - 99 mg/dL    BUN 18 7 - 20 mg/dL    Creatinine 0.7 0.6 - 1.2 mg/dL    Est, Glom Filt Rate >60 >60    Calcium 9.6 8.3 - 10.6 mg/dL   Troponin   Result Value Ref Range    Troponin <0.01 <0.01 ng/mL   CBC with Auto Differential   Result Value Ref Range    WBC 10.7 4.0 - 11.0 K/uL    RBC 5.20 4.00 - 5.20 M/uL    Hemoglobin 14.8 12.0 - 16.0 g/dL    Hematocrit 44.8 36.0 - 48.0 %    MCV 86.1 80.0 - 100.0 fL    MCH 28.4 26.0 - 34.0 pg    MCHC 33.0 31.0 - 36.0 g/dL    RDW 13.1 12.4 - 15.4 %    Platelets 056 861 - 506 K/uL    MPV 8.8 5.0 - 10.5 fL    Neutrophils % 64.9 %    Lymphocytes % 25.7 %    Monocytes % 8.1 %    Eosinophils % 0.8 %    Basophils % 0.5 %    Neutrophils Absolute 6.9 1.7 - 7.7 K/uL    Lymphocytes Absolute 2.8 1.0 - 5.1 K/uL    Monocytes Absolute 0.9 0.0 - 1.3 K/uL    Eosinophils Absolute 0.1 0.0 - 0.6 K/uL    Basophils Absolute 0.1 0.0 - 0.2 K/uL     EKG   T wave versions V1 V2 V3. No ST elevation. No blocks. Intervals platonic reading. Indication chest pain dyspnea    ED BEDSIDE ULTRASOUND:  No results found. MOST RECENT VITALS:  BP: 135/74,Temp: 98.2 °F (36.8 °C), Heart Rate: 78, Resp: 18, SpO2: 99 %     Procedures       ED Course     Nursing Notes, Past Medical Hx, Past Surgical Hx, Social Hx,Allergies, and Family Hx were reviewed. The patient was given the following medications:  Orders Placed This Encounter   Medications    DISCONTD: aspirin chewable tablet 324 mg    heparin (porcine) injection 4,000 Units    heparin (porcine) injection 4,000 Units    heparin (porcine) injection 2,000 Units    heparin 25,000 units in dextrose 5% 250 mL (premix) infusion    nitroGLYCERIN (NITROSTAT) SL tablet 0.4 mg    aspirin chewable tablet 324 mg    clopidogrel (PLAVIX) tablet 150 mg    nitroglycerin (NITRO-BID) 2 % ointment 0.5 inch       CONSULTS:  None    Review of Systems     Review of Systems no abdominal pain. No swelling of lower extremities. Positive HANCOCK. Positive chest pain with exertion.   Otherwise any review of system reported by patient    Past Medical, Surgical, Family, and Social History     She has a past medical history of Abnormal involuntary movements(781.0), Anal fissure, Anemia, unspecified, Anxiety, Chronic back pain, Chronic diarrhea, Depression, Diffuse cystic mastopathy, Encopresis(307.7), Esophageal reflux, Foot fracture, left, Headache(784.0), Hepatitis, unspecified, Herpes simplex without mention of complication, Hypertension, Hypogammaglobulinaemia, unspecified, Insomnia, unspecified, Lateral epicondylitis  of elbow, Migraine, unspecified, without mention of intractable migraine without mention of status migrainosus, Mixed hyperlipidemia, Osteopenia, Postmenopausal, Pure hypercholesterolemia, Symptomatic menopausal or female climacteric states, Type 2 diabetes mellitus without complication ([facility] [address] Type II or unspecified type diabetes mellitus without mention of complication, not stated as uncontrolled, and Unspecified asthma(493.90). She has a past surgical history that includes Rectal surgery; Dilation and curettage of uterus; Hysterectomy (1998); Hand surgery (2009); Foot surgery (10/2009,11/2010); Elbow surgery (1/18/11); Shoulder arthroscopy (Right, 7/07); Cataract removal (Bilateral); and Breast biopsy. Her family history includes Breast Cancer in her daughter; Cancer in an other family member; Obesity in her mother; Osteoporosis in her mother; Stroke in her father; Stroke (age of onset: 76) in her sister. She reports that she has never smoked. She has never used smokeless tobacco. She reports that she does not drink alcohol and does not use drugs.     Medications     Previous Medications    ACETAMINOPHEN (TYLENOL) 500 MG TABLET    Take 1 tablet by mouth 4 times daily as needed for Pain    ALBUTEROL SULFATE HFA (PROVENTIL;VENTOLIN;PROAIR) 108 (90 BASE) MCG/ACT INHALER    Inhale 2 puffs into the lungs every 6 hours as needed for Wheezing    AMITRIPTYLINE (ELAVIL) 25 MG TABLET    Take 25 mg by mouth nightly    ATORVASTATIN (LIPITOR) 20 MG TABLET    TAKE 1 TABLET DAILY    AZELASTINE HCL 0.15 % SOLN    by Nasal route    BISACODYL 5 MG EC TABLET    Take 1-2 tablets daily as needed    BLOOD GLUCOSE MONITORING SUPPL (TRUE METRIX METER) DMITRIY    Test blood sugar daily and PRN E11.9    BLOOD GLUCOSE TEST STRIPS (GLUCOSE METER TEST) STRIP    100 each by In Vitro route 2 times daily Diabetes   e11.9    BUDESONIDE-FORMOTEROL (SYMBICORT) 160-4.5 MCG/ACT AERO    USE 2 INHALATIONS ORALLY   TWICE DAILY    CELECOXIB (CELEBREX) 100 MG CAPSULE    Take 1 capsule by mouth daily as needed for Pain    CETIRIZINE (ZYRTEC) 10 MG TABLET    Take 1 tablet by mouth daily    CLONIDINE (CATAPRES) 0.1 MG TABLET    Take 0.1 mg by mouth 2 times daily    CLONIDINE (CATAPRES) 0.1 MG/24HR PTWK APPLY 1 PATCH ONTO THE SKINONCE A WEEK    DICLOFENAC SODIUM (VOLTAREN) 1 % GEL    Apply 2 g topically 4 times daily    DOCUSATE SODIUM (COLACE) 100 MG CAPSULE    Take 1 capsule by mouth 2 times daily    DULAGLUTIDE (TRULICITY) 5.68 TM/6.2LQ SOPN    INJECT 0.5ML (=0.75 MG)    SUBCUTANEOUSLY ONCE WEEKLY    FLUTICASONE (FLONASE) 50 MCG/ACT NASAL SPRAY    2 sprays by Nasal route daily    GABAPENTIN (NEURONTIN) 300 MG CAPSULE    Take 300 mg by mouth in the morning and at bedtime. KETOCONAZOLE (NIZORAL) 2 % CREAM    APPLY topically TO bottom of BOTH FEET AND between TOES DAILY FOR 3 TO 4 WEEKS    LANCETS MISC    1 each by Other route daily E11.9    LIDOCAINE (LIDODERM) 5 %    Place 1 patch onto the skin daily 12 hours on, 12 hours off. OMEPRAZOLE (PRILOSEC) 40 MG DELAYED RELEASE CAPSULE    Take 40 mg by mouth daily    ONDANSETRON (ZOFRAN-ODT) 4 MG DISINTEGRATING TABLET    Take 1 tablet by mouth 3 times daily as needed for Nausea or Vomiting    PANTOPRAZOLE (PROTONIX) 40 MG TABLET    TAKE 1 TABLET DAILY    PIOGLITAZONE (ACTOS) 30 MG TABLET    Take 30 mg by mouth daily       Allergies     She is allergic to effexor [venlafaxine hydrochloride], hydrocodone, ibuprofen, propoxyphene, aspirin, keflex [cephalexin], and paxil cr [paroxetine hcl er]. Physical Exam     INITIAL VITALS: BP: 135/74, Temp: 98.2 °F (36.8 °C), Heart Rate: 78, Resp: 18, SpO2: 99 %   Physical Exam  Vitals and nursing note reviewed. Constitutional:       General: She is not in acute distress. Appearance: She is well-developed. She is not diaphoretic. HENT:      Head: Normocephalic and atraumatic. Eyes:      Conjunctiva/sclera: Conjunctivae normal.      Pupils: Pupils are equal, round, and reactive to light. Cardiovascular:      Rate and Rhythm: Normal rate and regular rhythm. Pulmonary:      Effort: Pulmonary effort is normal. No respiratory distress. Abdominal:      Palpations: Abdomen is soft. Tenderness:  There is no (porcine) injection 4,000 Units    DISCONTD: heparin (porcine) injection 4,000 Units    DISCONTD: heparin (porcine) injection 2,000 Units    DISCONTD: heparin 25,000 units in dextrose 5% 250 mL (premix) infusion    DISCONTD: nitroGLYCERIN (NITROSTAT) SL tablet 0.4 mg    aspirin chewable tablet 324 mg    clopidogrel (PLAVIX) tablet 150 mg    nitroglycerin (NITRO-BID) 2 % ointment 0.5 inch    DISCONTD: amitriptyline (ELAVIL) tablet 25 mg    DISCONTD: atorvastatin (LIPITOR) tablet 80 mg    DISCONTD: cetirizine (ZYRTEC) tablet 10 mg    DISCONTD: cloNIDine (CATAPRES) 0.1 MG/24HR 1 patch    DISCONTD: diclofenac sodium (VOLTAREN) 1 % gel 2 g    DISCONTD: docusate sodium (COLACE) capsule 100 mg    DISCONTD: fluticasone (FLONASE) 50 MCG/ACT nasal spray 2 spray    DISCONTD: gabapentin (NEURONTIN) capsule 300 mg    DISCONTD: lidocaine 4 % external patch 1 patch    DISCONTD: pantoprazole (PROTONIX) tablet 40 mg    DISCONTD: ipratropium-albuterol (DUONEB) nebulizer solution 1 ampule     Order Specific Question:   Initiate RT Bronchodilator Protocol     Answer:   Yes - Inpatient Protocol    DISCONTD: albuterol (PROVENTIL) nebulizer solution 2.5 mg     Order Specific Question:   Initiate RT Bronchodilator Protocol     Answer:   Yes - Inpatient Protocol    DISCONTD: budesonide (PULMICORT) nebulizer suspension 500 mcg    DISCONTD: sodium chloride flush 0.9 % injection 5-40 mL    DISCONTD: sodium chloride flush 0.9 % injection 5-40 mL    DISCONTD: 0.9 % sodium chloride infusion    DISCONTD: ondansetron (ZOFRAN-ODT) disintegrating tablet 4 mg    DISCONTD: ondansetron (ZOFRAN) injection 4 mg    DISCONTD: acetaminophen (TYLENOL) tablet 650 mg    DISCONTD: acetaminophen (TYLENOL) suppository 650 mg    DISCONTD: polyethylene glycol (GLYCOLAX) packet 17 g    DISCONTD: perflutren lipid microspheres (DEFINITY) injection 1.5 mL    DISCONTD: glucose chewable tablet 16 g    DISCONTD: dextrose bolus 10% 125 mL    DISCONTD: dextrose bolus 10% 250 mL    DISCONTD: glucagon injection 1 mg    DISCONTD: dextrose 10 % infusion    DISCONTD: insulin lispro (1 Unit Dial) (HUMALOG/ADMELOG) pen 0-4 Units    DISCONTD: insulin lispro (1 Unit Dial) (HUMALOG/ADMELOG) pen 0-4 Units    DISCONTD: iohexol (OMNIPAQUE 350) solution 200 mL    0.9 % sodium chloride infusion    DISCONTD: sodium chloride flush 0.9 % injection 5-40 mL    DISCONTD: sodium chloride flush 0.9 % injection 5-40 mL    DISCONTD: 0.9 % sodium chloride infusion    DISCONTD: acetaminophen (TYLENOL) tablet 650 mg    atropine injection 0.5 mg    DISCONTD: ondansetron (ZOFRAN) injection 4 mg    DISCONTD: ranolazine (RANEXA) extended release tablet 500 mg    DISCONTD: clopidogrel (PLAVIX) tablet 75 mg    DISCONTD: nitroGLYCERIN (NITRODUR) 0.2 MG/HR 1 patch    iodixanol (VISIPAQUE) 320 MG/ML injection 350 mL    DISCONTD: ipratropium-albuterol (DUONEB) nebulizer solution 1 ampule     Order Specific Question:   Initiate RT Bronchodilator Protocol     Answer:   Yes - Inpatient Protocol    ranolazine (RANEXA) 500 MG extended release tablet     Sig: Take 1 tablet by mouth 2 times daily     Dispense:  60 tablet     Refill:  3    nitroGLYCERIN (NITROSTAT) 0.4 MG SL tablet     Sig: Place 1 tablet under the tongue every 5 minutes as needed for Chest pain up to max of 3 total doses. If no relief after 1 dose, call 911. Dispense:  25 tablet     Refill:  3    atorvastatin (LIPITOR) 80 MG tablet     Sig: Take 1 tablet by mouth nightly     Dispense:  30 tablet     Refill:  3    clopidogrel (PLAVIX) 75 MG tablet     Sig: Take 1 tablet by mouth daily     Dispense:  30 tablet     Refill:  3    nitroGLYCERIN (NITRODUR) 0.2 MG/HR     Sig: Place 1 patch onto the skin daily     Dispense:  30 patch     Refill:  3       CONSULTS:  IP CONSULT TO HOSPITALIST  IP CONSULT TO CARDIOLOGY    Review of Systems     Review of Systems no abdominal pain. No swelling of lower extremities. Positive HANCOCK. Positive chest pain with exertion. Otherwise any review of system reported by patient    Past Medical, Surgical, Family, and Social History     She has a past medical history of Abnormal involuntary movements(781.0), Anal fissure, Anemia, unspecified, Anxiety, Chronic back pain, Chronic diarrhea, Depression, Diffuse cystic mastopathy, Encopresis(307.7), Esophageal reflux, Foot fracture, left, Headache(784.0), Hepatitis, unspecified, Herpes simplex without mention of complication, Hypertension, Hypogammaglobulinaemia, unspecified, Insomnia, unspecified, Lateral epicondylitis  of elbow, Migraine, unspecified, without mention of intractable migraine without mention of status migrainosus, Mixed hyperlipidemia, Osteopenia, Postmenopausal, Pure hypercholesterolemia, Symptomatic menopausal or female climacteric states, Type 2 diabetes mellitus without complication ([place] [address] Type II or unspecified type diabetes mellitus without mention of complication, not stated as uncontrolled, and Unspecified asthma(493.90). She has a past surgical history that includes Rectal surgery; Dilation and curettage of uterus; Hysterectomy (1998); Hand surgery (2009); Foot surgery (10/2009,11/2010); Elbow surgery (1/18/11); Shoulder arthroscopy (Right, 7/07); Cataract removal (Bilateral); and Breast biopsy. Her family history includes Breast Cancer in her daughter; Cancer in an other family member; Obesity in her mother; Osteoporosis in her mother; Stroke in her father; Stroke (age of onset: 76) in her sister. She reports that she has never smoked. She has never used smokeless tobacco. She reports that she does not drink alcohol and does not use drugs.     Medications     Discharge Medication List as of 2/24/2023  1:47 PM        CONTINUE these medications which have NOT CHANGED    Details   cloNIDine (CATAPRES) 0.1 MG tablet Take 0.1 mg by mouth 2 times dailyHistorical Med      cloNIDine (CATAPRES) 0.1 MG/24HR PTWK APPLY 1 PATCH ONTO THE SKINONCE A WEEK, Disp-12 patch, R-1Normal budesonide-formoterol (SYMBICORT) 160-4.5 MCG/ACT AERO USE 2 INHALATIONS ORALLY   TWICE DAILY, Disp-30.6 g, R-1Normal      Dulaglutide (TRULICITY) 7.93 LR/8.2FZ SOPN INJECT 0.5ML (=0.75 MG)    SUBCUTANEOUSLY ONCE WEEKLY, Disp-6 mL, R-1Normal      albuterol sulfate HFA (PROVENTIL;VENTOLIN;PROAIR) 108 (90 Base) MCG/ACT inhaler Inhale 2 puffs into the lungs every 6 hours as needed for WheezingHistorical Med      gabapentin (NEURONTIN) 300 MG capsule Take 300 mg by mouth in the morning and at bedtime. Historical Med      omeprazole (PRILOSEC) 40 MG delayed release capsule Take 40 mg by mouth dailyHistorical Med      pioglitazone (ACTOS) 30 MG tablet Take 30 mg by mouth dailyHistorical Med      cetirizine (ZYRTEC) 10 MG tablet Take 1 tablet by mouth daily, Disp-30 tablet, R-2Normal      ondansetron (ZOFRAN-ODT) 4 MG disintegrating tablet Take 1 tablet by mouth 3 times daily as needed for Nausea or Vomiting, Disp-21 tablet, R-0Normal      lidocaine (LIDODERM) 5 % Place 1 patch onto the skin daily 12 hours on, 12 hours off., Disp-30 patch, R-0Normal      acetaminophen (TYLENOL) 500 MG tablet Take 1 tablet by mouth 4 times daily as needed for Pain, Disp-120 tablet, R-0Normal      docusate sodium (COLACE) 100 MG capsule Take 1 capsule by mouth 2 times daily, Disp-60 capsule, R-1Normal      bisacodyl 5 MG EC tablet Take 1-2 tablets daily as needed, Disp-30 tablet, R-0Normal      ketoconazole (NIZORAL) 2 % cream APPLY topically TO bottom of BOTH FEET AND between TOES DAILY FOR 3 TO 4 WEEKS, Historical Med      diclofenac sodium (VOLTAREN) 1 % GEL Apply 2 g topically 4 times daily, Topical, 4 TIMES DAILY Starting Wed 8/24/2022, Disp-350 g, R-1, Normal      fluticasone (FLONASE) 50 MCG/ACT nasal spray 2 sprays by Nasal route daily, Disp-48 g, R-1Normal      Lancets MISC DAILY Starting Mon 3/7/2022, Disp-100 each, R-0, EqohipX31.9      Blood Glucose Monitoring Suppl (TRUE METRIX METER) DMITRIY Test blood sugar daily and PRN E11.9, Disp-1 each, R-0Normal      blood glucose test strips (GLUCOSE METER TEST) strip 100 each by In Vitro route 2 times daily Diabetes   e11.9, Disp-100 each, R-1Normal             Allergies     She is allergic to effexor [venlafaxine hydrochloride], hydrocodone, ibuprofen, propoxyphene, aspirin, keflex [cephalexin], and paxil cr [paroxetine hcl er]. Physical Exam     INITIAL VITALS: BP: 135/74, Temp: 98.2 °F (36.8 °C), Heart Rate: 78, Resp: 18, SpO2: 99 %   Physical Exam  Vitals and nursing note reviewed. Constitutional:       General: She is not in acute distress. Appearance: She is well-developed. She is not diaphoretic. HENT:      Head: Normocephalic and atraumatic. Eyes:      Conjunctiva/sclera: Conjunctivae normal.      Pupils: Pupils are equal, round, and reactive to light. Cardiovascular:      Rate and Rhythm: Normal rate and regular rhythm. Pulmonary:      Effort: Pulmonary effort is normal. No respiratory distress. Abdominal:      Palpations: Abdomen is soft. Tenderness: There is no guarding or rebound. Musculoskeletal:         General: Normal range of motion. Cervical back: Normal range of motion. Skin:     General: Skin is warm and dry. Neurological:      Mental Status: She is alert and oriented to person, place, and time.                   Turner Sher MD  02/22/23 [id number]       Turner Sher MD  02/28/23 [id number]

## 2023-02-22 NOTE — PROGRESS NOTES
[address]         Reason for Consultation/Chief Complaint: \"I have been having chest pain. \"       History of Present Illness:  Marc Hampton is a 78 y.o. patient who presented to the hospital with complaints of chest pain worsening with exertion. Past Medical History:   has a past medical history of Abnormal involuntary movements(781.0), Anal fissure, Anemia, unspecified, Anxiety, Chronic back pain, Chronic diarrhea, Depression, Diffuse cystic mastopathy, Encopresis(307.7), Esophageal reflux, Foot fracture, left, Headache(784.0), Hepatitis, unspecified, Herpes simplex without mention of complication, Hypertension, Hypogammaglobulinaemia, unspecified, Insomnia, unspecified, Lateral epicondylitis  of elbow, Migraine, unspecified, without mention of intractable migraine without mention of status migrainosus, Mixed hyperlipidemia, Osteopenia, Postmenopausal, Pure hypercholesterolemia, Symptomatic menopausal or female climacteric states, Type 2 diabetes mellitus without complication ([address] Type II or unspecified type diabetes mellitus without mention of complication, not stated as uncontrolled, and Unspecified asthma(493.90). Surgical History:   has a past surgical history that includes Rectal surgery; Dilation and curettage of uterus; Hysterectomy (1998); Hand surgery (2009); Foot surgery (10/2009,11/2010); Elbow surgery (1/18/11); Shoulder arthroscopy (Right, 7/07); Cataract removal (Bilateral); and Breast biopsy. Social History:   reports that she has never smoked. She has never used smokeless tobacco. She reports that she does not drink alcohol and does not use drugs. Family History:  No evidence for sudden cardiac death or premature CAD    Home Medications:  Were reviewed and are listed in nursing record. and/or listed below  Prior to Admission medications    Medication Sig Start Date End Date Taking?  Authorizing Provider   cloNIDine (CATAPRES) 0.1 MG/24HR PTWK APPLY 1 PATCH ONTO THE SKINONCE A WEEK 2/20/23  Yes Tomeka Walters MD   budesonide-formoterol (SYMBICORT) 160-4.5 MCG/ACT AERO USE 2 INHALATIONS ORALLY   TWICE DAILY 2/17/23  Yes Tomeka Walters MD   Dulaglutide (TRULICITY) 2.23 DN/1.5WM SOPN INJECT 0.5ML (=0.75 MG)    SUBCUTANEOUSLY ONCE WEEKLY 2/14/23  Yes Tomeka Walters MD   pantoprazole (PROTONIX) 40 MG tablet TAKE 1 TABLET DAILY 2/6/23  Yes Tomeka Walters MD   celecoxib (CELEBREX) 100 MG capsule Take 1 capsule by mouth daily as needed for Pain 2/2/23  Yes Desiree Bacon MD   atorvastatin (LIPITOR) 20 MG tablet TAKE 1 TABLET DAILY 1/30/23  Yes Tomeka Walters MD   albuterol sulfate HFA (PROVENTIL;VENTOLIN;PROAIR) 108 (90 Base) MCG/ACT inhaler Inhale 2 puffs into the lungs every 6 hours as needed for Wheezing   Yes Historical Provider, MD   amitriptyline (ELAVIL) 25 MG tablet Take 25 mg by mouth nightly   Yes Historical Provider, MD   azelastine HCl 0.15 % SOLN by Nasal route   Yes Historical Provider, MD   gabapentin (NEURONTIN) 300 MG capsule Take 300 mg by mouth in the morning and at bedtime.    Yes Historical Provider, MD   omeprazole (PRILOSEC) 40 MG delayed release capsule Take 40 mg by mouth daily   Yes Historical Provider, MD   pioglitazone (ACTOS) 30 MG tablet Take 30 mg by mouth daily   Yes Historical Provider, MD   cetirizine (ZYRTEC) 10 MG tablet Take 1 tablet by mouth daily 12/28/22  Yes Tomeka Walters MD   ondansetron (ZOFRAN-ODT) 4 MG disintegrating tablet Take 1 tablet by mouth 3 times daily as needed for Nausea or Vomiting 12/14/22  Yes NIA Bush - CNP   lidocaine (LIDODERM) 5 % Place 1 patch onto the skin daily 12 hours on, 12 hours off. 11/29/22  Yes Tomeka Walters MD   acetaminophen (TYLENOL) 500 MG tablet Take 1 tablet by mouth 4 times daily as needed for Pain 11/29/22  Yes Tomeka Walters MD   docusate sodium (COLACE) 100 MG capsule Take 1 capsule by mouth 2 times daily 10/20/22  Yes Dyatra Sonali Osborn MD   bisacodyl 5 MG EC tablet Take 1-2 tablets daily as needed 10/20/22  Yes Arnulfo David MD   ketoconazole (NIZORAL) 2 % cream APPLY topically TO bottom of BOTH FEET AND between TOES DAILY FOR 3 TO 4 WEEKS 9/19/22  Yes Historical Provider, MD   diclofenac sodium (VOLTAREN) 1 % GEL Apply 2 g topically 4 times daily 8/24/22  Yes Arnulfo David MD   fluticasone Jusdes Shirley) 50 MCG/ACT nasal spray 2 sprays by Nasal route daily 5/24/22  Yes Arnulfo David MD   Lancets MISC 1 each by Other route daily E11.9 3/7/22  Yes Arnulfo David MD   Blood Glucose Monitoring Suppl (TRUE METRIX METER) DMITRIY Test blood sugar daily and PRN E11.9 12/1/21  Yes Zulema Han MD   blood glucose test strips (GLUCOSE METER TEST) strip 100 each by In Vitro route 2 times daily Diabetes   e11.9 11/30/21  Yes Arnulfo David MD        Hospital Medications: Allergies:  Aspirin, Darvocet [propoxyphene n-acetaminophen], Effexor [venlafaxine hydrochloride], Hydrocodone, Ibuprofen, Keflex [cephalexin], and Paxil cr [paroxetine hcl er]     Review of Systems:     A 14 ROS obtained and negative except as mentioned in HPI. Constitutional: there has been no unanticipated weight loss. Eyes: No visual changes or diplopia. No scleral icterus. ENT: No Headaches, hearing loss or vertigo. No mouth sores or sore throat. Cardiovascular: No loss of consciousness. No hemoptysis, pleuritic pain, or phlebitis. Respiratory: No cough or wheezing, no sputum production. No hematemesis. Gastrointestinal: No abdominal pain, appetite loss, blood in stools. No change in bowel or bladder habits. Genitourinary: No dysuria, or hematuria. Musculoskeletal:  No gait disturbance, weakness or joint complaints. Integumentary: No rash or pruritis. Neurological: No headache, diplopia,numbness or tingling. No change in gait, balance, coordination, mood, affect, memory, mentation, behavior.   Psychiatric: No anxiety,  Endocrine: No malaise,  Hematologic/Lymphatic: No abnormal bruising  Allergic/Immunologic: No nasal congestion      Physical Examination:    Vitals:    02/22/23 1318   BP: 128/74   Pulse: 89    Weight: 155 lb 12.8 oz (70.7 kg)         General Appearance:  Alert, cooperative, no distress, appears stated age   Head:  Normocephalic, without obvious abnormality, atraumatic   Eyes:  PERRL   Nose: Nares normal,   Neck: Supple, JVP normal    Lungs:   Clear to auscultation bilaterally, respirations unlabored   Chest Wall:  No tenderness or deformity   Heart:  Regular rate and rhythm, normal S1, S2 normal, no murmur,  No rub. No S3 gallop   Abdomen:   Soft, non-tender, +bowel sounds   Extremities: no cyanosis, no clubbing , No edema   Pulses: Symmetric extremities   Skin: no gross lesions or rashes   Pysch: Normal mood and affect   Neurologic: No gross deficits. CN II - XII grossly intact        Labs  CBC:   Lab Results   Component Value Date/Time    WBC 6.9 11/29/2022 11:49 AM    RBC 5.02 11/29/2022 11:49 AM    HGB 14.3 11/29/2022 11:49 AM    HCT 44.2 11/29/2022 11:49 AM    MCV 88.1 11/29/2022 11:49 AM    RDW 14.4 11/29/2022 11:49 AM     11/29/2022 11:49 AM     CMP:    Lab Results   Component Value Date/Time     10/20/2022 10:25 AM    K 4.6 10/20/2022 10:25 AM     10/20/2022 10:25 AM    CO2 27 10/20/2022 10:25 AM    BUN 17 10/20/2022 10:25 AM    CREATININE 0.7 10/20/2022 10:25 AM    GFRAA >60 06/16/2022 09:39 AM    GFRAA >60 04/19/2013 08:50 AM    AGRATIO 2.6 10/20/2022 10:25 AM    LABGLOM >60 10/20/2022 10:25 AM    GLUCOSE 104 10/20/2022 10:25 AM    PROT 6.5 10/20/2022 10:25 AM    PROT 6.8 09/13/2012 08:46 AM    CALCIUM 9.3 10/20/2022 10:25 AM    BILITOT 0.6 10/20/2022 10:25 AM    ALKPHOS 104 10/20/2022 10:25 AM    AST 18 10/20/2022 10:25 AM    ALT 19 10/20/2022 10:25 AM     PT/INR:  No results found for: PTINR  No results for input(s): CKTOTAL, CKMB, TROPONINI in the last 72 hours.     EKG: sr with TWI V2    Assessment  Patient Active Problem List   Diagnosis    Mixed hyperlipidemia    Asthma    Osteoporosis    Lateral epicondylitis    Arthralgia    Type 2 diabetes mellitus with diabetic polyneuropathy, without long-term current use of insulin (HCC)    Hepatitis    Leg pain    GERD (gastroesophageal reflux disease)    Hand pain    B12 deficiency anemia    Hemorrhoids, internal    Severe headache    Encopresis    Tremor    Hot flashes    Fibrocystic disease of breast    Insomnia    Shoulder pain    Malaise and fatigue    Migraine    Neck pain    Chronic back pain    Neuropathy    Major depression ([address]    History of arthroscopy of right shoulder 2008    Removal of ganglion cyst in left wrist    History of elbow surgery Right    Adhesive capsulitis of left shoulder    Tendinitis of left rotator cuff    Left rotator cuff tear    Pain, neck    Cervical stenosis of spinal canal    Arthritis of left acromioclavicular joint    Bursitis of right shoulder    Throat dryness    Right-sided chest wall pain    Dysuria    Acute cystitis without hematuria    Weight loss, abnormal    Moderate episode of recurrent major depressive disorder (HCC)    Arthritis of right acromioclavicular joint    Tendinitis of right rotator cuff    Cervical stenosis of spine    Chronic daily headache    Status migrainosus    Cervico-occipital neuralgia    Cervicogenic headache    Medication side effect    Chronic insomnia    Cervical spinal stenosis    Osteoarthritis of spine with radiculopathy, cervical region    Vitamin D deficiency    Intractable chronic migraine without aura    Chronic diarrhea    Essential hypertension    Cold sore    Chronic pain of right lower extremity    Type 2 diabetes mellitus without complication, without long-term current use of insulin (HCC)    COVID-19    Upper respiratory tract infection    Right ear pain    Conjunctivitis of left eye    Dry cough    Swelling of eyelid, left    Pain in toes of both feet    Allergic rhinitis    Skin lesion of right arm    Right groin pain    Right hip pain    Urinary tract infection without hematuria    Constipation    Feeling weak    Nonintractable headache    Burning pain    Exposure to carbon monoxide    Low back pain    Burning sensation of eye    Blurry vision, left eye    Nasal bleeding    Bruising    Right flank pain    Cyst of right kidney    Frequent headaches          Impression:  chest pain. Recommendations:    I had the opportunity to review the clinical symptoms and presentation of [facility]. I recommend that the patient undergo further cardiac evaluation cath. If pt has worsening pain she should go immediately to the hospital.     Tobacco use was discussed with the patient and educated on the negative effects. I have asked the patient to not utilize these agents. Thank you for allowing to us to participate in the care or [facility]. All questions and concerns were addressed to the patient/family. Alternatives to my treatment were discussed. The note was completed using EMR. Every effort was made to ensure accuracy; however, inadvertent computerized transcription errors may be present.        MD Ana Paula Phoenix

## 2023-02-22 NOTE — PROGRESS NOTES
Date of Visit: 2023    Tatiana Shepard (:  1955) is a 67 y.o. female,  Established patient here for evaluation of the following chief complaint(s):  Chest Pain, Shortness of Breath, and Fatigue      ASSESSMENT/PLAN:    1. Chest pain, unspecified type  - EKG 12 lead done and is abnormal  - XR CHEST (2 VW); Future  - Tylenol 650mg 3 times daily as needed  - Patient declines aspirin due to bruising  - Referral to  Derian Fields MD, Cardiology, [facility]    2. Shortness of breath  - XR CHEST (2 VW); Future  - CBC with Auto Differential; Future  -[facility]    3. Palpitations  - TSH; Future  - CBC with Auto Differential; Future  - Basic Metabolic Panel; Future  -Decrease caffeine  -Avoid decongestants  -Referral to Derian Fields MD, Cardiology, [place]    4. Fatigue, unspecified type  - TSH; Future  - CBC with Auto Differential; Future  - Basic Metabolic Panel; Future  -Multivitamin once daily    5. Abnormal EKG  [facility]        Return in about 2 weeks (around 3/8/2023) for fatigue.    SUBJECTIVE:    Patient chest pain, SOB, and fatigue since Friday, 2023 or Saturday, 2023.  Patient states she feels heavy and tiredness in her chest.  Patient states she has chest pressure center of chest. Patient states her heart is beating fast and she has had a lot of palpitations.  Patient states she cannot walk without stopping.  Patient denies cough, wheezing, dizziness, and fever.  Patient states she cannot take aspirin due to bruising.  Patient takes Tylenol.    Patient states she had an epidural injection on 2023.  Patient states she has had swelling since the epidural.  Patient states she had a headache after the procedure.  Patient states she took medication prescribed by Neurology and the headache went away.    Patient states she has trouble walking  with picking up her leg to walk. Review of Systems   Constitutional:  Positive for fatigue. Negative for chills and fever. HENT:  Negative for congestion, ear pain, postnasal drip, rhinorrhea, sinus pressure, sinus pain, sneezing and sore throat. Eyes:  Negative for visual disturbance. Respiratory:  Positive for shortness of breath. Negative for cough, chest tightness and wheezing. Cardiovascular:  Positive for chest pain and palpitations. Negative for leg swelling. Gastrointestinal:  Negative for abdominal pain, blood in stool, constipation, diarrhea, nausea and vomiting. Genitourinary:  Negative for dysuria, frequency and hematuria. Musculoskeletal:  Positive for gait problem and myalgias. Negative for arthralgias. Skin:  Negative for rash. Neurological:  Positive for headaches. Negative for dizziness, tremors, syncope, weakness and light-headedness. Psychiatric/Behavioral:  Negative for dysphoric mood and sleep disturbance. The patient is not nervous/anxious. Allergies   Allergen Reactions    Effexor [Venlafaxine Hydrochloride] Hives    Hydrocodone Hives and Itching    Ibuprofen Other (See Comments)     Unknown reaction    Propoxyphene Hives    Aspirin Other (See Comments)     bruising    Keflex [Cephalexin] Itching and Other (See Comments)     Face redness.     Paxil Cr [Paroxetine Hcl Er] Itching       Outpatient Medications Marked as Taking for the 2/22/23 encounter (Office Visit) with Mary Erazo MD   Medication Sig Dispense Refill    cloNIDine (CATAPRES) 0.1 MG/24HR PTWK APPLY 1 PATCH ONTO THE SKINONCE A WEEK 12 patch 1    budesonide-formoterol (SYMBICORT) 160-4.5 MCG/ACT AERO USE 2 INHALATIONS ORALLY   TWICE DAILY 30.6 g 1    Dulaglutide (TRULICITY) 5.25 GN/9.2II SOPN INJECT 0.5ML (=0.75 MG)    SUBCUTANEOUSLY ONCE WEEKLY 6 mL 1    pantoprazole (PROTONIX) 40 MG tablet TAKE 1 TABLET DAILY 90 tablet 1    atorvastatin (LIPITOR) 20 MG tablet TAKE 1 TABLET DAILY 90 tablet 1 albuterol sulfate HFA (PROVENTIL;VENTOLIN;PROAIR) 108 (90 Base) MCG/ACT inhaler Inhale 2 puffs into the lungs every 6 hours as needed for Wheezing      omeprazole (PRILOSEC) 40 MG delayed release capsule Take 40 mg by mouth daily      pioglitazone (ACTOS) 30 MG tablet Take 30 mg by mouth daily      amitriptyline (ELAVIL) 25 MG tablet Take 25 mg by mouth nightly (Patient not taking: Reported on 2/23/2023)      azelastine HCl 0.15 % SOLN by Nasal route      gabapentin (NEURONTIN) 300 MG capsule Take 300 mg by mouth in the morning and at bedtime. cetirizine (ZYRTEC) 10 MG tablet Take 1 tablet by mouth daily 30 tablet 2    ondansetron (ZOFRAN-ODT) 4 MG disintegrating tablet Take 1 tablet by mouth 3 times daily as needed for Nausea or Vomiting (Patient not taking: Reported on 3/8/2023) 21 tablet 0    lidocaine (LIDODERM) 5 % Place 1 patch onto the skin daily 12 hours on, 12 hours off.  30 patch 0    acetaminophen (TYLENOL) 500 MG tablet Take 1 tablet by mouth 4 times daily as needed for Pain 120 tablet 0    docusate sodium (COLACE) 100 MG capsule Take 1 capsule by mouth 2 times daily 60 capsule 1    bisacodyl 5 MG EC tablet Take 1-2 tablets daily as needed 30 tablet 0    ketoconazole (NIZORAL) 2 % cream APPLY topically TO bottom of BOTH FEET AND between TOES DAILY FOR 3 TO 4 WEEKS (Patient not taking: Reported on 2/27/2023)      diclofenac sodium (VOLTAREN) 1 % GEL Apply 2 g topically 4 times daily 350 g 1    fluticasone (FLONASE) 50 MCG/ACT nasal spray 2 sprays by Nasal route daily 48 g 1    Lancets MISC 1 each by Other route daily E11.9 100 each 0    Blood Glucose Monitoring Suppl (TRUE METRIX METER) DMITRIY Test blood sugar daily and PRN E11.9 1 each 0    blood glucose test strips (GLUCOSE METER TEST) strip 100 each by In Vitro route 2 times daily Diabetes   e11.9 100 each 1       Social History     Tobacco Use    Smoking status: Never    Smokeless tobacco: Never   Substance Use Topics    Alcohol use: No    Drug use: No         OBJECTIVE:    Vitals:    02/22/23 1041   BP: 120/80   Pulse: 80   Resp: 16   Temp: 98.1 °F (36.7 °C)   TempSrc: Oral   SpO2: 96%   Weight: 155 lb (70.3 kg)   Height: 5' 2\" (1.575 m)     Body mass index is 28.35 kg/m². Wt Readings from Last 3 Encounters:   02/22/23 155 lb (70.3 kg)   02/02/23 156 lb 1.4 oz (70.8 kg)   01/23/23 156 lb (70.8 kg)       Physical Exam  Nursing note reviewed. Constitutional:       General: She is not in acute distress. Appearance: Normal appearance. She is well-developed. HENT:      Right Ear: Tympanic membrane and ear canal normal.      Left Ear: Tympanic membrane and ear canal normal.      Mouth/Throat:      Pharynx: Oropharynx is clear. Eyes:      General: Lids are normal.      Extraocular Movements: Extraocular movements intact. Conjunctiva/sclera: Conjunctivae normal.      Pupils: Pupils are equal, round, and reactive to light. Neck:      Thyroid: No thyromegaly. Vascular: No carotid bruit. Cardiovascular:      Rate and Rhythm: Normal rate and regular rhythm. Heart sounds: Normal heart sounds, S1 normal and S2 normal. No murmur heard. No friction rub. No gallop. Pulmonary:      Effort: Pulmonary effort is normal. No respiratory distress. Breath sounds: Normal breath sounds. No wheezing, rhonchi or rales. Abdominal:      General: Bowel sounds are normal. There is no distension. Palpations: Abdomen is soft. Tenderness: There is no abdominal tenderness. Musculoskeletal:      Cervical back: Neck supple. Right lower leg: No edema. Left lower leg: No edema. Lymphadenopathy:      Head:      Right side of head: No submandibular adenopathy. Left side of head: No submandibular adenopathy. Neurological:      Mental Status: She is alert and oriented to person, place, and time. Psychiatric:         Mood and Affect: Mood normal.       No results found for this visit on 02/22/23.   Lab Review   Office Visit on 01/19/2023   Component Date Value    Color, UA 01/19/2023 yellow     Clarity, UA 01/19/2023 clear     Glucose, UA POC 01/19/2023 neg     Bilirubin, UA 01/19/2023 nnegg     Ketones, UA 01/19/2023 neg     Spec Grav, UA 01/19/2023 1.025     Blood, UA POC 01/19/2023 trace     pH, UA 01/19/2023 6.0     Protein, UA POC 01/19/2023 neg     Urobilinogen, UA 01/19/2023 0.2     Leukocytes, UA 01/19/2023 neg     Nitrite, UA 01/19/2023 neg     Urine Culture, Routine 01/19/2023 No growth at 18 to 36 hours    Orders Only on 01/16/2023   Component Date Value    Cholesterol, Total 01/16/2023 181     Triglycerides 01/16/2023 103     HDL 01/16/2023 50     LDL Calculated 01/16/2023 110 (A)     VLDL Cholesterol Calcula* 01/16/2023 [address] [facility] Outpatient Visit on 01/12/2023   Component Date Value    Visual Acuity Distance R* 01/16/2023 20/30     Intraocular Pressure Eye 01/16/2023 14     Visual Acuity Distance L* 01/16/2023 20/40     Intraocular Pressure Eye 01/16/2023 14     Diabetic Retinopathy 01/16/2023 NEGATIVE     Cataracts 01/16/2023 NEGATIVE     Glaucoma 01/16/2023 NEGATIVE    Office Visit on 01/11/2023   Component Date Value    Hemoglobin A1C 01/11/2023 6.1            Medications, Allergies, Social history, Medical history, and results reviewed      An electronic signature was used to authenticate this note.     Bill Harding MD

## 2023-02-23 ENCOUNTER — APPOINTMENT (OUTPATIENT)
Dept: CARDIAC CATH/INVASIVE PROCEDURES | Age: 68
DRG: 287 | End: 2023-02-23
Payer: MEDICARE

## 2023-02-23 PROBLEM — I21.4 NSTEMI (NON-ST ELEVATED MYOCARDIAL INFARCTION) (HCC): Status: ACTIVE | Noted: 2023-02-23

## 2023-02-23 LAB
ANION GAP SERPL CALCULATED.3IONS-SCNC: 14 MMOL/L (ref 3–16)
ANTI-XA UNFRAC HEPARIN: 0.55 IU/ML (ref 0.3–0.7)
ANTI-XA UNFRAC HEPARIN: >1.1 IU/ML (ref 0.3–0.7)
BUN BLDV-MCNC: 18 MG/DL (ref 7–20)
CALCIUM SERPL-MCNC: 9.3 MG/DL (ref 8.3–10.6)
CHLORIDE BLD-SCNC: 100 MMOL/L (ref 99–110)
CHOLESTEROL, TOTAL: 140 MG/DL (ref 0–199)
CHOLESTEROL, TOTAL: 154 MG/DL (ref 0–199)
CO2: 26 MMOL/L (ref 21–32)
CREAT SERPL-MCNC: 0.7 MG/DL (ref 0.6–1.2)
EKG ATRIAL RATE: 74 BPM
EKG DIAGNOSIS: NORMAL
EKG P AXIS: 23 DEGREES
EKG P-R INTERVAL: 142 MS
EKG Q-T INTERVAL: 402 MS
EKG QRS DURATION: 82 MS
EKG QTC CALCULATION (BAZETT): 446 MS
EKG R AXIS: -3 DEGREES
EKG T AXIS: 24 DEGREES
EKG VENTRICULAR RATE: 74 BPM
ESTIMATED AVERAGE GLUCOSE: 128.4 MG/DL
GFR SERPL CREATININE-BSD FRML MDRD: >60 ML/MIN/{1.73_M2}
GLUCOSE BLD-MCNC: 118 MG/DL (ref 70–99)
GLUCOSE BLD-MCNC: 87 MG/DL (ref 70–99)
GLUCOSE BLD-MCNC: 95 MG/DL (ref 70–99)
GLUCOSE BLD-MCNC: 96 MG/DL (ref 70–99)
HBA1C MFR BLD: 6.1 %
HCT VFR BLD CALC: 43.1 % (ref 36–48)
HDLC SERPL-MCNC: 47 MG/DL (ref 40–60)
HDLC SERPL-MCNC: 52 MG/DL (ref 40–60)
HEMOGLOBIN: 14.1 G/DL (ref 12–16)
LDL CHOLESTEROL CALCULATED: 66 MG/DL
LDL CHOLESTEROL CALCULATED: 81 MG/DL
LEFT VENTRICULAR EJECTION FRACTION MODE: NORMAL
LV EF: 58 %
LV EF: 65 %
LVEF MODALITY: NORMAL
MCH RBC QN AUTO: 28.7 PG (ref 26–34)
MCHC RBC AUTO-ENTMCNC: 32.8 G/DL (ref 31–36)
MCV RBC AUTO: 87.5 FL (ref 80–100)
PDW BLD-RTO: 13.1 % (ref 12.4–15.4)
PERFORMED ON: ABNORMAL
PERFORMED ON: NORMAL
PERFORMED ON: NORMAL
PLATELET # BLD: 221 K/UL (ref 135–450)
PMV BLD AUTO: 8.7 FL (ref 5–10.5)
POTASSIUM SERPL-SCNC: 3.9 MMOL/L (ref 3.5–5.1)
RBC # BLD: 4.93 M/UL (ref 4–5.2)
SODIUM BLD-SCNC: 140 MMOL/L (ref 136–145)
TRIGL SERPL-MCNC: 107 MG/DL (ref 0–150)
TRIGL SERPL-MCNC: 133 MG/DL (ref 0–150)
VLDLC SERPL CALC-MCNC: 21 MG/DL
VLDLC SERPL CALC-MCNC: 27 MG/DL
WBC # BLD: 9.8 K/UL (ref 4–11)

## 2023-02-23 PROCEDURE — 83036 HEMOGLOBIN GLYCOSYLATED A1C: CPT

## 2023-02-23 PROCEDURE — 94761 N-INVAS EAR/PLS OXIMETRY MLT: CPT

## 2023-02-23 PROCEDURE — 6360000002 HC RX W HCPCS

## 2023-02-23 PROCEDURE — 99152 MOD SED SAME PHYS/QHP 5/>YRS: CPT | Performed by: INTERNAL MEDICINE

## 2023-02-23 PROCEDURE — C1887 CATHETER, GUIDING: HCPCS

## 2023-02-23 PROCEDURE — 6370000000 HC RX 637 (ALT 250 FOR IP): Performed by: INTERNAL MEDICINE

## 2023-02-23 PROCEDURE — 93458 L HRT ARTERY/VENTRICLE ANGIO: CPT | Performed by: INTERNAL MEDICINE

## 2023-02-23 PROCEDURE — C1769 GUIDE WIRE: HCPCS

## 2023-02-23 PROCEDURE — 4A033BC MEASUREMENT OF ARTERIAL PRESSURE, CORONARY, PERCUTANEOUS APPROACH: ICD-10-PCS | Performed by: INTERNAL MEDICINE

## 2023-02-23 PROCEDURE — 6360000004 HC RX CONTRAST MEDICATION: Performed by: INTERNAL MEDICINE

## 2023-02-23 PROCEDURE — 36415 COLL VENOUS BLD VENIPUNCTURE: CPT

## 2023-02-23 PROCEDURE — B2111ZZ FLUOROSCOPY OF MULTIPLE CORONARY ARTERIES USING LOW OSMOLAR CONTRAST: ICD-10-PCS | Performed by: INTERNAL MEDICINE

## 2023-02-23 PROCEDURE — 80048 BASIC METABOLIC PNL TOTAL CA: CPT

## 2023-02-23 PROCEDURE — 2060000000 HC ICU INTERMEDIATE R&B

## 2023-02-23 PROCEDURE — 93306 TTE W/DOPPLER COMPLETE: CPT

## 2023-02-23 PROCEDURE — C1894 INTRO/SHEATH, NON-LASER: HCPCS

## 2023-02-23 PROCEDURE — 99152 MOD SED SAME PHYS/QHP 5/>YRS: CPT

## 2023-02-23 PROCEDURE — 4A023N7 MEASUREMENT OF CARDIAC SAMPLING AND PRESSURE, LEFT HEART, PERCUTANEOUS APPROACH: ICD-10-PCS | Performed by: INTERNAL MEDICINE

## 2023-02-23 PROCEDURE — 2580000003 HC RX 258: Performed by: INTERNAL MEDICINE

## 2023-02-23 PROCEDURE — 85027 COMPLETE CBC AUTOMATED: CPT

## 2023-02-23 PROCEDURE — 93005 ELECTROCARDIOGRAM TRACING: CPT | Performed by: INTERNAL MEDICINE

## 2023-02-23 PROCEDURE — 6360000002 HC RX W HCPCS: Performed by: INTERNAL MEDICINE

## 2023-02-23 PROCEDURE — 2500000003 HC RX 250 WO HCPCS

## 2023-02-23 PROCEDURE — 85520 HEPARIN ASSAY: CPT

## 2023-02-23 PROCEDURE — 94640 AIRWAY INHALATION TREATMENT: CPT

## 2023-02-23 PROCEDURE — 85610 PROTHROMBIN TIME: CPT

## 2023-02-23 PROCEDURE — 2709999900 HC NON-CHARGEABLE SUPPLY

## 2023-02-23 PROCEDURE — 99153 MOD SED SAME PHYS/QHP EA: CPT

## 2023-02-23 PROCEDURE — 93458 L HRT ARTERY/VENTRICLE ANGIO: CPT

## 2023-02-23 PROCEDURE — 80061 LIPID PANEL: CPT

## 2023-02-23 PROCEDURE — 94664 DEMO&/EVAL PT USE INHALER: CPT

## 2023-02-23 PROCEDURE — 93010 ELECTROCARDIOGRAM REPORT: CPT | Performed by: INTERNAL MEDICINE

## 2023-02-23 RX ORDER — BUDESONIDE 0.5 MG/2ML
0.5 INHALANT ORAL 2 TIMES DAILY
Status: DISCONTINUED | OUTPATIENT
Start: 2023-02-23 | End: 2023-02-24 | Stop reason: HOSPADM

## 2023-02-23 RX ORDER — ONDANSETRON 2 MG/ML
4 INJECTION INTRAMUSCULAR; INTRAVENOUS EVERY 6 HOURS PRN
Status: DISCONTINUED | OUTPATIENT
Start: 2023-02-23 | End: 2023-02-23

## 2023-02-23 RX ORDER — LIDOCAINE 4 G/G
1 PATCH TOPICAL DAILY
Status: DISCONTINUED | OUTPATIENT
Start: 2023-02-23 | End: 2023-02-24 | Stop reason: HOSPADM

## 2023-02-23 RX ORDER — CLONIDINE 0.1 MG/24H
1 PATCH, EXTENDED RELEASE TRANSDERMAL WEEKLY
Status: DISCONTINUED | OUTPATIENT
Start: 2023-02-28 | End: 2023-02-24 | Stop reason: HOSPADM

## 2023-02-23 RX ORDER — DOCUSATE SODIUM 100 MG/1
100 CAPSULE, LIQUID FILLED ORAL 2 TIMES DAILY
Status: DISCONTINUED | OUTPATIENT
Start: 2023-02-23 | End: 2023-02-24 | Stop reason: HOSPADM

## 2023-02-23 RX ORDER — IPRATROPIUM BROMIDE AND ALBUTEROL SULFATE 2.5; .5 MG/3ML; MG/3ML
1 SOLUTION RESPIRATORY (INHALATION) 2 TIMES DAILY
Status: DISCONTINUED | OUTPATIENT
Start: 2023-02-24 | End: 2023-02-24 | Stop reason: HOSPADM

## 2023-02-23 RX ORDER — AMITRIPTYLINE HYDROCHLORIDE 25 MG/1
25 TABLET, FILM COATED ORAL NIGHTLY
Status: DISCONTINUED | OUTPATIENT
Start: 2023-02-23 | End: 2023-02-24 | Stop reason: HOSPADM

## 2023-02-23 RX ORDER — IODIXANOL 320 MG/ML
350 INJECTION, SOLUTION INTRAVASCULAR ONCE
Status: COMPLETED | OUTPATIENT
Start: 2023-02-23 | End: 2023-02-23

## 2023-02-23 RX ORDER — PANTOPRAZOLE SODIUM 40 MG/1
40 TABLET, DELAYED RELEASE ORAL
Status: DISCONTINUED | OUTPATIENT
Start: 2023-02-23 | End: 2023-02-24 | Stop reason: HOSPADM

## 2023-02-23 RX ORDER — CETIRIZINE HYDROCHLORIDE 10 MG/1
10 TABLET ORAL DAILY
Status: DISCONTINUED | OUTPATIENT
Start: 2023-02-23 | End: 2023-02-24 | Stop reason: HOSPADM

## 2023-02-23 RX ORDER — GABAPENTIN 300 MG/1
300 CAPSULE ORAL 2 TIMES DAILY
Status: DISCONTINUED | OUTPATIENT
Start: 2023-02-23 | End: 2023-02-24 | Stop reason: HOSPADM

## 2023-02-23 RX ORDER — CLOPIDOGREL BISULFATE 75 MG/1
75 TABLET ORAL DAILY
Status: DISCONTINUED | OUTPATIENT
Start: 2023-02-24 | End: 2023-02-24 | Stop reason: HOSPADM

## 2023-02-23 RX ORDER — ATORVASTATIN CALCIUM 80 MG/1
80 TABLET, FILM COATED ORAL NIGHTLY
Status: DISCONTINUED | OUTPATIENT
Start: 2023-02-23 | End: 2023-02-24 | Stop reason: HOSPADM

## 2023-02-23 RX ORDER — ACETAMINOPHEN 325 MG/1
650 TABLET ORAL EVERY 4 HOURS PRN
Status: DISCONTINUED | OUTPATIENT
Start: 2023-02-23 | End: 2023-02-23 | Stop reason: SDUPTHER

## 2023-02-23 RX ORDER — RANOLAZINE 500 MG/1
500 TABLET, EXTENDED RELEASE ORAL 2 TIMES DAILY
Status: DISCONTINUED | OUTPATIENT
Start: 2023-02-23 | End: 2023-02-24 | Stop reason: HOSPADM

## 2023-02-23 RX ORDER — SODIUM CHLORIDE 0.9 % (FLUSH) 0.9 %
5-40 SYRINGE (ML) INJECTION PRN
Status: DISCONTINUED | OUTPATIENT
Start: 2023-02-23 | End: 2023-02-24 | Stop reason: HOSPADM

## 2023-02-23 RX ORDER — SODIUM CHLORIDE 9 MG/ML
INJECTION, SOLUTION INTRAVENOUS CONTINUOUS
Status: ACTIVE | OUTPATIENT
Start: 2023-02-23 | End: 2023-02-23

## 2023-02-23 RX ORDER — ALBUTEROL SULFATE 2.5 MG/3ML
2.5 SOLUTION RESPIRATORY (INHALATION) EVERY 6 HOURS PRN
Status: DISCONTINUED | OUTPATIENT
Start: 2023-02-23 | End: 2023-02-24 | Stop reason: HOSPADM

## 2023-02-23 RX ORDER — IPRATROPIUM BROMIDE AND ALBUTEROL SULFATE 2.5; .5 MG/3ML; MG/3ML
1 SOLUTION RESPIRATORY (INHALATION) 3 TIMES DAILY
Status: DISCONTINUED | OUTPATIENT
Start: 2023-02-23 | End: 2023-02-23

## 2023-02-23 RX ORDER — ATROPINE SULFATE 0.4 MG/ML
0.5 AMPUL (ML) INJECTION
Status: DISPENSED | OUTPATIENT
Start: 2023-02-23 | End: 2023-02-24

## 2023-02-23 RX ORDER — SODIUM CHLORIDE 0.9 % (FLUSH) 0.9 %
5-40 SYRINGE (ML) INJECTION EVERY 12 HOURS SCHEDULED
Status: DISCONTINUED | OUTPATIENT
Start: 2023-02-23 | End: 2023-02-24 | Stop reason: HOSPADM

## 2023-02-23 RX ORDER — NITROGLYCERIN 40 MG/1
1 PATCH TRANSDERMAL DAILY
Status: DISCONTINUED | OUTPATIENT
Start: 2023-02-23 | End: 2023-02-24 | Stop reason: HOSPADM

## 2023-02-23 RX ORDER — FLUTICASONE PROPIONATE 50 MCG
2 SPRAY, SUSPENSION (ML) NASAL DAILY
Status: DISCONTINUED | OUTPATIENT
Start: 2023-02-23 | End: 2023-02-24 | Stop reason: HOSPADM

## 2023-02-23 RX ORDER — SODIUM CHLORIDE 9 MG/ML
INJECTION, SOLUTION INTRAVENOUS PRN
Status: DISCONTINUED | OUTPATIENT
Start: 2023-02-23 | End: 2023-02-24 | Stop reason: HOSPADM

## 2023-02-23 RX ADMIN — DICLOFENAC SODIUM 2 G: 10 GEL TOPICAL at 09:00

## 2023-02-23 RX ADMIN — DOCUSATE SODIUM 100 MG: 100 CAPSULE, LIQUID FILLED ORAL at 01:58

## 2023-02-23 RX ADMIN — ATORVASTATIN CALCIUM 80 MG: 80 TABLET, FILM COATED ORAL at 20:22

## 2023-02-23 RX ADMIN — BUDESONIDE 500 MCG: 0.5 SUSPENSION RESPIRATORY (INHALATION) at 20:48

## 2023-02-23 RX ADMIN — ACETAMINOPHEN 650 MG: 325 TABLET ORAL at 20:21

## 2023-02-23 RX ADMIN — RANOLAZINE 500 MG: 500 TABLET, EXTENDED RELEASE ORAL at 20:25

## 2023-02-23 RX ADMIN — SODIUM CHLORIDE, PRESERVATIVE FREE 10 ML: 5 INJECTION INTRAVENOUS at 20:24

## 2023-02-23 RX ADMIN — SODIUM CHLORIDE, PRESERVATIVE FREE 10 ML: 5 INJECTION INTRAVENOUS at 09:08

## 2023-02-23 RX ADMIN — IPRATROPIUM BROMIDE AND ALBUTEROL SULFATE 1 AMPULE: 2.5; .5 SOLUTION RESPIRATORY (INHALATION) at 09:26

## 2023-02-23 RX ADMIN — ACETAMINOPHEN 650 MG: 325 TABLET ORAL at 01:43

## 2023-02-23 RX ADMIN — PANTOPRAZOLE SODIUM 40 MG: 40 TABLET, DELAYED RELEASE ORAL at 06:20

## 2023-02-23 RX ADMIN — DOCUSATE SODIUM 100 MG: 100 CAPSULE, LIQUID FILLED ORAL at 20:21

## 2023-02-23 RX ADMIN — RANOLAZINE 500 MG: 500 TABLET, EXTENDED RELEASE ORAL at 14:48

## 2023-02-23 RX ADMIN — IODIXANOL 130 ML: 320 INJECTION, SOLUTION INTRAVASCULAR at 13:14

## 2023-02-23 RX ADMIN — AMITRIPTYLINE HYDROCHLORIDE 25 MG: 25 TABLET, FILM COATED ORAL at 20:21

## 2023-02-23 RX ADMIN — SODIUM CHLORIDE: 9 INJECTION, SOLUTION INTRAVENOUS at 14:35

## 2023-02-23 RX ADMIN — GABAPENTIN 300 MG: 300 CAPSULE ORAL at 20:21

## 2023-02-23 RX ADMIN — DICLOFENAC SODIUM 2 G: 10 GEL TOPICAL at 20:23

## 2023-02-23 RX ADMIN — ACETAMINOPHEN 650 MG: 325 TABLET ORAL at 09:00

## 2023-02-23 RX ADMIN — BUDESONIDE 500 MCG: 0.5 SUSPENSION RESPIRATORY (INHALATION) at 09:26

## 2023-02-23 RX ADMIN — FLUTICASONE PROPIONATE 2 SPRAY: 50 SPRAY, METERED NASAL at 09:00

## 2023-02-23 RX ADMIN — GABAPENTIN 300 MG: 300 CAPSULE ORAL at 01:58

## 2023-02-23 RX ADMIN — GABAPENTIN 300 MG: 300 CAPSULE ORAL at 08:59

## 2023-02-23 ASSESSMENT — PAIN DESCRIPTION - LOCATION
LOCATION: HEAD

## 2023-02-23 ASSESSMENT — PAIN - FUNCTIONAL ASSESSMENT
PAIN_FUNCTIONAL_ASSESSMENT: ACTIVITIES ARE NOT PREVENTED

## 2023-02-23 ASSESSMENT — PAIN DESCRIPTION - ONSET
ONSET: GRADUAL

## 2023-02-23 ASSESSMENT — PAIN DESCRIPTION - DESCRIPTORS
DESCRIPTORS: ACHING

## 2023-02-23 ASSESSMENT — PAIN DESCRIPTION - PAIN TYPE
TYPE: ACUTE PAIN

## 2023-02-23 ASSESSMENT — PAIN DESCRIPTION - FREQUENCY
FREQUENCY: CONTINUOUS

## 2023-02-23 ASSESSMENT — PAIN SCALES - GENERAL
PAINLEVEL_OUTOF10: 5
PAINLEVEL_OUTOF10: 7
PAINLEVEL_OUTOF10: 3

## 2023-02-23 ASSESSMENT — PAIN DESCRIPTION - ORIENTATION
ORIENTATION: POSTERIOR
ORIENTATION: POSTERIOR

## 2023-02-23 ASSESSMENT — PAIN SCALES - WONG BAKER
WONGBAKER_NUMERICALRESPONSE: 0

## 2023-02-23 NOTE — PROCEDURES
[facility]                [address]                            CARDIAC CATHETERIZATION    PATIENT NAME: Urszula Platt                    :        1955  MED REC NO:   [id number]                          ROOM:       3527  ACCOUNT NO:   [de-identified]                           ADMIT DATE: 2023  PROVIDER:     Deepak Cash. Dejan Douglas MD    DATE OF PROCEDURE:  2023    INDICATION FOR PROCEDURE:  This patient has class III angina. She  complained of severe chest pain in the office with minimal exertion. She came to the hospital last night and was scheduled for angiography. She has T-wave inversions in the lateral precordial leads and V2  suggesting LAD disease. DESCRIPTION OF PROCEDURE:  Prepped and draped in sterile manner after  the plethysmography was normal in the right wrist, the right radial  artery was prepped and draped in sterile manner. The patient was  sedated at 12:05 p.m. with 1 mg of Versed and 50 mcg of fentanyl IV push  by my order. The end time of procedure was 12:43 p.m. These  medications were given by the qualified independent observer, [facility]. All vital signs were monitored during the entire procedure by the entire  cardiac team including myself. Using a 5/6 Slender sheath, this was advanced over the micropuncture  guidewire. It was aspirated and flushed. The usual dose of heparin,  verapamil, and nitroglycerin was given through the sideport of that  sheath. A JR4 catheter was advanced over the [facility] wire into the  ascending aorta, used to gain access to the ascending aorta. Injection of the right coronary artery shows a normal dominant right  coronary artery, right posterior descending normal, and right  posterolateral branch is normal.    The 0.035 J-wire was exchange and slipped into the left ventricle. The  JR4 catheter was advanced into the left ventricle. The LVEDP was 8  mmHg. Hand injection FINN projection shows LV ejection fraction of 65%  without wall motion abnormality. Over the exchange length J-wire, the JL3.5 catheter was used to engage  the left main coronary artery. The left main coronary artery is short  and normal.  In multiple views, there was some streaming of contrast  into the LAD and the circumflex, but there was no obstructive disease  identified in the left main. The LAD was angiographically normal.   There was no obstruction identified in the LAD or its branches. Diagonal branches were normal.  Septal perforators normal.    Circumflex coronary artery was normal.  Because of the streaming of  contrast, I wanted to take a better picture and I used an AL-1  diagnostic catheter and AL-2 guide. Injections revealed that there was  wide patency with LO-3 blood flow in the left coronary system. I  concluded the angiography procedure. All the angiography hardware was  removed. The TR band was applied after the radial sheath was removed. Heparin 6000 units total was given and  the procedure was terminated. PLAN:  Hydration. Bedrest.  Treat for microvascular angina and hydrate  today. Possible discharge later today if stable. ESTIMATED BLOOD LOSS:  Less than 10 mL. TOTAL CONTRAST USED:  130.         Lamine Joe MD    D: 02/23/2023 13:13:21       T: 02/23/2023 13:16:01     [id number]  Job#: [id number]     Doc#: [id number]    CC:

## 2023-02-23 NOTE — PROGRESS NOTES
HOSPITALISTS PROGRESS NOTE    2/23/2023 12:15 PM        Name: Moraima Barker . Admitted: 2/22/2023  Primary Care Provider: Joaquin Raza MD (Tel: [phone])      Problem List  Principal Problem:    Unstable angina [facility])  Resolved Problems:    * No resolved hospital problems. *       Assessment & Plan:   Unstable angina   Cath planned for today, further disposition based on the results of the angiogram  S/p cath , as per cardiology treat for microvascular angina/ d/w patient regarding discharge, feels weak and dizzy/ mentions lives alone and does not feel comfortable, plan to discharge in am  On plavix, Ranexa and statin as per cardiology    Headache  Monitor, as needed Tylenol    H/o DM   ISS, A1c of 6.1    Asthma  Dpression/anxiety     Hypertension  Clonidine,       IV Access: peripheral  Carter:   Diet: Diet NPO  Code:Full Code  DVT PPX   Disposition Monitor in the hospital      Brief Course:        CC: chest pain    Subjective:  .     Patient comfortable   Awaiting cath  No nausea/vomiting        Reviewed interval ancillary notes    Current Medications  amitriptyline (ELAVIL) tablet 25 mg, Nightly  atorvastatin (LIPITOR) tablet 80 mg, Nightly  cetirizine (ZYRTEC) tablet 10 mg, Daily  [START ON 2/28/2023] cloNIDine (CATAPRES) 0.1 MG/24HR 1 patch, Weekly  diclofenac sodium (VOLTAREN) 1 % gel 2 g, 4x Daily  docusate sodium (COLACE) capsule 100 mg, BID  fluticasone (FLONASE) 50 MCG/ACT nasal spray 2 spray, Daily  gabapentin (NEURONTIN) capsule 300 mg, BID  lidocaine 4 % external patch 1 patch, Daily  pantoprazole (PROTONIX) tablet 40 mg, QAM AC  ipratropium-albuterol (DUONEB) nebulizer solution 1 ampule, TID  albuterol (PROVENTIL) nebulizer solution 2.5 mg, Q6H PRN  budesonide (PULMICORT) nebulizer suspension 500 mcg, BID  heparin (porcine) injection 4,000 Units, PRN  heparin (porcine) injection 2,000 Units, PRN  heparin 25,000 units in dextrose 5% 250 mL (premix) infusion, Continuous  nitroGLYCERIN (NITROSTAT) SL tablet 0.4 mg, Q5 Min PRN  sodium chloride flush 0.9 % injection 5-40 mL, 2 times per day  sodium chloride flush 0.9 % injection 5-40 mL, PRN  0.9 % sodium chloride infusion, PRN  ondansetron (ZOFRAN-ODT) disintegrating tablet 4 mg, Q8H PRN   Or  ondansetron (ZOFRAN) injection 4 mg, Q6H PRN  acetaminophen (TYLENOL) tablet 650 mg, Q6H PRN   Or  acetaminophen (TYLENOL) suppository 650 mg, Q6H PRN  polyethylene glycol (GLYCOLAX) packet 17 g, Daily PRN  perflutren lipid microspheres (DEFINITY) injection 1.5 mL, ONCE PRN  glucose chewable tablet 16 g, PRN  dextrose bolus 10% 125 mL, PRN   Or  dextrose bolus 10% 250 mL, PRN  glucagon injection 1 mg, PRN  dextrose 10 % infusion, Continuous PRN  insulin lispro (1 Unit Dial) (HUMALOG/ADMELOG) pen 0-4 Units, TID WC  insulin lispro (1 Unit Dial) (HUMALOG/ADMELOG) pen 0-4 Units, Nightly        Objective:  /63   Pulse 85   Temp 98.1 °F (36.7 °C) (Oral)   Resp (!) 33   Ht 5' 2\" (1.575 m)   Wt 158 lb (71.7 kg)   SpO2 99%   BMI 28.90 kg/m²     Intake/Output Summary (Last 24 hours) at 2/23/2023 1215  Last data filed at 2/22/2023 2237  Gross per 24 hour   Intake 10 ml   Output --   Net 10 ml      Wt Readings from Last 3 Encounters:   02/23/23 158 lb (71.7 kg)   02/22/23 155 lb 12.8 oz (70.7 kg)   02/22/23 155 lb (70.3 kg)     Keeping her eye closed during the interview  Lungs are clear to auscultation  S1-S2 heard  Soft nontender nondistended  Oral mucosa moist  Patient is alert,awake/oriented times 3    Labs and Tests:  CBC:   Recent Labs     02/22/23  1154 02/22/23  1449 02/23/23  0506   WBC 9.8 10.7 9.8   HGB 15.2 14.8 14.1    255 221     BMP:    Recent Labs     02/22/23  1154 02/22/23  1449 02/23/23  0505    138 140   K 4.5 4.2 3.9    100 100   CO2 26 25 26   BUN 17 18 18   CREATININE 0.7 0.7 0.7   GLUCOSE 98 101* 95     Hepatic: No results for input(s): AST, ALT, ALB, BILITOT, ALKPHOS in the last 72 hours.   XR CHEST (2 VW)   Final Result      No acute cardiopulmonary disease              Jessica Grande MD   2/23/2023 12:15 PM

## 2023-02-23 NOTE — H&P
History and Physical    Admit Date: 2/22/2023    Patient's PCP: Dr. Joaquin Raza MD    Chief complaint: Chest pain x 4 days      HISTORY OF PRESENT ILLNESS:    This is a very pleasant 78 y.o. female with a history of multiple medical problems presenting with HTN, HLD, DM type 2, asthma, depression and anxiety who presented to the ER with chest pain. She has had chest pain over the past 4 days intermittently, , squeezing pain in middle of chest worse on activity, like when walking relieved by rest and associated with dyspnea. No cough. No fever or chills. No nausea or vomiting. No recent ischemia eval . Appears had a normal Normal myocardial perfusion scan in 2016. In the ED, she had normal vitals and labs. Troponin was < 0.01. EKG showed EKG: NSR with TWI V2. She was started on heparin and Nitropaste. She is allergic to aspirin. She is admitted for further management.       Past Medical / Surgical History:    Past Medical History:   Diagnosis Date    Abnormal involuntary movements(781.0)     Anal fissure     Anemia, unspecified     Anxiety     Chronic back pain     Chronic diarrhea 09/23/2019    Depression     Diffuse cystic mastopathy     Encopresis(307.7)     Esophageal reflux     Foot fracture, left 1989    drop something on foot    Headache(784.0)     Hepatitis, unspecified     Herpes simplex without mention of complication     Hypertension     Hypogammaglobulinaemia, unspecified     Insomnia, unspecified     Lateral epicondylitis  of elbow     Migraine, unspecified, without mention of intractable migraine without mention of status migrainosus     Mixed hyperlipidemia 07/30/2010    Osteopenia     Postmenopausal     Pure hypercholesterolemia     Symptomatic menopausal or female climacteric states     Type 2 diabetes mellitus without complication (HCC)     Type II or unspecified type diabetes mellitus without mention of complication, not stated as uncontrolled     Unspecified asthma(493.90)        Past Surgical History:   Procedure Laterality Date    BREAST BIOPSY      CATARACT REMOVAL Bilateral     DILATION AND CURETTAGE OF UTERUS      x2    ELBOW SURGERY  1/18/11    right    FOOT SURGERY  10/2009,11/2010    right foot    HAND SURGERY  2009    left hand    HYSTERECTOMY (CERVIX STATUS UNKNOWN)  1998    RECTAL SURGERY      SHOULDER ARTHROSCOPY Right 7/07    by Dr. Spenser Yost       Medications Prior to Admission:    No current facility-administered medications on file prior to encounter. Current Outpatient Medications on File Prior to Encounter   Medication Sig Dispense Refill    cloNIDine (CATAPRES) 0.1 MG tablet Take 0.1 mg by mouth 2 times daily      cloNIDine (CATAPRES) 0.1 MG/24HR PTWK APPLY 1 PATCH ONTO THE SKINONCE A WEEK 12 patch 1    budesonide-formoterol (SYMBICORT) 160-4.5 MCG/ACT AERO USE 2 INHALATIONS ORALLY   TWICE DAILY 30.6 g 1    Dulaglutide (TRULICITY) 9.52 IV/7.5DZ SOPN INJECT 0.5ML (=0.75 MG)    SUBCUTANEOUSLY ONCE WEEKLY 6 mL 1    pantoprazole (PROTONIX) 40 MG tablet TAKE 1 TABLET DAILY 90 tablet 1    celecoxib (CELEBREX) 100 MG capsule Take 1 capsule by mouth daily as needed for Pain 30 capsule 1    atorvastatin (LIPITOR) 20 MG tablet TAKE 1 TABLET DAILY 90 tablet 1    albuterol sulfate HFA (PROVENTIL;VENTOLIN;PROAIR) 108 (90 Base) MCG/ACT inhaler Inhale 2 puffs into the lungs every 6 hours as needed for Wheezing      amitriptyline (ELAVIL) 25 MG tablet Take 25 mg by mouth nightly      azelastine HCl 0.15 % SOLN by Nasal route      gabapentin (NEURONTIN) 300 MG capsule Take 300 mg by mouth in the morning and at bedtime.       omeprazole (PRILOSEC) 40 MG delayed release capsule Take 40 mg by mouth daily      pioglitazone (ACTOS) 30 MG tablet Take 30 mg by mouth daily      cetirizine (ZYRTEC) 10 MG tablet Take 1 tablet by mouth daily 30 tablet 2    ondansetron (ZOFRAN-ODT) 4 MG disintegrating tablet Take 1 tablet by mouth 3 times daily as needed for Nausea or Vomiting 21 tablet 0    lidocaine (LIDODERM) 5 % Place 1 patch onto the skin daily 12 hours on, 12 hours off. 30 patch 0    acetaminophen (TYLENOL) 500 MG tablet Take 1 tablet by mouth 4 times daily as needed for Pain 120 tablet 0    docusate sodium (COLACE) 100 MG capsule Take 1 capsule by mouth 2 times daily 60 capsule 1    bisacodyl 5 MG EC tablet Take 1-2 tablets daily as needed 30 tablet 0    ketoconazole (NIZORAL) 2 % cream APPLY topically TO bottom of BOTH FEET AND between TOES DAILY FOR 3 TO 4 WEEKS      diclofenac sodium (VOLTAREN) 1 % GEL Apply 2 g topically 4 times daily 350 g 1    fluticasone (FLONASE) 50 MCG/ACT nasal spray 2 sprays by Nasal route daily 48 g 1    Lancets MISC 1 each by Other route daily E11.9 100 each 0    Blood Glucose Monitoring Suppl (TRUE METRIX METER) DMITRIY Test blood sugar daily and PRN E11.9 1 each 0    blood glucose test strips (GLUCOSE METER TEST) strip 100 each by In Vitro route 2 times daily Diabetes   e11.9 100 each 1       Allergies:  Effexor [venlafaxine hydrochloride], Hydrocodone, Ibuprofen, Propoxyphene, Aspirin, Keflex [cephalexin], and Paxil cr [paroxetine hcl er]    Social History:   TOBACCO:   reports that she has never smoked. She has never used smokeless tobacco.     ETOH:   reports no history of alcohol use. Family History:       Problem Relation Age of Onset    Obesity Mother     Osteoporosis Mother     Stroke Father     Stroke Sister 76         from stroke    Breast Cancer Daughter     Cancer Other        ROS: Review of Systems - Negative except as in HPI. All other systems reviewed and are negative. PHYSICAL EXAM:  /88   Pulse 65   Temp 98.2 °F (36.8 °C) (Oral)   Resp 17   SpO2 94%     No results for input(s): POCGLU in the last 72 hours. Constitutional:       General: She is not in acute distress. Appearance: She is well-developed. She is not diaphoretic. HENT:      Head: Normocephalic and atraumatic.    Eyes:      Conjunctiva/sclera: Conjunctivae normal.      Pupils: Pupils are equal, round, and reactive to light. Cardiovascular:      Rate and Rhythm: Normal rate and regular rhythm. Pulmonary:      Effort: Pulmonary effort is normal. No respiratory distress. Abdominal:      Palpations: Abdomen is soft. Tenderness: There is no guarding or rebound. Musculoskeletal:         General: Normal range of motion. Cervical back: Normal range of motion. Skin:     General: Skin is warm and dry. Neurological:      Mental Status: She is alert and oriented to person, place, and time. LABS:  Recent Labs     02/22/23  1154 02/22/23  1449   WBC 9.8 10.7   HGB 15.2 14.8   HCT 45.0 44.8    255                                                                  Recent Labs     02/22/23  1449      K 4.2      CO2 25   BUN 18   CREATININE 0.7   GLUCOSE 101*     No results for input(s): AST, ALT, ALB, BILITOT, ALKPHOS in the last 72 hours. Recent Labs     02/22/23  1449   TROPONINI <0.01     No results for input(s): BNP in the last 72 hours. No results found for: PHART, CHB0GYJ, PO2ART  No results for input(s): INR in the last 72 hours. No results for input(s): NITRITE, COLORU, PHUR, LABCAST, WBCUA, RBCUA, MUCUS, TRICHOMONAS, YEAST, BACTERIA, CLARITYU, SPECGRAV, LEUKOCYTESUR, UROBILINOGEN, BILIRUBINUR, BLOODU, GLUCOSEU, AMORPHOUS in the last 72 hours. Invalid input(s): Sandro Ellis     Assessment & Plan:     78 y.o. female with a history of multiple medical problems presenting with HTN, HLD, DM type 2, asthma, depression and anxiety who presented to the ER with chest pain.       Chest Pain, possible unstable angina    Essential HTN    HLD    Type 2 diabetes mellitus with diabetic polyneuropathy, without long-term current use of insulin     Asthma    Depression and anxiety          Presented with chest pain    Significant risk factors for CAD including DM, HTN, HLD    EKG: Some TWI in V2    Admit to telemetry    Trend troponins    Optimize risk factor mgt    Update FLP, A1C    Continue BB, statin     Seen by cardiology. Started in the ED on Heparin gtt, Nitropaste; APT    Planned cath tomorrow         Mixed hyperlipidemia     Asthma     Osteoporosis     GERD (gastroesophageal reflux disease)     B12 deficiency anemia     Chronic back pain     Neuropathy     Major depression ([address]           The patient and / or the family were informed of the results of any tests, a time was given to answer questions, a plan was proposed and they agreed with plan. Thank you Dr. Arnulfo David MD for the opportunity to be involved in this patients care. If you have any questions or concerns please feel free to contact me at [phone].   No Order    Baron Mary MD

## 2023-02-23 NOTE — CARE COORDINATION
Pt is from home w/her gf. Pt will DC home. PT is indp at baseline and doesnt anticipate HHC or DME needs at this time. SW signing off, cons if DC needs arise.    Electronically signed by SHAHIDA Farr LSW on 2/23/2023 at 5:11 PM  [phone]

## 2023-02-23 NOTE — PROGRESS NOTES
[facility] Daily Progress Note      Admit Date:  2/22/2023    CC: Chest pain x 4 days    Subjective:  Ms. Kristen Oneal presented with 4 days of intermittent chest pain, dyspnea    Pt complaining of left sided headache, chest pain feels better than when she came in.    Objective:   /63   Pulse 85   Temp 98.1 °F (36.7 °C) (Oral)   Resp (!) 33   Ht 5' 2\" (1.575 m)   Wt 158 lb (71.7 kg)   SpO2 99%   BMI 28.90 kg/m²     Intake/Output Summary (Last 24 hours) at 2/23/2023 2043  Last data filed at 2/22/2023 2237  Gross per 24 hour   Intake 10 ml   Output --   Net 10 ml       Physical Exam:  General:  Awake, alert, NAD  Eyes:  EOMI   Skin:  Warm and dry. Neck:  JVP normal  Chest:  Diminshed. No Rales. Cardiovascular:  RRR, Normal S1S2. No Murmur. No Rub. No Gallops. Abdomen:  Soft NT  Extremities:  No edema  Neuro:  CN II-XII intact. No focal deficits. Psych:  Normal thought and affect. MSK:  No Cyanosis nor Clubbing.   Symmetrical strength upper and lower extremities    Medications:    amitriptyline  25 mg Oral Nightly    atorvastatin  80 mg Oral Nightly    cetirizine  10 mg Oral Daily    [START ON 2/28/2023] cloNIDine  1 patch TransDERmal Weekly    diclofenac sodium  2 g Topical 4x Daily    docusate sodium  100 mg Oral BID    fluticasone  2 spray Nasal Daily    gabapentin  300 mg Oral BID    lidocaine  1 patch Topical Daily    pantoprazole  40 mg Oral QAM AC    ipratropium-albuterol  1 ampule Inhalation TID    budesonide  0.5 mg Nebulization BID    sodium chloride flush  5-40 mL IntraVENous 2 times per day    insulin lispro  0-4 Units SubCUTAneous TID WC    insulin lispro  0-4 Units SubCUTAneous Nightly      heparin (PORCINE) Infusion 12 Units/kg/hr (02/22/23 1651)    sodium chloride      dextrose       albuterol, heparin (porcine), heparin (porcine), nitroGLYCERIN, sodium chloride flush, sodium chloride, ondansetron **OR** ondansetron, acetaminophen **OR** acetaminophen, polyethylene glycol, perflutren lipid microspheres, glucose, dextrose bolus **OR** dextrose bolus, glucagon (rDNA), dextrose    Lab Data:  CBC:   Recent Labs     02/22/23  1154 02/22/23  1449 02/23/23  0506   WBC 9.8 10.7 9.8   HGB 15.2 14.8 14.1   HCT 45.0 44.8 43.1   MCV 86.4 86.1 87.5    255 221     BMP:   Recent Labs     02/22/23  1154 02/22/23  1449 02/23/23  0505    138 140   K 4.5 4.2 3.9    100 100   CO2 26 25 26   BUN 17 18 18   CREATININE 0.7 0.7 0.7     LIVER PROFILE: No results for input(s): AST, ALT, LIPASE, BILIDIR, BILITOT, ALKPHOS in the last 72 hours. Invalid input(s): AMYLASE,  ALB  PT/INR: No results for input(s): PROTIME, INR in the last 72 hours. APTT: No results for input(s): APTT in the last 72 hours. BNP:  No results for input(s): BNP in the last 72 hours. IMAGING:    ECHO 2/23/23  Summary    Normal left ventricle size, wall thickness, and systolic function with an    estimated ejection fraction of 55-60%. No regional wall motion abnormalities are seen. Diastolic filling parameters suggest grade I diastolic dysfunction. There is a trivial pericardial effusion noted.          Assessment:  Patient Active Problem List    Diagnosis Date Noted    Unstable angina ([address] 02/22/2023    Frequent headaches 01/30/2023    Right flank pain 01/23/2023    Cyst of right kidney 01/23/2023    Nasal bleeding 12/12/2022    Bruising 12/12/2022    Burning sensation of eye 11/09/2022    Blurry vision, left eye 11/09/2022    Feeling weak 11/06/2022    Nonintractable headache 11/06/2022    Burning pain 11/06/2022    Exposure to carbon monoxide 11/06/2022    Low back pain 11/06/2022    Urinary tract infection without hematuria 10/31/2022    Constipation 10/31/2022    Skin lesion of right arm 09/28/2022    Right groin pain 09/28/2022    Right hip pain 09/28/2022    Pain in toes of both feet 09/14/2022    Allergic rhinitis 09/14/2022    Conjunctivitis of left eye 06/29/2022    Dry cough 06/29/2022 Swelling of eyelid, left 06/29/2022    COVID-19 05/30/2022    Upper respiratory tract infection 05/30/2022    Right ear pain 05/30/2022    Chronic pain of right lower extremity 03/22/2022    Type 2 diabetes mellitus without complication, without long-term current use of insulin ([address] 03/22/2022    Cold sore 12/31/2021    Essential hypertension 11/29/2021    Chronic diarrhea 09/23/2019    Intractable chronic migraine without aura 08/30/2017    Chronic daily headache 08/28/2017    Status migrainosus 08/28/2017    Cervico-occipital neuralgia 08/28/2017    Cervicogenic headache 08/28/2017    Medication side effect 08/28/2017    Chronic insomnia 08/28/2017    Cervical spinal stenosis 08/28/2017    Osteoarthritis of spine with radiculopathy, cervical region 08/28/2017    Vitamin D deficiency 08/28/2017    Arthritis of right acromioclavicular joint 06/06/2017    Tendinitis of right rotator cuff 06/06/2017    Cervical stenosis of spine 06/06/2017    Moderate episode of recurrent major depressive disorder ([address] 12/21/2016    Acute cystitis without hematuria 07/18/2016    Weight loss, abnormal 07/18/2016    Right-sided chest wall pain 06/08/2016    Dysuria 06/08/2016    Throat dryness 05/03/2016    Arthritis of left acromioclavicular joint 08/17/2015    Bursitis of right shoulder 08/17/2015    History of arthroscopy of right shoulder 2008 07/27/2015    Removal of ganglion cyst in left wrist 07/27/2015    History of elbow surgery Right 07/27/2015    Adhesive capsulitis of left shoulder 07/27/2015    Tendinitis of left rotator cuff 07/27/2015    Left rotator cuff tear 07/27/2015    Pain, neck 07/27/2015    Cervical stenosis of spinal canal 07/27/2015    Major depression ([address] 08/29/2013    Neuropathy 04/03/2013    Lateral epicondylitis 08/18/2010    Arthralgia 08/18/2010    Type 2 diabetes mellitus with diabetic polyneuropathy, without long-term current use of insulin ([address] 08/18/2010    Hepatitis 08/18/2010    Leg pain 08/18/2010    GERD (gastroesophageal reflux disease) 08/18/2010    Hand pain 08/18/2010    B12 deficiency anemia 08/18/2010    Hemorrhoids, internal 08/18/2010    Severe headache 08/18/2010    Encopresis 08/18/2010    Tremor 08/18/2010    Hot flashes 08/18/2010    Fibrocystic disease of breast 08/18/2010    Insomnia 08/18/2010    Shoulder pain 08/18/2010    Malaise and fatigue 08/18/2010    Migraine 08/18/2010    Neck pain 08/18/2010    Chronic back pain 08/18/2010    Mixed hyperlipidemia 07/30/2010    Asthma 07/30/2010    Osteoporosis 07/30/2010       Plan:    Chest Pain 2/2 possible unstable angina  4 days intermittent sqeezing chest pain and dyspnea, worse with exertion   EKG with TWI in V2, troponin <0.01 x2, given 324mg ASA  Pt is on heparin gtt  Plan for Cath today   Essential HTN  Controlled on clonidine patch  DM2  Asthma  Depression     Core Measures:  Discharge instructions:   LVEF documented:   ACEI for LV dysfunction:   Smoking Cessation:    Pat Diallo MD, MD 2/23/2023 9:59 AM

## 2023-02-23 NOTE — RT PROTOCOL NOTE
RT Nebulizer Bronchodilator Protocol Note    There is a bronchodilator order in the chart from a provider indicating to follow the RT Bronchodilator Protocol and there is an Initiate RT Bronchodilator Protocol order as well (see protocol at bottom of note). CXR Findings:  No results found. The findings from the last RT Protocol Assessment were as follows:  Smoking: None or smoker <15 pack years  Respiratory Pattern: Regular pattern and RR 12-20 bpm  Breath Sounds: Clear breath sounds  Cough: Indication for Bronchodilator Therapy:    Bronchodilator Assessment Score:      Aerosolized bronchodilator medication orders have been revised according to the RT Nebulizer Bronchodilator Protocol below. Respiratory Therapist to perform RT Therapy Protocol Assessment initially then follow the protocol. Repeat RT Therapy Protocol Assessment PRN for score 0-3 or on second treatment, BID, and PRN for scores above 3. No Indications - adjust the frequency to every 6 hours PRN wheezing or bronchospasm, if no treatments needed after 48 hours then discontinue using Per Protocol order mode. If indication present, adjust the RT bronchodilator orders based on the Bronchodilator Assessment Score as indicated below. If a patient is on this medication at home then do not decrease Frequency below that used at home. 0-3 - enter or revise RT bronchodilator order(s) to equivalent RT Bronchodilator order with Frequency of every 4 hours PRN for wheezing or increased work of breathing using Per Protocol order mode. 4-6 - enter or revise RT Bronchodilator order(s) to two equivalent RT bronchodilator orders with one order with BID Frequency and one order with Frequency of every 4 hours PRN wheezing or increased work of breathing using Per Protocol order mode.          7-10 - enter or revise RT Bronchodilator order(s) to two equivalent RT bronchodilator orders with one order with TID Frequency and one order with Frequency of every 4 hours PRN wheezing or increased work of breathing using Per Protocol order mode. 11-13 - enter or revise RT Bronchodilator order(s) to one equivalent RT bronchodilator order with QID Frequency and an Albuterol order with Frequency of every 4 hours PRN wheezing or increased work of breathing using Per Protocol order mode. Greater than 13 - enter or revise RT Bronchodilator order(s) to one equivalent RT bronchodilator order with every 4 hours Frequency and an Albuterol order with Frequency of every 2 hours PRN wheezing or increased work of breathing using Per Protocol order mode. RT to enter RT Home Evaluation for COPD & MDI Assessment order using Per Protocol order mode.     Electronically signed by Osvaldo Che RCP on 2/23/2023 at 9:48 AM

## 2023-02-23 NOTE — PROGRESS NOTES
4 Eyes Admission Assessment     I agree as the admission nurse that 2 RN's have performed a thorough Head to Toe Skin Assessment on the patient. ALL assessment sites listed below have been assessed on admission. Areas assessed by both nurses:     [x]   Head, Face, and Ears   [x]   Shoulders, Back, and Chest  [x]   Arms, Elbows, and Hands   [x]   Coccyx, Sacrum, and Ischium  [x]   Legs, Feet, and Heels        Does the Patient have Skin Breakdown?   No         Osei Prevention initiated:  No   Wound Care Orders initiated:  No      [facility] nurse consulted for Pressure Injury (Stage 3,4, Unstageable, DTI, NWPT, and Complex wounds) or Osei score 18 or lower:  No      Nurse 1 eSignature: Electronically signed by Jacqui Lujan RN on 2/23/23 at 2:18 AM EST    **SHARE this note so that the co-signing nurse is able to place an eSignature**    Nurse 2 eSignature: Electronically signed by Jimmie Bynum RN on 2/23/23 at 2:24 AM EST

## 2023-02-23 NOTE — PROGRESS NOTES
Pt arrived to room 3527_accompanied by RN. Pt able to ambulate with steady gait from stretcher to bed independently. Placed patient on heart monitor. VSS. Assessment complete. No s/s of distress, pt tolerated well. Pt A/O x4. O2 sat 95__% on _RA__. Oriented patient to call light, television controls, and room, verbalized understanding. Education initiated and all questions answered at this time. Non skid footwear placed on feet. Bed in lowest position with wheels locked. Patient instructed to call out for needs, denies needs at this time. Call light within reach. Will cont to monitor.

## 2023-02-23 NOTE — PROGRESS NOTES
Interventional Cardiology:    1 mg Versed. 50 mcg Fentanyl. Start 1205. End 1243. No Stenosis identified. Right coronary normal and dominant. Left main short and normal.  LAD:  streaming with no stenosis. Marilyn Quiver no diagonal disease. LO 3 flow. Left Cx normal with streaming. LVEF 65% LVEDP 8 mm Hg. No gradient no MR.  AL 2 guide used for cine of LAD. EBL < 10 ml.   130 ml Contrast used. TR band applied. Treat for microvascular disease.

## 2023-02-23 NOTE — PLAN OF CARE
Brief Pre-Op Note/Sedation Assessment      Marc Hampton  1955  [id number]  12:56 PM    Planned Procedure: Cardiac Catheterization Procedure  Post Procedure Plan: Return to same level of care  Consent: I have discussed with the patient and/or the patient representative the indication, alternatives, and the possible risks and/or complications of the planned procedure and the anesthesia methods. The patient and/or patient representative appear to understand and agree to proceed. Chief Complaint:   Chest Pain/Pressure      Indications for Cath Procedure:  Presentation:  New Onset Angina <= 2 months  2. Anginal Classification within 2 weeks:  CCS III - Symptoms with everyday living activities, i.e., moderate limitation  3. Angina Symptoms Assessment:  Atypical Chest Pain  4. Heart Failure Class within last 2 weeks:  No symptoms  5. Cardiovascular Instability:  No    Prior Ischemic Workup/Eval:  Pre-Procedural Medications: Yes: STATIN  2. Stress Test Completed? No    Does Patient need surgery? Cath Valve Surgery:  No    Pre-Procedure Medical History:  Vital Signs:  /63   Pulse 85   Temp 98.1 °F (36.7 °C) (Oral)   Resp (!) 33   Ht 5' 2\" (1.575 m)   Wt 158 lb (71.7 kg)   SpO2 99%   BMI 28.90 kg/m²     Allergies: Allergies   Allergen Reactions    Effexor [Venlafaxine Hydrochloride] Hives    Hydrocodone Hives and Itching    Ibuprofen Other (See Comments)     Unknown reaction    Propoxyphene Hives    Aspirin Other (See Comments)     bruising    Keflex [Cephalexin] Itching and Other (See Comments)     Face redness.     Paxil Cr [Paroxetine Hcl Er] Itching     Medications:    Current Facility-Administered Medications   Medication Dose Route Frequency Provider Last Rate Last Admin    amitriptyline (ELAVIL) tablet 25 mg  25 mg Oral Nightly Baron Mary MD        atorvastatin (LIPITOR) tablet 80 mg  80 mg Oral Nightly Baron Mary MD        cetirizine (ZYRTEC) tablet 10 mg  10 mg Oral Daily MD Wanda Baltazar ON 2/28/2023] cloNIDine (CATAPRES) 0.1 MG/24HR 1 patch  1 patch TransDERmal Weekly Baron Mary MD        diclofenac sodium (VOLTAREN) 1 % gel 2 g  2 g Topical 4x Daily Baron Mary MD   2 g at 02/23/23 0900    docusate sodium (COLACE) capsule 100 mg  100 mg Oral BID Baron Mary MD   100 mg at 02/23/23 0158    fluticasone (FLONASE) 50 MCG/ACT nasal spray 2 spray  2 spray Nasal Daily Baron Mary MD   2 spray at 02/23/23 0900    gabapentin (NEURONTIN) capsule 300 mg  300 mg Oral BID Baron Mary MD   300 mg at 02/23/23 0859    lidocaine 4 % external patch 1 patch  1 patch Topical Daily Baron Mary MD        pantoprazole (PROTONIX) tablet 40 mg  40 mg Oral QAM AC Baron Mary MD   40 mg at 02/23/23 5736    ipratropium-albuterol (DUONEB) nebulizer solution 1 ampule  1 ampule Inhalation TID Baron Mary MD   1 ampule at 02/23/23 0926    albuterol (PROVENTIL) nebulizer solution 2.5 mg  2.5 mg Nebulization Q6H PRN Baron Mary MD        budesonide (PULMICORT) nebulizer suspension 500 mcg  0.5 mg Nebulization BID Baron Mary MD   500 mcg at 02/23/23 5864    ranolazine (RANEXA) extended release tablet 500 mg  500 mg Oral BID MD Wanda Turk ON 2/24/2023] clopidogrel (PLAVIX) tablet 75 mg  75 mg Oral Daily Ignacia Bacon MD        heparin (porcine) injection 4,000 Units  4,000 Units IntraVENous PRN Garrick Omer MD        heparin (porcine) injection 2,000 Units  2,000 Units IntraVENous PRN Garrick Omer MD        heparin 25,000 units in dextrose 5% 250 mL (premix) infusion  5-30 Units/kg/hr IntraVENous Continuous Garrick Omer MD 8.5 mL/hr at 02/22/23 1651 12 Units/kg/hr at 02/22/23 1651    nitroGLYCERIN (NITROSTAT) SL tablet 0.4 mg  0.4 mg SubLINGual Q5 Min PRN Garrick Omer MD        sodium chloride flush 0.9 % injection 5-40 mL  5-40 mL IntraVENous 2 times per day Baron Mary MD   10 mL at 02/23/23 0908    sodium chloride flush 0.9 % injection 5-40 mL  5-40 mL IntraVENous PRN Camacho Rollins MD        0.9 % sodium chloride infusion   IntraVENous PRN Camacho Rollins MD        ondansetron (ZOFRAN-ODT) disintegrating tablet 4 mg  4 mg Oral Q8H PRN Camacho Rollins MD        Or    ondansetron (ZOFRAN) injection 4 mg  4 mg IntraVENous Q6H PRN Camacho Rollins MD        acetaminophen (TYLENOL) tablet 650 mg  650 mg Oral Q6H PRN Camacho Rollins MD   650 mg at 02/23/23 0900    Or    acetaminophen (TYLENOL) suppository 650 mg  650 mg Rectal Q6H PRN Camacho Rollins MD        polyethylene glycol (GLYCOLAX) packet 17 g  17 g Oral Daily PRN Camacho Rollins MD        perflutren lipid microspheres (DEFINITY) injection 1.5 mL  1.5 mL IntraVENous ONCE PRN Camacho Rollins MD        glucose chewable tablet 16 g  4 tablet Oral PRN Camacho Rollins MD        dextrose bolus 10% 125 mL  125 mL IntraVENous PRN Camacho Rollins MD        Or    dextrose bolus 10% 250 mL  250 mL IntraVENous PRN Camacho Rollins MD        glucagon injection 1 mg  1 mg SubCUTAneous PRN Camacho Rollins MD        dextrose 10 % infusion   IntraVENous Continuous PRN Camacho Rollins MD        insulin lispro (1 Unit Dial) (HUMALOG/ADMELOG) pen 0-4 Units  0-4 Units SubCUTAneous TID WC Camacho Rollins MD        insulin lispro (1 Unit Dial) (HUMALOG/ADMELOG) pen 0-4 Units  0-4 Units SubCUTAneous Nightly Camacho Rollins MD           Past Medical History:    Past Medical History:   Diagnosis Date    Abnormal involuntary movements(781.0)     Anal fissure     Anemia, unspecified     Anxiety     Chronic back pain     Chronic diarrhea 09/23/2019    Depression     Diffuse cystic mastopathy     Encopresis(307.7)     Esophageal reflux     Foot fracture, left 1989    drop something on foot    Headache(784.0)     Hepatitis, unspecified     Herpes simplex without mention of complication     Hypertension     Hypogammaglobulinaemia, unspecified     Insomnia, unspecified     Lateral epicondylitis of elbow     Migraine, unspecified, without mention of intractable migraine without mention of status migrainosus     Mixed hyperlipidemia 07/30/2010    Osteopenia     Postmenopausal     Pure hypercholesterolemia     Symptomatic menopausal or female climacteric states     Type 2 diabetes mellitus without complication ([facility] [address]     Type II or unspecified type diabetes mellitus without mention of complication, not stated as uncontrolled     Unspecified asthma(493.90)        Surgical History:    Past Surgical History:   Procedure Laterality Date    BREAST BIOPSY      CATARACT REMOVAL Bilateral     DILATION AND CURETTAGE OF UTERUS      x2    ELBOW SURGERY  1/18/11    right    FOOT SURGERY  10/2009,11/2010    right foot    HAND SURGERY  2009    left hand    HYSTERECTOMY (CERVIX STATUS UNKNOWN)  1998    RECTAL SURGERY      SHOULDER ARTHROSCOPY Right 7/07    by Dr. Nii Benitez             Pre-Sedation:  Pre-Sedation Documentation and Exam:  I have personally completed a history, physical exam & review of systems for this patient (see notes). Prior History of Anesthesia Complications:   none    Modified Mallampati:  II (soft palate, uvula, fauces visible)    ASA Classification:  Class 2 - A normal healthy patient with mild systemic disease    Seven Scale: Activity:  2 - Able to move 4 extremities voluntarily on command  Respiration:  2 - Able to breathe deeply and cough freely  Circulation:  1 - BP+/- 20-50mmHg of normal  Consciousness:  2 - Fully awake  Oxygen Saturation (color):  1 - Needs oxygen to maintain oxygen saturation >90%    Sedation/Anesthesia Plan:  Guard the patient's safety and welfare. Minimize physical discomfort and pain. Minimize negative psychological responses to treatment by providing sedation and analgesia and maximize the potential amnesia. Patient to meet pre-procedure discharge plan.     Medication Planned:  midazolam intravenously and fentanyl intravenously    Patient is an appropriate candidate for plan of sedation:   yes      Electronically signed by Tg Horta MD on 2/23/2023 at 12:56 PM

## 2023-02-24 VITALS
BODY MASS INDEX: 29.08 KG/M2 | DIASTOLIC BLOOD PRESSURE: 72 MMHG | HEART RATE: 90 BPM | HEIGHT: 62 IN | WEIGHT: 158 LBS | SYSTOLIC BLOOD PRESSURE: 119 MMHG | OXYGEN SATURATION: 95 % | RESPIRATION RATE: 17 BRPM | TEMPERATURE: 98.1 F

## 2023-02-24 LAB
EKG ATRIAL RATE: 68 BPM
EKG DIAGNOSIS: NORMAL
EKG P AXIS: 29 DEGREES
EKG P-R INTERVAL: 176 MS
EKG Q-T INTERVAL: 400 MS
EKG QRS DURATION: 90 MS
EKG QTC CALCULATION (BAZETT): 425 MS
EKG R AXIS: -5 DEGREES
EKG T AXIS: 4 DEGREES
EKG VENTRICULAR RATE: 68 BPM
GLUCOSE BLD-MCNC: 134 MG/DL (ref 70–99)
GLUCOSE BLD-MCNC: 92 MG/DL (ref 70–99)
HCT VFR BLD CALC: 40.1 % (ref 36–48)
HEMOGLOBIN: 13.4 G/DL (ref 12–16)
INR BLD: 1.4 (ref 0.88–1.12)
INR BLD: 1.6 (ref 0.88–1.12)
MCH RBC QN AUTO: 29.3 PG (ref 26–34)
MCHC RBC AUTO-ENTMCNC: 33.4 G/DL (ref 31–36)
MCV RBC AUTO: 87.8 FL (ref 80–100)
PDW BLD-RTO: 12.9 % (ref 12.4–15.4)
PERFORMED ON: ABNORMAL
PERFORMED ON: NORMAL
PLATELET # BLD: 213 K/UL (ref 135–450)
PLATELET SLIDE REVIEW: ADEQUATE
PMV BLD AUTO: 8.8 FL (ref 5–10.5)
RBC # BLD: 4.56 M/UL (ref 4–5.2)
WBC # BLD: 7.6 K/UL (ref 4–11)

## 2023-02-24 PROCEDURE — 6360000002 HC RX W HCPCS: Performed by: INTERNAL MEDICINE

## 2023-02-24 PROCEDURE — 2580000003 HC RX 258: Performed by: INTERNAL MEDICINE

## 2023-02-24 PROCEDURE — 99231 SBSQ HOSP IP/OBS SF/LOW 25: CPT | Performed by: INTERNAL MEDICINE

## 2023-02-24 PROCEDURE — 6370000000 HC RX 637 (ALT 250 FOR IP): Performed by: INTERNAL MEDICINE

## 2023-02-24 PROCEDURE — 85027 COMPLETE CBC AUTOMATED: CPT

## 2023-02-24 PROCEDURE — 36415 COLL VENOUS BLD VENIPUNCTURE: CPT

## 2023-02-24 PROCEDURE — 93010 ELECTROCARDIOGRAM REPORT: CPT | Performed by: INTERNAL MEDICINE

## 2023-02-24 PROCEDURE — 6370000000 HC RX 637 (ALT 250 FOR IP): Performed by: STUDENT IN AN ORGANIZED HEALTH CARE EDUCATION/TRAINING PROGRAM

## 2023-02-24 PROCEDURE — 93005 ELECTROCARDIOGRAM TRACING: CPT | Performed by: INTERNAL MEDICINE

## 2023-02-24 PROCEDURE — 94761 N-INVAS EAR/PLS OXIMETRY MLT: CPT

## 2023-02-24 PROCEDURE — 94640 AIRWAY INHALATION TREATMENT: CPT

## 2023-02-24 RX ORDER — ATORVASTATIN CALCIUM 80 MG/1
80 TABLET, FILM COATED ORAL NIGHTLY
Qty: 30 TABLET | Refills: 3 | Status: SHIPPED | OUTPATIENT
Start: 2023-02-24 | End: 2023-03-29 | Stop reason: SDUPTHER

## 2023-02-24 RX ORDER — RANOLAZINE 500 MG/1
500 TABLET, EXTENDED RELEASE ORAL 2 TIMES DAILY
Qty: 60 TABLET | Refills: 3 | Status: SHIPPED | OUTPATIENT
Start: 2023-02-24 | End: 2023-03-29 | Stop reason: SDUPTHER

## 2023-02-24 RX ORDER — NITROGLYCERIN 40 MG/1
1 PATCH TRANSDERMAL DAILY
Qty: 30 PATCH | Refills: 3 | Status: SHIPPED | OUTPATIENT
Start: 2023-02-25 | End: 2023-03-29 | Stop reason: SDUPTHER

## 2023-02-24 RX ORDER — NITROGLYCERIN 0.4 MG/1
0.4 TABLET SUBLINGUAL EVERY 5 MIN PRN
Qty: 25 TABLET | Refills: 3 | Status: SHIPPED | OUTPATIENT
Start: 2023-02-24

## 2023-02-24 RX ORDER — CLOPIDOGREL BISULFATE 75 MG/1
75 TABLET ORAL DAILY
Qty: 30 TABLET | Refills: 3 | Status: SHIPPED | OUTPATIENT
Start: 2023-02-24 | End: 2023-03-29 | Stop reason: SDUPTHER

## 2023-02-24 RX ADMIN — Medication 10 ML: at 09:54

## 2023-02-24 RX ADMIN — IPRATROPIUM BROMIDE AND ALBUTEROL SULFATE 1 AMPULE: .5; 3 SOLUTION RESPIRATORY (INHALATION) at 09:55

## 2023-02-24 RX ADMIN — GABAPENTIN 300 MG: 300 CAPSULE ORAL at 09:48

## 2023-02-24 RX ADMIN — BUDESONIDE 500 MCG: 0.5 SUSPENSION RESPIRATORY (INHALATION) at 09:55

## 2023-02-24 RX ADMIN — FLUTICASONE PROPIONATE 2 SPRAY: 50 SPRAY, METERED NASAL at 09:48

## 2023-02-24 RX ADMIN — SODIUM CHLORIDE, PRESERVATIVE FREE 10 ML: 5 INJECTION INTRAVENOUS at 09:54

## 2023-02-24 RX ADMIN — CLOPIDOGREL BISULFATE 75 MG: 75 TABLET ORAL at 09:48

## 2023-02-24 RX ADMIN — RANOLAZINE 500 MG: 500 TABLET, EXTENDED RELEASE ORAL at 09:47

## 2023-02-24 RX ADMIN — PANTOPRAZOLE SODIUM 40 MG: 40 TABLET, DELAYED RELEASE ORAL at 06:51

## 2023-02-24 ASSESSMENT — PAIN SCALES - WONG BAKER
WONGBAKER_NUMERICALRESPONSE: 0

## 2023-02-24 NOTE — PROGRESS NOTES
MD Casey Luciano called to check on patient. EKG ordered for 8am. Heparin gtt stopped before cath. Ok to d/c anti-xa levels.

## 2023-02-24 NOTE — PROGRESS NOTES
[facility] Daily Progress Note      Admit Date:  2/22/2023    CC: Chest pain x 4 days    Subjective:  Ms. Whit Burton feels a lot better today, denies chest pain, palpitations or SOB. HA improved    Objective:   /73   Pulse 70   Temp 97.7 °F (36.5 °C) (Oral)   Resp 16   Ht 5' 2\" (1.575 m)   Wt 158 lb (71.7 kg)   SpO2 96%   BMI 28.90 kg/m²     Intake/Output Summary (Last 24 hours) at 2/24/2023 1104  Last data filed at 2/23/2023 1819  Gross per 24 hour   Intake --   Output 500 ml   Net -500 ml       Physical Exam:  General:  Awake, alert, NAD  Eyes:  EOMI   Skin:  Warm and dry. Neck:  JVP normal  Chest:  CTAB  No Rales. Cardiovascular:  RRR, Normal S1S2. No Murmur. No Rub. No Gallops. Abdomen:  Soft NT  Extremities:  No edema  Neuro:   No focal deficits. Psych:  Normal thought and affect. MSK:  No Cyanosis nor Clubbing.   Symmetrical strength upper and lower extremities    Medications:    amitriptyline  25 mg Oral Nightly    atorvastatin  80 mg Oral Nightly    cetirizine  10 mg Oral Daily    [START ON 2/28/2023] cloNIDine  1 patch TransDERmal Weekly    diclofenac sodium  2 g Topical 4x Daily    docusate sodium  100 mg Oral BID    fluticasone  2 spray Nasal Daily    gabapentin  300 mg Oral BID    lidocaine  1 patch Topical Daily    pantoprazole  40 mg Oral QAM AC    budesonide  0.5 mg Nebulization BID    iohexol  200 mL IntraVENous Once    sodium chloride flush  5-40 mL IntraVENous 2 times per day    ranolazine  500 mg Oral BID    clopidogrel  75 mg Oral Daily    nitroGLYCERIN  1 patch TransDERmal Daily    ipratropium-albuterol  1 ampule Inhalation BID    sodium chloride flush  5-40 mL IntraVENous 2 times per day    insulin lispro  0-4 Units SubCUTAneous TID WC    insulin lispro  0-4 Units SubCUTAneous Nightly      sodium chloride      sodium chloride      dextrose       albuterol, sodium chloride flush, sodium chloride, atropine, nitroGLYCERIN, sodium chloride flush, sodium chloride, ondansetron **OR** ondansetron, acetaminophen **OR** acetaminophen, polyethylene glycol, perflutren lipid microspheres, glucose, dextrose bolus **OR** dextrose bolus, glucagon (rDNA), dextrose    Lab Data:  CBC:   Recent Labs     02/22/23  1449 02/23/23  0506 02/24/23  0445   WBC 10.7 9.8 7.6   HGB 14.8 14.1 13.4   HCT 44.8 43.1 40.1   MCV 86.1 87.5 87.8    221 213     BMP:   Recent Labs     02/22/23  1154 02/22/23  1449 02/23/23  0505    138 140   K 4.5 4.2 3.9    100 100   CO2 26 25 26   BUN 17 18 18   CREATININE 0.7 0.7 0.7     LIVER PROFILE: No results for input(s): AST, ALT, LIPASE, BILIDIR, BILITOT, ALKPHOS in the last 72 hours. Invalid input(s): AMYLASE,  ALB  PT/INR:   Recent Labs     02/23/23  1050 02/23/23  1054   INR 1.60* 1.40*     APTT: No results for input(s): APTT in the last 72 hours. BNP:  No results for input(s): BNP in the last 72 hours. IMAGING:    ECHO 2/23/23  Summary    Normal left ventricle size, wall thickness, and systolic function with an    estimated ejection fraction of 55-60%. No regional wall motion abnormalities are seen. Diastolic filling parameters suggest grade I diastolic dysfunction. There is a trivial pericardial effusion noted.          Assessment:  Patient Active Problem List    Diagnosis Date Noted    NSTEMI (non-ST elevated myocardial infarction) ([facility] [address] 02/23/2023    Unstable angina ([facility] [address] 02/22/2023    Frequent headaches 01/30/2023    Right flank pain 01/23/2023    Cyst of right kidney 01/23/2023    Nasal bleeding 12/12/2022    Bruising 12/12/2022    Burning sensation of eye 11/09/2022    Blurry vision, left eye 11/09/2022    Feeling weak 11/06/2022    Nonintractable headache 11/06/2022    Burning pain 11/06/2022    Exposure to carbon monoxide 11/06/2022    Low back pain 11/06/2022    Urinary tract infection without hematuria 10/31/2022    Constipation 10/31/2022    Skin lesion of right arm 09/28/2022    Right groin pain 09/28/2022    Right hip pain 09/28/2022    Pain in toes of both feet 09/14/2022    Allergic rhinitis 09/14/2022    Conjunctivitis of left eye 06/29/2022    Dry cough 06/29/2022    Swelling of eyelid, left 06/29/2022    COVID-19 05/30/2022    Upper respiratory tract infection 05/30/2022    Right ear pain 05/30/2022    Chronic pain of right lower extremity 03/22/2022    Type 2 diabetes mellitus without complication, without long-term current use of insulin ([facility] [address] 03/22/2022    Cold sore 12/31/2021    Essential hypertension 11/29/2021    Chronic diarrhea 09/23/2019    Intractable chronic migraine without aura 08/30/2017    Chronic daily headache 08/28/2017    Status migrainosus 08/28/2017    Cervico-occipital neuralgia 08/28/2017    Cervicogenic headache 08/28/2017    Medication side effect 08/28/2017    Chronic insomnia 08/28/2017    Cervical spinal stenosis 08/28/2017    Osteoarthritis of spine with radiculopathy, cervical region 08/28/2017    Vitamin D deficiency 08/28/2017    Arthritis of right acromioclavicular joint 06/06/2017    Tendinitis of right rotator cuff 06/06/2017    Cervical stenosis of spine 06/06/2017    Moderate episode of recurrent major depressive disorder ([facility] [address] 12/21/2016    Acute cystitis without hematuria 07/18/2016    Weight loss, abnormal 07/18/2016    Right-sided chest wall pain 06/08/2016    Dysuria 06/08/2016    Throat dryness 05/03/2016    Arthritis of left acromioclavicular joint 08/17/2015    Bursitis of right shoulder 08/17/2015    History of arthroscopy of right shoulder 2008 07/27/2015    Removal of ganglion cyst in left wrist 07/27/2015    History of elbow surgery Right 07/27/2015    Adhesive capsulitis of left shoulder 07/27/2015    Tendinitis of left rotator cuff 07/27/2015    Left rotator cuff tear 07/27/2015    Pain, neck 07/27/2015    Cervical stenosis of spinal canal 07/27/2015    Major depression ([facility] [address] 08/29/2013    Neuropathy 04/03/2013    Lateral epicondylitis 08/18/2010    Arthralgia 08/18/2010 Type 2 diabetes mellitus with diabetic polyneuropathy, without long-term current use of insulin (HCC) 08/18/2010    Hepatitis 08/18/2010    Leg pain 08/18/2010    GERD (gastroesophageal reflux disease) 08/18/2010    Hand pain 08/18/2010    B12 deficiency anemia 08/18/2010    Hemorrhoids, internal 08/18/2010    Severe headache 08/18/2010    Encopresis 08/18/2010    Tremor 08/18/2010    Hot flashes 08/18/2010    Fibrocystic disease of breast 08/18/2010    Insomnia 08/18/2010    Shoulder pain 08/18/2010    Malaise and fatigue 08/18/2010    Migraine 08/18/2010    Neck pain 08/18/2010    Chronic back pain 08/18/2010    Mixed hyperlipidemia 07/30/2010    Asthma 07/30/2010    Osteoporosis 07/30/2010       Plan:    Chest Pain likely due to microvascular angina  4 days intermittent sqeezing chest pain and dyspnea, worse with exertion   EKG with TWI in V2, troponin <0.01 x2, given 324mg ASA  LHC on 2/23 showed patent coronary arteries  Treat for microvascular angina, with plavix, ranolazine, nitro patch   Should follow up with Dr. Alison Azul in clinic  Essential HTN  Controlled on clonidine patch  DM2  Asthma  Depression     Core Measures:  Discharge instructions:   LVEF documented:   ACEI for LV dysfunction:   Smoking Cessation:    Discussed with attending Dr. Pushpa Yang MD, PGY-2 2/24/2023 11:04 AM      Staff Note      Patient seen and evaluated with the medical resident. I was physically present during the critical portions of the service when performed by the resident including the assessment and management of the patient. BB contraindicated due to slower HR. Stable for discharge. I agree with the findings and plans as described.

## 2023-02-24 NOTE — DISCHARGE SUMMARY
Hospital Medicine Discharge Summary    Patient ID: Johanne Randall      Patient's PCP: Binu Vásquez MD    Admit Date: 2/22/2023     Discharge Date:   2/24/2023    Admitting Provider: Maya Goodson MD     Discharge Provider: Oralia Ames MD     Discharge Diagnoses: Active Hospital Problems    Diagnosis     NSTEMI (non-ST elevated myocardial infarction) ([facility] [address] [I21.4]      Priority: Medium    Unstable angina ([facility] [address] [I20.0]      Priority: Medium       The patient was seen and examined on day of discharge and this discharge summary is in conjunction with any daily progress note from day of discharge. Hospital Course: This is a very pleasant 78 y.o. female with a history of multiple medical problems presenting with HTN, HLD, DM type 2, asthma, depression and anxiety who presented to the ER with chest pain. She has had chest pain over the past 4 days intermittently, , squeezing pain in middle of chest worse on activity, like when walking relieved by rest and associated with dyspnea. No cough. No fever or chills. No nausea or vomiting. No recent ischemia eval . Appears had a normal Normal myocardial perfusion scan in 2016. In the ED, she had normal vitals and labs. Troponin was < 0.01. EKG showed EKG: NSR with TWI V2. She was started on heparin and Nitropaste. She is allergic to aspirin with bruises happened after 1 week of taking it. Evaluated by cardioology and had LHC on 2/23/2023 with the following results:  No Stenosis identified. Right coronary normal and dominant. Left main short and normal.  LAD:  streaming with no stenosis. Les Pimple no diagonal disease. LO 3 flow. Left Cx normal with streaming. LVEF 65% LVEDP 8 mm Hg. No gradient no MR. Started on plavix, ranexa, high dose atorvastatin and PRN nitro patch which she was discharged on to treat for microvascular angina .     Physical Exam Performed:     /73   Pulse 70   Temp 97.7 °F (36.5 °C) (Oral)   Resp 16 Ht 5' 2\" (1.575 m)   Wt 158 lb (71.7 kg)   SpO2 96%   BMI 28.90 kg/m²       General appearance:  No apparent distress, appears stated age and cooperative. HEENT:  Normal cephalic, atraumatic without obvious deformity. Pupils equal, round, and reactive to light. Extra ocular muscles intact. Conjunctivae/corneas clear. Neck: Supple, with full range of motion. No jugular venous distention. Trachea midline. Respiratory:  Normal respiratory effort. Clear to auscultation, bilaterally without Rales/Wheezes/Rhonchi. Cardiovascular:  Regular rate and rhythm with normal S1/S2 without murmurs, rubs or gallops. Abdomen: Soft, non-tender, non-distended with normal bowel sounds. Musculoskeletal:  No clubbing, cyanosis or edema bilaterally. Full range of motion without deformity. Skin: Skin color, texture, turgor normal.  No rashes or lesions. Neurologic:  Neurovascularly intact without any focal sensory/motor deficits. Cranial nerves: II-XII intact, grossly non-focal.  Psychiatric:  Alert and oriented, thought content appropriate, normal insight      Labs:  For convenience and continuity at follow-up the following most recent labs are provided:      CBC:    Lab Results   Component Value Date/Time    WBC 7.6 02/24/2023 04:45 AM    HGB 13.4 02/24/2023 04:45 AM    HCT 40.1 02/24/2023 04:45 AM     02/24/2023 04:45 AM       Renal:    Lab Results   Component Value Date/Time     02/23/2023 05:05 AM    K 3.9 02/23/2023 05:05 AM     02/23/2023 05:05 AM    CO2 26 02/23/2023 05:05 AM    BUN 18 02/23/2023 05:05 AM    CREATININE 0.7 02/23/2023 05:05 AM    CALCIUM 9.3 02/23/2023 05:05 AM    PHOS 2.7 02/22/2012 03:50 PM         Significant Diagnostic Studies    Radiology:   XR CHEST (2 VW)   Final Result      No acute cardiopulmonary disease             Consults:     IP CONSULT TO HOSPITALIST  IP CONSULT TO CARDIOLOGY    Disposition:  Home     Condition at Discharge: Stable    Discharge Instructions/Follow-up: PCP and cardiology    Code Status:  Full Code     Activity: activity as tolerated    Diet: cardiac diet      Discharge Medications:     Current Discharge Medication List             Details   ranolazine (RANEXA) 500 MG extended release tablet Take 1 tablet by mouth 2 times daily  Qty: 60 tablet, Refills: 3      nitroGLYCERIN (NITROSTAT) 0.4 MG SL tablet Place 1 tablet under the tongue every 5 minutes as needed for Chest pain up to max of 3 total doses. If no relief after 1 dose, call 911. Qty: 25 tablet, Refills: 3      clopidogrel (PLAVIX) 75 MG tablet Take 1 tablet by mouth daily  Qty: 30 tablet, Refills: 3      nitroGLYCERIN (NITRODUR) 0.2 MG/HR Place 1 patch onto the skin daily  Qty: 30 patch, Refills: 3                Details   atorvastatin (LIPITOR) 80 MG tablet Take 1 tablet by mouth nightly  Qty: 30 tablet, Refills: 3                Details   cloNIDine (CATAPRES) 0.1 MG tablet Take 0.1 mg by mouth 2 times daily      cloNIDine (CATAPRES) 0.1 MG/24HR PTWK APPLY 1 PATCH ONTO THE SKINONCE A WEEK  Qty: 12 patch, Refills: 1    Associated Diagnoses: Essential hypertension      budesonide-formoterol (SYMBICORT) 160-4.5 MCG/ACT AERO USE 2 INHALATIONS ORALLY   TWICE DAILY  Qty: 30.6 g, Refills: 1      Dulaglutide (TRULICITY) 2.36 RM/5.7HS SOPN INJECT 0.5ML (=0.75 MG)    SUBCUTANEOUSLY ONCE WEEKLY  Qty: 6 mL, Refills: 1    Associated Diagnoses: Type 2 diabetes mellitus with diabetic polyneuropathy, without long-term current use of insulin ([facility])      albuterol sulfate HFA (PROVENTIL;VENTOLIN;PROAIR) 108 (90 Base) MCG/ACT inhaler Inhale 2 puffs into the lungs every 6 hours as needed for Wheezing      gabapentin (NEURONTIN) 300 MG capsule Take 300 mg by mouth in the morning and at bedtime.       omeprazole (PRILOSEC) 40 MG delayed release capsule Take 40 mg by mouth daily      pioglitazone (ACTOS) 30 MG tablet Take 30 mg by mouth daily      cetirizine (ZYRTEC) 10 MG tablet Take 1 tablet by mouth daily  Qty: 30 tablet, Refills: 2    Associated Diagnoses: Allergic rhinitis, unspecified seasonality, unspecified trigger      ondansetron (ZOFRAN-ODT) 4 MG disintegrating tablet Take 1 tablet by mouth 3 times daily as needed for Nausea or Vomiting  Qty: 21 tablet, Refills: 0    Associated Diagnoses: Nausea      lidocaine (LIDODERM) 5 % Place 1 patch onto the skin daily 12 hours on, 12 hours off. Qty: 30 patch, Refills: 0    Associated Diagnoses: Low back pain with bilateral sciatica, unspecified back pain laterality, unspecified chronicity      acetaminophen (TYLENOL) 500 MG tablet Take 1 tablet by mouth 4 times daily as needed for Pain  Qty: 120 tablet, Refills: 0    Associated Diagnoses: Low back pain with bilateral sciatica, unspecified back pain laterality, unspecified chronicity;  Nonintractable headache, unspecified chronicity pattern, unspecified headache type      docusate sodium (COLACE) 100 MG capsule Take 1 capsule by mouth 2 times daily  Qty: 60 capsule, Refills: 1    Associated Diagnoses: Constipation, unspecified constipation type      bisacodyl 5 MG EC tablet Take 1-2 tablets daily as needed  Qty: 30 tablet, Refills: 0    Associated Diagnoses: Constipation, unspecified constipation type      ketoconazole (NIZORAL) 2 % cream APPLY topically TO bottom of BOTH FEET AND between TOES DAILY FOR 3 TO 4 WEEKS      diclofenac sodium (VOLTAREN) 1 % GEL Apply 2 g topically 4 times daily  Qty: 350 g, Refills: 1      fluticasone (FLONASE) 50 MCG/ACT nasal spray 2 sprays by Nasal route daily  Qty: 48 g, Refills: 1    Associated Diagnoses: COVID-19      Lancets MISC 1 each by Other route daily E11.9  Qty: 100 each, Refills: 0      Blood Glucose Monitoring Suppl (TRUE METRIX METER) DMITRIY Test blood sugar daily and PRN E11.9  Qty: 1 each, Refills: 0      blood glucose test strips (GLUCOSE METER TEST) strip 100 each by In Vitro route 2 times daily Diabetes   e11.9  Qty: 100 each, Refills: 1             Time Spent on discharge is more than 45 minutes in the examination, evaluation, counseling and review of medications and discharge plan. Signed:    Crystal Jefferson MD   2/24/2023      Thank you Van Austin MD for the opportunity to be involved in this patient's care. If you have any questions or concerns, please feel free to contact me at [phone].

## 2023-02-24 NOTE — PLAN OF CARE
Problem: Pain  Goal: Verbalizes/displays adequate comfort level or baseline comfort level  Outcome: Progressing  Note: Pt with complaints of headache early in shift. PRN medication given, pt satisfied.

## 2023-02-24 NOTE — PLAN OF CARE
Problem: Pain  Goal: Verbalizes/displays adequate comfort level or baseline comfort level  2/24/2023 1320 by Lamar Crawley RN  Outcome: Completed  2/24/2023 0313 by Zakiya Liz RN  Outcome: Progressing  Note: Pt with complaints of headache early in shift. PRN medication given, pt satisfied.

## 2023-02-24 NOTE — PROGRESS NOTES
Physician Progress Note      PATIENT:               Adrianna Mendez  CSN #:                  [id number]  :                       1955  ADMIT DATE:       2023 4:23 PM  [address] [place] DATE:        2023 2:21 PM  RESPONDING  PROVIDER #:        Kajal Frias MD          QUERY TEXT:    Patient admitted with chest pain. Noted documentation of \"Chest Pain likely   due to microvascular angina\" by MD Tashi Flowers on 23 and \"NSTEMI (non-ST   elevated myocardial infarction)\" by MD Gasper Spurling on 23. If possible, please   document in progress notes and discharge summary if you are evaluating and   /or treating any of the following: The medical record reflects the following:  Risk Factors: CAD  Clinical Indicators: per Cardiology \"1. Chest Pain likely due to microvascular   angina4 days intermittent squeezing chest pain and  dyspnea, worse with exertion EKG with TWI in V2, troponin <0.01 x2, given   324mg ASA, LHC on  showed patent coronary  arteries\"  Treatment: Serial troponins and EKG, Cardiology consult, LHC, Plavix,   ranolazine, nitro patch  Options provided:  -- microvascular angina confirmed and NSTEMI ruled out  -- NSTEMI confirmed and microvascular angina ruled out  -- Other - I will add my own diagnosis  -- Disagree - Not applicable / Not valid  -- Disagree - Clinically unable to determine / Unknown  -- Refer to Clinical Documentation Reviewer    PROVIDER RESPONSE TEXT:    After study, microvascular angina confirmed and NSTEMI ruled out.     Query created by: Silvano Rodriguez on 2023 1:51 PM      Electronically signed by:  Kajal Frias MD 2023 6:10 PM

## 2023-02-24 NOTE — RT PROTOCOL NOTE
RT Nebulizer Bronchodilator Protocol Note    There is a bronchodilator order in the chart from a provider indicating to follow the RT Bronchodilator Protocol and there is an Initiate RT Bronchodilator Protocol order as well (see protocol at bottom of note). CXR Findings:  No results found. The findings from the last RT Protocol Assessment were as follows:  Smoking: None or smoker <15 pack years  Respiratory Pattern: Regular pattern and RR 12-20 bpm  Breath Sounds: Clear breath sounds  Cough: Strong, spontaneous, non-productive  Indication for Bronchodilator Therapy:    Bronchodilator Assessment Score: 0    Aerosolized bronchodilator medication orders have been revised according to the RT Nebulizer Bronchodilator Protocol below. Respiratory Therapist to perform RT Therapy Protocol Assessment initially then follow the protocol. Repeat RT Therapy Protocol Assessment PRN for score 0-3 or on second treatment, BID, and PRN for scores above 3. No Indications - adjust the frequency to every 6 hours PRN wheezing or bronchospasm, if no treatments needed after 48 hours then discontinue using Per Protocol order mode. If indication present, adjust the RT bronchodilator orders based on the Bronchodilator Assessment Score as indicated below. If a patient is on this medication at home then do not decrease Frequency below that used at home. 0-3 - enter or revise RT bronchodilator order(s) to equivalent RT Bronchodilator order with Frequency of every 4 hours PRN for wheezing or increased work of breathing using Per Protocol order mode. 4-6 - enter or revise RT Bronchodilator order(s) to two equivalent RT bronchodilator orders with one order with BID Frequency and one order with Frequency of every 4 hours PRN wheezing or increased work of breathing using Per Protocol order mode.          7-10 - enter or revise RT Bronchodilator order(s) to two equivalent RT bronchodilator orders with one order with TID Frequency and one order with Frequency of every 4 hours PRN wheezing or increased work of breathing using Per Protocol order mode. 11-13 - enter or revise RT Bronchodilator order(s) to one equivalent RT bronchodilator order with QID Frequency and an Albuterol order with Frequency of every 4 hours PRN wheezing or increased work of breathing using Per Protocol order mode. Greater than 13 - enter or revise RT Bronchodilator order(s) to one equivalent RT bronchodilator order with every 4 hours Frequency and an Albuterol order with Frequency of every 2 hours PRN wheezing or increased work of breathing using Per Protocol order mode. RT to enter RT Home Evaluation for COPD & MDI Assessment order using Per Protocol order mode.     Electronically signed by Elizabeth Vazquez RCP on 2/24/2023 at 10:01 AM

## 2023-02-24 NOTE — PROGRESS NOTES
Pt was brought back to floor around 1300. Pt remained in bed for the next 5 hours per protocol. Per orders started taking 2 mls of air out of TR band q15 mins starting at 1415. Pt tolerated well. Had cath lab come check on pt around 1500. Pt had vitals taken per protocol. Vitals were stable. Pts site clean and intact. Place dressing on site. Pt resting in room. Spoke to Richie Schumacher NP. Was given the verbal okay to give nitro patch and remove old nitro patch. Got verbal okay from pharmacy to place nitro patch with previous clonidine patch.

## 2023-02-24 NOTE — PROGRESS NOTES
Pt discharge home. Pt was discharged by self. MD aware that pt was driving self home. Pt was ortho neg on shift. Pt took all belongings with self. Went over discharge instructions with pt. Pt had no questions or concerns. Removed PIV. Intact no complications.

## 2023-02-27 ENCOUNTER — CARE COORDINATION (OUTPATIENT)
Dept: CASE MANAGEMENT | Age: 68
End: 2023-02-27

## 2023-02-27 ENCOUNTER — TELEPHONE (OUTPATIENT)
Dept: CARDIOLOGY CLINIC | Age: 68
End: 2023-02-27

## 2023-02-27 DIAGNOSIS — I20.0 UNSTABLE ANGINA (HCC): ICD-10-CM

## 2023-02-27 DIAGNOSIS — I21.4 NSTEMI (NON-ST ELEVATED MYOCARDIAL INFARCTION) (HCC): Primary | ICD-10-CM

## 2023-02-27 NOTE — TELEPHONE ENCOUNTER
Patient called and stated she received a new medication dosage from the hospital.   atorvastatin (LIPITOR) 80 MG tablet [[id number]]     Order Details  Dose: 80 mg Route: Oral Frequency: NIGHTLY   Dispense Quantity: 30 tablet Refills: 3          Sig: Take 1 tablet by mouth nightly     She's been taking this medication for 2 years and her cholesterol has been good. Why did the hospital prescribe her [de-identified] MG when she was previously on 20 MG? Patient has an appt 3/29/23 for a 1 month follow up from her ER visit last week. Patient wants a call back [phone].

## 2023-02-27 NOTE — CARE COORDINATION
[facility] Care Transitions Initial Follow Up Call    Call within 2 business days of discharge: Yes    Patient Current Location:  Home: [address] [place] [address]    LPN Care Coordinator contacted the patient by telephone to perform post hospital discharge assessment. Verified name and  with patient as identifiers. Provided introduction to self, and explanation of the LPN Care Coordinator role. Patient: Galo Fuentes Patient : 1955   MRN: [id number]  Reason for Admission: NSTEMI   Discharge Date: 23 RARS: Readmission Risk Score: 8.7      Last Discharge  Street       Date Complaint Diagnosis Description Type Department Provider    23 Chest Pain NSTEMI (non-ST elevated myocardial infarction) [facility]) ED to Hosp-Admission (Discharged) (ADMITTED) [facility] Alessandra Lo MD; Michael Huston. .. Was this an external facility discharge? No Discharge Facility: [facility] to be reviewed by the provider   Additional needs identified to be addressed with provider: No  none               Method of communication with provider: none. Patient reports she is feeling better. She denies cp,palpitations,sob, fever, chills, cough, weakness, nausea, vomiting, swelling or abdominal pain. She had a headache earlier. Tylenol relieved it. No home care. Patient uses a cane. Appetite and fluid intake is good. No urinary or bowel problems. Medication review and 1111f completed. Has f/u with PCP scheduled. No questions or needs. Patient agreeable to future calls. LPN Care Coordinator reviewed discharge instructions with patient who verbalized understanding. The patient was given an opportunity to ask questions and does not have any further questions or concerns at this time. Were discharge instructions available to patient? Yes. Reviewed appropriate site of care based on symptoms and resources available to patient including: PCP  Specialist  Home health.  The patient agrees to contact the PCP office for questions related to their healthcare. Advance Care Planning:   Does patient have an Advance Directive:  working on it . Medication reconciliation was performed with patient, who verbalizes understanding of administration of home medications. Medications reviewed, 1111F entered: yes    Was patient discharged with a pulse oximeter? yes, discussed and confirmed pulse oximeter discharge instructions and when to notify provider or seek emergency care. Non-face-to-face services provided:  Obtained and reviewed discharge summary and/or continuity of care documents    Offered patient enrollment in the Remote Patient Monitoring (RPM) program for in-home monitoring: NA.    Care Transitions 24 Hour Call    Do you have a copy of your discharge instructions?: Yes  Do you have all of your prescriptions and are they filled?: Yes  Have you been contacted by a [address] [place] [address] Avenue?: Yes  Have you scheduled your follow up appointment?: Yes  How are you going to get to your appointment?: Car - drive self  Do you feel like you have everything you need to keep you well at home?: Yes  Care Transitions Interventions         Discussed follow-up appointments. If no appointment was previously scheduled, appointment scheduling offered: Yes. Is follow up appointment scheduled within 7 days of discharge? No.    Follow Up  Future Appointments   Date Time Provider Galindo [place]   3/8/2023 10:45 AM MD CONY Lackey - [facility]   3/9/2023  1:10 PM Cindi Armstrong MD [facility] [address]   3/10/2023  1:30 PM [place] [facility]   3/16/2023 10:30 AM Kali Ruiz MD [facility]   3/29/2023  2:30 PM MD CONY Estevez CARD[facility]   4/11/2023 11:30 AM MD CONY Lackey [facility] [place] [facility] [place]       N Care Coordinator provided contact information. Plan for follow-up call in 5-7 days based on severity of symptoms and risk factors.   Plan for next call: self management-   follow-up appointment-     Monty Santos LPN

## 2023-03-01 ENCOUNTER — OFFICE VISIT (OUTPATIENT)
Dept: PRIMARY CARE CLINIC | Age: 68
End: 2023-03-01
Payer: MEDICARE

## 2023-03-01 VITALS
DIASTOLIC BLOOD PRESSURE: 70 MMHG | BODY MASS INDEX: 28.49 KG/M2 | OXYGEN SATURATION: 97 % | HEIGHT: 62 IN | HEART RATE: 91 BPM | TEMPERATURE: 97.2 F | SYSTOLIC BLOOD PRESSURE: 110 MMHG | WEIGHT: 154.8 LBS

## 2023-03-01 DIAGNOSIS — R51.9 NONINTRACTABLE HEADACHE, UNSPECIFIED CHRONICITY PATTERN, UNSPECIFIED HEADACHE TYPE: ICD-10-CM

## 2023-03-01 DIAGNOSIS — R31.9 HEMATURIA, UNSPECIFIED TYPE: Primary | ICD-10-CM

## 2023-03-01 DIAGNOSIS — R31.9 HEMATURIA, UNSPECIFIED TYPE: ICD-10-CM

## 2023-03-01 LAB
BACTERIA: NORMAL /HPF
BILIRUBIN URINE: NEGATIVE
BLOOD, URINE: NEGATIVE
CLARITY: CLEAR
COLOR: ABNORMAL
EPITHELIAL CELLS, UA: 1 /HPF (ref 0–5)
GLUCOSE URINE: NEGATIVE MG/DL
HCT VFR BLD CALC: 42.7 % (ref 36–48)
HEMOGLOBIN: 14 G/DL (ref 12–16)
HYALINE CASTS: 0 /LPF (ref 0–8)
KETONES, URINE: NEGATIVE MG/DL
LEUKOCYTE ESTERASE, URINE: ABNORMAL
MCH RBC QN AUTO: 28.6 PG (ref 26–34)
MCHC RBC AUTO-ENTMCNC: 32.8 G/DL (ref 31–36)
MCV RBC AUTO: 87.2 FL (ref 80–100)
MICROSCOPIC EXAMINATION: YES
NITRITE, URINE: NEGATIVE
PDW BLD-RTO: 12.9 % (ref 12.4–15.4)
PH UA: 6 (ref 5–8)
PLATELET # BLD: 234 K/UL (ref 135–450)
PMV BLD AUTO: 9.3 FL (ref 5–10.5)
PROTEIN UA: NEGATIVE MG/DL
RBC # BLD: 4.9 M/UL (ref 4–5.2)
RBC UA: 1 /HPF (ref 0–4)
SPECIFIC GRAVITY UA: 1.01 (ref 1–1.03)
URINE TYPE: ABNORMAL
UROBILINOGEN, URINE: 0.2 E.U./DL
WBC # BLD: 8.7 K/UL (ref 4–11)
WBC UA: 1 /HPF (ref 0–5)

## 2023-03-01 PROCEDURE — 3074F SYST BP LT 130 MM HG: CPT | Performed by: INTERNAL MEDICINE

## 2023-03-01 PROCEDURE — 1036F TOBACCO NON-USER: CPT | Performed by: INTERNAL MEDICINE

## 2023-03-01 PROCEDURE — 1111F DSCHRG MED/CURRENT MED MERGE: CPT | Performed by: INTERNAL MEDICINE

## 2023-03-01 PROCEDURE — 3078F DIAST BP <80 MM HG: CPT | Performed by: INTERNAL MEDICINE

## 2023-03-01 PROCEDURE — 99213 OFFICE O/P EST LOW 20 MIN: CPT | Performed by: INTERNAL MEDICINE

## 2023-03-01 PROCEDURE — 1090F PRES/ABSN URINE INCON ASSESS: CPT | Performed by: INTERNAL MEDICINE

## 2023-03-01 PROCEDURE — G8417 CALC BMI ABV UP PARAM F/U: HCPCS | Performed by: INTERNAL MEDICINE

## 2023-03-01 PROCEDURE — 1124F ACP DISCUSS-NO DSCNMKR DOCD: CPT | Performed by: INTERNAL MEDICINE

## 2023-03-01 PROCEDURE — 3017F COLORECTAL CA SCREEN DOC REV: CPT | Performed by: INTERNAL MEDICINE

## 2023-03-01 PROCEDURE — G8399 PT W/DXA RESULTS DOCUMENT: HCPCS | Performed by: INTERNAL MEDICINE

## 2023-03-01 PROCEDURE — G8427 DOCREV CUR MEDS BY ELIG CLIN: HCPCS | Performed by: INTERNAL MEDICINE

## 2023-03-01 PROCEDURE — G8484 FLU IMMUNIZE NO ADMIN: HCPCS | Performed by: INTERNAL MEDICINE

## 2023-03-01 NOTE — PROGRESS NOTES
Date of Visit: 3/1/2023    Garvin Habermann (:  1955) is a 78 y.o. female,  Established patient here for evaluation of the following chief complaint(s):  Hematuria (Onset was yesterday patient noticed blood in her urine /Patient denies any other symptoms) and Headache      ASSESSMENT/PLAN:    1. Hematuria, unspecified type  - Urinalysis; Future  - Culture, Urine; Future  - CBC; Future    2. Nonintractable headache, unspecified chronicity pattern, unspecified headache type-  -MRI of the brain done yesterday shows nonspecific scattered white matter  -Continue naratriptan 2.5 mg as needed  -Tylenol 650mg 3 times daily as needed  -Continue care per Neurology      Return if symptoms worsen or fail to improve. SUBJECTIVE:    Patient complains of blood in her urine since yesterday afternoon. Blood in urine 4 times since afternoon afternoon. Patient denies dysuria, urgency, frequency, and abdominal pain. Patient complains of terrible headache since this morning. Patient states she had a MRI of brain yesterday. Patient is seeing Neurology for headaches. Patient has Naratriptan. Patient states it helps but was told not to take more than 4 tablet per week. Headache pressure and burning front and back of head. Patient has light sensitivity. Patient states she has not taken anything for her headache this week. ---        Review of Systems   Constitutional:  Negative for chills and fever. HENT:  Negative for congestion, ear pain, postnasal drip, rhinorrhea, sinus pressure, sinus pain, sneezing and sore throat. Eyes:  Negative for photophobia and visual disturbance. Respiratory:  Negative for cough, chest tightness, shortness of breath and wheezing. Gastrointestinal:  Negative for abdominal pain, nausea and vomiting. Genitourinary:  Positive for hematuria. Negative for decreased urine volume, difficulty urinating, dysuria, flank pain, frequency and urgency.    Musculoskeletal:  Negative for back pain.   Neurological:  Positive for headaches. Negative for dizziness, syncope, facial asymmetry and light-headedness. Allergies   Allergen Reactions    Effexor [Venlafaxine Hydrochloride] Hives    Hydrocodone Hives and Itching    Ibuprofen Other (See Comments)     Unknown reaction    Propoxyphene Hives    Aspirin Other (See Comments)     bruising    Keflex [Cephalexin] Itching and Other (See Comments)     Face redness. Paxil Cr [Paroxetine Hcl Er] Itching       Outpatient Medications Marked as Taking for the 3/1/23 encounter (Office Visit) with Komal Mccray MD   Medication Sig Dispense Refill    ranolazine (RANEXA) 500 MG extended release tablet Take 1 tablet by mouth 2 times daily 60 tablet 3    nitroGLYCERIN (NITROSTAT) 0.4 MG SL tablet Place 1 tablet under the tongue every 5 minutes as needed for Chest pain up to max of 3 total doses.  If no relief after 1 dose, call 911. 25 tablet 3    atorvastatin (LIPITOR) 80 MG tablet Take 1 tablet by mouth nightly 30 tablet 3    clopidogrel (PLAVIX) 75 MG tablet Take 1 tablet by mouth daily 30 tablet 3    nitroGLYCERIN (NITRODUR) 0.2 MG/HR Place 1 patch onto the skin daily 30 patch 3    [DISCONTINUED] cloNIDine (CATAPRES) 0.1 MG tablet Take 0.1 mg by mouth 2 times daily      cloNIDine (CATAPRES) 0.1 MG/24HR PTWK APPLY 1 PATCH ONTO THE SKINONCE A WEEK 12 patch 1    budesonide-formoterol (SYMBICORT) 160-4.5 MCG/ACT AERO USE 2 INHALATIONS ORALLY   TWICE DAILY 30.6 g 1    Dulaglutide (TRULICITY) 0.29 UA/5.3HM SOPN INJECT 0.5ML (=0.75 MG)    SUBCUTANEOUSLY ONCE WEEKLY 6 mL 1    albuterol sulfate HFA (PROVENTIL;VENTOLIN;PROAIR) 108 (90 Base) MCG/ACT inhaler Inhale 2 puffs into the lungs every 6 hours as needed for Wheezing      omeprazole (PRILOSEC) 40 MG delayed release capsule Take 40 mg by mouth daily      pioglitazone (ACTOS) 30 MG tablet Take 30 mg by mouth daily      [DISCONTINUED] gabapentin (NEURONTIN) 300 MG capsule Take 300 mg by mouth in the morning and at bedtime. cetirizine (ZYRTEC) 10 MG tablet Take 1 tablet by mouth daily 30 tablet 2    ondansetron (ZOFRAN-ODT) 4 MG disintegrating tablet Take 1 tablet by mouth 3 times daily as needed for Nausea or Vomiting (Patient not taking: Reported on 3/8/2023) 21 tablet 0    lidocaine (LIDODERM) 5 % Place 1 patch onto the skin daily 12 hours on, 12 hours off. 30 patch 0    acetaminophen (TYLENOL) 500 MG tablet Take 1 tablet by mouth 4 times daily as needed for Pain 120 tablet 0    docusate sodium (COLACE) 100 MG capsule Take 1 capsule by mouth 2 times daily 60 capsule 1    bisacodyl 5 MG EC tablet Take 1-2 tablets daily as needed 30 tablet 0    diclofenac sodium (VOLTAREN) 1 % GEL Apply 2 g topically 4 times daily 350 g 1    fluticasone (FLONASE) 50 MCG/ACT nasal spray 2 sprays by Nasal route daily 48 g 1    Lancets MISC 1 each by Other route daily E11.9 100 each 0    Blood Glucose Monitoring Suppl (TRUE METRIX METER) DMITRIY Test blood sugar daily and PRN E11.9 1 each 0    blood glucose test strips (GLUCOSE METER TEST) strip 100 each by In Vitro route 2 times daily Diabetes   e11.9 100 each 1       Social History     Tobacco Use    Smoking status: Never    Smokeless tobacco: Never   Substance Use Topics    Alcohol use: No    Drug use: No         OBJECTIVE:    Vitals:    03/01/23 1027   BP: 110/70   Site: Right Upper Arm   Position: Sitting   Cuff Size: Medium Adult   Pulse: 91   Temp: 97.2 °F (36.2 °C)   TempSrc: Temporal   SpO2: 97%   Weight: 154 lb 12.8 oz (70.2 kg)   Height: 5' 2\" (1.575 m)     Body mass index is 28.31 kg/m². Wt Readings from Last 3 Encounters:   03/08/23 152 lb (68.9 kg)   03/01/23 154 lb 12.8 oz (70.2 kg)   02/23/23 158 lb (71.7 kg)       Physical Exam  Nursing note reviewed. Constitutional:       General: She is not in acute distress. Appearance: Normal appearance. She is well-developed.    HENT:      Right Ear: Tympanic membrane and ear canal normal.      Left Ear: Tympanic membrane and ear canal normal.      Mouth/Throat:      Pharynx: Oropharynx is clear. Eyes:      General: Lids are normal.      Extraocular Movements: Extraocular movements intact. Conjunctiva/sclera: Conjunctivae normal.      Pupils: Pupils are equal, round, and reactive to light. Cardiovascular:      Rate and Rhythm: Normal rate and regular rhythm. Heart sounds: Normal heart sounds, S1 normal and S2 normal. No murmur heard. Pulmonary:      Effort: Pulmonary effort is normal.      Breath sounds: Normal breath sounds. No wheezing, rhonchi or rales. Abdominal:      General: Bowel sounds are normal. There is no distension. Palpations: Abdomen is soft. Tenderness: There is no abdominal tenderness. Neurological:      Mental Status: She is alert. No results found for this visit on 03/01/23. Lab Review   Admission on 02/22/2023, Discharged on 02/24/2023   Component Date Value    Ventricular Rate 02/22/2023 75     Atrial Rate 02/22/2023 75     P-R Interval 02/22/2023 138     QRS Duration 02/22/2023 80     Q-T Interval 02/22/2023 374     QTc Calculation (Bazett) 02/22/2023 417     P Axis 02/22/2023 23     R Axis 02/22/2023 4     T [place] 02/22/2023 25     Diagnosis 02/22/2023 EKG performed in ER and to be interpreted by ER physician. Confirmed by MD, ER ([id number]),  Eulalia Lyles ([phone]) on 2/22/2023 7:27:13 PM     Sodium 02/22/2023 138     Potassium 02/22/2023 4.2     Chloride 02/22/2023 100     CO2 02/22/2023 25     Anion Gap 02/22/2023 13     Glucose 02/22/2023 101 (A)     BUN 02/22/2023 18     Creatinine 02/22/2023 0.7     Est, Glom Filt Rate 02/22/2023 >60     Calcium 02/22/2023 9.6     Troponin 02/22/2023 <0.01     WBC 02/22/2023 10.7     RBC 02/22/2023 5.20     Hemoglobin 02/22/2023 14.8     Hematocrit 02/22/2023 44.8     MCV 02/22/2023 86.1     MCH 02/22/2023 28.4     MCHC 02/22/2023 33.0     RDW 02/22/2023 13.1     Platelets 86/90/0553 255     MPV 02/22/2023 8.8     Neutrophils % 02/22/2023 64.9 Lymphocytes % 02/22/2023 25.7     Monocytes % 02/22/2023 8.1     Eosinophils % 02/22/2023 0.8     Basophils % 02/22/2023 0.5     Neutrophils Absolute 02/22/2023 6.9     Lymphocytes Absolute 02/22/2023 2.8     Monocytes Absolute 02/22/2023 0.9     Eosinophils Absolute 02/22/2023 0.1     Basophils Absolute 02/22/2023 0.1     Anti-XA Unfrac Heparin 02/22/2023 0.93 (A)     Troponin 02/22/2023 <0.01     POC Glucose 02/22/2023 99     Performed on 02/22/2023 ACCU-CHEK     Ventricular Rate 02/23/2023 74     Atrial Rate 02/23/2023 74     P-R Interval 02/23/2023 142     QRS Duration 02/23/2023 82     Q-T Interval 02/23/2023 402     QTc Calculation (Bazett) 02/23/2023 446     P Axis 02/23/2023 23     R Axis 02/23/2023 -3     T [place] 02/23/2023 24     Diagnosis 02/23/2023 Normal sinus rhythmInferior infarct , age undeterminedT wave abnormality, consider anterior ischemiaAbnormal ECGConfirmed by [facility] MD ([id number]) on 2/23/2023 3:44:03 PM     Left Ventricular Ejectio* 02/23/2023 58     LVEF MODALITY 02/23/2023 ECHO     Anti-XA Unfrac Heparin 02/23/2023 0.55     Anti-XA Unfrac Heparin 02/23/2023 >1.10 (A)     Sodium 02/23/2023 140     Potassium 02/23/2023 3.9     Chloride 02/23/2023 100     CO2 02/23/2023 26     Anion Gap 02/23/2023 14     Glucose 02/23/2023 95     BUN 02/23/2023 18     Creatinine 02/23/2023 0.7     Est, Glom Filt Rate 02/23/2023 >60     Calcium 02/23/2023 9.3     WBC 02/23/2023 9.8     RBC 02/23/2023 4.93     Hemoglobin 02/23/2023 14.1     Hematocrit 02/23/2023 43.1     MCV 02/23/2023 87.5     MCH 02/23/2023 28.7     MCHC 02/23/2023 32.8     RDW 02/23/2023 13.1     Platelets 45/66/3660 221     MPV 02/23/2023 8.7     Hemoglobin A1C 02/23/2023 6.1     eAG 02/23/2023 128. 4     Cholesterol, Total 02/23/2023 154     Triglycerides 02/23/2023 107     HDL 02/23/2023 52     LDL Calculated 02/23/2023 81     VLDL Cholesterol Calcula* 02/23/2023 21     Left Ventricular Ejectio* 02/23/2023 65     LEFT VENTRICULAR EJECTIO* 02/23/2023 Cardiac Cath     POC Glucose 02/23/2023 87     Performed on 02/23/2023 ACCU-CHEK     Cholesterol, Total 02/23/2023 140     Triglycerides 02/23/2023 133     HDL 02/23/2023 47     LDL Calculated 02/23/2023 66     VLDL Cholesterol Calcula* 02/23/2023 27     POC Glucose 02/23/2023 96     Performed on 02/23/2023 ACCU-CHEK     WBC 02/24/2023 7.6     RBC 02/24/2023 4.56     Hemoglobin 02/24/2023 13.4     Hematocrit 02/24/2023 40.1     MCV 02/24/2023 87.8     MCH 02/24/2023 29.3     MCHC 02/24/2023 33.4     RDW 02/24/2023 12.9     Platelets 40/41/3649 213     MPV 02/24/2023 8.8     PLATELET SLIDE REVIEW 02/24/2023 Adequate     POC Glucose 02/23/2023 118 (A)     Performed on 02/23/2023 ACCU-CHEK     Ventricular Rate 02/24/2023 68     Atrial Rate 02/24/2023 68     P-R Interval 02/24/2023 176     QRS Duration 02/24/2023 90     Q-T Interval 02/24/2023 400     QTc Calculation (Bazett) 02/24/2023 425     P [place] 02/24/2023 29     R Axis 02/24/2023 -5     T Axis 02/24/2023 4     Diagnosis 02/24/2023                      Value:Sinus rhythm with occasional Premature ventricular complexesMinimal voltage criteria for LVH, may be normal variantInferior infarct , age undeterminedT wave abnormality, consider anterior ischemiaAbnormal ECGConfirmed by [facility] - [facility] MD ([id number]) on 2/24/2023 8:13:38 AM      POC Glucose 02/24/2023 92     Performed on 02/24/2023 ACCU-CHEK     INR 02/23/2023 1.60 (A)     INR 02/23/2023 1.40 (A)     POC Glucose 02/24/2023 134 (A)     Performed on 02/24/2023 ACCU-CHEK    Orders Only on 02/22/2023   Component Date Value    Sodium 02/22/2023 139     Potassium 02/22/2023 4.5     Chloride 02/22/2023 102     CO2 02/22/2023 26     Anion Gap 02/22/2023 11     Glucose 02/22/2023 98     BUN 02/22/2023 17     Creatinine 02/22/2023 0.7     Est, Glom Filt Rate 02/22/2023 >60     Calcium 02/22/2023 9.9     WBC 02/22/2023 9.8     RBC 02/22/2023 5.20     Hemoglobin 02/22/2023 15.2     Hematocrit 02/22/2023 45.0     MCV 02/22/2023 86.4     MCH 02/22/2023 29.2     MCHC 02/22/2023 33.8     RDW 02/22/2023 12.6     Platelets 18/65/2878 243     MPV 02/22/2023 9.3     Neutrophils % 02/22/2023 62.7     Lymphocytes % 02/22/2023 27.3     Monocytes % 02/22/2023 8.6     Eosinophils % 02/22/2023 1.1     Basophils % 02/22/2023 0.3     Neutrophils Absolute 02/22/2023 6.2     Lymphocytes Absolute 02/22/2023 2.7     Monocytes Absolute 02/22/2023 0.8     Eosinophils Absolute 02/22/2023 0.1     Basophils Absolute 02/22/2023 0.0     TSH 02/22/2023 2.25    Office Visit on 01/19/2023   Component Date Value    Color, UA 01/19/2023 yellow     Clarity, UA 01/19/2023 clear     Glucose, UA POC 01/19/2023 neg     Bilirubin, UA 01/19/2023 nnegg     Ketones, UA 01/19/2023 neg     Spec Grav, UA 01/19/2023 1.025     Blood, UA POC 01/19/2023 trace     pH, UA 01/19/2023 6.0     Protein, UA POC 01/19/2023 neg     Urobilinogen, UA 01/19/2023 0.2     Leukocytes, UA 01/19/2023 neg     Nitrite, UA 01/19/2023 neg     Urine Culture, Routine 01/19/2023 No growth at 18 to 36 hours    Orders Only on 01/16/2023   Component Date Value    Cholesterol, Total 01/16/2023 181     Triglycerides 01/16/2023 103     HDL 01/16/2023 50     LDL Calculated 01/16/2023 110 (A)     VLDL Cholesterol Calcula* 01/16/2023 [address] [facility] Outpatient Visit on 01/12/2023   Component Date Value    Visual Acuity Distance R* 01/16/2023 20/30     Intraocular Pressure Eye 01/16/2023 14     Visual Acuity Distance L* 01/16/2023 20/40     Intraocular Pressure Eye 01/16/2023 14     Diabetic Retinopathy 01/16/2023 NEGATIVE     Cataracts 01/16/2023 NEGATIVE     Glaucoma 01/16/2023 NEGATIVE    Office Visit on 01/11/2023   Component Date Value    Hemoglobin A1C 01/11/2023 6.1            Medications, Allergies, Social history, Medical history, and results reviewed      An electronic signature was used to authenticate this note.     Rachel Brown MD

## 2023-03-03 DIAGNOSIS — M79.604 CHRONIC PAIN OF RIGHT LOWER EXTREMITY: ICD-10-CM

## 2023-03-03 DIAGNOSIS — E11.42 TYPE 2 DIABETES MELLITUS WITH DIABETIC POLYNEUROPATHY, WITHOUT LONG-TERM CURRENT USE OF INSULIN (HCC): Primary | ICD-10-CM

## 2023-03-03 DIAGNOSIS — G89.29 CHRONIC PAIN OF RIGHT LOWER EXTREMITY: ICD-10-CM

## 2023-03-03 LAB
ORGANISM: ABNORMAL
ORGANISM: ABNORMAL
URINE CULTURE, ROUTINE: ABNORMAL
URINE CULTURE, ROUTINE: ABNORMAL

## 2023-03-03 NOTE — TELEPHONE ENCOUNTER
----- Message from [facility] MANUEL sent at 3/3/2023 10:47 AM EST -----  Subject: Message to Provider    QUESTIONS  Information for Provider? Patient would like a refill on gabapentin   (NEURONTIN) capsule 300 mg she said sometimes she take two times a day and   the [facility] is [facility], [address] [place] [address] [phone] - [address] Arroyo, patient would like a   call back once sent.  ---------------------------------------------------------------------------  --------------  Carole STRICKLAND  [phone]; OK to leave message on voicemail  ---------------------------------------------------------------------------  --------------  SCRIPT ANSWERS  Relationship to Patient?  Self

## 2023-03-03 NOTE — TELEPHONE ENCOUNTER
Medication:   Requested Prescriptions     Pending Prescriptions Disp Refills    gabapentin (NEURONTIN) 300 MG capsule 90 capsule      Sig: Take 1 capsule by mouth in the morning and at bedtime. Last Filled:  11.22.22    Last appt: 3/1/2023   Next appt: 3/8/2023    Last OARRS:   RX Monitoring 11/11/2022   Periodic Controlled Substance Monitoring No signs of potential drug abuse or diversion identified.

## 2023-03-04 DIAGNOSIS — N39.0 URINARY TRACT INFECTION WITH HEMATURIA, SITE UNSPECIFIED: Primary | ICD-10-CM

## 2023-03-04 DIAGNOSIS — R31.9 URINARY TRACT INFECTION WITH HEMATURIA, SITE UNSPECIFIED: Primary | ICD-10-CM

## 2023-03-04 RX ORDER — CIPROFLOXACIN 250 MG/1
250 TABLET, FILM COATED ORAL 2 TIMES DAILY
Qty: 14 TABLET | Refills: 0 | Status: SHIPPED | OUTPATIENT
Start: 2023-03-04 | End: 2023-03-11

## 2023-03-04 RX ORDER — GABAPENTIN 300 MG/1
300 CAPSULE ORAL 2 TIMES DAILY
Qty: 180 CAPSULE | Refills: 1 | Status: SHIPPED | OUTPATIENT
Start: 2023-03-04 | End: 2023-06-02

## 2023-03-04 NOTE — TELEPHONE ENCOUNTER
Prescription refilled and called patient to inform her. Controlled Substance Monitoring:    Acute and Chronic Pain Monitoring:   RX Monitoring 3/4/2023   Periodic Controlled Substance Monitoring No signs of potential drug abuse or diversion identified.

## 2023-03-06 ENCOUNTER — CARE COORDINATION (OUTPATIENT)
Dept: CASE MANAGEMENT | Age: 68
End: 2023-03-06

## 2023-03-06 NOTE — CARE COORDINATION
[address] Care Transitions Follow Up Call    Care Transition Nurse contacted the patient by telephone to perform post hospital discharge assessment. Verified name with patient as identifier. Provided introduction to self, and explanation of the Care Transition Nurse role. Patient: Neel Santacruz Patient :  1955  MRN: [id number]  Reason for Admission:  unstable angina  Discharge Date:    RARS: 8    Challenges to be reviewed by the provider   Additional needs identified to be addressed with provider:    no    AMB CC Provider Discharge Needs:  none          Method of communication with provider : none      SUMMARY  CTN spoke to the Pt who reports the following:    -Felt really tired Saturday. Continues to feel tired but feeling better today and has more energy.  -Headache last night but none today.  -Heart palpitations yesterday for about 30 minutes then they went away and none since then. -Denies fever, chills, nausea, vomiting, cough, congestion, SOB / DB, chest pain, feeling lightheaded, dizziness, and heart flutters. -Denies issues with fluid intake, appetite, urination, and LBM was today. -  -Confirms they have all meds and taking as prescribed. From CDC: Are you at higher risk for severe illness? Wash your hands often. Avoid close contact (6 feet, which is about two arm lengths) w/people who are sick. Put distance between yourself and other people if COVID-19 is spreading in your community. Clean and disinfect frequently touched surfaces. Avoid all cruise travel and non-essential air travel. Call your healthcare professional if you have concerns about COVID-19 and your underlying condition or if you are sick. Pt will take all meds as prescribed and schedule / keep doctor's appt. [facility] provided education on s/s that require medical attention and when to seek medical attention.   [facility] advised Pt of use urgent care or physician's 24 hr access line if assistance is needed after hours or on the weekend. Patient denies any needs or concerns and is agreeable with additional calls. Addressed changes since last contact:  medications-no changes     Discussed follow-up appointments. If no appointment was previously scheduled, appointment scheduling offered: Yes     Is follow up appointment scheduled within 7 days of discharge? Yes - PCP     Care Transitions 24 Hour Call    Do you have any ongoing symptoms?: Yes  Patient-reported symptoms: Fatigue  Interventions for patient-reported symptoms: Other  Do you have all of your prescriptions and are they filled?: Yes  Were you discharged with any Home Care or Post Acute Services: No  Care Transitions Interventions         Non-[facility] follow up appointment(s):    Care Transition Nurse reviewed discharge instructions, medical action plan and red flags with patient and discussed any barriers to care and/or understanding of plan of care after discharge. Discussed appropriate site of care based on symptoms and resources available to patient including:    Specialist  After hours contact number  Benefits related nurse triage line  Urgent care clinics  [facility] 24/7  When to call [address] [place] [address] for reactivation or family member permission [phone]. The patient agrees to contact the PCP office for questions related to their healthcare.     Advance Care Planning: Does patient have an Advance Directive: patient declined education    Patients top risk factors for readmission:   functional cognitive ability  functional physical ability  falls  lack of knowledge about disease  medical condition  medication management  multiple health system providers    Interventions to address risk factors:   Education of patient/family/caregiver/guardian to support self-management  Assessment and support for treatment adherence and medication management     Care Transitions Subsequent and Final Call    Subsequent and Final Calls  Do you have any ongoing symptoms?: Yes  Patient-reported symptoms: Fatigue  Interventions for patient-reported symptoms: Other  Have your medications changed?: No  Do you have any questions related to your medications?: No  Do you currently have any active services?: No  Care Transitions Interventions  Other Interventions:              Care Transition Nurse provided contact information for future needs.    Plan for f/u call in 7-10 days based on severity of symptoms and risk factors.    Plan for next call:   symptom management  self-management  follow up appointment  medication management  RPM program - discuss      Future Appointments   Date Time Provider Department Center   3/8/2023 10:45 AM MD CONY Torres [facility] [place] - [facility]   3/9/2023  1:10 PM Andrea Tom MD [facility] DRFLD MMA   3/10/2023  1:30 PM JENI Meléndez PSYCHO [facility]   3/16/2023 10:30 AM Paulo Roque MD [facility] BH&O [facility]   3/29/2023  2:30 PM MD CONY Moyer CARDIO [facility]   4/11/2023 11:30 AM MD CONY Torres [facility] [place] - [facility]       TONYA Torres, RN  Care Transition Coordinator  Contact Number:  [phone]

## 2023-03-08 ENCOUNTER — OFFICE VISIT (OUTPATIENT)
Dept: PRIMARY CARE CLINIC | Age: 68
End: 2023-03-08
Payer: MEDICARE

## 2023-03-08 VITALS
WEIGHT: 152 LBS | BODY MASS INDEX: 27.8 KG/M2 | OXYGEN SATURATION: 97 % | DIASTOLIC BLOOD PRESSURE: 84 MMHG | HEART RATE: 85 BPM | SYSTOLIC BLOOD PRESSURE: 124 MMHG

## 2023-03-08 DIAGNOSIS — T14.8XXA BRUISING: ICD-10-CM

## 2023-03-08 DIAGNOSIS — R53.83 FATIGUE, UNSPECIFIED TYPE: Primary | ICD-10-CM

## 2023-03-08 DIAGNOSIS — N39.0 URINARY TRACT INFECTION WITH HEMATURIA, SITE UNSPECIFIED: ICD-10-CM

## 2023-03-08 DIAGNOSIS — R31.9 URINARY TRACT INFECTION WITH HEMATURIA, SITE UNSPECIFIED: ICD-10-CM

## 2023-03-08 DIAGNOSIS — R10.9 RIGHT FLANK PAIN: ICD-10-CM

## 2023-03-08 PROCEDURE — G8399 PT W/DXA RESULTS DOCUMENT: HCPCS | Performed by: INTERNAL MEDICINE

## 2023-03-08 PROCEDURE — G8484 FLU IMMUNIZE NO ADMIN: HCPCS | Performed by: INTERNAL MEDICINE

## 2023-03-08 PROCEDURE — 3017F COLORECTAL CA SCREEN DOC REV: CPT | Performed by: INTERNAL MEDICINE

## 2023-03-08 PROCEDURE — 3074F SYST BP LT 130 MM HG: CPT | Performed by: INTERNAL MEDICINE

## 2023-03-08 PROCEDURE — G8427 DOCREV CUR MEDS BY ELIG CLIN: HCPCS | Performed by: INTERNAL MEDICINE

## 2023-03-08 PROCEDURE — 99213 OFFICE O/P EST LOW 20 MIN: CPT | Performed by: INTERNAL MEDICINE

## 2023-03-08 PROCEDURE — G8417 CALC BMI ABV UP PARAM F/U: HCPCS | Performed by: INTERNAL MEDICINE

## 2023-03-08 PROCEDURE — 3078F DIAST BP <80 MM HG: CPT | Performed by: INTERNAL MEDICINE

## 2023-03-08 PROCEDURE — 1124F ACP DISCUSS-NO DSCNMKR DOCD: CPT | Performed by: INTERNAL MEDICINE

## 2023-03-08 PROCEDURE — 1111F DSCHRG MED/CURRENT MED MERGE: CPT | Performed by: INTERNAL MEDICINE

## 2023-03-08 PROCEDURE — 1090F PRES/ABSN URINE INCON ASSESS: CPT | Performed by: INTERNAL MEDICINE

## 2023-03-08 PROCEDURE — 1036F TOBACCO NON-USER: CPT | Performed by: INTERNAL MEDICINE

## 2023-03-09 ENCOUNTER — OFFICE VISIT (OUTPATIENT)
Dept: ORTHOPEDIC SURGERY | Age: 68
End: 2023-03-09

## 2023-03-09 DIAGNOSIS — M54.10 RADICULAR PAIN OF LEFT LOWER EXTREMITY: ICD-10-CM

## 2023-03-09 DIAGNOSIS — M47.816 LUMBAR SPONDYLOSIS: ICD-10-CM

## 2023-03-09 DIAGNOSIS — M51.36 DDD (DEGENERATIVE DISC DISEASE), LUMBAR: ICD-10-CM

## 2023-03-09 DIAGNOSIS — M48.061 LUMBAR FORAMINAL STENOSIS: Primary | ICD-10-CM

## 2023-03-09 DIAGNOSIS — M54.10 RADICULAR PAIN OF RIGHT LOWER EXTREMITY: ICD-10-CM

## 2023-03-09 DIAGNOSIS — Z79.02 LONG TERM CURRENT USE OF ANTITHROMBOTICS/ANTIPLATELETS: ICD-10-CM

## 2023-03-09 PROBLEM — R00.2 PALPITATIONS: Status: ACTIVE | Noted: 2023-03-09

## 2023-03-09 PROBLEM — R53.83 FATIGUE: Status: ACTIVE | Noted: 2023-03-09

## 2023-03-09 PROBLEM — R07.9 CHEST PAIN: Status: ACTIVE | Noted: 2023-03-09

## 2023-03-09 PROBLEM — R31.9 HEMATURIA: Status: ACTIVE | Noted: 2023-03-09

## 2023-03-09 PROBLEM — R06.02 SHORTNESS OF BREATH: Status: ACTIVE | Noted: 2023-03-09

## 2023-03-09 ASSESSMENT — ENCOUNTER SYMPTOMS
SINUS PRESSURE: 0
RHINORRHEA: 0
SORE THROAT: 0
SORE THROAT: 0
BACK PAIN: 0
NAUSEA: 0
COUGH: 0
WHEEZING: 0
SINUS PRESSURE: 0
CONSTIPATION: 0
BLOOD IN STOOL: 0
COUGH: 0
SHORTNESS OF BREATH: 0
SINUS PAIN: 0
DIARRHEA: 0
RHINORRHEA: 0
SINUS PAIN: 0
ABDOMINAL PAIN: 0
WHEEZING: 0
SHORTNESS OF BREATH: 1
VOMITING: 0
NAUSEA: 0
CHEST TIGHTNESS: 0
CHEST TIGHTNESS: 0
ABDOMINAL PAIN: 0
VOMITING: 0
PHOTOPHOBIA: 0

## 2023-03-09 NOTE — LETTER
March 10, 2023      No referring provider defined for this encounter. Patient: Chip Whitehead   MR Number: [id number]   YOB: 1955   Date of Visit: 3/9/2023       Dear Dr. Uli Torres ref. provider found: Thank you for referring Chip Whitehead to me for evaluation/treatment. Below are the relevant portions of my assessment and plan of care. Impression: . Encounter Diagnoses   Name Primary?  Lumbar foraminal stenosis Yes    Radicular pain of right lower extremity     Radicular pain of left lower extremity     DDD (degenerative disc disease), lumbar     Lumbar spondylosis     Long term current use of antithrombotics/antiplatelets               Plan:     C9 approval for physical therapy lumbar stabilization program  Active modification lumbar spine precautions  Continue maintenance lumbar stabilization home exercise program  Continue low-dose Celebrex daily with GI precautions impairing use of tramadol moderate to severe pain  Clinical follow-up in 2 to 3 months       Orders:        Orders Placed This Encounter   Procedures   [facility] ([facility])-[facility]R     Referral Priority:   Routine     Referral Type:   Eval and Treat     Referral Reason:   Specialty Services Required     Requested Specialty:   Physical Therapist     Number of Visits Requested:   1         Jesus Coleman MD.      Brett Rahman: \"This note was dictated with voice recognition software. Though review and correction are routine, we apologize for any errors. \"         If you have questions, please do not hesitate to call me. I look forward to following [place] along with you.     Sincerely,        Valentín Parnell MD    CC providers:  Gurdeep Alvarado ATC  [facility] [place]

## 2023-03-10 ENCOUNTER — TELEPHONE (OUTPATIENT)
Dept: PSYCHOLOGY | Age: 68
End: 2023-03-10

## 2023-03-10 ENCOUNTER — OFFICE VISIT (OUTPATIENT)
Dept: PSYCHOLOGY | Age: 68
End: 2023-03-10

## 2023-03-10 DIAGNOSIS — F33.1 MODERATE EPISODE OF RECURRENT MAJOR DEPRESSIVE DISORDER (HCC): Primary | ICD-10-CM

## 2023-03-10 DIAGNOSIS — F41.9 ANXIETY DISORDER, UNSPECIFIED TYPE: ICD-10-CM

## 2023-03-10 RX ORDER — TRAMADOL HYDROCHLORIDE 50 MG/1
50 TABLET ORAL EVERY 8 HOURS PRN
Qty: 20 TABLET | Refills: 0 | Status: SHIPPED | OUTPATIENT
Start: 2023-03-10 | End: 2023-03-17

## 2023-03-10 RX ORDER — CELECOXIB 100 MG/1
100 CAPSULE ORAL DAILY PRN
Qty: 30 CAPSULE | Refills: 1 | Status: SHIPPED | OUTPATIENT
Start: 2023-03-10

## 2023-03-10 ASSESSMENT — PATIENT HEALTH QUESTIONNAIRE - PHQ9
2. FEELING DOWN, DEPRESSED OR HOPELESS: 2
SUM OF ALL RESPONSES TO PHQ QUESTIONS 1-9: 16
7. TROUBLE CONCENTRATING ON THINGS, SUCH AS READING THE NEWSPAPER OR WATCHING TELEVISION: 2
8. MOVING OR SPEAKING SO SLOWLY THAT OTHER PEOPLE COULD HAVE NOTICED. OR THE OPPOSITE, BEING SO FIGETY OR RESTLESS THAT YOU HAVE BEEN MOVING AROUND A LOT MORE THAN USUAL: 2
4. FEELING TIRED OR HAVING LITTLE ENERGY: 3
9. THOUGHTS THAT YOU WOULD BE BETTER OFF DEAD, OR OF HURTING YOURSELF: 0
5. POOR APPETITE OR OVEREATING: 2
SUM OF ALL RESPONSES TO PHQ9 QUESTIONS 1 & 2: 3
SUM OF ALL RESPONSES TO PHQ QUESTIONS 1-9: 16
1. LITTLE INTEREST OR PLEASURE IN DOING THINGS: 1
SUM OF ALL RESPONSES TO PHQ QUESTIONS 1-9: 16
6. FEELING BAD ABOUT YOURSELF - OR THAT YOU ARE A FAILURE OR HAVE LET YOURSELF OR YOUR FAMILY DOWN: 2
SUM OF ALL RESPONSES TO PHQ QUESTIONS 1-9: 16
3. TROUBLE FALLING OR STAYING ASLEEP: 2

## 2023-03-10 ASSESSMENT — ANXIETY QUESTIONNAIRES
3. WORRYING TOO MUCH ABOUT DIFFERENT THINGS: 3
5. BEING SO RESTLESS THAT IT IS HARD TO SIT STILL: 2
1. FEELING NERVOUS, ANXIOUS, OR ON EDGE: 1
4. TROUBLE RELAXING: 2
2. NOT BEING ABLE TO STOP OR CONTROL WORRYING: 3

## 2023-03-10 NOTE — Clinical Note
Luis Bates stated she can no longer see me due to the costs of our sessions. She is trying to find a therapist under worker's comp. Does she receive worker's comp for something mental health related?  Thank you, Mara Collins

## 2023-03-10 NOTE — PROGRESS NOTES
that she has never smoked. She has never used smokeless tobacco.  ETOH:   reports no history of alcohol use. Family History:   Family History   Problem Relation Age of Onset    Obesity Mother     Osteoporosis Mother     Stroke Father     Stroke Sister 76         from stroke    Breast Cancer Daughter     Cancer Other        A:  Patient engaged and cooperative. Denied suicidal ideation. Insight and motivation are fair. Re-administered PHQ-9 and SHIN-7 (see below). Patient endorses Moderately Severe symptoms of depression and Severe symptoms of anxiety. PHQ-9 Questionaire 3/10/2023 2/10/2023 2023 2023 9/15/2022 3/14/2022 3/14/2022   Little interest or pleasure in doing things 1 2 2 0 0 - 0   Feeling down, depressed, or hopeless 2 3 3 0 2 0 0   Trouble falling or staying asleep, or sleeping too much 2 3 1 0 0 0 -   Feeling tired or having little energy 3 2 3 0 0 0 -   Poor appetite or overeating 2 2 2 0 0 0 -   Feeling bad about yourself - or that you are a failure or have let yourself or your family down 2 3 - 0 0 0 -   Trouble concentrating on things, such as reading the newspaper or watching television 2 2 2 0 0 0 -   Moving or speaking so slowly that other people could have noticed.  Or the opposite - being so fidgety or restless that you have been moving around a lot more than usual 2 2 1 0 0 0 -   Thoughts that you would be better off dead, or of hurting yourself in some way 0 0 0 0 0 0 -   PHQ-9 Total Score 16 19 14 0 2 0 0   If you checked off any problems, how difficult have these problems made it for you to do your work, take care of things at home, or get along with other people? - - 0 0 0 0 -     PHQ Scores 3/10/2023 2/10/2023 2023 2023 9/15/2022 3/14/2022 3/14/2022   PHQ2 Score 3 5 5 0 2 - 0   PHQ9 Score 16 19 14 0 2 0 0       Interpretation of Total Score Depression Severity: 1-4 = Minimal depression, 5-9 = Mild depression, 10-14 = Moderate depression, 15-19 = Moderately

## 2023-03-10 NOTE — PROGRESS NOTES
Chief Complaint:   Chief Complaint   Patient presents with    Lower Back Pain          History of Present Illness:       Patient is a 67 y.o. female returns follow up for the above complaint. The patient was last seen approximately 1 monthsago. The symptoms are improving since the last visit. The patient has had no further testing for the problem.      The patient did undergo lumbar epidural  in the interim.    Patient reports 50-75% (60%) improvement related to this intervention.  The leg pain has completely abated.    Back:Bilateral leg pain 100:0. Pain in back is aching or burning in quality.    Pain levels:4.    The patient denies new onset or progressive weakness of the lower extremities.  The patient denies bowel or bladder dysfunction.    She has transition to only as needed use of Celebrex and tramadol     Past Medical History:        Past Medical History:   Diagnosis Date    Abnormal involuntary movements(781.0)     Anal fissure     Anemia, unspecified     Anxiety     Chronic back pain     Chronic diarrhea 09/23/2019    Depression     Diffuse cystic mastopathy     Encopresis(307.7)     Esophageal reflux     Foot fracture, left 1989    drop something on foot    Headache(784.0)     Hepatitis, unspecified     Herpes simplex without mention of complication     Hypertension     Hypogammaglobulinaemia, unspecified     Insomnia, unspecified     Lateral epicondylitis  of elbow     Migraine, unspecified, without mention of intractable migraine without mention of status migrainosus     Mixed hyperlipidemia 07/30/2010    Osteopenia     Postmenopausal     Pure hypercholesterolemia     Symptomatic menopausal or female climacteric states     Type 2 diabetes mellitus without complication (HCC)     Type II or unspecified type diabetes mellitus without mention of complication, not stated as uncontrolled     Unspecified asthma(493.90)         Present Medications:         Current Outpatient Medications   Medication Sig  Dispense Refill    celecoxib (CELEBREX) 100 MG capsule Take 1 capsule by mouth daily as needed for Pain 30 capsule 1    traMADol (ULTRAM) 50 MG tablet Take 1 tablet by mouth every 8 hours as needed for Pain for up to 7 days. Max Daily Amount: 150 mg 20 tablet 0    gabapentin (NEURONTIN) 300 MG capsule Take 1 capsule by mouth in the morning and at bedtime for 90 days. 180 capsule 1    ciprofloxacin (CIPRO) 250 MG tablet Take 1 tablet by mouth 2 times daily for 7 days 14 tablet 0    ranolazine (RANEXA) 500 MG extended release tablet Take 1 tablet by mouth 2 times daily 60 tablet 3    nitroGLYCERIN (NITROSTAT) 0.4 MG SL tablet Place 1 tablet under the tongue every 5 minutes as needed for Chest pain up to max of 3 total doses.  If no relief after 1 dose, call 911. 25 tablet 3    atorvastatin (LIPITOR) 80 MG tablet Take 1 tablet by mouth nightly 30 tablet 3    clopidogrel (PLAVIX) 75 MG tablet Take 1 tablet by mouth daily 30 tablet 3    nitroGLYCERIN (NITRODUR) 0.2 MG/HR Place 1 patch onto the skin daily 30 patch 3    cloNIDine (CATAPRES) 0.1 MG/24HR PTWK APPLY 1 PATCH ONTO THE SKINONCE A WEEK 12 patch 1    budesonide-formoterol (SYMBICORT) 160-4.5 MCG/ACT AERO USE 2 INHALATIONS ORALLY   TWICE DAILY 30.6 g 1    Dulaglutide (TRULICITY) 5.44 SD/8.9KQ SOPN INJECT 0.5ML (=0.75 MG)    SUBCUTANEOUSLY ONCE WEEKLY 6 mL 1    albuterol sulfate HFA (PROVENTIL;VENTOLIN;PROAIR) 108 (90 Base) MCG/ACT inhaler Inhale 2 puffs into the lungs every 6 hours as needed for Wheezing      omeprazole (PRILOSEC) 40 MG delayed release capsule Take 40 mg by mouth daily      pioglitazone (ACTOS) 30 MG tablet Take 30 mg by mouth daily      cetirizine (ZYRTEC) 10 MG tablet Take 1 tablet by mouth daily 30 tablet 2    ondansetron (ZOFRAN-ODT) 4 MG disintegrating tablet Take 1 tablet by mouth 3 times daily as needed for Nausea or Vomiting (Patient not taking: Reported on 3/8/2023) 21 tablet 0    lidocaine (LIDODERM) 5 % Place 1 patch onto the skin daily 12 hours on, 12 hours off. 30 patch 0    acetaminophen (TYLENOL) 500 MG tablet Take 1 tablet by mouth 4 times daily as needed for Pain 120 tablet 0    docusate sodium (COLACE) 100 MG capsule Take 1 capsule by mouth 2 times daily 60 capsule 1    bisacodyl 5 MG EC tablet Take 1-2 tablets daily as needed 30 tablet 0    diclofenac sodium (VOLTAREN) 1 % GEL Apply 2 g topically 4 times daily 350 g 1    fluticasone (FLONASE) 50 MCG/ACT nasal spray 2 sprays by Nasal route daily 48 g 1    Lancets MISC 1 each by Other route daily E11.9 100 each 0    Blood Glucose Monitoring Suppl (TRUE METRIX METER) DMITRIY Test blood sugar daily and PRN E11.9 1 each 0    blood glucose test strips (GLUCOSE METER TEST) strip 100 each by In Vitro route 2 times daily Diabetes   e11.9 100 each 1     No current facility-administered medications for this visit. Allergies: Allergies   Allergen Reactions    Effexor [Venlafaxine Hydrochloride] Hives    Hydrocodone Hives and Itching    Ibuprofen Other (See Comments)     Unknown reaction    Propoxyphene Hives    Aspirin Other (See Comments)     bruising    Keflex [Cephalexin] Itching and Other (See Comments)     Face redness. Paxil Cr [Paroxetine Hcl Er] Itching           Review of Systems:    Pertinent items are noted in HPI       Vital Signs: There were no vitals filed for this visit. General Exam:     Constitutional: Patient is adequately groomed with no evidence of malnutrition    Physical Exam: lower back      Primary Exam:    Inspection: 65/20 pain with flexion and extension      Palpation: Normal lower extremity      Range of Motion: Negative SLR      Strength: Nonantalgic      Special Tests: Intact lower      Skin: There are no rashes, ulcerations or lesions.       Gait: Nonantalgic      Neurovascular - non focal and intact       Additional Comments:        Additional Examinations:                    Office Imaging Results/Procedures PerformedToday:            Office Procedures:     Orders Placed This Encounter   Procedures    [facility]     Referral Priority:   Routine     Referral Type:   Eval and Treat     Referral Reason:   Specialty Services Required     Requested Specialty:   Physical Therapist     Number of Visits Requested:   1           Other Outside Imaging and Testing Personally Reviewed:    No results found. Assessment   Impression: . Encounter Diagnoses   Name Primary? Lumbar foraminal stenosis Yes    Radicular pain of right lower extremity     Radicular pain of left lower extremity     DDD (degenerative disc disease), lumbar     Lumbar spondylosis     Long term current use of antithrombotics/antiplatelets               Plan:     C9 approval for physical therapy lumbar stabilization program  Active modification lumbar spine precautions  Continue maintenance lumbar stabilization home exercise program  Continue low-dose Celebrex daily with GI precautions impairing use of tramadol moderate to severe pain  Clinical follow-up in 2 to 3 months       Orders:        Orders Placed This Encounter   Procedures    [facility]     Referral Priority:   Routine     Referral Type:   Eval and Treat     Referral Reason:   Specialty Services Required     Requested Specialty:   Physical Therapist     Number of Visits Requested:   1         Rosalind Briggs MD.      Candice President: \"This note was dictated with voice recognition software. Though review and correction are routine, we apologize for any errors. \"

## 2023-03-10 NOTE — PATIENT INSTRUCTIONS
letter  126. Cleaning  127. Reading non-fiction  128. Taking children places  129. Dancing  1:30. Going on a picnic  131. Thinking \"I did that pretty well\" after doing something  132. Meditating  133. Playing volleyball  134. Having lunch with a friend  135. Going to the hills  136. Thinking about having a family  80. Thoughts about happy moments in my childhood  138. Splurging  139. Playing cards  140. Solving riddles mentally  141. Having a political discussion  142. Exploring a new area of town  143. Seeing and/or showing photos or slides  144. Knitting/crocheting/quilting  145. Doing crossword puzzles  146. Shooting pool / playing billiards  147. Dressing up and looking nice  148. Reflecting on how I've improved  149. Buying things for myself  150. Talking on the phone  151. Going to museums, [employer]  152. Thinking Mosque thoughts  153. Surfing the Internet  154. Lighting candles  155. Listening to the radio  156. Watching FARZAD Talks  157. Having coffee at a café  158. Listening to the radio  159. Getting/giving a massage  160. Saying \"I love you\"  161. Thinking about my good qualities  162. Buying books  163. Taking a sauna or steam bath  164. Going skiing  165. Going canoeing or white-water rafting  166. Going bowling  167. Doing woodworking  168. Fantasizing about the future  169. Doing ballet, jazz, tap dancing  170. Debating  171. Playing computer games  172. Having an aquarium  173. Erotica (sex books, movies)  80. Going horseback riding  175. Going rockclimbing  176. Thinking about becoming active in the community  177. Doing something new  178. Making jigsaw puzzles  179. Thinking I'm a person who can cope  180. Playing with my pets  181. Having a barbecue  182. Rearranging the furniture in my house  183. Buying new furniture  184. Going windowshopping  185. Thinking I have a lot more going for me than most people  186.   Gardening

## 2023-03-10 NOTE — TELEPHONE ENCOUNTER
Pt called and requested a call from this provider. Called pt and she was identified by . She reported that she was going to cancel the appointment but decided to keep it. Confirmed appointment time. No safety concerns noted during call.

## 2023-03-13 ENCOUNTER — TELEPHONE (OUTPATIENT)
Dept: PRIMARY CARE CLINIC | Age: 68
End: 2023-03-13

## 2023-03-13 ENCOUNTER — CARE COORDINATION (OUTPATIENT)
Dept: CASE MANAGEMENT | Age: 68
End: 2023-03-13

## 2023-03-13 NOTE — CARE COORDINATION
[facility] Transitions Initial Follow Up Call    Patient:  Daphne Wilson Patient :  1955  MRN:  [id number]   Reason for Admission:  unstable angina  Discharge Date:  23  RARS: 8      Transitions of Care Initial Call    Was this an external facility discharge? no    Discharge Facility: [address] [place] [address] to be reviewed by the provider   Additional needs identified to be addressed with provider:    juan carlos    AMB CC Provider Discharge Needs: none            CTC attempt to reach Pt regarding recent hospital discharge. CTC left voice recording with call back number requesting a call back. Follow up appointments:    Future Appointments   Date Time Provider Galindo Johnson   3/16/2023 10:30 AM Jay Jay Chan MD [facility] BH&O MMA   3/29/2023  2:30 PM MD CONY Puckett CARDIO [facility]   2023 11:30 AM MD CONY [facility]man [facility] [place] - DYD   2023  1:10 PM Marlon Billingsley [facility]  TONYA Loya, RN  Care Transition Coordinator  Contact Number:  [phone]

## 2023-03-13 NOTE — TELEPHONE ENCOUNTER
Pt wants Nishi Mcgowan or Dr. Lei Cranker to call her back in regards to a 2nd opinion on something. Pt would not disclose any more information. Pls call pt back.

## 2023-03-13 NOTE — TELEPHONE ENCOUNTER
Spoke with patient, she said the urologist she went to see has not called her back in over a week, She would like to get a second opinion from a urologist that Dr. Lei Cranker recommends.

## 2023-03-16 ENCOUNTER — OFFICE VISIT (OUTPATIENT)
Dept: ENDOCRINOLOGY | Age: 68
End: 2023-03-16

## 2023-03-16 VITALS
BODY MASS INDEX: 29.3 KG/M2 | SYSTOLIC BLOOD PRESSURE: 125 MMHG | DIASTOLIC BLOOD PRESSURE: 74 MMHG | WEIGHT: 155.2 LBS | HEIGHT: 61 IN

## 2023-03-16 DIAGNOSIS — M85.89 OSTEOPENIA OF MULTIPLE SITES: ICD-10-CM

## 2023-03-16 DIAGNOSIS — M89.9 DISEASE OF SKELETAL SYSTEM: ICD-10-CM

## 2023-03-16 DIAGNOSIS — M89.9 DISEASE OF SKELETAL SYSTEM: Primary | ICD-10-CM

## 2023-03-16 LAB — 25(OH)D3 SERPL-MCNC: 19.4 NG/ML

## 2023-03-16 RX ORDER — DENOSUMAB 60 MG/ML
60 INJECTION SUBCUTANEOUS ONCE
Qty: 1 ML | Refills: 0 | Status: SHIPPED | OUTPATIENT
Start: 2023-03-16 | End: 2023-03-16

## 2023-03-16 NOTE — LETTER
[facility] and Osteoporosis  [facility], [address] [place] [address],[place] [address]  Phone [phone]  Fax [phone]         2023      Ariadna Gr MD                                Re:  Beronica Lucero,  1955    Dear Dr. Ashley Oneil: Thank you for asking me to see Beronica Lucero in consultation. As you know, Ms. Fabiana Chester is a 78 y.o. woman found to have low bone density in . BMD decreased 9748-7998, T-scores -2.2 in the spine, -2.4 in the left femoral neck. We reviewed life-style issues (calcium, vitamin D and physical activity). I have ordered some diagnostic tests and will be back in touch when I have the results. Without effective treatment, fracture risk is high. We discussed long-term treatment options spine, hip and non-vertebral fractures (alendronate, zoledronate, Prolia). She selected denosumab SQ twice yearly (Prolia). We will make the necessary arrangements. Enclosed is a copy of my consultation note. Please let me know if you have any questions. Sincerely,    Genevieve Saucedo. [email or web address]     Encl.  Copy of consult note

## 2023-03-16 NOTE — PROGRESS NOTES
[facility] ([facility]) Osteoporosis and [address] [facility] [address] [place], [address] [facility]  Phone [phone]  Fax [phone]    NAME:  Caitlyn Helms  :  1955  CONSULT DATE:    2023  MOST RECENT VISIT:  2023  TODAY'S DATE:  03/15/2023    Labs @ [facility] 2023    CONSULTATION REQUESTED BY: Jammie Yarbrough MD    PROBLEMS. Low bone density by DXA 2013, T-scores -2.2 in the spine, -2.1 in the left femoral neck    Family history of osteoporosis, mother with arm and rib fractures    BMD decreased 6483-9705, T-scores -2.2 in the spine, -2.4 in the left femoral neck    , foot fracture  Surgical menopause age 44  Vitamin D deficiency (desirable 25-OH D is 30-60 ng/mL)     23 ng/ml 2014    57 ng/mL 2017  Asthma  Hypercholesterolemia  Dental implant   Coronary artery disease, NSTEMI 2023    CURRENT MANAGEMENT FOR BONE HEALTH/OSTEOPOROSIS. Calcium, 300 mg from low calcium foods, 300 mg cheese   diet MVI Ca+D other    Calcium 600    mg/d   Vitamin D     IU/d   Exercise, nothing regular  Pharmacologic therapy: none    PREVIOUS BONE-ACTIVE MEDICATIONS. Alendronate 6119-5966, not sure why she stopped     OTHER CURRENT MEDICATIONS (SELECTED): Trulicity, Truemetrix, atorvastatin, gabapentin, pantoprazole, clonidine, budesonide, fluticasone  diclofenac  OTC MEDICATIONS (SELECTED): none    CHIEF COMPLAINT. See referral    HISTORY OF PRESENT ILLNESS: See problem list for chronic/inactive conditions. Ms. Briana Cabrera is a 52-year-old woman who was found to have low bone density by DXA in 2013. FOR FULL DETAILS OF FAMILY HISTORY, PAST MEDICAL AND SURGICAL HISTORY, SOCIAL HISTORY, AND REVIEW OF SYSTEMS, SEE PATIENT QUESTIONNAIRE OF TODAY'S DATE. FAMILY HISTORY. Relevant hx in problem list and/or HPI. Otherwise not contributory. PAST MEDICAL HISTORY. Noted in health history form. PAST SURGICAL HISTORY. Noted in health history form. SOCIAL HISTORY. Nonsmoker. No excessive intake of alcohol, caffeine or sodas. Lives alone. REVIEW OF SYSTEMS. No recent significant change in weight. PHYSICAL EXAMINATION. GENERAL. Well-nourished, well-developed, normally proportioned adult. MENTAL STATUS. Pleasant mood. Oriented to time, place, and person. ORAL. Teeth appear to be in good condition. SKIN. Normal texture and turgor. LUNGS. Clear to auscultation. Breath sounds normal.  HEART. Heart sounds normal, no murmur or gallop. MUSCULOSKELETAL. The examination included inspection/palpation (any misalignment, asymmetry, crepitation, defects, tenderness, masses, effusions is noted), assessment of range of motion (any presence of pain, crepitation, contracture is noted), assessment of stability (any dislocation, subluxation, laxity is noted), assessment of muscle strength and tone (any atrophy or abnormal movements is noted). Pelvis appears normal.  Spinal contours are normal.  No spine tenderness to palpation or percussion. Three finger spaces between ribs and pelvis. Gait steady without assistance. NEUROLOGICAL. Not able to rise from chair without using arms. No obvious motor or sensory deficit. Coordination appears normal     BONE DENSITY. Most recent done at [facility] using [employer]. T-scores   Initial study: 03/13/2013* L1-L4 -2.2 left fem. neck -2.1   Current study: 12/12/2022* L1-L4 -2.2 left fem. neck -2.4     The table below shows bone mineral density (grams/cm2), the appropriate measure for comparing serial scans. A significant increase or decrease is based on precision studies done at our center according to the ISCD protocol with a least significant change of 0.030 g/cm2. PA spine Proximal Femur (left)   Date L1-L4  Fem. neck Trochanter Total hip   03/13/2013* 0.915 0.763  0.798   12/12/2022* 0.920 0.697 0.536 0.699   *Done with [employer] equipment. BMD low and decreased in the left hip 8769-3870.     Labs: 10/2022 Ca 9.3 Cr 0.7. 02/2023 Ca 9.6 Cr 0.7. Imaging: DXA printouts reviewed. 01/2023 LS MR.    ASSESSMENT. Bone density is low. Using FRAX, the 10-year fracture risk high, at the intervention thresholds, so pharmacologic therapy to reduce fracture risk is recommended. DIAGNOSTIC PLANS. Blood tests: 25-OH vitamin D. I will be in touch with the patient to review lab results. THERAPEUTIC PLANS. Calcium, target 1200 mg daily; we discussed recommended calcium intake and the effects of excessive calcium intake from supplements. I provided a handout with information about how to calculate daily calcium intake. Recommended adding calcium supplement, 300 mg BID with meals. Vitamin D, recommended amount to be determined after review of 25-OH D lab result. Exercise, Recommended weight-bearing exercise (walking or equivalent) 30-40 minutes per session, 3 or 4 sessions a week. Pharmacologic therapy, we discussed long-term treatment options for osteoporosis that have evidence for broad spectrum anti-fracture efficacy (reduce the risk of vertebral, hip and other nonvertebral fractures); alendronate (Fosamax), zoledronate (Reclast) and Prolia (denosumab). I explained dosing instructions, the mechanism of action, side effects (which are uncommon) and safety concerns (which are rare). I would also consider risedronate (Actonel) but it usually much more expensive than alendronate. I explained that a [place] of 1-3 years may be possible after 3 years of Reclast or 5 years of alendronate, but holidays are not appropriate with Prolia, which needs regular 6 months dosing to provide protection. The patient selected Prolia 60 mg SQ twice yearly. We will make the necessary arrangements. If Prolia is not affordable, Reclast is her second choice. Return appointment with DXA in 1 year. Total time on the date the encounter was 60-74 minutes. Humera Roque MD, Director, [facility]) Osteoporosis and [facility]    CC: Amarilis Adhikari MD

## 2023-03-17 DIAGNOSIS — E11.9 TYPE 2 DIABETES MELLITUS WITHOUT COMPLICATIONS (HCC): ICD-10-CM

## 2023-03-17 RX ORDER — GLUCOSAM/CHON-MSM1/C/MANG/BOSW 500-416.6
1 TABLET ORAL DAILY
Qty: 100 EACH | Refills: 3 | Status: SHIPPED | OUTPATIENT
Start: 2023-03-17

## 2023-03-17 RX ORDER — BLOOD-GLUCOSE METER
EACH MISCELLANEOUS
Qty: 1 KIT | Refills: 0 | Status: SHIPPED | OUTPATIENT
Start: 2023-03-17

## 2023-03-20 ENCOUNTER — CARE COORDINATION (OUTPATIENT)
Dept: CASE MANAGEMENT | Age: 68
End: 2023-03-20

## 2023-03-20 NOTE — CARE COORDINATION
[facility] Transitions Initial Follow Up Call    Patient:  Amina Crystal Patient :  1955  MRN:  [id number]   Reason for Admission:   unstable angina  Discharge Date:  23  RARS: 8      Transitions of Care Initial Call    Was this an external facility discharge? no    Discharge Facility: [address] [place] [address] to be reviewed by the provider   Additional needs identified to be addressed with provider:    juan carlos    AMB CC Provider Discharge Needs: none            CTC attempt to reach Pt regarding recent hospital discharge. CTC unable to leave voice recording with call back number requesting a call back / no answer. Follow up appointments:    Future Appointments   Date Time Provider Galindo Johnson   3/21/2023  1:00 PM Macy Lucero MD [facility] DF ENT [facility]   3/29/2023  2:30 PM MD CONY Winchester CARDIO [facility]   2023 11:30 AM MD CONY Wallis [facility] [place] - DYD   2023  1:10 PM Seth Blair MD [facility] DRFLD [facility]       [facility].  Adriana Lopez, BSN, RN  Care Transition Coordinator  Contact Number:  [phone]

## 2023-03-21 ENCOUNTER — OFFICE VISIT (OUTPATIENT)
Dept: ENT CLINIC | Age: 68
End: 2023-03-21
Payer: MEDICARE

## 2023-03-21 ENCOUNTER — TELEPHONE (OUTPATIENT)
Dept: ENT CLINIC | Age: 68
End: 2023-03-21

## 2023-03-21 VITALS
TEMPERATURE: 98.5 F | OXYGEN SATURATION: 96 % | SYSTOLIC BLOOD PRESSURE: 128 MMHG | HEART RATE: 94 BPM | DIASTOLIC BLOOD PRESSURE: 74 MMHG

## 2023-03-21 DIAGNOSIS — R04.0 EPISTAXIS: Primary | ICD-10-CM

## 2023-03-21 PROCEDURE — 31238 NSL/SINS NDSC SRG NSL HEMRRG: CPT | Performed by: OTOLARYNGOLOGY

## 2023-03-21 ASSESSMENT — ENCOUNTER SYMPTOMS
CHOKING: 0
SORE THROAT: 0
TROUBLE SWALLOWING: 0
NAUSEA: 0
COUGH: 0
EYE ITCHING: 0
SHORTNESS OF BREATH: 0
SINUS PRESSURE: 0
VOICE CHANGE: 0
SINUS PAIN: 0
EYE REDNESS: 0
EYE PAIN: 0
RHINORRHEA: 1
FACIAL SWELLING: 0
DIARRHEA: 0

## 2023-03-21 NOTE — PROGRESS NOTES
instructions below for post treatment management of your nose bleed. 1. Do not blow your nose for 7 days. 2. Try to avoid touching you nose for 7 days. 3. Saline nasal spray, 2 sprays each nostril 5 times a day for 2 weeks. 4. Afrin nasal spray, 2 sprays in the bleeding nostril 3 times a day for 3 days. 5. Hold aspirin, ibuprofen and/or fish oil for two weeks. If you were to have another nose bleed. .. 1. Afrin nasal spray, 5 sprays into the bleeding nostril. 2. Hold the soft portion of your nose with pressure for ten minutes. 3. If still bleeding repeat the above two steps three more times. If still bleeding please go to your closest emergency room.            Maria Teresa Ulloa MD

## 2023-03-21 NOTE — TELEPHONE ENCOUNTER
Pt called says that she saw Dr. Vielka Ruiz for her nose bleeds and she says that the right side of her nose and face are in a lot of pain. She wants to know if this is normal and she would like suggestions on what to do for the pain. Please give her a call back, thanks!

## 2023-03-21 NOTE — PATIENT INSTRUCTIONS
Dr. Lul Pro    Please follow the instructions below for treatment management of your nasal crusting. .     1. Be a two nostril blower. 2. Do not pick nose. 3. Do not place anything in nose. 4. Pretz nasal spray 5 times a day. Order from [employer]. com  5. Saline nasal gel 3 times a day. 6. Bedside humidifier while sleeping. 7. Saline rinses twice a day. 8. If you smoke stop.

## 2023-03-22 ENCOUNTER — TELEPHONE (OUTPATIENT)
Dept: ENDOCRINOLOGY | Age: 68
End: 2023-03-22

## 2023-03-22 NOTE — TELEPHONE ENCOUNTER
Message sent via [employer] related to [employer] benefit of summary and schedule an appointment for the prolia injection

## 2023-03-28 ENCOUNTER — CARE COORDINATION (OUTPATIENT)
Dept: CASE MANAGEMENT | Age: 68
End: 2023-03-28

## 2023-03-28 ENCOUNTER — TELEPHONE (OUTPATIENT)
Dept: ORTHOPEDIC SURGERY | Age: 68
End: 2023-03-28

## 2023-03-28 NOTE — TELEPHONE ENCOUNTER
Called patient and LM that work comp approved PT and she can call PT location and get scheduled -- left # for patient to call back with questions if she has any

## 2023-03-28 NOTE — CARE COORDINATION
Joseph 45 Transitions Initial Follow Up Call    Patient:  Cele Ambriz Patient :  1955  MRN:  [id number]   Reason for Admission: unstable angina  Discharge Date:  23  RARS: 8      Transitions of Care Initial Call    Was this an external facility discharge? no    Discharge Facility: [address] [place] [address] to be reviewed by the provider   Additional needs identified to be addressed with provider:    juan carlos    AMB CC Provider Discharge Needs: none            CTC attempt to reach Pt regarding recent hospital discharge. CTC left voice recording with call back number requesting a call back. Follow up appointments:    Future Appointments   Date Time Provider Galindo Johnson   3/29/2023 11:30  [address] [facility] PT [facility] HOD   3/29/2023  2:30 PM MD CONY Quiros CARDIO [facility]   3/30/2023  1:30 PM SCHEDULE, BONE HEALTH & OSTEOPOROSIS MHPHYS BH&O [facility]   2023 11:30 AM Marylen Life, MD [facility] [place] - DYD   2023  1:10 PM Sol Palencia MD [facility] DRFLD [facility].  MAGALYS WatkinsN, RN  Care Transition Coordinator  Contact Number:  [phone]

## 2023-03-29 ENCOUNTER — OFFICE VISIT (OUTPATIENT)
Dept: CARDIOLOGY CLINIC | Age: 68
End: 2023-03-29
Payer: MEDICARE

## 2023-03-29 ENCOUNTER — HOSPITAL ENCOUNTER (OUTPATIENT)
Dept: PHYSICAL THERAPY | Age: 68
Setting detail: THERAPIES SERIES
Discharge: HOME | End: 2023-03-29
Payer: MEDICARE

## 2023-03-29 VITALS
BODY MASS INDEX: 29.34 KG/M2 | SYSTOLIC BLOOD PRESSURE: 134 MMHG | WEIGHT: 154 LBS | DIASTOLIC BLOOD PRESSURE: 70 MMHG | HEART RATE: 90 BPM

## 2023-03-29 DIAGNOSIS — R06.02 SHORTNESS OF BREATH: ICD-10-CM

## 2023-03-29 DIAGNOSIS — I10 HTN (HYPERTENSION), BENIGN: ICD-10-CM

## 2023-03-29 DIAGNOSIS — I25.10 CORONARY ARTERY DISEASE INVOLVING NATIVE CORONARY ARTERY OF NATIVE HEART WITHOUT ANGINA PECTORIS: ICD-10-CM

## 2023-03-29 DIAGNOSIS — R06.02 SHORTNESS OF BREATH: Primary | ICD-10-CM

## 2023-03-29 DIAGNOSIS — R07.89 ATYPICAL CHEST PAIN: ICD-10-CM

## 2023-03-29 DIAGNOSIS — E78.5 HYPERLIPIDEMIA, UNSPECIFIED HYPERLIPIDEMIA TYPE: ICD-10-CM

## 2023-03-29 LAB
ALBUMIN SERPL-MCNC: 4.4 G/DL (ref 3.4–5)
ALBUMIN/GLOB SERPL: 1.6 {RATIO} (ref 1.1–2.2)
ALP SERPL-CCNC: 107 U/L (ref 40–129)
ALT SERPL-CCNC: 36 U/L (ref 10–40)
ANION GAP SERPL CALCULATED.3IONS-SCNC: 14 MMOL/L (ref 3–16)
AST SERPL-CCNC: <5 U/L (ref 15–37)
BASOPHILS # BLD: 0.1 K/UL (ref 0–0.2)
BASOPHILS NFR BLD: 1 %
BILIRUB SERPL-MCNC: 0.7 MG/DL (ref 0–1)
BUN SERPL-MCNC: 14 MG/DL (ref 7–20)
CALCIUM SERPL-MCNC: 9.8 MG/DL (ref 8.3–10.6)
CHLORIDE SERPL-SCNC: 103 MMOL/L (ref 99–110)
CO2 SERPL-SCNC: 24 MMOL/L (ref 21–32)
CREAT SERPL-MCNC: 0.7 MG/DL (ref 0.6–1.2)
DEPRECATED RDW RBC AUTO: 13.2 % (ref 12.4–15.4)
EOSINOPHIL # BLD: 0.1 K/UL (ref 0–0.6)
EOSINOPHIL NFR BLD: 2 %
GFR SERPLBLD CREATININE-BSD FMLA CKD-EPI: >60 ML/MIN/{1.73_M2}
GLUCOSE SERPL-MCNC: 162 MG/DL (ref 70–99)
HCT VFR BLD AUTO: 42 % (ref 36–48)
HGB BLD-MCNC: 14.4 G/DL (ref 12–16)
LYMPHOCYTES # BLD: 1.3 K/UL (ref 1–5.1)
LYMPHOCYTES NFR BLD: 18.7 %
MCH RBC QN AUTO: 29.4 PG (ref 26–34)
MCHC RBC AUTO-ENTMCNC: 34.2 G/DL (ref 31–36)
MCV RBC AUTO: 85.9 FL (ref 80–100)
MONOCYTES # BLD: 0.5 K/UL (ref 0–1.3)
MONOCYTES NFR BLD: 6.7 %
NEUTROPHILS # BLD: 5.1 K/UL (ref 1.7–7.7)
NEUTROPHILS NFR BLD: 71.6 %
PLATELET # BLD AUTO: 252 K/UL (ref 135–450)
PMV BLD AUTO: 9 FL (ref 5–10.5)
POTASSIUM SERPL-SCNC: 3.9 MMOL/L (ref 3.5–5.1)
PROT SERPL-MCNC: 7.1 G/DL (ref 6.4–8.2)
RBC # BLD AUTO: 4.89 M/UL (ref 4–5.2)
SODIUM SERPL-SCNC: 141 MMOL/L (ref 136–145)
WBC # BLD AUTO: 7.1 K/UL (ref 4–11)

## 2023-03-29 PROCEDURE — 1090F PRES/ABSN URINE INCON ASSESS: CPT | Performed by: INTERNAL MEDICINE

## 2023-03-29 PROCEDURE — G8427 DOCREV CUR MEDS BY ELIG CLIN: HCPCS | Performed by: INTERNAL MEDICINE

## 2023-03-29 PROCEDURE — 97110 THERAPEUTIC EXERCISES: CPT

## 2023-03-29 PROCEDURE — G8399 PT W/DXA RESULTS DOCUMENT: HCPCS | Performed by: INTERNAL MEDICINE

## 2023-03-29 PROCEDURE — G8484 FLU IMMUNIZE NO ADMIN: HCPCS | Performed by: INTERNAL MEDICINE

## 2023-03-29 PROCEDURE — 97161 PT EVAL LOW COMPLEX 20 MIN: CPT

## 2023-03-29 PROCEDURE — 93000 ELECTROCARDIOGRAM COMPLETE: CPT | Performed by: INTERNAL MEDICINE

## 2023-03-29 PROCEDURE — 3078F DIAST BP <80 MM HG: CPT | Performed by: INTERNAL MEDICINE

## 2023-03-29 PROCEDURE — 1036F TOBACCO NON-USER: CPT | Performed by: INTERNAL MEDICINE

## 2023-03-29 PROCEDURE — 1124F ACP DISCUSS-NO DSCNMKR DOCD: CPT | Performed by: INTERNAL MEDICINE

## 2023-03-29 PROCEDURE — G8417 CALC BMI ABV UP PARAM F/U: HCPCS | Performed by: INTERNAL MEDICINE

## 2023-03-29 PROCEDURE — 3075F SYST BP GE 130 - 139MM HG: CPT | Performed by: INTERNAL MEDICINE

## 2023-03-29 PROCEDURE — 3017F COLORECTAL CA SCREEN DOC REV: CPT | Performed by: INTERNAL MEDICINE

## 2023-03-29 PROCEDURE — 99214 OFFICE O/P EST MOD 30 MIN: CPT | Performed by: INTERNAL MEDICINE

## 2023-03-29 RX ORDER — ATORVASTATIN CALCIUM 80 MG/1
40 TABLET, FILM COATED ORAL NIGHTLY
Qty: 90 TABLET | Refills: 3 | Status: SHIPPED | OUTPATIENT
Start: 2023-03-29

## 2023-03-29 RX ORDER — NITROGLYCERIN 40 MG/1
1 PATCH TRANSDERMAL DAILY
Qty: 90 PATCH | Refills: 3 | Status: SHIPPED | OUTPATIENT
Start: 2023-03-29

## 2023-03-29 RX ORDER — DILTIAZEM HYDROCHLORIDE 120 MG/1
120 CAPSULE, COATED, EXTENDED RELEASE ORAL DAILY
Qty: 30 CAPSULE | Refills: 3 | Status: SHIPPED | OUTPATIENT
Start: 2023-03-29

## 2023-03-29 RX ORDER — RANOLAZINE 500 MG/1
500 TABLET, EXTENDED RELEASE ORAL 2 TIMES DAILY
Qty: 180 TABLET | Refills: 3 | Status: SHIPPED | OUTPATIENT
Start: 2023-03-29

## 2023-03-29 RX ORDER — CLOPIDOGREL BISULFATE 75 MG/1
75 TABLET ORAL DAILY
Qty: 90 TABLET | Refills: 3 | Status: SHIPPED | OUTPATIENT
Start: 2023-03-29

## 2023-03-29 NOTE — PROGRESS NOTES
End Date Taking? Authorizing Provider   Blood Glucose Monitoring Suppl (TRUE METRIX GO GLUCOSE METER) w/Device KIT USE AS DIRECTED AND AS NEEDED 3/17/23  Yes Garrick Uriostegui MD   TRUEplus Lancets 30G MISC 1 each by Does not apply route daily 3/17/23  Yes Garrick Uriostegui MD   celecoxib (CELEBREX) 100 MG capsule Take 1 capsule by mouth daily as needed for Pain 3/10/23  Yes Ricky Herrera MD   gabapentin (NEURONTIN) 300 MG capsule Take 1 capsule by mouth in the morning and at bedtime for 90 days. 3/4/23 6/2/23 Yes Garrick Uriostegui MD   ranolazine (RANEXA) 500 MG extended release tablet Take 1 tablet by mouth 2 times daily 2/24/23  Yes Florencia Benson MD   nitroGLYCERIN (NITROSTAT) 0.4 MG SL tablet Place 1 tablet under the tongue every 5 minutes as needed for Chest pain up to max of 3 total doses.  If no relief after 1 dose, call 911. 2/24/23  Yes Florencia Benson MD   atorvastatin (LIPITOR) 80 MG tablet Take 1 tablet by mouth nightly 2/24/23  Yes Florencia Benson MD   clopidogrel (PLAVIX) 75 MG tablet Take 1 tablet by mouth daily 2/24/23  Yes Florencia Benson MD   nitroGLYCERIN (NITRODUR) 0.2 MG/HR Place 1 patch onto the skin daily 2/25/23  Yes Florencia Benson MD   cloNIDine (CATAPRES) 0.1 MG/24HR PTWK APPLY 1 PATCH ONTO THE SKINONCE A WEEK 2/20/23  Yes Garrick Uriostegui MD   budesonide-formoterol (SYMBICORT) 160-4.5 MCG/ACT AERO USE 2 INHALATIONS ORALLY   TWICE DAILY 2/17/23  Yes Garrick Uriostegui MD   Dulaglutide (TRULICITY) 2.28 LF/9.5VA SOPN INJECT 0.5ML (=0.75 MG)    SUBCUTANEOUSLY ONCE WEEKLY 2/14/23  Yes Garrick Uriostegui MD   albuterol sulfate HFA (PROVENTIL;VENTOLIN;PROAIR) 108 (90 Base) MCG/ACT inhaler Inhale 2 puffs into the lungs every 6 hours as needed for Wheezing   Yes Historical MD Rodolfo   omeprazole (PRILOSEC) 40 MG delayed release capsule Take 40 mg by mouth daily   Yes Historical Provider, MD   cetirizine (ZYRTEC) 10 MG tablet Take 1 tablet by mouth daily 12/28/22  Yes Raisa FONG

## 2023-03-29 NOTE — THERAPY EVALUATION
flexion(S1) 5 5       Special tests   Comments   SLR Unable straighten leg in supine R    (+) L        DTRs Left Right Comments   Patellar(L3-L4) 2+ 3+ extreme shooting pain following kick     Achilles(S1-S2) 1+ 1+     Clonus WNL WNL     Sanchez's  WNL WNL       Joint mobility: unable to assess d/t concordant symptoms              []Normal               []Hypo              []Hyper     Palpation: severe TTP alone L paraspinals  and - improves with distraction     Functional Mobility/Transfers: IND     Posture: no outstanding deviations     Bandages/Dressings/Incisions: n/a     Gait: (include devices/WB status) antalgic gait, decreased stance on RLE and increase R knee flexion                          [x] Patient history, allergies, meds reviewed. Medical chart reviewed. See intake form. Review Of Systems (ROS):  [x]Performed Review of systems (Integumentary, CardioPulmonary, Neurological) by intake and observation. Intake form has been scanned into medical record. Patient has been instructed to contact their primary care physician regarding ROS issues if not already being addressed at this time.        Co-morbidities/Complexities (which will affect course of rehabilitation):   []None              Arthritic conditions   []Rheumatoid arthritis (M05.9)  [x]Osteoarthritis (M19.91)    Cardiovascular conditions   []Hypertension (I10)  []Hyperlipidemia (E78.5)  []Angina pectoris (I20)  []Atherosclerosis (I70)    Musculoskeletal conditions   []Disc pathology   []Congenital spine pathologies   []Prior surgical intervention  [x]Osteoporosis (M81.8)  [x]Osteopenia (M85.8)   Endocrine conditions   []Hypothyroid (E03.9)  []Hyperthyroid Gastrointestinal conditions   []Constipation (X52.55)    Metabolic conditions   []Morbid obesity (E66.01)  [x]Diabetes type 1(E10.65) or 2 (E11.65)   []Neuropathy (G60.9)      Pulmonary conditions   []Asthma (J45)  []Coughing   []COPD (J44.9)    Psychological Disorders  []Anxiety

## 2023-03-29 NOTE — FLOWSHEET NOTE
weekly - 1 sets - 10 reps - 5 hold  - Seated Ankle Pumps  - 2-3 x daily - 7 x weekly - 3 sets - 10 reps    Prognosis: [x] Good [] Fair  [] Poor    Patient Requires Follow-up: [x] Yes  [] No    PLAN: See eval  [] Continue per plan of care [] Alter current plan (see comments)  [x] Plan of care initiated [] Hold pending MD visit [] Discharge    Uli Bird, Student Physical Therapist  Therapist was present, directed the patient's care, made skilled judgement, and was responsible for assessment and treatment of the patient. Electronically signed by: Najma Jones, PT PT, DPT    *If patient does not return for further follow ups after this date. Please consider this as the patients discharge from physical therapy.

## 2023-03-30 ENCOUNTER — TELEPHONE (OUTPATIENT)
Dept: CARDIOLOGY CLINIC | Age: 68
End: 2023-03-30

## 2023-03-30 PROBLEM — N39.0 URINARY TRACT INFECTION WITH HEMATURIA: Status: ACTIVE | Noted: 2023-03-30

## 2023-03-30 PROBLEM — R31.9 URINARY TRACT INFECTION WITH HEMATURIA: Status: ACTIVE | Noted: 2023-03-30

## 2023-03-30 ASSESSMENT — ENCOUNTER SYMPTOMS
ABDOMINAL PAIN: 0
CONSTIPATION: 0
DIARRHEA: 0
COUGH: 0
VOMITING: 0
BACK PAIN: 0
NAUSEA: 0
SHORTNESS OF BREATH: 0
CHEST TIGHTNESS: 0
SORE THROAT: 0

## 2023-03-31 ENCOUNTER — HOSPITAL ENCOUNTER (OUTPATIENT)
Dept: PHYSICAL THERAPY | Age: 68
Setting detail: THERAPIES SERIES
Discharge: HOME | End: 2023-03-31
Payer: MEDICARE

## 2023-03-31 PROCEDURE — 97140 MANUAL THERAPY 1/> REGIONS: CPT

## 2023-03-31 PROCEDURE — G0283 ELEC STIM OTHER THAN WOUND: HCPCS

## 2023-03-31 PROCEDURE — 97110 THERAPEUTIC EXERCISES: CPT

## 2023-03-31 NOTE — FLOWSHEET NOTE
Post.)  [] (25292) Provided manual therapy to mobilize proximal hip and LS spine soft tissue/joints for the purpose of modulating pain, promoting relaxation,  increasing ROM, reducing/eliminating soft tissue swelling/inflammation/restriction, improving soft tissue extensibility and allowing for proper ROM for normal function with self care, mobility, lifting and ambulation. Modalities:   ICF 15' Long sitting with heat pack 3/31    Charges:  Timed Code Treatment Minutes: 11:20-11:45 25' MTx2  11:45-12:05 20' TEx1   12:05-12:20 ES x 1   Total Treatment Minutes: 11:20-12:20  60'       [] EVAL (LOW) 98887 (typically 20 minutes face-to-face)  [] EVAL (MOD) 20943 (typically 30 minutes face-to-face)  [] EVAL (HIGH) 27623 (typically 45 minutes face-to-face)  [] RE-EVAL     [x] YX(07823) x   1  [] IONTO  [] NMR (77009) x      [] VASO  [x] Manual (09146) x    2  [] Other:  [] TA x      [] Mech Traction (16593)  [x] ES(attended) (53502)      [] ES (un) (68803):     Goals:   Patient stated goal: Pt would like to return to taking care of her self. [] Progressing: [] Met: [] Not Met: [] Adjusted    Therapist goals for Patient:   Short Term Goals: To be achieved in: 4 weeks  1. Independent in HEP and progression per patient tolerance, in order to prevent re-injury. [] Progressing: [] Met: [] Not Met: [] Adjusted   2. Patient will have a decrease in pain to facilitate improvement in movement, function, and ADLs as indicated by Functional Deficits. [] Progressing: [] Met: [] Not Met: [] Adjusted  3. Patient will demonstrate ability to sleep throughout the night without increase in concordant pain or nightly disturbances. [] Progressing: [] Met: [] Not Met: [] Adjusted    Long Term Goals: To be achieved in: 8 weeks  1. Disability index score of 20% or less for the [place] to assist with reaching prior level of function. [] Progressing: [] Met: [] Not Met: [] Adjusted  2.  Patient will demonstrate increased lumbar

## 2023-04-04 ENCOUNTER — OFFICE VISIT (OUTPATIENT)
Dept: PRIMARY CARE CLINIC | Age: 68
End: 2023-04-04

## 2023-04-04 VITALS
HEART RATE: 107 BPM | SYSTOLIC BLOOD PRESSURE: 118 MMHG | WEIGHT: 153 LBS | DIASTOLIC BLOOD PRESSURE: 70 MMHG | RESPIRATION RATE: 18 BRPM | TEMPERATURE: 98.2 F | BODY MASS INDEX: 28.89 KG/M2 | HEIGHT: 61 IN | OXYGEN SATURATION: 96 %

## 2023-04-04 DIAGNOSIS — R20.2 NUMBNESS AND TINGLING OF LEFT SIDE OF FACE: ICD-10-CM

## 2023-04-04 DIAGNOSIS — E53.8 B12 DEFICIENCY: Primary | ICD-10-CM

## 2023-04-04 DIAGNOSIS — R20.0 NUMBNESS AND TINGLING OF LEFT SIDE OF FACE: ICD-10-CM

## 2023-04-04 NOTE — PROGRESS NOTES
discussed. - MRI ORBITS FACE NECK W WO CONTRAST; Future    2. B12 deficiency  Chronic, start B12 daily. May need IM injections. Repeat B12 in 8 weeks. MRI brain from February with white matter hyperintensity - possible demyelination. B12 was low from 2019. - Vitamin B12 level, future  - cyanocobalamin ([facility] VITAMIN B12) 1000 MCG tablet; Take 1 tablet by mouth daily  Dispense: 30 tablet; Refill: 0           Return if symptoms worsen or fail to improve. Future Appointments   Date Time Provider Galindo Johnson   4/5/2023 12:15 PM Chanda Carrera [facility]   4/6/2023  2:15 PM SCHEDULE, BONE HEALTH & OSTEOPOROSIS MHPHYS BH&O [facility]   4/7/2023 11:45 AM MAGDALENA Arcos [facility]   4/11/2023 11:30 AM MD CONY [facility] [place] - DYD   5/3/2023  2:30 PM MD CONY Madsen CARDIO [facility]   6/15/2023  1:00 PM Mita Cannon MD [facility] DRFLD [facility]       Patient given educational materials - see patient instructions. Discussed use, benefit, and sideeffects of prescribed medications. All patient questions answered. Pt voiced understanding. Reviewed health maintenance. Instructed to continue current medications, diet and exercise. Patient agreed with treatment plan. Follow up as directed.      Electronically signed by Cathy Dale DO on 4/4/2023 at 3:34 PM

## 2023-04-05 ENCOUNTER — TELEMEDICINE (OUTPATIENT)
Dept: PRIMARY CARE CLINIC | Age: 68
End: 2023-04-05

## 2023-04-05 ENCOUNTER — HOSPITAL ENCOUNTER (OUTPATIENT)
Dept: PHYSICAL THERAPY | Age: 68
Setting detail: THERAPIES SERIES
Discharge: HOME | End: 2023-04-05

## 2023-04-05 ENCOUNTER — TELEPHONE (OUTPATIENT)
Dept: PRIMARY CARE CLINIC | Age: 68
End: 2023-04-05

## 2023-04-05 DIAGNOSIS — J30.9 ALLERGIC RHINITIS, UNSPECIFIED SEASONALITY, UNSPECIFIED TRIGGER: Primary | ICD-10-CM

## 2023-04-05 RX ORDER — NARATRIPTAN 2.5 MG/1
TABLET ORAL
COMMUNITY
Start: 2023-03-28

## 2023-04-05 NOTE — PROGRESS NOTES
03/29/2023     03/29/2023     Lab Results   Component Value Date     03/29/2023    K 3.9 03/29/2023     03/29/2023    CO2 24 03/29/2023    BUN 14 03/29/2023    CREATININE 0.7 03/29/2023    GLUCOSE 162 (H) 03/29/2023    CALCIUM 9.8 03/29/2023    PROT 7.1 03/29/2023    LABALBU 4.4 03/29/2023    BILITOT 0.7 03/29/2023    ALKPHOS 107 03/29/2023    AST <5 (A) 03/29/2023    ALT 36 03/29/2023    LABGLOM >60 03/29/2023    GFRAA >60 06/16/2022    AGRATIO 1.6 03/29/2023    GLOB 1.9 09/23/2019     Lab Results   Component Value Date    TSH 2.25 02/22/2023    T4FREE 1.21 09/13/2012     Lab Results   Component Value Date    LABA1C 6.1 02/23/2023     Lab Results   Component Value Date    .4 02/23/2023     Lab Results   Component Value Date    CHOL 140 02/23/2023    CHOL 154 02/23/2023    CHOL 181 01/16/2023     Lab Results   Component Value Date    TRIG 133 02/23/2023    TRIG 107 02/23/2023    TRIG 103 01/16/2023     Lab Results   Component Value Date    HDL 47 02/23/2023    HDL 52 02/23/2023    HDL 50 01/16/2023     Lab Results   Component Value Date    LDLCALC 66 02/23/2023    LDLCALC 81 02/23/2023    LDLCALC 110 (H) 01/16/2023       Lab Results   Component Value Date    LABMICR YES 03/01/2023     Physical Exam  Constitutional:       General: She is not in acute distress. Appearance: Normal appearance. She is not ill-appearing. HENT:      Head: Normocephalic and atraumatic. Right Ear: External ear normal.      Left Ear: External ear normal.      Nose: Nose normal.   Eyes:      Extraocular Movements: Extraocular movements intact. Conjunctiva/sclera: Conjunctivae normal.      Pupils: Pupils are equal, round, and reactive to light. Pulmonary:      Effort: Pulmonary effort is normal. No respiratory distress. Neurological:      Mental Status: She is alert. Psychiatric:         Mood and Affect: Mood normal.         Behavior: Behavior normal.         Thought Content:  Thought content

## 2023-04-05 NOTE — FLOWSHEET NOTE
Physical Therapy  Cancellation/No-show Note  Patient Name:  Frida Villareal  :  1955   Date:  2023  Cancelled visits to date: 1  No-shows to date: 0    For today's appointment patient:  [x]  Cancelled  []  Rescheduled appointment  []  No-show     Reason given by patient:  [x]  Patient ill  []  Conflicting appointment  []  No transportation    []  Conflict with work  []  No reason given  []  Other:     Comments:      Electronically signed by:  Airam Baker PT, PT

## 2023-04-06 ENCOUNTER — TELEPHONE (OUTPATIENT)
Dept: PRIMARY CARE CLINIC | Age: 68
End: 2023-04-06

## 2023-04-06 DIAGNOSIS — U07.1 COVID: Primary | ICD-10-CM

## 2023-04-06 NOTE — TELEPHONE ENCOUNTER
Patient is a lot worse today then she was yesterday, she has since taken a covid test and it is positive,  her throat is now on fire, she can hardly talk. Would like to know if there is something that she can take? Please let the patient know.          Thank you

## 2023-04-06 NOTE — TELEPHONE ENCOUNTER
Yes - it is called paxlovid. It is an antiviral medication. She will need to stop her lipitor and ranexa for 10 days once she starts the paxlovid though. Side effects include nausea, diarrhea, metallic taste in mouth. Rx sent to pharmacy.

## 2023-04-07 ENCOUNTER — APPOINTMENT (OUTPATIENT)
Dept: PHYSICAL THERAPY | Age: 68
End: 2023-04-07
Payer: MEDICARE

## 2023-04-18 ENCOUNTER — HOSPITAL ENCOUNTER (OUTPATIENT)
Dept: PHYSICAL THERAPY | Age: 68
Setting detail: THERAPIES SERIES
Discharge: HOME | End: 2023-04-18
Payer: MEDICARE

## 2023-04-18 PROCEDURE — 97110 THERAPEUTIC EXERCISES: CPT

## 2023-04-18 PROCEDURE — G0283 ELEC STIM OTHER THAN WOUND: HCPCS

## 2023-04-18 PROCEDURE — 97112 NEUROMUSCULAR REEDUCATION: CPT

## 2023-04-18 NOTE — FLOWSHEET NOTE
[address],  [address]  Phone: [phone]   Fax:     [phone]    Physical Therapy Daily Treatment Note  Date:  2023    Patient Name:  Jackeline Barr    :  1955  MRN: [id number]  Restrictions/Precautions:    Medical/Treatment Diagnosis Information:  Diagnosis: M48.061 (ICD-10-CM) - Lumbar foraminal stenosis  M54.10 (ICD-10-CM) - Radicular pain of right lower extremity  M54.10 (ICD-10-CM) - Radicular pain of left lower extremity  M51.36 (ICD-10-CM) - DDD (degenerative disc disease), lumbar  M47.816 (ICD-10-CM) - Lumbar spondylosis  Treatment Diagnosis: M54. 16 - Radiculopathy, lumbar region  Insurance/Certification information:  Medicare with [facility] secondary  Physician Information:  Johanne Shepherd MD  Has the plan of care been signed (Y/N):        [x]  Yes  []  No     Date of Patient follow up with Physician:       Is this a Progress Report:     []  Yes  [x]  No        If Yes:  Date Range for reporting period:  Beginning: 3/29  Ending    Progress report will be due (10 Rx or 30 days whichever is less):        Recertification will be due (POC Duration  / 90 days whichever is less):          Visit # Insurance Allowable Auth Required   3   Medicare  2x a week for 6 weeks 12 total visits [x]  Yes []  No        Functional Scale:   RACHEL 60% deficit    Date assessed:  3/29/2023     Latex Allergy:  [x]NO      []YES  Preferred Language for Healthcare:   [x]English       []other:      Pain level:  8/10 4/18     SUBJECTIVE:   Pt states has missed the past 2 weeks d/t having COVID-19. States she feels weak in both legs since beiin sick and reports she had this symptom when she had COVID-19 May 2022.  Pain in her low back has increased along with pain down her R LE.      OBJECTIVE: See eval  Observation:    Pt in supine resting with R hip ER and mild R hip and R knee flexion

## 2023-04-19 ENCOUNTER — HOSPITAL ENCOUNTER (OUTPATIENT)
Dept: MRI IMAGING | Age: 68
Discharge: HOME | End: 2023-04-19
Payer: MEDICARE

## 2023-04-19 DIAGNOSIS — R20.2 NUMBNESS AND TINGLING OF LEFT SIDE OF FACE: ICD-10-CM

## 2023-04-19 DIAGNOSIS — R20.0 NUMBNESS AND TINGLING OF LEFT SIDE OF FACE: ICD-10-CM

## 2023-04-19 PROCEDURE — A9579 GAD-BASE MR CONTRAST NOS,1ML: HCPCS | Performed by: STUDENT IN AN ORGANIZED HEALTH CARE EDUCATION/TRAINING PROGRAM

## 2023-04-19 PROCEDURE — 70553 MRI BRAIN STEM W/O & W/DYE: CPT

## 2023-04-19 PROCEDURE — 6360000004 HC RX CONTRAST MEDICATION: Performed by: STUDENT IN AN ORGANIZED HEALTH CARE EDUCATION/TRAINING PROGRAM

## 2023-04-19 RX ADMIN — GADOTERIDOL 14 ML: 279.3 INJECTION, SOLUTION INTRAVENOUS at 15:28

## 2023-04-20 ENCOUNTER — NURSE ONLY (OUTPATIENT)
Dept: ENDOCRINOLOGY | Age: 68
End: 2023-04-20

## 2023-04-20 DIAGNOSIS — M81.0 POSTMENOPAUSAL OSTEOPOROSIS: Primary | ICD-10-CM

## 2023-04-21 ENCOUNTER — HOSPITAL ENCOUNTER (OUTPATIENT)
Dept: PHYSICAL THERAPY | Age: 68
Setting detail: THERAPIES SERIES
Discharge: HOME | End: 2023-04-21
Payer: MEDICARE

## 2023-04-21 PROCEDURE — 97012 MECHANICAL TRACTION THERAPY: CPT

## 2023-04-21 PROCEDURE — 97110 THERAPEUTIC EXERCISES: CPT

## 2023-04-21 NOTE — FLOWSHEET NOTE
Patient will demonstrate ability to sleep throughout the night without increase in concordant pain or nightly disturbances. [] Progressing: [] Met: [] Not Met: [] Adjusted    Long Term Goals: To be achieved in: 8 weeks  1. Disability index score of 20% or less for the [place] to assist with reaching prior level of function. [] Progressing: [] Met: [] Not Met: [] Adjusted  2. Patient will demonstrate increased lumbar AROM to WNL pain free in all directions in order to complete ADLs without pain or restriction. [] Progressing: [] Met: [] Not Met: [] Adjusted  3. Patient will demonstrate an increase in bilateral hip strength to 4+/5 in all directions pain free in order to complete light household work without restriction. [] Progressing: [] Met: [] Not Met: [] Adjusted  4. Patient will demonstrate ability to straight her RLE without increase in concordant pain in order to get dressed without restriction. [] Progressing: [] Met: [] Not Met: [] Adjusted  5. Pt will demonstrate ability to ambulate 1000' with improved gait mechanics in order to complete grocery shopping without restriction. [] Progressing: [] Met: [] Not Met: [] Adjusted     Overall Progression Towards Functional goals/ Treatment Progress Update:  [] Patient is progressing as expected towards functional goals listed. [] Progression is slowed due to complexities/Impairments listed. [] Progression has been slowed due to co-morbidities.   [x] Plan just implemented, too soon to assess goals progression <30days   [] Goals require adjustment due to lack of progress  [] Patient is not progressing as expected and requires additional follow up with physician  [] Other    Prognosis for POC: [x] Good [] Fair  [] Poor      Patient requires continued skilled intervention: [x] Yes  [] No    Treatment/Activity Tolerance:  [] Patient able to complete treatment  [x] Patient limited by fatigue  [x] Patient limited by pain     [] Patient limited by other medical

## 2023-04-24 ENCOUNTER — HOSPITAL ENCOUNTER (OUTPATIENT)
Dept: PHYSICAL THERAPY | Age: 68
Setting detail: THERAPIES SERIES
Discharge: HOME | End: 2023-04-24
Payer: MEDICARE

## 2023-04-24 PROCEDURE — 97112 NEUROMUSCULAR REEDUCATION: CPT

## 2023-04-24 PROCEDURE — 97110 THERAPEUTIC EXERCISES: CPT

## 2023-04-24 NOTE — FLOWSHEET NOTE
[address],  [address]  Phone: [phone]   Fax:     [phone]    Physical Therapy Daily Treatment Note  Date:  2023    Patient Name:  Xavier Constantino    :  1955  MRN: [id number]  Restrictions/Precautions:    Medical/Treatment Diagnosis Information:  Diagnosis: M48.061 (ICD-10-CM) - Lumbar foraminal stenosis  M54.10 (ICD-10-CM) - Radicular pain of right lower extremity  M54.10 (ICD-10-CM) - Radicular pain of left lower extremity  M51.36 (ICD-10-CM) - DDD (degenerative disc disease), lumbar  M47.816 (ICD-10-CM) - Lumbar spondylosis  Treatment Diagnosis: M54. 16 - Radiculopathy, lumbar region  Insurance/Certification information:  Medicare with [facility] secondary  Physician Information:  Sonali Chauhan MD  Has the plan of care been signed (Y/N):        [x]  Yes  []  No     Date of Patient follow up with Physician:       Is this a Progress Report:     []  Yes  [x]  No        If Yes:  Date Range for reporting period:  Beginning: 3/29  Ending    Progress report will be due (10 Rx or 30 days whichever is less):        Recertification will be due (POC Duration  / 90 days whichever is less):          Visit # Insurance Allowable Auth Required   5   Medicare  2x a week for 6 weeks 12 total visits [x]  Yes []  No        Functional Scale:   HGGBKM 60% deficit    Date assessed:  3/29/2023     Latex Allergy:  [x]NO      []YES  Preferred Language for Healthcare:   [x]English       []other:      Pain level:  4-5/10 4/24     SUBJECTIVE:  : Pt states felt some relief after last visit and symptoms are improved in low back but symptoms are continuing to travel down her entire R LE to her toes.  Pt continues have some head pain     OBJECTIVE: See eval  Observation:    Pt in supine resting with R hip ER and mild R hip and R knee flexion   Test measurements:      RESTRICTIONS/PRECAUTIONS: HTN,

## 2023-04-27 ENCOUNTER — TELEPHONE (OUTPATIENT)
Dept: ADMINISTRATIVE | Age: 68
End: 2023-04-27

## 2023-04-27 ENCOUNTER — HOSPITAL ENCOUNTER (OUTPATIENT)
Dept: PHYSICAL THERAPY | Age: 68
Setting detail: THERAPIES SERIES
Discharge: HOME | End: 2023-04-27
Payer: MEDICARE

## 2023-04-27 ENCOUNTER — OFFICE VISIT (OUTPATIENT)
Dept: PRIMARY CARE CLINIC | Age: 68
End: 2023-04-27

## 2023-04-27 VITALS
HEIGHT: 62 IN | DIASTOLIC BLOOD PRESSURE: 80 MMHG | SYSTOLIC BLOOD PRESSURE: 128 MMHG | OXYGEN SATURATION: 96 % | WEIGHT: 153.8 LBS | TEMPERATURE: 97.6 F | BODY MASS INDEX: 28.3 KG/M2 | HEART RATE: 81 BPM

## 2023-04-27 DIAGNOSIS — Z12.11 COLON CANCER SCREENING: ICD-10-CM

## 2023-04-27 DIAGNOSIS — G89.29 CHRONIC PAIN OF RIGHT LOWER EXTREMITY: ICD-10-CM

## 2023-04-27 DIAGNOSIS — R55 VASOVAGAL SYNCOPE: ICD-10-CM

## 2023-04-27 DIAGNOSIS — E11.42 TYPE 2 DIABETES MELLITUS WITH DIABETIC POLYNEUROPATHY, WITHOUT LONG-TERM CURRENT USE OF INSULIN (HCC): Primary | ICD-10-CM

## 2023-04-27 DIAGNOSIS — N28.1 CYST OF RIGHT KIDNEY: ICD-10-CM

## 2023-04-27 DIAGNOSIS — K59.00 CONSTIPATION, UNSPECIFIED CONSTIPATION TYPE: ICD-10-CM

## 2023-04-27 DIAGNOSIS — M79.604 CHRONIC PAIN OF RIGHT LOWER EXTREMITY: ICD-10-CM

## 2023-04-27 DIAGNOSIS — I10 ESSENTIAL HYPERTENSION: ICD-10-CM

## 2023-04-27 DIAGNOSIS — K21.00 GASTROESOPHAGEAL REFLUX DISEASE WITH ESOPHAGITIS WITHOUT HEMORRHAGE: ICD-10-CM

## 2023-04-27 DIAGNOSIS — E78.2 MIXED HYPERLIPIDEMIA: ICD-10-CM

## 2023-04-27 LAB
ANION GAP SERPL CALCULATED.3IONS-SCNC: 13 MMOL/L (ref 3–16)
BACTERIA URNS QL MICRO: ABNORMAL /HPF
BILIRUB UR QL STRIP.AUTO: ABNORMAL
BUN SERPL-MCNC: 18 MG/DL (ref 7–20)
CALCIUM SERPL-MCNC: 10.2 MG/DL (ref 8.3–10.6)
CHLORIDE SERPL-SCNC: 101 MMOL/L (ref 99–110)
CLARITY UR: CLEAR
CO2 SERPL-SCNC: 25 MMOL/L (ref 21–32)
COLOR UR: ABNORMAL
CREAT SERPL-MCNC: 0.7 MG/DL (ref 0.6–1.2)
DEPRECATED RDW RBC AUTO: 14 % (ref 12.4–15.4)
EPI CELLS #/AREA URNS AUTO: 4 /HPF (ref 0–5)
GFR SERPLBLD CREATININE-BSD FMLA CKD-EPI: >60 ML/MIN/{1.73_M2}
GLUCOSE SERPL-MCNC: 111 MG/DL (ref 70–99)
GLUCOSE UR STRIP.AUTO-MCNC: NEGATIVE MG/DL
HCT VFR BLD AUTO: 43.2 % (ref 36–48)
HGB BLD-MCNC: 14.3 G/DL (ref 12–16)
HGB UR QL STRIP.AUTO: NEGATIVE
HYALINE CASTS #/AREA URNS AUTO: 1 /LPF (ref 0–8)
KETONES UR STRIP.AUTO-MCNC: ABNORMAL MG/DL
LEUKOCYTE ESTERASE UR QL STRIP.AUTO: ABNORMAL
MCH RBC QN AUTO: 29.1 PG (ref 26–34)
MCHC RBC AUTO-ENTMCNC: 33.2 G/DL (ref 31–36)
MCV RBC AUTO: 87.5 FL (ref 80–100)
NITRITE UR QL STRIP.AUTO: NEGATIVE
PH UR STRIP.AUTO: 5.5 [PH] (ref 5–8)
PLATELET # BLD AUTO: 249 K/UL (ref 135–450)
PMV BLD AUTO: 9 FL (ref 5–10.5)
POTASSIUM SERPL-SCNC: 4.4 MMOL/L (ref 3.5–5.1)
PROT UR STRIP.AUTO-MCNC: ABNORMAL MG/DL
RBC # BLD AUTO: 4.94 M/UL (ref 4–5.2)
RBC CLUMPS #/AREA URNS AUTO: 5 /HPF (ref 0–4)
SODIUM SERPL-SCNC: 139 MMOL/L (ref 136–145)
SP GR UR STRIP.AUTO: 1.03 (ref 1–1.03)
UA DIPSTICK W REFLEX MICRO PNL UR: YES
URN SPEC COLLECT METH UR: ABNORMAL
UROBILINOGEN UR STRIP-ACNC: 1 E.U./DL
WBC # BLD AUTO: 6.1 K/UL (ref 4–11)
WBC #/AREA URNS AUTO: 1 /HPF (ref 0–5)

## 2023-04-27 PROCEDURE — 97110 THERAPEUTIC EXERCISES: CPT

## 2023-04-27 PROCEDURE — 97012 MECHANICAL TRACTION THERAPY: CPT

## 2023-04-27 RX ORDER — DIVALPROEX SODIUM 500 MG/1
500 TABLET, DELAYED RELEASE ORAL ONCE
COMMUNITY

## 2023-04-27 NOTE — FLOWSHEET NOTE
[address], [address] [place] [address]  Phone: [phone]   Fax:     [phone]    Physical Therapy Daily Treatment Note  Date:  2023    Patient Name:  Haley Izaguirre    :  1955  MRN: [id number]  Restrictions/Precautions:    Medical/Treatment Diagnosis Information:  Diagnosis: M48.061 (ICD-10-CM) - Lumbar foraminal stenosis  M54.10 (ICD-10-CM) - Radicular pain of right lower extremity  M54.10 (ICD-10-CM) - Radicular pain of left lower extremity  M51.36 (ICD-10-CM) - DDD (degenerative disc disease), lumbar  M47.816 (ICD-10-CM) - Lumbar spondylosis  Treatment Diagnosis: M54. 16 - Radiculopathy, lumbar region  Insurance/Certification information:  Medicare with [facility] secondary  Physician Information:  Rodriguez Crawford MD  Has the plan of care been signed (Y/N):        [x]  Yes  []  No     Date of Patient follow up with Physician:       Is this a Progress Report:     []  Yes  [x]  No        If Yes:  Date Range for reporting period:  Beginning: 3/29  Ending    Progress report will be due (10 Rx or 30 days whichever is less):        Recertification will be due (POC Duration  / 90 days whichever is less):          Visit # Insurance Allowable Auth Required   6   Medicare  2x a week for 6 weeks 12 total visits [x]  Yes []  No        Functional Scale:   YUQYTP 60% deficit    Date assessed:  3/29/2023     Latex Allergy:  [x]NO      []YES  Preferred Language for Healthcare:   [x]English       []other:      Pain level:  4-5/10 4/27     SUBJECTIVE:  : Pt reports having increase whole body pain on  and . Saw a Neurologist on  and was prescribed medication d/t presentation of symptoms.  Pt states she is feeling better today    OBJECTIVE: See eval  Observation:    Pt in supine resting with R hip ER and mild R hip and R knee flexion   Test measurements:      RESTRICTIONS/PRECAUTIONS: HTN, Type

## 2023-04-27 NOTE — TELEPHONE ENCOUNTER
PA submitted VIA CMM for  Trulance 3MG tablets  Key: [facility] - [place] Case ID: [id number] - Rx #: [id number]  PENDING    Approved on April 27  Your request has been approved

## 2023-05-02 ENCOUNTER — HOSPITAL ENCOUNTER (OUTPATIENT)
Dept: PHYSICAL THERAPY | Age: 68
Setting detail: THERAPIES SERIES
Discharge: HOME | End: 2023-05-02
Payer: MEDICARE

## 2023-05-02 PROCEDURE — 97110 THERAPEUTIC EXERCISES: CPT

## 2023-05-02 PROCEDURE — 97012 MECHANICAL TRACTION THERAPY: CPT

## 2023-05-02 NOTE — PROGRESS NOTES
[address],  [address]  Phone: [phone]   Fax:     [phone]    Physical Therapy Daily Treatment Note  Date:  2023    Patient Name:  Rachele Bergman    :  1955  MRN: [id number]  Restrictions/Precautions:    Medical/Treatment Diagnosis Information:  Diagnosis: M48.061 (ICD-10-CM) - Lumbar foraminal stenosis  M54.10 (ICD-10-CM) - Radicular pain of right lower extremity  M54.10 (ICD-10-CM) - Radicular pain of left lower extremity  M51.36 (ICD-10-CM) - DDD (degenerative disc disease), lumbar  M47.816 (ICD-10-CM) - Lumbar spondylosis  Treatment Diagnosis: M54. 16 - Radiculopathy, lumbar region  Insurance/Certification information:  Medicare with [facility] secondary  Physician Information:  Doug Griffin MD  Has the plan of care been signed (Y/N):        [x]  Yes  []  No     Date of Patient follow up with Physician:       Is this a Progress Report:     []  Yes  [x]  No        If Yes:  Date Range for reporting period:  Beginning: 3/29  Ending     Progress report will be due (10 Rx or 30 days whichever is less):        Recertification will be due (POC Duration  / 90 days whichever is less):          Visit # Insurance Allowable Auth Required   7   Medicare  2x a week for 6 weeks 12 total visits [x]  Yes []  No        Functional Scale:   Agip U. 96. 76% deficit    Date assessed: 23  Agip U. 96. 60% deficit    Date assessed:  3/29/2023     Latex Allergy:  [x]NO      []YES  Preferred Language for Healthcare:   [x]English       []other:      Pain level:  4-5/10      SUBJECTIVE:  / Pt states had increase in pain in low back over the weekend with no known cause. Reports she is feeling better today.      OBJECTIVE: See eval  Observation:    Gait: (include devices/WB status) antalgic gait, decreased stance on RLE and increase R knee flexion  Test measurements:     Palpation: Tenderness bilateral

## 2023-05-03 ENCOUNTER — OFFICE VISIT (OUTPATIENT)
Dept: CARDIOLOGY CLINIC | Age: 68
End: 2023-05-03

## 2023-05-03 VITALS
BODY MASS INDEX: 28.53 KG/M2 | DIASTOLIC BLOOD PRESSURE: 70 MMHG | HEART RATE: 86 BPM | SYSTOLIC BLOOD PRESSURE: 122 MMHG | WEIGHT: 156 LBS

## 2023-05-03 DIAGNOSIS — I25.10 CORONARY ARTERY DISEASE INVOLVING NATIVE CORONARY ARTERY OF NATIVE HEART WITHOUT ANGINA PECTORIS: ICD-10-CM

## 2023-05-03 DIAGNOSIS — E78.5 HYPERLIPIDEMIA, UNSPECIFIED HYPERLIPIDEMIA TYPE: Primary | ICD-10-CM

## 2023-05-03 DIAGNOSIS — I10 HTN (HYPERTENSION), BENIGN: ICD-10-CM

## 2023-05-03 RX ORDER — FLASH GLUCOSE SENSOR
1 KIT MISCELLANEOUS
Qty: 2 EACH | Refills: 5 | Status: SHIPPED | OUTPATIENT
Start: 2023-05-03 | End: 2023-05-03 | Stop reason: SDUPTHER

## 2023-05-03 RX ORDER — FLASH GLUCOSE SENSOR
1 KIT MISCELLANEOUS
Qty: 2 EACH | Refills: 5 | Status: SHIPPED | OUTPATIENT
Start: 2023-05-03

## 2023-05-03 NOTE — PROGRESS NOTES
[address]  CARDIOLOGY  follow up        Reason for Consultation/Chief Complaint: \"I have been having chest pain. \"       History of Present Illness:  Nickolas Gaffney is a 78 y.o. patient  has no chest pain now. No sob. Has IBS. Past Medical History:   has a past medical history of Abnormal involuntary movements(781.0), Anal fissure, Anemia, unspecified, Anxiety, Chronic back pain, Chronic diarrhea, Depression, Diffuse cystic mastopathy, Encopresis(307.7), Esophageal reflux, Foot fracture, left, Headache(784.0), Hepatitis, unspecified, Herpes simplex without mention of complication, Hypertension, Hypogammaglobulinaemia, unspecified, Insomnia, unspecified, Lateral epicondylitis  of elbow, Migraine, unspecified, without mention of intractable migraine without mention of status migrainosus, Mixed hyperlipidemia, Osteopenia, Postmenopausal, Pure hypercholesterolemia, Symptomatic menopausal or female climacteric states, Type 2 diabetes mellitus without complication ([address] Type II or unspecified type diabetes mellitus without mention of complication, not stated as uncontrolled, and Unspecified asthma(493.90). Has CP in am didn't take nitrodur in am but in pm.  HR 90 no new ECG changes. Surgical History:   has a past surgical history that includes Rectal surgery; Dilation and curettage of uterus; Hysterectomy (1998); Hand surgery (2009); Foot surgery (10/2009,11/2010); Elbow surgery (1/18/11); Shoulder arthroscopy (Right, 7/07); Cataract removal (Bilateral); and Breast biopsy. Social History:   reports that she has never smoked. She has never used smokeless tobacco. She reports that she does not drink alcohol and does not use drugs. Family History:  No evidence for sudden cardiac death or premature CAD    Home Medications:  Were reviewed and are listed in nursing record. and/or listed below  Prior to Admission medications    Medication Sig Start Date End Date Taking?  Authorizing Provider

## 2023-05-04 ENCOUNTER — HOSPITAL ENCOUNTER (OUTPATIENT)
Dept: PHYSICAL THERAPY | Age: 68
Setting detail: THERAPIES SERIES
Discharge: HOME | End: 2023-05-04
Payer: MEDICARE

## 2023-05-04 PROCEDURE — 97110 THERAPEUTIC EXERCISES: CPT

## 2023-05-04 PROCEDURE — 97012 MECHANICAL TRACTION THERAPY: CPT

## 2023-05-04 NOTE — FLOWSHEET NOTE
[address],  [address]  Phone: [phone]   Fax:     [phone]    Physical Therapy Daily Treatment Note  Date:  2023    Patient Name:  Lala Armstrong    :  1955  MRN: [id number]  Restrictions/Precautions:    Medical/Treatment Diagnosis Information:  Diagnosis: M48.061 (ICD-10-CM) - Lumbar foraminal stenosis  M54.10 (ICD-10-CM) - Radicular pain of right lower extremity  M54.10 (ICD-10-CM) - Radicular pain of left lower extremity  M51.36 (ICD-10-CM) - DDD (degenerative disc disease), lumbar  M47.816 (ICD-10-CM) - Lumbar spondylosis  Treatment Diagnosis: M54. 16 - Radiculopathy, lumbar region  Insurance/Certification information:  Medicare with [facility] secondary  Physician Information:  Juan Diego Proctor MD  Has the plan of care been signed (Y/N):        [x]  Yes  []  No     Date of Patient follow up with Physician:       Is this a Progress Report:     []  Yes  [x]  No        If Yes:  Date Range for reporting period:  Beginning: 3/29  Ending     Progress report will be due (10 Rx or 30 days whichever is less):        Recertification will be due (POC Duration  / 90 days whichever is less):          Visit # Insurance Allowable Auth Required   8   Medicare  2x a week for 6 weeks 12 total visits [x]  Yes []  No        Functional Scale:   Agip U. 96. 76% deficit    Date assessed: 23  Agip U. 96. 60% deficit    Date assessed:  3/29/2023     Latex Allergy:  [x]NO      []YES  Preferred Language for Healthcare:   [x]English       []other:      Pain level:  4/10 5/4     SUBJECTIVE:  : Pt states she is feeling a little better in her low back but still has burning in R LE.  Has not had a headache the past couple days which has helped     OBJECTIVE: See eval  Observation:    Gait: (include devices/WB status) antalgic gait, decreased stance on RLE and increase R knee flexion  Test measurements:

## 2023-05-09 ENCOUNTER — TELEPHONE (OUTPATIENT)
Dept: CARDIOLOGY CLINIC | Age: 68
End: 2023-05-09

## 2023-05-09 ENCOUNTER — HOSPITAL ENCOUNTER (OUTPATIENT)
Dept: PHYSICAL THERAPY | Age: 68
Setting detail: THERAPIES SERIES
Discharge: HOME | End: 2023-05-09
Payer: MEDICARE

## 2023-05-09 PROCEDURE — 97012 MECHANICAL TRACTION THERAPY: CPT

## 2023-05-09 PROCEDURE — 97110 THERAPEUTIC EXERCISES: CPT

## 2023-05-09 NOTE — TELEPHONE ENCOUNTER
The patient called to advise on Friday she took a shower and she saw bruises of both breasts. She also has bruises on her arm and leg. She takes a blood thinner so she wants Dr Mercy Dinh to be aware.  Please advise and call the patient at [phone]

## 2023-05-09 NOTE — FLOWSHEET NOTE
[address],  [address]  Phone: [phone]   Fax:     [phone]    Physical Therapy Daily Treatment Note  Date:  2023    Patient Name:  Jessie Rubio    :  1955  MRN: [id number]  Restrictions/Precautions:    Medical/Treatment Diagnosis Information:  Diagnosis: M48.061 (ICD-10-CM) - Lumbar foraminal stenosis  M54.10 (ICD-10-CM) - Radicular pain of right lower extremity  M54.10 (ICD-10-CM) - Radicular pain of left lower extremity  M51.36 (ICD-10-CM) - DDD (degenerative disc disease), lumbar  M47.816 (ICD-10-CM) - Lumbar spondylosis  Treatment Diagnosis: M54. 16 - Radiculopathy, lumbar region  Insurance/Certification information:  Medicare with [facility] secondary  Physician Information:  Josué Nuñez MD  Has the plan of care been signed (Y/N):        [x]  Yes  []  No     Date of Patient follow up with Physician:       Is this a Progress Report:     []  Yes  [x]  No        If Yes:  Date Range for reporting period:  Beginning: 3/29  Ending     Progress report will be due (10 Rx or 30 days whichever is less):        Recertification will be due (POC Duration  / 90 days whichever is less):          Visit # Insurance Allowable Auth Required   9   Medicare  2x a week for 6 weeks 12 total visits [x]  Yes []  No        Functional Scale:   NJGPXT 76% deficit    Date assessed: 23  [place] 60% deficit    Date assessed:  3/29/2023     Latex Allergy:  [x]NO      []YES  Preferred Language for Healthcare:   [x]English       []other:      Pain level:  4-5/10 5/9     SUBJECTIVE:  : Pt states pain in low back is about the same since last week but burning in less overall. Symptoms down R LE continue to be about the same.      OBJECTIVE: See eval  Observation:    Gait: (include devices/WB status) antalgic gait, decreased stance on RLE and increase R knee flexion  Test measurements:
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
## 2023-05-11 ENCOUNTER — HOSPITAL ENCOUNTER (OUTPATIENT)
Dept: PHYSICAL THERAPY | Age: 68
Setting detail: THERAPIES SERIES
Discharge: HOME | End: 2023-05-11
Payer: MEDICARE

## 2023-05-11 PROCEDURE — 97012 MECHANICAL TRACTION THERAPY: CPT

## 2023-05-11 PROCEDURE — 97110 THERAPEUTIC EXERCISES: CPT

## 2023-05-11 NOTE — FLOWSHEET NOTE
[place] [id number] [address]  Phone: [phone]   Fax:     [phone]    Physical Therapy Daily Treatment Note  Date:  2023    Patient Name:  Josiah Robles    :  1955  MRN: [id number]  Restrictions/Precautions:    Medical/Treatment Diagnosis Information:  Diagnosis: M48.061 (ICD-10-CM) - Lumbar foraminal stenosis  M54.10 (ICD-10-CM) - Radicular pain of right lower extremity  M54.10 (ICD-10-CM) - Radicular pain of left lower extremity  M51.36 (ICD-10-CM) - DDD (degenerative disc disease), lumbar  M47.816 (ICD-10-CM) - Lumbar spondylosis  Treatment Diagnosis: M54. 16 - Radiculopathy, lumbar region  Insurance/Certification information:  Medicare with [facility] secondary  Physician Information:  Walter Chacon MD  Has the plan of care been signed (Y/N):        [x]  Yes  []  No     Date of Patient follow up with Physician:       Is this a Progress Report:     []  Yes  [x]  No        If Yes:  Date Range for reporting period:  Beginning: 3/29  Ending     Progress report will be due (10 Rx or 30 days whichever is less):        Recertification will be due (POC Duration  / 90 days whichever is less):          Visit # Insurance Allowable Auth Required   10   Medicare  2x a week for 6 weeks 12 total visits [x]  Yes []  No        Functional Scale:   WLTYSY 76% deficit    Date assessed: 23  [place] 60% deficit    Date assessed:  3/29/2023     Latex Allergy:  [x]NO      []YES  Preferred Language for Healthcare:   [x]English       []other:      Pain level:  4-5/10 5/9     SUBJECTIVE:  : Pt sates she is feeling about the same since last visit but better overall.      OBJECTIVE: See eval  Observation:    Gait: (include devices/WB status) antalgic gait, decreased stance on RLE and increase R knee flexion  Test measurements:     Palpation: Tenderness bilateral lumbar paraspinals, R buttock and

## 2023-05-14 ASSESSMENT — ENCOUNTER SYMPTOMS
COUGH: 0
BLOOD IN STOOL: 0
NAUSEA: 0
CONSTIPATION: 1
SORE THROAT: 0
SINUS PAIN: 0
VOMITING: 1
CHEST TIGHTNESS: 0
PHOTOPHOBIA: 0
SINUS PRESSURE: 0
DIARRHEA: 0
SHORTNESS OF BREATH: 0
TROUBLE SWALLOWING: 0
RHINORRHEA: 0
WHEEZING: 0
ABDOMINAL PAIN: 0

## 2023-05-16 ENCOUNTER — HOSPITAL ENCOUNTER (OUTPATIENT)
Dept: PHYSICAL THERAPY | Age: 68
Setting detail: THERAPIES SERIES
Discharge: HOME | End: 2023-05-16
Payer: COMMERCIAL

## 2023-05-16 PROCEDURE — 97110 THERAPEUTIC EXERCISES: CPT

## 2023-05-16 PROCEDURE — 97012 MECHANICAL TRACTION THERAPY: CPT

## 2023-05-16 PROCEDURE — G0283 ELEC STIM OTHER THAN WOUND: HCPCS

## 2023-05-16 NOTE — FLOWSHEET NOTE
face-to-face)  [] EVAL (HIGH) 56120 (typically 45 minutes face-to-face)  [] RE-EVAL     [x] PX(29330) x   1  [] IONTO  [] NMR (44896) x      [] VASO  [] Manual (40522) x      [] Other:  [] TA x      [x] Mech Traction (28053)  [] ES(attended) (95363)      [x] ES (un) (35112):     Goals:   Patient stated goal: Pt would like to return to taking care of her self. [x] Progressing: [] Met: [] Not Met: [] Adjusted    Therapist goals for Patient:   Short Term Goals: To be achieved in: 4 weeks  1. Independent in HEP and progression per patient tolerance, in order to prevent re-injury. [x] Progressing: [] Met: [] Not Met: [] Adjusted   2. Patient will have a decrease in pain to facilitate improvement in movement, function, and ADLs as indicated by Functional Deficits. [x] Progressing: [] Met: [] Not Met: [] Adjusted  3. Patient will demonstrate ability to sleep throughout the night without increase in concordant pain or nightly disturbances. [x] Progressing: [] Met: [] Not Met: [] Adjusted    Long Term Goals: To be achieved in: 8 weeks  1. Disability index score of 20% or less for the [place] to assist with reaching prior level of function. [x] Progressing: [] Met: [] Not Met: [] Adjusted  2. Patient will demonstrate increased lumbar AROM to WNL pain free in all directions in order to complete ADLs without pain or restriction. [x] Progressing: [] Met: [] Not Met: [] Adjusted  3. Patient will demonstrate an increase in bilateral hip strength to 4+/5 in all directions pain free in order to complete light household work without restriction. [x] Progressing: [] Met: [] Not Met: [] Adjusted  4. Patient will demonstrate ability to straight her RLE without increase in concordant pain in order to get dressed without restriction. [x] Progressing: [] Met: [] Not Met: [] Adjusted  5.  Pt will demonstrate ability to ambulate 1000' with improved gait mechanics in order to complete grocery shopping without

## 2023-05-18 ENCOUNTER — HOSPITAL ENCOUNTER (OUTPATIENT)
Dept: PHYSICAL THERAPY | Age: 68
Setting detail: THERAPIES SERIES
Discharge: HOME | End: 2023-05-18
Payer: COMMERCIAL

## 2023-05-18 PROCEDURE — 97110 THERAPEUTIC EXERCISES: CPT

## 2023-05-18 PROCEDURE — 97012 MECHANICAL TRACTION THERAPY: CPT

## 2023-05-18 NOTE — FLOWSHEET NOTE
51431 (typically 30 minutes face-to-face)  [] EVAL (HIGH) 07083 (typically 45 minutes face-to-face)  [] RE-EVAL     [x] SJ(54594) x   2  [] IONTO  [] NMR (81590) x      [] VASO  [] Manual (99880) x      [] Other:  [] TA x      [x] Mech Traction (13497)  [] ES(attended) (98256)      [] ES (un) (18251):     Goals:   Patient stated goal: Pt would like to return to taking care of her self. [x] Progressing: [] Met: [] Not Met: [] Adjusted    Therapist goals for Patient:   Short Term Goals: To be achieved in: 4 weeks  1. Independent in HEP and progression per patient tolerance, in order to prevent re-injury. [x] Progressing: [] Met: [] Not Met: [] Adjusted   2. Patient will have a decrease in pain to facilitate improvement in movement, function, and ADLs as indicated by Functional Deficits. [x] Progressing: [] Met: [] Not Met: [] Adjusted  3. Patient will demonstrate ability to sleep throughout the night without increase in concordant pain or nightly disturbances. [x] Progressing: [] Met: [] Not Met: [] Adjusted    Long Term Goals: To be achieved in: 8 weeks  1. Disability index score of 20% or less for the [place] to assist with reaching prior level of function. [x] Progressing: [] Met: [] Not Met: [] Adjusted  2. Patient will demonstrate increased lumbar AROM to WNL pain free in all directions in order to complete ADLs without pain or restriction. [x] Progressing: [] Met: [] Not Met: [] Adjusted  3. Patient will demonstrate an increase in bilateral hip strength to 4+/5 in all directions pain free in order to complete light household work without restriction. [x] Progressing: [] Met: [] Not Met: [] Adjusted  4. Patient will demonstrate ability to straight her RLE without increase in concordant pain in order to get dressed without restriction. [x] Progressing: [] Met: [] Not Met: [] Adjusted  5.  Pt will demonstrate ability to ambulate 1000' with improved gait mechanics in order to complete grocery

## 2023-05-19 ENCOUNTER — OFFICE VISIT (OUTPATIENT)
Dept: PRIMARY CARE CLINIC | Age: 68
End: 2023-05-19

## 2023-05-19 VITALS
DIASTOLIC BLOOD PRESSURE: 84 MMHG | SYSTOLIC BLOOD PRESSURE: 118 MMHG | HEART RATE: 74 BPM | BODY MASS INDEX: 28.35 KG/M2 | WEIGHT: 155 LBS | OXYGEN SATURATION: 98 %

## 2023-05-19 DIAGNOSIS — R23.3 SPONTANEOUS BRUISING: ICD-10-CM

## 2023-05-19 DIAGNOSIS — K59.00 CONSTIPATION, UNSPECIFIED CONSTIPATION TYPE: Primary | ICD-10-CM

## 2023-05-19 DIAGNOSIS — R39.89 SUSPECTED URINARY TRACT INFECTION: ICD-10-CM

## 2023-05-19 DIAGNOSIS — R30.0 DYSURIA: ICD-10-CM

## 2023-05-19 DIAGNOSIS — R82.998 LEUKOCYTES IN URINE: ICD-10-CM

## 2023-05-19 LAB
BILIRUBIN, POC: NORMAL
BLOOD URINE, POC: NORMAL
CLARITY, POC: NORMAL
COLOR, POC: YELLOW
DEPRECATED RDW RBC AUTO: 13.9 % (ref 12.4–15.4)
GLUCOSE URINE, POC: NORMAL
HCT VFR BLD AUTO: 40 % (ref 36–48)
HGB BLD-MCNC: 13.4 G/DL (ref 12–16)
INR PPP: 0.93 (ref 0.84–1.16)
KETONES, POC: NORMAL
LEUKOCYTE EST, POC: NORMAL
MCH RBC QN AUTO: 29.2 PG (ref 26–34)
MCHC RBC AUTO-ENTMCNC: 33.4 G/DL (ref 31–36)
MCV RBC AUTO: 87.4 FL (ref 80–100)
NITRITE, POC: NORMAL
PH, POC: 5.5
PLATELET # BLD AUTO: 236 K/UL (ref 135–450)
PMV BLD AUTO: 9.2 FL (ref 5–10.5)
PROTEIN, POC: NORMAL
PROTHROMBIN TIME: 12.5 SEC (ref 11.5–14.8)
RBC # BLD AUTO: 4.58 M/UL (ref 4–5.2)
SPECIFIC GRAVITY, POC: 1.03
UROBILINOGEN, POC: 0.2
WBC # BLD AUTO: 6 K/UL (ref 4–11)

## 2023-05-19 RX ORDER — CIPROFLOXACIN 250 MG/1
250 TABLET, FILM COATED ORAL 2 TIMES DAILY
Qty: 14 TABLET | Refills: 0 | Status: SHIPPED | OUTPATIENT
Start: 2023-05-19 | End: 2023-05-26

## 2023-05-21 LAB — BACTERIA UR CULT: NORMAL

## 2023-05-24 ENCOUNTER — OFFICE VISIT (OUTPATIENT)
Dept: CARDIOLOGY CLINIC | Age: 68
End: 2023-05-24
Payer: MEDICARE

## 2023-05-24 VITALS
WEIGHT: 158 LBS | HEART RATE: 75 BPM | DIASTOLIC BLOOD PRESSURE: 62 MMHG | SYSTOLIC BLOOD PRESSURE: 110 MMHG | BODY MASS INDEX: 28.9 KG/M2

## 2023-05-24 DIAGNOSIS — R00.2 PALPITATIONS: Primary | ICD-10-CM

## 2023-05-24 PROCEDURE — 3078F DIAST BP <80 MM HG: CPT | Performed by: NURSE PRACTITIONER

## 2023-05-24 PROCEDURE — 1036F TOBACCO NON-USER: CPT | Performed by: NURSE PRACTITIONER

## 2023-05-24 PROCEDURE — G8417 CALC BMI ABV UP PARAM F/U: HCPCS | Performed by: NURSE PRACTITIONER

## 2023-05-24 PROCEDURE — 99214 OFFICE O/P EST MOD 30 MIN: CPT | Performed by: NURSE PRACTITIONER

## 2023-05-24 PROCEDURE — 1090F PRES/ABSN URINE INCON ASSESS: CPT | Performed by: NURSE PRACTITIONER

## 2023-05-24 PROCEDURE — G8427 DOCREV CUR MEDS BY ELIG CLIN: HCPCS | Performed by: NURSE PRACTITIONER

## 2023-05-24 PROCEDURE — 3017F COLORECTAL CA SCREEN DOC REV: CPT | Performed by: NURSE PRACTITIONER

## 2023-05-24 PROCEDURE — 1124F ACP DISCUSS-NO DSCNMKR DOCD: CPT | Performed by: NURSE PRACTITIONER

## 2023-05-24 PROCEDURE — 3074F SYST BP LT 130 MM HG: CPT | Performed by: NURSE PRACTITIONER

## 2023-05-24 PROCEDURE — 93000 ELECTROCARDIOGRAM COMPLETE: CPT | Performed by: NURSE PRACTITIONER

## 2023-05-24 PROCEDURE — G8399 PT W/DXA RESULTS DOCUMENT: HCPCS | Performed by: NURSE PRACTITIONER

## 2023-05-24 RX ORDER — FUROSEMIDE 20 MG/1
20 TABLET ORAL DAILY
Qty: 30 TABLET | Refills: 3 | Status: SHIPPED | OUTPATIENT
Start: 2023-05-24

## 2023-05-24 RX ORDER — FUROSEMIDE 20 MG/1
20 TABLET ORAL DAILY
Qty: 30 TABLET | Refills: 3 | Status: SHIPPED | OUTPATIENT
Start: 2023-05-24 | End: 2023-05-24 | Stop reason: SDUPTHER

## 2023-05-24 NOTE — PROGRESS NOTES
[address]     Outpatient Follow Up Note  Dr Leigh Ann Ramirez MD,  Anival Martínez RN, APRN,CNP CVNP      CHIEF COMPLAINT / HPI: Hossein Real is 78 y.o. female who presents today with a history of  atypical cp with  normal cors      Interval history:  She isnt taking her Plavix states due to \"bruising\", denies falls   She has small ecchymosis on her arms and legs    Added  cardizem 120 LA q am /  nitrodur. today she says its not helping as mucha as it was /  She states she is going to see a hematologist for ecchymosis     VSS wt stable, edema <1+ noted /  lungs clear   c/o atypical pain &  legs weak at time   c/o mild SOB at night, wt up 3#  / recommend low salt diet limit fluids  Recommend legs elevated when setting   She doesn't appear to be in acute distress distress   Lasix 20 mg po daily  with a banana or juice   Lab work and fu with Dr Kamini Balbuena in 2-3 months      EKG NSR no acute changes. 3/15/2023 [facility] No Stenosis identified. Right coronary normal and dominant. Left main short and normal.  LAD:  streaming with no stenosis. Arlean Veronica no diagonal disease. LO 3 flow. Left Cx normal with streaming. LVEF 65% LVEDP 8 mm Hg. No gradient no MR.  AL 2 guide used for cine of LAD. Treat for microvascular disease    I spent a total of 50 minutes and greater than 50% of the time was spent counseling with patient  coordinating care regarding her diagnosis, reviewing labs, cardiac work up, treatments and plan of care.     Past Medical History:   Diagnosis Date    Abnormal involuntary movements(781.0)     Anal fissure     Anemia, unspecified     Anxiety     Chronic back pain     Chronic diarrhea 09/23/2019    Depression     Diffuse cystic mastopathy     Encopresis(307.7)     Esophageal reflux     Foot fracture, left 1989    drop something on foot    Headache(784.0)     Hepatitis, unspecified     Herpes simplex without mention of complication     Hypertension     Hypogammaglobulinaemia, unspecified

## 2023-05-24 NOTE — PATIENT INSTRUCTIONS
Lasix 20mg po daily  with a banana or juice   Lab work and fu with Dr Josefina Mcneill in 1-2  months

## 2023-05-27 PROBLEM — R23.3 SPONTANEOUS BRUISING: Status: ACTIVE | Noted: 2023-05-27

## 2023-05-27 ASSESSMENT — ENCOUNTER SYMPTOMS
CONSTIPATION: 1
CHEST TIGHTNESS: 0
ABDOMINAL PAIN: 0
RHINORRHEA: 0
VOMITING: 0
NAUSEA: 0
BLOOD IN STOOL: 0
SORE THROAT: 0
WHEEZING: 0
SHORTNESS OF BREATH: 0
SINUS PRESSURE: 0
COUGH: 0
DIARRHEA: 0

## 2023-05-31 ENCOUNTER — TELEPHONE (OUTPATIENT)
Dept: PRIMARY CARE CLINIC | Age: 68
End: 2023-05-31

## 2023-05-31 NOTE — TELEPHONE ENCOUNTER
Pt called asking how long she needs to take the medication cyanocobalamin ([facility] VITAMIN B12) 1000 MCG tablet  Please call patient back.

## 2023-06-02 DIAGNOSIS — I10 ESSENTIAL HYPERTENSION: ICD-10-CM

## 2023-06-02 DIAGNOSIS — U07.1 COVID-19: ICD-10-CM

## 2023-06-03 DIAGNOSIS — K59.00 CONSTIPATION, UNSPECIFIED CONSTIPATION TYPE: ICD-10-CM

## 2023-06-05 RX ORDER — PLECANATIDE 3 MG/1
TABLET ORAL
Qty: 90 TABLET | Refills: 1 | Status: SHIPPED | OUTPATIENT
Start: 2023-06-05

## 2023-06-05 RX ORDER — CLONIDINE 0.1 MG/24H
PATCH, EXTENDED RELEASE TRANSDERMAL
Qty: 12 PATCH | Refills: 1 | Status: SHIPPED | OUTPATIENT
Start: 2023-06-05

## 2023-06-05 RX ORDER — FLUTICASONE PROPIONATE 50 MCG
SPRAY, SUSPENSION (ML) NASAL
Qty: 48 G | Refills: 1 | Status: SHIPPED | OUTPATIENT
Start: 2023-06-05

## 2023-06-05 NOTE — TELEPHONE ENCOUNTER
Medication:   Requested Prescriptions     Pending Prescriptions Disp Refills    fluticasone (FLONASE) 50 MCG/ACT nasal spray [Pharmacy Med Name: FLUTICASONE  SPR 50MCG RX] 48 g 1     Sig: USE 2 SPRAYS NASALLY DAILY    cloNIDine (CATAPRES) 0.1 MG/24HR PTWK [Pharmacy Med Name: CLONIDIN-TTS DIS 0.1/24HR] 12 patch 1     Sig: APPLY 1 PATCH ONTO THE SKINONCE A WEEK     Last Filled:  5/24/22 2.20.23    Last appt: 4/27/2023   Next appt: 6/8/2023    Last OARRS:   RX Monitoring 4/13/2023   Periodic Controlled Substance Monitoring Possible medication side effects, risk of tolerance/dependence & alternative treatments discussed.

## 2023-06-05 NOTE — TELEPHONE ENCOUNTER
Medication:   Requested Prescriptions     Pending Prescriptions Disp Refills    TRULANCE 3 MG TABS [Pharmacy Med Name: Trulance 3 mg tablet] 30 tablet 0     Sig: TAKE 1 TABLET BY MOUTH EVERY DAY     Last Filled:  3.17.23    Last appt: 5/19/2023   Next appt: 6/8/2023    Last OARRS:   RX Monitoring 4/13/2023   Periodic Controlled Substance Monitoring Possible medication side effects, risk of tolerance/dependence & alternative treatments discussed.

## 2023-06-08 ENCOUNTER — OFFICE VISIT (OUTPATIENT)
Dept: PRIMARY CARE CLINIC | Age: 68
End: 2023-06-08
Payer: COMMERCIAL

## 2023-06-08 VITALS
BODY MASS INDEX: 28.72 KG/M2 | SYSTOLIC BLOOD PRESSURE: 130 MMHG | OXYGEN SATURATION: 96 % | DIASTOLIC BLOOD PRESSURE: 82 MMHG | HEART RATE: 94 BPM | WEIGHT: 157 LBS

## 2023-06-08 DIAGNOSIS — E53.8 VITAMIN B 12 DEFICIENCY: ICD-10-CM

## 2023-06-08 DIAGNOSIS — L29.9 ITCHING: ICD-10-CM

## 2023-06-08 DIAGNOSIS — E11.42 TYPE 2 DIABETES MELLITUS WITH DIABETIC POLYNEUROPATHY, WITHOUT LONG-TERM CURRENT USE OF INSULIN (HCC): ICD-10-CM

## 2023-06-08 DIAGNOSIS — R23.3 SPONTANEOUS BRUISING: Primary | ICD-10-CM

## 2023-06-08 DIAGNOSIS — M79.89 SWELLING OF HAND, UNSPECIFIED LATERALITY: ICD-10-CM

## 2023-06-08 PROCEDURE — 1090F PRES/ABSN URINE INCON ASSESS: CPT | Performed by: INTERNAL MEDICINE

## 2023-06-08 PROCEDURE — 1124F ACP DISCUSS-NO DSCNMKR DOCD: CPT | Performed by: INTERNAL MEDICINE

## 2023-06-08 PROCEDURE — G8417 CALC BMI ABV UP PARAM F/U: HCPCS | Performed by: INTERNAL MEDICINE

## 2023-06-08 PROCEDURE — 99214 OFFICE O/P EST MOD 30 MIN: CPT | Performed by: INTERNAL MEDICINE

## 2023-06-08 PROCEDURE — G8427 DOCREV CUR MEDS BY ELIG CLIN: HCPCS | Performed by: INTERNAL MEDICINE

## 2023-06-08 PROCEDURE — 3078F DIAST BP <80 MM HG: CPT | Performed by: INTERNAL MEDICINE

## 2023-06-08 PROCEDURE — 2022F DILAT RTA XM EVC RTNOPTHY: CPT | Performed by: INTERNAL MEDICINE

## 2023-06-08 PROCEDURE — 3074F SYST BP LT 130 MM HG: CPT | Performed by: INTERNAL MEDICINE

## 2023-06-08 PROCEDURE — 3044F HG A1C LEVEL LT 7.0%: CPT | Performed by: INTERNAL MEDICINE

## 2023-06-08 PROCEDURE — G8399 PT W/DXA RESULTS DOCUMENT: HCPCS | Performed by: INTERNAL MEDICINE

## 2023-06-08 PROCEDURE — 1036F TOBACCO NON-USER: CPT | Performed by: INTERNAL MEDICINE

## 2023-06-08 PROCEDURE — 3017F COLORECTAL CA SCREEN DOC REV: CPT | Performed by: INTERNAL MEDICINE

## 2023-06-08 RX ORDER — DULAGLUTIDE 0.75 MG/.5ML
INJECTION, SOLUTION SUBCUTANEOUS
Qty: 6 ML | Refills: 1 | Status: SHIPPED | OUTPATIENT
Start: 2023-06-08

## 2023-06-14 ENCOUNTER — TELEPHONE (OUTPATIENT)
Dept: PRIMARY CARE CLINIC | Age: 68
End: 2023-06-14

## 2023-06-14 RX ORDER — DILTIAZEM HYDROCHLORIDE 120 MG/1
CAPSULE, COATED, EXTENDED RELEASE ORAL
Qty: 30 CAPSULE | Refills: 3 | OUTPATIENT
Start: 2023-06-14

## 2023-06-20 PROBLEM — E53.8 VITAMIN B 12 DEFICIENCY: Status: ACTIVE | Noted: 2023-06-20

## 2023-06-20 PROBLEM — M79.89 SWELLING OF HAND: Status: ACTIVE | Noted: 2023-06-20

## 2023-06-20 PROBLEM — L29.9 ITCHING: Status: ACTIVE | Noted: 2023-06-20

## 2023-06-20 ASSESSMENT — ENCOUNTER SYMPTOMS
NAUSEA: 1
CHEST TIGHTNESS: 0
COUGH: 0
RHINORRHEA: 0
SORE THROAT: 0
VOMITING: 0
BLOOD IN STOOL: 0
SINUS PRESSURE: 0
ROS SKIN COMMENTS: ITCHING
WHEEZING: 0
COLOR CHANGE: 0
ABDOMINAL PAIN: 0
SHORTNESS OF BREATH: 0

## 2023-06-21 NOTE — TELEPHONE ENCOUNTER
Spoke with patient 6/15/23. She wants the prescription for Diclofenac gel refilled anyway. Patient understands it can cause bruising. Patient has had bruising recently. Patient states she doesn't use it frequently and it works. Patient states Tylenol does nothing. Patient plans to travel to [facility] and would like a refill. Prescription refilled.

## 2023-06-23 DIAGNOSIS — R00.2 PALPITATIONS: ICD-10-CM

## 2023-06-23 DIAGNOSIS — E53.8 VITAMIN B 12 DEFICIENCY: ICD-10-CM

## 2023-06-23 LAB
25(OH)D3 SERPL-MCNC: 47.6 NG/ML
ALBUMIN SERPL-MCNC: 4.6 G/DL (ref 3.4–5)
ALBUMIN/GLOB SERPL: 2.2 {RATIO} (ref 1.1–2.2)
ALP SERPL-CCNC: 73 U/L (ref 40–129)
ALT SERPL-CCNC: 19 U/L (ref 10–40)
ANION GAP SERPL CALCULATED.3IONS-SCNC: 12 MMOL/L (ref 3–16)
AST SERPL-CCNC: 22 U/L (ref 15–37)
BILIRUB DIRECT SERPL-MCNC: <0.2 MG/DL (ref 0–0.3)
BILIRUB INDIRECT SERPL-MCNC: NORMAL MG/DL (ref 0–1)
BILIRUB SERPL-MCNC: 0.5 MG/DL (ref 0–1)
BUN SERPL-MCNC: 25 MG/DL (ref 7–20)
CALCIUM SERPL-MCNC: 10.1 MG/DL (ref 8.3–10.6)
CHLORIDE SERPL-SCNC: 102 MMOL/L (ref 99–110)
CHOLEST SERPL-MCNC: 189 MG/DL (ref 0–199)
CO2 SERPL-SCNC: 25 MMOL/L (ref 21–32)
CREAT SERPL-MCNC: 0.7 MG/DL (ref 0.6–1.2)
DEPRECATED RDW RBC AUTO: 13.8 % (ref 12.4–15.4)
GFR SERPLBLD CREATININE-BSD FMLA CKD-EPI: >60 ML/MIN/{1.73_M2}
GLUCOSE SERPL-MCNC: 113 MG/DL (ref 70–99)
HCT VFR BLD AUTO: 41.7 % (ref 36–48)
HDLC SERPL-MCNC: 65 MG/DL (ref 40–60)
HGB BLD-MCNC: 13.8 G/DL (ref 12–16)
LDLC SERPL CALC-MCNC: 89 MG/DL
MAGNESIUM SERPL-MCNC: 1.8 MG/DL (ref 1.8–2.4)
MCH RBC QN AUTO: 29.4 PG (ref 26–34)
MCHC RBC AUTO-ENTMCNC: 33.2 G/DL (ref 31–36)
MCV RBC AUTO: 88.5 FL (ref 80–100)
NT-PROBNP SERPL-MCNC: <36 PG/ML (ref 0–124)
PLATELET # BLD AUTO: 197 K/UL (ref 135–450)
PMV BLD AUTO: 9.2 FL (ref 5–10.5)
POTASSIUM SERPL-SCNC: 4.5 MMOL/L (ref 3.5–5.1)
PROT SERPL-MCNC: 6.7 G/DL (ref 6.4–8.2)
RBC # BLD AUTO: 4.71 M/UL (ref 4–5.2)
SODIUM SERPL-SCNC: 139 MMOL/L (ref 136–145)
TRIGL SERPL-MCNC: 177 MG/DL (ref 0–150)
TSH SERPL DL<=0.005 MIU/L-ACNC: 2.33 UIU/ML (ref 0.27–4.2)
TSH SERPL DL<=0.005 MIU/L-ACNC: 2.33 UIU/ML (ref 0.27–4.2)
VIT B12 SERPL-MCNC: 1632 PG/ML (ref 211–911)
VLDLC SERPL CALC-MCNC: 35 MG/DL
WBC # BLD AUTO: 5.6 K/UL (ref 4–11)

## 2023-06-24 LAB
EST. AVERAGE GLUCOSE BLD GHB EST-MCNC: 125.5 MG/DL
HBA1C MFR BLD: 6 %

## 2023-06-28 ENCOUNTER — OFFICE VISIT (OUTPATIENT)
Dept: CARDIOLOGY CLINIC | Age: 68
End: 2023-06-28
Payer: MEDICARE

## 2023-06-28 VITALS
WEIGHT: 156.4 LBS | DIASTOLIC BLOOD PRESSURE: 66 MMHG | HEART RATE: 81 BPM | SYSTOLIC BLOOD PRESSURE: 118 MMHG | BODY MASS INDEX: 28.61 KG/M2

## 2023-06-28 DIAGNOSIS — E78.5 HYPERLIPIDEMIA, UNSPECIFIED HYPERLIPIDEMIA TYPE: Primary | ICD-10-CM

## 2023-06-28 DIAGNOSIS — I10 HTN (HYPERTENSION), BENIGN: ICD-10-CM

## 2023-06-28 DIAGNOSIS — R07.89 RIGHT-SIDED CHEST WALL PAIN: ICD-10-CM

## 2023-06-28 PROCEDURE — 99214 OFFICE O/P EST MOD 30 MIN: CPT | Performed by: INTERNAL MEDICINE

## 2023-06-28 PROCEDURE — 1090F PRES/ABSN URINE INCON ASSESS: CPT | Performed by: INTERNAL MEDICINE

## 2023-06-28 PROCEDURE — G8417 CALC BMI ABV UP PARAM F/U: HCPCS | Performed by: INTERNAL MEDICINE

## 2023-06-28 PROCEDURE — 3074F SYST BP LT 130 MM HG: CPT | Performed by: INTERNAL MEDICINE

## 2023-06-28 PROCEDURE — 3017F COLORECTAL CA SCREEN DOC REV: CPT | Performed by: INTERNAL MEDICINE

## 2023-06-28 PROCEDURE — 1124F ACP DISCUSS-NO DSCNMKR DOCD: CPT | Performed by: INTERNAL MEDICINE

## 2023-06-28 PROCEDURE — 1036F TOBACCO NON-USER: CPT | Performed by: INTERNAL MEDICINE

## 2023-06-28 PROCEDURE — 3078F DIAST BP <80 MM HG: CPT | Performed by: INTERNAL MEDICINE

## 2023-06-28 PROCEDURE — G8427 DOCREV CUR MEDS BY ELIG CLIN: HCPCS | Performed by: INTERNAL MEDICINE

## 2023-06-28 PROCEDURE — G8399 PT W/DXA RESULTS DOCUMENT: HCPCS | Performed by: INTERNAL MEDICINE

## 2023-07-19 ENCOUNTER — TELEPHONE (OUTPATIENT)
Dept: ORTHOPEDIC SURGERY | Age: 68
End: 2023-07-19

## 2023-07-19 NOTE — TELEPHONE ENCOUNTER
[facility]'s Comp is requesting a LMN for the LMBB. Please see note from Ortho [address] in this encounter. Please advise when completed. Pt is sched for approved EMG on 9/5/23, though it is only approved through 8/31/23. I will notify pt to get it completed sooner, or try to get date extended.

## 2023-07-19 NOTE — TELEPHONE ENCOUNTER
LMN NEEDED:    [place] WANTS A LETTER OF MEDICAL NECESSITY REGARDING  REQUEST FOR LMBB L3-L5 ESPECIALLY SINCE  THIS IS DIFFERENT THEN PRIOR REQUESTED INGRID L5-SI. PLEASE LET WC KNOW WHEN THIS IS READY.

## 2023-07-23 NOTE — TELEPHONE ENCOUNTER
Paperwork has been faxed, scanned and put at the  for patient pickup. Called and left patient a voicemail letting her know it has been faxed and is ready for pickup.
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 Patient is in [facility] right now and will be home in a couple and these need to be faxed.        Thank you
Pt calling back to check on status of this? Please advise. Pt in [facility] and will call back later this week to check.
No
Patient requests all Lab, Cardiology, and Radiology Results on their Discharge Instructions

## 2023-08-04 DIAGNOSIS — M47.816 LUMBAR SPONDYLOSIS: ICD-10-CM

## 2023-08-04 DIAGNOSIS — M47.816 LUMBAR FACET ARTHROPATHY: Primary | ICD-10-CM

## 2023-08-12 DIAGNOSIS — E11.42 TYPE 2 DIABETES MELLITUS WITH DIABETIC POLYNEUROPATHY, WITHOUT LONG-TERM CURRENT USE OF INSULIN (HCC): ICD-10-CM

## 2023-08-14 RX ORDER — DULAGLUTIDE 0.75 MG/.5ML
INJECTION, SOLUTION SUBCUTANEOUS
Qty: 6 ML | Refills: 1 | Status: SHIPPED | OUTPATIENT
Start: 2023-08-14

## 2023-08-28 DIAGNOSIS — M79.604 CHRONIC PAIN OF RIGHT LOWER EXTREMITY: ICD-10-CM

## 2023-08-28 DIAGNOSIS — G89.29 CHRONIC PAIN OF RIGHT LOWER EXTREMITY: ICD-10-CM

## 2023-08-28 DIAGNOSIS — E11.42 TYPE 2 DIABETES MELLITUS WITH DIABETIC POLYNEUROPATHY, WITHOUT LONG-TERM CURRENT USE OF INSULIN (HCC): ICD-10-CM

## 2023-08-29 ENCOUNTER — TELEPHONE (OUTPATIENT)
Dept: ORTHOPEDIC SURGERY | Age: 68
End: 2023-08-29

## 2023-08-29 DIAGNOSIS — E11.42 TYPE 2 DIABETES MELLITUS WITH DIABETIC POLYNEUROPATHY, WITHOUT LONG-TERM CURRENT USE OF INSULIN (HCC): ICD-10-CM

## 2023-08-29 DIAGNOSIS — G89.29 CHRONIC PAIN OF RIGHT LOWER EXTREMITY: ICD-10-CM

## 2023-08-29 DIAGNOSIS — M79.604 CHRONIC PAIN OF RIGHT LOWER EXTREMITY: ICD-10-CM

## 2023-08-29 RX ORDER — GABAPENTIN 300 MG/1
300 CAPSULE ORAL 2 TIMES DAILY
Qty: 180 CAPSULE | Refills: 1 | Status: SHIPPED | OUTPATIENT
Start: 2023-08-29 | End: 2023-11-27

## 2023-08-29 RX ORDER — GABAPENTIN 300 MG/1
300 CAPSULE ORAL 2 TIMES DAILY
Qty: 60 CAPSULE | Refills: 0 | Status: SHIPPED | OUTPATIENT
Start: 2023-08-29 | End: 2023-09-01 | Stop reason: SDUPTHER

## 2023-08-29 RX ORDER — DULAGLUTIDE 0.75 MG/.5ML
0.75 INJECTION, SOLUTION SUBCUTANEOUS WEEKLY
Qty: 6 ML | Refills: 1 | Status: SHIPPED | OUTPATIENT
Start: 2023-08-29

## 2023-08-29 NOTE — TELEPHONE ENCOUNTER
Medication:   Requested Prescriptions     Pending Prescriptions Disp Refills    gabapentin (NEURONTIN) 300 MG capsule 180 capsule 1     Sig: Take 1 capsule by mouth in the morning and at bedtime for 90 days. Last Filled:      Patient Phone Number: [phone] (home)     Last appt: 6/8/2023   Next appt: 9/1/2023    Last OARRS:   RX Monitoring 4/13/2023   Periodic Controlled Substance Monitoring Possible medication side effects, risk of tolerance/dependence & alternative treatments discussed. Preferred Pharmacy: [facility], [facility] 389 Jamar TIDWELL [phone]  [address] [place] [address]  Phone: [phone] Fax: [phone] Nw 9[facility] 2606 [facility] [phone] Agnes Aldana [phone]  11 [facility] 110  [address] [place] [address]  Phone: [phone] Fax: [phone]    [address] [phone] Agnes Aldana [phone]  [place] [address]  Phone: [phone] Fax: [phone]    [facility] [address] [place], [place] [address] Nw 30[facility] [phone] - F [phone]  [facility]0 [facility]. Caitie [place] [address]  Phone: [phone] Fax: [phone]    [address] 9841 [facility] [phone] Agnes Aldana [phone]  [facility] [address] [address]  Phone: [phone] Fax: [phone]  Medication:   Requested Prescriptions     Pending Prescriptions Disp Refills    gabapentin (NEURONTIN) 300 MG capsule 60 capsule 2     Sig: Take 1 capsule by mouth in the morning and at bedtime for 90 days. Last Filled:      Patient Phone Number: [phone] (home)     Last appt: 6/8/2023   Next appt: 9/1/2023    Last OARRS:   RX Monitoring 4/13/2023   Periodic Controlled Substance Monitoring Possible medication side effects, risk of tolerance/dependence & alternative treatments discussed. Preferred Pharmacy:    Haylee Camargo

## 2023-08-29 NOTE — TELEPHONE ENCOUNTER
Controlled Substance Monitoring:    Acute and Chronic Pain Monitoring:   RX Monitoring 8/29/2023   Periodic Controlled Substance Monitoring No signs of potential drug abuse or diversion identified.
Medication:   Requested Prescriptions     Pending Prescriptions Disp Refills    gabapentin (NEURONTIN) 300 MG capsule 180 capsule 0     Sig: Take 1 capsule by mouth in the morning and at bedtime for 90 days. Dulaglutide (TRULICITY) 3.96 JM/8.6MI SOPN 6 mL 1        Last Filled:      Patient Phone Number: [phone] (home)     Last appt: 6/8/2023   Next appt: 9/1/2023    Last OARRS:   RX Monitoring 4/13/2023   Periodic Controlled Substance Monitoring Possible medication side effects, risk of tolerance/dependence & alternative treatments discussed. Preferred Pharmacy: 10[facility], 3400 [facility] 389 [facility] [phone]  [address] [address]  Phone: [phone] Fax: [phone]  Nw 9[facility] 2606 [facility] [phone] Kendra Nichole [phone]  [address]  [address] [facility] [address] [address]  Phone: [phone] Fax: [phone]    [facility] 880 [facility] [phone] Kendra Nichole [phone]  [address] [address]  Phone: [phone] Fax: [phone]    [facility]/pharmacy [address] [place], [place] [address] Nw 30[facility] [phone] - F [phone]  [address] [place] [address].   [place] [address]  Phone: [phone] Fax: [phone]    [facility] [address] [phone] Kendracaridad Nichole [phone]  855 [facility] [address]  Phone: [phone] Fax: [phone]
[phone]    [id number]  1310 [facility]. Miguel Angelcarly Boas [address]  Phone: [phone] Fax: [phone]    [address] - [address] [phone] Peder Floss 5220 [facility] [address]  Phone: [phone] Fax: [phone]  Medication:   Requested Prescriptions     Pending Prescriptions Disp Refills    gabapentin (NEURONTIN) 300 MG capsule 180 capsule 0     Sig: Take 1 capsule by mouth in the morning and at bedtime for 90 days. Last Filled:      Patient Phone Number: [phone] (home)     Last appt: 6/8/2023   Next appt: 9/1/2023    Last OARRS:   RX Monitoring 4/13/2023   Periodic Controlled Substance Monitoring Possible medication side effects, risk of tolerance/dependence & alternative treatments discussed. Preferred Pharmacy: [address] [place] [address] [phone]  [facility] [address] [place] [address]  Phone: [phone] Fax: [phone]  Nw 9[facility] 2606 [facility] [phone] Peder Floss [phone]  11 [facility] 110  223 [facility] [address]  Phone: [phone] Fax: [phone]    [address] [phone] Peder Floss [phone]  [facility] 421 [facility] [address]  Phone: [phone] Fax: [phone]    [facility] [address] [place] [address], 3487  30[facility] [phone]  F [phone]  [facility]0 [facility].   Louretta Boas [address]  Phone: [phone] Fax: [phone]    [facility] 8111 [facility]1 [facility] [phone] Peder Floss [phone]  [address] [place] [address]  Phone: [phone] Fax: [phone]

## 2023-08-29 NOTE — TELEPHONE ENCOUNTER
General Question     Subject: LUMBAR INJ AMD EMG  Patient and /or Facility RequestMonia Presume  Contact Number: [phone]     Patient has questions in regards to the lumbar inj and the EMG.

## 2023-08-30 NOTE — TELEPHONE ENCOUNTER
S/W pt, she has been in touch with [facility] regarding LMBB appt for tomorrow at [facility]. Pt needed location info for the EMG test, sent this to her via SMS [employer]. Pt also needed F/U appt, this has been scheduled. A detailed SMS [employer] message with all info was sent to pt.

## 2023-08-31 ENCOUNTER — HOSPITAL ENCOUNTER (OUTPATIENT)
Dept: INTERVENTIONAL RADIOLOGY/VASCULAR | Age: 68
Discharge: HOME | End: 2023-08-31
Payer: MEDICARE

## 2023-08-31 DIAGNOSIS — M47.816 LUMBAR FACET ARTHROPATHY: ICD-10-CM

## 2023-08-31 DIAGNOSIS — M47.816 LUMBAR SPONDYLOSIS: ICD-10-CM

## 2023-08-31 PROCEDURE — 6360000002 HC RX W HCPCS

## 2023-08-31 PROCEDURE — 64494 INJ PARAVERT F JNT L/S 2 LEV: CPT

## 2023-08-31 PROCEDURE — 64495 INJ PARAVERT F JNT L/S 3 LEV: CPT

## 2023-08-31 PROCEDURE — 64493 INJ PARAVERT F JNT L/S 1 LEV: CPT

## 2023-08-31 NOTE — DISCHARGE INSTRUCTIONS
fluids over the next 24 hours (If not contraindicated by illness or by                   physician order)  ____ Start with clear liquids and progress to normal diet as you feel like eating. If you experience nausea or repeated episodes of vomiting, which persist beyond 12-24 hours, notify your doctor        _x___ Resume your previous diet    ACTIVITY INSTRUCTIONS:    ____ See other instructions  ____ No special instructions  ____ Rest for 24 hours    _x___ Up as tolerated  __x__ Increase activity as tolerated    Wound/Dressing Instructions:  ____ See other instructions  ____ May shower, tomorrow  _x___ Remove bandage within 24 hours    FOLLOW-UP APPOINTMENT    Follow up with MD as directed. Belongings returned to patient and/or family: Yes. The Discharge Instructions have been explained to me. I understand and can verbalize these instructions.

## 2023-09-01 ENCOUNTER — OFFICE VISIT (OUTPATIENT)
Dept: PRIMARY CARE CLINIC | Age: 68
End: 2023-09-01

## 2023-09-01 VITALS
OXYGEN SATURATION: 98 % | SYSTOLIC BLOOD PRESSURE: 120 MMHG | BODY MASS INDEX: 28.53 KG/M2 | DIASTOLIC BLOOD PRESSURE: 76 MMHG | WEIGHT: 156 LBS | HEART RATE: 78 BPM

## 2023-09-01 DIAGNOSIS — G43.719 INTRACTABLE CHRONIC MIGRAINE WITHOUT AURA AND WITHOUT STATUS MIGRAINOSUS: ICD-10-CM

## 2023-09-01 DIAGNOSIS — Z23 NEED FOR INFLUENZA VACCINATION: ICD-10-CM

## 2023-09-01 DIAGNOSIS — G50.9 TRIGEMINAL NEUROPATHY: ICD-10-CM

## 2023-09-01 DIAGNOSIS — I10 ESSENTIAL HYPERTENSION: ICD-10-CM

## 2023-09-01 DIAGNOSIS — E78.2 MIXED HYPERLIPIDEMIA: ICD-10-CM

## 2023-09-01 DIAGNOSIS — E11.42 TYPE 2 DIABETES MELLITUS WITH DIABETIC POLYNEUROPATHY, WITHOUT LONG-TERM CURRENT USE OF INSULIN (HCC): Primary | ICD-10-CM

## 2023-09-01 RX ORDER — ATORVASTATIN CALCIUM 20 MG/1
20 TABLET, FILM COATED ORAL DAILY
COMMUNITY

## 2023-09-01 NOTE — PROGRESS NOTES
Date of Visit: 2023    Wyatt Haynes (:  1955) is a 80 y.o. female,  Established patient here for evaluation of the following chief complaint(s):  Diabetes, Hypertension, and Cholesterol Problem      ASSESSMENT/PLAN:    1. Type 2 diabetes mellitus with diabetic polyneuropathy, without long-term current use of insulin (HCC)  -Hemoglobin A1c of 6.0% shows diabetes is well controlled  -Continue Trulicity 3.75 mg subcutaneously once a week  -Limit carbohydrates to 45 grams with meals and 15 grams with snacks  -monitor blood sugars  -goal for blood sugar fasting or pre-meal  is   -goal for blood sugar 2 hours after a meal is less than 180  -goal for blood sugar at bedtime is less than 150  -Regular aerobic exercise    2. Essential hypertension  -stable  -Continue clonidine 0.1 mg/24hr patch weekly  -Low sodium diet  -Regular aerobic exercise    3. Mixed hyperlipidemia  -LDL is at goal  -Continue Atorvastatin 20mg nightly  -Low fat, low cholesterol diet  -Regular aerobic exercise    4. Intractable chronic migraine without aura and without status migrainosus  -better and stable  -Continue Gabapentin 300mg 1 capsule with dinner and 2 capsules bedtime  -Continue Naratriptan 2.5mg as needed  -continue care per Neurology    5. Trigeminal neuropathy  -pain better and stable  -Continue Gabapentin 300mg 1 capsule with dinner and 2 capsules bedtime  -continue care per Neurology    6. Need for influenza vaccination  - Influenza, FLUAD, (age 72 y+), IM, Preservative Free, 0.5 mL given          Return in about 4 weeks (around 2023) for Medicare AW. SUBJECTIVE:    Patient has Type 2 Diabetes. Patient takes Trulicity 3.16 mg subcutaneously once a week. Patient states she has not been checking her blood sugar and last checked it on Tuesday morning and it was 95. Patient decreases carbohydrates. Patient does not exercise. Patient has hyperlipidemia.  Patient states she decreased Atorvastatin to 20mg

## 2023-09-05 ENCOUNTER — PROCEDURE VISIT (OUTPATIENT)
Dept: NEUROLOGY | Age: 68
End: 2023-09-05
Payer: MEDICARE

## 2023-09-05 DIAGNOSIS — M79.604 RIGHT LEG PAIN: Primary | ICD-10-CM

## 2023-09-05 PROCEDURE — 95908 NRV CNDJ TST 3-4 STUDIES: CPT | Performed by: PSYCHIATRY & NEUROLOGY

## 2023-09-05 PROCEDURE — 95886 MUSC TEST DONE W/N TEST COMP: CPT | Performed by: PSYCHIATRY & NEUROLOGY

## 2023-09-05 RX ORDER — BUDESONIDE AND FORMOTEROL FUMARATE DIHYDRATE 160; 4.5 UG/1; UG/1
AEROSOL RESPIRATORY (INHALATION)
Qty: 30.6 G | Refills: 1 | Status: SHIPPED | OUTPATIENT
Start: 2023-09-05

## 2023-09-05 NOTE — TELEPHONE ENCOUNTER
Medication:   Requested Prescriptions     Pending Prescriptions Disp Refills    budesonide-formoterol (SYMBICORT) 160-4.5 MCG/ACT AERO [Pharmacy Med Name: BUDES/FORMOT -4.5] 30.6 g 1     Sig: USE 2 INHALATIONS ORALLY   TWICE DAILY     Last Filled:  02/17/2023    Last appt: 9/1/2023   Next appt: 10/6/2023    Last OARRS:   RX Monitoring 8/29/2023   Periodic Controlled Substance Monitoring No signs of potential drug abuse or diversion identified.

## 2023-09-08 LAB
FERRITIN SERPL IA-MCNC: 36.9 NG/ML (ref 15–150)
IRON SATN MFR SERPL: 20 % (ref 15–50)
IRON SERPL-MCNC: 71 UG/DL (ref 37–145)
TIBC SERPL-MCNC: 357 UG/DL (ref 260–445)

## 2023-09-11 ENCOUNTER — OFFICE VISIT (OUTPATIENT)
Dept: ORTHOPEDIC SURGERY | Age: 68
End: 2023-09-11
Payer: MEDICARE

## 2023-09-11 VITALS — HEIGHT: 62 IN | WEIGHT: 156.09 LBS | BODY MASS INDEX: 28.72 KG/M2

## 2023-09-11 DIAGNOSIS — R20.2 PARESTHESIA OF RIGHT LOWER EXTREMITY: ICD-10-CM

## 2023-09-11 DIAGNOSIS — M51.36 DDD (DEGENERATIVE DISC DISEASE), LUMBAR: ICD-10-CM

## 2023-09-11 DIAGNOSIS — Z86.39 HISTORY OF DIABETES MELLITUS: ICD-10-CM

## 2023-09-11 DIAGNOSIS — M47.816 LUMBAR SPONDYLOSIS: ICD-10-CM

## 2023-09-11 PROCEDURE — 1036F TOBACCO NON-USER: CPT | Performed by: INTERNAL MEDICINE

## 2023-09-11 PROCEDURE — 99214 OFFICE O/P EST MOD 30 MIN: CPT | Performed by: INTERNAL MEDICINE

## 2023-09-11 PROCEDURE — G8427 DOCREV CUR MEDS BY ELIG CLIN: HCPCS | Performed by: INTERNAL MEDICINE

## 2023-09-11 PROCEDURE — 1124F ACP DISCUSS-NO DSCNMKR DOCD: CPT | Performed by: INTERNAL MEDICINE

## 2023-09-11 PROCEDURE — 1090F PRES/ABSN URINE INCON ASSESS: CPT | Performed by: INTERNAL MEDICINE

## 2023-09-11 PROCEDURE — 3017F COLORECTAL CA SCREEN DOC REV: CPT | Performed by: INTERNAL MEDICINE

## 2023-09-11 PROCEDURE — G8399 PT W/DXA RESULTS DOCUMENT: HCPCS | Performed by: INTERNAL MEDICINE

## 2023-09-11 PROCEDURE — G8417 CALC BMI ABV UP PARAM F/U: HCPCS | Performed by: INTERNAL MEDICINE

## 2023-09-12 NOTE — PROGRESS NOTES
Chief Complaint:   Chief Complaint   Patient presents with    Lower Back Pain     feeling worse, had pain relief in back for a few hours s/p LMBB, but was afraid to walk with leg weakness/numbness, then pain fully returned, cannot get comfortable, not sleeping, almost went to ED over the weekend due to pain and weakness, hurts R side back to breathe          History of Present Illness:       Patient is a 80 y.o. female returns follow up for the above complaint. The patient was last seen approximately 2 monthsago. The symptoms show no change since the last visit. The patient has had further testing for the problem. EMG right lower extremity completed in the interim. The patient did undergo lumbar MBB in the interim. Patient reports <25% (0%) improvement related to this intervention. Back:Right leg pain 50:50. Pain in back and leg is aching, burning, or numbness in quality. The right lower limb pain does not follow a dermatomal distribution and she experiences pain in the right lateral flank and groin with respiration and complains of subjective generalized weakness. Pain levels:6.  Symptoms are variably problematic whether sitting or standing. The patient denies new onset or progressive weakness of the lower extremities. The patient denies bowel or bladder dysfunction. She continues on medical pain management as per previous  inclusive of gabapentin that was recently titrated by outside physician.          Past Medical History:        Past Medical History:   Diagnosis Date    Abnormal involuntary movements(781.0)     Anal fissure     Anemia, unspecified     Anxiety     Chronic back pain     Chronic diarrhea 09/23/2019    Depression     Diffuse cystic mastopathy     Encopresis(307.7)     Esophageal reflux     Foot fracture, left 1989    drop something on foot    Headache(784.0)     Hepatitis, unspecified     Herpes simplex without mention of complication     Hypertension

## 2023-09-13 ENCOUNTER — OFFICE VISIT (OUTPATIENT)
Dept: CARDIOLOGY CLINIC | Age: 68
End: 2023-09-13
Payer: MEDICARE

## 2023-09-13 VITALS
DIASTOLIC BLOOD PRESSURE: 70 MMHG | HEART RATE: 75 BPM | WEIGHT: 157.8 LBS | BODY MASS INDEX: 28.85 KG/M2 | SYSTOLIC BLOOD PRESSURE: 124 MMHG

## 2023-09-13 DIAGNOSIS — R00.2 PALPITATIONS: ICD-10-CM

## 2023-09-13 DIAGNOSIS — I10 HTN (HYPERTENSION), BENIGN: Primary | ICD-10-CM

## 2023-09-13 DIAGNOSIS — R10.9 FLANK PAIN: ICD-10-CM

## 2023-09-13 PROCEDURE — 1036F TOBACCO NON-USER: CPT | Performed by: INTERNAL MEDICINE

## 2023-09-13 PROCEDURE — 1124F ACP DISCUSS-NO DSCNMKR DOCD: CPT | Performed by: INTERNAL MEDICINE

## 2023-09-13 PROCEDURE — G8399 PT W/DXA RESULTS DOCUMENT: HCPCS | Performed by: INTERNAL MEDICINE

## 2023-09-13 PROCEDURE — 3078F DIAST BP <80 MM HG: CPT | Performed by: INTERNAL MEDICINE

## 2023-09-13 PROCEDURE — 1090F PRES/ABSN URINE INCON ASSESS: CPT | Performed by: INTERNAL MEDICINE

## 2023-09-13 PROCEDURE — 99214 OFFICE O/P EST MOD 30 MIN: CPT | Performed by: INTERNAL MEDICINE

## 2023-09-13 PROCEDURE — 93000 ELECTROCARDIOGRAM COMPLETE: CPT | Performed by: INTERNAL MEDICINE

## 2023-09-13 PROCEDURE — G8417 CALC BMI ABV UP PARAM F/U: HCPCS | Performed by: INTERNAL MEDICINE

## 2023-09-13 PROCEDURE — G8427 DOCREV CUR MEDS BY ELIG CLIN: HCPCS | Performed by: INTERNAL MEDICINE

## 2023-09-13 PROCEDURE — 3017F COLORECTAL CA SCREEN DOC REV: CPT | Performed by: INTERNAL MEDICINE

## 2023-09-13 PROCEDURE — 3074F SYST BP LT 130 MM HG: CPT | Performed by: INTERNAL MEDICINE

## 2023-09-13 RX ORDER — FLASH GLUCOSE SENSOR
1 KIT MISCELLANEOUS
Qty: 2 EACH | Refills: 5 | Status: SHIPPED | OUTPATIENT
Start: 2023-09-13

## 2023-09-13 NOTE — PROGRESS NOTES
[facility]  CARDIOLOGY  follow up        Reason for Consultation/Chief Complaint: \"I have been having chest pain. \"       History of Present Illness:  Gloria Bush is a 80 y.o. patient  has no chest pain now. No sob. Has IBS. Going to [employer]. Has HA when she doesn't sleep. Past Medical History:   has a past medical history of Abnormal involuntary movements(781.0), Anal fissure, Anemia, unspecified, Anxiety, Chronic back pain, Chronic diarrhea, Depression, Diffuse cystic mastopathy, Encopresis(307.7), Esophageal reflux, Foot fracture, left, Headache(784.0), Hepatitis, unspecified, Herpes simplex without mention of complication, Hypertension, Hypogammaglobulinaemia, unspecified, Insomnia, unspecified, Lateral epicondylitis  of elbow, Migraine, unspecified, without mention of intractable migraine without mention of status migrainosus, Mixed hyperlipidemia, Osteopenia, Postmenopausal, Pure hypercholesterolemia, Symptomatic menopausal or female climacteric states, Type 2 diabetes mellitus without complication ([address]), Type II or unspecified type diabetes mellitus without mention of complication, not stated as uncontrolled, and Unspecified asthma(493.90). Has CP in am didn't take nitrodur in am but in pm.  HR 90 no new ECG changes. Surgical History:   has a past surgical history that includes Rectal surgery; Dilation and curettage of uterus; Hysterectomy (1998); Hand surgery (2009); Foot surgery (10/2009,11/2010); Elbow surgery (1/18/11); Shoulder arthroscopy (Right, 7/07); Cataract removal (Bilateral); and Breast biopsy. Social History:   reports that she has never smoked. She has never used smokeless tobacco. She reports that she does not drink alcohol and does not use drugs. Family History:  No evidence for sudden cardiac death or premature CAD    Home Medications:  Were reviewed and are listed in nursing record.  and/or listed below  Prior to Admission medications    Medication

## 2023-09-16 PROBLEM — G50.9 TRIGEMINAL NEUROPATHY: Status: ACTIVE | Noted: 2023-09-16

## 2023-09-16 ASSESSMENT — ENCOUNTER SYMPTOMS
SINUS PAIN: 0
BLOOD IN STOOL: 0
VOMITING: 0
SORE THROAT: 0
COUGH: 0
CONSTIPATION: 1
ABDOMINAL PAIN: 0
SINUS PRESSURE: 0
RHINORRHEA: 0
TROUBLE SWALLOWING: 0
WHEEZING: 0
DIARRHEA: 0
PHOTOPHOBIA: 0
SHORTNESS OF BREATH: 0
CHEST TIGHTNESS: 0
NAUSEA: 0

## 2023-09-18 ENCOUNTER — TELEPHONE (OUTPATIENT)
Dept: CARDIOLOGY CLINIC | Age: 68
End: 2023-09-18

## 2023-09-18 DIAGNOSIS — Z79.4 TYPE 2 DIABETES MELLITUS WITHOUT COMPLICATION, WITH LONG-TERM CURRENT USE OF INSULIN (HCC): Primary | ICD-10-CM

## 2023-09-18 DIAGNOSIS — E11.9 DIABETES MELLITUS TYPE 2, NONINSULIN DEPENDENT (HCC): Primary | ICD-10-CM

## 2023-09-18 DIAGNOSIS — E11.9 TYPE 2 DIABETES MELLITUS WITHOUT COMPLICATION, WITH LONG-TERM CURRENT USE OF INSULIN (HCC): Primary | ICD-10-CM

## 2023-09-18 RX ORDER — FLASH GLUCOSE SENSOR
1 KIT MISCELLANEOUS
Qty: 2 EACH | Refills: 5 | Status: SHIPPED | OUTPATIENT
Start: 2023-09-18

## 2023-09-18 NOTE — TELEPHONE ENCOUNTER
[facility] pharmacy called stating the medication listed below needs to be resubmitted with a diagnosis code, so ut can be billed through insurance.      Continuous Blood Gluc Sensor (FREESTYLE AYAZ 14 DAY SENSOR) [place] [[id number]]     Order Details  Dose: 1 Device Route: Does not apply Frequency: EVERY 14 DAYS   Dispense Quantity: 2 each Refills: 5          Si Device by Does not apply route every 14 days     Pharmacy    [facility]/pharmacy [address] [place] [address], [address] [place] [address] [phone] - F [phone]   [address] [place] [address] , [facility] [address]   Phone:  [phone]  Fax:  [phone]

## 2023-09-18 NOTE — TELEPHONE ENCOUNTER
[facility] pharmacy called stating that in order for insurance to cover the prescription, they need a script for both the sensor and the reader. They also stated that they no longer make Edel 14 and will need to it to be a Edel 2. The diagnosis code needs to be on all the orders.

## 2023-09-20 ENCOUNTER — HOSPITAL ENCOUNTER (OUTPATIENT)
Dept: ULTRASOUND IMAGING | Age: 68
Discharge: HOME | End: 2023-09-20
Attending: INTERNAL MEDICINE
Payer: MEDICARE

## 2023-09-20 DIAGNOSIS — R00.2 PALPITATIONS: ICD-10-CM

## 2023-09-20 DIAGNOSIS — I10 HTN (HYPERTENSION), BENIGN: ICD-10-CM

## 2023-09-20 DIAGNOSIS — R10.9 FLANK PAIN: ICD-10-CM

## 2023-09-20 PROCEDURE — 76770 US EXAM ABDO BACK WALL COMP: CPT

## 2023-09-21 ENCOUNTER — TELEPHONE (OUTPATIENT)
Dept: PRIMARY CARE CLINIC | Age: 68
End: 2023-09-21

## 2023-09-25 ENCOUNTER — TELEPHONE (OUTPATIENT)
Dept: ORTHOPEDIC SURGERY | Age: 68
End: 2023-09-25

## 2023-09-25 NOTE — TELEPHONE ENCOUNTER
[facility] PATIENT SEEN IN ER:    Patient was seen in [address] [facility] ER please contact her for update and confirmation she should begin approved lumbar PT. Attempted to schedule a follow up appointment for her, but she requested to be called instead.     [address] [address] [place]: [phone]

## 2023-09-26 ENCOUNTER — TELEPHONE (OUTPATIENT)
Dept: PRIMARY CARE CLINIC | Age: 68
End: 2023-09-26

## 2023-09-26 NOTE — TELEPHONE ENCOUNTER
Verbal Orders Requested  [facility] calling  # [phone]  Fax# [phone]    Verbal Orders:  PT, Skilled Nursing, and

## 2023-09-27 DIAGNOSIS — N28.1 RENAL CYST: Primary | ICD-10-CM

## 2023-09-27 DIAGNOSIS — R07.9 CHEST PAIN, UNSPECIFIED TYPE: ICD-10-CM

## 2023-09-27 DIAGNOSIS — I21.4 NSTEMI (NON-ST ELEVATED MYOCARDIAL INFARCTION) (HCC): ICD-10-CM

## 2023-09-27 RX ORDER — DILTIAZEM HYDROCHLORIDE 120 MG/1
120 CAPSULE, COATED, EXTENDED RELEASE ORAL DAILY
Qty: 90 CAPSULE | Refills: 2 | Status: SHIPPED | OUTPATIENT
Start: 2023-09-27

## 2023-09-29 NOTE — PROGRESS NOTES
ENCOUNTER DATE: 10/3/2023     NAME: Freda Randhawa   AGE: 80 y.o. GENDER: female   YOB: 1955    Chief Complaint   Patient presents with    Fatigue     Follow up       ASSESSMENT/PLAN:  1. Chronic right-sided low back pain with right-sided sciatica  Hospital notes reviewed including imaging reports and labs  Ortho notes reviewed  Patient has multiple medication intolerances to opioids, NSAIDs, muscle relaxants. Is unable to tolerate gabapentin during the day. Is able to tolerate topical Voltaren. Encouraged to try Voltaren gel on lower back. May continue with Tylenol. Encouraged to continue with physical therapy. Scheduled with Ortho 10/9/2023. Encouraged to try TENS unit. Written Rx given to patient. 2. Lumbar spondylosis    3. Right leg weakness  Continue to use cane when ambulating    4. Right leg paresthesias    5. DDD (degenerative disc disease), lumbar    6. Hospital discharge follow-up  Hospital records reviewed including multiple MRI and lab results    7. Medication intolerance  Discussed medication options. Very limited in what she can take and tolerate. 8. Trigeminal neuropathy  Continue per neurology. Gabapentin dosing suggested per neuro      Return if symptoms worsen or fail to improve. HPI:   Patient is here for hospital follow-up    Admitted 9/21/2023 to [facility] for acute back pain and RLE weakness. Was initially concerned for stroke. Discharge 9/23/23    RLE weakness improved with pain control. Had MRI of spine, brain. No evidence of stroke. See reports below  Discharged home with Rx for Ultram, Robaxin 500 mg  Advised to follow-up with spine doctor    ORTHO  H/o chronic back pain due to previous workers comp injury in [place] [facility]. Scheduled with Ortho 10/9/2023  Has home health. Had MRI of spine, brain in hospital. Also completed EMG few weeks ago per ortho.    Referred for Physical Therapy per ortho, however wasn't able to start until yesterday  Had

## 2023-10-02 ENCOUNTER — TELEPHONE (OUTPATIENT)
Dept: CARDIOLOGY CLINIC | Age: 68
End: 2023-10-02

## 2023-10-02 ENCOUNTER — TELEPHONE (OUTPATIENT)
Dept: PRIMARY CARE CLINIC | Age: 68
End: 2023-10-02

## 2023-10-02 DIAGNOSIS — E11.9 TYPE 2 DIABETES MELLITUS WITHOUT COMPLICATION, WITHOUT LONG-TERM CURRENT USE OF INSULIN (HCC): Primary | ICD-10-CM

## 2023-10-02 NOTE — TELEPHONE ENCOUNTER
Dank Llanes, a nurse, from [facility] called and stated she had a virtual meeting with pt and pt was complaining of severe back pain and she isnt taking the tramadol or robaxin due to itching and nausea.  Dank Llanes was calling to see if something can be prescribed for pt nausea tomorrow during visit     [facility] # [phone]

## 2023-10-03 ENCOUNTER — OFFICE VISIT (OUTPATIENT)
Dept: PRIMARY CARE CLINIC | Age: 68
End: 2023-10-03

## 2023-10-03 VITALS
BODY MASS INDEX: 29.26 KG/M2 | OXYGEN SATURATION: 97 % | SYSTOLIC BLOOD PRESSURE: 128 MMHG | HEART RATE: 72 BPM | WEIGHT: 160 LBS | TEMPERATURE: 97.2 F | DIASTOLIC BLOOD PRESSURE: 80 MMHG

## 2023-10-03 DIAGNOSIS — G89.29 CHRONIC RIGHT-SIDED LOW BACK PAIN WITH RIGHT-SIDED SCIATICA: Primary | ICD-10-CM

## 2023-10-03 DIAGNOSIS — M54.41 CHRONIC RIGHT-SIDED LOW BACK PAIN WITH RIGHT-SIDED SCIATICA: Primary | ICD-10-CM

## 2023-10-03 DIAGNOSIS — M47.816 LUMBAR SPONDYLOSIS: ICD-10-CM

## 2023-10-03 DIAGNOSIS — Z78.9 MEDICATION INTOLERANCE: ICD-10-CM

## 2023-10-03 DIAGNOSIS — M51.36 DDD (DEGENERATIVE DISC DISEASE), LUMBAR: ICD-10-CM

## 2023-10-03 DIAGNOSIS — G50.9 TRIGEMINAL NEUROPATHY: ICD-10-CM

## 2023-10-03 DIAGNOSIS — R29.898 RIGHT LEG WEAKNESS: ICD-10-CM

## 2023-10-03 DIAGNOSIS — R20.2 RIGHT LEG PARESTHESIAS: ICD-10-CM

## 2023-10-03 DIAGNOSIS — Z09 HOSPITAL DISCHARGE FOLLOW-UP: ICD-10-CM

## 2023-10-03 PROBLEM — M51.369 DDD (DEGENERATIVE DISC DISEASE), LUMBAR: Status: ACTIVE | Noted: 2023-10-03

## 2023-10-03 PROBLEM — J06.9 UPPER RESPIRATORY TRACT INFECTION: Status: RESOLVED | Noted: 2022-05-30 | Resolved: 2023-10-03

## 2023-10-03 ASSESSMENT — ENCOUNTER SYMPTOMS
NAUSEA: 0
WHEEZING: 0
CONSTIPATION: 1
CHEST TIGHTNESS: 0
BLOOD IN STOOL: 0
COUGH: 0
SHORTNESS OF BREATH: 0
VOMITING: 0
DIARRHEA: 0
BACK PAIN: 1
ABDOMINAL PAIN: 0

## 2023-10-05 NOTE — TELEPHONE ENCOUNTER
Call Reena Bautista at [facility] with update. Patient was seen by Mendel Engels, NP on 10/3/23 for hospital follow up and pain addressed at visit.

## 2023-10-05 NOTE — TELEPHONE ENCOUNTER
Voicemail left for Maddi letting her know patient should have zofran if not please call and we can refill for patient.

## 2023-10-06 ENCOUNTER — OFFICE VISIT (OUTPATIENT)
Dept: PRIMARY CARE CLINIC | Age: 68
End: 2023-10-06
Payer: MEDICARE

## 2023-10-06 VITALS
WEIGHT: 159 LBS | HEART RATE: 92 BPM | RESPIRATION RATE: 16 BRPM | SYSTOLIC BLOOD PRESSURE: 116 MMHG | BODY MASS INDEX: 29.26 KG/M2 | TEMPERATURE: 97.3 F | HEIGHT: 62 IN | OXYGEN SATURATION: 95 % | DIASTOLIC BLOOD PRESSURE: 84 MMHG

## 2023-10-06 DIAGNOSIS — Z00.00 MEDICARE ANNUAL WELLNESS VISIT, SUBSEQUENT: Primary | ICD-10-CM

## 2023-10-06 DIAGNOSIS — F32.A DEPRESSION, UNSPECIFIED DEPRESSION TYPE: ICD-10-CM

## 2023-10-06 DIAGNOSIS — F51.01 PRIMARY INSOMNIA: ICD-10-CM

## 2023-10-06 PROCEDURE — 1124F ACP DISCUSS-NO DSCNMKR DOCD: CPT | Performed by: INTERNAL MEDICINE

## 2023-10-06 PROCEDURE — 3017F COLORECTAL CA SCREEN DOC REV: CPT | Performed by: INTERNAL MEDICINE

## 2023-10-06 PROCEDURE — G0439 PPPS, SUBSEQ VISIT: HCPCS | Performed by: INTERNAL MEDICINE

## 2023-10-06 PROCEDURE — G8484 FLU IMMUNIZE NO ADMIN: HCPCS | Performed by: INTERNAL MEDICINE

## 2023-10-06 PROCEDURE — 3078F DIAST BP <80 MM HG: CPT | Performed by: INTERNAL MEDICINE

## 2023-10-06 PROCEDURE — 3074F SYST BP LT 130 MM HG: CPT | Performed by: INTERNAL MEDICINE

## 2023-10-06 RX ORDER — SAW/PYGEUM/BETA/HERB/D3/B6/ZN 30 MG-25MG
CAPSULE ORAL
Status: CANCELLED | OUTPATIENT
Start: 2023-10-06

## 2023-10-06 ASSESSMENT — PATIENT HEALTH QUESTIONNAIRE - PHQ9
2. FEELING DOWN, DEPRESSED OR HOPELESS: 0
9. THOUGHTS THAT YOU WOULD BE BETTER OFF DEAD, OR OF HURTING YOURSELF: 0
SUM OF ALL RESPONSES TO PHQ QUESTIONS 1-9: 8
SUM OF ALL RESPONSES TO PHQ QUESTIONS 1-9: 6
SUM OF ALL RESPONSES TO PHQ QUESTIONS 1-9: 6
8. MOVING OR SPEAKING SO SLOWLY THAT OTHER PEOPLE COULD HAVE NOTICED. OR THE OPPOSITE, BEING SO FIGETY OR RESTLESS THAT YOU HAVE BEEN MOVING AROUND A LOT MORE THAN USUAL: 0
7. TROUBLE CONCENTRATING ON THINGS, SUCH AS READING THE NEWSPAPER OR WATCHING TELEVISION: 1
10. IF YOU CHECKED OFF ANY PROBLEMS, HOW DIFFICULT HAVE THESE PROBLEMS MADE IT FOR YOU TO DO YOUR WORK, TAKE CARE OF THINGS AT HOME, OR GET ALONG WITH OTHER PEOPLE: 0
4. FEELING TIRED OR HAVING LITTLE ENERGY: 3
9. THOUGHTS THAT YOU WOULD BE BETTER OFF DEAD, OR OF HURTING YOURSELF: 0
SUM OF ALL RESPONSES TO PHQ QUESTIONS 1-9: 8
10. IF YOU CHECKED OFF ANY PROBLEMS, HOW DIFFICULT HAVE THESE PROBLEMS MADE IT FOR YOU TO DO YOUR WORK, TAKE CARE OF THINGS AT HOME, OR GET ALONG WITH OTHER PEOPLE: 0
1. LITTLE INTEREST OR PLEASURE IN DOING THINGS: 0
SUM OF ALL RESPONSES TO PHQ QUESTIONS 1-9: 8
2. FEELING DOWN, DEPRESSED OR HOPELESS: 3
SUM OF ALL RESPONSES TO PHQ QUESTIONS 1-9: 8
SUM OF ALL RESPONSES TO PHQ QUESTIONS 1-9: 6
7. TROUBLE CONCENTRATING ON THINGS, SUCH AS READING THE NEWSPAPER OR WATCHING TELEVISION: 0
3. TROUBLE FALLING OR STAYING ASLEEP: 3
1. LITTLE INTEREST OR PLEASURE IN DOING THINGS: 1
SUM OF ALL RESPONSES TO PHQ9 QUESTIONS 1 & 2: 4
SUM OF ALL RESPONSES TO PHQ QUESTIONS 1-9: 6
8. MOVING OR SPEAKING SO SLOWLY THAT OTHER PEOPLE COULD HAVE NOTICED. OR THE OPPOSITE, BEING SO FIGETY OR RESTLESS THAT YOU HAVE BEEN MOVING AROUND A LOT MORE THAN USUAL: 0
3. TROUBLE FALLING OR STAYING ASLEEP: 3
6. FEELING BAD ABOUT YOURSELF - OR THAT YOU ARE A FAILURE OR HAVE LET YOURSELF OR YOUR FAMILY DOWN: 0
4. FEELING TIRED OR HAVING LITTLE ENERGY: 0
SUM OF ALL RESPONSES TO PHQ9 QUESTIONS 1 & 2: 0
5. POOR APPETITE OR OVEREATING: 0
5. POOR APPETITE OR OVEREATING: 0

## 2023-10-06 ASSESSMENT — LIFESTYLE VARIABLES
HOW OFTEN DO YOU HAVE A DRINK CONTAINING ALCOHOL: NEVER
HOW MANY STANDARD DRINKS CONTAINING ALCOHOL DO YOU HAVE ON A TYPICAL DAY: PATIENT DOES NOT DRINK

## 2023-10-06 NOTE — PROGRESS NOTES
Medicare Annual Wellness Visit    Jasper Gonzalez is here for Medicare AWV    Assessment & Plan   Medicare annual wellness visit, subsequent  Depression, unspecified depression type  -     [facility]jeet BANERJEE [employer]  Primary insomnia  -     Melatonin ER 10 MG TBCR; Take 1 tablet by mouth at bedtime, Disp-30 tablet, R-0Normal        Recommendations for Preventive Services Due: see orders and patient instructions/AVS.  Recommended screening schedule for the next 5-10 years is provided to the patient in written form: see Patient Instructions/AVS.         Return in about 3 weeks (around 10/27/2023) for insomnia. Subjective     Addendum: Patient states she has difficulty falling asleep and staying asleep. Patient states she takes Melatonin 5mg 2 gummies nightly. Patient wakes up. I recommend patient change Melatonin to Melatonin ER 10mg nightly 30-60 minutes prior to bedtime. Ferdie Fleischer, MD    Patient's complete Health Risk Assessment and screening values have been reviewed and are found in Flowsheets. The following problems were reviewed today and where indicated follow up appointments were made and/or referrals ordered.     Positive Risk Factor Screenings with Interventions:    Fall Risk:  Do you feel unsteady or are you worried about falling? : (!) yes (balance therapy, has PT and she told them about it)  2 or more falls in past year?: no  Fall with injury in past year?: no     Interventions:    Patient is in physical therapy right now and brought up to her therapist.      Depression:  PHQ-2 Score: 0  PHQ-9 Total Score: 6    Interpretation:   1-4 = minimal  5-9 = mild  10-14 = moderate  15-19 = moderately severe  20-27 = severe  Interventions:  LPN INTERVENTION GUIDE: SCORE 5-14 = MODERATE DEPRESSION: FOLLOW UP IN 1 WEEK          General HRA Questions:  Select all that apply: (!) New or Increased Pain, New or Increased Fatigue, Loneliness    Pain

## 2023-10-06 NOTE — PATIENT INSTRUCTIONS
Personalized Preventive Plan for Wilman Naylor - 10/6/2023  Medicare offers a range of preventive health benefits. Some of the tests and screenings are paid in full while other may be subject to a deductible, co-insurance, and/or copay. Some of these benefits include a comprehensive review of your medical history including lifestyle, illnesses that may run in your family, and various assessments and screenings as appropriate. After reviewing your medical record and screening and assessments performed today your provider may have ordered immunizations, labs, imaging, and/or referrals for you. A list of these orders (if applicable) as well as your Preventive Care list are included within your After Visit Summary for your review. Other Preventive Recommendations:    A preventive eye exam performed by an eye specialist is recommended every 1-2 years to screen for glaucoma; cataracts, macular degeneration, and other eye disorders. A preventive dental visit is recommended every 6 months. Try to get at least 150 minutes of exercise per week or 10,000 steps per day on a pedometer . Order or download the FREE \"Exercise & Physical Activity: Your Everyday Guide\" from [employer]. Call 9-765.808.5546 or search [employer] online. You need 5088-1011 mg of calcium and 0008-0429 IU of vitamin D per day. It is possible to meet your calcium requirement with diet alone, but a vitamin D supplement is usually necessary to meet this goal.  When exposed to the sun, use a sunscreen that protects against both UVA and UVB radiation with an SPF of 30 or greater. Reapply every 2 to 3 hours or after sweating, drying off with a towel, or swimming. Always wear a seat belt when traveling in a car. Always wear a helmet when riding a bicycle or motorcycle.

## 2023-10-09 ENCOUNTER — OFFICE VISIT (OUTPATIENT)
Dept: ORTHOPEDIC SURGERY | Age: 68
End: 2023-10-09
Payer: MEDICARE

## 2023-10-09 VITALS — WEIGHT: 159 LBS | HEIGHT: 62 IN | BODY MASS INDEX: 29.26 KG/M2

## 2023-10-09 DIAGNOSIS — M51.36 DDD (DEGENERATIVE DISC DISEASE), LUMBAR: ICD-10-CM

## 2023-10-09 DIAGNOSIS — M47.816 LUMBAR SPONDYLOSIS: Primary | ICD-10-CM

## 2023-10-09 DIAGNOSIS — Z86.39 HISTORY OF DIABETES MELLITUS: ICD-10-CM

## 2023-10-09 PROCEDURE — G8399 PT W/DXA RESULTS DOCUMENT: HCPCS | Performed by: INTERNAL MEDICINE

## 2023-10-09 PROCEDURE — 99214 OFFICE O/P EST MOD 30 MIN: CPT | Performed by: INTERNAL MEDICINE

## 2023-10-09 PROCEDURE — 1036F TOBACCO NON-USER: CPT | Performed by: INTERNAL MEDICINE

## 2023-10-09 PROCEDURE — 1124F ACP DISCUSS-NO DSCNMKR DOCD: CPT | Performed by: INTERNAL MEDICINE

## 2023-10-09 PROCEDURE — G8417 CALC BMI ABV UP PARAM F/U: HCPCS | Performed by: INTERNAL MEDICINE

## 2023-10-09 PROCEDURE — 1090F PRES/ABSN URINE INCON ASSESS: CPT | Performed by: INTERNAL MEDICINE

## 2023-10-09 PROCEDURE — G8427 DOCREV CUR MEDS BY ELIG CLIN: HCPCS | Performed by: INTERNAL MEDICINE

## 2023-10-09 PROCEDURE — 3017F COLORECTAL CA SCREEN DOC REV: CPT | Performed by: INTERNAL MEDICINE

## 2023-10-09 PROCEDURE — G8484 FLU IMMUNIZE NO ADMIN: HCPCS | Performed by: INTERNAL MEDICINE

## 2023-10-09 RX ORDER — CHOLECALCIFEROL (VITAMIN D3) 125 MCG
1 CAPSULE ORAL NIGHTLY
Qty: 30 TABLET | Refills: 2 | Status: SHIPPED | OUTPATIENT
Start: 2023-10-09 | End: 2023-10-09 | Stop reason: DRUGHIGH

## 2023-10-09 RX ORDER — SAW/PYGEUM/BETA/HERB/D3/B6/ZN 30 MG-25MG
1 CAPSULE ORAL NIGHTLY
Qty: 30 TABLET | Refills: 0 | Status: SHIPPED | OUTPATIENT
Start: 2023-10-09

## 2023-10-09 NOTE — PROGRESS NOTES
DDD (degenerative disc disease), lumbar     Lumbar spondylosis Yes    History of diabetes mellitus         [facility] DOI 8/[birth year]/1996      Plan:     Recommend continue PT/home PT lumbar stabilization program  Medical pain management as per previous  Recommend formal work-up of the right hip outside of [facility] approved diagnoses  No indication for spine intervention injection at this time    I am become increasingly concerned that a significant portion of her pain is hip mediated which is outside the approved diagnoses of her [facility] claim. I have recommended she schedule an appointment under private insurance for evaluation of her right hip. Certainly her right hip could be aggravating her low back and vice versa. Proceed as outlined above    Approximately 30 minutes was spent related to previewing pertinent medical documentation prior to the patient's visit along with counseling during the patient's visit with respect to treatment options inclusive of alternatives to treatment and the complications and risks related to those treatment options along with expectations of outcome related to those treatments and inclusive of time in the documentation and ordering of testing and treatment after the visit. Orders:      No orders of the defined types were placed in this encounter. Francoise Simons MD.      Disclaimer: \"This note was dictated with voice recognition software. Though review and correction are routine, we apologize for any errors. \"

## 2023-10-12 ENCOUNTER — OFFICE VISIT (OUTPATIENT)
Dept: ORTHOPEDIC SURGERY | Age: 68
End: 2023-10-12

## 2023-10-12 DIAGNOSIS — M16.11 PRIMARY OSTEOARTHRITIS OF RIGHT HIP: ICD-10-CM

## 2023-10-12 DIAGNOSIS — M76.891 HIP CAPSULITIS, RIGHT: ICD-10-CM

## 2023-10-12 DIAGNOSIS — M25.551 RIGHT HIP PAIN: ICD-10-CM

## 2023-10-12 DIAGNOSIS — Z86.39 HISTORY OF DIABETES MELLITUS: ICD-10-CM

## 2023-10-12 NOTE — PROGRESS NOTES
inhibition weakness with SLR      Special Tests: Stinchfield test positive FADIR positive for pain      Skin: There are no rashes, ulcerations or lesions. Gait: Antalgic      Reflex intact lower     Additional Comments:        Additional Examinations:           Left Lower Extremity: Examination of the left lower extremity does not show any tenderness, deformity or injury. Range of motion is unremarkable. There is no gross instability. There are no rashes, ulcerations or lesions. Strength and tone are normal.  Neurolgic -Light touch sensation and manual muscle testing normal L2-S1. No fasiculations. Pattella tendon and Achilles tendon reflexes +2 bilaterally. Seated SLR negative         Office Imaging Results/Procedures PerformedToday:      Radiology:      X-rays obtained and reviewed in office:   Views 2 views right hip   Location right   Impression mixed APOLINAR morphology CEA measures approximately 51 degrees negative crossover sign alpha angle 61 degrees. Proximal trabecular pattern of femur is normal.  No other soft tissue or osseous abnormalities. No evidence of discrete fracture       Office Procedures:     Orders Placed This Encounter   Procedures    XR HIP 1 VW W PELVIS RIGHT     Order Specific Question:   Reason for exam:     Answer:   Right hip pain    MRI HIP RIGHT WO CONTRAST     Standing Status:   Future     Standing Expiration Date:   10/12/2024     Scheduling Instructions:      Laura Green, please contact pt to schedule, [phone]. [facility] will obtain Shaquille Jose and dandre to your facility. Pt advised to f/u in clinic 2-3 days after MRI for results. Order Specific Question:   Reason for exam:     Answer:   Rule out avascular necrosis / labral tear     Order Specific Question:   What is the sedation requirement? Answer:   None           Other Outside Imaging and Testing Personally Reviewed:    XR HIP 1 VW W PELVIS RIGHT    Result Date: 10/12/2023  Radiology exam is complete.  No

## 2023-10-16 ENCOUNTER — TELEPHONE (OUTPATIENT)
Dept: PRIMARY CARE CLINIC | Age: 68
End: 2023-10-16

## 2023-10-16 NOTE — TELEPHONE ENCOUNTER
[facility] calling  Reporting symptoms from today virtual visit  Maddi  reporting - SOB, weakness, chest pain, chest pressure  Maddi advised patient to visit ER

## 2023-10-18 ENCOUNTER — OFFICE VISIT (OUTPATIENT)
Dept: CARDIOLOGY CLINIC | Age: 68
End: 2023-10-18
Payer: MEDICARE

## 2023-10-18 VITALS
DIASTOLIC BLOOD PRESSURE: 70 MMHG | HEART RATE: 90 BPM | BODY MASS INDEX: 28.16 KG/M2 | SYSTOLIC BLOOD PRESSURE: 118 MMHG | WEIGHT: 154 LBS

## 2023-10-18 DIAGNOSIS — R07.89 ATYPICAL CHEST PAIN: ICD-10-CM

## 2023-10-18 DIAGNOSIS — R06.02 SOB (SHORTNESS OF BREATH) ON EXERTION: ICD-10-CM

## 2023-10-18 DIAGNOSIS — E78.5 HYPERLIPIDEMIA, UNSPECIFIED HYPERLIPIDEMIA TYPE: Primary | ICD-10-CM

## 2023-10-18 PROCEDURE — 3078F DIAST BP <80 MM HG: CPT | Performed by: INTERNAL MEDICINE

## 2023-10-18 PROCEDURE — G8484 FLU IMMUNIZE NO ADMIN: HCPCS | Performed by: INTERNAL MEDICINE

## 2023-10-18 PROCEDURE — 93000 ELECTROCARDIOGRAM COMPLETE: CPT | Performed by: INTERNAL MEDICINE

## 2023-10-18 PROCEDURE — 99214 OFFICE O/P EST MOD 30 MIN: CPT | Performed by: INTERNAL MEDICINE

## 2023-10-18 PROCEDURE — G8399 PT W/DXA RESULTS DOCUMENT: HCPCS | Performed by: INTERNAL MEDICINE

## 2023-10-18 PROCEDURE — G8417 CALC BMI ABV UP PARAM F/U: HCPCS | Performed by: INTERNAL MEDICINE

## 2023-10-18 PROCEDURE — 1124F ACP DISCUSS-NO DSCNMKR DOCD: CPT | Performed by: INTERNAL MEDICINE

## 2023-10-18 PROCEDURE — G8427 DOCREV CUR MEDS BY ELIG CLIN: HCPCS | Performed by: INTERNAL MEDICINE

## 2023-10-18 PROCEDURE — 1036F TOBACCO NON-USER: CPT | Performed by: INTERNAL MEDICINE

## 2023-10-18 PROCEDURE — 3017F COLORECTAL CA SCREEN DOC REV: CPT | Performed by: INTERNAL MEDICINE

## 2023-10-18 PROCEDURE — 3074F SYST BP LT 130 MM HG: CPT | Performed by: INTERNAL MEDICINE

## 2023-10-18 PROCEDURE — 1090F PRES/ABSN URINE INCON ASSESS: CPT | Performed by: INTERNAL MEDICINE

## 2023-10-18 NOTE — PROGRESS NOTES
Clear to auscultation bilaterally, respirations unlabored   Chest Wall:  No tenderness or deformity   Heart:  Regular rate and rhythm, normal S1, S2 normal, no murmur,  No rub. No S3 gallop   Abdomen:   Soft, non-tender, +bowel sounds   Extremities: no cyanosis, no clubbing , No edema   Pulses: Symmetric extremities   Skin: no gross lesions or rashes   Pysch: Normal mood and affect   Neurologic: No gross deficits. CN II - XII grossly intact        Labs  CBC:   Lab Results   Component Value Date/Time    WBC 5.6 06/23/2023 09:34 AM    RBC 4.71 06/23/2023 09:34 AM    HGB 13.8 06/23/2023 09:34 AM    HCT 41.7 06/23/2023 09:34 AM    MCV 88.5 06/23/2023 09:34 AM    RDW 13.8 06/23/2023 09:34 AM     06/23/2023 09:34 AM     CMP:    Lab Results   Component Value Date/Time     06/23/2023 09:34 AM    K 4.5 06/23/2023 09:34 AM     06/23/2023 09:34 AM    CO2 25 06/23/2023 09:34 AM    BUN 25 06/23/2023 09:34 AM    CREATININE 0.7 06/23/2023 09:34 AM    GFRAA >60 06/16/2022 09:39 AM    GFRAA >60 04/19/2013 08:50 AM    AGRATIO 2.2 06/23/2023 09:34 AM    LABGLOM >60 06/23/2023 09:34 AM    GLUCOSE 113 06/23/2023 09:34 AM    PROT 6.7 06/23/2023 09:34 AM    PROT 6.8 09/13/2012 08:46 AM    CALCIUM 10.1 06/23/2023 09:34 AM    BILITOT 0.5 06/23/2023 09:34 AM    ALKPHOS 73 06/23/2023 09:34 AM    AST 22 06/23/2023 09:34 AM    ALT 19 06/23/2023 09:34 AM     PT/INR:  No results found for: \"PTINR\"  No results for input(s): \"CKTOTAL\", \"CKMB\", \"TROPONINI\" in the last 72 hours.     EKG: sr with TWI V2    Assessment  Patient Active Problem List   Diagnosis    Mixed hyperlipidemia    Asthma    Osteoporosis    Lateral epicondylitis    Arthralgia    Type 2 diabetes mellitus with diabetic polyneuropathy, without long-term current use of insulin (HCC)    Hepatitis    Leg pain    GERD (gastroesophageal reflux disease)    Hand pain    B12 deficiency anemia    Hemorrhoids, internal    Severe headache    Encopresis    Tremor    Hot flashes

## 2023-10-19 ENCOUNTER — TELEPHONE (OUTPATIENT)
Dept: PRIMARY CARE CLINIC | Age: 68
End: 2023-10-19

## 2023-10-19 DIAGNOSIS — M51.16 INTERVERTEBRAL DISC DISORDER WITH RADICULOPATHY OF LUMBAR REGION: Primary | ICD-10-CM

## 2023-10-25 ENCOUNTER — NURSE ONLY (OUTPATIENT)
Dept: ENDOCRINOLOGY | Age: 68
End: 2023-10-25
Payer: MEDICARE

## 2023-10-25 DIAGNOSIS — M81.0 POSTMENOPAUSAL OSTEOPOROSIS: ICD-10-CM

## 2023-10-25 DIAGNOSIS — M89.9 DISEASE OF SKELETAL SYSTEM: Primary | ICD-10-CM

## 2023-10-25 PROCEDURE — 96372 THER/PROPH/DIAG INJ SC/IM: CPT | Performed by: INTERNAL MEDICINE

## 2023-10-26 ENCOUNTER — OFFICE VISIT (OUTPATIENT)
Dept: ORTHOPEDIC SURGERY | Age: 68
End: 2023-10-26
Payer: MEDICARE

## 2023-10-26 VITALS — HEIGHT: 62 IN | WEIGHT: 154.1 LBS | BODY MASS INDEX: 28.36 KG/M2

## 2023-10-26 DIAGNOSIS — M76.891 HIP CAPSULITIS, RIGHT: Primary | ICD-10-CM

## 2023-10-26 DIAGNOSIS — M25.451 EFFUSION OF RIGHT HIP: ICD-10-CM

## 2023-10-26 DIAGNOSIS — M25.859 FEMORAL ACETABULAR IMPINGEMENT: ICD-10-CM

## 2023-10-26 PROCEDURE — 3017F COLORECTAL CA SCREEN DOC REV: CPT | Performed by: INTERNAL MEDICINE

## 2023-10-26 PROCEDURE — 1124F ACP DISCUSS-NO DSCNMKR DOCD: CPT | Performed by: INTERNAL MEDICINE

## 2023-10-26 PROCEDURE — G8417 CALC BMI ABV UP PARAM F/U: HCPCS | Performed by: INTERNAL MEDICINE

## 2023-10-26 PROCEDURE — 20610 DRAIN/INJ JOINT/BURSA W/O US: CPT | Performed by: INTERNAL MEDICINE

## 2023-10-26 PROCEDURE — 1036F TOBACCO NON-USER: CPT | Performed by: INTERNAL MEDICINE

## 2023-10-26 PROCEDURE — G8399 PT W/DXA RESULTS DOCUMENT: HCPCS | Performed by: INTERNAL MEDICINE

## 2023-10-26 PROCEDURE — G8427 DOCREV CUR MEDS BY ELIG CLIN: HCPCS | Performed by: INTERNAL MEDICINE

## 2023-10-26 PROCEDURE — G8484 FLU IMMUNIZE NO ADMIN: HCPCS | Performed by: INTERNAL MEDICINE

## 2023-10-26 PROCEDURE — 99213 OFFICE O/P EST LOW 20 MIN: CPT | Performed by: INTERNAL MEDICINE

## 2023-10-26 PROCEDURE — 1090F PRES/ABSN URINE INCON ASSESS: CPT | Performed by: INTERNAL MEDICINE

## 2023-10-26 RX ORDER — BUPIVACAINE HYDROCHLORIDE 2.5 MG/ML
8 INJECTION, SOLUTION INFILTRATION; PERINEURAL ONCE
Status: COMPLETED | OUTPATIENT
Start: 2023-10-26 | End: 2023-10-26

## 2023-10-26 RX ORDER — ROPIVACAINE HYDROCHLORIDE 5 MG/ML
3 INJECTION, SOLUTION EPIDURAL; INFILTRATION; PERINEURAL ONCE
Status: COMPLETED | OUTPATIENT
Start: 2023-10-26 | End: 2023-10-26

## 2023-10-26 RX ORDER — LIDOCAINE HYDROCHLORIDE 10 MG/ML
5 INJECTION, SOLUTION INFILTRATION; PERINEURAL ONCE
Status: COMPLETED | OUTPATIENT
Start: 2023-10-26 | End: 2023-10-26

## 2023-10-26 RX ORDER — METHYLPREDNISOLONE ACETATE 40 MG/ML
40 INJECTION, SUSPENSION INTRA-ARTICULAR; INTRALESIONAL; INTRAMUSCULAR; SOFT TISSUE ONCE
Status: COMPLETED | OUTPATIENT
Start: 2023-10-26 | End: 2023-10-26

## 2023-10-26 RX ADMIN — ROPIVACAINE HYDROCHLORIDE 3 ML: 5 INJECTION, SOLUTION EPIDURAL; INFILTRATION; PERINEURAL at 15:54

## 2023-10-26 RX ADMIN — METHYLPREDNISOLONE ACETATE 40 MG: 40 INJECTION, SUSPENSION INTRA-ARTICULAR; INTRALESIONAL; INTRAMUSCULAR; SOFT TISSUE at 15:53

## 2023-10-26 RX ADMIN — BUPIVACAINE HYDROCHLORIDE 20 MG: 2.5 INJECTION, SOLUTION INFILTRATION; PERINEURAL at 15:51

## 2023-10-26 RX ADMIN — LIDOCAINE HYDROCHLORIDE 5 ML: 10 INJECTION, SOLUTION INFILTRATION; PERINEURAL at 15:52

## 2023-10-26 NOTE — PROGRESS NOTES
Chief Complaint:   Chief Complaint   Patient presents with    Hip Pain     right, good/bad days, hurts worse when not getting enough sleep at night, slept well last night, so today is a good day, difficulty walking and moving in bed          History of Present Illness:       Patient is a 80 y.o. female returns follow up for the above complaint. The patient was last seen approximately 2 weeksago. The symptoms show no change since the last visit. The patient has had further testing for the problem. MRI completed in the interim    She continues to localize pain circumferentially about the right hip and right lower lumbar regions.     Pain levels 4-5/10    She continues with cane assisted weightbearing    No new injuries no new events     Past Medical History:        Past Medical History:   Diagnosis Date    Abnormal involuntary movements(781.0)     Anal fissure     Anemia, unspecified     Anxiety     Chronic back pain     Chronic diarrhea 09/23/2019    Depression     Diffuse cystic mastopathy     Encopresis(307.7)     Esophageal reflux     Foot fracture, left 1989    drop something on foot    Headache(784.0)     Hepatitis, unspecified     Herpes simplex without mention of complication     Hypertension     Hypogammaglobulinaemia, unspecified     Insomnia, unspecified     Lateral epicondylitis  of elbow     Migraine, unspecified, without mention of intractable migraine without mention of status migrainosus     Mixed hyperlipidemia 07/30/2010    Osteopenia     Postmenopausal     Pure hypercholesterolemia     Symptomatic menopausal or female climacteric states     Type 2 diabetes mellitus without complication (HCC)     Type II or unspecified type diabetes mellitus without mention of complication, not stated as uncontrolled     Unspecified asthma(493.90)         Present Medications:         Current Outpatient Medications   Medication Sig Dispense Refill    Melatonin ER 10 MG TBCR Take 1 tablet by mouth at bedtime 30

## 2023-10-27 ENCOUNTER — HOSPITAL ENCOUNTER (OUTPATIENT)
Dept: CT IMAGING | Age: 68
Discharge: HOME | End: 2023-10-27
Attending: INTERNAL MEDICINE
Payer: MEDICARE

## 2023-10-27 DIAGNOSIS — R06.02 SOB (SHORTNESS OF BREATH) ON EXERTION: ICD-10-CM

## 2023-10-27 DIAGNOSIS — E78.5 HYPERLIPIDEMIA, UNSPECIFIED HYPERLIPIDEMIA TYPE: ICD-10-CM

## 2023-10-27 DIAGNOSIS — R07.89 ATYPICAL CHEST PAIN: ICD-10-CM

## 2023-10-27 PROCEDURE — 71250 CT THORAX DX C-: CPT

## 2023-10-30 ENCOUNTER — OFFICE VISIT (OUTPATIENT)
Dept: PRIMARY CARE CLINIC | Age: 68
End: 2023-10-30
Payer: MEDICARE

## 2023-10-30 VITALS
RESPIRATION RATE: 16 BRPM | DIASTOLIC BLOOD PRESSURE: 82 MMHG | HEIGHT: 60 IN | WEIGHT: 157.2 LBS | HEART RATE: 82 BPM | BODY MASS INDEX: 30.86 KG/M2 | OXYGEN SATURATION: 96 % | SYSTOLIC BLOOD PRESSURE: 124 MMHG | TEMPERATURE: 97.3 F

## 2023-10-30 DIAGNOSIS — F51.01 PRIMARY INSOMNIA: Primary | ICD-10-CM

## 2023-10-30 PROCEDURE — 1124F ACP DISCUSS-NO DSCNMKR DOCD: CPT | Performed by: INTERNAL MEDICINE

## 2023-10-30 PROCEDURE — G8427 DOCREV CUR MEDS BY ELIG CLIN: HCPCS | Performed by: INTERNAL MEDICINE

## 2023-10-30 PROCEDURE — G8417 CALC BMI ABV UP PARAM F/U: HCPCS | Performed by: INTERNAL MEDICINE

## 2023-10-30 PROCEDURE — 3017F COLORECTAL CA SCREEN DOC REV: CPT | Performed by: INTERNAL MEDICINE

## 2023-10-30 PROCEDURE — G8484 FLU IMMUNIZE NO ADMIN: HCPCS | Performed by: INTERNAL MEDICINE

## 2023-10-30 PROCEDURE — G8399 PT W/DXA RESULTS DOCUMENT: HCPCS | Performed by: INTERNAL MEDICINE

## 2023-10-30 PROCEDURE — 3078F DIAST BP <80 MM HG: CPT | Performed by: INTERNAL MEDICINE

## 2023-10-30 PROCEDURE — 3074F SYST BP LT 130 MM HG: CPT | Performed by: INTERNAL MEDICINE

## 2023-10-30 PROCEDURE — 1090F PRES/ABSN URINE INCON ASSESS: CPT | Performed by: INTERNAL MEDICINE

## 2023-10-30 PROCEDURE — 1036F TOBACCO NON-USER: CPT | Performed by: INTERNAL MEDICINE

## 2023-10-30 PROCEDURE — 99213 OFFICE O/P EST LOW 20 MIN: CPT | Performed by: INTERNAL MEDICINE

## 2023-10-30 RX ORDER — HYDROXYZINE HYDROCHLORIDE 25 MG/1
25 TABLET, FILM COATED ORAL NIGHTLY
Qty: 30 TABLET | Refills: 0 | Status: SHIPPED | OUTPATIENT
Start: 2023-10-30 | End: 2023-11-29

## 2023-10-30 RX ORDER — CYCLOSPORINE 0.5 MG/ML
1 EMULSION OPHTHALMIC 2 TIMES DAILY
COMMUNITY
End: 2023-10-30

## 2023-10-30 RX ORDER — PERPHENAZINE/AMITRIPTYLINE HCL 4 MG-25 MG
TABLET ORAL
COMMUNITY

## 2023-10-30 NOTE — PROGRESS NOTES
Date of Visit: 10/30/2023    Ayana Jasmine (:  1955) is a 80 y.o. female,  Established patient here for evaluation of the following chief complaint(s):  Follow-up (For melatonin )      ASSESSMENT/PLAN:    1. Primary insomnia  -not controlled  -start hydrOXYzine HCl (ATARAX) 25 MG tablet; Take 1 tablet by mouth nightly  Dispense: 30 tablet; Refill: 0  -Follow up with Sleep Medicine for sleep study as scheduled        Return in about 3 weeks (around 2023) for insomnia. SUBJECTIVE:    Patient has insomnia. Patient has difficulty falling asleep and staying asleep. Patient states when she falls asleep and wakes up 10-15 minutes up to 1.5 hours later. Patient states she tosses and turns and sometimes she walks around the house for a few times. Patient states she sits in a chair and feels like she wants to go to sleep but as soon as she goes to bed she wants to get up again and can't fall asleep. Patient tried Melatonin ER 10mg and felt a little tired and falls asleep but is up again. Patient takes Melatonin 10:30-11:00pm and wakes up 1:30-2:00am and is sleeps off and on. Patient doesn't feel rested the next day. Patient states she goes to sleep for an hour in the morning and feels a little better. Patient takes Gabapentin 900mg nightly. Patient states she has taken Trazodone up to 300mg in the past and couldn't sleep at all. Patient states Tylenol PM doesn't do anything. Patient states she is scheduled for a sleep study this week. Review of Systems   Constitutional:  Negative for activity change and fatigue. Respiratory:  Negative for shortness of breath. Cardiovascular:  Negative for chest pain. Gastrointestinal:  Negative for abdominal pain. Neurological:  Negative for dizziness and headaches. Psychiatric/Behavioral:  Positive for sleep disturbance. Negative for decreased concentration and dysphoric mood. The patient is not nervous/anxious.         Allergies   Allergen Reactions

## 2023-11-06 ENCOUNTER — TELEPHONE (OUTPATIENT)
Dept: PRIMARY CARE CLINIC | Age: 68
End: 2023-11-06

## 2023-11-06 NOTE — TELEPHONE ENCOUNTER
yoel from [facility] call stating she needs a verbal order for urine culture   Please advice and call back  [phone]

## 2023-11-08 ENCOUNTER — TELEPHONE (OUTPATIENT)
Dept: PRIMARY CARE CLINIC | Age: 68
End: 2023-11-08

## 2023-11-08 DIAGNOSIS — R30.0 DYSURIA: Primary | ICD-10-CM

## 2023-11-08 NOTE — TELEPHONE ENCOUNTER
Bebeto Jenkins the nurse from [facility] called for the patient and stated that the patient's nose is bleeding and patient got burning sensation during urinating.      Please review and advice    The call back no--[phone]    thanks

## 2023-11-09 ENCOUNTER — OFFICE VISIT (OUTPATIENT)
Dept: ORTHOPEDIC SURGERY | Age: 68
End: 2023-11-09

## 2023-11-09 DIAGNOSIS — R30.0 DYSURIA: ICD-10-CM

## 2023-11-09 DIAGNOSIS — Z86.39 HISTORY OF DIABETES MELLITUS: Primary | ICD-10-CM

## 2023-11-09 DIAGNOSIS — I10 ESSENTIAL HYPERTENSION: ICD-10-CM

## 2023-11-09 DIAGNOSIS — M76.891 HIP CAPSULITIS, RIGHT: ICD-10-CM

## 2023-11-09 DIAGNOSIS — R29.898 WEAKNESS OF RIGHT HIP: ICD-10-CM

## 2023-11-09 DIAGNOSIS — M25.859 FEMORAL ACETABULAR IMPINGEMENT: ICD-10-CM

## 2023-11-09 RX ORDER — CLONIDINE 0.1 MG/24H
PATCH, EXTENDED RELEASE TRANSDERMAL
Qty: 12 PATCH | Refills: 1 | Status: SHIPPED | OUTPATIENT
Start: 2023-11-09

## 2023-11-09 NOTE — TELEPHONE ENCOUNTER
Spoke with José Luis Jose at [facility]. She states patient had dried blood around her nose when she woke up. I told her if patient has nose bleed she will need to see ENT or go to Urgent Care. She states patient also had burning sensation during urinating. I ordered a urinalysis and urine culture.

## 2023-11-09 NOTE — TELEPHONE ENCOUNTER
Medication:   Requested Prescriptions     Pending Prescriptions Disp Refills    cloNIDine (CATAPRES) 0.1 MG/24HR PTWK [Pharmacy Med Name: CLONIDIN-TTS DIS 0.1/24HR] 12 patch 1     Sig: APPLY 1 PATCH ONTO THE SKINONCE A WEEK     Last Filled:  6.5.23    Last appt: 10/30/2023   Next appt: 11/20/2023    Last OARRS:       8/29/2023     6:55 PM   RX Monitoring   Periodic Controlled Substance Monitoring No signs of potential drug abuse or diversion identified.

## 2023-11-09 NOTE — TELEPHONE ENCOUNTER
S:  Doing well. Complaining of lump over the left neck, some difficulty swallowing, but did well with the lunch, no difficulty breathing, voice normal denies any neurological symptoms. O:  On examination, the patient is alert and oriented. The incision is healing well. Small lump, 1.5 x 2 cm noted at the angle of the mandible, most likely very localized superficial fluid collection versus hematoma, rest of the incision healing well neurological examination is normal.  Throat intact, no swelling inside the throat, tongue in the midline, speech normal    Lower Extremities:  Pulses Right  Left    Radial    3=normal   Femoral    2=diminished   Popliteal    1=barely palpable   Dorsalis pedis    0=absent   Posterior tibial    4=aneurysmal       A:  Stable post-operative carotid endarterectomy. P:  Discussed with the patient, reassured, for now observe carefully, sleep with head of the bed elevated, may apply ice pack as needed basis, call me PRN if any increasing symptoms instructions were given to call if there are any symptoms. Follow up appointment suggested to see back in 3 to 4 weeks. This message has been addressed in another encounter. I spoke with Cristóbal Coto at [facility]. I gave a verbal order for a urinalysis and urine culture and put orders in the computer.

## 2023-11-10 LAB
BACTERIA UR CULT: ABNORMAL
BILIRUB UR QL STRIP.AUTO: NEGATIVE
CLARITY UR: CLEAR
COLOR UR: YELLOW
GLUCOSE UR STRIP.AUTO-MCNC: NEGATIVE MG/DL
HGB UR QL STRIP.AUTO: NEGATIVE
KETONES UR STRIP.AUTO-MCNC: NEGATIVE MG/DL
LEUKOCYTE ESTERASE UR QL STRIP.AUTO: NEGATIVE
NITRITE UR QL STRIP.AUTO: NEGATIVE
ORGANISM: ABNORMAL
PH UR STRIP.AUTO: 5 [PH] (ref 5–8)
PROT UR STRIP.AUTO-MCNC: NEGATIVE MG/DL
SP GR UR STRIP.AUTO: 1.02 (ref 1–1.03)
UA DIPSTICK W REFLEX MICRO PNL UR: NORMAL
URN SPEC COLLECT METH UR: NORMAL
UROBILINOGEN UR STRIP-ACNC: 0.2 E.U./DL

## 2023-11-13 ENCOUNTER — TELEPHONE (OUTPATIENT)
Dept: PRIMARY CARE CLINIC | Age: 68
End: 2023-11-13

## 2023-11-13 DIAGNOSIS — N39.0 URINARY TRACT INFECTION WITHOUT HEMATURIA, SITE UNSPECIFIED: Primary | ICD-10-CM

## 2023-11-13 RX ORDER — CIPROFLOXACIN 250 MG/1
250 TABLET, FILM COATED ORAL 2 TIMES DAILY
Qty: 6 TABLET | Refills: 0 | Status: SHIPPED | OUTPATIENT
Start: 2023-11-13 | End: 2023-11-16

## 2023-11-13 ASSESSMENT — ENCOUNTER SYMPTOMS
ABDOMINAL PAIN: 0
SHORTNESS OF BREATH: 0

## 2023-11-13 NOTE — TELEPHONE ENCOUNTER
Samantha from [facility] called for urinalysis results and wants a call back once  dr Alyson Waite looks at them ,Also pt is still complaining of sever burning and pt states oxygen dropped 54% after a walk.   [phone]

## 2023-11-14 ENCOUNTER — OFFICE VISIT (OUTPATIENT)
Dept: ENT CLINIC | Age: 68
End: 2023-11-14
Payer: MEDICARE

## 2023-11-14 VITALS — SYSTOLIC BLOOD PRESSURE: 132 MMHG | DIASTOLIC BLOOD PRESSURE: 78 MMHG

## 2023-11-14 DIAGNOSIS — J31.0 CHRONIC RHINITIS: Primary | ICD-10-CM

## 2023-11-14 PROCEDURE — 1090F PRES/ABSN URINE INCON ASSESS: CPT | Performed by: OTOLARYNGOLOGY

## 2023-11-14 PROCEDURE — 3075F SYST BP GE 130 - 139MM HG: CPT | Performed by: OTOLARYNGOLOGY

## 2023-11-14 PROCEDURE — G8417 CALC BMI ABV UP PARAM F/U: HCPCS | Performed by: OTOLARYNGOLOGY

## 2023-11-14 PROCEDURE — G8484 FLU IMMUNIZE NO ADMIN: HCPCS | Performed by: OTOLARYNGOLOGY

## 2023-11-14 PROCEDURE — 1124F ACP DISCUSS-NO DSCNMKR DOCD: CPT | Performed by: OTOLARYNGOLOGY

## 2023-11-14 PROCEDURE — G8427 DOCREV CUR MEDS BY ELIG CLIN: HCPCS | Performed by: OTOLARYNGOLOGY

## 2023-11-14 PROCEDURE — 99212 OFFICE O/P EST SF 10 MIN: CPT | Performed by: OTOLARYNGOLOGY

## 2023-11-14 PROCEDURE — 3017F COLORECTAL CA SCREEN DOC REV: CPT | Performed by: OTOLARYNGOLOGY

## 2023-11-14 PROCEDURE — G8399 PT W/DXA RESULTS DOCUMENT: HCPCS | Performed by: OTOLARYNGOLOGY

## 2023-11-14 PROCEDURE — 1036F TOBACCO NON-USER: CPT | Performed by: OTOLARYNGOLOGY

## 2023-11-14 PROCEDURE — 3078F DIAST BP <80 MM HG: CPT | Performed by: OTOLARYNGOLOGY

## 2023-11-14 NOTE — PROGRESS NOTES
Subjective:      Patient ID: Pat Aldridge is a 78 y.o. female. HPI  Chief Complaint   Patient presents with    Nose bleeds  History of Present Illness:Tatiana is a(n) 78 y.o. female who presents with blood from nose. No dripping. Just blows onto tissue in morning. Also some frontal sinus pressure and pain when touching forehead. States right nostril drips. Clear. Here for follow up epistaxis. Seen in March. Some blood on tissue. Left. No gross bleeding. Had stopped Koleen Oyster but just restarted. Wants checked.        Patient Active Problem List   Diagnosis    Mixed hyperlipidemia    Asthma    Osteoporosis    Lateral epicondylitis    Arthralgia    Type 2 diabetes mellitus with diabetic polyneuropathy, without long-term current use of insulin (HCC)    Hepatitis    Leg pain    GERD (gastroesophageal reflux disease)    Hand pain    B12 deficiency anemia    Hemorrhoids, internal    Severe headache    Encopresis    Tremor    Hot flashes    Fibrocystic disease of breast    Insomnia    Shoulder pain    Malaise and fatigue    Migraine    Neck pain    Chronic back pain    Neuropathy    Major depression ([address])    History of arthroscopy of right shoulder 2008    Removal of ganglion cyst in left wrist    History of elbow surgery Right    Adhesive capsulitis of left shoulder    Tendinitis of left rotator cuff    Left rotator cuff tear    Pain, neck    Cervical stenosis of spinal canal    Arthritis of left acromioclavicular joint    Bursitis of right shoulder    Throat dryness    Right-sided chest wall pain    Dysuria    Acute cystitis without hematuria    Weight loss, abnormal    Moderate episode of recurrent major depressive disorder (HCC)    Arthritis of right acromioclavicular joint    Tendinitis of right rotator cuff    Cervical stenosis of spine    Chronic daily headache    Status migrainosus    Cervico-occipital neuralgia    Cervicogenic headache    Medication side effect    Chronic insomnia    Cervical spinal stenosis

## 2023-11-15 ENCOUNTER — OFFICE VISIT (OUTPATIENT)
Dept: PRIMARY CARE CLINIC | Age: 68
End: 2023-11-15

## 2023-11-15 ENCOUNTER — TELEPHONE (OUTPATIENT)
Dept: PULMONOLOGY | Age: 68
End: 2023-11-15

## 2023-11-15 VITALS
WEIGHT: 161.2 LBS | BODY MASS INDEX: 31.65 KG/M2 | OXYGEN SATURATION: 96 % | TEMPERATURE: 97.3 F | RESPIRATION RATE: 16 BRPM | HEART RATE: 82 BPM | HEIGHT: 60 IN | DIASTOLIC BLOOD PRESSURE: 82 MMHG | SYSTOLIC BLOOD PRESSURE: 128 MMHG

## 2023-11-15 DIAGNOSIS — R09.02 HYPOXIA: ICD-10-CM

## 2023-11-15 DIAGNOSIS — R06.09 DYSPNEA ON EXERTION: ICD-10-CM

## 2023-11-15 DIAGNOSIS — N39.0 URINARY TRACT INFECTION WITHOUT HEMATURIA, SITE UNSPECIFIED: ICD-10-CM

## 2023-11-15 DIAGNOSIS — F51.01 PRIMARY INSOMNIA: Primary | ICD-10-CM

## 2023-11-15 DIAGNOSIS — R06.02 SHORTNESS OF BREATH: Primary | ICD-10-CM

## 2023-11-15 DIAGNOSIS — K21.00 GASTROESOPHAGEAL REFLUX DISEASE WITH ESOPHAGITIS WITHOUT HEMORRHAGE: ICD-10-CM

## 2023-11-15 RX ORDER — PANTOPRAZOLE SODIUM 40 MG/1
40 TABLET, DELAYED RELEASE ORAL DAILY
Qty: 90 TABLET | Refills: 1 | Status: SHIPPED | OUTPATIENT
Start: 2023-11-15

## 2023-11-15 NOTE — PROGRESS NOTES
Hemoglobin A1C 06/23/2023 6.0     eAG 06/23/2023 125.5     Pro-BNP 06/23/2023 <36     TSH 06/23/2023 2.33     Vit D, 25-Hydroxy 06/23/2023 47.6     TSH Reflex FT4 06/23/2023 2.33     Magnesium 06/23/2023 1.80     Cholesterol, Total 06/23/2023 189     Triglycerides 06/23/2023 177 (H)     HDL 06/23/2023 65 (H)     LDL Calculated 06/23/2023 89     VLDL Cholesterol Calcula* 06/23/2023 35     Total Protein 06/23/2023 6.7     Albumin 06/23/2023 4.6     Alkaline Phosphatase 06/23/2023 73     ALT 06/23/2023 19     AST 06/23/2023 22     Total Bilirubin 06/23/2023 0.5     Bilirubin, Direct 06/23/2023 <0.2     Bilirubin, Indirect 06/23/2023 see below     Sodium 06/23/2023 139     Potassium 06/23/2023 4.5     Chloride 06/23/2023 102     CO2 06/23/2023 25     Anion Gap 06/23/2023 12     Glucose 06/23/2023 113 (H)     BUN 06/23/2023 25 (H)     Creatinine 06/23/2023 0.7     Est, Glom Filt Rate 06/23/2023 >60     Calcium 06/23/2023 10.1     Albumin/Globulin Ratio 06/23/2023 2.2     WBC 06/23/2023 5.6     RBC 06/23/2023 4.71     Hemoglobin 06/23/2023 13.8     Hematocrit 06/23/2023 41.7     MCV 06/23/2023 88.5     MCH 06/23/2023 29.4     MCHC 06/23/2023 33.2     RDW 06/23/2023 13.8     Platelets 72/37/2588 197     MPV 06/23/2023 9.2    Orders Only on 05/19/2023   Component Date Value    WBC 05/19/2023 6.0     RBC 05/19/2023 4.58     Hemoglobin 05/19/2023 13.4     Hematocrit 05/19/2023 40.0     MCV 05/19/2023 87.4     MCH 05/19/2023 29.2     MCHC 05/19/2023 33.4     RDW 05/19/2023 13.9     Platelets 47/76/2140 236     MPV 05/19/2023 9.2     Protime 05/19/2023 12.5     INR 05/19/2023 0.93    Office Visit on 05/19/2023   Component Date Value    Color, UA 05/19/2023 yellow     Clarity, UA 05/19/2023 cloudy     Glucose, UA POC 05/19/2023 neg     Bilirubin, UA 05/19/2023 neg     Ketones, UA 05/19/2023 neg     Spec Grav, UA 05/19/2023 1.030     Blood, UA POC 05/19/2023 neg     pH, UA 05/19/2023 5.5     Protein, UA POC 05/19/2023 neg

## 2023-11-15 NOTE — TELEPHONE ENCOUNTER
Medication:   Requested Prescriptions     Pending Prescriptions Disp Refills    pantoprazole (PROTONIX) 40 MG tablet [Pharmacy Med Name: PANTOPRAZOLE TAB 40MG DR] 90 tablet 1     Sig: TAKE 1 TABLET DAILY     Last Filled:  06/14/23    Last appt: 11/15/2023   Next appt: 12/13/2023    Last OARRS:       8/29/2023     6:55 PM   RX Monitoring   Periodic Controlled Substance Monitoring No signs of potential drug abuse or diversion identified.

## 2023-11-15 NOTE — TELEPHONE ENCOUNTER
Please sign pending orders for PFTs. NP appt scheduled in January. Ref from Dr Iftikhar Mckeon.    Thank you

## 2023-11-20 ENCOUNTER — TELEPHONE (OUTPATIENT)
Dept: PRIMARY CARE CLINIC | Age: 68
End: 2023-11-20

## 2023-11-20 NOTE — PROGRESS NOTES
fluticasone (FLONASE) 50 MCG/ACT nasal spray USE 2 SPRAYS NASALLY DAILY 48 g 1    TRULANCE 3 MG TABS TAKE 1 TABLET BY MOUTH EVERY DAY 90 tablet 1    Continuous Blood Gluc Sensor (FREESTYLE AYAZ 14 DAY SENSOR) MISC 1 Device by Does not apply route every 14 days 2 each 5    naratriptan (AMERGE) 2.5 MG tablet TAKE ONE TABLET BY MOUTH AT THE ONSET of HEADACHE, MAY REPEAT in FOUR hours, not more THAN TWO PER DAY OR FOUR PER WEEK      ranolazine (RANEXA) 500 MG extended release tablet Take 1 tablet by mouth 2 times daily 180 tablet 3    nitroGLYCERIN (NITRODUR) 0.2 MG/HR Place 1 patch onto the skin daily 90 patch 3    Blood Glucose Monitoring Suppl (TRUE METRIX GO GLUCOSE METER) w/Device KIT USE AS DIRECTED AND AS NEEDED 1 kit 0    TRUEplus Lancets 30G MISC 1 each by Does not apply route daily 100 each 3    denosumab (PROLIA) 60 MG/ML SOSY SC injection Inject 1 mL into the skin once for 1 dose 1 mL 0    nitroGLYCERIN (NITROSTAT) 0.4 MG SL tablet Place 1 tablet under the tongue every 5 minutes as needed for Chest pain up to max of 3 total doses. If no relief after 1 dose, call 911. 25 tablet 3    albuterol sulfate HFA (PROVENTIL;VENTOLIN;PROAIR) 108 (90 Base) MCG/ACT inhaler Inhale 2 puffs into the lungs every 6 hours as needed for Wheezing      omeprazole (PRILOSEC) 40 MG delayed release capsule Take 1 capsule by mouth daily      cetirizine (ZYRTEC) 10 MG tablet Take 1 tablet by mouth daily 30 tablet 2    ondansetron (ZOFRAN-ODT) 4 MG disintegrating tablet Take 1 tablet by mouth 3 times daily as needed for Nausea or Vomiting 21 tablet 0    lidocaine (LIDODERM) 5 % Place 1 patch onto the skin daily 12 hours on, 12 hours off.  30 patch 0    acetaminophen (TYLENOL) 500 MG tablet Take 1 tablet by mouth 4 times daily as needed for Pain 120 tablet 0    docusate sodium (COLACE) 100 MG capsule Take 1 capsule by mouth 2 times daily 60 capsule 1    bisacodyl 5 MG EC tablet Take 1-2 tablets daily as needed 30 tablet 0    blood

## 2023-11-20 NOTE — TELEPHONE ENCOUNTER
Spoke with nurse Medina with [facility] and told her patient will need an appointment.  Then reached out to patient and scheduled her for 11/21/23

## 2023-11-20 NOTE — TELEPHONE ENCOUNTER
[facility] called and Pt is complaining of bumps and sensitivity to gums and mouth. Possible thrush. Can Dr Prescribe  nystatin or something to help her? 1 Jordi TDIWELL [phone] [[id number]]

## 2023-11-21 ENCOUNTER — OFFICE VISIT (OUTPATIENT)
Dept: PRIMARY CARE CLINIC | Age: 68
End: 2023-11-21
Payer: MEDICARE

## 2023-11-21 ENCOUNTER — TELEPHONE (OUTPATIENT)
Dept: PRIMARY CARE CLINIC | Age: 68
End: 2023-11-21

## 2023-11-21 VITALS
TEMPERATURE: 97.8 F | DIASTOLIC BLOOD PRESSURE: 76 MMHG | HEART RATE: 86 BPM | OXYGEN SATURATION: 98 % | BODY MASS INDEX: 31.64 KG/M2 | WEIGHT: 162 LBS | SYSTOLIC BLOOD PRESSURE: 130 MMHG

## 2023-11-21 DIAGNOSIS — B37.0 THRUSH, ORAL: Primary | ICD-10-CM

## 2023-11-21 PROCEDURE — G8399 PT W/DXA RESULTS DOCUMENT: HCPCS | Performed by: NURSE PRACTITIONER

## 2023-11-21 PROCEDURE — G8417 CALC BMI ABV UP PARAM F/U: HCPCS | Performed by: NURSE PRACTITIONER

## 2023-11-21 PROCEDURE — 99213 OFFICE O/P EST LOW 20 MIN: CPT | Performed by: NURSE PRACTITIONER

## 2023-11-21 PROCEDURE — 1124F ACP DISCUSS-NO DSCNMKR DOCD: CPT | Performed by: NURSE PRACTITIONER

## 2023-11-21 PROCEDURE — 1036F TOBACCO NON-USER: CPT | Performed by: NURSE PRACTITIONER

## 2023-11-21 PROCEDURE — 3075F SYST BP GE 130 - 139MM HG: CPT | Performed by: NURSE PRACTITIONER

## 2023-11-21 PROCEDURE — 3017F COLORECTAL CA SCREEN DOC REV: CPT | Performed by: NURSE PRACTITIONER

## 2023-11-21 PROCEDURE — G8427 DOCREV CUR MEDS BY ELIG CLIN: HCPCS | Performed by: NURSE PRACTITIONER

## 2023-11-21 PROCEDURE — G8484 FLU IMMUNIZE NO ADMIN: HCPCS | Performed by: NURSE PRACTITIONER

## 2023-11-21 PROCEDURE — 3078F DIAST BP <80 MM HG: CPT | Performed by: NURSE PRACTITIONER

## 2023-11-21 PROCEDURE — 1090F PRES/ABSN URINE INCON ASSESS: CPT | Performed by: NURSE PRACTITIONER

## 2023-11-21 ASSESSMENT — ENCOUNTER SYMPTOMS
WHEEZING: 0
SHORTNESS OF BREATH: 0
ABDOMINAL PAIN: 0
NAUSEA: 0
COUGH: 0
BLOOD IN STOOL: 0
CONSTIPATION: 1
VOMITING: 0
DIARRHEA: 0
BACK PAIN: 1
CHEST TIGHTNESS: 0

## 2023-11-21 NOTE — TELEPHONE ENCOUNTER
Patient was asking if we had test results from her neurologist. I let her know we do not have them and to request they send them again.

## 2023-11-22 ENCOUNTER — HOSPITAL ENCOUNTER (OUTPATIENT)
Dept: PULMONOLOGY | Age: 68
Discharge: HOME | End: 2023-11-22
Payer: MEDICARE

## 2023-11-22 PROCEDURE — 94729 DIFFUSING CAPACITY: CPT

## 2023-11-22 PROCEDURE — 94760 N-INVAS EAR/PLS OXIMETRY 1: CPT

## 2023-11-22 PROCEDURE — 94060 EVALUATION OF WHEEZING: CPT

## 2023-11-22 PROCEDURE — 94664 DEMO&/EVAL PT USE INHALER: CPT

## 2023-11-22 PROCEDURE — 6360000002 HC RX W HCPCS: Performed by: INTERNAL MEDICINE

## 2023-11-22 PROCEDURE — 94726 PLETHYSMOGRAPHY LUNG VOLUMES: CPT

## 2023-11-22 RX ORDER — ALBUTEROL SULFATE 2.5 MG/3ML
2.5 SOLUTION RESPIRATORY (INHALATION) ONCE
Status: COMPLETED | OUTPATIENT
Start: 2023-11-22 | End: 2023-11-22

## 2023-11-22 RX ADMIN — ALBUTEROL SULFATE 2.5 MG: 2.5 SOLUTION RESPIRATORY (INHALATION) at 11:17

## 2023-11-27 ENCOUNTER — TELEPHONE (OUTPATIENT)
Dept: PRIMARY CARE CLINIC | Age: 68
End: 2023-11-27

## 2023-11-27 NOTE — TELEPHONE ENCOUNTER
[facility]. calling from [facility] to get re-cert order for 90 days for MSW and skilled nursing     Call back # [phone]

## 2023-11-28 ENCOUNTER — TELEPHONE (OUTPATIENT)
Dept: PRIMARY CARE CLINIC | Age: 68
End: 2023-11-28

## 2023-11-28 NOTE — TELEPHONE ENCOUNTER
Ktahi De Leon,  from [facility] called requesting a call back in regards to pt. She states that pt had a sleep study and breathing test and she is wanting to know if the results have came back.  Ph [phone]

## 2023-11-29 ENCOUNTER — TELEPHONE (OUTPATIENT)
Dept: PRIMARY CARE CLINIC | Age: 68
End: 2023-11-29

## 2023-11-29 NOTE — TELEPHONE ENCOUNTER
Rosalba Stephen,  from [facility] called requesting a call back in regards to pt. She states that pt had a sleep study and breathing test and she is wanting to know if the results have came back. Ph [phone] \". Please call Yoel Bartlett back for more information.

## 2023-11-29 NOTE — TELEPHONE ENCOUNTER
Zuleika Cervantes from [facility] called for the patient and requested the verbal  order for re certification  for the patient .     Please review and advice    Call back TT--[phone]    Thanks

## 2023-11-30 ENCOUNTER — TELEPHONE (OUTPATIENT)
Dept: PRIMARY CARE CLINIC | Age: 68
End: 2023-11-30

## 2023-11-30 DIAGNOSIS — F51.01 PRIMARY INSOMNIA: ICD-10-CM

## 2023-11-30 PROBLEM — R06.09 DYSPNEA ON EXERTION: Status: ACTIVE | Noted: 2023-03-09

## 2023-11-30 PROBLEM — R09.02 HYPOXIA: Status: ACTIVE | Noted: 2023-11-30

## 2023-11-30 RX ORDER — HYDROXYZINE HYDROCHLORIDE 25 MG/1
TABLET, FILM COATED ORAL
Qty: 30 TABLET | Refills: 0 | Status: SHIPPED | OUTPATIENT
Start: 2023-11-30

## 2023-11-30 ASSESSMENT — ENCOUNTER SYMPTOMS
SINUS PAIN: 0
SORE THROAT: 0
SHORTNESS OF BREATH: 1
CHEST TIGHTNESS: 0
DIARRHEA: 0
CONSTIPATION: 0
WHEEZING: 0
COUGH: 0
NAUSEA: 0
SINUS PRESSURE: 0
ABDOMINAL PAIN: 0
VOMITING: 0
RHINORRHEA: 0

## 2023-11-30 NOTE — TELEPHONE ENCOUNTER
Erasmo Menjivar called again and requested to know the status of re cert for the patient.      Her call back no-- [phone]      Please review and do the needful      thanks

## 2023-11-30 NOTE — TELEPHONE ENCOUNTER
Kaushik Morales called again and requested to know the status of re cert for the patient.     Her call back no-- [phone]     Please review and do the needful     thanks

## 2023-11-30 NOTE — TELEPHONE ENCOUNTER
This is a duplicate encounter. Will continue documentation on previous encounter that was sent to PCP for review.

## 2023-11-30 NOTE — TELEPHONE ENCOUNTER
Medication:   Requested Prescriptions     Pending Prescriptions Disp Refills    hydrOXYzine HCl (ATARAX) 25 MG tablet [Pharmacy Med Name: hydroxyzine HCl 25 mg tablet] 30 tablet 0     Sig: TAKE ONE Tablet BY MOUTH nightly AT BEDTIME     Last filled: 10.30.23  Last appt: 11/21/2023   Next appt: 12/13/2023    Last OARRS:       8/29/2023     6:55 PM   RX Monitoring   Periodic Controlled Substance Monitoring No signs of potential drug abuse or diversion identified.

## 2023-12-04 NOTE — TELEPHONE ENCOUNTER
Verbal Orders -   To continue Home Health care  [address] [facility] calling   # [phone]

## 2023-12-08 NOTE — TELEPHONE ENCOUNTER
Call Shandra Burt at [facility]. What verbal order for home care is she requesting? Call Ron Harkins at [facility]. Patient needs to follow up with Pulmonary and Sleep Medicine regarding sleep study results and pulmonary tests results. I did not order the tests and she would need to follow up with them regarding results and next steps.

## 2023-12-08 NOTE — TELEPHONE ENCOUNTER
Spoke with Domenico Engle at [facility] she said patient was receritified for skilled nursing due to help with ADL's and recert for a medical socail worker. Left a voicemail for Nishi Martinez asking her to return call. She will need to follow up with pulmonologist Dr. Simón Palmer for these results.

## 2023-12-13 ENCOUNTER — TELEPHONE (OUTPATIENT)
Dept: PRIMARY CARE CLINIC | Age: 68
End: 2023-12-13

## 2023-12-13 ENCOUNTER — OFFICE VISIT (OUTPATIENT)
Dept: PRIMARY CARE CLINIC | Age: 68
End: 2023-12-13

## 2023-12-13 VITALS
DIASTOLIC BLOOD PRESSURE: 74 MMHG | TEMPERATURE: 97.1 F | HEIGHT: 62 IN | BODY MASS INDEX: 30.07 KG/M2 | HEART RATE: 82 BPM | WEIGHT: 163.4 LBS | OXYGEN SATURATION: 97 % | SYSTOLIC BLOOD PRESSURE: 122 MMHG

## 2023-12-13 DIAGNOSIS — R09.02 HYPOXIA: ICD-10-CM

## 2023-12-13 DIAGNOSIS — J45.40 MODERATE PERSISTENT ASTHMA WITHOUT COMPLICATION: ICD-10-CM

## 2023-12-13 DIAGNOSIS — M51.16 INTERVERTEBRAL DISC DISORDER WITH RADICULOPATHY OF LUMBAR REGION: Primary | ICD-10-CM

## 2023-12-13 DIAGNOSIS — R42 DIZZINESS: ICD-10-CM

## 2023-12-13 DIAGNOSIS — R14.3 EXCESSIVE GAS: ICD-10-CM

## 2023-12-13 DIAGNOSIS — R06.09 DYSPNEA ON EXERTION: Primary | ICD-10-CM

## 2023-12-13 DIAGNOSIS — G89.29 CHRONIC PAIN OF RIGHT LOWER EXTREMITY: ICD-10-CM

## 2023-12-13 DIAGNOSIS — R63.5 WEIGHT GAIN: ICD-10-CM

## 2023-12-13 DIAGNOSIS — R53.1 WEAKNESS: ICD-10-CM

## 2023-12-13 DIAGNOSIS — M79.604 CHRONIC PAIN OF RIGHT LOWER EXTREMITY: ICD-10-CM

## 2023-12-13 DIAGNOSIS — E11.42 TYPE 2 DIABETES MELLITUS WITH DIABETIC POLYNEUROPATHY, WITHOUT LONG-TERM CURRENT USE OF INSULIN (HCC): ICD-10-CM

## 2023-12-13 DIAGNOSIS — I10 ESSENTIAL HYPERTENSION: ICD-10-CM

## 2023-12-13 DIAGNOSIS — E78.2 MIXED HYPERLIPIDEMIA: ICD-10-CM

## 2023-12-13 RX ORDER — GABAPENTIN 300 MG/1
CAPSULE ORAL
COMMUNITY
Start: 2023-12-13

## 2023-12-13 NOTE — PROGRESS NOTES
Date of Visit: 2023    Wyatt Haynes (:  1955) is a 80 y.o. female,  Established patient here for evaluation of the following chief complaint(s):  1 Month Follow-Up (Hypoxia and pulmonary function test results )      ASSESSMENT/PLAN:    1. Dyspnea on exertion  -CBC with Auto Differential; Future  -Continue albuterol HFA inhaler 2 puffs every 6 hours as needed  -Follow up with Pulmonary  -Follow up with Cardiology    2. Hypoxia  - CBC with Auto Differential; Future  -Follow up with Pulmonary  -Follow up with Cardiology    3. Moderate persistent asthma without complication  -not controlled  -Continue Symbicort HFA inhaler 160-4.5 mcg 2 puffs 2 times daily  -Continue albuterol HFA inhaler 2 puffs every 6 hours as needed  -Follow-up with Pulmonary    4. Dizziness  - CBC with Auto Differential; Future  - Comprehensive Metabolic Panel; Future  -Decrease gabapentin (NEURONTIN) 300 MG capsule; Take 1 capsule at dinner and 1 capsule at bedtime    5. Weakness  - CBC with Auto Differential; Future  - Comprehensive Metabolic Panel; Future  - TSH; Future  - Magnesium; Future  - CK; Future    6. Essential hypertension  -Stable  -Continue clonidine 0.1 mg/24hr patch weekly  -Low sodium diet  -Regular aerobic exercise  -Comprehensive Metabolic Panel; Future    7. Type 2 diabetes mellitus with diabetic polyneuropathy, without long-term current use of insulin (HCC)  -Stable  -Continue Trulicity 2.17 mg SC once a week  -Decrease gabapentin (NEURONTIN) 300 MG capsule; Take 1 capsule at dinner and 1 capsule at bedtime  -Limit carbohydrates to 45 grams with meals and 15 grams with snacks  -monitor blood sugars  -goal for blood sugar fasting or pre-meal  is   -goal for blood sugar 2 hours after a meal is less than 180  -goal for blood sugar at bedtime is less than 150  -Regular aerobic exercise  -Comprehensive Metabolic Panel; Future  -Hemoglobin A1C; Future    8.  Mixed hyperlipidemia  -Stable  -continue

## 2023-12-14 DIAGNOSIS — M19.012 ARTHRITIS OF LEFT ACROMIOCLAVICULAR JOINT: ICD-10-CM

## 2023-12-15 DIAGNOSIS — I10 ESSENTIAL HYPERTENSION: ICD-10-CM

## 2023-12-15 DIAGNOSIS — R53.1 WEAKNESS: ICD-10-CM

## 2023-12-15 DIAGNOSIS — E11.42 TYPE 2 DIABETES MELLITUS WITH DIABETIC POLYNEUROPATHY, WITHOUT LONG-TERM CURRENT USE OF INSULIN (HCC): ICD-10-CM

## 2023-12-15 DIAGNOSIS — E78.2 MIXED HYPERLIPIDEMIA: ICD-10-CM

## 2023-12-15 DIAGNOSIS — R06.09 DYSPNEA ON EXERTION: ICD-10-CM

## 2023-12-15 DIAGNOSIS — R42 DIZZINESS: ICD-10-CM

## 2023-12-15 DIAGNOSIS — R09.02 HYPOXIA: ICD-10-CM

## 2023-12-15 LAB
BASOPHILS # BLD: 0 K/UL (ref 0–0.2)
BASOPHILS NFR BLD: 0.7 %
DEPRECATED RDW RBC AUTO: 13.5 % (ref 12.4–15.4)
EOSINOPHIL # BLD: 0.1 K/UL (ref 0–0.6)
EOSINOPHIL NFR BLD: 1.7 %
HCT VFR BLD AUTO: 41.7 % (ref 36–48)
HGB BLD-MCNC: 13.9 G/DL (ref 12–16)
LYMPHOCYTES # BLD: 1.7 K/UL (ref 1–5.1)
LYMPHOCYTES NFR BLD: 29.4 %
MCH RBC QN AUTO: 29.6 PG (ref 26–34)
MCHC RBC AUTO-ENTMCNC: 33.5 G/DL (ref 31–36)
MCV RBC AUTO: 88.5 FL (ref 80–100)
MONOCYTES # BLD: 0.5 K/UL (ref 0–1.3)
MONOCYTES NFR BLD: 8.5 %
NEUTROPHILS # BLD: 3.5 K/UL (ref 1.7–7.7)
NEUTROPHILS NFR BLD: 59.7 %
PLATELET # BLD AUTO: 223 K/UL (ref 135–450)
PMV BLD AUTO: 9.1 FL (ref 5–10.5)
RBC # BLD AUTO: 4.7 M/UL (ref 4–5.2)
TSH SERPL DL<=0.005 MIU/L-ACNC: 4.36 UIU/ML (ref 0.27–4.2)
WBC # BLD AUTO: 5.9 K/UL (ref 4–11)

## 2023-12-15 NOTE — TELEPHONE ENCOUNTER
Medication:   Requested Prescriptions     Pending Prescriptions Disp Refills    diclofenac sodium (VOLTAREN) 1 % GEL [Pharmacy Med Name: DICLOFENAC GEL 1%] 300 g 1     Sig: APPLY 2 GRAMS TOPICALLY 4  TIMES A DAY     Last filled: 6.15.23  Last appt: 12/13/2023   Next appt: 1/2/2024    Last OARRS:       8/29/2023     6:55 PM   RX Monitoring   Periodic Controlled Substance Monitoring No signs of potential drug abuse or diversion identified.

## 2023-12-16 LAB
ALBUMIN SERPL-MCNC: 4.5 G/DL (ref 3.4–5)
ALBUMIN/GLOB SERPL: 2.1 {RATIO} (ref 1.1–2.2)
ALP SERPL-CCNC: 65 U/L (ref 40–129)
ALT SERPL-CCNC: 16 U/L (ref 10–40)
ANION GAP SERPL CALCULATED.3IONS-SCNC: 15 MMOL/L (ref 3–16)
AST SERPL-CCNC: 17 U/L (ref 15–37)
BILIRUB SERPL-MCNC: 0.7 MG/DL (ref 0–1)
BUN SERPL-MCNC: 17 MG/DL (ref 7–20)
CALCIUM SERPL-MCNC: 9.4 MG/DL (ref 8.3–10.6)
CHLORIDE SERPL-SCNC: 101 MMOL/L (ref 99–110)
CHOLEST SERPL-MCNC: 168 MG/DL (ref 0–199)
CK SERPL-CCNC: 90 U/L (ref 26–192)
CO2 SERPL-SCNC: 24 MMOL/L (ref 21–32)
CREAT SERPL-MCNC: 0.9 MG/DL (ref 0.6–1.2)
EST. AVERAGE GLUCOSE BLD GHB EST-MCNC: 125.5 MG/DL
GFR SERPLBLD CREATININE-BSD FMLA CKD-EPI: >60 ML/MIN/{1.73_M2}
GLUCOSE SERPL-MCNC: 117 MG/DL (ref 70–99)
HBA1C MFR BLD: 6 %
HDLC SERPL-MCNC: 53 MG/DL (ref 40–60)
LDLC SERPL CALC-MCNC: 78 MG/DL
MAGNESIUM SERPL-MCNC: 2.1 MG/DL (ref 1.8–2.4)
POTASSIUM SERPL-SCNC: 4.4 MMOL/L (ref 3.5–5.1)
PROT SERPL-MCNC: 6.6 G/DL (ref 6.4–8.2)
SODIUM SERPL-SCNC: 140 MMOL/L (ref 136–145)
TRIGL SERPL-MCNC: 187 MG/DL (ref 0–150)
VLDLC SERPL CALC-MCNC: 37 MG/DL

## 2023-12-24 ENCOUNTER — APPOINTMENT (OUTPATIENT)
Dept: GENERAL RADIOLOGY | Age: 68
End: 2023-12-24
Payer: MEDICARE

## 2023-12-24 ENCOUNTER — HOSPITAL ENCOUNTER (EMERGENCY)
Age: 68
Discharge: HOME | End: 2023-12-24
Attending: EMERGENCY MEDICINE
Payer: MEDICARE

## 2023-12-24 VITALS
HEIGHT: 62 IN | OXYGEN SATURATION: 94 % | RESPIRATION RATE: 14 BRPM | DIASTOLIC BLOOD PRESSURE: 70 MMHG | TEMPERATURE: 97.7 F | BODY MASS INDEX: 29.88 KG/M2 | SYSTOLIC BLOOD PRESSURE: 109 MMHG | HEART RATE: 66 BPM | WEIGHT: 162.4 LBS

## 2023-12-24 DIAGNOSIS — J45.31 MILD PERSISTENT ASTHMA WITH EXACERBATION: Primary | ICD-10-CM

## 2023-12-24 LAB
ANION GAP SERPL CALCULATED.3IONS-SCNC: 8 MMOL/L (ref 3–16)
BASE EXCESS BLDV CALC-SCNC: 2.6 MMOL/L (ref -2–3)
BASOPHILS # BLD: 0.1 K/UL (ref 0–0.2)
BASOPHILS NFR BLD: 0.8 %
BUN SERPL-MCNC: 20 MG/DL (ref 7–20)
CALCIUM SERPL-MCNC: 9.9 MG/DL (ref 8.3–10.6)
CHLORIDE SERPL-SCNC: 100 MMOL/L (ref 99–110)
CO2 BLDV-SCNC: 30 MMOL/L
CO2 SERPL-SCNC: 30 MMOL/L (ref 21–32)
COHGB MFR BLDV: 1.1 % (ref 0–1.5)
CREAT SERPL-MCNC: 0.8 MG/DL (ref 0.6–1.2)
DEPRECATED RDW RBC AUTO: 13.3 % (ref 12.4–15.4)
EKG ATRIAL RATE: 72 BPM
EKG DIAGNOSIS: NORMAL
EKG P AXIS: 37 DEGREES
EKG P-R INTERVAL: 164 MS
EKG Q-T INTERVAL: 370 MS
EKG QRS DURATION: 78 MS
EKG QTC CALCULATION (BAZETT): 405 MS
EKG R AXIS: 14 DEGREES
EKG T AXIS: 39 DEGREES
EKG VENTRICULAR RATE: 72 BPM
EOSINOPHIL # BLD: 0.2 K/UL (ref 0–0.6)
EOSINOPHIL NFR BLD: 1.8 %
GFR SERPLBLD CREATININE-BSD FMLA CKD-EPI: >60 ML/MIN/{1.73_M2}
GLUCOSE SERPL-MCNC: 117 MG/DL (ref 70–99)
HCO3 BLDV-SCNC: 28.6 MMOL/L (ref 24–28)
HCT VFR BLD AUTO: 42.9 % (ref 36–48)
HGB BLD-MCNC: 14.6 G/DL (ref 12–16)
LYMPHOCYTES # BLD: 2.8 K/UL (ref 1–5.1)
LYMPHOCYTES NFR BLD: 33.3 %
MCH RBC QN AUTO: 29.4 PG (ref 26–34)
MCHC RBC AUTO-ENTMCNC: 34 G/DL (ref 31–36)
MCV RBC AUTO: 86.6 FL (ref 80–100)
METHGB MFR BLDV: 0.1 % (ref 0–1.5)
MONOCYTES # BLD: 0.8 K/UL (ref 0–1.3)
MONOCYTES NFR BLD: 9 %
NEUTROPHILS # BLD: 4.7 K/UL (ref 1.7–7.7)
NEUTROPHILS NFR BLD: 55.1 %
NT-PROBNP SERPL-MCNC: <36 PG/ML (ref 0–124)
PCO2 BLDV: 47.9 MMHG (ref 41–51)
PH BLDV: 7.38 [PH] (ref 7.35–7.45)
PLATELET # BLD AUTO: 256 K/UL (ref 135–450)
PMV BLD AUTO: 8.3 FL (ref 5–10.5)
PO2 BLDV: 33.2 MMHG (ref 25–40)
POTASSIUM SERPL-SCNC: 4.3 MMOL/L (ref 3.5–5.1)
RBC # BLD AUTO: 4.95 M/UL (ref 4–5.2)
SAO2 % BLDV: 62 %
SODIUM SERPL-SCNC: 138 MMOL/L (ref 136–145)
TROPONIN, HIGH SENSITIVITY: 8 NG/L (ref 0–14)
TROPONIN, HIGH SENSITIVITY: 8 NG/L (ref 0–14)
WBC # BLD AUTO: 8.5 K/UL (ref 4–11)

## 2023-12-24 PROCEDURE — 82803 BLOOD GASES ANY COMBINATION: CPT

## 2023-12-24 PROCEDURE — 93005 ELECTROCARDIOGRAM TRACING: CPT | Performed by: EMERGENCY MEDICINE

## 2023-12-24 PROCEDURE — 94640 AIRWAY INHALATION TREATMENT: CPT

## 2023-12-24 PROCEDURE — 80048 BASIC METABOLIC PNL TOTAL CA: CPT

## 2023-12-24 PROCEDURE — 83880 ASSAY OF NATRIURETIC PEPTIDE: CPT

## 2023-12-24 PROCEDURE — 99285 EMERGENCY DEPT VISIT HI MDM: CPT

## 2023-12-24 PROCEDURE — 84484 ASSAY OF TROPONIN QUANT: CPT

## 2023-12-24 PROCEDURE — 71045 X-RAY EXAM CHEST 1 VIEW: CPT

## 2023-12-24 PROCEDURE — 85025 COMPLETE CBC W/AUTO DIFF WBC: CPT

## 2023-12-24 PROCEDURE — 6370000000 HC RX 637 (ALT 250 FOR IP): Performed by: EMERGENCY MEDICINE

## 2023-12-24 PROCEDURE — 94761 N-INVAS EAR/PLS OXIMETRY MLT: CPT

## 2023-12-24 RX ORDER — IPRATROPIUM BROMIDE AND ALBUTEROL SULFATE 2.5; .5 MG/3ML; MG/3ML
1 SOLUTION RESPIRATORY (INHALATION) ONCE
Status: COMPLETED | OUTPATIENT
Start: 2023-12-24 | End: 2023-12-24

## 2023-12-24 RX ADMIN — IPRATROPIUM BROMIDE AND ALBUTEROL SULFATE 1 DOSE: 2.5; .5 SOLUTION RESPIRATORY (INHALATION) at 09:14

## 2023-12-24 NOTE — ED PROVIDER NOTES
[address] [facility] ENCOUNTER          ATTENDING PHYSICIAN NOTE       Date of evaluation: 12/24/2023    Chief Complaint     Shortness of Breath      History of Present Illness     Hayley Myrick is a 80 y.o. female who presents to the emergency room today complaining of shortness of breath and exertional fatigue for the past year. Patient reports mild chronic nonproductive cough denies any fevers or chills. Reports no chest pain. Has not had any weight gain orthopnea or lower extremity swelling. Has a history of asthma. Review of Systems     Review of Systems  All systems were reviewed with the patient/family and negative except as otherwise documented in the HPI. Past Medical, Surgical, Family, and Social History     She has a past medical history of Abnormal involuntary movements(781.0), Anal fissure, Anemia, unspecified, Anxiety, Chronic back pain, Chronic diarrhea, Depression, Diffuse cystic mastopathy, Encopresis(307.7), Esophageal reflux, Foot fracture, left, Headache(784.0), Hepatitis, unspecified, Herpes simplex without mention of complication, Hypertension, Hypogammaglobulinaemia, unspecified, Insomnia, unspecified, Lateral epicondylitis  of elbow, Migraine, unspecified, without mention of intractable migraine without mention of status migrainosus, Mixed hyperlipidemia, Osteopenia, Postmenopausal, Pure hypercholesterolemia, Symptomatic menopausal or female climacteric states, Type 2 diabetes mellitus without complication ([address]), Type II or unspecified type diabetes mellitus without mention of complication, not stated as uncontrolled, and Unspecified asthma(493.90). She has a past surgical history that includes Rectal surgery; Dilation and curettage of uterus; Hysterectomy (1998); Hand surgery (2009); Foot surgery (10/2009,11/2010); Elbow surgery (1/18/11); Shoulder arthroscopy (Right, 7/07); Cataract removal (Bilateral); and Breast biopsy.   Her family history includes

## 2023-12-26 NOTE — TELEPHONE ENCOUNTER
Medication:   Requested Prescriptions     Pending Prescriptions Disp Refills    atorvastatin (LIPITOR) 20 MG tablet 90 tablet 0     Sig: Take 1 tablet by mouth daily     Last Filled:      Last appt: 12/13/2023   Next appt: 1/2/2024    Last OARRS:       8/29/2023     6:55 PM   RX Monitoring   Periodic Controlled Substance Monitoring No signs of potential drug abuse or diversion identified.

## 2023-12-27 RX ORDER — ATORVASTATIN CALCIUM 20 MG/1
20 TABLET, FILM COATED ORAL DAILY
Qty: 90 TABLET | Refills: 1 | Status: SHIPPED | OUTPATIENT
Start: 2023-12-27 | End: 2024-02-15

## 2024-01-02 ENCOUNTER — OFFICE VISIT (OUTPATIENT)
Dept: PRIMARY CARE CLINIC | Age: 69
End: 2024-01-02
Payer: MEDICARE

## 2024-01-02 VITALS
HEART RATE: 86 BPM | DIASTOLIC BLOOD PRESSURE: 82 MMHG | BODY MASS INDEX: 30 KG/M2 | WEIGHT: 164 LBS | OXYGEN SATURATION: 97 % | SYSTOLIC BLOOD PRESSURE: 114 MMHG

## 2024-01-02 DIAGNOSIS — F51.01 PRIMARY INSOMNIA: ICD-10-CM

## 2024-01-02 DIAGNOSIS — R11.0 NAUSEA: ICD-10-CM

## 2024-01-02 DIAGNOSIS — R07.9 CHEST PAIN, UNSPECIFIED TYPE: ICD-10-CM

## 2024-01-02 DIAGNOSIS — R42 DIZZINESS: ICD-10-CM

## 2024-01-02 DIAGNOSIS — R53.1 WEAKNESS: ICD-10-CM

## 2024-01-02 DIAGNOSIS — R06.09 DYSPNEA ON EXERTION: Primary | ICD-10-CM

## 2024-01-02 DIAGNOSIS — F33.1 MODERATE EPISODE OF RECURRENT MAJOR DEPRESSIVE DISORDER (HCC): ICD-10-CM

## 2024-01-02 DIAGNOSIS — E03.9 ACQUIRED HYPOTHYROIDISM: ICD-10-CM

## 2024-01-02 DIAGNOSIS — J45.40 MODERATE PERSISTENT ASTHMA WITHOUT COMPLICATION: ICD-10-CM

## 2024-01-02 PROCEDURE — 1090F PRES/ABSN URINE INCON ASSESS: CPT | Performed by: INTERNAL MEDICINE

## 2024-01-02 PROCEDURE — 99214 OFFICE O/P EST MOD 30 MIN: CPT | Performed by: INTERNAL MEDICINE

## 2024-01-02 PROCEDURE — 3074F SYST BP LT 130 MM HG: CPT | Performed by: INTERNAL MEDICINE

## 2024-01-02 PROCEDURE — G8399 PT W/DXA RESULTS DOCUMENT: HCPCS | Performed by: INTERNAL MEDICINE

## 2024-01-02 PROCEDURE — 3079F DIAST BP 80-89 MM HG: CPT | Performed by: INTERNAL MEDICINE

## 2024-01-02 PROCEDURE — G8417 CALC BMI ABV UP PARAM F/U: HCPCS | Performed by: INTERNAL MEDICINE

## 2024-01-02 PROCEDURE — 1124F ACP DISCUSS-NO DSCNMKR DOCD: CPT | Performed by: INTERNAL MEDICINE

## 2024-01-02 PROCEDURE — G8484 FLU IMMUNIZE NO ADMIN: HCPCS | Performed by: INTERNAL MEDICINE

## 2024-01-02 PROCEDURE — 1036F TOBACCO NON-USER: CPT | Performed by: INTERNAL MEDICINE

## 2024-01-02 PROCEDURE — G8427 DOCREV CUR MEDS BY ELIG CLIN: HCPCS | Performed by: INTERNAL MEDICINE

## 2024-01-02 PROCEDURE — 3017F COLORECTAL CA SCREEN DOC REV: CPT | Performed by: INTERNAL MEDICINE

## 2024-01-02 RX ORDER — TIOTROPIUM BROMIDE 18 UG/1
18 CAPSULE ORAL; RESPIRATORY (INHALATION) DAILY
Qty: 30 CAPSULE | Refills: 0 | Status: SHIPPED | OUTPATIENT
Start: 2024-01-02

## 2024-01-02 ASSESSMENT — PATIENT HEALTH QUESTIONNAIRE - PHQ9
SUM OF ALL RESPONSES TO PHQ QUESTIONS 1-9: 0
SUM OF ALL RESPONSES TO PHQ9 QUESTIONS 1 & 2: 0
SUM OF ALL RESPONSES TO PHQ QUESTIONS 1-9: 0
SUM OF ALL RESPONSES TO PHQ QUESTIONS 1-9: 0
5. POOR APPETITE OR OVEREATING: 0
7. TROUBLE CONCENTRATING ON THINGS, SUCH AS READING THE NEWSPAPER OR WATCHING TELEVISION: 0
8. MOVING OR SPEAKING SO SLOWLY THAT OTHER PEOPLE COULD HAVE NOTICED. OR THE OPPOSITE, BEING SO FIGETY OR RESTLESS THAT YOU HAVE BEEN MOVING AROUND A LOT MORE THAN USUAL: 0
3. TROUBLE FALLING OR STAYING ASLEEP: 0
2. FEELING DOWN, DEPRESSED OR HOPELESS: 0
4. FEELING TIRED OR HAVING LITTLE ENERGY: 0
6. FEELING BAD ABOUT YOURSELF - OR THAT YOU ARE A FAILURE OR HAVE LET YOURSELF OR YOUR FAMILY DOWN: 0
1. LITTLE INTEREST OR PLEASURE IN DOING THINGS: 0
10. IF YOU CHECKED OFF ANY PROBLEMS, HOW DIFFICULT HAVE THESE PROBLEMS MADE IT FOR YOU TO DO YOUR WORK, TAKE CARE OF THINGS AT HOME, OR GET ALONG WITH OTHER PEOPLE: 0
SUM OF ALL RESPONSES TO PHQ QUESTIONS 1-9: 0
9. THOUGHTS THAT YOU WOULD BE BETTER OFF DEAD, OR OF HURTING YOURSELF: 0

## 2024-01-02 NOTE — PROGRESS NOTES
Date of Visit: 2024    Tatiana Shepard (:  1955) is a 68 y.o. female,  Established patient here for evaluation of the following chief complaint(s):  Dizziness, Shortness of Breath, Asthma, and Results      ASSESSMENT/PLAN:    1. Dyspnea on exertion  - not controlled  - patient has had chest xrays, chest CT scan, EKG, pulmonary function tests, labs, and has seen Cardiology  - Continue inhalers   - Follow up with Pulmonary as scheduled 2024    2. Moderate persistent asthma without complication  -Not controlled  -Start tiotropium (SPIRIVA HANDIHALER) 18 MCG inhalation capsule; Inhale 1 capsule into the lungs daily  Dispense: 30 capsule; Refill: 0  -Continue Symbicort HFA inhaler 160-4.5 mcg 2 puffs 2 times daily  -Continue albuterol HFA inhaler 2 puffs every 6 hours as needed    3. Dizziness  -Not controlled  -Discontinue hydroxyzine due to potential side effect of dizziness  -Patient has had an MRI of the brain and Head CT scans done September  -Labs unremarkable  -Follow-up with neurology as scheduled 2024    4. Weakness  -Leg weakness  -Labs unremarkable  -Hold Atorvastatin for 2 weeks to see if it makes a difference    5. Nausea  -Continue Zofran as needed    6. Primary insomnia  -Stable  -Continue over-the-counter melatonin gummy  -Discontinue hydroxyzine due to potential side effect of dizziness    7. Chest pain, unspecified type  -Not new  -Follow-up with cardiology as scheduled 2024    8. Acquired hypothyroidism  - TSH elevated on lab  -Recheck TSH and if still elevated may need medication  - TSH; Future    9. Moderate episode of recurrent major depressive disorder (HCC)  -Follow up with counseling          Return in about 2 weeks (around 2024) for weakness, insomnia, and dizziness.    SUBJECTIVE:    Patient states she still has SOB. Patient states her oxygen drops below 90 with moving around in her house. Patient states if she sits in a chair she sweats. Patient has

## 2024-01-05 ENCOUNTER — TELEPHONE (OUTPATIENT)
Dept: PRIMARY CARE CLINIC | Age: 69
End: 2024-01-05

## 2024-01-05 NOTE — TELEPHONE ENCOUNTER
----- Message from Bhavana Watson sent at 1/5/2024  9:01 AM EST -----  Subject: Medication Problem     Medication: tiotropium (SPIRIVA HANDIHALER) 18 MCG inhalation capsule  Dosage: once a day  Ordering Provider: Raisa Espitia    Question/Problem: patient is wanting to know if she is to take the old one   with the new medication? She is a little confused please call her asap      Pharmacy: [facility] - [address] [facility] - P [phone] - F [phone]    ---------------------------------------------------------------------------  --------------  CALL BACK INFO  [phone]; OK to leave message on voicemail  ---------------------------------------------------------------------------  --------------    SCRIPT ANSWERS  Relationship to Patient: Self

## 2024-01-08 ENCOUNTER — TELEPHONE (OUTPATIENT)
Dept: PRIMARY CARE CLINIC | Age: 69
End: 2024-01-08

## 2024-01-08 NOTE — TELEPHONE ENCOUNTER
Pt called to verify if she should take the new meds ipratropium 0.5 mg-albuterol 2.5 mg (DUONEB) nebulizer solution 1 Dose  with the old meds.  ipratropium 0.5 mg-albuterol 2.5 mg (DUONEB) nebulizer solution 1 Dose   [1     Please call pt concerning this. She is also looking for paperwork from the doctor as well.

## 2024-01-09 ENCOUNTER — OFFICE VISIT (OUTPATIENT)
Dept: PULMONOLOGY | Age: 69
End: 2024-01-09
Payer: MEDICARE

## 2024-01-09 VITALS
OXYGEN SATURATION: 95 % | SYSTOLIC BLOOD PRESSURE: 118 MMHG | DIASTOLIC BLOOD PRESSURE: 68 MMHG | BODY MASS INDEX: 30.42 KG/M2 | HEART RATE: 84 BPM | HEIGHT: 62 IN | WEIGHT: 165.3 LBS

## 2024-01-09 DIAGNOSIS — J45.40 ASTHMA, MODERATE PERSISTENT, POORLY-CONTROLLED: Primary | ICD-10-CM

## 2024-01-09 PROBLEM — R11.0 NAUSEA: Status: ACTIVE | Noted: 2024-01-09

## 2024-01-09 PROBLEM — R53.1 WEAKNESS: Status: ACTIVE | Noted: 2024-01-09

## 2024-01-09 PROBLEM — E03.9 ACQUIRED HYPOTHYROIDISM: Status: ACTIVE | Noted: 2024-01-09

## 2024-01-09 PROBLEM — R42 DIZZINESS: Status: ACTIVE | Noted: 2024-01-09

## 2024-01-09 PROCEDURE — G8484 FLU IMMUNIZE NO ADMIN: HCPCS | Performed by: INTERNAL MEDICINE

## 2024-01-09 PROCEDURE — G8399 PT W/DXA RESULTS DOCUMENT: HCPCS | Performed by: INTERNAL MEDICINE

## 2024-01-09 PROCEDURE — 3074F SYST BP LT 130 MM HG: CPT | Performed by: INTERNAL MEDICINE

## 2024-01-09 PROCEDURE — 1124F ACP DISCUSS-NO DSCNMKR DOCD: CPT | Performed by: INTERNAL MEDICINE

## 2024-01-09 PROCEDURE — 3078F DIAST BP <80 MM HG: CPT | Performed by: INTERNAL MEDICINE

## 2024-01-09 PROCEDURE — 99204 OFFICE O/P NEW MOD 45 MIN: CPT | Performed by: INTERNAL MEDICINE

## 2024-01-09 PROCEDURE — G8417 CALC BMI ABV UP PARAM F/U: HCPCS | Performed by: INTERNAL MEDICINE

## 2024-01-09 PROCEDURE — G8427 DOCREV CUR MEDS BY ELIG CLIN: HCPCS | Performed by: INTERNAL MEDICINE

## 2024-01-09 PROCEDURE — 1036F TOBACCO NON-USER: CPT | Performed by: INTERNAL MEDICINE

## 2024-01-09 PROCEDURE — 3017F COLORECTAL CA SCREEN DOC REV: CPT | Performed by: INTERNAL MEDICINE

## 2024-01-09 PROCEDURE — 1090F PRES/ABSN URINE INCON ASSESS: CPT | Performed by: INTERNAL MEDICINE

## 2024-01-09 RX ORDER — MONTELUKAST SODIUM 10 MG/1
10 TABLET ORAL DAILY
Qty: 30 TABLET | Refills: 5 | Status: SHIPPED | OUTPATIENT
Start: 2024-01-09

## 2024-01-09 RX ORDER — ALBUTEROL SULFATE 90 UG/1
2 AEROSOL, METERED RESPIRATORY (INHALATION) EVERY 4 HOURS PRN
Qty: 36 G | Refills: 5 | Status: SHIPPED | OUTPATIENT
Start: 2024-01-09 | End: 2025-01-08

## 2024-01-09 ASSESSMENT — ENCOUNTER SYMPTOMS
VOMITING: 0
SORE THROAT: 0
CONSTIPATION: 1
BLOOD IN STOOL: 0
COUGH: 0
RHINORRHEA: 0
DIARRHEA: 0
NAUSEA: 0
CHEST TIGHTNESS: 0
WHEEZING: 0
ABDOMINAL PAIN: 0
SINUS PRESSURE: 0
SHORTNESS OF BREATH: 1
SINUS PAIN: 0

## 2024-01-09 NOTE — PROGRESS NOTES
[facility] Pulmonary and Critical Care    Outpatient Initial Note    Subjective:   CHIEF COMPLAINT / HPI:     The patient is 68 y.o. female who presents today for a new patient visit for evaluation of chronic HANCOCK walking room to room as well as when she talks a lot. 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 She mentions there was asbestosis in the room. She also mentions leg weakness, headache, facial burning/tingling and right sided CP. No cough or wheezing. Se was diagnosed with asthma in 1991 and she mentions bronchiolitis and being treated with IVIG in the pats      Past Medical History:    Past Medical History:   Diagnosis Date    Abnormal involuntary movements(781.0)     Anal fissure     Anemia, unspecified     Anxiety     Chronic back pain     Chronic diarrhea 09/23/2019    Depression     Diffuse cystic mastopathy     Encopresis(307.7)     Esophageal reflux     Foot fracture, left 1989    drop something on foot    Headache(784.0)     Hepatitis, unspecified     Herpes simplex without mention of complication     Hypertension     Hypogammaglobulinaemia, unspecified     Insomnia, unspecified     Lateral epicondylitis  of elbow     Migraine, unspecified, without mention of intractable migraine without mention of status migrainosus     Mixed hyperlipidemia 07/30/2010    Osteopenia     Postmenopausal     Pure hypercholesterolemia     Symptomatic menopausal or female climacteric states     Type 2 diabetes mellitus without complication (HCC)     Type II or unspecified type diabetes mellitus without mention of complication, not stated as uncontrolled     Unspecified asthma(493.90)        Social History:    No significant tobacco use  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

## 2024-01-10 DIAGNOSIS — U07.1 COVID-19: ICD-10-CM

## 2024-01-11 DIAGNOSIS — E03.9 ACQUIRED HYPOTHYROIDISM: ICD-10-CM

## 2024-01-11 LAB — TSH SERPL DL<=0.005 MIU/L-ACNC: 2.37 UIU/ML (ref 0.27–4.2)

## 2024-01-11 RX ORDER — FLUTICASONE PROPIONATE 50 MCG
SPRAY, SUSPENSION (ML) NASAL
Qty: 48 G | Refills: 1 | Status: SHIPPED | OUTPATIENT
Start: 2024-01-11

## 2024-01-11 NOTE — TELEPHONE ENCOUNTER
Medication:   Requested Prescriptions     Pending Prescriptions Disp Refills    fluticasone (FLONASE) 50 MCG/ACT nasal spray [Pharmacy Med Name: FLUTICASONE  SPR 50MCG RX] 48 g 1     Sig: USE 2 SPRAYS NASALLY DAILY     Last Filled:  6.5.23    Last appt: 1/2/2024   Next appt: 1/16/2024    Last OARRS:       8/29/2023     6:55 PM   RX Monitoring   Periodic Controlled Substance Monitoring No signs of potential drug abuse or diversion identified.

## 2024-01-16 ENCOUNTER — HOSPITAL ENCOUNTER (OUTPATIENT)
Dept: WOMENS IMAGING | Age: 69
Discharge: HOME | End: 2024-01-16
Payer: MEDICARE

## 2024-01-16 ENCOUNTER — OFFICE VISIT (OUTPATIENT)
Dept: PRIMARY CARE CLINIC | Age: 69
End: 2024-01-16
Payer: MEDICARE

## 2024-01-16 VITALS
WEIGHT: 163 LBS | SYSTOLIC BLOOD PRESSURE: 116 MMHG | DIASTOLIC BLOOD PRESSURE: 82 MMHG | HEART RATE: 76 BPM | BODY MASS INDEX: 29.81 KG/M2 | OXYGEN SATURATION: 97 %

## 2024-01-16 VITALS — HEIGHT: 62 IN | WEIGHT: 162 LBS | BODY MASS INDEX: 29.81 KG/M2

## 2024-01-16 DIAGNOSIS — R53.1 WEAKNESS: Primary | ICD-10-CM

## 2024-01-16 DIAGNOSIS — Z12.31 VISIT FOR SCREENING MAMMOGRAM: ICD-10-CM

## 2024-01-16 DIAGNOSIS — R42 DIZZINESS: ICD-10-CM

## 2024-01-16 DIAGNOSIS — J45.40 MODERATE PERSISTENT ASTHMA WITHOUT COMPLICATION: ICD-10-CM

## 2024-01-16 DIAGNOSIS — F51.01 PRIMARY INSOMNIA: ICD-10-CM

## 2024-01-16 PROCEDURE — 3079F DIAST BP 80-89 MM HG: CPT | Performed by: INTERNAL MEDICINE

## 2024-01-16 PROCEDURE — 1036F TOBACCO NON-USER: CPT | Performed by: INTERNAL MEDICINE

## 2024-01-16 PROCEDURE — G8417 CALC BMI ABV UP PARAM F/U: HCPCS | Performed by: INTERNAL MEDICINE

## 2024-01-16 PROCEDURE — G8427 DOCREV CUR MEDS BY ELIG CLIN: HCPCS | Performed by: INTERNAL MEDICINE

## 2024-01-16 PROCEDURE — 77063 BREAST TOMOSYNTHESIS BI: CPT

## 2024-01-16 PROCEDURE — G8484 FLU IMMUNIZE NO ADMIN: HCPCS | Performed by: INTERNAL MEDICINE

## 2024-01-16 PROCEDURE — 3074F SYST BP LT 130 MM HG: CPT | Performed by: INTERNAL MEDICINE

## 2024-01-16 PROCEDURE — 1090F PRES/ABSN URINE INCON ASSESS: CPT | Performed by: INTERNAL MEDICINE

## 2024-01-16 PROCEDURE — 1124F ACP DISCUSS-NO DSCNMKR DOCD: CPT | Performed by: INTERNAL MEDICINE

## 2024-01-16 PROCEDURE — G8399 PT W/DXA RESULTS DOCUMENT: HCPCS | Performed by: INTERNAL MEDICINE

## 2024-01-16 PROCEDURE — 3017F COLORECTAL CA SCREEN DOC REV: CPT | Performed by: INTERNAL MEDICINE

## 2024-01-16 PROCEDURE — 99214 OFFICE O/P EST MOD 30 MIN: CPT | Performed by: INTERNAL MEDICINE

## 2024-01-16 RX ORDER — TIOTROPIUM BROMIDE 18 UG/1
18 CAPSULE ORAL; RESPIRATORY (INHALATION) DAILY
Qty: 90 CAPSULE | Refills: 1 | Status: SHIPPED | OUTPATIENT
Start: 2024-01-16

## 2024-01-16 SDOH — ECONOMIC STABILITY: FOOD INSECURITY: WITHIN THE PAST 12 MONTHS, YOU WORRIED THAT YOUR FOOD WOULD RUN OUT BEFORE YOU GOT MONEY TO BUY MORE.: NEVER TRUE

## 2024-01-16 SDOH — ECONOMIC STABILITY: FOOD INSECURITY: WITHIN THE PAST 12 MONTHS, THE FOOD YOU BOUGHT JUST DIDN'T LAST AND YOU DIDN'T HAVE MONEY TO GET MORE.: NEVER TRUE

## 2024-01-16 SDOH — ECONOMIC STABILITY: INCOME INSECURITY: HOW HARD IS IT FOR YOU TO PAY FOR THE VERY BASICS LIKE FOOD, HOUSING, MEDICAL CARE, AND HEATING?: NOT HARD AT ALL

## 2024-01-16 NOTE — PROGRESS NOTES
12/15/2023 1.7     Monocytes Absolute 12/15/2023 0.5     Eosinophils Absolute 12/15/2023 0.1     Basophils Absolute 12/15/2023 0.0     Sodium 12/15/2023 140     Potassium 12/15/2023 4.4     Chloride 12/15/2023 101     CO2 12/15/2023 24     Anion Gap 12/15/2023 15     Glucose 12/15/2023 117 (H)     BUN 12/15/2023 17     Creatinine 12/15/2023 0.9     Est, Glom Filt Rate 12/15/2023 >60     Calcium 12/15/2023 9.4     Total Protein 12/15/2023 6.6     Albumin 12/15/2023 4.5     Albumin/Globulin Ratio 12/15/2023 2.1     Total Bilirubin 12/15/2023 0.7     Alkaline Phosphatase 12/15/2023 65     ALT 12/15/2023 16     AST 12/15/2023 17     TSH 12/15/2023 4.36 (H)     Hemoglobin A1C 12/15/2023 6.0     Estimated Avg Glucose 12/15/2023 125.5     Cholesterol, Total 12/15/2023 168     Triglycerides 12/15/2023 187 (H)     HDL 12/15/2023 53     LDL Calculated 12/15/2023 78     VLDL Cholesterol Calcula* 12/15/2023 37     Magnesium 12/15/2023 2.10     Total CK 12/15/2023 90    Orders Only on 11/09/2023   Component Date Value    Organism 11/09/2023 Proteus species (A)     Urine Culture, Routine 11/09/2023                      Value:<10,000 CFU/ml  No further workup      Color, UA 11/09/2023 Yellow     Clarity, UA 11/09/2023 Clear     Glucose, Ur 11/09/2023 Negative     Bilirubin Urine 11/09/2023 Negative     Ketones, Urine 11/09/2023 Negative     Specific Gravity, UA 11/09/2023 1.022     Blood, Urine 11/09/2023 Negative     pH, UA 11/09/2023 5.0     Protein, UA 11/09/2023 Negative     Urobilinogen, Urine 11/09/2023 0.2     Nitrite, Urine 11/09/2023 Negative     Leukocyte Esterase, Urine 11/09/2023 Negative     Microscopic Examination 11/09/2023 Not Indicated     Urine Type 11/09/2023 Cleancatch    Orders Only on 09/08/2023   Component Date Value    Iron 09/08/2023 71     TIBC 09/08/2023 357     Iron % Saturation 09/08/2023 20    Orders Only on 09/08/2023   Component Date Value    Ferritin 09/08/2023 36.9            Medications,

## 2024-01-19 ENCOUNTER — OFFICE VISIT (OUTPATIENT)
Dept: CARDIOLOGY CLINIC | Age: 69
End: 2024-01-19
Payer: MEDICARE

## 2024-01-19 VITALS
HEART RATE: 80 BPM | SYSTOLIC BLOOD PRESSURE: 110 MMHG | WEIGHT: 167 LBS | TEMPERATURE: 97.3 F | RESPIRATION RATE: 17 BRPM | BODY MASS INDEX: 30.73 KG/M2 | OXYGEN SATURATION: 95 % | DIASTOLIC BLOOD PRESSURE: 78 MMHG | HEIGHT: 62 IN

## 2024-01-19 DIAGNOSIS — E78.5 HYPERLIPIDEMIA, UNSPECIFIED HYPERLIPIDEMIA TYPE: Primary | ICD-10-CM

## 2024-01-19 DIAGNOSIS — R06.02 SOB (SHORTNESS OF BREATH) ON EXERTION: ICD-10-CM

## 2024-01-19 PROCEDURE — 1090F PRES/ABSN URINE INCON ASSESS: CPT | Performed by: INTERNAL MEDICINE

## 2024-01-19 PROCEDURE — 93000 ELECTROCARDIOGRAM COMPLETE: CPT | Performed by: INTERNAL MEDICINE

## 2024-01-19 PROCEDURE — 3074F SYST BP LT 130 MM HG: CPT | Performed by: INTERNAL MEDICINE

## 2024-01-19 PROCEDURE — 3017F COLORECTAL CA SCREEN DOC REV: CPT | Performed by: INTERNAL MEDICINE

## 2024-01-19 PROCEDURE — G8427 DOCREV CUR MEDS BY ELIG CLIN: HCPCS | Performed by: INTERNAL MEDICINE

## 2024-01-19 PROCEDURE — G8399 PT W/DXA RESULTS DOCUMENT: HCPCS | Performed by: INTERNAL MEDICINE

## 2024-01-19 PROCEDURE — 99213 OFFICE O/P EST LOW 20 MIN: CPT | Performed by: INTERNAL MEDICINE

## 2024-01-19 PROCEDURE — 1124F ACP DISCUSS-NO DSCNMKR DOCD: CPT | Performed by: INTERNAL MEDICINE

## 2024-01-19 PROCEDURE — 1036F TOBACCO NON-USER: CPT | Performed by: INTERNAL MEDICINE

## 2024-01-19 PROCEDURE — 3078F DIAST BP <80 MM HG: CPT | Performed by: INTERNAL MEDICINE

## 2024-01-19 PROCEDURE — G8417 CALC BMI ABV UP PARAM F/U: HCPCS | Performed by: INTERNAL MEDICINE

## 2024-01-19 PROCEDURE — G8484 FLU IMMUNIZE NO ADMIN: HCPCS | Performed by: INTERNAL MEDICINE

## 2024-01-19 RX ORDER — NITROGLYCERIN 80 MG/1
1 PATCH TRANSDERMAL DAILY
Qty: 30 PATCH | Refills: 3 | Status: SHIPPED | OUTPATIENT
Start: 2024-01-19

## 2024-01-19 NOTE — PROGRESS NOTES
[facility]  CARDIOLOGY  follow up        Reason for Consultation/Chief Complaint: \"I have been having chest pain.\"       History of Present Illness:  Tatiana Shepard is a 68 y.o. patient  has no chest pain now.  No sob.  Has IBS.  Going to [place] tomorrow.  Has HA when she doesn't sleep.    Past Medical History:   has a past medical history of Abnormal involuntary movements(781.0), Anal fissure, Anemia, unspecified, Anxiety, Chronic back pain, Chronic diarrhea, Depression, Diffuse cystic mastopathy, Encopresis(307.7), Esophageal reflux, Foot fracture, left, Headache(784.0), Hepatitis, unspecified, Herpes simplex without mention of complication, Hypertension, Hypogammaglobulinaemia, unspecified, Insomnia, unspecified, Lateral epicondylitis  of elbow, Migraine, unspecified, without mention of intractable migraine without mention of status migrainosus, Mixed hyperlipidemia, Osteopenia, Postmenopausal, Pure hypercholesterolemia, Symptomatic menopausal or female climacteric states, Type 2 diabetes mellitus without complication (HCC), Type II or unspecified type diabetes mellitus without mention of complication, not stated as uncontrolled, and Unspecified asthma(493.90).    Has CP in am didn't take nitrodur in am but in pm.  HR 90 no new ECG changes.    Surgical History:   has a past surgical history that includes Rectal surgery; Dilation and curettage of uterus; Hysterectomy (1998); Hand surgery (2009); Foot surgery (10/2009,11/2010); Elbow surgery (1/18/11); Shoulder arthroscopy (Right, 7/07); Cataract removal (Bilateral); and Breast biopsy.     Social History:   reports that she has never smoked. She has never used smokeless tobacco. She reports that she does not drink alcohol and does not use drugs.     Family History:  No evidence for sudden cardiac death or premature CAD    Home Medications:  Were reviewed and are listed in nursing record. and/or listed below  Prior to Admission medications    Medication

## 2024-01-22 ENCOUNTER — OFFICE VISIT (OUTPATIENT)
Dept: ENT CLINIC | Age: 69
End: 2024-01-22
Payer: MEDICARE

## 2024-01-22 VITALS
WEIGHT: 164.8 LBS | BODY MASS INDEX: 30.33 KG/M2 | HEART RATE: 94 BPM | DIASTOLIC BLOOD PRESSURE: 76 MMHG | TEMPERATURE: 97.3 F | HEIGHT: 62 IN | SYSTOLIC BLOOD PRESSURE: 115 MMHG

## 2024-01-22 DIAGNOSIS — R49.0 MUSCLE TENSION DYSPHONIA: ICD-10-CM

## 2024-01-22 DIAGNOSIS — K21.9 LARYNGOPHARYNGEAL REFLUX (LPR): ICD-10-CM

## 2024-01-22 DIAGNOSIS — R49.0 HOARSE VOICE QUALITY: Primary | ICD-10-CM

## 2024-01-22 DIAGNOSIS — K21.9 GASTROESOPHAGEAL REFLUX DISEASE, UNSPECIFIED WHETHER ESOPHAGITIS PRESENT: ICD-10-CM

## 2024-01-22 PROCEDURE — 1036F TOBACCO NON-USER: CPT | Performed by: OTOLARYNGOLOGY

## 2024-01-22 PROCEDURE — G8399 PT W/DXA RESULTS DOCUMENT: HCPCS | Performed by: OTOLARYNGOLOGY

## 2024-01-22 PROCEDURE — 1090F PRES/ABSN URINE INCON ASSESS: CPT | Performed by: OTOLARYNGOLOGY

## 2024-01-22 PROCEDURE — G8428 CUR MEDS NOT DOCUMENT: HCPCS | Performed by: OTOLARYNGOLOGY

## 2024-01-22 PROCEDURE — 1124F ACP DISCUSS-NO DSCNMKR DOCD: CPT | Performed by: OTOLARYNGOLOGY

## 2024-01-22 PROCEDURE — 99213 OFFICE O/P EST LOW 20 MIN: CPT | Performed by: OTOLARYNGOLOGY

## 2024-01-22 PROCEDURE — G8484 FLU IMMUNIZE NO ADMIN: HCPCS | Performed by: OTOLARYNGOLOGY

## 2024-01-22 PROCEDURE — 3017F COLORECTAL CA SCREEN DOC REV: CPT | Performed by: OTOLARYNGOLOGY

## 2024-01-22 PROCEDURE — 3078F DIAST BP <80 MM HG: CPT | Performed by: OTOLARYNGOLOGY

## 2024-01-22 PROCEDURE — G8417 CALC BMI ABV UP PARAM F/U: HCPCS | Performed by: OTOLARYNGOLOGY

## 2024-01-22 PROCEDURE — 31575 DIAGNOSTIC LARYNGOSCOPY: CPT | Performed by: OTOLARYNGOLOGY

## 2024-01-22 PROCEDURE — 3074F SYST BP LT 130 MM HG: CPT | Performed by: OTOLARYNGOLOGY

## 2024-01-22 ASSESSMENT — ENCOUNTER SYMPTOMS
SINUS PAIN: 0
SORE THROAT: 0
RHINORRHEA: 0
EYE PAIN: 0
TROUBLE SWALLOWING: 0
EYE REDNESS: 0
SINUS PRESSURE: 0
DIARRHEA: 0
FACIAL SWELLING: 0
EYE ITCHING: 0
VOICE CHANGE: 0
NAUSEA: 0
SHORTNESS OF BREATH: 0
CHOKING: 0

## 2024-01-22 NOTE — PROGRESS NOTES
12/24/2023     Lab Results   Component Value Date    MG 2.10 12/15/2023     Lab Results   Component Value Date    PHOS 2.7 02/22/2012     Lab Results   Component Value Date    ALKPHOS 65 12/15/2023    ALT 16 12/15/2023    AST 17 12/15/2023    BILITOT 0.7 12/15/2023    PROT 6.6 12/15/2023               Review of Systems   Constitutional:  Negative for activity change, appetite change, chills, fatigue and fever.   HENT:  Negative for congestion, ear discharge, ear pain, facial swelling, hearing loss, nosebleeds, postnasal drip, rhinorrhea, sinus pressure, sinus pain, sneezing, sore throat, tinnitus, trouble swallowing and voice change.    Eyes:  Negative for pain, redness, itching and visual disturbance.   Respiratory:  Negative for cough, choking and shortness of breath.    Gastrointestinal:  Negative for diarrhea and nausea.   Endocrine: Negative for cold intolerance and heat intolerance.       Objective:   Physical Exam  Constitutional:       General: She is not in acute distress.     Appearance: She is well-developed.   HENT:      Head: Not macrocephalic and not microcephalic. No abrasion or contusion.      Mouth/Throat:      Lips: No lesions.      Mouth: No oral lesions.      Dentition: Normal dentition.      Tongue: No lesions.      Palate: No mass.      Pharynx: No oropharyngeal exudate, posterior oropharyngeal erythema or uvula swelling.      Tonsils: No tonsillar abscesses.   Neck:      Thyroid: No thyroid mass or thyromegaly.      Trachea: No tracheal tenderness or tracheal deviation.   Cardiovascular:      Rate and Rhythm: Normal rate and regular rhythm.   Pulmonary:      Breath sounds: No stridor.   Musculoskeletal:      Cervical back: Normal range of motion and neck supple. No edema or erythema. No muscular tenderness.   Lymphadenopathy:      Head:      Right side of head: No submental, submandibular, tonsillar, preauricular, posterior auricular or occipital adenopathy.      Left side of head: No 
- - -

## 2024-01-26 ENCOUNTER — TELEMEDICINE (OUTPATIENT)
Dept: PRIMARY CARE CLINIC | Age: 69
End: 2024-01-26
Payer: MEDICARE

## 2024-01-26 DIAGNOSIS — R53.1 WEAKNESS: Primary | ICD-10-CM

## 2024-01-26 DIAGNOSIS — R30.0 DYSURIA: ICD-10-CM

## 2024-01-26 DIAGNOSIS — F51.01 PRIMARY INSOMNIA: ICD-10-CM

## 2024-01-26 DIAGNOSIS — L29.9 ITCHING: ICD-10-CM

## 2024-01-26 DIAGNOSIS — R42 DIZZINESS: ICD-10-CM

## 2024-01-26 PROCEDURE — 3017F COLORECTAL CA SCREEN DOC REV: CPT | Performed by: INTERNAL MEDICINE

## 2024-01-26 PROCEDURE — 99213 OFFICE O/P EST LOW 20 MIN: CPT | Performed by: INTERNAL MEDICINE

## 2024-01-26 PROCEDURE — 1124F ACP DISCUSS-NO DSCNMKR DOCD: CPT | Performed by: INTERNAL MEDICINE

## 2024-01-26 PROCEDURE — G8399 PT W/DXA RESULTS DOCUMENT: HCPCS | Performed by: INTERNAL MEDICINE

## 2024-01-26 PROCEDURE — G8427 DOCREV CUR MEDS BY ELIG CLIN: HCPCS | Performed by: INTERNAL MEDICINE

## 2024-01-26 PROCEDURE — 1090F PRES/ABSN URINE INCON ASSESS: CPT | Performed by: INTERNAL MEDICINE

## 2024-01-26 NOTE — PROGRESS NOTES
appetite is decreased. Patient states her  mouth is really dry. Patient complains of  burning with urination the past few days. Patient states her urine is dark. Patient states the past 3 nights she has had itching and feeling like ants walking all over her body and especially back and left arm. Patient states she took an allergy pill first 2 nights and it helped but 3rd night it didn't help.      Review of Systems   Constitutional:  Positive for fatigue. Negative for chills and fever.   HENT:  Negative for congestion, ear pain, postnasal drip, rhinorrhea, sinus pressure, sinus pain, sneezing and sore throat.    Eyes:  Negative for visual disturbance.   Respiratory:  Positive for shortness of breath. Negative for cough, chest tightness and wheezing.    Cardiovascular:  Positive for chest pain. Negative for palpitations and leg swelling.   Gastrointestinal:  Positive for constipation and vomiting. Negative for abdominal pain, blood in stool, diarrhea and nausea.   Genitourinary:  Positive for dysuria. Negative for frequency and hematuria.   Musculoskeletal:  Positive for myalgias. Negative for arthralgias.   Skin:  Negative for rash and wound.   Neurological:  Positive for dizziness, weakness, numbness and headaches. Negative for tremors, syncope and light-headedness.   Psychiatric/Behavioral:  Positive for dysphoric mood and sleep disturbance. The patient is not nervous/anxious.         Outpatient Medications Marked as Taking for the 1/26/24 encounter (Telemedicine) with Raisa Espitia MD   Medication Sig Dispense Refill    Alum Hydroxide-Mag Carbonate (GAVISCON PO) Take by mouth      butalbital-acetaminophen-caffeine (FIORICET, ESGIC) -40 MG per tablet Take 1 tablet by mouth every 8 hours as needed for Headaches      memantine (NAMENDA) 5 MG tablet Take 1 tablet by mouth 2 times daily      nitroGLYCERIN (NITRODUR) 0.4 MG/HR Place 1 patch onto the skin daily 30 patch 3    tiotropium (SPIRIVA HANDIHALER)

## 2024-01-30 ENCOUNTER — TELEPHONE (OUTPATIENT)
Dept: CARDIOLOGY CLINIC | Age: 69
End: 2024-01-30

## 2024-01-30 ASSESSMENT — ENCOUNTER SYMPTOMS
CONSTIPATION: 1
ABDOMINAL PAIN: 0
SINUS PRESSURE: 0
NAUSEA: 0
COUGH: 0
VOMITING: 0
WHEEZING: 0
RHINORRHEA: 0
SINUS PAIN: 0
SORE THROAT: 0
CHEST TIGHTNESS: 0
DIARRHEA: 0
SHORTNESS OF BREATH: 1
BLOOD IN STOOL: 0

## 2024-01-30 NOTE — TELEPHONE ENCOUNTER
Pt stated she was to call with an update after starting a new medication/dosage.      nitroGLYCERIN (NITRODUR) 0.4 MG/HR [[id number]]    Order Details  Dose: 1 patch Route: TransDERmal Frequency: DAILY   Dispense Quantity: 30 patch Refills: 3          Sig: Place 1 patch onto the skin daily     Pt would like  to know she is feeling a little better and she is not experiencing any dizziness.    [phone]

## 2024-02-01 ENCOUNTER — APPOINTMENT (OUTPATIENT)
Dept: GENERAL RADIOLOGY | Age: 69
End: 2024-02-01
Payer: MEDICARE

## 2024-02-01 ENCOUNTER — TELEPHONE (OUTPATIENT)
Dept: CARDIOLOGY CLINIC | Age: 69
End: 2024-02-01

## 2024-02-01 ENCOUNTER — HOSPITAL ENCOUNTER (EMERGENCY)
Age: 69
Discharge: HOME | End: 2024-02-01
Attending: EMERGENCY MEDICINE
Payer: MEDICARE

## 2024-02-01 VITALS
HEIGHT: 62 IN | WEIGHT: 167.8 LBS | OXYGEN SATURATION: 93 % | DIASTOLIC BLOOD PRESSURE: 87 MMHG | SYSTOLIC BLOOD PRESSURE: 131 MMHG | HEART RATE: 89 BPM | BODY MASS INDEX: 30.88 KG/M2 | TEMPERATURE: 98.2 F | RESPIRATION RATE: 26 BRPM

## 2024-02-01 DIAGNOSIS — R06.02 SHORTNESS OF BREATH: Primary | ICD-10-CM

## 2024-02-01 LAB
ANION GAP SERPL CALCULATED.3IONS-SCNC: 12 MMOL/L (ref 3–16)
BASOPHILS # BLD: 0.1 K/UL (ref 0–0.2)
BASOPHILS NFR BLD: 0.9 %
BUN SERPL-MCNC: 16 MG/DL (ref 7–20)
CALCIUM SERPL-MCNC: 9.3 MG/DL (ref 8.3–10.6)
CHLORIDE SERPL-SCNC: 104 MMOL/L (ref 99–110)
CO2 SERPL-SCNC: 23 MMOL/L (ref 21–32)
CREAT SERPL-MCNC: 0.8 MG/DL (ref 0.6–1.2)
DEPRECATED RDW RBC AUTO: 13.5 % (ref 12.4–15.4)
EOSINOPHIL # BLD: 0.1 K/UL (ref 0–0.6)
EOSINOPHIL NFR BLD: 1.6 %
FLUAV RNA RESP QL NAA+PROBE: NOT DETECTED
FLUBV RNA RESP QL NAA+PROBE: NOT DETECTED
GFR SERPLBLD CREATININE-BSD FMLA CKD-EPI: >60 ML/MIN/{1.73_M2}
GLUCOSE SERPL-MCNC: 100 MG/DL (ref 70–99)
HCT VFR BLD AUTO: 41.4 % (ref 36–48)
HGB BLD-MCNC: 14.1 G/DL (ref 12–16)
LYMPHOCYTES # BLD: 2.1 K/UL (ref 1–5.1)
LYMPHOCYTES NFR BLD: 35.5 %
MCH RBC QN AUTO: 30.1 PG (ref 26–34)
MCHC RBC AUTO-ENTMCNC: 34 G/DL (ref 31–36)
MCV RBC AUTO: 88.7 FL (ref 80–100)
MONOCYTES # BLD: 0.6 K/UL (ref 0–1.3)
MONOCYTES NFR BLD: 10 %
NEUTROPHILS # BLD: 3.1 K/UL (ref 1.7–7.7)
NEUTROPHILS NFR BLD: 52 %
NT-PROBNP SERPL-MCNC: 41 PG/ML (ref 0–124)
PLATELET # BLD AUTO: 235 K/UL (ref 135–450)
PMV BLD AUTO: 8.5 FL (ref 5–10.5)
POTASSIUM SERPL-SCNC: 4.1 MMOL/L (ref 3.5–5.1)
RBC # BLD AUTO: 4.67 M/UL (ref 4–5.2)
SARS-COV-2 RNA RESP QL NAA+PROBE: NOT DETECTED
SODIUM SERPL-SCNC: 139 MMOL/L (ref 136–145)
TROPONIN, HIGH SENSITIVITY: 7 NG/L (ref 0–14)
TROPONIN, HIGH SENSITIVITY: 9 NG/L (ref 0–14)
WBC # BLD AUTO: 6 K/UL (ref 4–11)

## 2024-02-01 PROCEDURE — 85025 COMPLETE CBC W/AUTO DIFF WBC: CPT

## 2024-02-01 PROCEDURE — 36415 COLL VENOUS BLD VENIPUNCTURE: CPT

## 2024-02-01 PROCEDURE — 84484 ASSAY OF TROPONIN QUANT: CPT

## 2024-02-01 PROCEDURE — 99284 EMERGENCY DEPT VISIT MOD MDM: CPT

## 2024-02-01 PROCEDURE — 94761 N-INVAS EAR/PLS OXIMETRY MLT: CPT

## 2024-02-01 PROCEDURE — 6370000000 HC RX 637 (ALT 250 FOR IP)

## 2024-02-01 PROCEDURE — 87636 SARSCOV2 & INF A&B AMP PRB: CPT

## 2024-02-01 PROCEDURE — 83880 ASSAY OF NATRIURETIC PEPTIDE: CPT

## 2024-02-01 PROCEDURE — 71045 X-RAY EXAM CHEST 1 VIEW: CPT

## 2024-02-01 PROCEDURE — 94640 AIRWAY INHALATION TREATMENT: CPT

## 2024-02-01 PROCEDURE — 80048 BASIC METABOLIC PNL TOTAL CA: CPT

## 2024-02-01 RX ORDER — IPRATROPIUM BROMIDE AND ALBUTEROL SULFATE 2.5; .5 MG/3ML; MG/3ML
1 SOLUTION RESPIRATORY (INHALATION) ONCE
Status: COMPLETED | OUTPATIENT
Start: 2024-02-01 | End: 2024-02-01

## 2024-02-01 RX ADMIN — IPRATROPIUM BROMIDE AND ALBUTEROL SULFATE 1 DOSE: 2.5; .5 SOLUTION RESPIRATORY (INHALATION) at 17:28

## 2024-02-01 ASSESSMENT — ENCOUNTER SYMPTOMS
EYES NEGATIVE: 1
GASTROINTESTINAL NEGATIVE: 1
SHORTNESS OF BREATH: 1
STRIDOR: 0
WHEEZING: 0

## 2024-02-01 ASSESSMENT — PAIN - FUNCTIONAL ASSESSMENT: PAIN_FUNCTIONAL_ASSESSMENT: 0-10

## 2024-02-01 ASSESSMENT — PAIN SCALES - GENERAL: PAINLEVEL_OUTOF10: 6

## 2024-02-01 NOTE — TELEPHONE ENCOUNTER
CHEST PAIN    1) Are you having active chest pain right now? Yes  If so, when did the symptoms start? Last night       2) On a scale of 1-10 how bad is your pain? 7-8      3) Where is the pain located? All over chest and back area    4) Are you having shortness of breath? Yes  Nausea? No  Vomiting? No  Dizziness? No     5) Have you taken any Nitroglycerin tablets?  Yes patient feels like it's not working    The patient says she is on her way to [facility]  If you have any further questions for patient please call [phone].

## 2024-02-02 NOTE — ED PROVIDER NOTES
ED Attending Attestation Note     Date of evaluation: 2/1/2024    This patient was seen by the resident.  I have seen and examined the patient, agree with the workup, evaluation, management and diagnosis. The care plan has been discussed.  I have reviewed the ECG and concur with the resident's interpretation.  My assessment reveals a well-nourished appearing female sitting up in the hospital stretcher comfortable and in no acute distress.  She is breathing comfortably she has clear breath sounds to auscultation bilaterally she has equal pulses noted in all extremities she has equal  strength bicep/tricep strength bilaterally.  She is able to lift her legs up off the bed without significant difficulty.     Jasiel Cardenas MD  02/01/24 [id number]    
Neutrophils % 52.0 %    Lymphocytes % 35.5 %    Monocytes % 10.0 %    Eosinophils % 1.6 %    Basophils % 0.9 %    Neutrophils Absolute 3.1 1.7 - 7.7 K/uL    Lymphocytes Absolute 2.1 1.0 - 5.1 K/uL    Monocytes Absolute 0.6 0.0 - 1.3 K/uL    Eosinophils Absolute 0.1 0.0 - 0.6 K/uL    Basophils Absolute 0.1 0.0 - 0.2 K/uL   BMP w/ Reflex to MG   Result Value Ref Range    Sodium 139 136 - 145 mmol/L    Potassium reflex Magnesium 4.1 3.5 - 5.1 mmol/L    Chloride 104 99 - 110 mmol/L    CO2 23 21 - 32 mmol/L    Anion Gap 12 3 - 16    Glucose 100 (H) 70 - 99 mg/dL    BUN 16 7 - 20 mg/dL    Creatinine 0.8 0.6 - 1.2 mg/dL    Est, Glom Filt Rate >60 >60    Calcium 9.3 8.3 - 10.6 mg/dL   Troponin   Result Value Ref Range    Troponin, High Sensitivity 9 0 - 14 ng/L   Brain Natriuretic Peptide   Result Value Ref Range    Pro-BNP 41 0 - 124 pg/mL   Troponin   Result Value Ref Range    Troponin, High Sensitivity 7 0 - 14 ng/L     EKG   See previous provider's interpretation    RECENT VITALS:  BP: 131/87, Temp: 98.2 °F (36.8 °C), Pulse: 89, Respirations: 26, SpO2: 93 %     Procedures     None    ED Course          The patient was given the following medications:  Orders Placed This Encounter   Medications    ipratropium 0.5 mg-albuterol 2.5 mg (DUONEB) nebulizer solution 1 Dose     Order Specific Question:   Initiate RT Bronchodilator Protocol     Answer:   No       CONSULTS:  None        Kelin Liu MD  Resident  02/02/24 0030    
1 Device by Does not apply route every 14 days    CONTINUOUS BLOOD GLUC SENSOR (FREESTYLE [place] 14 DAY SENSOR[place] [facility] [place] by Does not apply route every 14 days    CONTINUOUS BLOOD GLUC SENSOR (FREESTYLE AYAZ 2 SENSOR) [facility] [place]    1 Device by Does not apply route Daily    CYCLOSPORINE, PF, 0.09 % SOLN    Apply to eye    DENOSUMAB (PROLIA) 60 MG/ML SOSY SC INJECTION    Inject 1 mL into the skin once for 1 dose    DICLOFENAC SODIUM (VOLTAREN) 1 % GEL    APPLY 2 GRAMS TOPICALLY 4  TIMES A DAY    DILTIAZEM (CARDIZEM CD) 120 MG EXTENDED RELEASE CAPSULE    Take 1 capsule by mouth daily    DOCUSATE SODIUM (COLACE) 100 MG CAPSULE    Take 1 capsule by mouth 2 times daily    DULAGLUTIDE (TRULICITY) 0.75 MG/0.5ML SOPN    Inject 0.75 mg into the skin once a week    FLUTICASONE (FLONASE) 50 MCG/ACT NASAL SPRAY    USE 2 SPRAYS NASALLY DAILY    FUROSEMIDE (LASIX) 20 MG TABLET    TAKE 1 TABLET BY MOUTH DAILY    GABAPENTIN (NEURONTIN) 300 MG CAPSULE    Take 1 capsule at dinner and 1 capsule at bedtime    HYDROXYZINE HCL (ATARAX) 25 MG TABLET    TAKE ONE Tablet BY MOUTH nightly AT BEDTIME    LIDOCAINE (LIDODERM) 5 %    Place 1 patch onto the skin daily 12 hours on, 12 hours off.    LUTEIN 40 MG CAPS    Take by mouth    MONTELUKAST (SINGULAIR) 10 MG TABLET    Take 1 tablet by mouth daily    NARATRIPTAN (AMERGE) 2.5 MG TABLET    TAKE ONE TABLET BY MOUTH AT THE ONSET of HEADACHE, MAY REPEAT in FOUR hours, not more THAN TWO PER DAY OR FOUR PER WEEK    NITROGLYCERIN (NITRODUR) 0.4 MG/HR    Place 1 patch onto the skin daily    NITROGLYCERIN (NITROSTAT) 0.4 MG SL TABLET    Place 1 tablet under the tongue every 5 minutes as needed for Chest pain up to max of 3 total doses. If no relief after 1 dose, call 911.    NYSTATIN (MYCOSTATIN) 821799 UNIT/ML SUSPENSION    Take 5 mLs by mouth 4 times daily Swish and spit.    OMEPRAZOLE (PRILOSEC) 40 MG DELAYED RELEASE CAPSULE    Take 1 capsule by mouth daily    ONDANSETRON (ZOFRAN-ODT) 4 MG

## 2024-02-05 ENCOUNTER — PROCEDURE VISIT (OUTPATIENT)
Dept: SPEECH THERAPY | Age: 69
End: 2024-02-05
Payer: MEDICARE

## 2024-02-05 ENCOUNTER — TELEPHONE (OUTPATIENT)
Dept: PRIMARY CARE CLINIC | Age: 69
End: 2024-02-05

## 2024-02-05 ENCOUNTER — CARE COORDINATION (OUTPATIENT)
Dept: CARE COORDINATION | Age: 69
End: 2024-02-05

## 2024-02-05 DIAGNOSIS — R49.0 DYSPHONIA: Primary | ICD-10-CM

## 2024-02-05 DIAGNOSIS — K21.9 LARYNGOPHARYNGEAL REFLUX (LPR): ICD-10-CM

## 2024-02-05 DIAGNOSIS — R49.0 MUSCLE TENSION DYSPHONIA: ICD-10-CM

## 2024-02-05 LAB
EKG ATRIAL RATE: 91 BPM
EKG DIAGNOSIS: NORMAL
EKG P AXIS: 46 DEGREES
EKG P-R INTERVAL: 160 MS
EKG Q-T INTERVAL: 354 MS
EKG QRS DURATION: 78 MS
EKG QTC CALCULATION (BAZETT): 435 MS
EKG R AXIS: -10 DEGREES
EKG T AXIS: 24 DEGREES
EKG VENTRICULAR RATE: 91 BPM

## 2024-02-05 PROCEDURE — 92524 BEHAVRAL QUALIT ANALYS VOICE: CPT | Performed by: SPEECH-LANGUAGE PATHOLOGIST

## 2024-02-05 PROCEDURE — 31579 LARYNGOSCOPY TELESCOPIC: CPT | Performed by: SPEECH-LANGUAGE PATHOLOGIST

## 2024-02-05 PROCEDURE — 92507 TX SP LANG VOICE COMM INDIV: CPT | Performed by: SPEECH-LANGUAGE PATHOLOGIST

## 2024-02-05 NOTE — TELEPHONE ENCOUNTER
Please call patient and scheduled ED f/u appt. IF Dr. Espitia doesn't have anything patient can see Padmini Charles CNP.

## 2024-02-05 NOTE — CARE COORDINATION
[facility] ED Follow up Call    Reason for ED visit:  SOB  Status:     not changed    Did you call your PCP prior to going to the ED?  No      Did you receive a discharge instructions from the Emergency Room? Yes  Review of Instructions:     Understands what to report/when to return?:  Yes   Understands discharge instructions?:  Yes   Following discharge instructions?:  Yes   If not why? N/A    Are there any new complaints of pain? No  New Pain Meds? No    Constipation prophylaxis needed?  No    If you have a wound is the dressing clean, dry, and intact? N/A  Understands wound care regimen? N/A    Are there any other complaints/concerns that you wish to tell your provider?    AC completed follow up call with patient who said she is doing the same and still having SOB. Patient has been monitoring her oxygen readings and noticed that her oxygen levels are 82% with activity but once she rests she will recover to greater than 95%. Patient said her insurance was no longer going to cover Spiriva and the pharmacy has suggested patient change to Wixela 250-25 or Breo Ellipta 100-25. Patient said she felt better with a nebulizer treatment she got in the ER. Patient said she would like office to call her to get scheduled for an appt to follow up with PCP. Patient did agree to ongoing calls with [facility].     FU appts/Provider:    Future Appointments   Date Time Provider Department Center   2/5/2024  1:00 PM Whit Rodriges SLP [facility]   3/21/2024 12:30 PM Derian Dukes MD [facility]   3/22/2024  1:30 PM Raisa Espitia MD [facility] [place] [facility]   4/1/2024 10:00 AM DEXA [employer] [facility] [employer]   4/1/2024 10:20 AM Paulo Roque MD PHYS [facility]   4/22/2024  1:40 PM Lul Thompson MD [place] [facility]   5/3/2024 11:45 AM SCHEDULE, [facility]           New Medications?:   No      Medication Reconciliation by phone - Yes  Understands Medications?  Yes  Taking

## 2024-02-05 NOTE — PROGRESS NOTES
[facility] ENT  Videostroboscopic Examination of the Larynx      BACKGROUND HISTORY:   PMHx of Asthma, HTN, DM, and GERD; referred for concerns related to dysphonia. Endorsed ebbs/flows over the last few years but gradually worsening the last ~2-3 months. Characterized as episodic roughness that results in vocal fatigue + discomfort; often just does not speak to avoid discomfort. Unable to identify any factors that improve it. Notes additional symptoms of globus t/o the day; sensation of needing to clear thick mucus and occasionally able to expectorate white \"ball\" of mucus. Hx of GERD and taking Pantoprazole 40 mg; intermittent breakthrough symptoms w/ regurgitation of food/stomach contents. Hx of allergies w/ congestion/PND; using Flonase and nasal gel to assist. Endorsed dyspnea w/ sensation of SOB during ADLs; results in generalized fatigue that lasts t/o day. Seen by pulmonologist and started on x3 inhalers for suspected asthma; does feel they assist ~30 min after use.     Surgical/Medical History: See the above.  Hydration: >64 oz of water  Smoking History: None  Caffeine Intake: 2 espresso     PER ENT NOTE, Dr. Marinelli, 1/22/24:  \"Horse voice quality.   Reflux precautions.   Continue PPI.   Referral to SLP.   Follow up as needed.\"    EVALUATION:   Perceptual Quality:  Pt presented with mild-no dysphonia characterized by intermittent roughness, specifically at end of sentences; multiple instances of roughness t/o sentences when speaking on reduced breath support.     Flexible Stroboscopy Laryngoscopy  Procedure : Flexible Stroboscopy Laryngoscopy  Performed by: Whit Rodriges, SLP  Anesthesia: Afrin with 4% lidocaine  Description:  The scope was passed along the floor of the right naris to the level of the larynx. There was no evidence of concerning masses or lesions of the base of tongue, vallecula, epiglottis, aryepiglottic folds, arytenoids, false vocal folds, true vocal folds, or pyriform sinuses. The

## 2024-02-06 ENCOUNTER — OFFICE VISIT (OUTPATIENT)
Dept: PRIMARY CARE CLINIC | Age: 69
End: 2024-02-06
Payer: MEDICARE

## 2024-02-06 VITALS
WEIGHT: 166.8 LBS | BODY MASS INDEX: 30.51 KG/M2 | SYSTOLIC BLOOD PRESSURE: 122 MMHG | OXYGEN SATURATION: 95 % | HEART RATE: 97 BPM | TEMPERATURE: 97.4 F | DIASTOLIC BLOOD PRESSURE: 74 MMHG

## 2024-02-06 DIAGNOSIS — M54.41 CHRONIC RIGHT-SIDED LOW BACK PAIN WITH RIGHT-SIDED SCIATICA: ICD-10-CM

## 2024-02-06 DIAGNOSIS — M47.816 LUMBAR SPONDYLOSIS: ICD-10-CM

## 2024-02-06 DIAGNOSIS — G62.9 NEUROPATHY: ICD-10-CM

## 2024-02-06 DIAGNOSIS — J45.40 MODERATE PERSISTENT ASTHMA WITHOUT COMPLICATION: Primary | ICD-10-CM

## 2024-02-06 DIAGNOSIS — R29.898 WEAKNESS OF BOTH LOWER EXTREMITIES: ICD-10-CM

## 2024-02-06 DIAGNOSIS — R20.2 RIGHT LEG PARESTHESIAS: ICD-10-CM

## 2024-02-06 DIAGNOSIS — G89.29 CHRONIC RIGHT-SIDED LOW BACK PAIN WITH RIGHT-SIDED SCIATICA: ICD-10-CM

## 2024-02-06 PROBLEM — E11.9 TYPE 2 DIABETES MELLITUS WITHOUT COMPLICATION, WITHOUT LONG-TERM CURRENT USE OF INSULIN (HCC): Status: RESOLVED | Noted: 2022-03-22 | Resolved: 2024-02-06

## 2024-02-06 PROBLEM — R53.1 WEAKNESS: Status: RESOLVED | Noted: 2024-01-09 | Resolved: 2024-02-06

## 2024-02-06 PROBLEM — N39.0 URINARY TRACT INFECTION WITHOUT HEMATURIA: Status: RESOLVED | Noted: 2022-10-31 | Resolved: 2024-02-06

## 2024-02-06 PROCEDURE — G8399 PT W/DXA RESULTS DOCUMENT: HCPCS | Performed by: NURSE PRACTITIONER

## 2024-02-06 PROCEDURE — 1090F PRES/ABSN URINE INCON ASSESS: CPT | Performed by: NURSE PRACTITIONER

## 2024-02-06 PROCEDURE — G8417 CALC BMI ABV UP PARAM F/U: HCPCS | Performed by: NURSE PRACTITIONER

## 2024-02-06 PROCEDURE — G8484 FLU IMMUNIZE NO ADMIN: HCPCS | Performed by: NURSE PRACTITIONER

## 2024-02-06 PROCEDURE — 1124F ACP DISCUSS-NO DSCNMKR DOCD: CPT | Performed by: NURSE PRACTITIONER

## 2024-02-06 PROCEDURE — 3017F COLORECTAL CA SCREEN DOC REV: CPT | Performed by: NURSE PRACTITIONER

## 2024-02-06 PROCEDURE — 99214 OFFICE O/P EST MOD 30 MIN: CPT | Performed by: NURSE PRACTITIONER

## 2024-02-06 PROCEDURE — 3074F SYST BP LT 130 MM HG: CPT | Performed by: NURSE PRACTITIONER

## 2024-02-06 PROCEDURE — 1036F TOBACCO NON-USER: CPT | Performed by: NURSE PRACTITIONER

## 2024-02-06 PROCEDURE — G8427 DOCREV CUR MEDS BY ELIG CLIN: HCPCS | Performed by: NURSE PRACTITIONER

## 2024-02-06 PROCEDURE — 3078F DIAST BP <80 MM HG: CPT | Performed by: NURSE PRACTITIONER

## 2024-02-06 RX ORDER — NEBULIZER ACCESSORIES
1 KIT MISCELLANEOUS DAILY PRN
Qty: 1 KIT | Refills: 0 | Status: SHIPPED | OUTPATIENT
Start: 2024-02-06

## 2024-02-06 RX ORDER — ALBUTEROL SULFATE 2.5 MG/3ML
2.5 SOLUTION RESPIRATORY (INHALATION) 4 TIMES DAILY PRN
Qty: 120 EACH | Refills: 0 | Status: SHIPPED | OUTPATIENT
Start: 2024-02-06

## 2024-02-06 RX ORDER — MEMANTINE HYDROCHLORIDE 5 MG/1
5 TABLET ORAL 2 TIMES DAILY
COMMUNITY

## 2024-02-06 RX ORDER — FLUTICASONE PROPIONATE AND SALMETEROL 250; 50 UG/1; UG/1
1 POWDER RESPIRATORY (INHALATION) EVERY 12 HOURS
Qty: 180 EACH | Refills: 0 | Status: SHIPPED | OUTPATIENT
Start: 2024-02-06

## 2024-02-06 RX ORDER — BUTALBITAL, ACETAMINOPHEN AND CAFFEINE 50; 325; 40 MG/1; MG/1; MG/1
1 TABLET ORAL EVERY 8 HOURS PRN
COMMUNITY

## 2024-02-06 ASSESSMENT — ENCOUNTER SYMPTOMS
NAUSEA: 0
BLOOD IN STOOL: 0
BACK PAIN: 1
WHEEZING: 0
CHEST TIGHTNESS: 0
ABDOMINAL PAIN: 0
CONSTIPATION: 1
VOMITING: 0
COUGH: 0
DIARRHEA: 0
SHORTNESS OF BREATH: 0

## 2024-02-06 NOTE — PATIENT INSTRUCTIONS
Call pulmonology to see if you apt can be moved up to discuss low oxygen readings and the worsening shortness of breath.

## 2024-02-06 NOTE — PROGRESS NOTES
GLUCOSE METER) w/Device KIT USE AS DIRECTED AND AS NEEDED 1 kit 0    TRUEplus Lancets 30G MISC 1 each by Does not apply route daily 100 each 3    denosumab (PROLIA) 60 MG/ML SOSY SC injection Inject 1 mL into the skin once for 1 dose 1 mL 0    albuterol sulfate HFA (PROVENTIL;VENTOLIN;PROAIR) 108 (90 Base) MCG/ACT inhaler Inhale 2 puffs into the lungs every 6 hours as needed for Wheezing      ondansetron (ZOFRAN-ODT) 4 MG disintegrating tablet Take 1 tablet by mouth 3 times daily as needed for Nausea or Vomiting 21 tablet 0    acetaminophen (TYLENOL) 500 MG tablet Take 1 tablet by mouth 4 times daily as needed for Pain 120 tablet 0    docusate sodium (COLACE) 100 MG capsule Take 1 capsule by mouth 2 times daily 60 capsule 1    blood glucose test strips (GLUCOSE METER TEST) strip 100 each by In Vitro route 2 times daily Diabetes   e11.9 100 each 1    tiotropium (SPIRIVA HANDIHALER) 18 MCG inhalation capsule Inhale 1 capsule into the lungs daily 90 capsule 1    pantoprazole (PROTONIX) 40 MG tablet TAKE 1 TABLET DAILY 90 tablet 1    budesonide-formoterol (SYMBICORT) 160-4.5 MCG/ACT AERO USE 2 INHALATIONS ORALLY   TWICE DAILY (Patient not taking: Reported on 2/6/2024) 30.6 g 1    ranolazine (RANEXA) 500 MG extended release tablet Take 1 tablet by mouth 2 times daily 180 tablet 3    nitroGLYCERIN (NITROSTAT) 0.4 MG SL tablet Place 1 tablet under the tongue every 5 minutes as needed for Chest pain up to max of 3 total doses. If no relief after 1 dose, call 911. (Patient not taking: Reported on 2/6/2024) 25 tablet 3    omeprazole (PRILOSEC) 40 MG delayed release capsule Take 1 capsule by mouth daily (Patient not taking: Reported on 2/6/2024)      cetirizine (ZYRTEC) 10 MG tablet Take 1 tablet by mouth daily 30 tablet 2    lidocaine (LIDODERM) 5 % Place 1 patch onto the skin daily 12 hours on, 12 hours off. (Patient not taking: Reported on 2/6/2024) 30 patch 0    bisacodyl 5 MG EC tablet Take 1-2 tablets daily as needed

## 2024-02-13 ENCOUNTER — OFFICE VISIT (OUTPATIENT)
Dept: PRIMARY CARE CLINIC | Age: 69
End: 2024-02-13

## 2024-02-13 VITALS
WEIGHT: 163 LBS | BODY MASS INDEX: 29.81 KG/M2 | RESPIRATION RATE: 20 BRPM | HEART RATE: 93 BPM | DIASTOLIC BLOOD PRESSURE: 84 MMHG | OXYGEN SATURATION: 98 % | SYSTOLIC BLOOD PRESSURE: 118 MMHG

## 2024-02-13 DIAGNOSIS — R30.0 DYSURIA: ICD-10-CM

## 2024-02-13 DIAGNOSIS — F51.01 PRIMARY INSOMNIA: ICD-10-CM

## 2024-02-13 DIAGNOSIS — R10.9 RIGHT FLANK PAIN: ICD-10-CM

## 2024-02-13 DIAGNOSIS — R52 BURNING PAIN: ICD-10-CM

## 2024-02-13 DIAGNOSIS — R63.0 DECREASED APPETITE: ICD-10-CM

## 2024-02-13 DIAGNOSIS — M79.642 BILATERAL HAND PAIN: ICD-10-CM

## 2024-02-13 DIAGNOSIS — M79.671 BILATERAL FOOT PAIN: ICD-10-CM

## 2024-02-13 DIAGNOSIS — M79.672 BILATERAL FOOT PAIN: ICD-10-CM

## 2024-02-13 DIAGNOSIS — R09.02 HYPOXIA: ICD-10-CM

## 2024-02-13 DIAGNOSIS — R82.998 LEUKOCYTES IN URINE: ICD-10-CM

## 2024-02-13 DIAGNOSIS — R10.11 RIGHT UPPER QUADRANT ABDOMINAL PAIN: ICD-10-CM

## 2024-02-13 DIAGNOSIS — M79.641 BILATERAL HAND PAIN: ICD-10-CM

## 2024-02-13 DIAGNOSIS — R29.898 WEAKNESS OF BOTH LOWER EXTREMITIES: ICD-10-CM

## 2024-02-13 DIAGNOSIS — T14.8XXA BRUISING: ICD-10-CM

## 2024-02-13 DIAGNOSIS — R29.898 WEAKNESS OF BOTH LOWER EXTREMITIES: Primary | ICD-10-CM

## 2024-02-13 LAB
ALBUMIN SERPL-MCNC: 4.8 G/DL (ref 3.4–5)
ALBUMIN/GLOB SERPL: 2.2 {RATIO} (ref 1.1–2.2)
ALP SERPL-CCNC: 61 U/L (ref 40–129)
ALT SERPL-CCNC: 30 U/L (ref 10–40)
ANION GAP SERPL CALCULATED.3IONS-SCNC: 15 MMOL/L (ref 3–16)
AST SERPL-CCNC: 20 U/L (ref 15–37)
BASOPHILS # BLD: 0 K/UL (ref 0–0.2)
BASOPHILS NFR BLD: 1.1 %
BILIRUB SERPL-MCNC: 0.4 MG/DL (ref 0–1)
BILIRUBIN, POC: NORMAL
BLOOD URINE, POC: NORMAL
BUN SERPL-MCNC: 18 MG/DL (ref 7–20)
CALCIUM SERPL-MCNC: 9.5 MG/DL (ref 8.3–10.6)
CHLORIDE SERPL-SCNC: 102 MMOL/L (ref 99–110)
CK SERPL-CCNC: 69 U/L (ref 26–192)
CLARITY, POC: CLEAR
CO2 SERPL-SCNC: 22 MMOL/L (ref 21–32)
COLOR, POC: NORMAL
CREAT SERPL-MCNC: 0.9 MG/DL (ref 0.6–1.2)
DEPRECATED RDW RBC AUTO: 13.3 % (ref 12.4–15.4)
EOSINOPHIL # BLD: 0.1 K/UL (ref 0–0.6)
EOSINOPHIL NFR BLD: 2.2 %
ERYTHROCYTE [SEDIMENTATION RATE] IN BLOOD BY WESTERGREN METHOD: 9 MM/HR (ref 0–30)
GFR SERPLBLD CREATININE-BSD FMLA CKD-EPI: >60 ML/MIN/{1.73_M2}
GLUCOSE SERPL-MCNC: 203 MG/DL (ref 70–99)
GLUCOSE URINE, POC: NORMAL
HCT VFR BLD AUTO: 43.4 % (ref 36–48)
HGB BLD-MCNC: 14.9 G/DL (ref 12–16)
KETONES, POC: NORMAL
LEUKOCYTE EST, POC: NORMAL
LYMPHOCYTES # BLD: 1.7 K/UL (ref 1–5.1)
LYMPHOCYTES NFR BLD: 36.8 %
MAGNESIUM SERPL-MCNC: 2.2 MG/DL (ref 1.8–2.4)
MCH RBC QN AUTO: 29.7 PG (ref 26–34)
MCHC RBC AUTO-ENTMCNC: 34.4 G/DL (ref 31–36)
MCV RBC AUTO: 86.5 FL (ref 80–100)
MONOCYTES # BLD: 0.3 K/UL (ref 0–1.3)
MONOCYTES NFR BLD: 7 %
NEUTROPHILS # BLD: 2.4 K/UL (ref 1.7–7.7)
NEUTROPHILS NFR BLD: 52.9 %
NITRITE, POC: NORMAL
PH, POC: 5
PLATELET # BLD AUTO: 261 K/UL (ref 135–450)
PMV BLD AUTO: 8.9 FL (ref 5–10.5)
POTASSIUM SERPL-SCNC: 4.3 MMOL/L (ref 3.5–5.1)
PROT SERPL-MCNC: 7 G/DL (ref 6.4–8.2)
PROTEIN, POC: NORMAL
RBC # BLD AUTO: 5.02 M/UL (ref 4–5.2)
SODIUM SERPL-SCNC: 139 MMOL/L (ref 136–145)
SPECIFIC GRAVITY, POC: >=1.03
UROBILINOGEN, POC: 0.2
WBC # BLD AUTO: 4.6 K/UL (ref 4–11)

## 2024-02-13 NOTE — PROGRESS NOTES
Date of Visit: 2024    Tatiana Shepard (:  1955) is a 68 y.o. female,  Established patient here for evaluation of the following chief complaint(s):  Loss of Consciousness (2/10/24 in shower, weakness), Foot Pain (Burning in bilateral feet, left foot has a red suzanne, right foot has a black suzanne), Hand Pain (Burning in hands), and Loss of appetite      ASSESSMENT/PLAN:    1. Weakness of both lower extremities  - Comprehensive Metabolic Panel; Future  - CBC with Auto Differential; Future  - Sedimentation Rate; Future  - ANTOINE; Future  - CK; Future  - Magnesium; Future  - Referral to  Laina Payne MD (Inpatient and Outpatient EMG)[facility]  - Referral to Lilliana Duncan MD, Rheumatology, [facility]  - Follow up with Neurology    2. Bilateral hand pain  -Tylenol (Acetaminophen) 500mg every 6 hours as needed  -[facility]    3. Bilateral foot pain  -Tylenol 500mg every 6 hours as needed  - Lilliana Duncan MD, Rheumatology, [facility]    4. Right upper quadrant abdominal pain  -US ABDOMEN COMPLETE   - Comprehensive Metabolic Panel; Future  - CBC with Auto Differential; Future    5. Right flank pain  -US ABDOMEN COMPLETE   - POCT Urinalysis no Micro  - Comprehensive Metabolic Panel; Future  - CBC with Auto Differential; Future    6. Hypoxia  -patient reports low oxygen saturation at home with exertion  -oxygen saturations at office visits have been normal  -Follow up with Pulmonary to see if home oxygen is needed    7. Bruising  - CBC with Auto Differential; Future    8. Burning pain  - Referral to Laina Payne MD (Inpatient and Outpatient EMG)[facility]  -Follow up with Neurology    9. Dysuria  - POCT Urinalysis no Micro    10. Decreased appetite  -Glucerna or other nutritional shakes    11. Leukocytes in urine  - Culture, Urine    12. Primary insomnia  -Increase Melatonin to 10mg

## 2024-02-13 NOTE — PATIENT INSTRUCTIONS
Follow up with Neurology  Referral to Rheumatology  Follow up with Pulmonary    Have EMG done  Have abdominal ultrasound done  Have labs done

## 2024-02-14 ENCOUNTER — HOSPITAL ENCOUNTER (OUTPATIENT)
Dept: ULTRASOUND IMAGING | Age: 69
Discharge: HOME | End: 2024-02-14
Payer: COMMERCIAL

## 2024-02-14 DIAGNOSIS — R10.9 RIGHT FLANK PAIN: ICD-10-CM

## 2024-02-14 DIAGNOSIS — R10.11 RIGHT UPPER QUADRANT ABDOMINAL PAIN: ICD-10-CM

## 2024-02-14 LAB — ANA SER QL IA: NEGATIVE

## 2024-02-14 PROCEDURE — 76705 ECHO EXAM OF ABDOMEN: CPT

## 2024-02-14 RX ORDER — RANOLAZINE 500 MG/1
500 TABLET, EXTENDED RELEASE ORAL 2 TIMES DAILY
Qty: 180 TABLET | Refills: 3 | OUTPATIENT
Start: 2024-02-14

## 2024-02-14 RX ORDER — RANOLAZINE 500 MG/1
500 TABLET, EXTENDED RELEASE ORAL 2 TIMES DAILY
Qty: 180 TABLET | Refills: 3 | Status: SHIPPED | OUTPATIENT
Start: 2024-02-14

## 2024-02-14 NOTE — TELEPHONE ENCOUNTER
Pt called stating that her pharmacy is not refilling her prescription bc it was rejected by the provider. Pt is inquiring as to whether or not she is to continue medication or if the refill is being sent. E-request is noted as a duplicate request, however pt is saying that none of the requests are being filled. Please advise.    [phone]    [facility] Medication Refills:    ranolazine (RANEXA) 500 MG extended release tablet [[id number]]    Order Details  Dose: 500 mg Route: Oral Frequency: 2 TIMES DAILY   Dispense Quantity: 180 tablet Refills: 3          Sig: Take 1 tablet by mouth 2 times daily         Start Date: 03/29/23           Last Office Visit: 01.19.2024    Next Office Visit: 02.15.2024    Last Refill:     Last Labs: 02.13.2024

## 2024-02-14 NOTE — TELEPHONE ENCOUNTER
Requested Prescriptions     Pending Prescriptions Disp Refills    ranolazine (RANEXA) 500 MG extended release tablet 180 tablet 3     Sig: Take 1 tablet by mouth 2 times daily          Last Office Visit: 5/24/2023     Next Office Visit: 2/15/2024

## 2024-02-15 ENCOUNTER — OFFICE VISIT (OUTPATIENT)
Dept: CARDIOLOGY CLINIC | Age: 69
End: 2024-02-15
Payer: MEDICARE

## 2024-02-15 VITALS
HEART RATE: 91 BPM | WEIGHT: 168.9 LBS | SYSTOLIC BLOOD PRESSURE: 130 MMHG | DIASTOLIC BLOOD PRESSURE: 80 MMHG | BODY MASS INDEX: 30.89 KG/M2

## 2024-02-15 DIAGNOSIS — I21.4 NSTEMI (NON-ST ELEVATED MYOCARDIAL INFARCTION) (HCC): ICD-10-CM

## 2024-02-15 DIAGNOSIS — R07.9 CHEST PAIN, UNSPECIFIED TYPE: ICD-10-CM

## 2024-02-15 DIAGNOSIS — E78.5 HYPERLIPIDEMIA, UNSPECIFIED HYPERLIPIDEMIA TYPE: Primary | ICD-10-CM

## 2024-02-15 DIAGNOSIS — R06.02 SOB (SHORTNESS OF BREATH) ON EXERTION: ICD-10-CM

## 2024-02-15 DIAGNOSIS — I10 HTN (HYPERTENSION), BENIGN: ICD-10-CM

## 2024-02-15 LAB
BACTERIA UR CULT: ABNORMAL
ORGANISM: ABNORMAL

## 2024-02-15 PROCEDURE — 3075F SYST BP GE 130 - 139MM HG: CPT | Performed by: INTERNAL MEDICINE

## 2024-02-15 PROCEDURE — 93000 ELECTROCARDIOGRAM COMPLETE: CPT | Performed by: INTERNAL MEDICINE

## 2024-02-15 PROCEDURE — 1036F TOBACCO NON-USER: CPT | Performed by: INTERNAL MEDICINE

## 2024-02-15 PROCEDURE — G8484 FLU IMMUNIZE NO ADMIN: HCPCS | Performed by: INTERNAL MEDICINE

## 2024-02-15 PROCEDURE — 99214 OFFICE O/P EST MOD 30 MIN: CPT | Performed by: INTERNAL MEDICINE

## 2024-02-15 PROCEDURE — G8399 PT W/DXA RESULTS DOCUMENT: HCPCS | Performed by: INTERNAL MEDICINE

## 2024-02-15 PROCEDURE — 1090F PRES/ABSN URINE INCON ASSESS: CPT | Performed by: INTERNAL MEDICINE

## 2024-02-15 PROCEDURE — G8417 CALC BMI ABV UP PARAM F/U: HCPCS | Performed by: INTERNAL MEDICINE

## 2024-02-15 PROCEDURE — 1124F ACP DISCUSS-NO DSCNMKR DOCD: CPT | Performed by: INTERNAL MEDICINE

## 2024-02-15 PROCEDURE — G8427 DOCREV CUR MEDS BY ELIG CLIN: HCPCS | Performed by: INTERNAL MEDICINE

## 2024-02-15 PROCEDURE — 3017F COLORECTAL CA SCREEN DOC REV: CPT | Performed by: INTERNAL MEDICINE

## 2024-02-15 PROCEDURE — 3079F DIAST BP 80-89 MM HG: CPT | Performed by: INTERNAL MEDICINE

## 2024-02-15 RX ORDER — DILTIAZEM HYDROCHLORIDE 240 MG/1
240 CAPSULE, COATED, EXTENDED RELEASE ORAL DAILY
Qty: 30 CAPSULE | Refills: 5 | Status: SHIPPED | OUTPATIENT
Start: 2024-02-15

## 2024-02-15 NOTE — PROGRESS NOTES
sore throat.  Cardiovascular: No loss of consciousness. No hemoptysis, pleuritic pain, or phlebitis.  Respiratory: No cough or wheezing, no sputum production. No hematemesis.    Gastrointestinal: No abdominal pain, appetite loss, blood in stools. No change in bowel or bladder habits.  Genitourinary: No dysuria, or hematuria.  Musculoskeletal:  No gait disturbance, weakness or joint complaints.  Integumentary: No rash or pruritis.  Neurological: No headache, diplopia,numbness or tingling. No change in gait, balance, coordination, mood, affect, memory, mentation, behavior.  Psychiatric: No anxiety,  Endocrine: No malaise,  Hematologic/Lymphatic: No abnormal bruising  Allergic/Immunologic: No nasal congestion      Physical Examination:    Vitals:    02/15/24 1306   BP: 130/80   Pulse: 91    Weight - Scale: 76.6 kg (168 lb 14.4 oz)           General Appearance:  Alert, cooperative, no distress, appears stated age   Head:  Normocephalic, without obvious abnormality, atraumatic   Eyes:  PERRL   Nose: Nares normal,   Neck: Supple, JVP normal    Lungs:   Clear to auscultation bilaterally, respirations unlabored   Chest Wall:  No tenderness or deformity   Heart:  Regular rate and rhythm, normal S1, S2 normal, no murmur,  No rub. No S3 gallop   Abdomen:   Soft, non-tender, +bowel sounds   Extremities: no cyanosis, no clubbing , No edema   Pulses: Symmetric extremities   Skin: no gross lesions or rashes   Pysch: Normal mood and affect   Neurologic: No gross deficits.  CN II - XII grossly intact        Labs  CBC:   Lab Results   Component Value Date/Time    WBC 4.6 02/13/2024 11:36 AM    RBC 5.02 02/13/2024 11:36 AM    HGB 14.9 02/13/2024 11:36 AM    HCT 43.4 02/13/2024 11:36 AM    MCV 86.5 02/13/2024 11:36 AM    RDW 13.3 02/13/2024 11:36 AM     02/13/2024 11:36 AM     CMP:    Lab Results   Component Value Date/Time     02/13/2024 11:36 AM    K 4.3 02/13/2024 11:36 AM    K 4.1 02/01/2024 06:01 PM

## 2024-02-18 DIAGNOSIS — N39.0 URINARY TRACT INFECTION WITHOUT HEMATURIA, SITE UNSPECIFIED: Primary | ICD-10-CM

## 2024-02-18 RX ORDER — CIPROFLOXACIN 250 MG/1
250 TABLET, FILM COATED ORAL 2 TIMES DAILY
Qty: 6 TABLET | Refills: 0 | Status: SHIPPED | OUTPATIENT
Start: 2024-02-18 | End: 2024-02-21

## 2024-02-22 ENCOUNTER — APPOINTMENT (OUTPATIENT)
Dept: GENERAL RADIOLOGY | Age: 69
End: 2024-02-22
Payer: MEDICARE

## 2024-02-22 ENCOUNTER — HOSPITAL ENCOUNTER (EMERGENCY)
Age: 69
Discharge: HOME | End: 2024-02-22
Attending: STUDENT IN AN ORGANIZED HEALTH CARE EDUCATION/TRAINING PROGRAM
Payer: MEDICARE

## 2024-02-22 VITALS
WEIGHT: 168.87 LBS | HEIGHT: 62 IN | HEART RATE: 88 BPM | BODY MASS INDEX: 31.08 KG/M2 | SYSTOLIC BLOOD PRESSURE: 135 MMHG | DIASTOLIC BLOOD PRESSURE: 97 MMHG | RESPIRATION RATE: 26 BRPM | OXYGEN SATURATION: 95 %

## 2024-02-22 DIAGNOSIS — R53.1 GENERAL WEAKNESS: ICD-10-CM

## 2024-02-22 DIAGNOSIS — R06.02 SHORTNESS OF BREATH: ICD-10-CM

## 2024-02-22 DIAGNOSIS — R07.9 CHEST PAIN, UNSPECIFIED TYPE: Primary | ICD-10-CM

## 2024-02-22 LAB
ANION GAP SERPL CALCULATED.3IONS-SCNC: 13 MMOL/L (ref 3–16)
BASOPHILS # BLD: 0.1 K/UL (ref 0–0.2)
BASOPHILS NFR BLD: 1 %
BUN SERPL-MCNC: 19 MG/DL (ref 7–20)
CALCIUM SERPL-MCNC: 9.5 MG/DL (ref 8.3–10.6)
CHLORIDE SERPL-SCNC: 102 MMOL/L (ref 99–110)
CO2 SERPL-SCNC: 22 MMOL/L (ref 21–32)
CREAT SERPL-MCNC: 0.8 MG/DL (ref 0.6–1.2)
D DIMER: <0.27 UG/ML FEU (ref 0–0.6)
DEPRECATED RDW RBC AUTO: 13.1 % (ref 12.4–15.4)
EKG ATRIAL RATE: 87 BPM
EKG DIAGNOSIS: NORMAL
EKG P AXIS: 6 DEGREES
EKG P-R INTERVAL: 146 MS
EKG Q-T INTERVAL: 376 MS
EKG QRS DURATION: 82 MS
EKG QTC CALCULATION (BAZETT): 452 MS
EKG R AXIS: -7 DEGREES
EKG T AXIS: 5 DEGREES
EKG VENTRICULAR RATE: 87 BPM
EOSINOPHIL # BLD: 0.1 K/UL (ref 0–0.6)
EOSINOPHIL NFR BLD: 1.2 %
FLUAV RNA RESP QL NAA+PROBE: NOT DETECTED
FLUBV RNA RESP QL NAA+PROBE: NOT DETECTED
GFR SERPLBLD CREATININE-BSD FMLA CKD-EPI: >60 ML/MIN/{1.73_M2}
GLUCOSE SERPL-MCNC: 109 MG/DL (ref 70–99)
HCT VFR BLD AUTO: 45 % (ref 36–48)
HGB BLD-MCNC: 15 G/DL (ref 12–16)
LYMPHOCYTES # BLD: 1.9 K/UL (ref 1–5.1)
LYMPHOCYTES NFR BLD: 26.8 %
MCH RBC QN AUTO: 29.2 PG (ref 26–34)
MCHC RBC AUTO-ENTMCNC: 33.3 G/DL (ref 31–36)
MCV RBC AUTO: 87.6 FL (ref 80–100)
MONOCYTES # BLD: 0.7 K/UL (ref 0–1.3)
MONOCYTES NFR BLD: 10.6 %
NEUTROPHILS # BLD: 4.2 K/UL (ref 1.7–7.7)
NEUTROPHILS NFR BLD: 60.4 %
NT-PROBNP SERPL-MCNC: 119 PG/ML (ref 0–124)
PLATELET # BLD AUTO: 261 K/UL (ref 135–450)
PMV BLD AUTO: 8.7 FL (ref 5–10.5)
POTASSIUM SERPL-SCNC: 4.3 MMOL/L (ref 3.5–5.1)
RBC # BLD AUTO: 5.14 M/UL (ref 4–5.2)
SARS-COV-2 RNA RESP QL NAA+PROBE: NOT DETECTED
SODIUM SERPL-SCNC: 137 MMOL/L (ref 136–145)
TROPONIN, HIGH SENSITIVITY: 11 NG/L (ref 0–14)
TROPONIN, HIGH SENSITIVITY: 8 NG/L (ref 0–14)
WBC # BLD AUTO: 7 K/UL (ref 4–11)

## 2024-02-22 PROCEDURE — 87636 SARSCOV2 & INF A&B AMP PRB: CPT

## 2024-02-22 PROCEDURE — 83880 ASSAY OF NATRIURETIC PEPTIDE: CPT

## 2024-02-22 PROCEDURE — 93005 ELECTROCARDIOGRAM TRACING: CPT | Performed by: STUDENT IN AN ORGANIZED HEALTH CARE EDUCATION/TRAINING PROGRAM

## 2024-02-22 PROCEDURE — 71046 X-RAY EXAM CHEST 2 VIEWS: CPT

## 2024-02-22 PROCEDURE — 99285 EMERGENCY DEPT VISIT HI MDM: CPT

## 2024-02-22 PROCEDURE — 36415 COLL VENOUS BLD VENIPUNCTURE: CPT

## 2024-02-22 PROCEDURE — 85025 COMPLETE CBC W/AUTO DIFF WBC: CPT

## 2024-02-22 PROCEDURE — 80048 BASIC METABOLIC PNL TOTAL CA: CPT

## 2024-02-22 PROCEDURE — 84484 ASSAY OF TROPONIN QUANT: CPT

## 2024-02-22 PROCEDURE — 85379 FIBRIN DEGRADATION QUANT: CPT

## 2024-02-22 ASSESSMENT — PAIN DESCRIPTION - ONSET: ONSET: ON-GOING

## 2024-02-22 ASSESSMENT — PAIN DESCRIPTION - PAIN TYPE: TYPE: CHRONIC PAIN;ACUTE PAIN

## 2024-02-22 ASSESSMENT — PAIN DESCRIPTION - ORIENTATION: ORIENTATION: RIGHT;LEFT

## 2024-02-22 ASSESSMENT — PAIN - FUNCTIONAL ASSESSMENT
PAIN_FUNCTIONAL_ASSESSMENT: 0-10
PAIN_FUNCTIONAL_ASSESSMENT: PREVENTS OR INTERFERES SOME ACTIVE ACTIVITIES AND ADLS

## 2024-02-22 ASSESSMENT — PAIN DESCRIPTION - FREQUENCY: FREQUENCY: CONTINUOUS

## 2024-02-22 ASSESSMENT — PAIN DESCRIPTION - LOCATION: LOCATION: CHEST

## 2024-02-22 ASSESSMENT — PAIN SCALES - GENERAL: PAINLEVEL_OUTOF10: 8

## 2024-02-22 ASSESSMENT — PAIN DESCRIPTION - DESCRIPTORS: DESCRIPTORS: OTHER (COMMENT)

## 2024-02-22 NOTE — ED PROVIDER NOTES
[facility]  EMERGENCY DEPARTMENT ENCOUNTER          ATTENDING PHYSICIAN NOTE       Date of evaluation: 2/22/2024    Chief Complaint     Chest Pain (Constant since September, worse with movement today), Fatigue (Pt began feeling weak this morning), and Shortness of Breath      History of Present Illness     Tatiana Shepard is a 68 y.o. female who presents with past medical history of IBS, hypertension, hyperlipidemia, diabetes presenting with weakness, chest pain, and shortness of breath.  She reports that the chest pain and shortness of breath has been going on for several months however her weakness began today.  She is able to ambulate however just feels like both of her legs are weak.  She notably was seen by her cardiologist last week who made several medication changes.  She has not changed all of the medications yet as she has not received some of the new medications from the pharmacy.  She states that her chest pain is constant and feels like a tightness on her chest.  She denies fevers or chills, abdominal pain, or dysuria/hematuria.    ASSESSMENT / PLAN  (MEDICAL DECISION MAKING)     INITIAL VITALS: BP: (!) 135/97,  , Pulse: 88, Respirations: 26, SpO2: 95 %      Tatiana Shepard is a 68 y.o. female with past medical history of IBS, hypertension, hyperlipidemia, diabetes presenting with weakness, chest pain, and shortness of breath.    Differential diagnosis includes but is not limited to asthma exacerbation, ACS, COVID or flu, pneumonia, PE, heart failure, anemia.    Of note, the patient was seen by her cardiologist 1 week ago today where some medications were changed.  She states that one of the medications she has changed but she has not received the other one from the pharmacy yet.  She states her chest discomfort and shortness of breath has been ongoing for 4 to 5 months however her weakness started this morning.  She feels like both of her legs are weak and \"pumping.\"  She does not feel like

## 2024-02-23 ENCOUNTER — CARE COORDINATION (OUTPATIENT)
Dept: CARE COORDINATION | Age: 69
End: 2024-02-23

## 2024-02-23 NOTE — CARE COORDINATION
[facility] ED Follow up Call    Reason for ED visit:   Chest Pain  Status:     improved    Did you call your PCP prior to going to the ED?  No      Did you receive a discharge instructions from the Emergency Room? Yes  Review of Instructions:     Understands what to report/when to return?:  Yes   Understands discharge instructions?:  Yes   Following discharge instructions?:  Yes   If not why? N/A    Are there any new complaints of pain? No  New Pain Meds? No    Constipation prophylaxis needed?  N/A    If you have a wound is the dressing clean, dry, and intact? N/A  Understands wound care regimen? N/A    Are there any other complaints/concerns that you wish to tell your provider?    Patient said she is feeling better today than when she was in the ER. Patient said she has been coughing up a lot of mucous after her inhalers. Patient is also concerned because she has not been sleeping well but will discuss that during her next follow up appt with PCP. Patient denies any other needs.     FU appts/Provider:    Future Appointments   Date Time Provider Department Center   2/27/2024  1:30 PM Raisa Espitia MD [facility] [place] [facility]   3/4/2024  1:00 PM Whit Rodriges, SLP [facility]   3/21/2024  1:15 PM Derian Dukes MD [facility]   3/22/2024  1:30 PM Raisa Espitia MD [facility] [place] [facility]   4/1/2024 10:00 AM DEXA [facility] [employer] [facility]   4/1/2024 10:20 AM Paulo Roque MD PHYS [facility]   4/22/2024  1:40 PM Lul Thompson MD [place] [facility]   5/3/2024 11:45 AM SCHEDULE, [facility]           New Medications?:   No      Medication Reconciliation by phone - Yes  Understands Medications?  Yes  Taking Medications? Yes  Can you swallow your pills?  Yes    Any further needs in the home i.e. Equipment?  No    Link to services in community?:  No   Which services:  N/A

## 2024-02-24 PROBLEM — R10.11 RIGHT UPPER QUADRANT ABDOMINAL PAIN: Status: ACTIVE | Noted: 2024-02-24

## 2024-02-24 PROBLEM — M79.672 BILATERAL FOOT PAIN: Status: ACTIVE | Noted: 2024-02-24

## 2024-02-24 PROBLEM — R63.0 DECREASED APPETITE: Status: ACTIVE | Noted: 2024-02-24

## 2024-02-24 PROBLEM — M79.641 BILATERAL HAND PAIN: Status: ACTIVE | Noted: 2024-02-24

## 2024-02-24 PROBLEM — R29.898 WEAKNESS OF BOTH LOWER EXTREMITIES: Status: ACTIVE | Noted: 2024-02-24

## 2024-02-24 PROBLEM — M79.642 BILATERAL HAND PAIN: Status: ACTIVE | Noted: 2024-02-24

## 2024-02-24 PROBLEM — M79.671 BILATERAL FOOT PAIN: Status: ACTIVE | Noted: 2024-02-24

## 2024-02-24 ASSESSMENT — ENCOUNTER SYMPTOMS
BLOOD IN STOOL: 0
NAUSEA: 0
RHINORRHEA: 0
ABDOMINAL PAIN: 1
WHEEZING: 0
VOMITING: 0
SINUS PRESSURE: 0
SORE THROAT: 0
COUGH: 0
CHEST TIGHTNESS: 0
SINUS PAIN: 0
DIARRHEA: 0
SHORTNESS OF BREATH: 1

## 2024-02-26 ENCOUNTER — TELEPHONE (OUTPATIENT)
Dept: PRIMARY CARE CLINIC | Age: 69
End: 2024-02-26

## 2024-02-26 ENCOUNTER — TELEPHONE (OUTPATIENT)
Dept: CARDIOLOGY CLINIC | Age: 69
End: 2024-02-26

## 2024-02-26 DIAGNOSIS — I21.4 NSTEMI (NON-ST ELEVATED MYOCARDIAL INFARCTION) (HCC): ICD-10-CM

## 2024-02-26 DIAGNOSIS — R07.9 CHEST PAIN, UNSPECIFIED TYPE: ICD-10-CM

## 2024-02-26 RX ORDER — DILTIAZEM HYDROCHLORIDE 240 MG/1
240 CAPSULE, COATED, EXTENDED RELEASE ORAL DAILY
Qty: 90 CAPSULE | Refills: 2 | Status: SHIPPED | OUTPATIENT
Start: 2024-02-26

## 2024-02-26 NOTE — TELEPHONE ENCOUNTER
Medication -  Patient has questions regarding  TRULANCE 3 MG TABS   Please follow up with patient: [phone]

## 2024-02-26 NOTE — TELEPHONE ENCOUNTER
Spoke with patient she has not had a BM in a week. She has been taking her Trulance 3mg for 3 days in a row and has not been effective. She is requesting if she can take another dose to try and help. She says her abdomen is bloated and she is in discomfort. Recommended taking miralax and she asked about an enema, I said that would be okay too. But she wants to take another dose of Trulance. She wanted to note she will not be able to answer her phone from 2pm-3:30pm. Please advise.

## 2024-02-27 ENCOUNTER — OFFICE VISIT (OUTPATIENT)
Dept: PRIMARY CARE CLINIC | Age: 69
End: 2024-02-27
Payer: MEDICARE

## 2024-02-27 VITALS
OXYGEN SATURATION: 96 % | WEIGHT: 162.2 LBS | BODY MASS INDEX: 29.66 KG/M2 | HEART RATE: 88 BPM | DIASTOLIC BLOOD PRESSURE: 72 MMHG | SYSTOLIC BLOOD PRESSURE: 114 MMHG

## 2024-02-27 DIAGNOSIS — R29.898 WEAKNESS OF BOTH LOWER EXTREMITIES: Primary | ICD-10-CM

## 2024-02-27 DIAGNOSIS — M79.671 BILATERAL FOOT PAIN: ICD-10-CM

## 2024-02-27 DIAGNOSIS — K59.00 CONSTIPATION, UNSPECIFIED CONSTIPATION TYPE: ICD-10-CM

## 2024-02-27 DIAGNOSIS — R10.9 RIGHT FLANK PAIN: ICD-10-CM

## 2024-02-27 DIAGNOSIS — M79.672 BILATERAL FOOT PAIN: ICD-10-CM

## 2024-02-27 DIAGNOSIS — M79.641 BILATERAL HAND PAIN: ICD-10-CM

## 2024-02-27 DIAGNOSIS — M79.642 BILATERAL HAND PAIN: ICD-10-CM

## 2024-02-27 PROCEDURE — G8427 DOCREV CUR MEDS BY ELIG CLIN: HCPCS | Performed by: INTERNAL MEDICINE

## 2024-02-27 PROCEDURE — G8417 CALC BMI ABV UP PARAM F/U: HCPCS | Performed by: INTERNAL MEDICINE

## 2024-02-27 PROCEDURE — 3074F SYST BP LT 130 MM HG: CPT | Performed by: INTERNAL MEDICINE

## 2024-02-27 PROCEDURE — G8484 FLU IMMUNIZE NO ADMIN: HCPCS | Performed by: INTERNAL MEDICINE

## 2024-02-27 PROCEDURE — 1090F PRES/ABSN URINE INCON ASSESS: CPT | Performed by: INTERNAL MEDICINE

## 2024-02-27 PROCEDURE — 1124F ACP DISCUSS-NO DSCNMKR DOCD: CPT | Performed by: INTERNAL MEDICINE

## 2024-02-27 PROCEDURE — 3017F COLORECTAL CA SCREEN DOC REV: CPT | Performed by: INTERNAL MEDICINE

## 2024-02-27 PROCEDURE — G8399 PT W/DXA RESULTS DOCUMENT: HCPCS | Performed by: INTERNAL MEDICINE

## 2024-02-27 PROCEDURE — 3078F DIAST BP <80 MM HG: CPT | Performed by: INTERNAL MEDICINE

## 2024-02-27 PROCEDURE — 1036F TOBACCO NON-USER: CPT | Performed by: INTERNAL MEDICINE

## 2024-02-27 PROCEDURE — 99214 OFFICE O/P EST MOD 30 MIN: CPT | Performed by: INTERNAL MEDICINE

## 2024-02-27 RX ORDER — DOCUSATE SODIUM 100 MG/1
100 CAPSULE, LIQUID FILLED ORAL 2 TIMES DAILY
Qty: 60 CAPSULE | Refills: 1 | Status: SHIPPED | OUTPATIENT
Start: 2024-02-27

## 2024-02-27 NOTE — TELEPHONE ENCOUNTER
Spoke w/pt at length; states this am is dizzy, feels cold/then hot, no fever, no cough; feels like when she had vertigo a long time ago. Dizzy w/sudden movements and also makes her feel nauseated. BP this am 117/58, states was constipated x 1 week, had BM today. Does endorse that she is likely dehydrated, does not drink adequate fluids. States 1st dose Cardizem 240 mg once daily pm 2/26 as ordered  at OV w/dr berry.  Advised to take cardizem once daily as directed.    Has OV w/PCP today at 1:30 pm, will have someone drive her to the OV; advised pt either to see PCP or go to the ER.   Requested call back with update. Verb understanding.

## 2024-02-27 NOTE — TELEPHONE ENCOUNTER
Patient called saying that she took Cardizem 240mg last night around 8pm and when she woke up this morning around 4am she started feeling dizzy and has no appetite. [phone]

## 2024-02-27 NOTE — PROGRESS NOTES
Anion Gap 02/13/2024 15     Glucose 02/13/2024 203 (H)     BUN 02/13/2024 18     Creatinine 02/13/2024 0.9     Est, Glom Filt Rate 02/13/2024 >60     Calcium 02/13/2024 9.5     Total Protein 02/13/2024 7.0     Albumin 02/13/2024 4.8     Albumin/Globulin Ratio 02/13/2024 2.2     Total Bilirubin 02/13/2024 0.4     Alkaline Phosphatase 02/13/2024 61     ALT 02/13/2024 30     AST 02/13/2024 20    Office Visit on 02/13/2024   Component Date Value    Color, UA 02/13/2024 kong yellow     Clarity, UA 02/13/2024 clear     Glucose, UA POC 02/13/2024 neg     Bilirubin, UA 02/13/2024 neg     Ketones, UA 02/13/2024 neg     Spec Grav, UA 02/13/2024 >=1.030     Blood, UA POC 02/13/2024 neg     pH, UA 02/13/2024 5.0     Protein, UA POC 02/13/2024 neg     Urobilinogen, UA 02/13/2024 0.2     Leukocytes, UA 02/13/2024 small     Nitrite, UA 02/13/2024 neg     Organism 02/13/2024 Escherichia coli (A)     Urine Culture, Routine 02/13/2024                      Value:<10,000 CFU/ml  No further workup     Admission on 02/01/2024, Discharged on 02/01/2024   Component Date Value    WBC 02/01/2024 6.0     RBC 02/01/2024 4.67     Hemoglobin 02/01/2024 14.1     Hematocrit 02/01/2024 41.4     MCV 02/01/2024 88.7     MCH 02/01/2024 30.1     MCHC 02/01/2024 34.0     RDW 02/01/2024 13.5     Platelets 02/01/2024 235     MPV 02/01/2024 8.5     Neutrophils % 02/01/2024 52.0     Lymphocytes % 02/01/2024 35.5     Monocytes % 02/01/2024 10.0     Eosinophils % 02/01/2024 1.6     Basophils % 02/01/2024 0.9     Neutrophils Absolute 02/01/2024 3.1     Lymphocytes Absolute 02/01/2024 2.1     Monocytes Absolute 02/01/2024 0.6     Eosinophils Absolute 02/01/2024 0.1     Basophils Absolute 02/01/2024 0.1     Sodium 02/01/2024 139     Potassium reflex Magnesi* 02/01/2024 4.1     Chloride 02/01/2024 104     CO2 02/01/2024 23     Anion Gap 02/01/2024 12     Glucose 02/01/2024 100 (H)     BUN 02/01/2024 16     Creatinine 02/01/2024 0.8     Est, Glom Filt Rate

## 2024-02-29 ENCOUNTER — TELEPHONE (OUTPATIENT)
Dept: PRIMARY CARE CLINIC | Age: 69
End: 2024-02-29

## 2024-02-29 NOTE — TELEPHONE ENCOUNTER
Patient called and stated to inform that she is in the hospital and she wanted to speak to Dr Espitia.    Please review and advice    Thanks

## 2024-02-29 NOTE — TELEPHONE ENCOUNTER
Patient currently in ED at [facility]. Wanting to know if pulmonary physician had been contacted and home care for low O2 set up?

## 2024-03-01 ENCOUNTER — TELEPHONE (OUTPATIENT)
Dept: PULMONOLOGY | Age: 69
End: 2024-03-01

## 2024-03-01 ENCOUNTER — TELEPHONE (OUTPATIENT)
Dept: PRIMARY CARE CLINIC | Age: 69
End: 2024-03-01

## 2024-03-01 NOTE — TELEPHONE ENCOUNTER
Patient called to see if she could be seen sooner than April. She called 911 yesterday because she started to feel SOB. When she checked her o2 it was 61%. By the paramedics came to her house it was 93%.  She was taken to [facility].    She thinks she may need oxygen. States her legs are weak and she wants to lay down all day. During the call her o2 was 92% and she was in bed.     I told her if her o2 drops 88% or lower call paramedics again

## 2024-03-02 PROBLEM — M85.80 OSTEOPENIA WITH HIGH RISK OF FRACTURE: Status: ACTIVE | Noted: 2023-03-16

## 2024-03-02 PROBLEM — Z51.81 MEDICATION MONITORING ENCOUNTER: Status: ACTIVE | Noted: 2024-03-02

## 2024-03-03 ENCOUNTER — HOSPITAL ENCOUNTER (EMERGENCY)
Age: 69
Discharge: HOME | End: 2024-03-03
Attending: STUDENT IN AN ORGANIZED HEALTH CARE EDUCATION/TRAINING PROGRAM
Payer: MEDICARE

## 2024-03-03 VITALS
HEIGHT: 62 IN | TEMPERATURE: 98.4 F | HEART RATE: 83 BPM | OXYGEN SATURATION: 95 % | BODY MASS INDEX: 29.67 KG/M2 | RESPIRATION RATE: 18 BRPM | SYSTOLIC BLOOD PRESSURE: 126 MMHG | DIASTOLIC BLOOD PRESSURE: 84 MMHG

## 2024-03-03 DIAGNOSIS — R07.9 CHEST PAIN, UNSPECIFIED TYPE: ICD-10-CM

## 2024-03-03 DIAGNOSIS — R53.83 FATIGUE, UNSPECIFIED TYPE: Primary | ICD-10-CM

## 2024-03-03 PROCEDURE — 99283 EMERGENCY DEPT VISIT LOW MDM: CPT

## 2024-03-03 PROCEDURE — 93005 ELECTROCARDIOGRAM TRACING: CPT | Performed by: STUDENT IN AN ORGANIZED HEALTH CARE EDUCATION/TRAINING PROGRAM

## 2024-03-03 RX ORDER — 0.9 % SODIUM CHLORIDE 0.9 %
1000 INTRAVENOUS SOLUTION INTRAVENOUS ONCE
Status: DISCONTINUED | OUTPATIENT
Start: 2024-03-03 | End: 2024-03-03 | Stop reason: HOSPADM

## 2024-03-03 ASSESSMENT — PAIN DESCRIPTION - LOCATION: LOCATION: CHEST

## 2024-03-03 ASSESSMENT — PAIN - FUNCTIONAL ASSESSMENT: PAIN_FUNCTIONAL_ASSESSMENT: 0-10

## 2024-03-03 ASSESSMENT — PAIN DESCRIPTION - ORIENTATION: ORIENTATION: RIGHT

## 2024-03-03 ASSESSMENT — PAIN SCALES - GENERAL: PAINLEVEL_OUTOF10: 9

## 2024-03-03 NOTE — ED PROVIDER NOTES
not apply route daily as needed (for shortness of breath and wheezing)    TIOTROPIUM (SPIRIVA HANDIHALER) 18 MCG INHALATION CAPSULE    Inhale 1 capsule into the lungs daily    TRUEPLUS LANCETS 30G MISC    1 each by Does not apply route daily    TRULANCE 3 MG TABS    TAKE 1 TABLET BY MOUTH EVERY DAY       Allergies     She is allergic to effexor [venlafaxine hydrochloride], hydrocodone, ibuprofen, methocarbamol, propoxyphene, tramadol, aspirin, keflex [cephalexin], and paxil cr [paroxetine hcl er].    Physical Exam     INITIAL VITALS: BP: 126/84, Temp: 98.4 °F (36.9 °C), Pulse: 83, Respirations: 18, SpO2: 95 %   Physical Exam  Vitals reviewed.   Constitutional:       Appearance: Normal appearance.   HENT:      Head: Normocephalic and atraumatic.   Eyes:      Pupils: Pupils are equal, round, and reactive to light.   Cardiovascular:      Rate and Rhythm: Normal rate.   Pulmonary:      Effort: Pulmonary effort is normal.   Abdominal:      General: Abdomen is flat.      Palpations: Abdomen is soft.   Musculoskeletal:         General: Normal range of motion.      Cervical back: Normal range of motion.   Skin:     General: Skin is warm and dry.   Neurological:      General: No focal deficit present.      Mental Status: She is alert and oriented to person, place, and time.                    Mendel Thompson MD  03/03/24 [id number]

## 2024-03-04 ENCOUNTER — TELEPHONE (OUTPATIENT)
Dept: PRIMARY CARE CLINIC | Age: 69
End: 2024-03-04

## 2024-03-04 ENCOUNTER — PROCEDURE VISIT (OUTPATIENT)
Dept: SPEECH THERAPY | Age: 69
End: 2024-03-04
Payer: MEDICARE

## 2024-03-04 DIAGNOSIS — J38.7 PRESBYLARYNGES: ICD-10-CM

## 2024-03-04 DIAGNOSIS — R49.0 MUSCLE TENSION DYSPHONIA: ICD-10-CM

## 2024-03-04 DIAGNOSIS — R49.0 DYSPHONIA: Primary | ICD-10-CM

## 2024-03-04 LAB
EKG ATRIAL RATE: 84 BPM
EKG DIAGNOSIS: NORMAL
EKG P AXIS: 39 DEGREES
EKG P-R INTERVAL: 152 MS
EKG Q-T INTERVAL: 372 MS
EKG QRS DURATION: 82 MS
EKG QTC CALCULATION (BAZETT): 439 MS
EKG R AXIS: 7 DEGREES
EKG T AXIS: 43 DEGREES
EKG VENTRICULAR RATE: 84 BPM

## 2024-03-04 PROCEDURE — 92507 TX SP LANG VOICE COMM INDIV: CPT | Performed by: SPEECH-LANGUAGE PATHOLOGIST

## 2024-03-04 NOTE — TELEPHONE ENCOUNTER
POA called for pt stating that she needs to talk with Omkar Harris about pt. Pt was in the emergency room recently and the  Told pt that there was nothing wrong with her and that she should be off all meds. Mrs Cabrales her Power of  is wanting to get with DR Espitia to get more info about pt. Please contact POA at [phone].

## 2024-03-04 NOTE — PROGRESS NOTES
[facility]  Voice Therapy      Pt Seen for Therapy - Session # 2     Diagnosis/Indication:   Primary: Dysphonia  Secondary: MTD  Tertiary: LPR    Background History:   PMHx of Asthma, HTN, DM, and GERD; referred for concerns related to dysphonia. Endorsed ebbs/flows over the last few years but gradually worsening the last ~2-3 months. Characterized as episodic roughness that results in vocal fatigue + discomfort; often just does not speak to avoid discomfort. Unable to identify any factors that improve it. Notes additional symptoms of globus t/o the day; sensation of needing to clear thick mucus and occasionally able to expectorate white \"ball\" of mucus. Hx of GERD and taking Pantoprazole 40 mg; intermittent breakthrough symptoms w/ regurgitation of food/stomach contents. Hx of allergies w/ congestion/PND; using Flonase and nasal gel to assist. Endorsed dyspnea w/ sensation of SOB during ADLs; results in generalized fatigue that lasts t/o day. Seen by pulmonologist and started on x3 inhalers for suspected asthma; does feel they assist ~30 min after use.      Previous SLP Evaluations: 2/5/24  VLS revealed generalized irritation of pharynx/larynx including frothy drainage, cobblestoning of PPW, and post-cricoid edema. Bilateral TVFs smooth & straight w/ functional movement of arytenoids; glottic closure characterized as complete w/ grossly functional mucosal wave/periodicity. Moderate supraglottic compression (LM>AP) which increased during conversational voicing resulting in obstructed vibratory engagement of phonation (roughness).     SUBJECTIVE:   Endorsed direct correlation w/ lack of sleep and dysphonia; finds when able to sleep for multiple hours, vocal quality is baseline. Ongoing difficulties related to general fatigue, LE weakness, and back/chest pain. Endorsed feeling frustration w/ multiple medications + doctors visits but no answers regarding symptoms.     OBJECTIVE:   Voice Therapy    Goal: Session 2

## 2024-03-05 ASSESSMENT — ENCOUNTER SYMPTOMS
SINUS PAIN: 0
SHORTNESS OF BREATH: 1
COUGH: 0
CHEST TIGHTNESS: 0
RHINORRHEA: 0
DIARRHEA: 0
ABDOMINAL PAIN: 0
VOMITING: 0
SORE THROAT: 0
NAUSEA: 0
WHEEZING: 0
BLOOD IN STOOL: 0
SINUS PRESSURE: 0

## 2024-03-06 ENCOUNTER — CARE COORDINATION (OUTPATIENT)
Dept: CARE COORDINATION | Age: 69
End: 2024-03-06

## 2024-03-06 SDOH — ECONOMIC STABILITY: INCOME INSECURITY: IN THE LAST 12 MONTHS, WAS THERE A TIME WHEN YOU WERE NOT ABLE TO PAY THE MORTGAGE OR RENT ON TIME?: NO

## 2024-03-06 SDOH — ECONOMIC STABILITY: HOUSING INSECURITY: IN THE LAST 12 MONTHS, HOW MANY PLACES HAVE YOU LIVED?: 1

## 2024-03-06 SDOH — ECONOMIC STABILITY: TRANSPORTATION INSECURITY
IN THE PAST 12 MONTHS, HAS LACK OF TRANSPORTATION KEPT YOU FROM MEETINGS, WORK, OR FROM GETTING THINGS NEEDED FOR DAILY LIVING?: YES

## 2024-03-06 SDOH — ECONOMIC STABILITY: TRANSPORTATION INSECURITY
IN THE PAST 12 MONTHS, HAS THE LACK OF TRANSPORTATION KEPT YOU FROM MEDICAL APPOINTMENTS OR FROM GETTING MEDICATIONS?: YES

## 2024-03-06 NOTE — CARE COORDINATION
3/22/2024  1:30 PM Raisa Espitia MD [facility] [place] [facility]   4/1/2024 10:00 AM DEXA [facility] [place] [employer] [facility]   4/1/2024 10:20 AM [facility]   4/22/2024  1:40 PM Lul Thompson MD [place] [facility]   5/3/2024 11:45 AM SCHEDULE, [facility]    and   General Assessment    Do you have any symptoms that are causing concern?: Yes  Progression since Onset: Unchanged  Reported Symptoms: Constipation

## 2024-03-13 ENCOUNTER — OFFICE VISIT (OUTPATIENT)
Age: 69
End: 2024-03-13
Payer: MEDICARE

## 2024-03-13 ENCOUNTER — TELEPHONE (OUTPATIENT)
Dept: PULMONOLOGY | Age: 69
End: 2024-03-13

## 2024-03-13 ENCOUNTER — HOSPITAL ENCOUNTER (OUTPATIENT)
Age: 69
Discharge: HOME | End: 2024-03-13
Attending: INTERNAL MEDICINE

## 2024-03-13 VITALS
WEIGHT: 159 LBS | SYSTOLIC BLOOD PRESSURE: 124 MMHG | DIASTOLIC BLOOD PRESSURE: 84 MMHG | BODY MASS INDEX: 29.26 KG/M2 | HEIGHT: 62 IN | HEART RATE: 97 BPM | TEMPERATURE: 98.6 F | OXYGEN SATURATION: 95 %

## 2024-03-13 DIAGNOSIS — R06.02 SHORTNESS OF BREATH: Primary | ICD-10-CM

## 2024-03-13 DIAGNOSIS — J45.40 ASTHMA, MODERATE PERSISTENT, WELL-CONTROLLED: ICD-10-CM

## 2024-03-13 DIAGNOSIS — R07.81 PLEURITIC CHEST PAIN: ICD-10-CM

## 2024-03-13 DIAGNOSIS — R06.02 SHORTNESS OF BREATH: ICD-10-CM

## 2024-03-13 LAB
EGFR, POC: >60 ML/MIN/1.73M2
POC CREATININE: 0.7 MG/DL (ref 0.6–1.2)

## 2024-03-13 PROCEDURE — 99214 OFFICE O/P EST MOD 30 MIN: CPT | Performed by: INTERNAL MEDICINE

## 2024-03-13 PROCEDURE — 6360000004 HC RX CONTRAST MEDICATION: Performed by: INTERNAL MEDICINE

## 2024-03-13 PROCEDURE — G8484 FLU IMMUNIZE NO ADMIN: HCPCS | Performed by: INTERNAL MEDICINE

## 2024-03-13 PROCEDURE — 1124F ACP DISCUSS-NO DSCNMKR DOCD: CPT | Performed by: INTERNAL MEDICINE

## 2024-03-13 PROCEDURE — 82565 ASSAY OF CREATININE: CPT

## 2024-03-13 PROCEDURE — G8417 CALC BMI ABV UP PARAM F/U: HCPCS | Performed by: INTERNAL MEDICINE

## 2024-03-13 PROCEDURE — 1036F TOBACCO NON-USER: CPT | Performed by: INTERNAL MEDICINE

## 2024-03-13 PROCEDURE — G8427 DOCREV CUR MEDS BY ELIG CLIN: HCPCS | Performed by: INTERNAL MEDICINE

## 2024-03-13 PROCEDURE — 71260 CT THORAX DX C+: CPT

## 2024-03-13 PROCEDURE — G8399 PT W/DXA RESULTS DOCUMENT: HCPCS | Performed by: INTERNAL MEDICINE

## 2024-03-13 PROCEDURE — 3079F DIAST BP 80-89 MM HG: CPT | Performed by: INTERNAL MEDICINE

## 2024-03-13 PROCEDURE — 3017F COLORECTAL CA SCREEN DOC REV: CPT | Performed by: INTERNAL MEDICINE

## 2024-03-13 PROCEDURE — 3074F SYST BP LT 130 MM HG: CPT | Performed by: INTERNAL MEDICINE

## 2024-03-13 PROCEDURE — 1090F PRES/ABSN URINE INCON ASSESS: CPT | Performed by: INTERNAL MEDICINE

## 2024-03-13 RX ADMIN — IOPAMIDOL 75 ML: 755 INJECTION, SOLUTION INTRAVENOUS at 16:33

## 2024-03-13 NOTE — TELEPHONE ENCOUNTER
C/o difficulty breathing, pain in right side. Worsening over past couple of months. Weakness in legs and back. Pulse ox drops to 80%'s when she walks. Not on O2 at home. Requests to be seen.  appt made for today at 2:20 PM at [facility] with Dr. Thompson. Patient and Dr. Thompson agreeable.

## 2024-03-13 NOTE — PROGRESS NOTES
coronary artery was normal.  Because of the streaming of  contrast, I wanted to take a better picture and I used an AL-1  diagnostic catheter and AL-2 guide.  Injections revealed that there was  wide patency with LO-3 blood flow in the left coronary system.  I  concluded the angiography procedure.  All the angiography hardware was  removed.  The TR band was applied after the radial sheath was removed.  Heparin 6000 units total was given and  the procedure was terminated.     PLAN:  Hydration.  Bedrest.  Treat for microvascular angina and hydrate  today.  Possible discharge later today if stable.    Assessment:      Diagnosis Orders   1. Shortness of breath  CT CHEST PULMONARY EMBOLISM W CONTRAST      2. Asthma, moderate persistent, well-controlled        3. Pleuritic chest pain          Jamaria has many different complaints that have defied explanation.  While my suspicion for PE is not high I do think that it definitely needs to be ruled out with imaging    Plan:   Will obtain CT pulmonary embolism protocol to rule out PE  2.  Continue Symbicort, Spiriva, singular, and rescue  3. RTC in April as previously

## 2024-03-19 NOTE — TELEPHONE ENCOUNTER
----- Message from Dianne Lua sent at 12/21/2021  9:26 AM EST -----  Subject: Refill Request    QUESTIONS  Name of Medication? cloNIDine (CATAPRES-TTS-1) 0.1 MG/24HR PTWK  Patient-reported dosage and instructions? daily  How many days do you have left? 0  Preferred Pharmacy? [facility] phone number (if available)? [phone]  ---------------------------------------------------------------------------  --------------,  Name of Medication? atorvastatin (LIPITOR) 20 MG tablet  Patient-reported dosage and instructions? daily  How many days do you have left? 0  Preferred Pharmacy? [facility] phone number (if available)? [phone]  ---------------------------------------------------------------------------  --------------,  Name of Medication? pantoprazole (PROTONIX) 40 MG tablet  Patient-reported dosage and instructions? daily  How many days do you have left? 0  Preferred Pharmacy? [facility] phone number (if available)? [phone]  Additional Information for Provider? Patient is asking if these medicines   can be sent via the [employer] mail order. ---------------------------------------------------------------------------  --------------  Juan Pablo KUMAR  What is the best way for the office to contact you? OK to leave message on   voicemail  Preferred Call Back Phone Number?  [phone] JENNY RUIZ - [facility]  Jan Pearce M.D.  [phone]           3/19/2024  Donnie Raya Jr.  :  1978  [facility] Medical Record Number:  [id number]        ENDOSCOPY FINDINGS:   Your endoscopy showed minimal changes from acid reflux consistent with French's esophagus, otherwise mucosa within normal.      EGD DISCHARGE INSTRUCTIONS    DISCOMFORT:  Sore throat- throat lozenges or warm salt water gargle  redness at IV site- apply warm compress to area; if redness or soreness persist- contact your physician  Gaseous discomfort- walking, belching will help relieve any discomfort  You may not operate a vehicle for 12 hours  You may not engage in an occupation involving machinery or appliances for rest of today  You may not drink alcoholic beverages for at least 12 hours  Avoid making any critical decisions for at least 24 hour    DIET:   You may resume your regular diet.    ACTIVITY  Spend the remainder of the day resting -  avoid any strenuous activity. Avoid driving or operating machinery.    CALL M.D.  ANY SIGN OF   Increasing pain, nausea, vomiting  Abdominal distension (swelling)  New increased bleeding (oral or rectal)  Fever (chills)  Pain in chest area  Bloody discharge from nose or mouth  Shortness of breath    Follow-up Instructions:   Call Dr. Pearce for any questions or problems. Telephone # [phone]  Biopsies were obtained, the results will be available  in  5 to 7 days. We will call you to notify you of these results.   Continue same medications. Prescription to be called in for refill on Protonix.  Also call office for follow up appointment - next available appointment in the office.

## 2024-03-21 ENCOUNTER — OFFICE VISIT (OUTPATIENT)
Dept: CARDIOLOGY CLINIC | Age: 69
End: 2024-03-21
Payer: MEDICARE

## 2024-03-21 VITALS
SYSTOLIC BLOOD PRESSURE: 108 MMHG | WEIGHT: 160 LBS | DIASTOLIC BLOOD PRESSURE: 60 MMHG | HEART RATE: 86 BPM | BODY MASS INDEX: 29.26 KG/M2

## 2024-03-21 DIAGNOSIS — I21.4 NSTEMI (NON-ST ELEVATED MYOCARDIAL INFARCTION) (HCC): ICD-10-CM

## 2024-03-21 DIAGNOSIS — R07.9 CHEST PAIN, UNSPECIFIED TYPE: ICD-10-CM

## 2024-03-21 DIAGNOSIS — I10 HTN (HYPERTENSION), BENIGN: Primary | ICD-10-CM

## 2024-03-21 PROCEDURE — 1036F TOBACCO NON-USER: CPT | Performed by: INTERNAL MEDICINE

## 2024-03-21 PROCEDURE — G8427 DOCREV CUR MEDS BY ELIG CLIN: HCPCS | Performed by: INTERNAL MEDICINE

## 2024-03-21 PROCEDURE — 3078F DIAST BP <80 MM HG: CPT | Performed by: INTERNAL MEDICINE

## 2024-03-21 PROCEDURE — 99214 OFFICE O/P EST MOD 30 MIN: CPT | Performed by: INTERNAL MEDICINE

## 2024-03-21 PROCEDURE — G8484 FLU IMMUNIZE NO ADMIN: HCPCS | Performed by: INTERNAL MEDICINE

## 2024-03-21 PROCEDURE — G8417 CALC BMI ABV UP PARAM F/U: HCPCS | Performed by: INTERNAL MEDICINE

## 2024-03-21 PROCEDURE — 1124F ACP DISCUSS-NO DSCNMKR DOCD: CPT | Performed by: INTERNAL MEDICINE

## 2024-03-21 PROCEDURE — 3074F SYST BP LT 130 MM HG: CPT | Performed by: INTERNAL MEDICINE

## 2024-03-21 PROCEDURE — 1090F PRES/ABSN URINE INCON ASSESS: CPT | Performed by: INTERNAL MEDICINE

## 2024-03-21 PROCEDURE — 93000 ELECTROCARDIOGRAM COMPLETE: CPT | Performed by: INTERNAL MEDICINE

## 2024-03-21 PROCEDURE — G8399 PT W/DXA RESULTS DOCUMENT: HCPCS | Performed by: INTERNAL MEDICINE

## 2024-03-21 PROCEDURE — 3017F COLORECTAL CA SCREEN DOC REV: CPT | Performed by: INTERNAL MEDICINE

## 2024-03-21 RX ORDER — NITROGLYCERIN 0.4 MG/1
0.4 TABLET SUBLINGUAL EVERY 5 MIN PRN
Qty: 25 TABLET | Refills: 3 | Status: SHIPPED | OUTPATIENT
Start: 2024-03-21

## 2024-03-21 RX ORDER — NITROGLYCERIN 80 MG/1
1 PATCH TRANSDERMAL DAILY
Qty: 30 PATCH | Refills: 3 | Status: SHIPPED | OUTPATIENT
Start: 2024-03-21

## 2024-03-21 RX ORDER — RANOLAZINE 500 MG/1
500 TABLET, EXTENDED RELEASE ORAL 2 TIMES DAILY
Qty: 180 TABLET | Refills: 3 | Status: SHIPPED | OUTPATIENT
Start: 2024-03-21

## 2024-03-21 RX ORDER — DILTIAZEM HYDROCHLORIDE 240 MG/1
240 CAPSULE, COATED, EXTENDED RELEASE ORAL DAILY
Qty: 90 CAPSULE | Refills: 2 | Status: SHIPPED | OUTPATIENT
Start: 2024-03-21

## 2024-03-21 NOTE — PROGRESS NOTES
Diagnosis Orders   1. HTN (hypertension), benign  EKG 12 Lead      2. NSTEMI (non-ST elevated myocardial infarction) (formerly [facility])  EKG 12 Lead            Recommendations:      CP: stop asa continue plavix daily.  Continue nitrodur in am.  Add cardizem 120 LA q am.  Feels better with am nitrodur.  No more CP.  ECG unremarkable.  HTN: fair control.  Add cardizem  add nitrodur 0.4 mg hr and watch for dizziness.  Pulm sob:  check ct chest without thorax.  HLP: STOP ATORVA.     Stopped lasix and plavix and no baby asa.  Baby asa MWF at night.  Benedryl   STOP CLONIDINE.  CONTINUE CARDIZEM  MG DAILY.   NEEDS SOMETHING TO HELP WITH ANXIETY BUT PT STATES THAT SHE HAD TAKEN MANY SSRI AND DO NOT KNOW ABOUT TCAs AND HAD TAKEN WELLBUTRIN IN THE PAST SHE STATES.    Derian Dukes MD [facility]

## 2024-03-22 ENCOUNTER — OFFICE VISIT (OUTPATIENT)
Dept: PRIMARY CARE CLINIC | Age: 69
End: 2024-03-22

## 2024-03-22 ENCOUNTER — CARE COORDINATION (OUTPATIENT)
Dept: CARE COORDINATION | Age: 69
End: 2024-03-22

## 2024-03-22 VITALS
TEMPERATURE: 97.1 F | SYSTOLIC BLOOD PRESSURE: 116 MMHG | BODY MASS INDEX: 29.26 KG/M2 | RESPIRATION RATE: 16 BRPM | HEART RATE: 98 BPM | WEIGHT: 160 LBS | DIASTOLIC BLOOD PRESSURE: 72 MMHG | OXYGEN SATURATION: 95 %

## 2024-03-22 DIAGNOSIS — G89.29 CHRONIC PAIN OF RIGHT LOWER EXTREMITY: ICD-10-CM

## 2024-03-22 DIAGNOSIS — E78.2 MIXED HYPERLIPIDEMIA: Chronic | ICD-10-CM

## 2024-03-22 DIAGNOSIS — K59.00 CONSTIPATION, UNSPECIFIED CONSTIPATION TYPE: ICD-10-CM

## 2024-03-22 DIAGNOSIS — R19.7 DIARRHEA, UNSPECIFIED TYPE: ICD-10-CM

## 2024-03-22 DIAGNOSIS — M79.604 CHRONIC PAIN OF RIGHT LOWER EXTREMITY: ICD-10-CM

## 2024-03-22 DIAGNOSIS — J45.40 MODERATE PERSISTENT ASTHMA WITHOUT COMPLICATION: ICD-10-CM

## 2024-03-22 DIAGNOSIS — I10 ESSENTIAL HYPERTENSION: ICD-10-CM

## 2024-03-22 DIAGNOSIS — E11.42 TYPE 2 DIABETES MELLITUS WITH DIABETIC POLYNEUROPATHY, WITHOUT LONG-TERM CURRENT USE OF INSULIN (HCC): Primary | ICD-10-CM

## 2024-03-22 RX ORDER — FLUTICASONE PROPIONATE AND SALMETEROL 500; 50 UG/1; UG/1
1 POWDER RESPIRATORY (INHALATION) EVERY 12 HOURS
Qty: 180 EACH | Refills: 1 | Status: SHIPPED | OUTPATIENT
Start: 2024-03-22

## 2024-03-22 NOTE — CARE COORDINATION
Ambulatory Care Coordination Note  3/22/2024    Patient Current Location:  Home: [address] [place] [id number]     ACM contacted the patient by telephone. Verified name and  with patient as identifiers. Provided introduction to self, and explanation of the ACM role.     Challenges to be reviewed by the provider   Additional needs identified to be addressed with provider: No  none               Method of communication with provider: chart routing.    ACM: Christine Martel RN     ACM completed follow up call with patient who said she is doing well. Patient said she has not been monitoring her blood sugars at home as she feels she does not need to prick her fingers. Patient is still having complaints of constipation with diarrhea at times. Patient is doing to discuss more with PCP during appt today. Patient denies any other needs at this time.    Plan:    F/U call 3 weeks     Offered patient enrollment in the Remote Patient Monitoring (RPM) program for in-home monitoring: Patient is not eligible for RPM program.    Lab Results       None            Care Coordination Interventions    Referral from Primary Care Provider: No  Suggested Interventions and Community Resources  Meals on Wheels: Completed (Comment: Active with Meals on Wheels)          Goals Addressed                   This Visit's Progress     Conditions and Symptoms        I will schedule office visits, as directed by my provider.  I will keep my appointment or reschedule if I have to cancel.  I will notify my provider of any barriers to my plan of care.    Barriers: overwhelmed by complexity of regimen  Plan for overcoming my barriers: ACM will engage patient with care management phone calls.   Confidence: 8/10  Anticipated Goal Completion Date: 24                  Future Appointments   Date Time Provider Department Center   3/22/2024  1:30 PM Raisa Espitia MD [facility]   2024 10:00 AM RONIT MCKEON [facility]

## 2024-03-22 NOTE — PROGRESS NOTES
daily 100 each 3    denosumab (PROLIA) 60 MG/ML SOSY SC injection Inject 1 mL into the skin once for 1 dose 1 mL 0    albuterol sulfate HFA (PROVENTIL;VENTOLIN;PROAIR) 108 (90 Base) MCG/ACT inhaler Inhale 2 puffs into the lungs every 6 hours as needed for Wheezing      omeprazole (PRILOSEC) 40 MG delayed release capsule Take 1 capsule by mouth daily      cetirizine (ZYRTEC) 10 MG tablet Take 1 tablet by mouth daily 30 tablet 2    ondansetron (ZOFRAN-ODT) 4 MG disintegrating tablet Take 1 tablet by mouth 3 times daily as needed for Nausea or Vomiting 21 tablet 0    acetaminophen (TYLENOL) 500 MG tablet Take 1 tablet by mouth 4 times daily as needed for Pain 120 tablet 0    docusate sodium (COLACE) 100 MG capsule Take 1 capsule by mouth 2 times daily 60 capsule 1    blood glucose test strips (GLUCOSE METER TEST) strip 100 each by In Vitro route 2 times daily Diabetes   e11.9 100 each 1       Social History     Tobacco Use    Smoking status: Never    Smokeless tobacco: Never   Vaping Use    Vaping Use: Never used   Substance Use Topics    Alcohol use: No    Drug use: No         OBJECTIVE:    Vitals:    03/22/24 1330   BP: 116/72   Pulse: 98   Resp: 16   Temp: 97.1 °F (36.2 °C)   SpO2: 95%   Weight: 72.6 kg (160 lb)     Body mass index is 29.26 kg/m².    Wt Readings from Last 3 Encounters:   03/22/24 72.6 kg (160 lb)   03/21/24 72.6 kg (160 lb)   03/13/24 72.1 kg (159 lb)       Physical Exam  Nursing note reviewed.   Constitutional:       General: She is not in acute distress.     Appearance: Normal appearance. She is well-developed.   HENT:      Head: Normocephalic and atraumatic.      Mouth/Throat:      Pharynx: Oropharynx is clear.   Eyes:      General: Lids are normal.      Extraocular Movements: Extraocular movements intact.      Conjunctiva/sclera: Conjunctivae normal.      Pupils: Pupils are equal, round, and reactive to light.   Neck:      Thyroid: No thyromegaly.      Vascular: No carotid bruit.

## 2024-03-28 PROBLEM — R19.7 DIARRHEA: Status: ACTIVE | Noted: 2024-03-28

## 2024-03-28 ASSESSMENT — ENCOUNTER SYMPTOMS
DIARRHEA: 1
COUGH: 0
SINUS PRESSURE: 0
SORE THROAT: 0
WHEEZING: 0
ABDOMINAL PAIN: 0
CONSTIPATION: 1
BLOOD IN STOOL: 0
SHORTNESS OF BREATH: 1
VOMITING: 0
SINUS PAIN: 0
NAUSEA: 0
CHEST TIGHTNESS: 0
RHINORRHEA: 0

## 2024-04-01 ENCOUNTER — HOSPITAL ENCOUNTER (OUTPATIENT)
Dept: GENERAL RADIOLOGY | Age: 69
Discharge: HOME | End: 2024-04-01
Payer: MEDICARE

## 2024-04-01 ENCOUNTER — OFFICE VISIT (OUTPATIENT)
Dept: ENDOCRINOLOGY | Age: 69
End: 2024-04-01
Payer: MEDICARE

## 2024-04-01 VITALS
HEIGHT: 61 IN | BODY MASS INDEX: 30.17 KG/M2 | RESPIRATION RATE: 16 BRPM | WEIGHT: 159.8 LBS | SYSTOLIC BLOOD PRESSURE: 133 MMHG | HEART RATE: 77 BPM | DIASTOLIC BLOOD PRESSURE: 78 MMHG

## 2024-04-01 DIAGNOSIS — Z51.81 MEDICATION MONITORING ENCOUNTER: ICD-10-CM

## 2024-04-01 DIAGNOSIS — M85.80 OSTEOPENIA WITH HIGH RISK OF FRACTURE: ICD-10-CM

## 2024-04-01 DIAGNOSIS — M89.9 DISEASE OF SKELETAL SYSTEM: Primary | ICD-10-CM

## 2024-04-01 DIAGNOSIS — M89.9 DISEASE OF SKELETAL SYSTEM: ICD-10-CM

## 2024-04-01 PROCEDURE — 1090F PRES/ABSN URINE INCON ASSESS: CPT | Performed by: INTERNAL MEDICINE

## 2024-04-01 PROCEDURE — 1036F TOBACCO NON-USER: CPT | Performed by: INTERNAL MEDICINE

## 2024-04-01 PROCEDURE — G2211 COMPLEX E/M VISIT ADD ON: HCPCS | Performed by: INTERNAL MEDICINE

## 2024-04-01 PROCEDURE — G8399 PT W/DXA RESULTS DOCUMENT: HCPCS | Performed by: INTERNAL MEDICINE

## 2024-04-01 PROCEDURE — 77080 DXA BONE DENSITY AXIAL: CPT

## 2024-04-01 PROCEDURE — 99214 OFFICE O/P EST MOD 30 MIN: CPT | Performed by: INTERNAL MEDICINE

## 2024-04-01 PROCEDURE — G8417 CALC BMI ABV UP PARAM F/U: HCPCS | Performed by: INTERNAL MEDICINE

## 2024-04-01 PROCEDURE — 77080 DXA BONE DENSITY AXIAL: CPT | Performed by: INTERNAL MEDICINE

## 2024-04-01 PROCEDURE — 1124F ACP DISCUSS-NO DSCNMKR DOCD: CPT | Performed by: INTERNAL MEDICINE

## 2024-04-01 PROCEDURE — G8427 DOCREV CUR MEDS BY ELIG CLIN: HCPCS | Performed by: INTERNAL MEDICINE

## 2024-04-01 PROCEDURE — 3078F DIAST BP <80 MM HG: CPT | Performed by: INTERNAL MEDICINE

## 2024-04-01 PROCEDURE — 3075F SYST BP GE 130 - 139MM HG: CPT | Performed by: INTERNAL MEDICINE

## 2024-04-01 PROCEDURE — 3017F COLORECTAL CA SCREEN DOC REV: CPT | Performed by: INTERNAL MEDICINE

## 2024-04-01 RX ORDER — ACETAMINOPHEN 160 MG
2000 TABLET,DISINTEGRATING ORAL DAILY
COMMUNITY

## 2024-04-01 NOTE — PROGRESS NOTES
[facility]  [address] [place] [address] [address] [place] [address]  Phone [phone]  Fax [phone]    NAME:  RODY QUEZADA  :  1955  CONSULT DATE:    2023  MOST RECENT VISIT:  2023  TODAY'S DATE:  2024    Labs @ [facility] 2024    PROBLEMS.  Low bone density by DXA 2013, T-scores -2.2 in the spine, -2.1 in the left femoral neck    Family history of osteoporosis, mother with arm and rib fractures    BMD decreased 9527-0681, T-scores -2.2 in the spine, -2.4 in the left femoral neck    1989, foot fracture  Surgical menopause age 43  Vitamin D deficiency (desirable 25-OH D is 30-60 ng/mL)     23 ng/ml 20148 ng/mL 2023    57 ng/mL 2017    19 ng/mL 2023    48 ng/mL 2023  Asthma  Hypercholesterolemia  Dental implant   Coronary artery disease, NSTEMI 2023    CURRENT MANAGEMENT FOR BONE HEALTH/OSTEOPOROSIS.  Calcium, 300 mg from low calcium foods, 300 mg cheese   diet MVI Ca+D other    Calcium 600   600 mg/d   Vitamin D    2000 IU/d   Exercise, nothing regular  Pharmacologic therapy: Prolia 60 mg SQ twice yearly started 2023    PREVIOUS BONE-ACTIVE MEDICATIONS.   Alendronate 1012-8821, not sure why she stopped     OTHER CURRENT MEDICATIONS (SELECTED): Trulicity, Truemetrix, atorvastatin, gabapentin, pantoprazole, clonidine, budesonide, fluticasone  diclofenac  OTC MEDICATIONS (SELECTED): none    CHIEF COMPLAINT.  Here for f/u of low bone density, high fracture risk vitamin D deficiency, monitoring treatment. No new related signs or symptoms.     INTERVAL HISTORY:  See problem list for chronic/inactive conditions.  She received Prolia without side effects. No falls, near-falls or fractures.  Dealing with asthma.    FOR FULL DETAILS OF FAMILY HISTORY, PAST MEDICAL AND SURGICAL HISTORY, SOCIAL HISTORY, AND REVIEW OF SYSTEMS, SEE PATIENT QUESTIONNAIRE OF TODAY'S DATE.    PHYSICAL EXAMINATION.   GENERAL.  Well-nourished,

## 2024-04-12 ENCOUNTER — TELEPHONE (OUTPATIENT)
Dept: PRIMARY CARE CLINIC | Age: 69
End: 2024-04-12

## 2024-04-12 ENCOUNTER — OFFICE VISIT (OUTPATIENT)
Dept: PRIMARY CARE CLINIC | Age: 69
End: 2024-04-12
Payer: MEDICARE

## 2024-04-12 VITALS
BODY MASS INDEX: 30.3 KG/M2 | DIASTOLIC BLOOD PRESSURE: 66 MMHG | OXYGEN SATURATION: 95 % | WEIGHT: 158.8 LBS | SYSTOLIC BLOOD PRESSURE: 108 MMHG | HEART RATE: 63 BPM

## 2024-04-12 DIAGNOSIS — F32.A DEPRESSION, UNSPECIFIED DEPRESSION TYPE: ICD-10-CM

## 2024-04-12 DIAGNOSIS — I10 ESSENTIAL HYPERTENSION: ICD-10-CM

## 2024-04-12 DIAGNOSIS — R19.7 DIARRHEA, UNSPECIFIED TYPE: ICD-10-CM

## 2024-04-12 DIAGNOSIS — E11.42 TYPE 2 DIABETES MELLITUS WITH DIABETIC POLYNEUROPATHY, WITHOUT LONG-TERM CURRENT USE OF INSULIN (HCC): Primary | ICD-10-CM

## 2024-04-12 DIAGNOSIS — K59.00 CONSTIPATION, UNSPECIFIED CONSTIPATION TYPE: Primary | ICD-10-CM

## 2024-04-12 DIAGNOSIS — H91.91 HEARING PROBLEM OF RIGHT EAR: ICD-10-CM

## 2024-04-12 DIAGNOSIS — J45.40 MODERATE PERSISTENT ASTHMA WITHOUT COMPLICATION: ICD-10-CM

## 2024-04-12 DIAGNOSIS — H93.8X1 SENSATION OF PLUGGED EAR ON RIGHT SIDE: ICD-10-CM

## 2024-04-12 DIAGNOSIS — E11.42 TYPE 2 DIABETES MELLITUS WITH DIABETIC POLYNEUROPATHY, WITHOUT LONG-TERM CURRENT USE OF INSULIN (HCC): ICD-10-CM

## 2024-04-12 PROCEDURE — G8427 DOCREV CUR MEDS BY ELIG CLIN: HCPCS | Performed by: INTERNAL MEDICINE

## 2024-04-12 PROCEDURE — G8399 PT W/DXA RESULTS DOCUMENT: HCPCS | Performed by: INTERNAL MEDICINE

## 2024-04-12 PROCEDURE — 99214 OFFICE O/P EST MOD 30 MIN: CPT | Performed by: INTERNAL MEDICINE

## 2024-04-12 PROCEDURE — 2022F DILAT RTA XM EVC RTNOPTHY: CPT | Performed by: INTERNAL MEDICINE

## 2024-04-12 PROCEDURE — G8417 CALC BMI ABV UP PARAM F/U: HCPCS | Performed by: INTERNAL MEDICINE

## 2024-04-12 PROCEDURE — 1036F TOBACCO NON-USER: CPT | Performed by: INTERNAL MEDICINE

## 2024-04-12 PROCEDURE — 1090F PRES/ABSN URINE INCON ASSESS: CPT | Performed by: INTERNAL MEDICINE

## 2024-04-12 PROCEDURE — 3074F SYST BP LT 130 MM HG: CPT | Performed by: INTERNAL MEDICINE

## 2024-04-12 PROCEDURE — 3017F COLORECTAL CA SCREEN DOC REV: CPT | Performed by: INTERNAL MEDICINE

## 2024-04-12 PROCEDURE — 3046F HEMOGLOBIN A1C LEVEL >9.0%: CPT | Performed by: INTERNAL MEDICINE

## 2024-04-12 PROCEDURE — 1124F ACP DISCUSS-NO DSCNMKR DOCD: CPT | Performed by: INTERNAL MEDICINE

## 2024-04-12 PROCEDURE — 3078F DIAST BP <80 MM HG: CPT | Performed by: INTERNAL MEDICINE

## 2024-04-12 NOTE — TELEPHONE ENCOUNTER
Please contact patient. She has several social work needs. Patient needs assistance at home. Patient needs assistance with medications. Patient states she needs money. Patient states she needs an  in [place] for a Worker's comp case from [place].

## 2024-04-12 NOTE — PROGRESS NOTES
Date of Visit: 2024    Tatiana Shepard (:  1955) is a 68 y.o. female,  Established patient here for evaluation of the following chief complaint(s):  Diarrhea, Constipation, Weight Loss (Patient has dropped weight recently without trying), and Home Health nurse (Patient states she was supposed to have nurse come out for her medication set up at home. She never heard anything from them. )      ASSESSMENT/PLAN:    1. Constipation, unspecified constipation type  -better  -continue stool softener once daily  -Continue Trulance every 2-3 days    2. Diarrhea, unspecified type  -resolved    3. Depression, unspecified depression type  -not controlled  -patient declines medication  -patient needs to resume counseling    4. Sensation of plugged ear on right side  - ear exam normal  - Referral to Eliu Mendoza MD, Otolaryngology, [facility]    5. Hearing problem of right ear  -ear exam normal  -Referral to  Eliu Mendoza MD, Otolaryngology, [facility]    6. Type 2 diabetes mellitus with diabetic polyneuropathy, without long-term current use of insulin ([facility])  -stable  -due for urine microalbumin  -Microalbumin / Creatinine Urine Ratio        Return in about 3 months (around 2024) for diabetes, hypertension, neuropathy, hyperlipidemia.    SUBJECTIVE:    Patient has had diarrhea and constipation. Patient states diarrhea resolved. Patient's constipation is better. Patient states she takes a stool softener once daily. Patient states Trulance every 2-3 days.    Patient states she had coughing with asthma attack.    Patient has depression. Patient states she cries because she wants to do things she can't. Patient states she wants to talk to somebody about her workers comp case. Patient states she had a counselor but they would just watch each other. Patient states if she didn't say something the counselor didn't push her to talk. Patient states sometimes she doesn't know what to

## 2024-04-12 NOTE — TELEPHONE ENCOUNTER
Call [facility]. Patient needs nursing for medication set up. Patient has had problems with medication mistakes. Patient saw Neuropsychology and has mild cognitive impairment based on results.

## 2024-04-12 NOTE — TELEPHONE ENCOUNTER
Patient wants to make sure I received her diabetic eye exam results. She states she signed a form last visit. Please follow up.

## 2024-04-13 LAB
CREAT UR-MCNC: 102.2 MG/DL (ref 28–259)
MICROALBUMIN UR DL<=1MG/L-MCNC: <1.2 MG/DL
MICROALBUMIN/CREAT UR: NORMAL MG/G (ref 0–30)

## 2024-04-16 ENCOUNTER — TELEPHONE (OUTPATIENT)
Dept: PRIMARY CARE CLINIC | Age: 69
End: 2024-04-16

## 2024-04-16 ENCOUNTER — CARE COORDINATION (OUTPATIENT)
Dept: CARE COORDINATION | Age: 69
End: 2024-04-16

## 2024-04-16 ENCOUNTER — CARE COORDINATION (OUTPATIENT)
Dept: PRIMARY CARE CLINIC | Age: 69
End: 2024-04-16

## 2024-04-16 DIAGNOSIS — I10 ESSENTIAL HYPERTENSION: Primary | Chronic | ICD-10-CM

## 2024-04-16 DIAGNOSIS — J45.20 MILD INTERMITTENT ASTHMA, UNSPECIFIED WHETHER COMPLICATED: ICD-10-CM

## 2024-04-16 NOTE — TELEPHONE ENCOUNTER
Called and spoke with patient, she states when she walks around the house her oxygen gets down to 84% her legs get weak. When she is sitting her oxygen is at 91%. This nurse recommended patient go to the ER for evaluation patient refused. This nurse also recommended if oxygen stays under 90% for longer than 5 minutes she should seek medical attention as well as follow up with her pulmonologist and cardiologist regarding her symptoms.

## 2024-04-16 NOTE — PROGRESS NOTES
Remote Patient Monitoring Treatment Plan    Received request from AC/Christine Hicks, RN   to order remote patient monitoring for in home monitoring of HTN; Condition managed by Dr. Derian Dukes ([facility]).  Asthma; Condition managed by Dr. Lul Thompson ([facility]).  and order completed.     Patient will be monitoring blood pressure   pulse ox   survey questions.      Patient will engage in Remote Patient Monitoring each day to develop the skills necessary for self management.       RPM Care Team Responsibilities:   Alerts will be reviewed daily and addressed within 2-4 hours during operational hours (Monday -Friday 9 am-4 pm)  Alert response and intervention documented in patient medical record  Alert response escalated to PCP per protocol and documented in patient medical record  Patient monitored over approximately  days  Discharge from program based on self-management readiness    See care coordination encounters for additional details.

## 2024-04-16 NOTE — CARE COORDINATION
Ambulatory Care Coordination Note  2024    Patient Current Location:  Home: [address] [address] [address]     ACM contacted the patient by telephone. Verified name and  with patient as identifiers. Provided introduction to self, and explanation of the ACM role.     Challenges to be reviewed by the provider   Additional needs identified to be addressed with provider: No  none               Method of communication with provider: chart routing.    ACM: Christine Martel RN     ACM completed follow up call with patient who said she has had some difficulty breath today but her oxygen levels are 96% now on RA. Patient said she has recently completed 6 min walk test which was normal and she maintained oxygen levels of 94% while walking per patient. Patient said she coughed up white frothy phlegm this morning. Patient denies any swelling and says she has been losing weight not retaining weight. ACM offered RPM and discussed the program in detail. Patient agreed to enroll for close monitoring.     Patient expressed concerns about needing an . ACM did provide [employer] phone number to patient who  is going to call regarding her workers comp case. Patient expressed Trulicity is to expensive and is wanting to look at PAP to help cover costs.     ACM and patient spoke for over  20 mins and throughout patient was able to maintain oxygen levels greater than 95%. ACM did explain when patient should seek medical attention but patient refused. She said she will get there and then told to leave because there is no reason to keep her. ACM still provided the education patient verbalized understanding.     Plan:    RPM enrollment  SW referral  Route to PCP    Offered patient enrollment in the Remote Patient Monitoring (RPM) program for in-home monitoring: Yes, patient enrolled:     Remote Patient Monitoring Enrollment Note    Date/Time:  2024 10:47 AM    Offered patient enrollment in the [facility]

## 2024-04-16 NOTE — CARE COORDINATION
RPM Kit Order    Remote Patient Kit Ordering Note      Date/Time:  4/16/2024 11:48 AM      [facility]SS placed phone call to patient/family today to notify of RPM kit order; patient/family was available; discussed the following topics below and all questions answered.    [x] [facility]  [x] Patient will receive package over the next 1-3 business days. Someone 21 years or older must be present to sign for UPS delivery.  [x] HRS will contact patient within 24 hours, an HRS  will call the patient directly: If the patient does not answer, HRS will follow up with the clinical team notifying them about the unsuccessful attempt to contact the patient. HRS will make three call attempts to the patient.Provide patient with [facility] Virtual install number is: [phone].  [x] ACM will contact patient once equipment is active to welcome them to the program.                                                         [x] Hours of RPM monitoring - Monday-Friday 3636-3055; encourage patient to get vitals entered by Noon each day to have the alert addressed same day.  [x][facility] mailed RPM Patient flyer to patient.                      ACM made aware the RPM kit has been ordered.

## 2024-04-16 NOTE — TELEPHONE ENCOUNTER
Pt called and wants Dr. Espitia or Ro to call pt.   Oxygen at 9:30am was 84. Pls call pt back to discuss

## 2024-04-19 ENCOUNTER — CARE COORDINATION (OUTPATIENT)
Dept: CARE COORDINATION | Age: 69
End: 2024-04-19

## 2024-04-19 NOTE — CARE COORDINATION
SW received referral from [facility] for assistance with community resource needs.   KENNY placed call to patient and informed her that [facility] is not accepting new patient referrals at this time.   KENNY provided her with the contact number for [facility] [phone] to inquire about Medicare Extra Help program and enrolling in Part D.   SW to touch base with patient early next week for follow up.      Carli Heard MSW, LSW     [phone]

## 2024-04-19 NOTE — CARE COORDINATION
ACM attempted outreach call but patient driving and asked ACM to call back in 5 mins.     Remote Patient Monitoring Welcome Note Date/Time: 2024 2:13 PM Patient Current Location: Home: [address] [place], [place] [id number] Verified patients name and  as identifiers. Completed and confirmed the following: Emergency Contact: Nydia Loganga [phone] [x] Patient received all RPM equipment (tablet, scale, blood pressure device and cuff, and pulse oximeter)  Cuff Size: regular (9.05\"-15.74\")  Weight Scale: regular (<330lbs) [x] Instructed patient keep box for use when returning equipment [x] Reviewed Patient Welcome Letter with patient [x] Reviewed Consent Form  Copy of consent form in chart. [x] Reviewed expectations for patient and care team  Monitoring hours M-F 9-4pm  It is important to take your vitals every day, even on the weekends,to keep your care team aware of how you are doing every day of the week.  Completing monitoring by 12pm on  so that alerts can be responded to in the same day  Patient weighs self at same time every day (or after urinating and waking up)  Take blood pressure 1-2 hrs after medications  RPM team may have different phone area code (including [place], [place], [place] or [place])                        [x] Instructed patient to keep scale on flat surface [x] Instructed patient to keep tablet plugged in at all times [x] Instructed how to contact IT support ([phone]) [x] Provided Remote Patient Monitoring care  information All questions answered at this time. ---- Current Patient Metrics ---- Blood Pressure: 124/63, 84bpm Pulseox: 97%, 83bpm Survey: 0/5 Note Created at: 2024 02:22 PM ET ---- Time-Spent: 3 minutes 0 seconds

## 2024-04-22 ENCOUNTER — OFFICE VISIT (OUTPATIENT)
Dept: PULMONOLOGY | Age: 69
End: 2024-04-22
Payer: MEDICARE

## 2024-04-22 VITALS
WEIGHT: 161 LBS | SYSTOLIC BLOOD PRESSURE: 104 MMHG | HEIGHT: 61 IN | OXYGEN SATURATION: 95 % | DIASTOLIC BLOOD PRESSURE: 72 MMHG | BODY MASS INDEX: 30.4 KG/M2 | HEART RATE: 92 BPM

## 2024-04-22 DIAGNOSIS — J45.40 ASTHMA, MODERATE PERSISTENT, WELL-CONTROLLED: Primary | ICD-10-CM

## 2024-04-22 PROBLEM — F32.A DEPRESSION: Status: ACTIVE | Noted: 2024-04-22

## 2024-04-22 PROBLEM — H93.8X1 SENSATION OF PLUGGED EAR ON RIGHT SIDE: Status: ACTIVE | Noted: 2024-04-22

## 2024-04-22 PROBLEM — H91.91 HEARING PROBLEM OF RIGHT EAR: Status: ACTIVE | Noted: 2024-04-22

## 2024-04-22 PROCEDURE — 1036F TOBACCO NON-USER: CPT | Performed by: INTERNAL MEDICINE

## 2024-04-22 PROCEDURE — G8427 DOCREV CUR MEDS BY ELIG CLIN: HCPCS | Performed by: INTERNAL MEDICINE

## 2024-04-22 PROCEDURE — 3078F DIAST BP <80 MM HG: CPT | Performed by: INTERNAL MEDICINE

## 2024-04-22 PROCEDURE — 3017F COLORECTAL CA SCREEN DOC REV: CPT | Performed by: INTERNAL MEDICINE

## 2024-04-22 PROCEDURE — G8399 PT W/DXA RESULTS DOCUMENT: HCPCS | Performed by: INTERNAL MEDICINE

## 2024-04-22 PROCEDURE — 3074F SYST BP LT 130 MM HG: CPT | Performed by: INTERNAL MEDICINE

## 2024-04-22 PROCEDURE — 1124F ACP DISCUSS-NO DSCNMKR DOCD: CPT | Performed by: INTERNAL MEDICINE

## 2024-04-22 PROCEDURE — G8417 CALC BMI ABV UP PARAM F/U: HCPCS | Performed by: INTERNAL MEDICINE

## 2024-04-22 PROCEDURE — 99213 OFFICE O/P EST LOW 20 MIN: CPT | Performed by: INTERNAL MEDICINE

## 2024-04-22 PROCEDURE — 1090F PRES/ABSN URINE INCON ASSESS: CPT | Performed by: INTERNAL MEDICINE

## 2024-04-22 RX ORDER — MONTELUKAST SODIUM 10 MG/1
10 TABLET ORAL DAILY
Qty: 90 TABLET | Refills: 3 | Status: SHIPPED | OUTPATIENT
Start: 2024-04-22

## 2024-04-22 ASSESSMENT — ENCOUNTER SYMPTOMS
CONSTIPATION: 1
SINUS PRESSURE: 0
COUGH: 1
ABDOMINAL PAIN: 0
CHEST TIGHTNESS: 0
DIARRHEA: 0
BLOOD IN STOOL: 0
SINUS PAIN: 0
NAUSEA: 0
VOMITING: 0
SORE THROAT: 0
SHORTNESS OF BREATH: 0
RHINORRHEA: 0
WHEEZING: 0

## 2024-04-22 NOTE — PROGRESS NOTES
diskus inhaler Inhale 1 puff into the lungs in the morning and 1 puff in the evening. 180 each 1    dilTIAZem (CARDIZEM CD) 240 MG extended release capsule Take 1 capsule by mouth daily 90 capsule 2    nitroGLYCERIN (NITRODUR) 0.4 MG/HR Place 1 patch onto the skin daily 30 patch 3    nitroGLYCERIN (NITROSTAT) 0.4 MG SL tablet Place 1 tablet under the tongue every 5 minutes as needed for Chest pain up to max of 3 total doses. If no relief after 1 dose, call 911. 25 tablet 3    ranolazine (RANEXA) 500 MG extended release tablet Take 1 tablet by mouth 2 times daily 180 tablet 3    docusate sodium (COLACE) 100 MG capsule Take 1 capsule by mouth 2 times daily 60 capsule 1    Alum Hydroxide-Mag Carbonate (GAVISCON PO) Take by mouth      butalbital-acetaminophen-caffeine (FIORICET, ESGIC) -40 MG per tablet Take 1 tablet by mouth every 8 hours as needed for Headaches      memantine (NAMENDA) 5 MG tablet Take 1 tablet by mouth 2 times daily      Respiratory Therapy Supplies (NEBULIZER/TUBING/MOUTHPIECE) KIT 1 kit by Does not apply route daily as needed (for shortness of breath and wheezing) 1 kit 0    albuterol (PROVENTIL) (2.5 MG/3ML) 0.083% nebulizer solution Take 3 mLs by nebulization 4 times daily as needed for Wheezing 120 each 0    tiotropium (SPIRIVA HANDIHALER) 18 MCG inhalation capsule Inhale 1 capsule into the lungs daily 90 capsule 1    fluticasone (FLONASE) 50 MCG/ACT nasal spray USE 2 SPRAYS NASALLY DAILY 48 g 1    albuterol sulfate HFA (PROVENTIL HFA) 108 (90 Base) MCG/ACT inhaler Inhale 2 puffs into the lungs every 4 hours as needed for Wheezing or Shortness of Breath 36 g 5    diclofenac sodium (VOLTAREN) 1 % GEL APPLY 2 GRAMS TOPICALLY 4  TIMES A  g 0    gabapentin (NEURONTIN) 300 MG capsule Take 1 capsule at dinner and 1 capsule at bedtime      pantoprazole (PROTONIX) 40 MG tablet TAKE 1 TABLET DAILY 90 tablet 1    cloNIDine (CATAPRES) 0.1 MG/24HR PTWK APPLY 1 PATCH ONTO THE SKINONCE A WEEK 12

## 2024-04-23 ENCOUNTER — CARE COORDINATION (OUTPATIENT)
Dept: CARE COORDINATION | Age: 69
End: 2024-04-23

## 2024-04-23 NOTE — CARE COORDINATION
SW placed follow up call to patient who stated she contacted [facility] and she is not eligible for the Extra Help program. She was instructed to call back in the fall during open enrollment to enroll in Medicare Part D. KENNY provided patient with [facility] contact number again as well as this worker's number for future reference. No additional resources available at this time. SW encouraged patient to call with additional questions in the future if needed.     Will sign off at this time.     Carli Heard MSW, LSW     [phone]

## 2024-04-24 DIAGNOSIS — M19.012 ARTHRITIS OF LEFT ACROMIOCLAVICULAR JOINT: ICD-10-CM

## 2024-04-24 DIAGNOSIS — K21.00 GASTROESOPHAGEAL REFLUX DISEASE WITH ESOPHAGITIS WITHOUT HEMORRHAGE: ICD-10-CM

## 2024-04-24 RX ORDER — PANTOPRAZOLE SODIUM 40 MG/1
40 TABLET, DELAYED RELEASE ORAL DAILY
Qty: 90 TABLET | Refills: 1 | Status: SHIPPED | OUTPATIENT
Start: 2024-04-24

## 2024-04-24 NOTE — TELEPHONE ENCOUNTER
Medication:   Requested Prescriptions     Pending Prescriptions Disp Refills    diclofenac sodium (VOLTAREN) 1 % GEL [Pharmacy Med Name: DICLOFENAC GEL 1%] 300 g 0     Sig: APPLY 2 GRAMS TOPICALLY 4  TIMES A DAY     Last Filled:  12/15/2023    Last appt: 4/12/2024   Next appt: 4/24/2024    Last [place]:       8/29/2023     6:55 PM   RX Monitoring   Periodic Controlled Substance Monitoring No signs of potential drug abuse or diversion identified.

## 2024-04-24 NOTE — TELEPHONE ENCOUNTER
Medication:   Requested Prescriptions     Pending Prescriptions Disp Refills    pantoprazole (PROTONIX) 40 MG tablet [Pharmacy Med Name: PANTOPRAZOLE TAB 40MG DR] 90 tablet 1     Sig: TAKE 1 TABLET DAILY     Last Filled:  11/15/2023    Last appt: 4/12/2024   Next appt: 7/17/2024    Last [place]:       8/29/2023     6:55 PM   RX Monitoring   Periodic Controlled Substance Monitoring No signs of potential drug abuse or diversion identified.

## 2024-04-26 ENCOUNTER — NURSE ONLY (OUTPATIENT)
Dept: ENDOCRINOLOGY | Age: 69
End: 2024-04-26
Payer: MEDICARE

## 2024-04-26 VITALS — OXYGEN SATURATION: 97 % | SYSTOLIC BLOOD PRESSURE: 126 MMHG | DIASTOLIC BLOOD PRESSURE: 82 MMHG | HEART RATE: 96 BPM

## 2024-04-26 DIAGNOSIS — M79.604 CHRONIC PAIN OF RIGHT LOWER EXTREMITY: ICD-10-CM

## 2024-04-26 DIAGNOSIS — E11.42 TYPE 2 DIABETES MELLITUS WITH DIABETIC POLYNEUROPATHY, WITHOUT LONG-TERM CURRENT USE OF INSULIN (HCC): ICD-10-CM

## 2024-04-26 DIAGNOSIS — G89.29 CHRONIC PAIN OF RIGHT LOWER EXTREMITY: ICD-10-CM

## 2024-04-26 DIAGNOSIS — M85.80 OSTEOPENIA WITH HIGH RISK OF FRACTURE: Primary | ICD-10-CM

## 2024-04-26 PROCEDURE — 96372 THER/PROPH/DIAG INJ SC/IM: CPT | Performed by: INTERNAL MEDICINE

## 2024-04-26 RX ORDER — GABAPENTIN 300 MG/1
CAPSULE ORAL
Qty: 90 CAPSULE | Status: CANCELLED | OUTPATIENT
Start: 2024-04-26

## 2024-04-26 NOTE — TELEPHONE ENCOUNTER
Medication:   Requested Prescriptions     Pending Prescriptions Disp Refills    gabapentin (NEURONTIN) 300 MG capsule 90 capsule      Sig: Take 1 capsule at dinner and 1 capsule at bedtime     Last Filled:  12/13/2023    Last appt: 4/12/2024   Next appt: 7/17/2024    Last OARRS:       8/29/2023     6:55 PM   RX Monitoring   Periodic Controlled Substance Monitoring No signs of potential drug abuse or diversion identified.

## 2024-04-29 ENCOUNTER — TELEPHONE (OUTPATIENT)
Dept: ADMINISTRATIVE | Age: 69
End: 2024-04-29

## 2024-04-29 NOTE — TELEPHONE ENCOUNTER
Fax referral to [facility] in addition to requested information. Call [facility] to make sure referral has been received.

## 2024-04-29 NOTE — TELEPHONE ENCOUNTER
Submitted PA for Trulance 3MG tablets   Via [facility] Key: [id number]  STATUS: PENDING.    Follow up done daily; if no decision with in three days we will refax.  If another three days goes by with no decision will call the insurance for status.

## 2024-04-30 NOTE — TELEPHONE ENCOUNTER
The medication is APPROVED. LETTER AVAILABLE IN MEDIA  APPROVED 01/01/2024 - 04/29/2025    If this requires a response please respond to the pool ( P MHCX PSC MEDICATION PRE-AUTH).      Thank you please advise patient.

## 2024-05-01 ENCOUNTER — TELEPHONE (OUTPATIENT)
Dept: PRIMARY CARE CLINIC | Age: 69
End: 2024-05-01

## 2024-05-01 RX ORDER — GABAPENTIN 300 MG/1
600 CAPSULE ORAL NIGHTLY
Qty: 180 CAPSULE | Refills: 1 | Status: SHIPPED | OUTPATIENT
Start: 2024-05-01 | End: 2024-10-28

## 2024-05-01 NOTE — TELEPHONE ENCOUNTER
Joceline from [facility] called about patient, she stated she was going to admit her but patient has had no significant changes and to fill her medi set is not a billable code for insurance. Joceline recommended a psych home care [facility] in which would be more beneficial for the patient. And the patient agreed to this, [facility] is going to send the referral to [facility] to get the admission process started.

## 2024-05-02 ENCOUNTER — TELEPHONE (OUTPATIENT)
Dept: PRIMARY CARE CLINIC | Age: 69
End: 2024-05-02

## 2024-05-02 ENCOUNTER — TELEPHONE (OUTPATIENT)
Dept: CARDIOLOGY CLINIC | Age: 69
End: 2024-05-02

## 2024-05-02 ENCOUNTER — CARE COORDINATION (OUTPATIENT)
Dept: CASE MANAGEMENT | Age: 69
End: 2024-05-02

## 2024-05-02 RX ORDER — NITROGLYCERIN 80 MG/1
1 PATCH TRANSDERMAL DAILY
Qty: 90 PATCH | Refills: 3 | Status: SHIPPED | OUTPATIENT
Start: 2024-05-02

## 2024-05-02 NOTE — CARE COORDINATION
-Remote Alert Monitoring Note      Date/Time:  2024 12:22 PM  Patient Current Location: [place]  Verified patients name and  as identifiers.    Rpm alert to be reviewed by the provider   red alert  pulse ox reading (91%)  Additional needs to be addressed by provider:  LPN contacted pt in regard to RPM red alert for PO of 91%. Pt c/o exertional SOB. Pt also c/o extreme fatigue and weakness. Pt states that she has bilateral pain and a \"pounding sensation\" in her legs, however the right leg has more intense pain. Pt states that the pain in her legs is not new, but has suddenly become more intense. Pt also states that she has had some dizziness for the past couple of weeks as well. Pt did recheck PO, with updated PO at 92%. Pt denied chest pain. Please advise, thank you.  Pt taking all meds as prescribed.                   LPN contacted patient by telephone regarding red alert received for Pulse Ox: 91%.  Background: RPM for HTN, Asthma  Refer to 911 immediately if:  Patient unresponsive or unable to provide history  Change in cognition or sudden confusion  Patient unable to respond in complete sentences  Intense chest pain/tightness  Any concern for any clinical emergency  Red Alert: Provider response time of 1 hr required for any red alert requiring intervention  Yellow Alert: Provider response time of 3hr required for any escalated yellow alert    Patient Chief Complaint:  O2 Triage  Are you having any Chest Pain? no   Are you having any Shortness of Breath? Yes, exertional   Swelling in your hands or feet? no     Are you having any other health concerns or issues? Yes, see note       Clinical Interventions: LPN contacted pt in regard to RPM red alert for PO of 91%. Pt c/o exertional SOB. Pt also c/o extreme fatigue and weakness. Pt states that she has bilateral pain and a \"pounding sensation\" in her legs, however the right leg has more intense pain. Pt states that the pain in her legs is not new, but has

## 2024-05-02 NOTE — TELEPHONE ENCOUNTER
Pt is requesting Rx refill. She is wanting a 90 day supply.    Medication  nitroGLYCERIN (NITRODUR) 0.4 MG/HR [[id number]]  nitroGLYCERIN (NITRODUR) 0.4 MG/HR [[id number]]    Order Details  Dose: 1 patch Route: TransDERmal Frequency: DAILY   Dispense Quantity: 30 patch Refills: 3          Sig: Place 1 patch onto the skin daily         Pharmacy    [facility] - [place] [facility] - [address]  [phone] - F [phone]  [address] [address]  Phone: [phone]  Fax: [phone]

## 2024-05-02 NOTE — TELEPHONE ENCOUNTER
05/02/24 1:57 PM     REMOTE PATIENT MONITORING HIGH ALERT RESPONSE         ASSESSMENT AND PLAN   Low pulse ox, positive asthma symptom survey response  --speaking in complete sentences on phone. Denies new cough or wheezing today. Repeated pulse ox on phone and per pt report, is now 93%. Reports help from twice daily Wixela Inhub prescribed by PCP.  SOB  --reports she has this often and it is worse in the mornings. States it is better at this moment.  CP  --her regular team is aware of this. She reports right-sided CP when she has it. States there is nothing new with this today. Advised if it worsens or changes at any time, she should be evaluated in ED.  Dizziness  --this has been happening for the last 2 weeks. Confirms it is more like lightheadedness. Asked if she checks her BS when it occurs. She states she does not and is not interested in doing this but does have a glucometer.  Bilateral leg pain  --again, not a new issue for patient and it appears her team is aware.  Anxiety?  --with so many health issues, has she had evaluation for anxiety? Is she willing to see someone or discuss with Dr. Espitia?       CHIEF COMPLAINT    Responding to a high RPM alert for oxygen sat of 91% at 12:01P; per RPM LPN's note improved to 92% on recheck (was 96% at 10:12A today); also asthma symptom survey positive response to increased or new coughing or wheezing in last 24 hours    HPI    Tatiana Shepard is a 68 y.o. female who presents sounding very worried and stressed. Denies fever and chills, but does report ongoing weakness and fatigue that are not acutely worse today. Reports SOB and HANCOCK that are worse in the mornings. Denies new or worsening cough or wheezing today. Speaking very fluently and easily on the phone with no noted dyspnea heard. Reports improvement in expectoration when she has cough since starting Wixela Inhub twice daily from PCP. Right sided chest pain, muscle pain in legs--states not worsening and own

## 2024-05-03 ENCOUNTER — CARE COORDINATION (OUTPATIENT)
Dept: CARE COORDINATION | Age: 69
End: 2024-05-03

## 2024-05-03 ENCOUNTER — TELEPHONE (OUTPATIENT)
Dept: PRIMARY CARE CLINIC | Age: 69
End: 2024-05-03

## 2024-05-03 NOTE — TELEPHONE ENCOUNTER
Patient was not appropriate for HHC with [facility] per nurse with C. This is documented in a previous encounter. Wang Callejas was to send referral to [facility] for patient.

## 2024-05-03 NOTE — TELEPHONE ENCOUNTER
Dr. Floresita Ontiveros from [facility] called and wanted Dr. Espitia's approval before she starts pt on amitriptyline. She said she usually starts pts at 10mg and goes up weekly to 50mg at most. Please review and contact Dr. Ontiveros once completed.

## 2024-05-03 NOTE — CARE COORDINATION
ACM placed call to patient who said she is waiting for a virtual call with a provider. ACM will reach out at a later time.

## 2024-05-06 ENCOUNTER — CARE COORDINATION (OUTPATIENT)
Dept: CARE COORDINATION | Age: 69
End: 2024-05-06

## 2024-05-06 DIAGNOSIS — J45.40 MODERATE PERSISTENT ASTHMA WITHOUT COMPLICATION: ICD-10-CM

## 2024-05-06 RX ORDER — ALBUTEROL SULFATE 2.5 MG/3ML
2.5 SOLUTION RESPIRATORY (INHALATION) 4 TIMES DAILY PRN
Qty: 120 EACH | Refills: 0 | Status: SHIPPED | OUTPATIENT
Start: 2024-05-06

## 2024-05-06 NOTE — CARE COORDINATION
Ambulatory Care Coordination Note  2024    Patient Current Location:  Home: [address] [place] [id number]     ACM contacted the patient by telephone. Verified name and  with patient as identifiers. Provided introduction to self, and explanation of the ACM role.     Challenges to be reviewed by the provider   Additional needs identified to be addressed with provider: No  none               Method of communication with provider: chart routing.    ACM: Christine Martel RN     ACM completed follow up call with patient who said she is not going to participate with [facility] at this time. Patient said she is active with RPM but was told she needed new equipment. ACM will route to RPM team to confirm this. Patient has no other questions or concerns at this time.     Plan:    F/U call 1 week      Offered patient enrollment in the Remote Patient Monitoring (RPM) program for in-home monitoring: Yes, patient enrolled; current status is activated and monitoring.    Lab Results       None            Care Coordination Interventions    Referral from Primary Care Provider: No  Suggested Interventions and Community Resources  Meals on Wheels: Completed (Comment: Active with Meals on Wheels)  Medication Assistance Program: In Process (Comment: [employer] PAP form to mail out)  Social Work: In Process (Comment: SDOH needs)  Specialty Services Referral: In Process (Comment: PRO-seniors information given)          Goals Addressed    None         Future Appointments   Date Time Provider Department Center   2024  3:15 PM Raisa Espitia MD [facility] [place] - D[facility]   2024 10:30 AM Regla Marshall AuD [facility]   2024 11:00 AM Refugio Tyler DO [facility]   2024 11:45 AM Raisa Espitia MD [facility] [place] - D[facility]   2024  2:20 PM Lul Thompson MD [facility]ULM [facility]   2024  2:00 PM Derian Dukes MD [facility]   10/30/2024  1:45 PM SCHEDULE, [facility] & OSTEOPOROSIS [facility]PHYS [facility]   2025

## 2024-05-07 RX ORDER — ALBUTEROL SULFATE 90 UG/1
2 AEROSOL, METERED RESPIRATORY (INHALATION) EVERY 4 HOURS PRN
Qty: 36 G | Refills: 5 | Status: SHIPPED | OUTPATIENT
Start: 2024-05-07 | End: 2025-05-07

## 2024-05-07 NOTE — TELEPHONE ENCOUNTER
[place]rommel noted thank you. I noticed the same and did not see any communications amongst our team for new equipment needing ordered. Thank you

## 2024-05-07 NOTE — TELEPHONE ENCOUNTER
Tatiana requests albuterol inhaler prescription be sent to [facility] mail order pharmacy.  Albuterol nebulizer sol'n had been previously sent, but patient does not have nebulizer.  She requests inhaler.  Order for that had previously been sent to [facility] pharmacy.  Please see pended order.

## 2024-05-09 ENCOUNTER — CARE COORDINATION (OUTPATIENT)
Dept: CARE COORDINATION | Age: 69
End: 2024-05-09

## 2024-05-09 ENCOUNTER — OFFICE VISIT (OUTPATIENT)
Dept: PRIMARY CARE CLINIC | Age: 69
End: 2024-05-09

## 2024-05-09 VITALS
TEMPERATURE: 97.1 F | WEIGHT: 158 LBS | OXYGEN SATURATION: 93 % | HEART RATE: 84 BPM | BODY MASS INDEX: 31.02 KG/M2 | SYSTOLIC BLOOD PRESSURE: 138 MMHG | RESPIRATION RATE: 16 BRPM | HEIGHT: 60 IN | DIASTOLIC BLOOD PRESSURE: 88 MMHG

## 2024-05-09 DIAGNOSIS — R11.0 NAUSEA: ICD-10-CM

## 2024-05-09 DIAGNOSIS — M54.2 NECK PAIN: ICD-10-CM

## 2024-05-09 DIAGNOSIS — T14.8XXA BRUISING: ICD-10-CM

## 2024-05-09 DIAGNOSIS — R42 DIZZINESS: Primary | ICD-10-CM

## 2024-05-09 DIAGNOSIS — R26.89 BALANCE DISORDER: ICD-10-CM

## 2024-05-09 DIAGNOSIS — R42 DIZZINESS: ICD-10-CM

## 2024-05-09 LAB
ANION GAP SERPL CALCULATED.3IONS-SCNC: 13 MMOL/L (ref 3–16)
BILIRUBIN, POC: 1.01
BLOOD URINE, POC: NORMAL
BUN SERPL-MCNC: 18 MG/DL (ref 7–20)
CALCIUM SERPL-MCNC: 9.8 MG/DL (ref 8.3–10.6)
CHLORIDE SERPL-SCNC: 102 MMOL/L (ref 99–110)
CLARITY, POC: NORMAL
CO2 SERPL-SCNC: 25 MMOL/L (ref 21–32)
COLOR, POC: NORMAL
CREAT SERPL-MCNC: 0.8 MG/DL (ref 0.6–1.2)
DEPRECATED RDW RBC AUTO: 13.8 % (ref 12.4–15.4)
GFR SERPLBLD CREATININE-BSD FMLA CKD-EPI: 80 ML/MIN/{1.73_M2}
GLUCOSE SERPL-MCNC: 123 MG/DL (ref 70–99)
GLUCOSE URINE, POC: NORMAL
HCT VFR BLD AUTO: 44.8 % (ref 36–48)
HGB BLD-MCNC: 15.1 G/DL (ref 12–16)
KETONES, POC: NORMAL
LEUKOCYTE EST, POC: NORMAL
MCH RBC QN AUTO: 29.7 PG (ref 26–34)
MCHC RBC AUTO-ENTMCNC: 33.6 G/DL (ref 31–36)
MCV RBC AUTO: 88.3 FL (ref 80–100)
NITRITE, POC: NORMAL
PH, POC: 5.5
PLATELET # BLD AUTO: 252 K/UL (ref 135–450)
PMV BLD AUTO: 8.9 FL (ref 5–10.5)
POTASSIUM SERPL-SCNC: 5.1 MMOL/L (ref 3.5–5.1)
PROTEIN, POC: NORMAL
RBC # BLD AUTO: 5.07 M/UL (ref 4–5.2)
SODIUM SERPL-SCNC: 140 MMOL/L (ref 136–145)
SPECIFIC GRAVITY, POC: 5.5
UROBILINOGEN, POC: 0.2
WBC # BLD AUTO: 7 K/UL (ref 4–11)

## 2024-05-09 NOTE — PROGRESS NOTES
Date of Visit: 2024    Tatiana Shepard (:  1955) is a 68 y.o. female,  Established patient here for evaluation of the following chief complaint(s):  Dizziness (Light headedness)      ASSESSMENT/PLAN:    1. Dizziness  - Basic Metabolic Panel; Future  - CBC; Future  - POCT Urinalysis no Micro  - Vascular duplex carotid bilateral; Future  - stay hydrated    2. Bruising  -monitor  -patient is off aspirin    3. Neck pain  - Tylenol 650mg 3 times daily as needed    4. Balance disorder  -Follow up with Neurology    5. Nausea  -Zofran 4mg 3 times daily as needed        Return in about 2 weeks (around 2024) for dizziness, neck pain, and bruising.    SUBJECTIVE:    Patient complains dizziness longer than 2 weeks. Patient states dizziness is worse when she gets up. Patient states she loses her balance and her body goes to the left or right. Patient states she feels lightheaded and like she wants to vomit. Patient states she took a nausea pill and it helped Saturday a week ago. Patient states she is drinking a little more water than before the past 4 days. Patient complains of neck pain left side of neck. Patient complains of burning and itching right thigh and then noticed a bruise. Patient denies injury to her thigh. Patient states last week she had bruises on her left upper arm and forearm that are fading. Patient states she thinks she hit her arm. Patient states she has a dark spot or bruise on the palm of her hand. Patient states she started fish oil on Friday and has recently also started amitriptyline. Patient has insomnia. Patient states she did not fall asleep until 6:00 AM and only slept until 8:30 AM.      Review of Systems   Constitutional:  Positive for fatigue. Negative for chills and fever.   HENT:  Negative for congestion, ear pain, postnasal drip, rhinorrhea, sinus pressure, sinus pain, sneezing and sore throat.    Eyes:  Negative for visual disturbance.   Respiratory:  Positive for

## 2024-05-09 NOTE — CARE COORDINATION
-Remote Alert Monitoring Note      Date/Time:  2024 9:55 AM  Patient Current Location: [place]  Verified patients name and  as identifiers.    Rpm alert to be reviewed by the provider   red alert  survey response (Pt reports new cough/wheezing in the last 24 hours)  Additional needs to be addressed by provider:  Pt reports coughing, a tickle in her throat that has prevented her from getting a good amount of sleep, body aches. She reports her oxygen has been on the lower end of normal (she had 2 readings this morning, 91% and on recheck 92%) she is scheduled to see PCP this afternoon                      Background: Pt enrolled for HTN and asthma monitoring    Refer to 911 immediately if:  Patient unresponsive or unable to provide history  Change in cognition or sudden confusion  Patient unable to respond in complete sentences  Intense chest pain/tightness  Any concern for any clinical emergency  Red Alert: Provider response time of 1 hr required for any red alert requiring intervention  Yellow Alert: Provider response time of 3hr required for any escalated yellow alert        Clinical Interventions:  Spoke with patient, she reports she has barely gotten any sleep, she has a constant tickle in her throat and experiencing coughing that has started recently. She reports body aches/fatigue. She has already called and scheduled an appt this afternoon with her PCP to be seen. She has not updated BP yet, will monitor for updated BP and also how her appt goes this afternoon. Will send PCP RPM readings for review for her appt         Plan/Follow Up: Will continue to review, monitor and address alerts with follow up based on severity of symptoms and risk factors.  **For any new or worsening symptoms, please contact your Provider or report to the nearest Emergency Room.**

## 2024-05-13 ENCOUNTER — HOSPITAL ENCOUNTER (OUTPATIENT)
Age: 69
Discharge: HOME | End: 2024-05-15
Payer: MEDICARE

## 2024-05-13 DIAGNOSIS — E11.42 TYPE 2 DIABETES MELLITUS WITH DIABETIC POLYNEUROPATHY, WITHOUT LONG-TERM CURRENT USE OF INSULIN (HCC): ICD-10-CM

## 2024-05-13 DIAGNOSIS — R42 DIZZINESS: ICD-10-CM

## 2024-05-13 LAB
VAS LEFT CCA DIST EDV: 13.7 CM/S
VAS LEFT CCA DIST PSV: 48.5 CM/S
VAS LEFT CCA MID EDV: 15.5 CM/S
VAS LEFT CCA MID PSV: 46 CM/S
VAS LEFT CCA PROX EDV: 13.7 CM/S
VAS LEFT CCA PROX PSV: 57.8 CM/S
VAS LEFT ECA EDV: 14.3 CM/S
VAS LEFT ECA PSV: 67.1 CM/S
VAS LEFT ICA DIST EDV: 14.3 CM/S
VAS LEFT ICA DIST PSV: 39.1 CM/S
VAS LEFT ICA MID EDV: 17.4 CM/S
VAS LEFT ICA MID PSV: 37.9 CM/S
VAS LEFT ICA PROX EDV: 8.7 CM/S
VAS LEFT ICA PROX PSV: 30.4 CM/S
VAS LEFT SUBCLAVIAN PROX EDV: 0.6 CM/S
VAS LEFT SUBCLAVIAN PROX PSV: 66.5 CM/S
VAS LEFT VERTEBRAL EDV: 11.8 CM/S
VAS LEFT VERTEBRAL PSV: 31.7 CM/S
VAS RIGHT CCA DIST EDV: 16.8 CM/S
VAS RIGHT CCA DIST PSV: 54 CM/S
VAS RIGHT CCA MID EDV: 18.6 CM/S
VAS RIGHT CCA MID PSV: 50.3 CM/S
VAS RIGHT CCA PROX EDV: 21.1 CM/S
VAS RIGHT CCA PROX PSV: 67.7 CM/S
VAS RIGHT ECA EDV: 12.4 CM/S
VAS RIGHT ECA PSV: 81.4 CM/S
VAS RIGHT ICA DIST EDV: 20.5 CM/S
VAS RIGHT ICA DIST PSV: 36 CM/S
VAS RIGHT ICA MID EDV: 19.3 CM/S
VAS RIGHT ICA MID PSV: 46 CM/S
VAS RIGHT ICA PROX EDV: 11.2 CM/S
VAS RIGHT ICA PROX PSV: 44.1 CM/S
VAS RIGHT SUBCLAVIAN PROX PSV: 77.7 CM/S
VAS RIGHT VERTEBRAL EDV: 13.7 CM/S
VAS RIGHT VERTEBRAL PSV: 32.9 CM/S

## 2024-05-13 PROCEDURE — 93880 EXTRACRANIAL BILAT STUDY: CPT | Performed by: INTERNAL MEDICINE

## 2024-05-13 PROCEDURE — 93880 EXTRACRANIAL BILAT STUDY: CPT

## 2024-05-14 ENCOUNTER — CARE COORDINATION (OUTPATIENT)
Dept: CARE COORDINATION | Age: 69
End: 2024-05-14

## 2024-05-14 RX ORDER — DULAGLUTIDE 0.75 MG/.5ML
INJECTION, SOLUTION SUBCUTANEOUS
Qty: 6 ML | Refills: 1 | Status: SHIPPED | OUTPATIENT
Start: 2024-05-14

## 2024-05-14 NOTE — CARE COORDINATION
OSTEOPOROSIS MHPHYS [facility] MMA   5/13/2025 10:00 AM [facility]   5/13/2025 10:20 AM [facility]    and   General Assessment

## 2024-05-19 DIAGNOSIS — E78.2 MIXED HYPERLIPIDEMIA: Primary | ICD-10-CM

## 2024-05-19 PROBLEM — R26.89 BALANCE DISORDER: Status: ACTIVE | Noted: 2024-05-19

## 2024-05-19 RX ORDER — ROSUVASTATIN CALCIUM 10 MG/1
10 TABLET, COATED ORAL DAILY
Qty: 30 TABLET | Refills: 3 | Status: SHIPPED | OUTPATIENT
Start: 2024-05-19

## 2024-05-19 RX ORDER — ASPIRIN 81 MG/1
81 TABLET ORAL EVERY OTHER DAY
Qty: 30 TABLET | Refills: 2 | Status: SHIPPED | OUTPATIENT
Start: 2024-05-19

## 2024-05-23 ENCOUNTER — OFFICE VISIT (OUTPATIENT)
Age: 69
End: 2024-05-23
Payer: MEDICARE

## 2024-05-23 ENCOUNTER — OFFICE VISIT (OUTPATIENT)
Dept: PRIMARY CARE CLINIC | Age: 69
End: 2024-05-23
Payer: MEDICARE

## 2024-05-23 ENCOUNTER — PROCEDURE VISIT (OUTPATIENT)
Age: 69
End: 2024-05-23
Payer: MEDICARE

## 2024-05-23 VITALS
TEMPERATURE: 98.1 F | HEIGHT: 60 IN | OXYGEN SATURATION: 95 % | HEART RATE: 72 BPM | WEIGHT: 158 LBS | BODY MASS INDEX: 31.02 KG/M2 | SYSTOLIC BLOOD PRESSURE: 133 MMHG | DIASTOLIC BLOOD PRESSURE: 77 MMHG

## 2024-05-23 VITALS
OXYGEN SATURATION: 94 % | SYSTOLIC BLOOD PRESSURE: 138 MMHG | BODY MASS INDEX: 31.33 KG/M2 | HEART RATE: 88 BPM | DIASTOLIC BLOOD PRESSURE: 82 MMHG | WEIGHT: 160.4 LBS

## 2024-05-23 DIAGNOSIS — M26.621 ARTHRALGIA OF RIGHT TEMPOROMANDIBULAR JOINT: ICD-10-CM

## 2024-05-23 DIAGNOSIS — M79.604 RIGHT LEG PAIN: ICD-10-CM

## 2024-05-23 DIAGNOSIS — H93.11 TINNITUS OF RIGHT EAR: ICD-10-CM

## 2024-05-23 DIAGNOSIS — H90.3 SENSORINEURAL HEARING LOSS (SNHL) OF BOTH EARS: Primary | ICD-10-CM

## 2024-05-23 DIAGNOSIS — R26.89 BALANCE DISORDER: ICD-10-CM

## 2024-05-23 DIAGNOSIS — K59.00 CONSTIPATION, UNSPECIFIED CONSTIPATION TYPE: ICD-10-CM

## 2024-05-23 DIAGNOSIS — I65.21 CAROTID ARTERY PLAQUE, RIGHT: ICD-10-CM

## 2024-05-23 DIAGNOSIS — R42 DIZZINESS: Primary | ICD-10-CM

## 2024-05-23 DIAGNOSIS — H90.3 SENSORINEURAL HEARING LOSS (SNHL) OF BOTH EARS: ICD-10-CM

## 2024-05-23 DIAGNOSIS — M54.2 NECK PAIN: ICD-10-CM

## 2024-05-23 DIAGNOSIS — R42 DIZZINESS: ICD-10-CM

## 2024-05-23 DIAGNOSIS — H92.01 RIGHT EAR PAIN: Primary | ICD-10-CM

## 2024-05-23 DIAGNOSIS — J45.40 MODERATE PERSISTENT ASTHMA WITHOUT COMPLICATION: ICD-10-CM

## 2024-05-23 PROCEDURE — 3075F SYST BP GE 130 - 139MM HG: CPT | Performed by: INTERNAL MEDICINE

## 2024-05-23 PROCEDURE — 1036F TOBACCO NON-USER: CPT | Performed by: INTERNAL MEDICINE

## 2024-05-23 PROCEDURE — 3078F DIAST BP <80 MM HG: CPT | Performed by: OTOLARYNGOLOGY

## 2024-05-23 PROCEDURE — G8427 DOCREV CUR MEDS BY ELIG CLIN: HCPCS | Performed by: INTERNAL MEDICINE

## 2024-05-23 PROCEDURE — 3017F COLORECTAL CA SCREEN DOC REV: CPT | Performed by: INTERNAL MEDICINE

## 2024-05-23 PROCEDURE — 92567 TYMPANOMETRY: CPT

## 2024-05-23 PROCEDURE — 99213 OFFICE O/P EST LOW 20 MIN: CPT | Performed by: OTOLARYNGOLOGY

## 2024-05-23 PROCEDURE — G8399 PT W/DXA RESULTS DOCUMENT: HCPCS | Performed by: INTERNAL MEDICINE

## 2024-05-23 PROCEDURE — 3075F SYST BP GE 130 - 139MM HG: CPT | Performed by: OTOLARYNGOLOGY

## 2024-05-23 PROCEDURE — 92504 EAR MICROSCOPY EXAMINATION: CPT | Performed by: OTOLARYNGOLOGY

## 2024-05-23 PROCEDURE — 1036F TOBACCO NON-USER: CPT | Performed by: OTOLARYNGOLOGY

## 2024-05-23 PROCEDURE — G8417 CALC BMI ABV UP PARAM F/U: HCPCS | Performed by: OTOLARYNGOLOGY

## 2024-05-23 PROCEDURE — 1090F PRES/ABSN URINE INCON ASSESS: CPT | Performed by: INTERNAL MEDICINE

## 2024-05-23 PROCEDURE — 92557 COMPREHENSIVE HEARING TEST: CPT

## 2024-05-23 PROCEDURE — 3017F COLORECTAL CA SCREEN DOC REV: CPT | Performed by: OTOLARYNGOLOGY

## 2024-05-23 PROCEDURE — G8417 CALC BMI ABV UP PARAM F/U: HCPCS | Performed by: INTERNAL MEDICINE

## 2024-05-23 PROCEDURE — 3079F DIAST BP 80-89 MM HG: CPT | Performed by: INTERNAL MEDICINE

## 2024-05-23 PROCEDURE — 1090F PRES/ABSN URINE INCON ASSESS: CPT | Performed by: OTOLARYNGOLOGY

## 2024-05-23 PROCEDURE — 1124F ACP DISCUSS-NO DSCNMKR DOCD: CPT | Performed by: OTOLARYNGOLOGY

## 2024-05-23 PROCEDURE — 1124F ACP DISCUSS-NO DSCNMKR DOCD: CPT | Performed by: INTERNAL MEDICINE

## 2024-05-23 PROCEDURE — G8399 PT W/DXA RESULTS DOCUMENT: HCPCS | Performed by: OTOLARYNGOLOGY

## 2024-05-23 PROCEDURE — 99214 OFFICE O/P EST MOD 30 MIN: CPT | Performed by: INTERNAL MEDICINE

## 2024-05-23 PROCEDURE — G8427 DOCREV CUR MEDS BY ELIG CLIN: HCPCS | Performed by: OTOLARYNGOLOGY

## 2024-05-23 RX ORDER — PLECANATIDE 3 MG/1
1 TABLET ORAL DAILY
Qty: 90 TABLET | Refills: 1 | Status: SHIPPED | OUTPATIENT
Start: 2024-05-23

## 2024-05-23 RX ORDER — AMITRIPTYLINE HYDROCHLORIDE 10 MG/1
10 TABLET, FILM COATED ORAL NIGHTLY
COMMUNITY
Start: 2024-05-03

## 2024-05-23 ASSESSMENT — ENCOUNTER SYMPTOMS
EYE ITCHING: 0
CHOKING: 0
SINUS PAIN: 0
EYE PAIN: 0
COUGH: 0
SHORTNESS OF BREATH: 0
EYE REDNESS: 0
DIARRHEA: 0
PHOTOPHOBIA: 0
COLOR CHANGE: 0
STRIDOR: 0
SORE THROAT: 0
VOICE CHANGE: 0
NAUSEA: 0
FACIAL SWELLING: 0
SINUS PRESSURE: 0
RHINORRHEA: 0
TROUBLE SWALLOWING: 0

## 2024-05-23 NOTE — PROGRESS NOTES
Date of Visit: 2024    Tatiana Shepard (:  1955) is a 68 y.o. female,  Established patient here for evaluation of the following chief complaint(s):  Dizziness, Neck Pain, and Leg Pain      ASSESSMENT/PLAN:    1. Dizziness  -Labs unremarkable  -Patient has had MRI brain and CT head done on 2023  -Stay hydrated  -Follow-up with Neurology    2. Balance disorder  -Referral to [facility] balance therapy    3. Neck pain  -Better  - Tylenol 650mg 3 times daily as needed    4. Right leg pain  -Chronic  -Continue gabapentin 600 mg nightly  - Tylenol 650mg 3 times daily as needed    5. Constipation, unspecified constipation type  -Not controlled  -Continue plecanatide (TRULANCE) 3 MG TABS; Take 1 tablet by mouth daily  Dispense: 90 tablet; Refill: 1  -Increase Colace 100 mg to 2 times daily  -High fiber diet    6. Moderate persistent asthma without complication  -Better  -Continue Wixela inhaler 500-50 mcg 1 puff 2 times daily  -Continue Singulair 10 mg nightly  -Continue Spiriva 1 puff daily  -Continue albuterol as needed  -Continue care for Pulmonary    7. Carotid artery plaque, right  -Carotid ultrasound shows mild bilateral carotid stenosis less than 50% and plaque in right internal carotid artery  -Patient started on rosuvastatin 10 mg once daily  -Aspirin 81 mg every other day as tolerated (patient does not tolerate aspirin daily due to bruising) the        Return in about 7 weeks (around 2024) for diabetes, hypertension, hyperlipidemia.    SUBJECTIVE:    Patient states she feels dizzy and off-balance. Patient states she feels like she loses balance and does right to left or front to back and has to grab something.Patient had labs and carotid ultrasound done. Patient has left neck pain. Neck pain is a little better. Patient states she is going to do physical therapy for right TMJ. Patient has chronic right leg pain. Patient states her right leg is worse. Patient states

## 2024-05-23 NOTE — PROGRESS NOTES
[facility]  [address] , [address]  [place] [address]  P: [phone]  F: [phone]       Patient     Tatiana Shepard  1955    ChiefComplaint     Chief Complaint   Patient presents with    Ear Problem     Right Ear feels clogged, muffled hearing, buzzing sound.        History of Present Illness     Tatiana Shepard is a pleasant 68 y.o. female who presents as a new patient for right-sided ear pain, muffled hearing and intermittent tinnitus.  Symptoms have been going on for 6 to 12 months.  She does have a longstanding history of TMJ problems.  She used ibuprofen and anti-inflammatories in the past but no longer tolerates them.  Tinnitus has been occurring for 6 months or so.  She will experience a tingling noise in the right ear that will fade out over the course of a few minutes.  Additionally, she has noticed some further difficulty hearing especially around her grandson.  Denies prior history of ear issues.    Audiogram performed the office today reveals mild sensorineural hearing loss and a flat configuration that upward slopes to normal in the high frequencies.  Normal tympanograms  Recognition scores.    Past Medical History     Past Medical History:   Diagnosis Date    Abnormal involuntary movements(781.0)     Allergic rhinitis     Anal fissure     Anemia, unspecified     Anxiety     Asthma     Chronic back pain     Chronic diarrhea 09/23/2019    Depression     Diffuse cystic mastopathy     Dizziness     Encopresis(307.7)     Esophageal reflux     Foot fracture, left 1989    drop something on foot    Headache(784.0)     Hearing loss     Hepatitis, unspecified     Herpes simplex without mention of complication     Hypertension     Hypogammaglobulinaemia, unspecified     Insomnia, unspecified     Lateral epicondylitis  of elbow     Migraine, unspecified, without mention of intractable migraine without mention of status migrainosus     Mixed hyperlipidemia 07/30/2010

## 2024-05-23 NOTE — PROGRESS NOTES
Tatiana Shepard   1955, 68 y.o. female   [id number]       Referring Provider: Refugio Tyler DO  Referral Type: In an order in Epic    Reason for Visit: Evaluation of suspected change in hearing, tinnitus, or balance.    ADULT AUDIOLOGIC EVALUATION      Tatiana Shepard is a 68 y.o. female seen today, 5/23/2024 , for an initial audiologic evaluation.  Patient was seen by Refugio Tyler DO following today's evaluation.    AUDIOLOGIC AND OTHER PERTINENT MEDICAL HISTORY:      Tatiana Shepard reports difficulty hearing, as she is often asking people to repeat themselves and is turning the TV up louder. She noted that she has experienced a few episodes of tinnitus, usually before she gets sick, with the most recent a few weeks ago that has resolved. Patient reported frequent imbalance that lasts a few seconds. She denied any spinning. Patient has a history of occupational noise exposure and a family history of occupational hearing loss.     She denied aural fullness, otorrhea, history of falls, history of head trauma, and history of ear surgery    Date: 5/23/2024     IMPRESSIONS:      Today's results revealed a Mild Sensorineural hearing loss in the right ear and a Mild sloping to Moderate Sensorineural hearing loss in the left ear. Excellent speech understanding when in quiet. Tympanometry indicates normal middle ear function. Discussed test results and implications with patient. Discussed possible benefits of amplification.  Hearing aids recommended at this time.    Follow medical recommendations of Refugio Tyler DO.     ASSESSMENT AND FINDINGS:     Otoscopy unremarkable.    RIGHT EAR:  Hearing Sensitivity: Mild Sensorineural hearing loss  Speech Recognition Threshold: 35 dB HL  Word Recognition: Excellent 100%, based on NU-6 by-difficulty list at 75 dBHL using recorded speech stimuli.    Tympanometry: Normal peak pressure and compliance, Type A tympanogram, consistent with normal middle ear function.       LEFT

## 2024-05-26 DIAGNOSIS — K59.00 CONSTIPATION, UNSPECIFIED CONSTIPATION TYPE: ICD-10-CM

## 2024-05-28 NOTE — TELEPHONE ENCOUNTER
Medication:   Requested Prescriptions     Pending Prescriptions Disp Refills    docusate sodium (COLACE) 100 MG capsule [Pharmacy Med Name: docusate sodium 100 mg capsule] 60 capsule 1     Sig: TAKE ONE Capsule BY MOUTH TWICE DAILY     Last Filled:  2.27.24    Last appt: 5/23/2024   Next appt: 7/17/2024    Last OARRS:       8/29/2023     6:55 PM   RX Monitoring   Periodic Controlled Substance Monitoring No signs of potential drug abuse or diversion identified.

## 2024-05-29 ENCOUNTER — CARE COORDINATION (OUTPATIENT)
Dept: CARE COORDINATION | Age: 69
End: 2024-05-29

## 2024-05-29 RX ORDER — DOCUSATE SODIUM 100 MG/1
100 CAPSULE, LIQUID FILLED ORAL 2 TIMES DAILY
Qty: 60 CAPSULE | Refills: 1 | Status: SHIPPED | OUTPATIENT
Start: 2024-05-29

## 2024-05-29 NOTE — CARE COORDINATION
Ambulatory Care Coordination Note  2024    Patient Current Location:  Home: [address] [place] [id number]     ACM contacted the patient by telephone. Verified name and  with patient as identifiers. Provided introduction to self, and explanation of the ACM role.     Challenges to be reviewed by the provider   Additional needs identified to be addressed with provider: No  none               Method of communication with provider: chart routing.    ACM: Christine Martle RN     ACM completed follow up with patient who said she has no concerns at this time. Patient is enjoying monitoring system at this time.     Plan:    F.U call 3 weeks     Offered patient enrollment in the Remote Patient Monitoring (RPM) program for in-home monitoring: Yes, patient enrolled; current status is activated and monitoring.    Lab Results       None            Care Coordination Interventions    Referral from Primary Care Provider: No  Suggested Interventions and Community Resources  Meals on Wheels: Completed (Comment: Active with Meals on Wheels)  Medication Assistance Program: Completed (Comment: [employer] PAP form to mail out)  Social Work: Completed (Comment: SDOH needs)  Specialty Services Referral: Completed (Comment: PRO-seniors information given)          Goals Addressed    None         Future Appointments   Date Time Provider Department Center   2024 12:30 PM Beverly Avila, PT [facility] PT [facility]   2024 11:45 AM Raisa Espitia MD [facility] [place] - DYD   2024  2:20 PM [facility]   2024  2:00 PM Derian Dukes MD [facility] C[facility]   10/30/2024  1:45 PM SCHEDULE, BONE HEALTH & OSTEOPOROSIS MHPHYS [facility] MMA   2025 10:00 AM DEXA [facility]   2025 10:20 AM Paulo Roque MD PHYS BH&O MMA   ,   Diabetes Assessment      Meal Planning: None   How often do you test your blood sugar?: No Testing (Comment: Patient said she does not want to

## 2024-05-31 PROBLEM — M79.604 RIGHT LEG PAIN: Status: ACTIVE | Noted: 2024-05-31

## 2024-05-31 ASSESSMENT — ENCOUNTER SYMPTOMS
CHEST TIGHTNESS: 0
VOMITING: 0
ABDOMINAL PAIN: 0
SHORTNESS OF BREATH: 1
COUGH: 0
SORE THROAT: 0
NAUSEA: 0
SINUS PAIN: 0
SINUS PRESSURE: 0
RHINORRHEA: 0
PHOTOPHOBIA: 1
BLOOD IN STOOL: 0
DIARRHEA: 0
CONSTIPATION: 1
WHEEZING: 0

## 2024-06-06 ENCOUNTER — CARE COORDINATION (OUTPATIENT)
Dept: CASE MANAGEMENT | Age: 69
End: 2024-06-06

## 2024-06-06 ENCOUNTER — TELEPHONE (OUTPATIENT)
Dept: PRIMARY CARE CLINIC | Age: 69
End: 2024-06-06

## 2024-06-06 NOTE — CARE COORDINATION
Remote Patient Monitoring Note  Date/Time:  6/6/2024 1:36 PM  Patient Current Location: Home: [address] [place], [place] [id number]  Pt was contacted by KINGSTON Moser at 1158am.     PT C/O Pt c/o weakness. Pt denied chest pain, HA. Pt states that she has been a little more SOB than usual, using inhaler 4 times a day. ALERT ESCALATED to PCP at that time  Background: enrolled in [facility] HTN AND ASTHMA  Clinical Interventions: Reviewed and followed up on alerts and treatments-no reply from PCP at this time. Alert escalated to Billie Ya CNP     Plan/Follow Up: Will continue to review, monitor and address alerts with follow up based on severity of symptoms and risk factors.        
Thanks for update. I reviewed message and Billie Ya NP's message as well.   
prescribed.    Plan/Follow Up: Will continue to review, monitor and address alerts with follow up based on severity of symptoms and risk factors.  **For any new or worsening symptoms, please contact your Provider or report to the nearest Emergency Room.**            Mere Moser LPN, [facility]  PH: [phone]

## 2024-06-06 NOTE — TELEPHONE ENCOUNTER
06/06/24 2:02 PM     REMOTE PATIENT MONITORING HIGH ALERT RESPONSE         ASSESSMENT AND PLAN   Hypertension  --pt confirms she is adherent to taking Cardizem CD once daily for HTN; states she is no longer on Clonidine patch   --denies new or concerning symptoms today  --advised pt to call 911 and go to ED for the following: chest pain/pressure/tightness, worsening SOB, stroke-like symptoms  Asthma  --pt confirms she is using albuterol inhaler according to directions and has used it only twice today. States she is using less of this now that she is on Spiriva and Wixela daily. Also confirms she is still taking montelukast and using Flonase nasal spray daily. Reports good relief of nasal symptoms with Flonase.   Weakness  --reports feeling weak and tired today. Reports poor sleep. Feels unable to exercise because experiences a pulsing in her leg when walking. Has PT appt on 6/17 and encouraged her to go so she can be evaluated and receive guidance on safe exercise. Advised this can be a good remedy for feeling weak and tired and can improve sleep.     CHIEF COMPLAINT    Responding to a high RPM alert for BP of 182/65 at 10:56A; on repeat at 12N was 112/74; pt also reported use of inhaler more than 4 times in last 24 hours.    HPI    Tatiana Shepard is a 68 y.o. female HPI: Patient is enrolled in [facility] Remote Patient Monitoring program for hx of HTN. States she had an interpersonal challenge yesterday and thinks this added to not feeling well. Sounds like this was resolved and things are better today. Still feeling weak and tired. Has PT evaluation on 6/17. Encouraged her to find out what she can do safely for exercise once she sees the PT. Advised this can help her fatigue and poor sleep. BP was much improved this afternoon on repeat. Reports good adherence to daily Cardizem CD and that she is no longer on Clonidine patch. Per EMR, the patch was D/C'd on 11/9/23. Asthma sx survey response of increased use of inhaler

## 2024-06-13 ENCOUNTER — CARE COORDINATION (OUTPATIENT)
Dept: CARE COORDINATION | Age: 69
End: 2024-06-13

## 2024-06-17 ENCOUNTER — HOSPITAL ENCOUNTER (OUTPATIENT)
Dept: PHYSICAL THERAPY | Age: 69
Setting detail: THERAPIES SERIES
Discharge: HOME | End: 2024-06-17
Payer: MEDICARE

## 2024-06-17 DIAGNOSIS — R26.9 ABNORMALITY OF GAIT: ICD-10-CM

## 2024-06-17 DIAGNOSIS — R26.89 BALANCE PROBLEM: ICD-10-CM

## 2024-06-17 DIAGNOSIS — R53.1 WEAKNESS: Primary | ICD-10-CM

## 2024-06-17 PROCEDURE — 97530 THERAPEUTIC ACTIVITIES: CPT

## 2024-06-17 PROCEDURE — 97162 PT EVAL MOD COMPLEX 30 MIN: CPT

## 2024-06-17 NOTE — PLAN OF CARE
[facility]- [facility] Therapy [address], [address], [place] [id number] office: [phone] fax: [phone]     Physical Therapy Initial Evaluation Certification      Dear Raisa Espitia MD,    We had the pleasure of evaluating the following patient for physical therapy services at [facility].  A summary of our findings can be found in the initial assessment below.  This includes our plan of care.  If you have any questions or concerns regarding these findings, please do not hesitate to contact me at the office phone number listed above.  Thank you for the referral.     Physician Signature:_______________________________Date:__________________  By signing above (or electronic signature), therapist’s plan is approved by physician       Physical Therapy: TREATMENT/PROGRESS NOTE   Patient: Tatiana Shepard (68 y.o. female)   Examination Date: 2024   :  1955 MRN: [id number]   Visit #:    Insurance Allowable Auth Needed   BMN []Yes    [x]No    Insurance: Payor: MEDICARE / Plan: MEDICARE PART A AND B / Product Type: *No Product type* /   Insurance ID: [id number] - (Medicare)  Secondary Insurance (if applicable): [facility]   Treatment Diagnosis:     ICD-10-CM    1. Weakness  R53.1       2. Abnormality of gait  R26.9       3. Balance problem  R26.89          Medical Diagnosis:  Balance disorder [R26.89]   Referring Physician: Raisa Espitia MD  PCP: Raisa Espitia MD     Plan of care signed (Y/N): N    Date of Patient follow up with Physician: July     Progress Report/POC: EVAL today and NO  Progress note update due: (10 visits /OR AUTH LIMITS, whichever is less)  2024   Recert: 2024                                            Precautions/ Contra-indications:           Latex allergy:  NO  Pacemaker:    NO  Contraindications for Manipulation: NA  Date of Surgery: NA  Other: asthma, HTN, DM, anemia,

## 2024-06-20 ENCOUNTER — HOSPITAL ENCOUNTER (OUTPATIENT)
Dept: PHYSICAL THERAPY | Age: 69
Setting detail: THERAPIES SERIES
Discharge: HOME | End: 2024-06-20
Payer: MEDICARE

## 2024-06-20 ENCOUNTER — OFFICE VISIT (OUTPATIENT)
Dept: PRIMARY CARE CLINIC | Age: 69
End: 2024-06-20
Payer: MEDICARE

## 2024-06-20 ENCOUNTER — CARE COORDINATION (OUTPATIENT)
Dept: CARE COORDINATION | Age: 69
End: 2024-06-20

## 2024-06-20 VITALS
DIASTOLIC BLOOD PRESSURE: 80 MMHG | RESPIRATION RATE: 30 BRPM | OXYGEN SATURATION: 94 % | HEART RATE: 97 BPM | WEIGHT: 163 LBS | SYSTOLIC BLOOD PRESSURE: 120 MMHG | TEMPERATURE: 97.4 F | BODY MASS INDEX: 32 KG/M2 | HEIGHT: 60 IN

## 2024-06-20 DIAGNOSIS — H91.91 HEARING PROBLEM OF RIGHT EAR: ICD-10-CM

## 2024-06-20 DIAGNOSIS — J02.9 PHARYNGITIS, UNSPECIFIED ETIOLOGY: ICD-10-CM

## 2024-06-20 DIAGNOSIS — H60.91 OTITIS EXTERNA OF RIGHT EAR, UNSPECIFIED CHRONICITY, UNSPECIFIED TYPE: ICD-10-CM

## 2024-06-20 DIAGNOSIS — H92.01 RIGHT EAR PAIN: Primary | ICD-10-CM

## 2024-06-20 PROCEDURE — 3074F SYST BP LT 130 MM HG: CPT | Performed by: INTERNAL MEDICINE

## 2024-06-20 PROCEDURE — G8417 CALC BMI ABV UP PARAM F/U: HCPCS | Performed by: INTERNAL MEDICINE

## 2024-06-20 PROCEDURE — 97110 THERAPEUTIC EXERCISES: CPT

## 2024-06-20 PROCEDURE — 1036F TOBACCO NON-USER: CPT | Performed by: INTERNAL MEDICINE

## 2024-06-20 PROCEDURE — 3079F DIAST BP 80-89 MM HG: CPT | Performed by: INTERNAL MEDICINE

## 2024-06-20 PROCEDURE — 3017F COLORECTAL CA SCREEN DOC REV: CPT | Performed by: INTERNAL MEDICINE

## 2024-06-20 PROCEDURE — 1124F ACP DISCUSS-NO DSCNMKR DOCD: CPT | Performed by: INTERNAL MEDICINE

## 2024-06-20 PROCEDURE — 1090F PRES/ABSN URINE INCON ASSESS: CPT | Performed by: INTERNAL MEDICINE

## 2024-06-20 PROCEDURE — 4130F TOPICAL PREP RX AOE: CPT | Performed by: INTERNAL MEDICINE

## 2024-06-20 PROCEDURE — 99213 OFFICE O/P EST LOW 20 MIN: CPT | Performed by: INTERNAL MEDICINE

## 2024-06-20 PROCEDURE — G8427 DOCREV CUR MEDS BY ELIG CLIN: HCPCS | Performed by: INTERNAL MEDICINE

## 2024-06-20 PROCEDURE — G8399 PT W/DXA RESULTS DOCUMENT: HCPCS | Performed by: INTERNAL MEDICINE

## 2024-06-20 RX ORDER — NEOMYCIN SULFATE, POLYMYXIN B SULFATE AND HYDROCORTISONE 10; 3.5; 1 MG/ML; MG/ML; [USP'U]/ML
3 SUSPENSION/ DROPS AURICULAR (OTIC) 4 TIMES DAILY
Qty: 10 ML | Refills: 0 | Status: SHIPPED | OUTPATIENT
Start: 2024-06-20 | End: 2024-06-27

## 2024-06-20 NOTE — FLOWSHEET NOTE
Note: Portions of this note have been templated and/or copied from initial evaluation, reassessments and prior notes for documentation efficiency.    Note: If patient does not return for scheduled/recommended follow up visits, this note will serve as a discharge from care along with the most recent update on progress.

## 2024-06-20 NOTE — PROGRESS NOTES
Date of Visit: 2024    Tatiana Shepard (:  1955) is a 68 y.o. female,  Established patient here for evaluation of the following chief complaint(s):  Otalgia (Right earache and sorethroat for past 1 week. /Symptoms got worse yesterday.)      ASSESSMENT/PLAN:    1. Right ear pain  - neomycin-polymyxin-hydrocortisone (CORTISPORIN) 3.5-32729-2 otic suspension; Place 3 drops into the right ear 4 times daily for 7 days  Dispense: 10 mL; Refill: 0  - Tylenol 650mg 3 times daily as needed    2. Otitis externa of right ear, unspecified chronicity, unspecified type  - neomycin-polymyxin-hydrocortisone (CORTISPORIN) 3.5-20527-5 otic suspension; Place 3 drops into the right ear 4 times daily for 7 days  Dispense: 10 mL; Refill: 0    3. Hearing problem of right ear  -if no improvement with treatment of ear infection will need to see ENT    4. Pharyngitis, unspecified etiology  - Tylenol 650mg 3 times daily as needed  -throat lozenges   -salt water gargles 3-4 times daily  -warm fluids        Return if symptoms worsen or fail to improve.    SUBJECTIVE:    Patient complains of right ear pain for a couple of day. Patient states it was worse yesterday. Patient states she has a sharp pain that radiates down. Patient states she has a sore throat. Patient states she couldn't hear out of her ear yesterday. Patient states today it is a little better. Patient states she has runny nose every morning. Patient uses saline nasal rinse which helps a lot. Patient states mucus comes out and it helps her nose symptoms.        Review of Systems   Constitutional:  Negative for chills and fever.   HENT:  Positive for ear pain, rhinorrhea and sore throat. Negative for congestion, postnasal drip, sinus pressure, sinus pain and sneezing.    Respiratory:  Negative for cough, chest tightness, shortness of breath and wheezing.    Neurological:  Negative for headaches.       Allergies   Allergen Reactions    Effexor [Venlafaxine

## 2024-06-20 NOTE — CARE COORDINATION
ACM completed chart review and patient to follow up with PCP today. ACM will set outreach to another day.

## 2024-06-21 ENCOUNTER — TELEPHONE (OUTPATIENT)
Dept: AUDIOLOGY | Age: 69
End: 2024-06-21

## 2024-06-21 ENCOUNTER — CARE COORDINATION (OUTPATIENT)
Dept: CARE COORDINATION | Age: 69
End: 2024-06-21

## 2024-06-21 NOTE — TELEPHONE ENCOUNTER
This INS has called to give benefits  for a patient   aid jane almanzar, fitting , purchase max 1500. Per ear ,once every 48 months,  not subject to basic medical, deductible or co ins this patient did not understand her benefits and asked the INS to call the office and give us this information this is FYI

## 2024-06-21 NOTE — ACP (ADVANCE CARE PLANNING)
Advance Care Planning   Healthcare Decision Maker:    Primary Decision Maker: Nydia Cabrales - Girlfriend - [phone]    Click here to complete Healthcare Decision Makers including selection of the Healthcare Decision Maker Relationship (ie \"Primary\").  Today we discussed healthcare decision maker. Patient has ACP documents at home [facility] requested a copy to be sent to the office to make a copy.        If the relationship to the patient does NOT follow our state's Next of Kin hierarchy, the patient MUST complete an ACP Document to allow him/her to act on the patient's behalf. :

## 2024-06-23 ENCOUNTER — APPOINTMENT (OUTPATIENT)
Age: 69
End: 2024-06-23
Payer: MEDICARE

## 2024-06-23 ENCOUNTER — HOSPITAL ENCOUNTER (EMERGENCY)
Age: 69
Discharge: HOME | End: 2024-06-23
Attending: EMERGENCY MEDICINE
Payer: MEDICARE

## 2024-06-23 VITALS
WEIGHT: 173.9 LBS | HEART RATE: 77 BPM | DIASTOLIC BLOOD PRESSURE: 71 MMHG | SYSTOLIC BLOOD PRESSURE: 121 MMHG | HEIGHT: 62 IN | OXYGEN SATURATION: 97 % | TEMPERATURE: 98.4 F | RESPIRATION RATE: 16 BRPM | BODY MASS INDEX: 32 KG/M2

## 2024-06-23 DIAGNOSIS — R07.9 CHEST PAIN, UNSPECIFIED TYPE: ICD-10-CM

## 2024-06-23 DIAGNOSIS — B96.89 ACUTE BACTERIAL SINUSITIS: ICD-10-CM

## 2024-06-23 DIAGNOSIS — R53.1 GENERAL WEAKNESS: Primary | ICD-10-CM

## 2024-06-23 DIAGNOSIS — J01.90 ACUTE BACTERIAL SINUSITIS: ICD-10-CM

## 2024-06-23 DIAGNOSIS — H65.01 RIGHT ACUTE SEROUS OTITIS MEDIA, RECURRENCE NOT SPECIFIED: ICD-10-CM

## 2024-06-23 LAB
ALBUMIN SERPL-MCNC: 3.9 G/DL (ref 3.4–5)
ALBUMIN/GLOB SERPL: 2 {RATIO}
ALP SERPL-CCNC: 68 U/L (ref 40–129)
ALT SERPL-CCNC: 15 U/L (ref 10–40)
ANION GAP SERPL CALCULATED.3IONS-SCNC: 11 MMOL/L (ref 3–16)
AST SERPL-CCNC: 20 U/L (ref 15–37)
BASOPHILS # BLD: 0.03 K/UL (ref 0–0.2)
BASOPHILS NFR BLD: 0 %
BILIRUB SERPL-MCNC: 0.3 MG/DL (ref 0–1)
BILIRUB UR QL STRIP: NEGATIVE
BNP SERPL-MCNC: <36 PG/ML (ref 0–124)
BUN SERPL-MCNC: 19 MG/DL (ref 7–20)
CALCIUM SERPL-MCNC: 8.3 MG/DL (ref 8.3–10.6)
CHARACTER UR: ABNORMAL
CHLORIDE SERPL-SCNC: 108 MMOL/L (ref 99–110)
CLARITY UR: CLEAR
CO2 SERPL-SCNC: 21 MMOL/L (ref 21–32)
COHGB MFR BLD: 3.4 % (ref 0–1.5)
COLOR UR: YELLOW
CREAT SERPL-MCNC: 0.8 MG/DL (ref 0.6–1.2)
EOSINOPHIL # BLD: 0.12 K/UL (ref 0–0.6)
EOSINOPHILS RELATIVE PERCENT: 2 %
EPI CELLS #/AREA URNS HPF: ABNORMAL /HPF
ERYTHROCYTE [DISTWIDTH] IN BLOOD BY AUTOMATED COUNT: 12.8 % (ref 12.4–15.4)
FLUAV AG SPEC QL: NEGATIVE
FLUBV AG SPEC QL: NEGATIVE
GFR, ESTIMATED: 81 ML/MIN/1.73M2
GLUCOSE SERPL-MCNC: 97 MG/DL (ref 70–99)
GLUCOSE UR STRIP-MCNC: NEGATIVE MG/DL
HCO3 VENOUS: 20.5 MMOL/L (ref 23–29)
HCT VFR BLD AUTO: 41.7 % (ref 36–48)
HGB BLD-MCNC: 14.2 G/DL (ref 12–16)
HGB UR QL STRIP.AUTO: NEGATIVE
IMM GRANULOCYTES # BLD AUTO: 0.09 K/UL (ref 0–0.5)
IMM GRANULOCYTES NFR BLD: 1 %
KETONES UR STRIP-MCNC: NEGATIVE MG/DL
LEUKOCYTE ESTERASE UR QL STRIP: ABNORMAL
LYMPHOCYTES NFR BLD: 2.01 K/UL (ref 1–5.1)
LYMPHOCYTES RELATIVE PERCENT: 26 %
MCH RBC QN AUTO: 30.1 PG (ref 26–34)
MCHC RBC AUTO-ENTMCNC: 34.1 G/DL (ref 31–36)
MCV RBC AUTO: 88.5 FL (ref 80–100)
METHEMOGLOBIN: 0.3 % (ref 0–1.4)
MONOCYTES NFR BLD: 0.69 K/UL (ref 0–1.3)
MONOCYTES NFR BLD: 9 %
NEGATIVE BASE EXCESS, VEN: 2.3 MMOL/L
NEUTROPHILS NFR BLD: 62 %
NEUTS SEG NFR BLD: 4.71 K/UL (ref 1.7–7.7)
NITRITE UR QL STRIP: NEGATIVE
O2 SAT, VEN: 99.3 %
PCO2 VENOUS: 30.2 MM HG (ref 40–50)
PH UR STRIP: 5.5 [PH] (ref 5–8)
PH VENOUS: 7.45 (ref 7.35–7.45)
PLATELET # BLD AUTO: 269 K/UL (ref 135–450)
PMV BLD AUTO: 10.6 FL
PO2 VENOUS: 149.3 MM HG
POTASSIUM SERPL-SCNC: 3.9 MMOL/L (ref 3.5–5.1)
PROT SERPL-MCNC: 5.9 G/DL (ref 6.4–8.2)
PROT UR STRIP-MCNC: NEGATIVE MG/DL
RBC # BLD AUTO: 4.71 M/UL (ref 4–5.2)
RBC #/AREA URNS HPF: ABNORMAL /HPF
SARS-COV-2 RDRP RESP QL NAA+PROBE: NOT DETECTED
SODIUM SERPL-SCNC: 140 MMOL/L (ref 136–145)
SP GR UR STRIP: 1.02 (ref 1–1.03)
SPECIMEN DESCRIPTION: NORMAL
TROPONIN I SERPL HS-MCNC: 9 NG/L (ref 0–14)
TROPONIN I SERPL HS-MCNC: 9 NG/L (ref 0–14)
UROBILINOGEN UR STRIP-ACNC: 0.2 EU/DL (ref 0–1)
WBC #/AREA URNS HPF: ABNORMAL /HPF
WBC OTHER # BLD: 7.7 K/UL (ref 4–11)

## 2024-06-23 PROCEDURE — 99285 EMERGENCY DEPT VISIT HI MDM: CPT

## 2024-06-23 PROCEDURE — 85025 COMPLETE CBC W/AUTO DIFF WBC: CPT

## 2024-06-23 PROCEDURE — 83880 ASSAY OF NATRIURETIC PEPTIDE: CPT

## 2024-06-23 PROCEDURE — 2580000003 HC RX 258: Performed by: EMERGENCY MEDICINE

## 2024-06-23 PROCEDURE — 71045 X-RAY EXAM CHEST 1 VIEW: CPT

## 2024-06-23 PROCEDURE — 82805 BLOOD GASES W/O2 SATURATION: CPT

## 2024-06-23 PROCEDURE — 87635 SARS-COV-2 COVID-19 AMP PRB: CPT

## 2024-06-23 PROCEDURE — 93005 ELECTROCARDIOGRAM TRACING: CPT | Performed by: EMERGENCY MEDICINE

## 2024-06-23 PROCEDURE — 84484 ASSAY OF TROPONIN QUANT: CPT

## 2024-06-23 PROCEDURE — 87804 INFLUENZA ASSAY W/OPTIC: CPT

## 2024-06-23 PROCEDURE — 81001 URINALYSIS AUTO W/SCOPE: CPT

## 2024-06-23 PROCEDURE — 36415 COLL VENOUS BLD VENIPUNCTURE: CPT

## 2024-06-23 PROCEDURE — 6370000000 HC RX 637 (ALT 250 FOR IP): Performed by: EMERGENCY MEDICINE

## 2024-06-23 PROCEDURE — 80053 COMPREHEN METABOLIC PANEL: CPT

## 2024-06-23 RX ORDER — AZITHROMYCIN 250 MG/1
TABLET, FILM COATED ORAL
Qty: 6 TABLET | Refills: 0 | Status: SHIPPED | OUTPATIENT
Start: 2024-06-23 | End: 2024-07-03

## 2024-06-23 RX ORDER — 0.9 % SODIUM CHLORIDE 0.9 %
500 INTRAVENOUS SOLUTION INTRAVENOUS ONCE
Status: COMPLETED | OUTPATIENT
Start: 2024-06-23 | End: 2024-06-23

## 2024-06-23 RX ORDER — AZITHROMYCIN 250 MG/1
500 TABLET, FILM COATED ORAL ONCE
Status: COMPLETED | OUTPATIENT
Start: 2024-06-23 | End: 2024-06-23

## 2024-06-23 RX ADMIN — AZITHROMYCIN DIHYDRATE 500 MG: 250 TABLET, FILM COATED ORAL at 20:55

## 2024-06-23 RX ADMIN — SODIUM CHLORIDE 500 ML: 9 INJECTION, SOLUTION INTRAVENOUS at 15:54

## 2024-06-23 ASSESSMENT — PAIN DESCRIPTION - DESCRIPTORS: DESCRIPTORS: ACHING

## 2024-06-23 ASSESSMENT — PAIN DESCRIPTION - FREQUENCY: FREQUENCY: CONTINUOUS

## 2024-06-23 ASSESSMENT — PAIN - FUNCTIONAL ASSESSMENT
PAIN_FUNCTIONAL_ASSESSMENT: NONE - DENIES PAIN
PAIN_FUNCTIONAL_ASSESSMENT: 0-10

## 2024-06-23 ASSESSMENT — PAIN DESCRIPTION - PAIN TYPE: TYPE: CHRONIC PAIN

## 2024-06-23 ASSESSMENT — PAIN DESCRIPTION - LOCATION: LOCATION: GENERALIZED

## 2024-06-23 ASSESSMENT — PAIN SCALES - WONG BAKER: WONGBAKER_NUMERICALRESPONSE: NO HURT

## 2024-06-23 ASSESSMENT — PAIN SCALES - GENERAL: PAINLEVEL_OUTOF10: 5

## 2024-06-23 ASSESSMENT — PAIN DESCRIPTION - ONSET: ONSET: ON-GOING

## 2024-06-23 NOTE — ED PROVIDER NOTES
EMERGENCY MEDICINE ATTENDING NOTE  Damien White Jr., DO, FACEP, FAAEM        CHIEF COMPLAINT  Chief Complaint   Patient presents with    Chest Pain    Fatigue     Pt arrives to ED via EMS w/ c/o SOB, chest pain, and weakness that began last night. Pt denies cardiac hx. Pt has nitro patch on upon arrival.         HISTORY OF PRESENT ILLNESS  Tatiana Shepard is a 68 y.o. female who presents to the ED for evaluation of generalized weakness.  Patient also has some chest tightness and shortness of breath but states it is not abnormal for her.  It may be a little bit worse but her main concern is that she just feels very weak.  This just started last night.  Denies any nausea or vomiting.  Denies any diaphoresis.  No fevers or chills.  Denies any falls.  No known sick contacts.    Nursing/triage notes reviewed.  No other complaints, modifying factors or associated symptoms.     REVIEW OF SYSTEMS:  All systems are reviewed and are negative unless noted in the HPI.    PAST MEDICAL HISTORY  Past Medical History:   Diagnosis Date    Abnormal involuntary movements(781.0)     Allergic rhinitis     Anal fissure     Anemia, unspecified     Anxiety     Asthma     Chronic back pain     Chronic diarrhea 09/23/2019    Depression     Diffuse cystic mastopathy     Dizziness     Encopresis(307.7)     Esophageal reflux     Foot fracture, left 1989    drop something on foot    Headache(784.0)     Hearing loss     Hepatitis, unspecified     Herpes simplex without mention of complication     Hypertension     Hypogammaglobulinaemia, unspecified     Insomnia, unspecified     Lateral epicondylitis  of elbow     Migraine, unspecified, without mention of intractable migraine without mention of status migrainosus     Mixed hyperlipidemia 07/30/2010    Nosebleed     Osteopenia     Postmenopausal     Pure hypercholesterolemia     Recurrent upper respiratory infection (URI)     Symptomatic menopausal or female climacteric states

## 2024-06-23 NOTE — ED NOTES
Patient now states she is not having chest pain, only weakness. Patient has hx of weakness and has been seen outpatient for this complaint.

## 2024-06-23 NOTE — ED NOTES
Patient states \"I am not checking my sugars anymore because it is summer and I dont want to\". Patient informed this RN that she informed her PCP of this. Patient reports her speech is slurred but it has been like this and she sees a speech therapist for it. She also reports R foot has been weak \"since 1996\". Patient answers all orientation questions appropriately.

## 2024-06-23 NOTE — ED NOTES
Patient refusing to use pure wick, she states \"I have been weak and I have been walking around so I am ok to walk I want to walk.\"

## 2024-06-24 ENCOUNTER — CARE COORDINATION (OUTPATIENT)
Dept: CARE COORDINATION | Age: 69
End: 2024-06-24

## 2024-06-24 ENCOUNTER — TELEPHONE (OUTPATIENT)
Dept: PRIMARY CARE CLINIC | Age: 69
End: 2024-06-24

## 2024-06-24 LAB
EKG ATRIAL RATE: 81 BPM
EKG DIAGNOSIS: NORMAL
EKG P AXIS: 37 DEGREES
EKG P-R INTERVAL: 162 MS
EKG Q-T INTERVAL: 382 MS
EKG QRS DURATION: 82 MS
EKG QTC CALCULATION (BAZETT): 443 MS
EKG R AXIS: -4 DEGREES
EKG T AXIS: 37 DEGREES
EKG VENTRICULAR RATE: 81 BPM

## 2024-06-24 NOTE — TELEPHONE ENCOUNTER
----- Message from Briana Alcaraz sent at 6/24/2024 12:44 PM EDT -----  Regarding: ECC Appointment Request  ECC Appointment Request    Patient needs appointment for ECC Appointment Type: ED Follow-Up.    Reason for Appointment Request: Requested Provider unavailable.   --------------------------------------------------------------------------------------------------------------------------    Relationship to Patient: Self     Call Back Information: OK to leave message on voicemail  Preferred Call Back Number: Phone [phone]

## 2024-06-24 NOTE — PROGRESS NOTES
for chills, diaphoresis and fever.   HENT:  Positive for ear pain and voice change (chronic). Negative for congestion, postnasal drip, rhinorrhea, sinus pressure, sinus pain, sore throat and tinnitus.    Respiratory:  Positive for shortness of breath (chronic). Negative for cough and wheezing.    Cardiovascular:  Negative for chest pain.   Gastrointestinal:  Negative for diarrhea, nausea and vomiting.   Genitourinary:  Negative for difficulty urinating.   Neurological:  Positive for dizziness (chronic). Negative for light-headedness and headaches.   Hematological:  Negative for adenopathy.        VITALS:  BP (!) 140/90 (Site: Right Upper Arm, Position: Sitting, Cuff Size: Medium Adult)   Pulse 82   Temp 97.2 °F (36.2 °C) (Infrared)   Ht 1.575 m (5' 2\")   Wt 78.5 kg (173 lb)   SpO2 94%   BMI 31.64 kg/m²      PE:  Physical Exam  Vitals and nursing note reviewed.   Constitutional:       General: She is not in acute distress.     Appearance: Normal appearance. She is well-developed and normal weight. She is not ill-appearing.   HENT:      Right Ear: External ear normal. No drainage, swelling or tenderness. A middle ear effusion is present. Tympanic membrane is erythematous. Tympanic membrane is not perforated or bulging.      Left Ear: Tympanic membrane, ear canal and external ear normal.      Nose: Nose normal. No congestion or rhinorrhea.      Mouth/Throat:      Mouth: Mucous membranes are moist.      Pharynx: Oropharynx is clear. No oropharyngeal exudate or posterior oropharyngeal erythema.   Cardiovascular:      Rate and Rhythm: Normal rate and regular rhythm.      Heart sounds: Normal heart sounds. No murmur heard.     No friction rub.   Pulmonary:      Effort: Pulmonary effort is normal. No respiratory distress.      Breath sounds: Normal breath sounds. No wheezing, rhonchi or rales.   Lymphadenopathy:      Cervical: No cervical adenopathy.   Skin:     General: Skin is warm and dry.      Findings: No rash.

## 2024-06-24 NOTE — CARE COORDINATION
[facility] ED Follow up Call    Reason for ED visit:  Weakness  Status:     improved    Did you call your PCP prior to going to the ED?  No      Did you receive a discharge instructions from the Emergency Room? Yes  Review of Instructions:     Understands what to report/when to return?:  Yes   Understands discharge instructions?:  Yes   Following discharge instructions?:  Yes   If not why? N/A    Are there any new complaints of pain? No  New Pain Meds? No    Constipation prophylaxis needed?  No    If you have a wound is the dressing clean, dry, and intact? N/A  Understands wound care regimen? N/A    Are there any other complaints/concerns that you wish to tell your provider?   ACM completed outreach to patient who scheduled follow up with PCP. Patient has her antibiotics and taking them as prescribed. Patient said she is feeling a little better today. Patient has no other complaints at this time.     FU appts/Provider:    Future Appointments   Date Time Provider Department Center   6/25/2024  3:45 PM Padmini Charles, APRN - CNP [facility]  [place] - [facility]   7/10/2024 11:30 AM [place], [place], PT [facility]   7/12/2024 12:30 PM Austin, [place], PT [facility]   7/17/2024 11:45 AM Raisa Espitia MD [facility]  [place] - [facility]   7/17/2024  2:00 PM Austin, [place], PT [facility]   7/19/2024 11:00 AM [place], [place], PT [facility]   7/24/2024  2:00 PM Austin, Beverly, PT [facility]   7/26/2024 11:30 AM [place], [place], PT [facility]   7/31/2024 11:30 AM [place], [place], PT [facility]   8/5/2024  2:20 PM Lul Thompson MD [facility]   9/19/2024  2:00 PM Derian Dukes MD [facility]   10/30/2024  1:45 PM SCHEDULE, [facility] HE[facility] OSTEOPOROSIS [facility]   5/13/2025 10:00 AM DEXA [facility]   5/13/2025 10:20 AM Paulo Roque MD [facility]           New Medications?:   Yes      Medication Reconciliation by phone - Yes  Understands

## 2024-06-24 NOTE — ED PROVIDER NOTES
Continuity of Care Note:    The patient was seen and examined earlier by Dr. White.  At the time of change of shift, test results and final disposition were pending.  I was asked to follow-up on test results and make final disposition of the patient.  I also completed a face-to-face examination.      HPI: Tatiana Shepard is a 68 y.o. female who presents to the emergency department with a complaint of feeling generally weak for the last few days.  She feels like she is \"getting sick\".  She complains of some right ear pain for the last 3 or 4 days.  She complains of some fullness in the right ear and muffled hearing.  She also reports rhinorrhea and nasal congestion.  She denies any sore throat.  She denies any headache.  She denies any neck pain or stiffness.    Currently she denies any chest pain heaviness pressure tightness or shortness of breath.  She denies any abdominal pain nausea vomiting or diarrhea.  She denies any dysuria hematuria frequency or urgency.  Appetite has been normal.  Urine output has been normal.    She denies any focal weakness, vision change, vision loss, difficulty walking, focal weakness or numbness in the extremities, vertigo, dizziness, facial droop, difficulty speaking, slurred speech.  She denies any recent fall trauma or injury.    She does report some occasional chest discomfort and shortness of breath but states that these are chronic and unchanged from baseline.  She states \"I been getting that for a long time, it comes and goes\".  She denies any recent chest pain in the last 48 hours.  She states that last occurred a few days ago.  She has had extensive workup in the past.    She denies any leg or calf pain.  No recent travel.  No recent surgeries.    Medical history significant for hyperlipidemia, hypertension, NSTEMI, asthma, type 2 diabetes, GERD, tremor, migraine, neuropathy, depression, cervical spinal stenosis, osteoarthritis, GERD, trigeminal neuropathy/neuralgia,

## 2024-06-25 ENCOUNTER — OFFICE VISIT (OUTPATIENT)
Dept: PRIMARY CARE CLINIC | Age: 69
End: 2024-06-25
Payer: MEDICARE

## 2024-06-25 ENCOUNTER — TELEPHONE (OUTPATIENT)
Dept: PULMONOLOGY | Age: 69
End: 2024-06-25

## 2024-06-25 VITALS
DIASTOLIC BLOOD PRESSURE: 90 MMHG | BODY MASS INDEX: 31.83 KG/M2 | TEMPERATURE: 97.2 F | WEIGHT: 173 LBS | HEART RATE: 82 BPM | OXYGEN SATURATION: 94 % | HEIGHT: 62 IN | SYSTOLIC BLOOD PRESSURE: 140 MMHG

## 2024-06-25 DIAGNOSIS — J45.40 MODERATE PERSISTENT ASTHMA WITHOUT COMPLICATION: ICD-10-CM

## 2024-06-25 DIAGNOSIS — H65.01 NON-RECURRENT ACUTE SEROUS OTITIS MEDIA OF RIGHT EAR: Primary | ICD-10-CM

## 2024-06-25 PROCEDURE — 3080F DIAST BP >= 90 MM HG: CPT | Performed by: NURSE PRACTITIONER

## 2024-06-25 PROCEDURE — 1124F ACP DISCUSS-NO DSCNMKR DOCD: CPT | Performed by: NURSE PRACTITIONER

## 2024-06-25 PROCEDURE — G8417 CALC BMI ABV UP PARAM F/U: HCPCS | Performed by: NURSE PRACTITIONER

## 2024-06-25 PROCEDURE — G8399 PT W/DXA RESULTS DOCUMENT: HCPCS | Performed by: NURSE PRACTITIONER

## 2024-06-25 PROCEDURE — 1090F PRES/ABSN URINE INCON ASSESS: CPT | Performed by: NURSE PRACTITIONER

## 2024-06-25 PROCEDURE — G8427 DOCREV CUR MEDS BY ELIG CLIN: HCPCS | Performed by: NURSE PRACTITIONER

## 2024-06-25 PROCEDURE — 3017F COLORECTAL CA SCREEN DOC REV: CPT | Performed by: NURSE PRACTITIONER

## 2024-06-25 PROCEDURE — 99214 OFFICE O/P EST MOD 30 MIN: CPT | Performed by: NURSE PRACTITIONER

## 2024-06-25 PROCEDURE — 1036F TOBACCO NON-USER: CPT | Performed by: NURSE PRACTITIONER

## 2024-06-25 PROCEDURE — 3077F SYST BP >= 140 MM HG: CPT | Performed by: NURSE PRACTITIONER

## 2024-06-25 ASSESSMENT — ENCOUNTER SYMPTOMS
VOMITING: 0
SORE THROAT: 0
COUGH: 0
WHEEZING: 0
RHINORRHEA: 0
SHORTNESS OF BREATH: 1
SINUS PRESSURE: 0
DIARRHEA: 0
VOICE CHANGE: 1
SINUS PAIN: 0
NAUSEA: 0

## 2024-06-25 NOTE — TELEPHONE ENCOUNTER
She called today wanting to see if she could get in sooner than 8/5/24.  She was in the ER on 6/23 because she was feeling weak  and they did a lot of  blood work,  her venous blood gas was low.  She seen NP today and she is being treated for ear infection. Can you look at her chart and see if she needs to be seen sooner and if so when,  she is on waiting list.

## 2024-07-01 PROBLEM — H60.91 OTITIS EXTERNA OF RIGHT EAR: Status: ACTIVE | Noted: 2024-07-01

## 2024-07-01 ASSESSMENT — ENCOUNTER SYMPTOMS
COUGH: 0
SORE THROAT: 1
WHEEZING: 0
SHORTNESS OF BREATH: 0
SINUS PAIN: 0
RHINORRHEA: 1
SINUS PRESSURE: 0
CHEST TIGHTNESS: 0

## 2024-07-10 ENCOUNTER — HOSPITAL ENCOUNTER (EMERGENCY)
Age: 69
Discharge: HOME | End: 2024-07-10
Attending: EMERGENCY MEDICINE
Payer: MEDICARE

## 2024-07-10 ENCOUNTER — CARE COORDINATION (OUTPATIENT)
Dept: CARE COORDINATION | Age: 69
End: 2024-07-10

## 2024-07-10 ENCOUNTER — HOSPITAL ENCOUNTER (OUTPATIENT)
Dept: PHYSICAL THERAPY | Age: 69
Setting detail: THERAPIES SERIES
Discharge: HOME | End: 2024-07-10
Payer: MEDICARE

## 2024-07-10 ENCOUNTER — APPOINTMENT (OUTPATIENT)
Dept: GENERAL RADIOLOGY | Age: 69
End: 2024-07-10
Payer: MEDICARE

## 2024-07-10 ENCOUNTER — CARE COORDINATION (OUTPATIENT)
Dept: CASE MANAGEMENT | Age: 69
End: 2024-07-10

## 2024-07-10 VITALS
BODY MASS INDEX: 30.59 KG/M2 | HEART RATE: 78 BPM | RESPIRATION RATE: 17 BRPM | DIASTOLIC BLOOD PRESSURE: 69 MMHG | TEMPERATURE: 98.2 F | OXYGEN SATURATION: 92 % | WEIGHT: 166.2 LBS | SYSTOLIC BLOOD PRESSURE: 118 MMHG | HEIGHT: 62 IN

## 2024-07-10 DIAGNOSIS — R07.9 CHEST PAIN, UNSPECIFIED TYPE: Primary | ICD-10-CM

## 2024-07-10 LAB
ALBUMIN SERPL-MCNC: 4.3 G/DL (ref 3.4–5)
ALBUMIN/GLOB SERPL: 1.8 {RATIO} (ref 1.1–2.2)
ALP SERPL-CCNC: 83 U/L (ref 40–129)
ALT SERPL-CCNC: 20 U/L (ref 10–40)
ANION GAP SERPL CALCULATED.3IONS-SCNC: 11 MMOL/L (ref 3–16)
AST SERPL-CCNC: 21 U/L (ref 15–37)
BASOPHILS # BLD: 0 K/UL (ref 0–0.2)
BASOPHILS NFR BLD: 0.7 %
BILIRUB SERPL-MCNC: 0.5 MG/DL (ref 0–1)
BUN SERPL-MCNC: 15 MG/DL (ref 7–20)
CALCIUM SERPL-MCNC: 9.4 MG/DL (ref 8.3–10.6)
CHLORIDE SERPL-SCNC: 106 MMOL/L (ref 99–110)
CO2 SERPL-SCNC: 23 MMOL/L (ref 21–32)
CREAT SERPL-MCNC: 0.7 MG/DL (ref 0.6–1.2)
DEPRECATED RDW RBC AUTO: 13 % (ref 12.4–15.4)
EOSINOPHIL # BLD: 0.1 K/UL (ref 0–0.6)
EOSINOPHIL NFR BLD: 1.5 %
GFR SERPLBLD CREATININE-BSD FMLA CKD-EPI: >90 ML/MIN/{1.73_M2}
GLUCOSE SERPL-MCNC: 102 MG/DL (ref 70–99)
HCT VFR BLD AUTO: 42.1 % (ref 36–48)
HGB BLD-MCNC: 14.1 G/DL (ref 12–16)
LYMPHOCYTES # BLD: 1.9 K/UL (ref 1–5.1)
LYMPHOCYTES NFR BLD: 29.5 %
MCH RBC QN AUTO: 30 PG (ref 26–34)
MCHC RBC AUTO-ENTMCNC: 33.6 G/DL (ref 31–36)
MCV RBC AUTO: 89.3 FL (ref 80–100)
MONOCYTES # BLD: 0.7 K/UL (ref 0–1.3)
MONOCYTES NFR BLD: 10.5 %
NEUTROPHILS # BLD: 3.7 K/UL (ref 1.7–7.7)
NEUTROPHILS NFR BLD: 57.8 %
NT-PROBNP SERPL-MCNC: <36 PG/ML (ref 0–124)
PLATELET # BLD AUTO: 215 K/UL (ref 135–450)
PMV BLD AUTO: 8.5 FL (ref 5–10.5)
POTASSIUM SERPL-SCNC: 4.3 MMOL/L (ref 3.5–5.1)
PROT SERPL-MCNC: 6.7 G/DL (ref 6.4–8.2)
RBC # BLD AUTO: 4.72 M/UL (ref 4–5.2)
SODIUM SERPL-SCNC: 140 MMOL/L (ref 136–145)
TROPONIN, HIGH SENSITIVITY: 11 NG/L (ref 0–14)
TROPONIN, HIGH SENSITIVITY: 9 NG/L (ref 0–14)
WBC # BLD AUTO: 6.3 K/UL (ref 4–11)

## 2024-07-10 PROCEDURE — 97530 THERAPEUTIC ACTIVITIES: CPT

## 2024-07-10 PROCEDURE — 99285 EMERGENCY DEPT VISIT HI MDM: CPT

## 2024-07-10 PROCEDURE — 83880 ASSAY OF NATRIURETIC PEPTIDE: CPT

## 2024-07-10 PROCEDURE — 80053 COMPREHEN METABOLIC PANEL: CPT

## 2024-07-10 PROCEDURE — 71046 X-RAY EXAM CHEST 2 VIEWS: CPT

## 2024-07-10 PROCEDURE — 84484 ASSAY OF TROPONIN QUANT: CPT

## 2024-07-10 PROCEDURE — 85025 COMPLETE CBC W/AUTO DIFF WBC: CPT

## 2024-07-10 PROCEDURE — 36415 COLL VENOUS BLD VENIPUNCTURE: CPT

## 2024-07-10 ASSESSMENT — PAIN DESCRIPTION - LOCATION: LOCATION: CHEST

## 2024-07-10 ASSESSMENT — ENCOUNTER SYMPTOMS
CHEST TIGHTNESS: 1
RHINORRHEA: 0
SHORTNESS OF BREATH: 1
BACK PAIN: 0
CHOKING: 0
NAUSEA: 0
COLOR CHANGE: 0
SINUS PRESSURE: 0
CONSTIPATION: 0
SORE THROAT: 0
EYE ITCHING: 0
ABDOMINAL PAIN: 0
DIARRHEA: 0
EYE DISCHARGE: 0

## 2024-07-10 NOTE — ED PROVIDER NOTES
THE [facility]  EMERGENCY DEPARTMENT ENCOUNTER          PHYSICIAN ASSISTANT NOTE       Date of evaluation: 7/10/2024    Chief Complaint     Chest Pain (Patient with c/o chest pain since Sept, reports that her pain gets better and worse. Also reports difficulty breathing.)      History of Present Illness     Tatiana Shepard is a 68 y.o. female who presents asthma, hypothyroidism, hyperlipidemia, HTN who presents to the emergency department with chest pain.  Per chart review, patient took her pulse ox this morning and it was satting at 90%.  Patient was instructed to come to the ER.  She reports squeezing chest pain since September 2023 however worsening.  She states that it hurts to take a deep breath in.  No nausea, vomiting, fevers or chills.  Denies dysuria.    ASSESSMENT / PLAN  (MEDICAL DECISION MAKING)     INITIAL VITALS: BP: (!) 145/88, Temp: 98.2 °F (36.8 °C), Pulse: 82, Respirations: 22, SpO2: 94 %    Tatiana Shepard is a 68 y.o. female who presents asthma, hypothyroidism, hyperlipidemia, HTN who presents to the emergency department with chest pain.  Per chart review, patient took her pulse ox this morning and it was satting at 90%.  Patient was instructed to come to the ER.  She reports squeezing chest pain since September 2023 however worsening.  She states that it hurts to take a deep breath in.  No nausea, vomiting, fevers or chills.  Denies dysuria.    On chart review, patient did have a cardiac catheterization on 2/23/2023, follows with Dr. Stringer.  Is getting treated for microvascular angina.  She has had multiple ER visits for chronic shortness of breath and chest pain.    Initial workup includes EKG, CXR, and blood work.  Workup unremarkable.  Troponins reassuring at 11 and 9.  CXR showing no acute abnormalities.  EKG without any acute changes.  Patient has had intermittent biphasic T waves in the past.  Upon reevaluation, patient states that she is feeling better.  We did do a walking

## 2024-07-10 NOTE — FLOWSHEET NOTE
efficiency.    Note: If patient does not return for scheduled/recommended follow up visits, this note will serve as a discharge from care along with the most recent update on progress.

## 2024-07-10 NOTE — CARE COORDINATION
-Remote Alert Monitoring Note      Date/Time:  7/10/2024 11:06 AM  Patient Current Location: Home: [address] [id number]  Verified patients name and  as identifiers.    Rpm alert to be reviewed by the provider   red alert  pulse ox reading (90%)  Vitals Recheck pulse ox reading (95%)  Additional needs to be addressed by provider: No                   LPN contacted patient by telephone regarding red alert received   Background: enrolled in RPM for HTN AND ASTHMA  Refer to 911 immediately if:  Patient unresponsive or unable to provide history  Change in cognition or sudden confusion  Patient unable to respond in complete sentences  Intense chest pain/tightness  Any concern for any clinical emergency  Red Alert: Provider response time of 1 hr required for any red alert requiring intervention  Yellow Alert: Provider response time of 3hr required for any escalated yellow alert  Patient Chief Complaint:  O2 Triage  Are you having any Chest Pain? yes   Are you having any Shortness of Breath? yes   Swelling in your hands or feet? no     Are you having any other health concerns or issues? no     Clinical Interventions: Reviewed and followed up on alerts and treatments-Spoke to Tatiana regarding RPM red alert for low pulse ox reading. Pt complains of chest pain and dyspnea. She is very anxious. States she took all of her medications today. Asked pt if this writer can call 911 for her. She states \" No!\" \" I have to go to physical therapy\" . Educated pt that she should not drive with chest pain and dyspnea. She states \" I do not want to cancel\"   pt states \" I have to be there at 1130\" pt rechecked her pulse ox metrics. New reading is 95%. All metrics WNL. Update to [facility] Christine Martel  1120AM call to [facility] outpatient therapy. Spoke to Sally. Updated on pt condition.   Plan/Follow Up: Will continue to review, monitor and address alerts with follow up based on severity of symptoms and risk

## 2024-07-10 NOTE — CARE COORDINATION
ACM placed call to patient who said she was on her way to physical therapy at this time. ACM reviewed symptoms that patient was having and she said she was going to physical therapy. ACM encouraged patient to go to ER but patient refused during call. Once, patient began talking about ER she became anxious. ACM notified RPM KINGSTON Awad who said she spoke to PT who was going to meet patient in the parking lot. ACM placed outgoing call back to patient who seemed to be calmer on the phone. ACM talked about general things while patient got to the parking lot of Physical therapy. ACM reviewed upcoming PCP appt per patient request. Patient confirmed she was parked and hung up the phone.

## 2024-07-10 NOTE — ED PROVIDER NOTES
ED Attending Attestation Note     Date of evaluation: 7/10/2024    This patient was seen by the advance practice provider.  I have seen and examined the patient, agree with the workup, evaluation, management and diagnosis. The care plan has been discussed.  I have reviewed the ECG and concur with the IRINA's interpretation.      Briefly, Tatiana Shepard is a 68 y.o. female with a PMH inclusive of hyperlipidemia, hypertension who presents for evaluation of chest pain, fatigue, chronic.     Notable exam findings include appears fatigued but nontoxic    Assessment/ Medical Decision Making:     Will perform workup in the ED.  If ED workup reassuring and symptoms stable, may be appropriate for discharge with close outpatient follow-up.  She is established with PCP and cardiology as well as pulmonology.         Kris Vivas MD  07/12/24 [id number]

## 2024-07-10 NOTE — DISCHARGE INSTRUCTIONS
- You were seen in the emergency department due to chest pain and low oxygen saturation.  Today your workup was unremarkable.  Your blood work and EKG look good.  -Your oxygen also was low but within normal limits while walking.  I do recommend that you keep an eye on it and follow-up with your PCP.  I have no concerns for pneumonia or acute process at this time.  -Return to the ER with concerning symptoms such as fever, chills, chest pain, shortness of breath or other concerning symptoms.

## 2024-07-10 NOTE — ED NOTES
Patient ambulated on room air. O2 sat maintained between 91%-93%.     Josephine Agee RN  07/10/24 [id number]

## 2024-07-12 ENCOUNTER — CARE COORDINATION (OUTPATIENT)
Dept: CARE COORDINATION | Age: 69
End: 2024-07-12

## 2024-07-12 ENCOUNTER — APPOINTMENT (OUTPATIENT)
Dept: PHYSICAL THERAPY | Age: 69
End: 2024-07-12
Payer: MEDICARE

## 2024-07-12 NOTE — CARE COORDINATION
[facility] ED Follow up Call    Reason for ED visit:  SOB, Chest pain  Status:     improved    Did you call your PCP prior to going to the ED?  Yes      Did you receive a discharge instructions from the Emergency Room? Yes  Review of Instructions:     Understands what to report/when to return?:  Yes   Understands discharge instructions?:  Yes   Following discharge instructions?:  Yes   If not why? N/A    Are there any new complaints of pain? No  New Pain Meds? No    Constipation prophylaxis needed?  N/A    If you have a wound is the dressing clean, dry, and intact? N/A  Understands wound care regimen? N/A    Are there any other complaints/concerns that you wish to tell your provider?   Patient went to ER for further evaluation for SOB and chest pain. Patient said she is feeling better today. Patient reviewed PCP f/u appt for next week. Patient said she has F/U scheduled with Pulm. Patient has no other concerns at this time and is feeling better.     FU appts/Provider:    Future Appointments   Date Time Provider Department Center   7/17/2024 11:45 AM Raisa Espitia MD [facility]  [place] - [facility]   7/17/2024  2:00 PM [place], [place], PT [facility] PT [facility]   7/19/2024 11:00 AM [place], [place], PT [facility]   7/22/2024  3:00 PM Lul Thompson MD [facility]   7/24/2024  2:00 PM [place], [place], PT [facility]   7/26/2024 11:30 AM [place], [place], PT [facility]   7/31/2024 11:30 AM [place], [place], PT [facility]   8/5/2024  2:20 PM Lul Thompson MD [facility]   9/19/2024  2:00 PM Derian Dukes MD [facility]   10/30/2024  1:45 PM SCHEDULE[facility]   5/13/2025 10:00 AM DEXA MOB Jain [facility] RAD [facility]   5/13/2025 10:20 AM Paulo Roque [facility]           New Medications?:   No      Any further needs in the home i.e. Equipment?  No    Link to services in community?:  No   Which services:  N/A

## 2024-07-15 ENCOUNTER — CARE COORDINATION (OUTPATIENT)
Dept: CARE COORDINATION | Age: 69
End: 2024-07-15

## 2024-07-15 NOTE — CARE COORDINATION
Ambulatory Care Coordination Note     7/15/2024 12:54 PM     Patient Current Location:  Home: [address] [place], [place] [id number]     ACM contacted the patient by telephone. Verified name and  with patient as identifiers.         ACM: Christine Martel RN     Challenges to be reviewed by the provider   Additional needs identified to be addressed with provider No  none               Method of communication with provider: chart routing.    Care Summary Note:  ACM completed return call to patient after voicemail was left. Patient said she is having oxygen levels 90-93%. Patient is doing her breathing treatments and rescue inhalers. Patient said she was not going to go to ER as she knows nothing will be done. Patient said she called ACM as she is easier to get a hold of than going through prompts via office phone number. Patient said she has no new cough or coughing anything up at this time just feeling week. ACM reviewed PCP appt  and Pulm appt for . Patient has no other questions or concerns at this time.     Offered patient enrollment in the Remote Patient Monitoring (RPM) program for in-home monitoring: Yes, patient enrolled; current status is activated and monitoring.     Assessments Completed:   COPD Assessment              Symptoms:               Medications Reviewed:   Completed during this call    Advance Care Planning:   Reviewed and current     Care Planning:    Goals Addressed                   This Visit's Progress     Conditions and Symptoms   Improving     I will schedule office visits, as directed by my provider.  I will keep my appointment or reschedule if I have to cancel.  I will notify my provider of any barriers to my plan of care.    Barriers: overwhelmed by complexity of regimen  Plan for overcoming my barriers: AC will engage patient with care management phone calls.   Confidence: 8/10  Anticipated Goal Completion Date: 24                   PCP/Specialist follow up:

## 2024-07-17 ENCOUNTER — HOSPITAL ENCOUNTER (OUTPATIENT)
Dept: PHYSICAL THERAPY | Age: 69
Setting detail: THERAPIES SERIES
Discharge: HOME | End: 2024-07-17
Payer: MEDICARE

## 2024-07-17 ENCOUNTER — OFFICE VISIT (OUTPATIENT)
Dept: PRIMARY CARE CLINIC | Age: 69
End: 2024-07-17

## 2024-07-17 VITALS
WEIGHT: 170.4 LBS | BODY MASS INDEX: 31.17 KG/M2 | SYSTOLIC BLOOD PRESSURE: 120 MMHG | DIASTOLIC BLOOD PRESSURE: 78 MMHG | HEART RATE: 90 BPM | OXYGEN SATURATION: 94 %

## 2024-07-17 DIAGNOSIS — I10 ESSENTIAL HYPERTENSION: Chronic | ICD-10-CM

## 2024-07-17 DIAGNOSIS — R53.1 WEAKNESS: ICD-10-CM

## 2024-07-17 DIAGNOSIS — E78.2 MIXED HYPERLIPIDEMIA: Chronic | ICD-10-CM

## 2024-07-17 DIAGNOSIS — J45.40 MODERATE PERSISTENT ASTHMA WITHOUT COMPLICATION: ICD-10-CM

## 2024-07-17 DIAGNOSIS — E11.42 TYPE 2 DIABETES MELLITUS WITH DIABETIC POLYNEUROPATHY, WITHOUT LONG-TERM CURRENT USE OF INSULIN (HCC): Primary | ICD-10-CM

## 2024-07-17 LAB — HBA1C MFR BLD: 6.4 %

## 2024-07-17 PROCEDURE — 97110 THERAPEUTIC EXERCISES: CPT

## 2024-07-17 RX ORDER — NEBULIZER ACCESSORIES
1 KIT MISCELLANEOUS DAILY PRN
Qty: 1 KIT | Refills: 0 | Status: SHIPPED | OUTPATIENT
Start: 2024-07-17

## 2024-07-17 NOTE — PROGRESS NOTES
Date of Visit: 2024    Tatiana Shepard (:  1955) is a 68 y.o. female,  Established patient here for evaluation of the following chief complaint(s):  Diabetes, neuropathy, Hypertension, and Hyperlipidemia      ASSESSMENT/PLAN:    1. Type 2 diabetes mellitus with diabetic polyneuropathy, without long-term current use of insulin (HCC)  -Hemoglobin A1c is 6.4% shows diabetes is controlled  -Continue Trulicity 0.75 mg SC weekly  -Continue gabapentin 600 mg nightly for neuropathy  -Limit carbohydrates to 45 grams with meals and 15 grams with snacks  -monitor blood sugars  -goal for blood sugar fasting or pre-meal  is   -goal for blood sugar 2 hours after a meal is less than 180  -goal for blood sugar at bedtime is less than 150  -POCT glycosylated hemoglobin (Hb A1C)    2. Essential hypertension  -Stable  -Continue diltiazem  mg once daily  -Low sodium diet  -Regular aerobic exercise    3. Mixed hyperlipidemia  -Stable  -LDL is at goal  -Continue rosuvastatin 10 mg once daily  -Low fat, low cholesterol diet  -Regular aerobic exercise  -Lipid Panel; Future    4. Moderate persistent asthma without complication  -Stable  -Continue fluticasone-salmeterol (WIXELA INHUB) 500-50 MCG/ACT AEPB diskus inhaler; Inhale 1 puff into the lungs in the morning and 1 puff in the evening.  -Continue Spiriva 1 puff daily  -Continue Singulair 10 mg nightly  -Continue albuterol inhaler as needed  -Respiratory Therapy Supplies (NEBULIZER/TUBING/MOUTHPIECE) KIT; 1 kit by Does not apply route daily as needed (for shortness of breath or wheezing)  Dispense: 1 kit; Refill: 0  -Follow-up with Pulmonary as scheduled    5. Weakness  -Same  -Continue Physical Therapy        Return in about 3 months (around 10/7/2024) for Medicare AW.    SUBJECTIVE:    Patient has Type 2 Diabetes. Patient takes Trulicity 0.75 mg subcutaneously once a week. Patient takes gabapentin for neuropathy. Patient does not test her blood sugar. 
No

## 2024-07-17 NOTE — PLAN OF CARE
[facility]- Outpatient Rehabilitation and Therapy [address], [place] [address] office: [phone] fax: [phone]       Physical Therapy: TREATMENT/PROGRESS NOTE   Patient: Tatiana Shepard (68 y.o. female)   Examination Date: 2024   :  1955 MRN: [id number]   Visit #:    Insurance Allowable Auth Needed   BMN []Yes    [x]No    Insurance: Payor: MEDICARE / Plan: MEDICARE PART A AND B / Product Type: *No Product type* /   Insurance ID: [id number] - (Medicare)  Secondary Insurance (if applicable): BCBS   Treatment Diagnosis:     ICD-10-CM    1. Weakness  R53.1       2. Abnormality of gait  R26.9       3. Balance problem  R26.89          Medical Diagnosis:  Balance disorder [R26.89]   Referring Physician: Raisa Espitia MD  PCP: Raisa Espitia MD     Plan of care signed (Y/N): Y    Date of Patient follow up with Physician: July     Progress Report/POC: YES, Date Range for this report: 2024 to 2024  Progress note update due: (10 visits /OR AUTH LIMITS, whichever is less)  08/15/2024   Recert: 2024                                            Precautions/ Contra-indications:           Latex allergy:  NO  Pacemaker:    NO  Contraindications for Manipulation: NA  Date of Surgery: NA  Other: asthma, HTN, DM, anemia, anxiety, depression, dizziness, osteoporosis/osteopenia, neuropathy, Hx R foot sx, wears nitroglycerin patch daily (takes off at night).  Episodes of significant drop in O2 status (has seen cardiology, pulmonary, neurology)     SUBJECTIVE EXAMINATION     Patient stated complaint/comments: Pt notes that today she feels better than last week.  At ER last week, pt noted her heart was clear.  Pt just saw PCP earlier today and was prescribed a breathing treatment machine that pt is checking into.                          Test used Initial score  2024   Pain Summary VAS 6-7/10 R leg and LB \"Terrible\"/10 R leg     Functional

## 2024-07-17 NOTE — CARE COORDINATION
I saw patient in the office. I ordered a nebulizer for her. Pulse ox 96% at visit. Patient has appointment with Pulmonary scheduled for 7/22/24.

## 2024-07-18 ENCOUNTER — CARE COORDINATION (OUTPATIENT)
Dept: CARE COORDINATION | Age: 69
End: 2024-07-18

## 2024-07-18 ENCOUNTER — TELEPHONE (OUTPATIENT)
Dept: CARDIOLOGY CLINIC | Age: 69
End: 2024-07-18

## 2024-07-18 DIAGNOSIS — I20.0 UNSTABLE ANGINA (HCC): ICD-10-CM

## 2024-07-18 DIAGNOSIS — I21.4 NSTEMI (NON-ST ELEVATED MYOCARDIAL INFARCTION) (HCC): ICD-10-CM

## 2024-07-18 DIAGNOSIS — R00.2 PALPITATIONS: ICD-10-CM

## 2024-07-18 DIAGNOSIS — R07.9 CHEST PAIN, UNSPECIFIED TYPE: Primary | ICD-10-CM

## 2024-07-18 RX ORDER — RANOLAZINE 500 MG/1
500 TABLET, EXTENDED RELEASE ORAL 2 TIMES DAILY
Qty: 180 TABLET | Refills: 3 | Status: SHIPPED | OUTPATIENT
Start: 2024-07-18

## 2024-07-18 RX ORDER — RANOLAZINE 500 MG/1
500 TABLET, EXTENDED RELEASE ORAL 2 TIMES DAILY
Qty: 180 TABLET | Refills: 3 | Status: SHIPPED | OUTPATIENT
Start: 2024-07-18 | End: 2024-07-18 | Stop reason: SDUPTHER

## 2024-07-18 NOTE — CARE COORDINATION
Patient provided fax number: [phone] for letter that needs to be faxed from office discussed in follow up appt with PCP 7/17/24.

## 2024-07-18 NOTE — TELEPHONE ENCOUNTER
[facility] Medication Refills:    ranolazine (RANEXA) 500 MG extended release tablet [[id number]]    Order Details  Dose: 500 mg Route: Oral Frequency: 2 TIMES DAILY   Dispense Quantity: 180 tablet Refills: 3          Sig: Take 1 tablet by mouth 2 times daily         Start Date: 03/21/24       [facility] - [facility] [address] [facility] - P [phone] - F [phone]  [facility] [address]  Phone: [phone]  Fax: [phone]       Last Office Visit: 03.21.2024    Next Office Visit: 09.19.2024    Last Refill:     Last Labs: 07.10.2024

## 2024-07-19 ENCOUNTER — HOSPITAL ENCOUNTER (OUTPATIENT)
Dept: PHYSICAL THERAPY | Age: 69
Setting detail: THERAPIES SERIES
Discharge: HOME | End: 2024-07-19
Payer: MEDICARE

## 2024-07-19 ENCOUNTER — TELEPHONE (OUTPATIENT)
Age: 69
End: 2024-07-19

## 2024-07-19 PROCEDURE — 97110 THERAPEUTIC EXERCISES: CPT

## 2024-07-19 NOTE — TELEPHONE ENCOUNTER
I called patient and left her a VM on directions per Dr. Thompson. Informed her to call the office with any other questions. No further action required at this time.

## 2024-07-19 NOTE — TELEPHONE ENCOUNTER
I called patient and spoke with her she states that dr moreira wants her to take her albuterol 4 times a day. She wants to know if she should be taking her nebulizer and inhaler at the same time.     The orders are for PRN but she states that she was told to take them both 4 times a day. Please advise.     Pt states that if she does not answer when we call back it is okay to leave her a detailed message of what she needs to do.

## 2024-07-19 NOTE — FLOWSHEET NOTE
Met: [] Adjusted  2. Patient will improve TUG <13 seconds for decreased fall risk  [] Progressing: [] Met: [] Not Met: [] Adjusted    Long Term Goals: To be achieved in: 8 weeks  1. Disability index score of 50% or less for the LEFS and 50% or more confident on ABC Scale to assist with reaching prior level of function with activities such as walking.  [] Progressing: [] Met: [] Not Met: [] Adjusted  2. Patient will demonstrate good dynamic stand balance for decreased fall risk   [] Progressing: [] Met: [] Not Met: [] Adjusted  3. Patient will demonstrate increased Strength of B LE to 4+/5 to allow for proper functional mobility to enable patient to return to walking.   [x] Progressing: [] Met: [] Not Met: [] Adjusted  4. Patient will return to walking with cane without increased symptoms or restriction with gait speed: 2.68 ft/sec.   [] Progressing: [] Met: [] Not Met: [] Adjusted  5. Independent in HEP progression per patient tolerance, in order to prevent re-injury.   [x] Progressing: [] Met: [] Not Met: [] Adjusted     Overall Progression Towards Functional goals/ Treatment Progress Update:  [] Patient is progressing as expected towards functional goals listed.    [x] Progression is slowed due to complexities/Impairments listed.  [x] Progression has been slowed due to co-morbidities.  [] Plan just implemented, too soon (<30days) to assess goals progression   [] Goals require adjustment due to lack of progress  [] Patient is not progressing as expected and requires additional follow up with physician  [] Other:     TREATMENT PLAN     Plan: Cont POC- Continue emphasis/focus on exercise progression, improving proper muscle recruitment and activation/motor control patterns, and static and dynamic balance. Next visit plan to do POC     Electronically Signed by Beverly Avila PT, DPT [id number]  Date: 07/19/2024     Note: Portions of this note have been templated and/or copied from initial evaluation, reassessments and prior

## 2024-07-22 ENCOUNTER — OFFICE VISIT (OUTPATIENT)
Age: 69
End: 2024-07-22
Payer: MEDICARE

## 2024-07-22 VITALS
HEIGHT: 62 IN | BODY MASS INDEX: 31.28 KG/M2 | OXYGEN SATURATION: 97 % | DIASTOLIC BLOOD PRESSURE: 78 MMHG | SYSTOLIC BLOOD PRESSURE: 124 MMHG | WEIGHT: 170 LBS | HEART RATE: 87 BPM

## 2024-07-22 DIAGNOSIS — J45.40 MODERATE PERSISTENT ASTHMA WITHOUT COMPLICATION: ICD-10-CM

## 2024-07-22 DIAGNOSIS — E78.2 MIXED HYPERLIPIDEMIA: Chronic | ICD-10-CM

## 2024-07-22 DIAGNOSIS — E78.2 MIXED HYPERLIPIDEMIA: ICD-10-CM

## 2024-07-22 DIAGNOSIS — K59.00 CONSTIPATION, UNSPECIFIED CONSTIPATION TYPE: ICD-10-CM

## 2024-07-22 LAB
CHOLEST SERPL-MCNC: 169 MG/DL (ref 0–199)
HDLC SERPL-MCNC: 69 MG/DL (ref 40–60)
LDLC SERPL CALC-MCNC: 70 MG/DL
TRIGL SERPL-MCNC: 148 MG/DL (ref 0–150)
VLDLC SERPL CALC-MCNC: 30 MG/DL

## 2024-07-22 PROCEDURE — 3074F SYST BP LT 130 MM HG: CPT | Performed by: INTERNAL MEDICINE

## 2024-07-22 PROCEDURE — G8399 PT W/DXA RESULTS DOCUMENT: HCPCS | Performed by: INTERNAL MEDICINE

## 2024-07-22 PROCEDURE — 3017F COLORECTAL CA SCREEN DOC REV: CPT | Performed by: INTERNAL MEDICINE

## 2024-07-22 PROCEDURE — 99213 OFFICE O/P EST LOW 20 MIN: CPT | Performed by: INTERNAL MEDICINE

## 2024-07-22 PROCEDURE — G8417 CALC BMI ABV UP PARAM F/U: HCPCS | Performed by: INTERNAL MEDICINE

## 2024-07-22 PROCEDURE — 1124F ACP DISCUSS-NO DSCNMKR DOCD: CPT | Performed by: INTERNAL MEDICINE

## 2024-07-22 PROCEDURE — 3078F DIAST BP <80 MM HG: CPT | Performed by: INTERNAL MEDICINE

## 2024-07-22 PROCEDURE — 1036F TOBACCO NON-USER: CPT | Performed by: INTERNAL MEDICINE

## 2024-07-22 PROCEDURE — 1090F PRES/ABSN URINE INCON ASSESS: CPT | Performed by: INTERNAL MEDICINE

## 2024-07-22 PROCEDURE — G8427 DOCREV CUR MEDS BY ELIG CLIN: HCPCS | Performed by: INTERNAL MEDICINE

## 2024-07-22 RX ORDER — ALBUTEROL SULFATE 2.5 MG/3ML
2.5 SOLUTION RESPIRATORY (INHALATION) 4 TIMES DAILY PRN
Qty: 360 ML | Refills: 3 | Status: SHIPPED | OUTPATIENT
Start: 2024-07-22

## 2024-07-22 NOTE — TELEPHONE ENCOUNTER
Patient walked in and requested to refill the following medications        docusate sodium (COLACE) 100 MG capsule       albuterol (PROVENTIL) (2.5 MG/3ML) 0.083% nebulizer solution         aspirin 81 MG EC tablet       rosuvastatin (CRESTOR) 10 MG tablet     Preferred pharmacy      [facility] - [place] [address] - [address] - P [phone] - F [phone]       Pl review    thanks

## 2024-07-22 NOTE — TELEPHONE ENCOUNTER
Medication:   Requested Prescriptions     Pending Prescriptions Disp Refills    docusate sodium (COLACE) 100 MG capsule 60 capsule 1     Sig: Take 1 capsule by mouth 2 times daily    aspirin 81 MG EC tablet 30 tablet 2     Sig: Take 1 tablet by mouth every other day    rosuvastatin (CRESTOR) 10 MG tablet 30 tablet 3     Sig: Take 1 tablet by mouth daily     Last Filled:  5/29/24 5/19/24 5/19/24    Last appt: 7/17/2024   Next appt: 10/17/2024    Last Lipid:   Lab Results   Component Value Date/Time    CHOL 168 12/15/2023 09:02 AM    TRIG 187 12/15/2023 09:02 AM    HDL 53 12/15/2023 09:02 AM    HDL 58 10/27/2011 08:44 AM

## 2024-07-22 NOTE — PROGRESS NOTES
aorta.     Injection of the right coronary artery shows a normal dominant right  coronary artery, right posterior descending normal, and right  posterolateral branch is normal.     The 0.035 J-wire was exchange and slipped into the left ventricle.  The  JR4 catheter was advanced into the left ventricle.  The LVEDP was 8  mmHg.  Hand injection FINN projection shows LV ejection fraction of 65%  without wall motion abnormality.     Over the exchange length J-wire, the JL3.5 catheter was used to engage  the left main coronary artery.  The left main coronary artery is short  and normal.  In multiple views, there was some streaming of contrast  into the LAD and the circumflex, but there was no obstructive disease  identified in the left main.  The LAD was angiographically normal.   There was no obstruction identified in the LAD or its branches.   Diagonal branches were normal.  Septal perforators normal.     Circumflex coronary artery was normal.  Because of the streaming of  contrast, I wanted to take a better picture and I used an AL-1  diagnostic catheter and AL-2 guide.  Injections revealed that there was  wide patency with LO-3 blood flow in the left coronary system.  I  concluded the angiography procedure.  All the angiography hardware was  removed.  The TR band was applied after the radial sheath was removed.  Heparin 6000 units total was given and  the procedure was terminated.     PLAN:  Hydration.  Bedrest.  Treat for microvascular angina and hydrate  today.  Possible discharge later today if stable.    Assessment:      Diagnosis Orders   1. Moderate persistent asthma without complication  albuterol (PROVENTIL) (2.5 MG/3ML) 0.083% nebulizer solution              Plan:   CT chest pulmonary embolism protocol in May was normal.  There is no explanation for her chest pain, dyspnea or hypoxemia that she reports from home.  Oxygen saturation has been normal this visit and last visit.  Unfortunately have not been able

## 2024-07-23 DIAGNOSIS — U07.1 COVID-19: ICD-10-CM

## 2024-07-23 DIAGNOSIS — J45.40 MODERATE PERSISTENT ASTHMA WITHOUT COMPLICATION: ICD-10-CM

## 2024-07-23 RX ORDER — TIOTROPIUM BROMIDE 18 UG/1
CAPSULE ORAL; RESPIRATORY (INHALATION)
Qty: 90 CAPSULE | Refills: 1 | Status: SHIPPED | OUTPATIENT
Start: 2024-07-23

## 2024-07-23 RX ORDER — FLUTICASONE PROPIONATE 50 MCG
SPRAY, SUSPENSION (ML) NASAL
Qty: 48 G | Refills: 1 | Status: SHIPPED | OUTPATIENT
Start: 2024-07-23

## 2024-07-23 NOTE — TELEPHONE ENCOUNTER
Medication:   Requested Prescriptions     Pending Prescriptions Disp Refills    fluticasone (FLONASE) 50 MCG/ACT nasal spray [Pharmacy Med Name: FLUTICASONE  SPR 50MCG RX] 48 g 1     Sig: USE 2 SPRAYS NASALLY DAILY    tiotropium (SPIRIVA) 18 MCG inhalation capsule [Pharmacy Med Name: TIOTROP BROM CAP 18MCG] 90 capsule 1     Sig: INHALE THE CONTENTS OF ONE CAPSULE DAILY     Last Filled:  tiotropium - 1/16/2024  Fluticasone - 1/11/2024    Last appt: 7/17/2024   Next appt: 10/17/2024    Last OARRS:       8/29/2023     6:55 PM   RX Monitoring   Periodic Controlled Substance Monitoring No signs of potential drug abuse or diversion identified.

## 2024-07-23 NOTE — TELEPHONE ENCOUNTER
Pt called and requested status update on medication refill and stated she needs it as soon as possible. Please review and advise pt once completed.

## 2024-07-24 ENCOUNTER — TELEPHONE (OUTPATIENT)
Dept: ADMINISTRATIVE | Age: 69
End: 2024-07-24

## 2024-07-24 ENCOUNTER — HOSPITAL ENCOUNTER (OUTPATIENT)
Dept: PHYSICAL THERAPY | Age: 69
Setting detail: THERAPIES SERIES
Discharge: HOME | End: 2024-07-24
Payer: MEDICARE

## 2024-07-24 PROCEDURE — 97110 THERAPEUTIC EXERCISES: CPT

## 2024-07-24 NOTE — FLOWSHEET NOTE
Short Term Goals: To be achieved in: 4 weeks  1. Independent in initial HEP per patient tolerance, in order to prevent re-injury.   [] Progressing: [x] Met: [] Not Met: [] Adjusted  2. Patient will improve TUG <13 seconds for decreased fall risk  [] Progressing: [] Met: [] Not Met: [] Adjusted    Long Term Goals: To be achieved in: 8 weeks  1. Disability index score of 50% or less for the LEFS and 50% or more confident on ABC Scale to assist with reaching prior level of function with activities such as walking.  [] Progressing: [] Met: [] Not Met: [] Adjusted  2. Patient will demonstrate good dynamic stand balance for decreased fall risk   [] Progressing: [] Met: [] Not Met: [] Adjusted  3. Patient will demonstrate increased Strength of B LE to 4+/5 to allow for proper functional mobility to enable patient to return to walking.   [x] Progressing: [] Met: [] Not Met: [] Adjusted  4. Patient will return to walking with cane without increased symptoms or restriction with gait speed: 2.68 ft/sec.   [] Progressing: [] Met: [] Not Met: [] Adjusted  5. Independent in HEP progression per patient tolerance, in order to prevent re-injury.   [x] Progressing: [] Met: [] Not Met: [] Adjusted     Overall Progression Towards Functional goals/ Treatment Progress Update:  [] Patient is progressing as expected towards functional goals listed.    [x] Progression is slowed due to complexities/Impairments listed.  [x] Progression has been slowed due to co-morbidities.  [] Plan just implemented, too soon (<30days) to assess goals progression   [] Goals require adjustment due to lack of progress  [] Patient is not progressing as expected and requires additional follow up with physician  [] Other:     TREATMENT PLAN     Plan: Cont POC- Continue emphasis/focus on exercise progression, improving proper muscle recruitment and activation/motor control patterns, and static and dynamic balance. Next visit plan to do POC     Electronically

## 2024-07-24 NOTE — TELEPHONE ENCOUNTER
Submitted PA for Albuterol Sulfate (2.5 MG/3ML)0.083% nebulizer solution   Via CMM Key: [id number]  STATUS: PENDING.    Follow up done daily; if no decision with in three days we will refax.  If another three days goes by with no decision will call the insurance for status.

## 2024-07-25 ENCOUNTER — CARE COORDINATION (OUTPATIENT)
Dept: CARE COORDINATION | Age: 69
End: 2024-07-25

## 2024-07-25 RX ORDER — DOCUSATE SODIUM 100 MG/1
100 CAPSULE, LIQUID FILLED ORAL 2 TIMES DAILY
Qty: 180 CAPSULE | Refills: 1 | Status: SHIPPED | OUTPATIENT
Start: 2024-07-25

## 2024-07-25 RX ORDER — ROSUVASTATIN CALCIUM 10 MG/1
10 TABLET, COATED ORAL DAILY
Qty: 90 TABLET | Refills: 1 | Status: SHIPPED | OUTPATIENT
Start: 2024-07-25

## 2024-07-25 RX ORDER — ASPIRIN 81 MG/1
81 TABLET ORAL EVERY OTHER DAY
Qty: 45 TABLET | Refills: 1 | Status: SHIPPED | OUTPATIENT
Start: 2024-07-25

## 2024-07-25 NOTE — CARE COORDINATION
Ambulatory Care Coordination Note     2024 10:00 AM     Patient Current Location:  Home: [address] [place] [id number]     ACM contacted the patient by telephone. Verified name and  with patient as identifiers.         ACM: Christine Martel RN     Challenges to be reviewed by the provider   Additional needs identified to be addressed with provider No  none               Method of communication with provider: chart routing.    Care Summary Note: ACM completed follow up call with patient. Patient said she is waiting to  Rx that were sent for PCP to refill. Patient said she is going to be booking a ticket soon to [facility] for a month. Patient will be calling [place] to see if she is able to use the refrigerator for her Trulicity. Patient said if she was not able to she would need Dr. Espitia to write a Rx to take to [facility]. ACM will route to PCP for review but unsure if Rx would be honored in [facility] d/t licensing.     Offered patient enrollment in the Remote Patient Monitoring (RPM) program for in-home monitoring: Yes, patient enrolled; current status is activated and monitoring.     Assessments Completed:   No changes since last call    Medications Reviewed:   Patient denies any changes with medications and reports taking all medications as prescribed.    Advance Care Planning:   Reviewed during previous call      Care Planning:   Education Documentation  Influenza Vaccine, taught by Christine Martel RN at 2024  9:58 AM.  Learner: Patient  Readiness: Acceptance  Method: Explanation  Response: Verbalizes Understanding    Pneumovax Recommendation, taught by Christine Martel, RN at 2024  9:58 AM.  Learner: Patient  Readiness: Acceptance  Method: Explanation  Response: Verbalizes Understanding    Lifestyle Changes/Goal Setting, taught by Christine Martel RN at 2024  9:58 AM.  Learner: Patient  Readiness: Acceptance  Method: Explanation  Response: Verbalizes Understanding    General

## 2024-07-26 ENCOUNTER — HOSPITAL ENCOUNTER (OUTPATIENT)
Dept: PHYSICAL THERAPY | Age: 69
Setting detail: THERAPIES SERIES
Discharge: HOME | End: 2024-07-26
Payer: MEDICARE

## 2024-07-26 PROCEDURE — 97110 THERAPEUTIC EXERCISES: CPT

## 2024-07-26 NOTE — FLOWSHEET NOTE
[facility]- Outpatient Rehabilitation and Therapy [address], [place] [address] office: [phone] fax: [phone]       Physical Therapy: TREATMENT/PROGRESS NOTE   Patient: Tatiana Shepard (68 y.o. female)   Examination Date: 2024   :  1955 MRN: [id number]   Visit #:    Insurance Allowable Auth Needed   BMN []Yes    [x]No    Insurance: Payor: MEDICARE / Plan: MEDICARE PART A AND B / Product Type: *No Product type* /   Insurance ID: [id number] - (Medicare)  Secondary Insurance (if applicable): BCBS   Treatment Diagnosis:     ICD-10-CM    1. Weakness  R53.1       2. Abnormality of gait  R26.9       3. Balance problem  R26.89          Medical Diagnosis:  Balance disorder [R26.89]   Referring Physician: Raisa Espitia MD  PCP: Raisa Espitia MD     Plan of care signed (Y/N): Y    Date of Patient follow up with Physician: July     Progress Report/POC: NO  Progress note update due: (10 visits /OR AUTH LIMITS, whichever is less)  08/15/2024   Recert: 2024                                            Precautions/ Contra-indications:           Latex allergy:  NO  Pacemaker:    NO  Contraindications for Manipulation: NA  Date of Surgery: NA  Other: asthma, HTN, DM, anemia, anxiety, depression, dizziness, osteoporosis/osteopenia, neuropathy, Hx R foot sx, wears nitroglycerin patch daily (takes off at night).  Episodes of significant drop in O2 status (has seen cardiology, pulmonary, neurology)     SUBJECTIVE EXAMINATION     Patient stated complaint/comments: Pt notes she did okay after last session.                Test used Initial score  2024   Pain Summary VAS 6-7/10 R leg and LB 6-7/10 R leg     Functional questionnaire LEFS and ABC Scale 0/68 = 100% impaired,  4% confident    Other:                OBJECTIVE EXAMINATION     O2 sats initially: 94%, HR 78 bpm  After , hip abd, hip add: O2 sats: 94%, HR: 82 bpm  At end of session: O2

## 2024-07-29 ENCOUNTER — TELEPHONE (OUTPATIENT)
Dept: PRIMARY CARE CLINIC | Age: 69
End: 2024-07-29

## 2024-07-29 NOTE — TELEPHONE ENCOUNTER
Pt feels bad new meds and has a feeling it's new meds. Not prescribed by us. Pt wants to talk to Dr. Espitia. Pls call pt back.

## 2024-07-29 NOTE — TELEPHONE ENCOUNTER
allergy to aspirin reported, safe to fill? Should patient remain on this medication?  Please respond with appropriate changes or comment to Pharmacy.   allergy to aspirin reported, safe to fill? Should patient remain on this medication?  Please respond with appropriate changes or comment to Pharmacy     This is from the pharmacy , please advise

## 2024-07-30 ENCOUNTER — TELEPHONE (OUTPATIENT)
Dept: PRIMARY CARE CLINIC | Age: 69
End: 2024-07-30

## 2024-07-30 NOTE — TELEPHONE ENCOUNTER
Pt states pharmacy won't fill meds until they talk to Omkar Harris. Pt only has a few left.         aspirin 81 MG EC tablet [[id number]]     Pharmacy    [facility] - [place] - [facility] -  [phone] - F [phone]  [facility] [address]  Phone: [phone]  Fax: [phone]

## 2024-07-31 ENCOUNTER — HOSPITAL ENCOUNTER (OUTPATIENT)
Dept: PHYSICAL THERAPY | Age: 69
Setting detail: THERAPIES SERIES
End: 2024-07-31
Payer: MEDICARE

## 2024-07-31 NOTE — TELEPHONE ENCOUNTER
Spoke with [facility] pharmacy they were inquiring about patients listed allergy to aspirin. According to PCP notes patient tolerates aspisrin 81mg every other day. Notified [facility] pharmacist Giulia and they will fill patients prescription 8/21/2024. Since she had a local fill of 60 tablets on 7/7/24 she should have enough to get to that refill.

## 2024-08-01 ENCOUNTER — OFFICE VISIT (OUTPATIENT)
Dept: PRIMARY CARE CLINIC | Age: 69
End: 2024-08-01

## 2024-08-01 VITALS
HEART RATE: 90 BPM | DIASTOLIC BLOOD PRESSURE: 80 MMHG | TEMPERATURE: 97.4 F | OXYGEN SATURATION: 95 % | BODY MASS INDEX: 31.28 KG/M2 | SYSTOLIC BLOOD PRESSURE: 120 MMHG | HEIGHT: 62 IN | WEIGHT: 170 LBS

## 2024-08-01 DIAGNOSIS — R06.02 SHORTNESS OF BREATH: ICD-10-CM

## 2024-08-01 DIAGNOSIS — M79.604 RIGHT LEG PAIN: Primary | ICD-10-CM

## 2024-08-01 DIAGNOSIS — R63.0 POOR APPETITE: ICD-10-CM

## 2024-08-01 DIAGNOSIS — K59.00 CONSTIPATION, UNSPECIFIED CONSTIPATION TYPE: ICD-10-CM

## 2024-08-01 DIAGNOSIS — R33.9 URINARY RETENTION: ICD-10-CM

## 2024-08-01 LAB
BASOPHILS # BLD: 0 K/UL (ref 0–0.2)
BASOPHILS NFR BLD: 0.4 %
DEPRECATED RDW RBC AUTO: 13.3 % (ref 12.4–15.4)
EOSINOPHIL # BLD: 0.1 K/UL (ref 0–0.6)
EOSINOPHIL NFR BLD: 1.4 %
HCT VFR BLD AUTO: 46 % (ref 36–48)
HGB BLD-MCNC: 15.4 G/DL (ref 12–16)
LYMPHOCYTES # BLD: 2.4 K/UL (ref 1–5.1)
LYMPHOCYTES NFR BLD: 29.2 %
MCH RBC QN AUTO: 30 PG (ref 26–34)
MCHC RBC AUTO-ENTMCNC: 33.4 G/DL (ref 31–36)
MCV RBC AUTO: 89.6 FL (ref 80–100)
MONOCYTES # BLD: 0.8 K/UL (ref 0–1.3)
MONOCYTES NFR BLD: 9.3 %
NEUTROPHILS # BLD: 5 K/UL (ref 1.7–7.7)
NEUTROPHILS NFR BLD: 59.7 %
PLATELET # BLD AUTO: 215 K/UL (ref 135–450)
PLATELET BLD QL SMEAR: ADEQUATE
RBC # BLD AUTO: 5.13 M/UL (ref 4–5.2)
SLIDE REVIEW: NORMAL
WBC # BLD AUTO: 8.4 K/UL (ref 4–11)

## 2024-08-01 RX ORDER — DULOXETIN HYDROCHLORIDE 60 MG/1
60 CAPSULE, DELAYED RELEASE ORAL DAILY
COMMUNITY

## 2024-08-01 RX ORDER — DOCUSATE SODIUM 100 MG/1
100 CAPSULE, LIQUID FILLED ORAL 2 TIMES DAILY
Qty: 180 CAPSULE | Refills: 1 | Status: SHIPPED | OUTPATIENT
Start: 2024-08-01

## 2024-08-01 RX ORDER — DULAGLUTIDE 0.75 MG/.5ML
INJECTION, SOLUTION SUBCUTANEOUS
Qty: 4 ML | Refills: 0 | Status: SHIPPED | OUTPATIENT
Start: 2024-08-01 | End: 2024-08-14 | Stop reason: SDUPTHER

## 2024-08-01 NOTE — PROGRESS NOTES
Potassium reflex Magnesi* 07/10/2024 4.3     Chloride 07/10/2024 106     CO2 07/10/2024 23     Anion Gap 07/10/2024 11     Glucose 07/10/2024 102 (H)     BUN 07/10/2024 15     Creatinine 07/10/2024 0.7     Est, Glom Filt Rate 07/10/2024 >90     Calcium 07/10/2024 9.4     Total Protein 07/10/2024 6.7     Albumin 07/10/2024 4.3     Albumin/Globulin Ratio 07/10/2024 1.8     Total Bilirubin 07/10/2024 0.5     Alkaline Phosphatase 07/10/2024 83     ALT 07/10/2024 20     AST 07/10/2024 21     Troponin, High Sensitivi* 07/10/2024 11     Pro-BNP 07/10/2024 <36     Troponin, High Sensitivi* 07/10/2024 9    Admission on 06/23/2024, Discharged on 06/23/2024   Component Date Value    Ventricular Rate 06/23/2024 81     Atrial Rate 06/23/2024 81     P-R Interval 06/23/2024 162     QRS Duration 06/23/2024 82     Q-T Interval 06/23/2024 382     QTc Calculation (Bazett) 06/23/2024 443     P Axis 06/23/2024 37     R Media 06/23/2024 -4     T Axis 06/23/2024 37     Diagnosis 06/23/2024                      Value:Normal sinus rhythmMinimal voltage criteria for LVH, may be normal variant ( R in aVL )Inferior infarct , age undeterminedAbnormal ECGWhen compared with ECG of 03-MAR-2024 11:28,Inferior infarct is now PresentConfirmed by MIRYAM RICHARDSON MD ([id number]) on 6/24/2024 5:04:32 PM      WBC 06/23/2024 7.7     RBC 06/23/2024 4.71     Hemoglobin 06/23/2024 14.2     Hematocrit 06/23/2024 41.7     MCV 06/23/2024 88.5     MCH 06/23/2024 30.1     MCHC 06/23/2024 34.1     RDW 06/23/2024 12.8     Platelets 06/23/2024 269     MPV 06/23/2024 10.6     Neutrophils % 06/23/2024 62     Lymphocytes % 06/23/2024 26     Monocytes % 06/23/2024 9     Eosinophils % 06/23/2024 2     Basophils % 06/23/2024 0     Immature Granulocytes % 06/23/2024 1 (H)     Neutrophils Absolute 06/23/2024 4.71     Lymphocytes Absolute 06/23/2024 2.01     Monocytes Absolute 06/23/2024 0.69     Eosinophils Absolute 06/23/2024 0.12     Basophils Absolute 06/23/2024 0.03

## 2024-08-02 ENCOUNTER — CARE COORDINATION (OUTPATIENT)
Dept: PRIMARY CARE CLINIC | Age: 69
End: 2024-08-02

## 2024-08-02 ENCOUNTER — CARE COORDINATION (OUTPATIENT)
Dept: CARE COORDINATION | Age: 69
End: 2024-08-02

## 2024-08-02 DIAGNOSIS — R33.9 URINARY RETENTION: ICD-10-CM

## 2024-08-02 DIAGNOSIS — I10 ESSENTIAL HYPERTENSION: Primary | Chronic | ICD-10-CM

## 2024-08-02 DIAGNOSIS — J45.20 MILD INTERMITTENT ASTHMA, UNSPECIFIED WHETHER COMPLICATED: ICD-10-CM

## 2024-08-02 LAB
ALBUMIN SERPL-MCNC: 4.8 G/DL (ref 3.4–5)
ALBUMIN/GLOB SERPL: 1.9 {RATIO} (ref 1.1–2.2)
ALP SERPL-CCNC: 72 U/L (ref 40–129)
ALT SERPL-CCNC: 25 U/L (ref 10–40)
ANION GAP SERPL CALCULATED.3IONS-SCNC: 17 MMOL/L (ref 3–16)
AST SERPL-CCNC: 25 U/L (ref 15–37)
BACTERIA URNS QL MICRO: NORMAL /HPF
BILIRUB SERPL-MCNC: 0.6 MG/DL (ref 0–1)
BILIRUB UR QL STRIP.AUTO: NEGATIVE
BUN SERPL-MCNC: 21 MG/DL (ref 7–20)
CALCIUM SERPL-MCNC: 10 MG/DL (ref 8.3–10.6)
CHLORIDE SERPL-SCNC: 103 MMOL/L (ref 99–110)
CLARITY UR: CLEAR
CO2 SERPL-SCNC: 20 MMOL/L (ref 21–32)
COLOR UR: YELLOW
CREAT SERPL-MCNC: 0.8 MG/DL (ref 0.6–1.2)
EPI CELLS #/AREA URNS AUTO: 3 /HPF (ref 0–5)
GFR SERPLBLD CREATININE-BSD FMLA CKD-EPI: 80 ML/MIN/{1.73_M2}
GLUCOSE SERPL-MCNC: 115 MG/DL (ref 70–99)
GLUCOSE UR STRIP.AUTO-MCNC: NEGATIVE MG/DL
HGB UR QL STRIP.AUTO: NEGATIVE
HYALINE CASTS #/AREA URNS AUTO: 0 /LPF (ref 0–8)
KETONES UR STRIP.AUTO-MCNC: NEGATIVE MG/DL
LEUKOCYTE ESTERASE UR QL STRIP.AUTO: ABNORMAL
NITRITE UR QL STRIP.AUTO: NEGATIVE
PH UR STRIP.AUTO: 5.5 [PH] (ref 5–8)
POTASSIUM SERPL-SCNC: 4.8 MMOL/L (ref 3.5–5.1)
PROT SERPL-MCNC: 7.3 G/DL (ref 6.4–8.2)
PROT UR STRIP.AUTO-MCNC: NEGATIVE MG/DL
RBC CLUMPS #/AREA URNS AUTO: 1 /HPF (ref 0–4)
SODIUM SERPL-SCNC: 140 MMOL/L (ref 136–145)
SP GR UR STRIP.AUTO: 1.01 (ref 1–1.03)
UA DIPSTICK W REFLEX MICRO PNL UR: YES
URN SPEC COLLECT METH UR: ABNORMAL
UROBILINOGEN UR STRIP-ACNC: 0.2 E.U./DL
WBC #/AREA URNS AUTO: 5 /HPF (ref 0–5)

## 2024-08-02 NOTE — TELEPHONE ENCOUNTER
Called insurance, spoke with Maicol who states this needs to be run under pts medical benefit and will be covered.

## 2024-08-02 NOTE — CARE COORDINATION
RPM Kit Return    Tatiana Shepard  8/2/24    Care Coordination  placed call to patient to arrange RPM kit  through [employer].     [facility]SS spoke to Patient reviewed with Patient how to pack equipment in original packing. Patient aware [employer] will  equipment in 2-4 days.   All questions and concerns answered.

## 2024-08-02 NOTE — TELEPHONE ENCOUNTER
Spoke to Santy at [facility]. He states that they are unable to bill for part B.   I spoke with the patient she states she already spoke with [facility] and had the RX transferred to [facility].  No further action is required for this encounter.

## 2024-08-02 NOTE — PROGRESS NOTES
Remote Patient Order Discontinued    Received request from hCristine Martel, RN   to discontinue order for remote patient monitoring of HTN and Asthma and order completed.

## 2024-08-05 ENCOUNTER — OFFICE VISIT (OUTPATIENT)
Age: 69
End: 2024-08-05
Payer: MEDICARE

## 2024-08-05 VITALS
WEIGHT: 163 LBS | HEART RATE: 85 BPM | HEIGHT: 62 IN | OXYGEN SATURATION: 96 % | SYSTOLIC BLOOD PRESSURE: 124 MMHG | DIASTOLIC BLOOD PRESSURE: 82 MMHG | BODY MASS INDEX: 30 KG/M2

## 2024-08-05 DIAGNOSIS — J45.40 MODERATE PERSISTENT ASTHMA WITHOUT COMPLICATION: Primary | ICD-10-CM

## 2024-08-05 PROCEDURE — 3017F COLORECTAL CA SCREEN DOC REV: CPT | Performed by: INTERNAL MEDICINE

## 2024-08-05 PROCEDURE — 99213 OFFICE O/P EST LOW 20 MIN: CPT | Performed by: INTERNAL MEDICINE

## 2024-08-05 PROCEDURE — G8417 CALC BMI ABV UP PARAM F/U: HCPCS | Performed by: INTERNAL MEDICINE

## 2024-08-05 PROCEDURE — 3079F DIAST BP 80-89 MM HG: CPT | Performed by: INTERNAL MEDICINE

## 2024-08-05 PROCEDURE — 1090F PRES/ABSN URINE INCON ASSESS: CPT | Performed by: INTERNAL MEDICINE

## 2024-08-05 PROCEDURE — 1036F TOBACCO NON-USER: CPT | Performed by: INTERNAL MEDICINE

## 2024-08-05 PROCEDURE — G8427 DOCREV CUR MEDS BY ELIG CLIN: HCPCS | Performed by: INTERNAL MEDICINE

## 2024-08-05 PROCEDURE — G8399 PT W/DXA RESULTS DOCUMENT: HCPCS | Performed by: INTERNAL MEDICINE

## 2024-08-05 PROCEDURE — 3074F SYST BP LT 130 MM HG: CPT | Performed by: INTERNAL MEDICINE

## 2024-08-05 PROCEDURE — 1124F ACP DISCUSS-NO DSCNMKR DOCD: CPT | Performed by: INTERNAL MEDICINE

## 2024-08-05 NOTE — PROGRESS NOTES
Miscellaneous   The inferior vena cava appears normal in size with normal respiratory   variation.    [facility] 2/2023  DESCRIPTION OF PROCEDURE:  Prepped and draped in sterile manner after  the plethysmography was normal in the right wrist, the right radial  artery was prepped and draped in sterile manner.  The patient was  sedated at 12:05 p.m. with 1 mg of Versed and 50 mcg of fentanyl IV push  by my order.  The end time of procedure was 12:43 p.m.  These  medications were given by the qualified independent observer, DAVID Morin.  All vital signs were monitored during the entire procedure by the entire  cardiac team including myself.     Using a 5/6 Slender sheath, this was advanced over the micropuncture  guidewire.  It was aspirated and flushed.  The usual dose of heparin,  verapamil, and nitroglycerin was given through the sideport of that  sheath.  A JR4 catheter was advanced over the Wholey wire into the  ascending aorta, used to gain access to the ascending aorta.     Injection of the right coronary artery shows a normal dominant right  coronary artery, right posterior descending normal, and right  posterolateral branch is normal.     The 0.035 J-wire was exchange and slipped into the left ventricle.  The  JR4 catheter was advanced into the left ventricle.  The LVEDP was 8  mmHg.  Hand injection FINN projection shows LV ejection fraction of 65%  without wall motion abnormality.     Over the exchange length J-wire, the JL3.5 catheter was used to engage  the left main coronary artery.  The left main coronary artery is short  and normal.  In multiple views, there was some streaming of contrast  into the LAD and the circumflex, but there was no obstructive disease  identified in the left main.  The LAD was angiographically normal.   There was no obstruction identified in the LAD or its branches.   Diagonal branches were normal.  Septal perforators normal.     Circumflex coronary artery was normal.  Because of the

## 2024-08-09 DIAGNOSIS — E11.42 TYPE 2 DIABETES MELLITUS WITH DIABETIC POLYNEUROPATHY, WITHOUT LONG-TERM CURRENT USE OF INSULIN (HCC): ICD-10-CM

## 2024-08-14 ENCOUNTER — TELEPHONE (OUTPATIENT)
Dept: PRIMARY CARE CLINIC | Age: 69
End: 2024-08-14

## 2024-08-14 DIAGNOSIS — E11.42 TYPE 2 DIABETES MELLITUS WITH DIABETIC POLYNEUROPATHY, WITHOUT LONG-TERM CURRENT USE OF INSULIN (HCC): ICD-10-CM

## 2024-08-14 RX ORDER — DULAGLUTIDE 0.75 MG/.5ML
INJECTION, SOLUTION SUBCUTANEOUS
Qty: 6 ML | Refills: 1 | Status: SHIPPED | OUTPATIENT
Start: 2024-08-14

## 2024-08-14 NOTE — TELEPHONE ENCOUNTER
Patient states she is experiencing more black and blue swelling and discoloration in her leg. The treatment discussed at her last appointment has not been helping her. Please contact the patient to discuss.

## 2024-08-14 NOTE — TELEPHONE ENCOUNTER
Patient states she needs a refill of medication. States her pharmacy told her they sent two requests and neither was sent over.  dulaglutide (TRULICITY) 0.75 MG/0.5ML SOPN SC injection [[id number]]   [facility] [address] [place] [facility] [place] [address] [address] - P [phone] - F [phone] [[id number]]

## 2024-08-15 RX ORDER — ASPIRIN 81 MG/1
81 TABLET, COATED ORAL EVERY OTHER DAY
Qty: 45 TABLET | Refills: 1 | Status: SHIPPED | OUTPATIENT
Start: 2024-08-15

## 2024-08-15 NOTE — TELEPHONE ENCOUNTER
Left message on patient's voicemail. I have not received a request for Trulicity prior to this message. I gave a printed prescription for Trulicity at visit 8/1/24. I sent a prescription to [facility] electronically.

## 2024-08-16 PROBLEM — R33.9 URINARY RETENTION: Status: ACTIVE | Noted: 2024-08-16

## 2024-08-16 PROBLEM — R63.0 POOR APPETITE: Status: ACTIVE | Noted: 2024-08-16

## 2024-08-16 PROBLEM — R06.02 SHORTNESS OF BREATH: Status: ACTIVE | Noted: 2024-08-16

## 2024-08-16 ASSESSMENT — ENCOUNTER SYMPTOMS
SINUS PAIN: 0
RHINORRHEA: 0
NAUSEA: 0
WHEEZING: 0
SINUS PRESSURE: 0
SHORTNESS OF BREATH: 1
COUGH: 0
SORE THROAT: 0
VOMITING: 0
ABDOMINAL PAIN: 0
BACK PAIN: 0
CHEST TIGHTNESS: 0
CONSTIPATION: 1
DIARRHEA: 0
BLOOD IN STOOL: 0

## 2024-08-16 NOTE — ASSESSMENT & PLAN NOTE
-Chronic problem  -Patient has asthma  -Continue fluticasone-salmeterol (WIXELA INHUB) 500-50 MCG/ACT AEPB diskus inhaler; Inhale 1 puff into the lungs in the morning and 1 puff in the evening.  -Continue Spiriva 1 puff daily  -Continue Singulair 10 mg nightly  -Continue albuterol inhaler as needed  -Follow-up with Pulmonary as scheduled 8/5/2024

## 2024-08-16 NOTE — ASSESSMENT & PLAN NOTE
-Comprehensive metabolic panel  -CBC with differential  -Boost or Ensure nutritional shakes   -Discontinue duloxetine

## 2024-08-16 NOTE — ASSESSMENT & PLAN NOTE
-Not controlled  -Increase in chronic leg pain  -Resume gabapentin 300 mg nightly  -Tylenol 650mg 3 times daily as needed

## 2024-08-16 NOTE — ASSESSMENT & PLAN NOTE
-Not controlled  -Continue Trulance 3 mg once daily  -Colace 100 mg 2 times daily  -High fiber diet

## 2024-08-20 DIAGNOSIS — J45.40 MODERATE PERSISTENT ASTHMA WITHOUT COMPLICATION: ICD-10-CM

## 2024-08-20 RX ORDER — FLUTICASONE PROPIONATE AND SALMETEROL 500; 50 UG/1; UG/1
1 POWDER RESPIRATORY (INHALATION) EVERY 12 HOURS
Qty: 180 EACH | Refills: 1 | Status: SHIPPED | OUTPATIENT
Start: 2024-08-20

## 2024-08-29 ENCOUNTER — OFFICE VISIT (OUTPATIENT)
Dept: PRIMARY CARE CLINIC | Age: 69
End: 2024-08-29

## 2024-08-29 VITALS
RESPIRATION RATE: 16 BRPM | HEIGHT: 62 IN | SYSTOLIC BLOOD PRESSURE: 130 MMHG | BODY MASS INDEX: 30 KG/M2 | TEMPERATURE: 96.9 F | OXYGEN SATURATION: 94 % | HEART RATE: 103 BPM | DIASTOLIC BLOOD PRESSURE: 88 MMHG | WEIGHT: 163 LBS

## 2024-08-29 DIAGNOSIS — B08.5 HERPANGINA: Primary | ICD-10-CM

## 2024-08-29 NOTE — PROGRESS NOTES
[facility]  [facility]  [address]  [address]  [place] [address]  Dept: [phone]  Dept Fax: [phone]  Loc: [phone]      Tatiana Shepard is a 69 y.o. female who presents today for:  Chief Complaint   Patient presents with    Fatigue    Mouth Lesions    Tingling       HPI:   Tatiana Shepard is 69 y.o. presents today for acute visit.     Burning in esophagus overnight - taking protonix. Waking up at 1 AM.   Pain in mouth this week; sleeps with mouth guard at night; Unable to use due to pain. Used Rx toothpaste last year and helped.   SpO2 has been around 90% at home.     Objective:     Vitals:    08/29/24 1117   BP: 130/88   Pulse: (!) 103   Resp: 16   Temp: 96.9 °F (36.1 °C)   SpO2: 94%         Wt Readings from Last 3 Encounters:   08/29/24 73.9 kg (163 lb)   08/05/24 73.9 kg (163 lb)   08/01/24 77.1 kg (170 lb)       BP Readings from Last 3 Encounters:   08/29/24 130/88   08/05/24 124/82   08/01/24 120/80       Lab Results   Component Value Date    WBC 8.4 08/01/2024    HGB 15.4 08/01/2024    HCT 46.0 08/01/2024    MCV 89.6 08/01/2024     08/01/2024     Lab Results   Component Value Date     08/01/2024    K 4.8 08/01/2024     08/01/2024    CO2 20 (L) 08/01/2024    BUN 21 (H) 08/01/2024    CREATININE 0.8 08/01/2024    GLUCOSE 115 (H) 08/01/2024    CALCIUM 10.0 08/01/2024    BILITOT 0.6 08/01/2024    ALKPHOS 72 08/01/2024    AST 25 08/01/2024    ALT 25 08/01/2024    LABGLOM 80 08/01/2024    GFRAA >60 06/16/2022    AGRATIO 1.9 08/01/2024    GLOB 1.9 09/23/2019     Lab Results   Component Value Date    ANTOINE Negative 02/13/2024    SEDRATE 9 02/13/2024       Lab Results   Component Value Date    TSH 2.37 01/11/2024    TSHFT4 2.33 06/23/2023     Lab Results   Component Value Date    LABA1C 6.4 07/17/2024     Lab Results   Component Value Date    .5 12/15/2023     Lab Results   Component Value Date    CHOL 169 07/22/2024    CHOL 168 12/15/2023     Syncytial Virus (RSV) Pregnant or age 60 yrs+ (1 - 1-dose 60+ series) Never done    COVID-19 Vaccine (4 - 2023-24 season) 09/01/2023    DTaP/Tdap/Td vaccine (2 - Td or Tdap) 09/22/2023    Diabetic foot exam  04/27/2024    Flu vaccine (1) 08/01/2024       Assessment / Plan:   1. Herpangina  Acute, likely viral, supportive care. Cepacol PRN for comfort. Stay hydrated.   - Cetylpyridinium Chloride (CEPACOL MOUTHWASH/GARGLE) 0.05 % LIQD; Take 5 mLs by mouth 3 times daily as needed (mouth pain)  Dispense: 354 mL; Refill: 0        Patient given educational materials - see patient instructions.  Discussed use, benefit, and side effects of prescribed medications.  All patient questions answered.  They voiced understanding. Patient agreed with treatment plan. Follow up as directed.        No follow-ups on file.      Future Appointments   Date Time Provider Department Center   9/20/2024 11:00 AM Ramone Leslie MD [facility]   10/17/2024  1:00 PM Raisa Espitia MD [facility]   10/30/2024  1:45 PM SCHEDULE, [facility]   5/13/2025 10:00 AM DEXA [facility] [employer]   5/13/2025 10:20 AM [facility]         Electronically signed by Naomy Gonzalez DO on 8/29/2024 at 12:34 PM

## 2024-09-03 ENCOUNTER — CARE COORDINATION (OUTPATIENT)
Dept: CARE COORDINATION | Age: 69
End: 2024-09-03

## 2024-09-03 NOTE — CARE COORDINATION
ACM returned patient call who said she did not go on her trip to [place]. Patient said she has been feeling really weak but has not went to anymore therapy sessions. ACM encouraged patient to make appt with PT as she still has visits to see if that is helpful.  Patient denies any other needs and would be interested in RPM if she qualifies.

## 2024-09-08 ENCOUNTER — HOSPITAL ENCOUNTER (EMERGENCY)
Age: 69
Discharge: HOME | End: 2024-09-08
Attending: EMERGENCY MEDICINE
Payer: MEDICARE

## 2024-09-08 ENCOUNTER — APPOINTMENT (OUTPATIENT)
Age: 69
End: 2024-09-08
Payer: MEDICARE

## 2024-09-08 VITALS
WEIGHT: 170.2 LBS | RESPIRATION RATE: 18 BRPM | HEIGHT: 62 IN | DIASTOLIC BLOOD PRESSURE: 70 MMHG | TEMPERATURE: 98 F | BODY MASS INDEX: 31.32 KG/M2 | OXYGEN SATURATION: 93 % | HEART RATE: 94 BPM | SYSTOLIC BLOOD PRESSURE: 104 MMHG

## 2024-09-08 DIAGNOSIS — J40 BRONCHITIS: ICD-10-CM

## 2024-09-08 DIAGNOSIS — R06.00 DYSPNEA, UNSPECIFIED TYPE: Primary | ICD-10-CM

## 2024-09-08 LAB
ALBUMIN SERPL-MCNC: 4.5 G/DL (ref 3.4–5)
ALBUMIN/GLOB SERPL: 1.8 {RATIO}
ALP SERPL-CCNC: 74 U/L (ref 40–129)
ALT SERPL-CCNC: 25 U/L (ref 10–40)
ANION GAP SERPL CALCULATED.3IONS-SCNC: 13 MMOL/L (ref 3–16)
AST SERPL-CCNC: 27 U/L (ref 15–37)
BASOPHILS # BLD: 0.04 K/UL (ref 0–0.2)
BASOPHILS NFR BLD: 1 %
BILIRUB SERPL-MCNC: 0.5 MG/DL (ref 0–1)
BUN SERPL-MCNC: 16 MG/DL (ref 7–20)
CALCIUM SERPL-MCNC: 9 MG/DL (ref 8.3–10.6)
CHLORIDE SERPL-SCNC: 104 MMOL/L (ref 99–110)
CO2 SERPL-SCNC: 21 MMOL/L (ref 21–32)
COHGB MFR BLD: 2.8 % (ref 0–1.5)
CREAT SERPL-MCNC: 0.9 MG/DL (ref 0.6–1.2)
EOSINOPHIL # BLD: 0.07 K/UL (ref 0–0.6)
EOSINOPHILS RELATIVE PERCENT: 1 %
ERYTHROCYTE [DISTWIDTH] IN BLOOD BY AUTOMATED COUNT: 13 % (ref 12.4–15.4)
FLUAV AG SPEC QL: NEGATIVE
FLUBV AG SPEC QL: NEGATIVE
GFR, ESTIMATED: 74 ML/MIN/1.73M2
GLUCOSE SERPL-MCNC: 113 MG/DL (ref 70–99)
HCO3 VENOUS: 21.8 MMOL/L (ref 23–29)
HCT VFR BLD AUTO: 44 % (ref 36–48)
HGB BLD-MCNC: 14.9 G/DL (ref 12–16)
IMM GRANULOCYTES # BLD AUTO: 0.1 K/UL (ref 0–0.5)
IMM GRANULOCYTES NFR BLD: 1 %
LACTATE BLDV-SCNC: 1 MMOL/L (ref 0.4–1.9)
LYMPHOCYTES NFR BLD: 2.36 K/UL (ref 1–5.1)
LYMPHOCYTES RELATIVE PERCENT: 28 %
MCH RBC QN AUTO: 29.5 PG (ref 26–34)
MCHC RBC AUTO-ENTMCNC: 33.9 G/DL (ref 31–36)
MCV RBC AUTO: 87.1 FL (ref 80–100)
METHEMOGLOBIN: 0.1 % (ref 0–1.4)
MONOCYTES NFR BLD: 0.8 K/UL (ref 0–1.3)
MONOCYTES NFR BLD: 10 %
NEGATIVE BASE EXCESS, VEN: 1.4 MMOL/L
NEUTROPHILS NFR BLD: 60 %
NEUTS SEG NFR BLD: 5.04 K/UL (ref 1.7–7.7)
O2 SAT, VEN: 96.8 %
PCO2 VENOUS: 32.7 MM HG (ref 40–50)
PH VENOUS: 7.44 (ref 7.35–7.45)
PLATELET # BLD AUTO: 292 K/UL (ref 135–450)
PMV BLD AUTO: 10.2 FL
PO2 VENOUS: 88.9 MM HG
POTASSIUM SERPL-SCNC: 4.4 MMOL/L (ref 3.5–5.1)
PROT SERPL-MCNC: 7.1 G/DL (ref 6.4–8.2)
RBC # BLD AUTO: 5.05 M/UL (ref 4–5.2)
SARS-COV-2 RDRP RESP QL NAA+PROBE: NOT DETECTED
SODIUM SERPL-SCNC: 138 MMOL/L (ref 136–145)
SPECIMEN DESCRIPTION: NORMAL
TROPONIN I SERPL HS-MCNC: 9 NG/L (ref 0–14)
TROPONIN I SERPL HS-MCNC: <6 NG/L (ref 0–14)
WBC OTHER # BLD: 8.4 K/UL (ref 4–11)

## 2024-09-08 PROCEDURE — 85025 COMPLETE CBC W/AUTO DIFF WBC: CPT

## 2024-09-08 PROCEDURE — 84484 ASSAY OF TROPONIN QUANT: CPT

## 2024-09-08 PROCEDURE — 36415 COLL VENOUS BLD VENIPUNCTURE: CPT

## 2024-09-08 PROCEDURE — 87635 SARS-COV-2 COVID-19 AMP PRB: CPT

## 2024-09-08 PROCEDURE — 99285 EMERGENCY DEPT VISIT HI MDM: CPT

## 2024-09-08 PROCEDURE — 6370000000 HC RX 637 (ALT 250 FOR IP)

## 2024-09-08 PROCEDURE — 83605 ASSAY OF LACTIC ACID: CPT

## 2024-09-08 PROCEDURE — 87804 INFLUENZA ASSAY W/OPTIC: CPT

## 2024-09-08 PROCEDURE — 82805 BLOOD GASES W/O2 SATURATION: CPT

## 2024-09-08 PROCEDURE — 80053 COMPREHEN METABOLIC PANEL: CPT

## 2024-09-08 PROCEDURE — 71045 X-RAY EXAM CHEST 1 VIEW: CPT

## 2024-09-08 PROCEDURE — 93005 ELECTROCARDIOGRAM TRACING: CPT | Performed by: EMERGENCY MEDICINE

## 2024-09-08 RX ORDER — IPRATROPIUM BROMIDE AND ALBUTEROL SULFATE 2.5; .5 MG/3ML; MG/3ML
1 SOLUTION RESPIRATORY (INHALATION) ONCE
Status: COMPLETED | OUTPATIENT
Start: 2024-09-08 | End: 2024-09-08

## 2024-09-08 RX ORDER — IPRATROPIUM BROMIDE AND ALBUTEROL SULFATE 2.5; .5 MG/3ML; MG/3ML
SOLUTION RESPIRATORY (INHALATION)
Status: COMPLETED
Start: 2024-09-08 | End: 2024-09-08

## 2024-09-08 RX ORDER — MUPIROCIN 20 MG/G
OINTMENT TOPICAL
Qty: 15 G | Refills: 0 | Status: SHIPPED | OUTPATIENT
Start: 2024-09-08 | End: 2024-09-15

## 2024-09-08 RX ORDER — ALBUTEROL SULFATE 90 UG/1
2 AEROSOL, METERED RESPIRATORY (INHALATION) EVERY 6 HOURS PRN
Qty: 18 G | Refills: 3 | Status: SHIPPED | OUTPATIENT
Start: 2024-09-08

## 2024-09-08 RX ADMIN — IPRATROPIUM BROMIDE AND ALBUTEROL SULFATE 1 DOSE: 2.5; .5 SOLUTION RESPIRATORY (INHALATION) at 15:41

## 2024-09-08 ASSESSMENT — PAIN DESCRIPTION - LOCATION: LOCATION: CHEST

## 2024-09-08 ASSESSMENT — PAIN - FUNCTIONAL ASSESSMENT: PAIN_FUNCTIONAL_ASSESSMENT: 0-10

## 2024-09-08 ASSESSMENT — PAIN SCALES - GENERAL: PAINLEVEL_OUTOF10: 7

## 2024-09-08 ASSESSMENT — HEART SCORE: ECG: NORMAL

## 2024-09-09 ENCOUNTER — CARE COORDINATION (OUTPATIENT)
Dept: CARE COORDINATION | Age: 69
End: 2024-09-09

## 2024-09-09 DIAGNOSIS — I10 ESSENTIAL HYPERTENSION: Primary | Chronic | ICD-10-CM

## 2024-09-09 DIAGNOSIS — J45.909 UNCOMPLICATED ASTHMA, UNSPECIFIED ASTHMA SEVERITY, UNSPECIFIED WHETHER PERSISTENT: ICD-10-CM

## 2024-09-09 LAB
EKG ATRIAL RATE: 84 BPM
EKG DIAGNOSIS: NORMAL
EKG P AXIS: 33 DEGREES
EKG P-R INTERVAL: 146 MS
EKG Q-T INTERVAL: 374 MS
EKG QRS DURATION: 70 MS
EKG QTC CALCULATION (BAZETT): 441 MS
EKG R AXIS: 11 DEGREES
EKG T AXIS: 37 DEGREES
EKG VENTRICULAR RATE: 84 BPM

## 2024-09-10 ENCOUNTER — CARE COORDINATION (OUTPATIENT)
Dept: PRIMARY CARE CLINIC | Age: 69
End: 2024-09-10

## 2024-09-10 ENCOUNTER — OFFICE VISIT (OUTPATIENT)
Dept: PRIMARY CARE CLINIC | Age: 69
End: 2024-09-10
Payer: MEDICARE

## 2024-09-10 VITALS
WEIGHT: 166.2 LBS | SYSTOLIC BLOOD PRESSURE: 138 MMHG | HEART RATE: 72 BPM | BODY MASS INDEX: 30.4 KG/M2 | DIASTOLIC BLOOD PRESSURE: 76 MMHG | OXYGEN SATURATION: 95 %

## 2024-09-10 DIAGNOSIS — R06.02 SHORTNESS OF BREATH: ICD-10-CM

## 2024-09-10 DIAGNOSIS — Z23 NEED FOR INFLUENZA VACCINATION: ICD-10-CM

## 2024-09-10 DIAGNOSIS — R07.9 CHEST PAIN, UNSPECIFIED TYPE: Primary | ICD-10-CM

## 2024-09-10 DIAGNOSIS — R53.83 FATIGUE, UNSPECIFIED TYPE: ICD-10-CM

## 2024-09-10 PROCEDURE — G0008 ADMIN INFLUENZA VIRUS VAC: HCPCS | Performed by: INTERNAL MEDICINE

## 2024-09-10 PROCEDURE — G8417 CALC BMI ABV UP PARAM F/U: HCPCS | Performed by: INTERNAL MEDICINE

## 2024-09-10 PROCEDURE — 99214 OFFICE O/P EST MOD 30 MIN: CPT | Performed by: INTERNAL MEDICINE

## 2024-09-10 PROCEDURE — 1090F PRES/ABSN URINE INCON ASSESS: CPT | Performed by: INTERNAL MEDICINE

## 2024-09-10 PROCEDURE — G8399 PT W/DXA RESULTS DOCUMENT: HCPCS | Performed by: INTERNAL MEDICINE

## 2024-09-10 PROCEDURE — 1123F ACP DISCUSS/DSCN MKR DOCD: CPT | Performed by: INTERNAL MEDICINE

## 2024-09-10 PROCEDURE — 3075F SYST BP GE 130 - 139MM HG: CPT | Performed by: INTERNAL MEDICINE

## 2024-09-10 PROCEDURE — G8427 DOCREV CUR MEDS BY ELIG CLIN: HCPCS | Performed by: INTERNAL MEDICINE

## 2024-09-10 PROCEDURE — 1036F TOBACCO NON-USER: CPT | Performed by: INTERNAL MEDICINE

## 2024-09-10 PROCEDURE — 90653 IIV ADJUVANT VACCINE IM: CPT | Performed by: INTERNAL MEDICINE

## 2024-09-10 PROCEDURE — 3078F DIAST BP <80 MM HG: CPT | Performed by: INTERNAL MEDICINE

## 2024-09-10 PROCEDURE — 3017F COLORECTAL CA SCREEN DOC REV: CPT | Performed by: INTERNAL MEDICINE

## 2024-09-11 ENCOUNTER — OFFICE VISIT (OUTPATIENT)
Dept: CARDIOLOGY CLINIC | Age: 69
End: 2024-09-11
Payer: MEDICARE

## 2024-09-11 VITALS
BODY MASS INDEX: 30.95 KG/M2 | OXYGEN SATURATION: 95 % | HEART RATE: 97 BPM | DIASTOLIC BLOOD PRESSURE: 70 MMHG | WEIGHT: 169.2 LBS | SYSTOLIC BLOOD PRESSURE: 122 MMHG

## 2024-09-11 DIAGNOSIS — I10 ESSENTIAL HYPERTENSION: ICD-10-CM

## 2024-09-11 DIAGNOSIS — R07.89 ATYPICAL CHEST PAIN: Primary | ICD-10-CM

## 2024-09-11 PROCEDURE — 1090F PRES/ABSN URINE INCON ASSESS: CPT | Performed by: INTERNAL MEDICINE

## 2024-09-11 PROCEDURE — 3074F SYST BP LT 130 MM HG: CPT | Performed by: INTERNAL MEDICINE

## 2024-09-11 PROCEDURE — 1036F TOBACCO NON-USER: CPT | Performed by: INTERNAL MEDICINE

## 2024-09-11 PROCEDURE — 3078F DIAST BP <80 MM HG: CPT | Performed by: INTERNAL MEDICINE

## 2024-09-11 PROCEDURE — 1124F ACP DISCUSS-NO DSCNMKR DOCD: CPT | Performed by: INTERNAL MEDICINE

## 2024-09-11 PROCEDURE — G8417 CALC BMI ABV UP PARAM F/U: HCPCS | Performed by: INTERNAL MEDICINE

## 2024-09-11 PROCEDURE — G8427 DOCREV CUR MEDS BY ELIG CLIN: HCPCS | Performed by: INTERNAL MEDICINE

## 2024-09-11 PROCEDURE — 99213 OFFICE O/P EST LOW 20 MIN: CPT | Performed by: INTERNAL MEDICINE

## 2024-09-11 PROCEDURE — G8399 PT W/DXA RESULTS DOCUMENT: HCPCS | Performed by: INTERNAL MEDICINE

## 2024-09-11 PROCEDURE — 3017F COLORECTAL CA SCREEN DOC REV: CPT | Performed by: INTERNAL MEDICINE

## 2024-09-16 ENCOUNTER — TELEPHONE (OUTPATIENT)
Dept: PRIMARY CARE CLINIC | Age: 69
End: 2024-09-16

## 2024-09-17 ENCOUNTER — CARE COORDINATION (OUTPATIENT)
Dept: CARE COORDINATION | Age: 69
End: 2024-09-17

## 2024-09-17 DIAGNOSIS — K59.00 CONSTIPATION, UNSPECIFIED CONSTIPATION TYPE: Primary | ICD-10-CM

## 2024-09-17 RX ORDER — BISACODYL 5 MG/1
5 TABLET, DELAYED RELEASE ORAL DAILY PRN
Qty: 30 TABLET | Refills: 0 | Status: SHIPPED | OUTPATIENT
Start: 2024-09-17

## 2024-09-18 ENCOUNTER — HOSPITAL ENCOUNTER (OUTPATIENT)
Dept: GENERAL RADIOLOGY | Age: 69
Discharge: HOME | End: 2024-09-18
Payer: MEDICARE

## 2024-09-18 ENCOUNTER — OFFICE VISIT (OUTPATIENT)
Dept: PRIMARY CARE CLINIC | Age: 69
End: 2024-09-18

## 2024-09-18 VITALS
DIASTOLIC BLOOD PRESSURE: 84 MMHG | RESPIRATION RATE: 16 BRPM | WEIGHT: 166 LBS | OXYGEN SATURATION: 96 % | HEART RATE: 89 BPM | SYSTOLIC BLOOD PRESSURE: 118 MMHG | TEMPERATURE: 97.6 F | BODY MASS INDEX: 30.36 KG/M2

## 2024-09-18 DIAGNOSIS — K59.00 CONSTIPATION, UNSPECIFIED CONSTIPATION TYPE: ICD-10-CM

## 2024-09-18 DIAGNOSIS — R82.2 BILIRUBIN IN URINE: ICD-10-CM

## 2024-09-18 DIAGNOSIS — R30.0 DYSURIA: Primary | ICD-10-CM

## 2024-09-18 DIAGNOSIS — R30.0 DYSURIA: ICD-10-CM

## 2024-09-18 DIAGNOSIS — N28.1 CYST OF RIGHT KIDNEY: ICD-10-CM

## 2024-09-18 LAB
ALBUMIN SERPL-MCNC: 4.7 G/DL (ref 3.4–5)
ALP SERPL-CCNC: 64 U/L (ref 40–129)
ALT SERPL-CCNC: 31 U/L (ref 10–40)
ANION GAP SERPL CALCULATED.3IONS-SCNC: 13 MMOL/L (ref 3–16)
AST SERPL-CCNC: 26 U/L (ref 15–37)
BILIRUB DIRECT SERPL-MCNC: 0.3 MG/DL (ref 0–0.3)
BILIRUB INDIRECT SERPL-MCNC: 0.3 MG/DL (ref 0–1)
BILIRUB SERPL-MCNC: 0.6 MG/DL (ref 0–1)
BILIRUBIN, POC: NORMAL
BLOOD URINE, POC: NORMAL
BUN SERPL-MCNC: 18 MG/DL (ref 7–20)
CALCIUM SERPL-MCNC: 9.8 MG/DL (ref 8.3–10.6)
CHLORIDE SERPL-SCNC: 101 MMOL/L (ref 99–110)
CLARITY, POC: CLEAR
CO2 SERPL-SCNC: 22 MMOL/L (ref 21–32)
COLOR, POC: NORMAL
CREAT SERPL-MCNC: 0.9 MG/DL (ref 0.6–1.2)
DEPRECATED RDW RBC AUTO: 14 % (ref 12.4–15.4)
GFR SERPLBLD CREATININE-BSD FMLA CKD-EPI: 70 ML/MIN/{1.73_M2}
GLUCOSE SERPL-MCNC: 112 MG/DL (ref 70–99)
GLUCOSE URINE, POC: NORMAL MG/DL
HCT VFR BLD AUTO: 45 % (ref 36–48)
HGB BLD-MCNC: 15.3 G/DL (ref 12–16)
KETONES, POC: NORMAL MG/DL
LEUKOCYTE EST, POC: NORMAL
MCH RBC QN AUTO: 29.5 PG (ref 26–34)
MCHC RBC AUTO-ENTMCNC: 33.9 G/DL (ref 31–36)
MCV RBC AUTO: 86.9 FL (ref 80–100)
NITRITE, POC: NORMAL
PH, POC: 5.5
PLATELET # BLD AUTO: 252 K/UL (ref 135–450)
PMV BLD AUTO: 8.5 FL (ref 5–10.5)
POTASSIUM SERPL-SCNC: 4.5 MMOL/L (ref 3.5–5.1)
PROT SERPL-MCNC: 6.8 G/DL (ref 6.4–8.2)
PROTEIN, POC: NORMAL MG/DL
RBC # BLD AUTO: 5.18 M/UL (ref 4–5.2)
SODIUM SERPL-SCNC: 136 MMOL/L (ref 136–145)
SPECIFIC GRAVITY, POC: >=1.03
UROBILINOGEN, POC: 0.2 MG/DL
WBC # BLD AUTO: 8.1 K/UL (ref 4–11)

## 2024-09-18 PROCEDURE — 74018 RADEX ABDOMEN 1 VIEW: CPT

## 2024-09-18 ASSESSMENT — ENCOUNTER SYMPTOMS
DIARRHEA: 0
SHORTNESS OF BREATH: 0
BACK PAIN: 1
WHEEZING: 0
NAUSEA: 0
CONSTIPATION: 1
VOMITING: 0

## 2024-09-19 LAB — BACTERIA UR CULT: NORMAL

## 2024-09-20 VITALS — SYSTOLIC BLOOD PRESSURE: 109 MMHG | HEART RATE: 80 BPM | DIASTOLIC BLOOD PRESSURE: 76 MMHG | OXYGEN SATURATION: 93 %

## 2024-09-23 ENCOUNTER — APPOINTMENT (OUTPATIENT)
Age: 69
DRG: 202 | End: 2024-09-23
Payer: MEDICARE

## 2024-09-23 ENCOUNTER — CARE COORDINATION (OUTPATIENT)
Dept: CASE MANAGEMENT | Age: 69
End: 2024-09-23

## 2024-09-23 ENCOUNTER — HOSPITAL ENCOUNTER (INPATIENT)
Age: 69
LOS: 2 days | Discharge: HOME HEALTH | DRG: 202 | End: 2024-09-25
Attending: EMERGENCY MEDICINE | Admitting: HOSPITALIST
Payer: MEDICARE

## 2024-09-23 DIAGNOSIS — R06.00 DYSPNEA, UNSPECIFIED TYPE: ICD-10-CM

## 2024-09-23 DIAGNOSIS — R06.02 SOB (SHORTNESS OF BREATH): Primary | ICD-10-CM

## 2024-09-23 LAB
ALBUMIN SERPL-MCNC: 4.4 G/DL (ref 3.4–5)
ALBUMIN/GLOB SERPL: 2 {RATIO}
ALP SERPL-CCNC: 59 U/L (ref 40–129)
ALT SERPL-CCNC: 21 U/L (ref 10–40)
ANION GAP SERPL CALCULATED.3IONS-SCNC: 12 MMOL/L (ref 3–16)
AST SERPL-CCNC: 33 U/L (ref 15–37)
BASOPHILS # BLD: 0.02 K/UL (ref 0–0.2)
BASOPHILS NFR BLD: 0 %
BILIRUB SERPL-MCNC: 0.5 MG/DL (ref 0–1)
BILIRUB UR QL STRIP: NEGATIVE
BNP SERPL-MCNC: 57 PG/ML (ref 0–124)
BUN SERPL-MCNC: 14 MG/DL (ref 7–20)
CALCIUM SERPL-MCNC: 8.7 MG/DL (ref 8.3–10.6)
CHLORIDE SERPL-SCNC: 102 MMOL/L (ref 99–110)
CLARITY UR: CLEAR
CO2 SERPL-SCNC: 22 MMOL/L (ref 21–32)
COLOR UR: YELLOW
COMMENT: NORMAL
CREAT SERPL-MCNC: 0.8 MG/DL (ref 0.6–1.2)
EOSINOPHIL # BLD: 0.04 K/UL (ref 0–0.6)
EOSINOPHILS RELATIVE PERCENT: 1 %
ERYTHROCYTE [DISTWIDTH] IN BLOOD BY AUTOMATED COUNT: 13.1 % (ref 12.4–15.4)
FLUAV AG SPEC QL: NEGATIVE
FLUBV AG SPEC QL: NEGATIVE
GFR, ESTIMATED: 79 ML/MIN/1.73M2
GLUCOSE SERPL-MCNC: 194 MG/DL (ref 70–99)
GLUCOSE UR STRIP-MCNC: NEGATIVE MG/DL
HCT VFR BLD AUTO: 43.1 % (ref 36–48)
HGB BLD-MCNC: 14.3 G/DL (ref 12–16)
HGB UR QL STRIP.AUTO: NEGATIVE
IMM GRANULOCYTES # BLD AUTO: 0.05 K/UL (ref 0–0.5)
IMM GRANULOCYTES NFR BLD: 1 %
KETONES UR STRIP-MCNC: NEGATIVE MG/DL
LEUKOCYTE ESTERASE UR QL STRIP: NEGATIVE
LYMPHOCYTES NFR BLD: 1.62 K/UL (ref 1–5.1)
LYMPHOCYTES RELATIVE PERCENT: 20 %
MCH RBC QN AUTO: 28.9 PG (ref 26–34)
MCHC RBC AUTO-ENTMCNC: 33.2 G/DL (ref 31–36)
MCV RBC AUTO: 87.2 FL (ref 80–100)
MONOCYTES NFR BLD: 0.43 K/UL (ref 0–1.3)
MONOCYTES NFR BLD: 5 %
NEUTROPHILS NFR BLD: 73 %
NEUTS SEG NFR BLD: 5.93 K/UL (ref 1.7–7.7)
NITRITE UR QL STRIP: NEGATIVE
PH UR STRIP: 6 [PH] (ref 5–8)
PLATELET # BLD AUTO: 247 K/UL (ref 135–450)
PMV BLD AUTO: 10 FL
POTASSIUM SERPL-SCNC: 4.4 MMOL/L (ref 3.5–5.1)
PROT SERPL-MCNC: 6.6 G/DL (ref 6.4–8.2)
PROT UR STRIP-MCNC: NEGATIVE MG/DL
RBC # BLD AUTO: 4.94 M/UL (ref 4–5.2)
SARS-COV-2 RDRP RESP QL NAA+PROBE: NOT DETECTED
SODIUM SERPL-SCNC: 137 MMOL/L (ref 136–145)
SP GR UR STRIP: 1.01 (ref 1–1.03)
SPECIMEN DESCRIPTION: NORMAL
TROPONIN I SERPL HS-MCNC: 7 NG/L (ref 0–14)
TROPONIN I SERPL HS-MCNC: 8 NG/L (ref 0–14)
TROPONIN I SERPL HS-MCNC: 9 NG/L (ref 0–14)
UROBILINOGEN UR STRIP-ACNC: 0.2 EU/DL (ref 0–1)
WBC OTHER # BLD: 8.1 K/UL (ref 4–11)

## 2024-09-23 PROCEDURE — 6360000002 HC RX W HCPCS: Performed by: EMERGENCY MEDICINE

## 2024-09-23 PROCEDURE — 94640 AIRWAY INHALATION TREATMENT: CPT

## 2024-09-23 PROCEDURE — 2580000003 HC RX 258: Performed by: EMERGENCY MEDICINE

## 2024-09-23 PROCEDURE — 81003 URINALYSIS AUTO W/O SCOPE: CPT

## 2024-09-23 PROCEDURE — 2580000003 HC RX 258: Performed by: HOSPITALIST

## 2024-09-23 PROCEDURE — 6370000000 HC RX 637 (ALT 250 FOR IP): Performed by: HOSPITALIST

## 2024-09-23 PROCEDURE — 71260 CT THORAX DX C+: CPT

## 2024-09-23 PROCEDURE — 6370000000 HC RX 637 (ALT 250 FOR IP): Performed by: EMERGENCY MEDICINE

## 2024-09-23 PROCEDURE — 93005 ELECTROCARDIOGRAM TRACING: CPT

## 2024-09-23 PROCEDURE — 94762 N-INVAS EAR/PLS OXIMTRY CONT: CPT

## 2024-09-23 PROCEDURE — 87635 SARS-COV-2 COVID-19 AMP PRB: CPT

## 2024-09-23 PROCEDURE — 99285 EMERGENCY DEPT VISIT HI MDM: CPT

## 2024-09-23 PROCEDURE — 80053 COMPREHEN METABOLIC PANEL: CPT

## 2024-09-23 PROCEDURE — 87804 INFLUENZA ASSAY W/OPTIC: CPT

## 2024-09-23 PROCEDURE — 36415 COLL VENOUS BLD VENIPUNCTURE: CPT

## 2024-09-23 PROCEDURE — 83880 ASSAY OF NATRIURETIC PEPTIDE: CPT

## 2024-09-23 PROCEDURE — 71045 X-RAY EXAM CHEST 1 VIEW: CPT

## 2024-09-23 PROCEDURE — 84484 ASSAY OF TROPONIN QUANT: CPT

## 2024-09-23 PROCEDURE — 85025 COMPLETE CBC W/AUTO DIFF WBC: CPT

## 2024-09-23 PROCEDURE — 2060000000 HC ICU INTERMEDIATE R&B

## 2024-09-23 PROCEDURE — 6360000004 HC RX CONTRAST MEDICATION: Performed by: EMERGENCY MEDICINE

## 2024-09-23 RX ORDER — ENOXAPARIN SODIUM 100 MG/ML
40 INJECTION SUBCUTANEOUS DAILY
Status: DISCONTINUED | OUTPATIENT
Start: 2024-09-23 | End: 2024-09-25 | Stop reason: HOSPADM

## 2024-09-23 RX ORDER — LANOLIN ALCOHOL/MO/W.PET/CERES
3 CREAM (GRAM) TOPICAL NIGHTLY PRN
Status: DISCONTINUED | OUTPATIENT
Start: 2024-09-23 | End: 2024-09-25 | Stop reason: HOSPADM

## 2024-09-23 RX ORDER — IPRATROPIUM BROMIDE AND ALBUTEROL SULFATE 2.5; .5 MG/3ML; MG/3ML
1 SOLUTION RESPIRATORY (INHALATION) ONCE
Status: COMPLETED | OUTPATIENT
Start: 2024-09-23 | End: 2024-09-23

## 2024-09-23 RX ORDER — ONDANSETRON 4 MG/1
4 TABLET, ORALLY DISINTEGRATING ORAL EVERY 8 HOURS PRN
Status: DISCONTINUED | OUTPATIENT
Start: 2024-09-23 | End: 2024-09-25 | Stop reason: HOSPADM

## 2024-09-23 RX ORDER — IPRATROPIUM BROMIDE AND ALBUTEROL SULFATE 2.5; .5 MG/3ML; MG/3ML
SOLUTION RESPIRATORY (INHALATION)
Status: DISCONTINUED
Start: 2024-09-23 | End: 2024-09-24

## 2024-09-23 RX ORDER — MAGNESIUM SULFATE IN WATER 40 MG/ML
2000 INJECTION, SOLUTION INTRAVENOUS PRN
Status: DISCONTINUED | OUTPATIENT
Start: 2024-09-23 | End: 2024-09-25 | Stop reason: HOSPADM

## 2024-09-23 RX ORDER — IPRATROPIUM BROMIDE AND ALBUTEROL SULFATE 2.5; .5 MG/3ML; MG/3ML
1 SOLUTION RESPIRATORY (INHALATION)
Status: DISCONTINUED | OUTPATIENT
Start: 2024-09-23 | End: 2024-09-24

## 2024-09-23 RX ORDER — PREDNISONE 20 MG/1
40 TABLET ORAL DAILY
Status: DISCONTINUED | OUTPATIENT
Start: 2024-09-24 | End: 2024-09-25 | Stop reason: HOSPADM

## 2024-09-23 RX ORDER — ACETAMINOPHEN 650 MG/1
650 SUPPOSITORY RECTAL EVERY 6 HOURS PRN
Status: DISCONTINUED | OUTPATIENT
Start: 2024-09-23 | End: 2024-09-25 | Stop reason: HOSPADM

## 2024-09-23 RX ORDER — DILTIAZEM HYDROCHLORIDE 120 MG/1
240 CAPSULE, COATED, EXTENDED RELEASE ORAL DAILY
Status: DISCONTINUED | OUTPATIENT
Start: 2024-09-23 | End: 2024-09-25 | Stop reason: HOSPADM

## 2024-09-23 RX ORDER — PANTOPRAZOLE SODIUM 40 MG/1
40 TABLET, DELAYED RELEASE ORAL DAILY
Status: DISCONTINUED | OUTPATIENT
Start: 2024-09-23 | End: 2024-09-25 | Stop reason: HOSPADM

## 2024-09-23 RX ORDER — POTASSIUM CHLORIDE 1500 MG/1
40 TABLET, EXTENDED RELEASE ORAL PRN
Status: DISCONTINUED | OUTPATIENT
Start: 2024-09-23 | End: 2024-09-25 | Stop reason: HOSPADM

## 2024-09-23 RX ORDER — POLYETHYLENE GLYCOL 3350 17 G/17G
17 POWDER, FOR SOLUTION ORAL DAILY
Status: DISCONTINUED | OUTPATIENT
Start: 2024-09-23 | End: 2024-09-25 | Stop reason: HOSPADM

## 2024-09-23 RX ORDER — MAGNESIUM HYDROXIDE/ALUMINUM HYDROXICE/SIMETHICONE 120; 1200; 1200 MG/30ML; MG/30ML; MG/30ML
30 SUSPENSION ORAL EVERY 6 HOURS PRN
Status: DISCONTINUED | OUTPATIENT
Start: 2024-09-23 | End: 2024-09-25 | Stop reason: HOSPADM

## 2024-09-23 RX ORDER — POTASSIUM CHLORIDE 7.45 MG/ML
10 INJECTION INTRAVENOUS PRN
Status: DISCONTINUED | OUTPATIENT
Start: 2024-09-23 | End: 2024-09-25 | Stop reason: HOSPADM

## 2024-09-23 RX ORDER — DULOXETIN HYDROCHLORIDE 30 MG/1
60 CAPSULE, DELAYED RELEASE ORAL DAILY
Status: DISCONTINUED | OUTPATIENT
Start: 2024-09-23 | End: 2024-09-25 | Stop reason: HOSPADM

## 2024-09-23 RX ORDER — SENNOSIDES A AND B 8.6 MG/1
1 TABLET, FILM COATED ORAL 2 TIMES DAILY PRN
Status: DISCONTINUED | OUTPATIENT
Start: 2024-09-23 | End: 2024-09-25 | Stop reason: HOSPADM

## 2024-09-23 RX ORDER — ACETAMINOPHEN 325 MG/1
650 TABLET ORAL EVERY 6 HOURS PRN
Status: DISCONTINUED | OUTPATIENT
Start: 2024-09-23 | End: 2024-09-25 | Stop reason: HOSPADM

## 2024-09-23 RX ORDER — SODIUM CHLORIDE 0.9 % (FLUSH) 0.9 %
5-40 SYRINGE (ML) INJECTION EVERY 12 HOURS SCHEDULED
Status: DISCONTINUED | OUTPATIENT
Start: 2024-09-23 | End: 2024-09-25 | Stop reason: HOSPADM

## 2024-09-23 RX ORDER — ROSUVASTATIN CALCIUM 10 MG/1
10 TABLET, COATED ORAL DAILY
Status: DISCONTINUED | OUTPATIENT
Start: 2024-09-23 | End: 2024-09-25 | Stop reason: HOSPADM

## 2024-09-23 RX ORDER — ONDANSETRON 2 MG/ML
4 INJECTION INTRAMUSCULAR; INTRAVENOUS EVERY 6 HOURS PRN
Status: DISCONTINUED | OUTPATIENT
Start: 2024-09-23 | End: 2024-09-25 | Stop reason: HOSPADM

## 2024-09-23 RX ORDER — SODIUM CHLORIDE 0.9 % (FLUSH) 0.9 %
5-40 SYRINGE (ML) INJECTION PRN
Status: DISCONTINUED | OUTPATIENT
Start: 2024-09-23 | End: 2024-09-25 | Stop reason: HOSPADM

## 2024-09-23 RX ORDER — SODIUM CHLORIDE 9 MG/ML
INJECTION, SOLUTION INTRAVENOUS PRN
Status: DISCONTINUED | OUTPATIENT
Start: 2024-09-23 | End: 2024-09-25 | Stop reason: HOSPADM

## 2024-09-23 RX ORDER — IOPAMIDOL 755 MG/ML
75 INJECTION, SOLUTION INTRAVASCULAR
Status: COMPLETED | OUTPATIENT
Start: 2024-09-23 | End: 2024-09-23

## 2024-09-23 RX ORDER — ASPIRIN 81 MG/1
81 TABLET ORAL EVERY OTHER DAY
Status: DISCONTINUED | OUTPATIENT
Start: 2024-09-23 | End: 2024-09-25 | Stop reason: HOSPADM

## 2024-09-23 RX ORDER — GABAPENTIN 300 MG/1
600 CAPSULE ORAL NIGHTLY
Status: DISCONTINUED | OUTPATIENT
Start: 2024-09-23 | End: 2024-09-25 | Stop reason: HOSPADM

## 2024-09-23 RX ORDER — RANOLAZINE 500 MG/1
500 TABLET, EXTENDED RELEASE ORAL 2 TIMES DAILY
Status: DISCONTINUED | OUTPATIENT
Start: 2024-09-23 | End: 2024-09-25 | Stop reason: HOSPADM

## 2024-09-23 RX ORDER — MONTELUKAST SODIUM 10 MG/1
10 TABLET ORAL
Status: DISCONTINUED | OUTPATIENT
Start: 2024-09-23 | End: 2024-09-25 | Stop reason: HOSPADM

## 2024-09-23 RX ORDER — AMITRIPTYLINE HYDROCHLORIDE 10 MG/1
10 TABLET ORAL NIGHTLY
Status: DISCONTINUED | OUTPATIENT
Start: 2024-09-23 | End: 2024-09-25 | Stop reason: HOSPADM

## 2024-09-23 RX ADMIN — WATER 40 MG: 1 INJECTION INTRAMUSCULAR; INTRAVENOUS; SUBCUTANEOUS at 18:37

## 2024-09-23 RX ADMIN — RANOLAZINE 500 MG: 500 TABLET, FILM COATED, EXTENDED RELEASE ORAL at 21:39

## 2024-09-23 RX ADMIN — AMITRIPTYLINE HYDROCHLORIDE 10 MG: 10 TABLET, FILM COATED ORAL at 21:39

## 2024-09-23 RX ADMIN — IPRATROPIUM BROMIDE AND ALBUTEROL SULFATE 1 DOSE: 2.5; .5 SOLUTION RESPIRATORY (INHALATION) at 20:42

## 2024-09-23 RX ADMIN — IOPAMIDOL 75 ML: 755 INJECTION, SOLUTION INTRAVENOUS at 16:57

## 2024-09-23 RX ADMIN — MONTELUKAST 10 MG: 10 TABLET, FILM COATED ORAL at 21:39

## 2024-09-23 RX ADMIN — Medication 10 ML: at 21:40

## 2024-09-23 RX ADMIN — GABAPENTIN 600 MG: 300 CAPSULE ORAL at 21:39

## 2024-09-23 RX ADMIN — IPRATROPIUM BROMIDE AND ALBUTEROL SULFATE 1 DOSE: 2.5; .5 SOLUTION RESPIRATORY (INHALATION) at 15:01

## 2024-09-23 ASSESSMENT — PAIN DESCRIPTION - LOCATION: LOCATION: CHEST

## 2024-09-23 ASSESSMENT — PAIN - FUNCTIONAL ASSESSMENT: PAIN_FUNCTIONAL_ASSESSMENT: 0-10

## 2024-09-23 ASSESSMENT — PAIN DESCRIPTION - DESCRIPTORS: DESCRIPTORS: CRUSHING

## 2024-09-23 ASSESSMENT — PAIN SCALES - GENERAL
PAINLEVEL_OUTOF10: 0
PAINLEVEL_OUTOF10: 8
PAINLEVEL_OUTOF10: 0

## 2024-09-24 ENCOUNTER — APPOINTMENT (OUTPATIENT)
Age: 69
DRG: 202 | End: 2024-09-24
Payer: MEDICARE

## 2024-09-24 PROBLEM — Z23 NEED FOR INFLUENZA VACCINATION: Status: ACTIVE | Noted: 2024-09-24

## 2024-09-24 LAB
ANION GAP SERPL CALCULATED.3IONS-SCNC: 11 MMOL/L (ref 3–16)
BASOPHILS # BLD: 0.01 K/UL (ref 0–0.2)
BASOPHILS NFR BLD: 0 %
BUN SERPL-MCNC: 10 MG/DL (ref 7–20)
CALCIUM SERPL-MCNC: 8.8 MG/DL (ref 8.3–10.6)
CHLORIDE SERPL-SCNC: 106 MMOL/L (ref 99–110)
CO2 SERPL-SCNC: 22 MMOL/L (ref 21–32)
CREAT SERPL-MCNC: 0.7 MG/DL (ref 0.6–1.2)
EKG ATRIAL RATE: 87 BPM
EKG DIAGNOSIS: NORMAL
EKG P AXIS: 39 DEGREES
EKG P-R INTERVAL: 154 MS
EKG Q-T INTERVAL: 364 MS
EKG QRS DURATION: 78 MS
EKG QTC CALCULATION (BAZETT): 438 MS
EKG R AXIS: -5 DEGREES
EKG T AXIS: 25 DEGREES
EKG VENTRICULAR RATE: 87 BPM
EOSINOPHIL # BLD: 0 K/UL (ref 0–0.6)
EOSINOPHILS RELATIVE PERCENT: 0 %
ERYTHROCYTE [DISTWIDTH] IN BLOOD BY AUTOMATED COUNT: 13.2 % (ref 12.4–15.4)
GFR, ESTIMATED: >90 ML/MIN/1.73M2
GLUCOSE BLD-MCNC: 142 MG/DL (ref 70–99)
GLUCOSE BLD-MCNC: 173 MG/DL (ref 70–99)
GLUCOSE BLD-MCNC: 204 MG/DL (ref 70–99)
GLUCOSE BLD-MCNC: 214 MG/DL (ref 70–99)
GLUCOSE SERPL-MCNC: 201 MG/DL (ref 70–99)
HCT VFR BLD AUTO: 41.5 % (ref 36–48)
HGB BLD-MCNC: 14.1 G/DL (ref 12–16)
IMM GRANULOCYTES # BLD AUTO: 0.04 K/UL (ref 0–0.5)
IMM GRANULOCYTES NFR BLD: 1 %
IRON SATN MFR SERPL: 11 % (ref 15–50)
IRON SERPL-MCNC: 43 UG/DL (ref 37–145)
LEFT VENTRICULAR EJECTION FRACTION MODE: NORMAL
LV EF: 65 % (ref 65–70)
LYMPHOCYTES NFR BLD: 0.88 K/UL (ref 1–5.1)
LYMPHOCYTES RELATIVE PERCENT: 11 %
MCH RBC QN AUTO: 29.3 PG (ref 26–34)
MCHC RBC AUTO-ENTMCNC: 34 G/DL (ref 31–36)
MCV RBC AUTO: 86.3 FL (ref 80–100)
MONOCYTES NFR BLD: 0.16 K/UL (ref 0–1.3)
MONOCYTES NFR BLD: 2 %
NEUTROPHILS NFR BLD: 87 %
NEUTS SEG NFR BLD: 7.23 K/UL (ref 1.7–7.7)
PLATELET # BLD AUTO: 224 K/UL (ref 135–450)
PMV BLD AUTO: 9.7 FL
POTASSIUM SERPL-SCNC: 4.2 MMOL/L (ref 3.5–5.1)
RBC # BLD AUTO: 4.81 M/UL (ref 4–5.2)
SODIUM SERPL-SCNC: 139 MMOL/L (ref 136–145)
TIBC SERPL-MCNC: 376 UG/DL (ref 260–445)
WBC OTHER # BLD: 8.3 K/UL (ref 4–11)

## 2024-09-24 PROCEDURE — 97530 THERAPEUTIC ACTIVITIES: CPT

## 2024-09-24 PROCEDURE — 2580000003 HC RX 258: Performed by: HOSPITALIST

## 2024-09-24 PROCEDURE — 6360000002 HC RX W HCPCS: Performed by: NURSE PRACTITIONER

## 2024-09-24 PROCEDURE — 82746 ASSAY OF FOLIC ACID SERUM: CPT

## 2024-09-24 PROCEDURE — 82962 GLUCOSE BLOOD TEST: CPT

## 2024-09-24 PROCEDURE — 6360000002 HC RX W HCPCS: Performed by: HOSPITALIST

## 2024-09-24 PROCEDURE — 97161 PT EVAL LOW COMPLEX 20 MIN: CPT

## 2024-09-24 PROCEDURE — 83550 IRON BINDING TEST: CPT

## 2024-09-24 PROCEDURE — 6370000000 HC RX 637 (ALT 250 FOR IP): Performed by: HOSPITALIST

## 2024-09-24 PROCEDURE — 99222 1ST HOSP IP/OBS MODERATE 55: CPT | Performed by: NURSE PRACTITIONER

## 2024-09-24 PROCEDURE — 85025 COMPLETE CBC W/AUTO DIFF WBC: CPT

## 2024-09-24 PROCEDURE — 83036 HEMOGLOBIN GLYCOSYLATED A1C: CPT

## 2024-09-24 PROCEDURE — 97166 OT EVAL MOD COMPLEX 45 MIN: CPT

## 2024-09-24 PROCEDURE — 6370000000 HC RX 637 (ALT 250 FOR IP): Performed by: NURSE PRACTITIONER

## 2024-09-24 PROCEDURE — 83540 ASSAY OF IRON: CPT

## 2024-09-24 PROCEDURE — 82607 VITAMIN B-12: CPT

## 2024-09-24 PROCEDURE — 82306 VITAMIN D 25 HYDROXY: CPT

## 2024-09-24 PROCEDURE — 93306 TTE W/DOPPLER COMPLETE: CPT

## 2024-09-24 PROCEDURE — 36415 COLL VENOUS BLD VENIPUNCTURE: CPT

## 2024-09-24 PROCEDURE — 2060000000 HC ICU INTERMEDIATE R&B

## 2024-09-24 PROCEDURE — 97535 SELF CARE MNGMENT TRAINING: CPT

## 2024-09-24 PROCEDURE — 94640 AIRWAY INHALATION TREATMENT: CPT

## 2024-09-24 PROCEDURE — 80048 BASIC METABOLIC PNL TOTAL CA: CPT

## 2024-09-24 RX ORDER — NITROGLYCERIN 40 MG/1
2 PATCH TRANSDERMAL DAILY
Status: DISCONTINUED | OUTPATIENT
Start: 2024-09-24 | End: 2024-09-25 | Stop reason: HOSPADM

## 2024-09-24 RX ORDER — INSULIN LISPRO 100 [IU]/ML
0-8 INJECTION, SOLUTION INTRAVENOUS; SUBCUTANEOUS
Status: DISCONTINUED | OUTPATIENT
Start: 2024-09-24 | End: 2024-09-25 | Stop reason: HOSPADM

## 2024-09-24 RX ORDER — INSULIN LISPRO 100 [IU]/ML
0-4 INJECTION, SOLUTION INTRAVENOUS; SUBCUTANEOUS NIGHTLY
Status: DISCONTINUED | OUTPATIENT
Start: 2024-09-24 | End: 2024-09-25 | Stop reason: HOSPADM

## 2024-09-24 RX ORDER — ALBUTEROL SULFATE 0.83 MG/ML
2.5 SOLUTION RESPIRATORY (INHALATION)
Status: DISCONTINUED | OUTPATIENT
Start: 2024-09-24 | End: 2024-09-25 | Stop reason: HOSPADM

## 2024-09-24 RX ORDER — DEXTROSE MONOHYDRATE 100 MG/ML
INJECTION, SOLUTION INTRAVENOUS CONTINUOUS PRN
Status: DISCONTINUED | OUTPATIENT
Start: 2024-09-24 | End: 2024-09-25 | Stop reason: HOSPADM

## 2024-09-24 RX ORDER — FLUTICASONE PROPIONATE 50 MCG
1 SPRAY, SUSPENSION (ML) NASAL NIGHTLY PRN
Status: DISCONTINUED | OUTPATIENT
Start: 2024-09-24 | End: 2024-09-25 | Stop reason: HOSPADM

## 2024-09-24 RX ORDER — GLUCAGON 1 MG/ML
1 KIT INJECTION PRN
Status: DISCONTINUED | OUTPATIENT
Start: 2024-09-24 | End: 2024-09-25 | Stop reason: HOSPADM

## 2024-09-24 RX ADMIN — ENOXAPARIN SODIUM 40 MG: 100 INJECTION SUBCUTANEOUS at 09:20

## 2024-09-24 RX ADMIN — PHENOL 1 SPRAY: 1.4 SPRAY ORAL at 09:33

## 2024-09-24 RX ADMIN — DULOXETINE HYDROCHLORIDE 60 MG: 30 CAPSULE, DELAYED RELEASE ORAL at 09:20

## 2024-09-24 RX ADMIN — RANOLAZINE 500 MG: 500 TABLET, FILM COATED, EXTENDED RELEASE ORAL at 20:48

## 2024-09-24 RX ADMIN — ALBUTEROL SULFATE 2.5 MG: 2.5 SOLUTION RESPIRATORY (INHALATION) at 21:03

## 2024-09-24 RX ADMIN — PREDNISONE 40 MG: 20 TABLET ORAL at 09:18

## 2024-09-24 RX ADMIN — PANTOPRAZOLE SODIUM 40 MG: 40 TABLET, DELAYED RELEASE ORAL at 09:17

## 2024-09-24 RX ADMIN — GABAPENTIN 600 MG: 300 CAPSULE ORAL at 20:45

## 2024-09-24 RX ADMIN — MONTELUKAST 10 MG: 10 TABLET, FILM COATED ORAL at 20:48

## 2024-09-24 RX ADMIN — INSULIN LISPRO 2 UNITS: 100 INJECTION, SOLUTION INTRAVENOUS; SUBCUTANEOUS at 16:20

## 2024-09-24 RX ADMIN — ROSUVASTATIN CALCIUM 10 MG: 10 TABLET, FILM COATED ORAL at 09:20

## 2024-09-24 RX ADMIN — DILTIAZEM HYDROCHLORIDE 240 MG: 120 CAPSULE, EXTENDED RELEASE ORAL at 09:16

## 2024-09-24 RX ADMIN — ALBUTEROL SULFATE 2.5 MG: 2.5 SOLUTION RESPIRATORY (INHALATION) at 15:54

## 2024-09-24 RX ADMIN — POLYETHYLENE GLYCOL 3350 17 G: 17 POWDER, FOR SOLUTION ORAL at 09:21

## 2024-09-24 RX ADMIN — IPRATROPIUM BROMIDE AND ALBUTEROL SULFATE 1 DOSE: 2.5; .5 SOLUTION RESPIRATORY (INHALATION) at 11:50

## 2024-09-24 RX ADMIN — ARFORMOTEROL TARTRATE: 15 SOLUTION RESPIRATORY (INHALATION) at 21:03

## 2024-09-24 RX ADMIN — AMITRIPTYLINE HYDROCHLORIDE 10 MG: 10 TABLET, FILM COATED ORAL at 20:46

## 2024-09-24 RX ADMIN — IPRATROPIUM BROMIDE AND ALBUTEROL SULFATE 1 DOSE: 2.5; .5 SOLUTION RESPIRATORY (INHALATION) at 07:45

## 2024-09-24 RX ADMIN — RANOLAZINE 500 MG: 500 TABLET, FILM COATED, EXTENDED RELEASE ORAL at 09:17

## 2024-09-24 RX ADMIN — Medication 10 ML: at 09:21

## 2024-09-24 ASSESSMENT — ENCOUNTER SYMPTOMS
DIARRHEA: 0
NAUSEA: 0
SHORTNESS OF BREATH: 1
SINUS PRESSURE: 0
COUGH: 0
WHEEZING: 0
WHEEZING: 0
ABDOMINAL PAIN: 0
STRIDOR: 0
SHORTNESS OF BREATH: 1
SORE THROAT: 0
GASTROINTESTINAL NEGATIVE: 1
CONSTIPATION: 0
COUGH: 0
EYES NEGATIVE: 1
VOMITING: 0
BLOOD IN STOOL: 0
RHINORRHEA: 0
ALLERGIC/IMMUNOLOGIC NEGATIVE: 1
CHEST TIGHTNESS: 1

## 2024-09-24 ASSESSMENT — PAIN DESCRIPTION - FREQUENCY: FREQUENCY: INTERMITTENT

## 2024-09-24 ASSESSMENT — PAIN DESCRIPTION - ONSET: ONSET: GRADUAL

## 2024-09-24 ASSESSMENT — PAIN DESCRIPTION - PAIN TYPE: TYPE: CHRONIC PAIN

## 2024-09-24 ASSESSMENT — PAIN SCALES - GENERAL
PAINLEVEL_OUTOF10: 0
PAINLEVEL_OUTOF10: 0
PAINLEVEL_OUTOF10: 2
PAINLEVEL_OUTOF10: 2
PAINLEVEL_OUTOF10: 0

## 2024-09-24 ASSESSMENT — PAIN DESCRIPTION - DESCRIPTORS: DESCRIPTORS: ITCHING

## 2024-09-24 ASSESSMENT — PAIN - FUNCTIONAL ASSESSMENT: PAIN_FUNCTIONAL_ASSESSMENT: ACTIVITIES ARE NOT PREVENTED

## 2024-09-24 ASSESSMENT — PAIN DESCRIPTION - LOCATION: LOCATION: THROAT

## 2024-09-25 VITALS
RESPIRATION RATE: 18 BRPM | TEMPERATURE: 98.4 F | SYSTOLIC BLOOD PRESSURE: 116 MMHG | BODY MASS INDEX: 31.43 KG/M2 | HEIGHT: 62 IN | HEART RATE: 71 BPM | DIASTOLIC BLOOD PRESSURE: 73 MMHG | WEIGHT: 170.8 LBS | OXYGEN SATURATION: 96 %

## 2024-09-25 LAB
ANION GAP SERPL CALCULATED.3IONS-SCNC: 9 MMOL/L (ref 3–16)
BACTERIA URNS QL MICRO: ABNORMAL
BASOPHILS # BLD: 0.01 K/UL (ref 0–0.2)
BASOPHILS NFR BLD: 0 %
BILIRUB UR QL STRIP: NEGATIVE
BUN SERPL-MCNC: 21 MG/DL (ref 7–20)
CALCIUM SERPL-MCNC: 8.6 MG/DL (ref 8.3–10.6)
CHARACTER UR: ABNORMAL
CHLORIDE SERPL-SCNC: 108 MMOL/L (ref 99–110)
CLARITY UR: CLEAR
CO2 SERPL-SCNC: 23 MMOL/L (ref 21–32)
COLOR UR: YELLOW
CREAT SERPL-MCNC: 0.8 MG/DL (ref 0.6–1.2)
ECHO AO ASC DIAM: 3.3 CM
ECHO AO ASCENDING AORTA INDEX: 1.86 CM/M2
ECHO AO ROOT DIAM: 3.5 CM
ECHO AO ROOT INDEX: 1.98 CM/M2
ECHO AV AREA PEAK VELOCITY: 2.6 CM2
ECHO AV AREA VTI: 2.5 CM2
ECHO AV AREA/BSA PEAK VELOCITY: 1.5 CM2/M2
ECHO AV AREA/BSA VTI: 1.4 CM2/M2
ECHO AV CUSP MM: 1.7 CM
ECHO AV MEAN GRADIENT: 7 MMHG
ECHO AV MEAN VELOCITY: 1.3 M/S
ECHO AV PEAK GRADIENT: 14 MMHG
ECHO AV PEAK VELOCITY: 1.9 M/S
ECHO AV VELOCITY RATIO: 0.79
ECHO AV VTI: 39.5 CM
ECHO BSA: 1.83 M2
ECHO EST RA PRESSURE: 3 MMHG
ECHO LA AREA 4C: 18.5 CM2
ECHO LA DIAMETER INDEX: 2.03 CM/M2
ECHO LA DIAMETER: 3.6 CM
ECHO LA MAJOR AXIS: 5.2 CM
ECHO LA TO AORTIC ROOT RATIO: 1.03
ECHO LA VOL MOD A4C: 53 ML (ref 22–52)
ECHO LA VOLUME INDEX MOD A4C: 30 ML/M2 (ref 16–34)
ECHO LV E' LATERAL VELOCITY: 6.6 CM/S
ECHO LV E' SEPTAL VELOCITY: 6 CM/S
ECHO LV EF PHYSICIAN: 65 %
ECHO LV EJECTION FRACTION BIPLANE: 65 % (ref 55–100)
ECHO LV FRACTIONAL SHORTENING: 36 % (ref 28–44)
ECHO LV INTERNAL DIMENSION DIASTOLE INDEX: 2.37 CM/M2
ECHO LV INTERNAL DIMENSION DIASTOLIC: 4.2 CM (ref 3.9–5.3)
ECHO LV INTERNAL DIMENSION SYSTOLIC INDEX: 1.53 CM/M2
ECHO LV INTERNAL DIMENSION SYSTOLIC: 2.7 CM
ECHO LV IVSD: 0.9 CM (ref 0.6–0.9)
ECHO LV MASS 2D: 147 G (ref 67–162)
ECHO LV MASS INDEX 2D: 83 G/M2 (ref 43–95)
ECHO LV POSTERIOR WALL DIASTOLIC: 1.2 CM (ref 0.6–0.9)
ECHO LV RELATIVE WALL THICKNESS RATIO: 0.57
ECHO LVOT AREA: 3.1 CM2
ECHO LVOT AV VTI INDEX: 0.8
ECHO LVOT DIAM: 2 CM
ECHO LVOT MEAN GRADIENT: 5 MMHG
ECHO LVOT PEAK GRADIENT: 9 MMHG
ECHO LVOT PEAK VELOCITY: 1.5 M/S
ECHO LVOT STROKE VOLUME INDEX: 56.1 ML/M2
ECHO LVOT SV: 99.2 ML
ECHO LVOT VTI: 31.6 CM
ECHO MV A VELOCITY: 0.87 M/S
ECHO MV AREA VTI: 6 CM2
ECHO MV E DECELERATION TIME (DT): 201 MS
ECHO MV E VELOCITY: 0.63 M/S
ECHO MV E/A RATIO: 0.72
ECHO MV E/E' LATERAL: 9.55
ECHO MV E/E' RATIO (AVERAGED): 10.02
ECHO MV E/E' SEPTAL: 10.5
ECHO MV LVOT VTI INDEX: 0.52
ECHO MV MAX VELOCITY: 0.9 M/S
ECHO MV MEAN GRADIENT: 1 MMHG
ECHO MV MEAN VELOCITY: 0.6 M/S
ECHO MV PEAK GRADIENT: 3 MMHG
ECHO MV VTI: 16.5 CM
ECHO PV MAX VELOCITY: 1.1 M/S
ECHO PV PEAK GRADIENT: 5 MMHG
ECHO PVEIN A DURATION: 79 MS
ECHO PVEIN A VELOCITY: 0.2 M/S
ECHO PVEIN PEAK D VELOCITY: 0.3 M/S
ECHO PVEIN PEAK S VELOCITY: 0.4 M/S
ECHO PVEIN S/D RATIO: 1.3 NO UNITS
ECHO RV FREE WALL PEAK S': 20.5 CM/S
ECHO RV INTERNAL DIMENSION: 4.3 CM
ECHO RV TAPSE: 2.1 CM (ref 1.7–?)
EOSINOPHIL # BLD: 0 K/UL (ref 0–0.6)
EOSINOPHILS RELATIVE PERCENT: 0 %
EPI CELLS #/AREA URNS HPF: ABNORMAL /HPF
ERYTHROCYTE [DISTWIDTH] IN BLOOD BY AUTOMATED COUNT: 13.4 % (ref 12.4–15.4)
EST. AVERAGE GLUCOSE BLD GHB EST-MCNC: 134 MG/DL
FOLATE SERPL-MCNC: 10.6 NG/ML (ref 4.8–24.2)
GFR, ESTIMATED: 78 ML/MIN/1.73M2
GLUCOSE BLD-MCNC: 120 MG/DL (ref 70–99)
GLUCOSE BLD-MCNC: 144 MG/DL (ref 70–99)
GLUCOSE SERPL-MCNC: 178 MG/DL (ref 70–99)
GLUCOSE UR STRIP-MCNC: 100 MG/DL
HBA1C MFR BLD: 6.3 %
HCT VFR BLD AUTO: 39.1 % (ref 36–48)
HGB BLD-MCNC: 13 G/DL (ref 12–16)
HGB UR QL STRIP.AUTO: NEGATIVE
IMM GRANULOCYTES # BLD AUTO: 0.11 K/UL (ref 0–0.5)
IMM GRANULOCYTES NFR BLD: 1 %
KETONES UR STRIP-MCNC: NEGATIVE MG/DL
LEUKOCYTE ESTERASE UR QL STRIP: ABNORMAL
LYMPHOCYTES NFR BLD: 1.45 K/UL (ref 1–5.1)
LYMPHOCYTES RELATIVE PERCENT: 9 %
MCH RBC QN AUTO: 29.1 PG (ref 26–34)
MCHC RBC AUTO-ENTMCNC: 33.2 G/DL (ref 31–36)
MCV RBC AUTO: 87.7 FL (ref 80–100)
MONOCYTES NFR BLD: 1.06 K/UL (ref 0–1.3)
MONOCYTES NFR BLD: 7 %
NEUTROPHILS NFR BLD: 83 %
NEUTS SEG NFR BLD: 12.83 K/UL (ref 1.7–7.7)
NITRITE UR QL STRIP: NEGATIVE
PH UR STRIP: 6 [PH] (ref 5–8)
PLATELET # BLD AUTO: 237 K/UL (ref 135–450)
PMV BLD AUTO: 10.2 FL
POTASSIUM SERPL-SCNC: 4.5 MMOL/L (ref 3.5–5.1)
PROCALCITONIN SERPL-MCNC: 0.04 NG/ML (ref 0–0.15)
PROT UR STRIP-MCNC: NEGATIVE MG/DL
RBC # BLD AUTO: 4.46 M/UL (ref 4–5.2)
RBC #/AREA URNS HPF: ABNORMAL /HPF
SODIUM SERPL-SCNC: 140 MMOL/L (ref 136–145)
SP GR UR STRIP: 1.01 (ref 1–1.03)
UROBILINOGEN UR STRIP-ACNC: 1 EU/DL (ref 0–1)
VIT B12 SERPL-MCNC: 466 PG/ML (ref 211–911)
WBC #/AREA URNS HPF: ABNORMAL /HPF
WBC OTHER # BLD: 15.5 K/UL (ref 4–11)

## 2024-09-25 PROCEDURE — 84145 PROCALCITONIN (PCT): CPT

## 2024-09-25 PROCEDURE — 6370000000 HC RX 637 (ALT 250 FOR IP): Performed by: HOSPITALIST

## 2024-09-25 PROCEDURE — 6370000000 HC RX 637 (ALT 250 FOR IP): Performed by: NURSE PRACTITIONER

## 2024-09-25 PROCEDURE — 6360000002 HC RX W HCPCS: Performed by: NURSE PRACTITIONER

## 2024-09-25 PROCEDURE — 99231 SBSQ HOSP IP/OBS SF/LOW 25: CPT | Performed by: NURSE PRACTITIONER

## 2024-09-25 PROCEDURE — 80048 BASIC METABOLIC PNL TOTAL CA: CPT

## 2024-09-25 PROCEDURE — 6360000002 HC RX W HCPCS: Performed by: HOSPITALIST

## 2024-09-25 PROCEDURE — 82962 GLUCOSE BLOOD TEST: CPT

## 2024-09-25 PROCEDURE — 85025 COMPLETE CBC W/AUTO DIFF WBC: CPT

## 2024-09-25 PROCEDURE — 81001 URINALYSIS AUTO W/SCOPE: CPT

## 2024-09-25 PROCEDURE — 36415 COLL VENOUS BLD VENIPUNCTURE: CPT

## 2024-09-25 PROCEDURE — 94640 AIRWAY INHALATION TREATMENT: CPT

## 2024-09-25 PROCEDURE — 2580000003 HC RX 258: Performed by: HOSPITALIST

## 2024-09-25 RX ORDER — PREDNISONE 20 MG/1
40 TABLET ORAL DAILY
Qty: 8 TABLET | Refills: 0 | Status: SHIPPED | OUTPATIENT
Start: 2024-09-25 | End: 2024-09-29

## 2024-09-25 RX ORDER — ARFORMOTEROL TARTRATE 15 UG/2ML
1 SOLUTION RESPIRATORY (INHALATION) 2 TIMES DAILY
Qty: 120 ML | Refills: 3 | Status: SHIPPED | OUTPATIENT
Start: 2024-09-25

## 2024-09-25 RX ADMIN — ALBUTEROL SULFATE 2.5 MG: 2.5 SOLUTION RESPIRATORY (INHALATION) at 11:32

## 2024-09-25 RX ADMIN — ALBUTEROL SULFATE 2.5 MG: 2.5 SOLUTION RESPIRATORY (INHALATION) at 07:56

## 2024-09-25 RX ADMIN — PREDNISONE 40 MG: 20 TABLET ORAL at 09:40

## 2024-09-25 RX ADMIN — ROSUVASTATIN CALCIUM 10 MG: 10 TABLET, FILM COATED ORAL at 09:39

## 2024-09-25 RX ADMIN — ENOXAPARIN SODIUM 40 MG: 100 INJECTION SUBCUTANEOUS at 09:40

## 2024-09-25 RX ADMIN — POLYETHYLENE GLYCOL 3350 17 G: 17 POWDER, FOR SOLUTION ORAL at 09:39

## 2024-09-25 RX ADMIN — PANTOPRAZOLE SODIUM 40 MG: 40 TABLET, DELAYED RELEASE ORAL at 09:40

## 2024-09-25 RX ADMIN — ASPIRIN 81 MG: 81 TABLET, COATED ORAL at 09:49

## 2024-09-25 RX ADMIN — RANOLAZINE 500 MG: 500 TABLET, FILM COATED, EXTENDED RELEASE ORAL at 09:40

## 2024-09-25 RX ADMIN — Medication 10 ML: at 09:40

## 2024-09-25 RX ADMIN — DILTIAZEM HYDROCHLORIDE 240 MG: 120 CAPSULE, EXTENDED RELEASE ORAL at 09:44

## 2024-09-25 RX ADMIN — TIOTROPIUM BROMIDE INHALATION SPRAY 2 PUFF: 3.12 SPRAY, METERED RESPIRATORY (INHALATION) at 07:56

## 2024-09-25 RX ADMIN — ALBUTEROL SULFATE 2.5 MG: 2.5 SOLUTION RESPIRATORY (INHALATION) at 15:49

## 2024-09-25 RX ADMIN — DULOXETINE HYDROCHLORIDE 60 MG: 30 CAPSULE, DELAYED RELEASE ORAL at 09:39

## 2024-09-25 RX ADMIN — ARFORMOTEROL TARTRATE: 15 SOLUTION RESPIRATORY (INHALATION) at 07:56

## 2024-09-25 ASSESSMENT — ENCOUNTER SYMPTOMS
ALLERGIC/IMMUNOLOGIC NEGATIVE: 1
EYES NEGATIVE: 1
GASTROINTESTINAL NEGATIVE: 1
SHORTNESS OF BREATH: 1

## 2024-09-26 ENCOUNTER — CARE COORDINATION (OUTPATIENT)
Dept: CARE COORDINATION | Age: 69
End: 2024-09-26

## 2024-09-26 ENCOUNTER — CARE COORDINATION (OUTPATIENT)
Dept: CASE MANAGEMENT | Age: 69
End: 2024-09-26

## 2024-09-26 RX ORDER — PERPHENAZINE/AMITRIPTYLINE HCL 4 MG-25 MG
1 TABLET ORAL DAILY
COMMUNITY

## 2024-09-28 LAB — 25(OH)D3 SERPL-MCNC: 40 NG/ML (ref 30–149.9)

## 2024-09-29 NOTE — PROGRESS NOTES
(90 Base) MCG/ACT inhaler Inhale 2 puffs into the lungs every 6 hours as needed for Wheezing 18 g 3    fluticasone-salmeterol (WIXELA INHUB) 500-50 MCG/ACT AEPB diskus inhaler Inhale 1 puff into the lungs in the morning and 1 puff in the evening. 180 each 1    aspirin (ASPIRIN LOW DOSE) 81 MG EC tablet TAKE 1 TABLET EVERY OTHER  DAY. (Patient taking differently: Take 1 tablet by mouth Two times a week) 45 tablet 1    dulaglutide (TRULICITY) 0.75 MG/0.5ML SOPN SC injection INJECT 0.75 MG SUBCUTANEOUSLY ONE TIME PER WEEK 6 mL 1    DULoxetine (CYMBALTA) 60 MG extended release capsule Take 1 capsule by mouth daily      docusate sodium (COLACE) 100 MG capsule Take 1 capsule by mouth 2 times daily 180 capsule 1    rosuvastatin (CRESTOR) 10 MG tablet Take 1 tablet by mouth daily 90 tablet 1    fluticasone (FLONASE) 50 MCG/ACT nasal spray USE 2 SPRAYS NASALLY DAILY 48 g 1    tiotropium (SPIRIVA) 18 MCG inhalation capsule INHALE THE CONTENTS OF ONE CAPSULE DAILY 90 capsule 1    albuterol (PROVENTIL) (2.5 MG/3ML) 0.083% nebulizer solution Take 3 mLs by nebulization 4 times daily as needed for Wheezing or Shortness of Breath 360 mL 3    ranolazine (RANEXA) 500 MG extended release tablet Take 1 tablet by mouth 2 times daily 180 tablet 3    Respiratory Therapy Supplies (NEBULIZER/TUBING/MOUTHPIECE) KIT 1 kit by Does not apply route daily as needed (for shortness of breath or wheezing) 1 kit 0    amitriptyline (ELAVIL) 10 MG tablet Take 1 tablet by mouth nightly Taking at dinnertime      Plecanatide (TRULANCE) 3 MG TABS Take 1 tablet by mouth daily 90 tablet 1    nitroGLYCERIN (NITRODUR) 0.4 MG/HR Place 1 patch onto the skin daily 90 patch 3    gabapentin (NEURONTIN) 300 MG capsule Take 2 capsules by mouth at bedtime for 180 days. (Patient taking differently: Take 1 capsule by mouth at bedtime.) 180 capsule 1    diclofenac sodium (VOLTAREN) 1 % GEL APPLY 2 GRAMS TOPICALLY 4  TIMES A  g 0    pantoprazole (PROTONIX) 40 MG

## 2024-10-02 ENCOUNTER — OFFICE VISIT (OUTPATIENT)
Dept: PRIMARY CARE CLINIC | Age: 69
End: 2024-10-02

## 2024-10-02 VITALS
HEART RATE: 100 BPM | OXYGEN SATURATION: 94 % | RESPIRATION RATE: 16 BRPM | SYSTOLIC BLOOD PRESSURE: 122 MMHG | TEMPERATURE: 97.8 F | DIASTOLIC BLOOD PRESSURE: 84 MMHG

## 2024-10-02 DIAGNOSIS — Z91.81 AT RISK FOR FALLS: ICD-10-CM

## 2024-10-02 DIAGNOSIS — Z09 HOSPITAL DISCHARGE FOLLOW-UP: ICD-10-CM

## 2024-10-02 DIAGNOSIS — R26.89 BALANCE DISORDER: ICD-10-CM

## 2024-10-02 DIAGNOSIS — R53.1 GENERALIZED WEAKNESS: ICD-10-CM

## 2024-10-02 DIAGNOSIS — J45.40 MODERATE PERSISTENT ASTHMA WITHOUT COMPLICATION: Primary | ICD-10-CM

## 2024-10-02 DIAGNOSIS — E11.42 TYPE 2 DIABETES MELLITUS WITH DIABETIC POLYNEUROPATHY, WITHOUT LONG-TERM CURRENT USE OF INSULIN (HCC): ICD-10-CM

## 2024-10-02 DIAGNOSIS — K59.00 CONSTIPATION, UNSPECIFIED CONSTIPATION TYPE: ICD-10-CM

## 2024-10-02 DIAGNOSIS — R30.0 DYSURIA: ICD-10-CM

## 2024-10-02 ASSESSMENT — ENCOUNTER SYMPTOMS
NAUSEA: 0
VOMITING: 0
WHEEZING: 0
SHORTNESS OF BREATH: 1
ABDOMINAL PAIN: 0
DIARRHEA: 0
BLOOD IN STOOL: 0
CONSTIPATION: 1
RHINORRHEA: 0
CHEST TIGHTNESS: 1
SORE THROAT: 0
SINUS PRESSURE: 0
COUGH: 0

## 2024-10-03 ENCOUNTER — TELEPHONE (OUTPATIENT)
Dept: PRIMARY CARE CLINIC | Age: 69
End: 2024-10-03

## 2024-10-03 NOTE — TELEPHONE ENCOUNTER
Brooke, PT with [facility], called to report that pt will have PT twice a week for 4 weeks. Pt will also have OT and social work evaluations next week. PT also reported that pt is weak, shaky, and was advised she needs to be using her walker

## 2024-10-09 ENCOUNTER — CARE COORDINATION (OUTPATIENT)
Dept: CARE COORDINATION | Age: 69
End: 2024-10-09

## 2024-10-09 NOTE — CARE COORDINATION
Ambulatory Care Coordination Note     10/9/2024 10:31 AM     Patient Current Location:  Home: [address] [place], [place] [id number]     ACM contacted the patient by telephone. Verified name and  with patient as identifiers.         ACM: Christine Martel RN     Challenges to be reviewed by the provider   Additional needs identified to be addressed with provider No  none               Method of communication with provider: chart routing.    Has the patient been seen in the ED since your last call? no    Care Summary Note:  ACM completed follow up call with patient. ACM reviewed Hrs metrics with patient at this time. ACM and patient reviewed upcoming appts. Patient said she needs to reschedule PCP follow up.     Offered patient enrollment in the Remote Patient Monitoring (RPM) program for in-home monitoring: Yes, patient enrolled; current status is activated and monitoring.     Assessments Completed:   Care Coordination Interventions    Referral from Primary Care Provider: No  Suggested Interventions and Community Resources  Zone Management Tools: In Process (Comment: COPD zone)          Medications Reviewed:   Completed during this call    Advance Care Planning:   Reviewed during previous call      Care Planning:   Not completed during this call    PCP/Specialist follow up:   Future Appointments         Provider Specialty Dept Phone    10/17/2024 10:40 AM Lul Thompson MD Pulmonology [phone]    10/17/2024 1:00 PM Raisa Espitia MD Primary Care [phone]    10/30/2024 1:45 PM SCHEDULE, [facility] Endocrinology [phone]    3/19/2025 1:40 PM Ramone Leslie MD Cardiology [phone]    2025 10:00 AM (Arrive by 9:45 AM) DEXA [facility] Radiology [phone]    2025 10:20 AM Paulo Roque MD Endocrinology [phone]            Follow Up:   Plan for next AC outreach in approximately 2 weeks to complete:  - CC Protocol assessments  - disease specific assessments  -

## 2024-10-10 DIAGNOSIS — E11.9 TYPE 2 DIABETES MELLITUS WITHOUT COMPLICATIONS (HCC): ICD-10-CM

## 2024-10-10 RX ORDER — GLUCOSAM/CHON-MSM1/C/MANG/BOSW 500-416.6
1 TABLET ORAL DAILY
Qty: 100 EACH | Refills: 1 | Status: SHIPPED | OUTPATIENT
Start: 2024-10-10

## 2024-10-10 RX ORDER — BLOOD SUGAR DIAGNOSTIC
100 STRIP MISCELLANEOUS 2 TIMES DAILY
Qty: 100 EACH | Refills: 1 | Status: SHIPPED | OUTPATIENT
Start: 2024-10-10

## 2024-10-10 NOTE — TELEPHONE ENCOUNTER
Medication Refill Requests    *Patient out-of-diabetic check supplies for blood sugar checks  (Lancets, strips)    Please send to:  [facility] [place] [place] - [facility] CHARAN Fuentes - P [phone] - F [phone]

## 2024-10-11 ENCOUNTER — TELEPHONE (OUTPATIENT)
Dept: PRIMARY CARE CLINIC | Age: 69
End: 2024-10-11

## 2024-10-11 DIAGNOSIS — J45.40 MODERATE PERSISTENT ASTHMA WITHOUT COMPLICATION: Primary | ICD-10-CM

## 2024-10-17 ENCOUNTER — OFFICE VISIT (OUTPATIENT)
Dept: PULMONOLOGY | Age: 69
End: 2024-10-17
Payer: MEDICARE

## 2024-10-17 VITALS
BODY MASS INDEX: 31.1 KG/M2 | SYSTOLIC BLOOD PRESSURE: 122 MMHG | HEIGHT: 62 IN | DIASTOLIC BLOOD PRESSURE: 74 MMHG | OXYGEN SATURATION: 94 % | WEIGHT: 169 LBS | HEART RATE: 92 BPM

## 2024-10-17 DIAGNOSIS — J45.40 MODERATE PERSISTENT ASTHMA WITHOUT COMPLICATION: Primary | ICD-10-CM

## 2024-10-17 PROCEDURE — 3078F DIAST BP <80 MM HG: CPT | Performed by: INTERNAL MEDICINE

## 2024-10-17 PROCEDURE — 3017F COLORECTAL CA SCREEN DOC REV: CPT | Performed by: INTERNAL MEDICINE

## 2024-10-17 PROCEDURE — G8482 FLU IMMUNIZE ORDER/ADMIN: HCPCS | Performed by: INTERNAL MEDICINE

## 2024-10-17 PROCEDURE — 3074F SYST BP LT 130 MM HG: CPT | Performed by: INTERNAL MEDICINE

## 2024-10-17 PROCEDURE — 1090F PRES/ABSN URINE INCON ASSESS: CPT | Performed by: INTERNAL MEDICINE

## 2024-10-17 PROCEDURE — 1111F DSCHRG MED/CURRENT MED MERGE: CPT | Performed by: INTERNAL MEDICINE

## 2024-10-17 PROCEDURE — 1123F ACP DISCUSS/DSCN MKR DOCD: CPT | Performed by: INTERNAL MEDICINE

## 2024-10-17 PROCEDURE — G8417 CALC BMI ABV UP PARAM F/U: HCPCS | Performed by: INTERNAL MEDICINE

## 2024-10-17 PROCEDURE — 1036F TOBACCO NON-USER: CPT | Performed by: INTERNAL MEDICINE

## 2024-10-17 PROCEDURE — G8427 DOCREV CUR MEDS BY ELIG CLIN: HCPCS | Performed by: INTERNAL MEDICINE

## 2024-10-17 PROCEDURE — 99213 OFFICE O/P EST LOW 20 MIN: CPT | Performed by: INTERNAL MEDICINE

## 2024-10-17 PROCEDURE — G8399 PT W/DXA RESULTS DOCUMENT: HCPCS | Performed by: INTERNAL MEDICINE

## 2024-10-17 RX ORDER — POLYETHYLENE GLYCOL 3350 17 G/17G
17 POWDER, FOR SOLUTION ORAL DAILY PRN
COMMUNITY

## 2024-10-17 NOTE — PROGRESS NOTES
[facility] Pulmonary and Critical Care    Outpatient Follow Up Note    Subjective:   CHIEF COMPLAINT / HPI:     The patient is 69 y.o. female is here for hospital follow-up of generalized weakness and shortness of breath at [place] 23 to 25.  She had a CTPA that was normal.  She was given empiric treatment of asthma exacerbation with prednisone and she states that helped.  Oxygen saturation was normal her entire hospitalization.  Currently her oxygen saturation is 94% and she denies any significant dyspnea exertion, cough, wheezing, or chest tightness.  She did mention her O2 sat dropped to 88% last week and was associated with some weakness.     8/5/2024   Tatiana is here for follow-up of asthma.  I just saw her July 22 and she has had no ER visits since then.  I am not quite sure why she is back to see me so soon.  My previous note July 22nd I mentioned a 6-month follow-up.  When I asked her what was bothering her she talked about dizziness and leg pain.  She recently saw Dr. Espitia for similar symptoms and she had her resume gabapentin.  She is under the care of neurology for this as well.  There does not appear to be any change in her respiratory complaints.  She does still talk about her oxygen saturation drops to 9192%.  Here it is 96%.  She appears to be breathing very comfortably and has no audible wheezing.    7/22/2024  Tatiana is here for evaluation of asthma.  Since last visit she presented to [facility] emergency room for evaluation of chest pain.  No abnormalities were found on imaging or blood work.  She was discharged home.  She does state her dyspnea is controlled by nebulizer and is asking for more albuterol solution.  She finds it better than her MDI.  She continues on Advair, Spiriva, and Singulair.  She is planning to go to [place] in August but is nervous as she states the healthcare in [place] is not good    4/22/2024  Nuraudelkashmir is here for follow-up of continued

## 2024-10-18 ENCOUNTER — TELEPHONE (OUTPATIENT)
Dept: ENDOCRINOLOGY | Age: 69
End: 2024-10-18

## 2024-10-22 ENCOUNTER — CARE COORDINATION (OUTPATIENT)
Dept: CARE COORDINATION | Age: 69
End: 2024-10-22

## 2024-10-22 LAB — DIABETIC RETINOPATHY: NEGATIVE

## 2024-10-23 ENCOUNTER — CARE COORDINATION (OUTPATIENT)
Dept: CARE COORDINATION | Age: 69
End: 2024-10-23

## 2024-10-23 ENCOUNTER — TELEPHONE (OUTPATIENT)
Dept: PRIMARY CARE CLINIC | Age: 69
End: 2024-10-23

## 2024-10-23 DIAGNOSIS — R26.89 BALANCE DISORDER: ICD-10-CM

## 2024-10-23 DIAGNOSIS — R53.1 GENERALIZED WEAKNESS: Primary | ICD-10-CM

## 2024-10-23 DIAGNOSIS — Z91.81 AT RISK FOR FALLS: ICD-10-CM

## 2024-10-23 SDOH — HEALTH STABILITY: PHYSICAL HEALTH: ON AVERAGE, HOW MANY DAYS PER WEEK DO YOU ENGAGE IN MODERATE TO STRENUOUS EXERCISE (LIKE A BRISK WALK)?: 0 DAYS

## 2024-10-23 SDOH — SOCIAL STABILITY: SOCIAL NETWORK
DO YOU BELONG TO ANY CLUBS OR ORGANIZATIONS SUCH AS CHURCH GROUPS UNIONS, FRATERNAL OR ATHLETIC GROUPS, OR SCHOOL GROUPS?: YES

## 2024-10-23 SDOH — HEALTH STABILITY: MENTAL HEALTH: HOW OFTEN DO YOU HAVE A DRINK CONTAINING ALCOHOL?: NEVER

## 2024-10-23 SDOH — HEALTH STABILITY: PHYSICAL HEALTH: ON AVERAGE, HOW MANY MINUTES DO YOU ENGAGE IN EXERCISE AT THIS LEVEL?: 0 MIN

## 2024-10-23 SDOH — HEALTH STABILITY: MENTAL HEALTH
STRESS IS WHEN SOMEONE FEELS TENSE, NERVOUS, ANXIOUS, OR CAN'T SLEEP AT NIGHT BECAUSE THEIR MIND IS TROUBLED. HOW STRESSED ARE YOU?: ONLY A LITTLE

## 2024-10-23 SDOH — SOCIAL STABILITY: SOCIAL NETWORK: ARE YOU MARRIED, WIDOWED, DIVORCED, SEPARATED, NEVER MARRIED, OR LIVING WITH A PARTNER?: DIVORCED

## 2024-10-23 SDOH — HEALTH STABILITY: MENTAL HEALTH: HOW MANY STANDARD DRINKS CONTAINING ALCOHOL DO YOU HAVE ON A TYPICAL DAY?: PATIENT DOES NOT DRINK

## 2024-10-23 SDOH — ECONOMIC STABILITY: INCOME INSECURITY: IN THE LAST 12 MONTHS, WAS THERE A TIME WHEN YOU WERE NOT ABLE TO PAY THE MORTGAGE OR RENT ON TIME?: NO

## 2024-10-23 SDOH — SOCIAL STABILITY: SOCIAL NETWORK: HOW OFTEN DO YOU ATTEND CHURCH OR RELIGIOUS SERVICES?: NEVER

## 2024-10-23 SDOH — ECONOMIC STABILITY: FOOD INSECURITY: WITHIN THE PAST 12 MONTHS, YOU WORRIED THAT YOUR FOOD WOULD RUN OUT BEFORE YOU GOT MONEY TO BUY MORE.: NEVER TRUE

## 2024-10-23 SDOH — ECONOMIC STABILITY: INCOME INSECURITY: HOW HARD IS IT FOR YOU TO PAY FOR THE VERY BASICS LIKE FOOD, HOUSING, MEDICAL CARE, AND HEATING?: NOT HARD AT ALL

## 2024-10-23 SDOH — ECONOMIC STABILITY: FOOD INSECURITY: WITHIN THE PAST 12 MONTHS, THE FOOD YOU BOUGHT JUST DIDN'T LAST AND YOU DIDN'T HAVE MONEY TO GET MORE.: NEVER TRUE

## 2024-10-23 SDOH — SOCIAL STABILITY: SOCIAL NETWORK: IN A TYPICAL WEEK, HOW MANY TIMES DO YOU TALK ON THE PHONE WITH FAMILY, FRIENDS, OR NEIGHBORS?: ONCE A WEEK

## 2024-10-23 SDOH — SOCIAL STABILITY: SOCIAL NETWORK: HOW OFTEN DO YOU ATTENT MEETINGS OF THE CLUB OR ORGANIZATION YOU BELONG TO?: NEVER

## 2024-10-23 SDOH — SOCIAL STABILITY: SOCIAL NETWORK: HOW OFTEN DO YOU GET TOGETHER WITH FRIENDS OR RELATIVES?: ONCE A WEEK

## 2024-10-23 NOTE — CARE COORDINATION
Ambulatory Care Coordination Note     10/22/2024 2:47 PM     Patient Current Location:  [place]     ACM contacted the patient by telephone. Verified name and  with patient as identifiers.         ACM: Elizabeth Monreal RN     Challenges to be reviewed by the provider   Additional needs identified to be addressed with provider No  none               Method of communication with provider: none.    Has the patient been seen in the ED since your last call? no    Care Summary Note: ACM made outreach to f/u with patient.  Patient reported that she was at the eye doctor and asked that ACM call her back tomorrow.  ACM agreed and will call pt tomorrow.    Offered patient enrollment in the Remote Patient Monitoring (RPM) program for in-home monitoring: Yes, patient enrolled; current status is activated and monitoring.     Date BP Pulse Ox   10/22/2024 120/71/84  (8:52 AM) 95/79  (8:53 AM)   10/21/2024 127/87/82  (9:47 AM) 96/82  (9:47 AM)   10/18/2024 125/80/81  (12:18 PM) 97/83  (12:17 PM)   10/17/2024 124/81/90  (8:53 AM) 94/95  (8:52 AM)   10/16/2024 134/89/84  (8:58 AM) 97/91  (8:57 AM)   10/15/2024 124/70/84  (9:30 AM) 92/95  (10:08 PM)  96/91  (9:29 AM)   10/14/2024 124/79/85  (8:45 AM) 96/84  (8:44 AM)   10/13/2024 108/68/86  (10:14 AM) 94/95  (10:13 AM)       PCP/Specialist follow up:   Future Appointments         Provider Specialty Dept Phone    10/30/2024 1:45 PM SCHEDULE, [facility] Endocrinology [phone]    2024 2:00 PM Raisa Espitia MD Primary Care [phone]    2/3/2025 2:00 PM Lul Thompson MD Pulmonology [phone]    3/19/2025 1:40 PM Ramone Leslie MD Cardiology [phone]    2025 10:00 AM (Arrive by 9:45 AM) DEXA [facility] Radiology [phone]    2025 10:20 AM Paulo Roque MD Endocrinology [phone]            Follow Up:   Plan for next [facility] outreach in approximately 1-2 days  to complete:  - CC Protocol assessments  - disease specific 
VS metrics   Confidence: 8/10  Anticipated Goal Completion Date: 11/17/24                 PCP/Specialist follow up:   Future Appointments         Provider Specialty Dept Phone    10/30/2024 1:45 PM SCHEDULE, [facility] Endocrinology [phone]    11/14/2024 2:00 PM Raisa Espitia MD Primary Care [phone]    2/3/2025 2:00 PM Lul Thompson MD Pulmonology [phone]    3/19/2025 1:40 PM Ramone Leslie MD Cardiology [phone]    5/13/2025 10:00 AM (Arrive by 9:45 AM) DEXA [facility] Radiology [phone]    5/13/2025 10:20 AM Paulo Roque MD Endocrinology [phone]            Follow Up:   Plan for next ACM outreach in approximately 1 week to complete:  - disease specific assessments  - advance care planning   - goal progression  - education   - RPM  - DME f/u, C PT eval for Rolator .   Patient  is agreeable to this plan.       Elizabeth DOWELLN, RN     Ambulatory Care Manager    [facility]    Phone: [phone]    [email or web address]

## 2024-10-23 NOTE — TELEPHONE ENCOUNTER
Karen dejesus the physical therapist call for the patient and requested Dr Espitia to order for 4 wheels roll ator walker .    She requested to fax the order to the following    [facility]    Fax--[phone]    Pl review and advice    thanks

## 2024-10-24 PROBLEM — Z23 NEED FOR INFLUENZA VACCINATION: Status: RESOLVED | Noted: 2024-09-24 | Resolved: 2024-10-24

## 2024-10-25 ENCOUNTER — HOSPITAL ENCOUNTER (OUTPATIENT)
Age: 69
Setting detail: OBSERVATION
Discharge: HOME | End: 2024-10-27
Attending: EMERGENCY MEDICINE | Admitting: HOSPITALIST
Payer: MEDICARE

## 2024-10-25 ENCOUNTER — APPOINTMENT (OUTPATIENT)
Age: 69
End: 2024-10-25
Payer: MEDICARE

## 2024-10-25 ENCOUNTER — CARE COORDINATION (OUTPATIENT)
Dept: OTHER | Facility: CLINIC | Age: 69
End: 2024-10-25

## 2024-10-25 DIAGNOSIS — R00.2 PALPITATIONS: ICD-10-CM

## 2024-10-25 DIAGNOSIS — R07.9 CHEST PAIN, UNSPECIFIED TYPE: Primary | ICD-10-CM

## 2024-10-25 DIAGNOSIS — R55 NEAR SYNCOPE: ICD-10-CM

## 2024-10-25 LAB
ALBUMIN SERPL-MCNC: 4.5 G/DL (ref 3.4–5)
ALBUMIN/GLOB SERPL: 1.9 {RATIO}
ALP SERPL-CCNC: 73 U/L (ref 40–129)
ALT SERPL-CCNC: 30 U/L (ref 10–40)
ANION GAP SERPL CALCULATED.3IONS-SCNC: 12 MMOL/L (ref 3–16)
AST SERPL-CCNC: 23 U/L (ref 15–37)
BASOPHILS # BLD: 0.02 K/UL (ref 0–0.2)
BASOPHILS NFR BLD: 0 %
BILIRUB SERPL-MCNC: 0.5 MG/DL (ref 0–1)
BNP SERPL-MCNC: 86 PG/ML (ref 0–124)
BUN SERPL-MCNC: 19 MG/DL (ref 7–20)
CALCIUM SERPL-MCNC: 9.2 MG/DL (ref 8.3–10.6)
CHLORIDE SERPL-SCNC: 106 MMOL/L (ref 99–110)
CO2 SERPL-SCNC: 22 MMOL/L (ref 21–32)
CREAT SERPL-MCNC: 0.8 MG/DL (ref 0.6–1.2)
D DIMER PPP FEU-MCNC: <0.27 UG/ML FEU (ref 0–0.6)
EKG ATRIAL RATE: 90 BPM
EKG DIAGNOSIS: NORMAL
EKG P AXIS: 48 DEGREES
EKG P-R INTERVAL: 154 MS
EKG Q-T INTERVAL: 382 MS
EKG QRS DURATION: 76 MS
EKG QTC CALCULATION (BAZETT): 467 MS
EKG R AXIS: 66 DEGREES
EKG T AXIS: 39 DEGREES
EKG VENTRICULAR RATE: 90 BPM
EOSINOPHIL # BLD: 0.04 K/UL (ref 0–0.6)
EOSINOPHILS RELATIVE PERCENT: 1 %
ERYTHROCYTE [DISTWIDTH] IN BLOOD BY AUTOMATED COUNT: 13.5 % (ref 12.4–15.4)
GFR, ESTIMATED: 75 ML/MIN/1.73M2
GLUCOSE BLD-MCNC: 142 MG/DL (ref 70–99)
GLUCOSE SERPL-MCNC: 168 MG/DL (ref 70–99)
HCT VFR BLD AUTO: 44.5 % (ref 36–48)
HGB BLD-MCNC: 15 G/DL (ref 12–16)
IMM GRANULOCYTES # BLD AUTO: 0.13 K/UL (ref 0–0.5)
IMM GRANULOCYTES NFR BLD: 2 %
LYMPHOCYTES NFR BLD: 1.86 K/UL (ref 1–5.1)
LYMPHOCYTES RELATIVE PERCENT: 26 %
MCH RBC QN AUTO: 29.5 PG (ref 26–34)
MCHC RBC AUTO-ENTMCNC: 33.7 G/DL (ref 31–36)
MCV RBC AUTO: 87.6 FL (ref 80–100)
MONOCYTES NFR BLD: 0.54 K/UL (ref 0–1.3)
MONOCYTES NFR BLD: 8 %
NEUTROPHILS NFR BLD: 64 %
NEUTS SEG NFR BLD: 4.54 K/UL (ref 1.7–7.7)
PLATELET # BLD AUTO: 262 K/UL (ref 135–450)
PMV BLD AUTO: 9.6 FL (ref 9.4–12.4)
POTASSIUM SERPL-SCNC: 4.1 MMOL/L (ref 3.5–5.1)
PROT SERPL-MCNC: 6.8 G/DL (ref 6.4–8.2)
RBC # BLD AUTO: 5.08 M/UL (ref 4–5.2)
SODIUM SERPL-SCNC: 140 MMOL/L (ref 136–145)
TROPONIN I SERPL HS-MCNC: 11 NG/L (ref 0–14)
TROPONIN I SERPL HS-MCNC: 9 NG/L (ref 0–14)
TROPONIN I SERPL HS-MCNC: 9 NG/L (ref 0–14)
TSH SERPL DL<=0.05 MIU/L-ACNC: 1.13 UIU/ML (ref 0.27–4.2)
WBC OTHER # BLD: 7.1 K/UL (ref 4–11)

## 2024-10-25 PROCEDURE — 80053 COMPREHEN METABOLIC PANEL: CPT

## 2024-10-25 PROCEDURE — 2580000003 HC RX 258: Performed by: NURSE PRACTITIONER

## 2024-10-25 PROCEDURE — 96372 THER/PROPH/DIAG INJ SC/IM: CPT

## 2024-10-25 PROCEDURE — 99285 EMERGENCY DEPT VISIT HI MDM: CPT

## 2024-10-25 PROCEDURE — 6370000000 HC RX 637 (ALT 250 FOR IP): Performed by: NURSE PRACTITIONER

## 2024-10-25 PROCEDURE — 6360000002 HC RX W HCPCS: Performed by: NURSE PRACTITIONER

## 2024-10-25 PROCEDURE — G0378 HOSPITAL OBSERVATION PER HR: HCPCS

## 2024-10-25 PROCEDURE — 99222 1ST HOSP IP/OBS MODERATE 55: CPT | Performed by: INTERNAL MEDICINE

## 2024-10-25 PROCEDURE — 94640 AIRWAY INHALATION TREATMENT: CPT

## 2024-10-25 PROCEDURE — 83880 ASSAY OF NATRIURETIC PEPTIDE: CPT

## 2024-10-25 PROCEDURE — 71045 X-RAY EXAM CHEST 1 VIEW: CPT

## 2024-10-25 PROCEDURE — 93010 ELECTROCARDIOGRAM REPORT: CPT | Performed by: INTERNAL MEDICINE

## 2024-10-25 PROCEDURE — 74018 RADEX ABDOMEN 1 VIEW: CPT

## 2024-10-25 PROCEDURE — 85025 COMPLETE CBC W/AUTO DIFF WBC: CPT

## 2024-10-25 PROCEDURE — 82962 GLUCOSE BLOOD TEST: CPT

## 2024-10-25 PROCEDURE — 36415 COLL VENOUS BLD VENIPUNCTURE: CPT

## 2024-10-25 PROCEDURE — 84484 ASSAY OF TROPONIN QUANT: CPT

## 2024-10-25 PROCEDURE — 93005 ELECTROCARDIOGRAM TRACING: CPT | Performed by: EMERGENCY MEDICINE

## 2024-10-25 PROCEDURE — 85379 FIBRIN DEGRADATION QUANT: CPT

## 2024-10-25 PROCEDURE — 84443 ASSAY THYROID STIM HORMONE: CPT

## 2024-10-25 RX ORDER — DULOXETIN HYDROCHLORIDE 30 MG/1
60 CAPSULE, DELAYED RELEASE ORAL DAILY
Status: DISCONTINUED | OUTPATIENT
Start: 2024-10-25 | End: 2024-10-27 | Stop reason: HOSPADM

## 2024-10-25 RX ORDER — SODIUM CHLORIDE 0.9 % (FLUSH) 0.9 %
5-40 SYRINGE (ML) INJECTION EVERY 12 HOURS SCHEDULED
Status: DISCONTINUED | OUTPATIENT
Start: 2024-10-25 | End: 2024-10-27 | Stop reason: HOSPADM

## 2024-10-25 RX ORDER — GABAPENTIN 300 MG/1
300 CAPSULE ORAL NIGHTLY
Status: DISCONTINUED | OUTPATIENT
Start: 2024-10-25 | End: 2024-10-27 | Stop reason: HOSPADM

## 2024-10-25 RX ORDER — FLUTICASONE PROPIONATE 50 MCG
2 SPRAY, SUSPENSION (ML) NASAL DAILY
Status: DISCONTINUED | OUTPATIENT
Start: 2024-10-25 | End: 2024-10-27 | Stop reason: HOSPADM

## 2024-10-25 RX ORDER — AMITRIPTYLINE HYDROCHLORIDE 10 MG/1
10 TABLET ORAL NIGHTLY
Status: DISCONTINUED | OUTPATIENT
Start: 2024-10-25 | End: 2024-10-27 | Stop reason: HOSPADM

## 2024-10-25 RX ORDER — MAGNESIUM SULFATE IN WATER 40 MG/ML
2000 INJECTION, SOLUTION INTRAVENOUS PRN
Status: DISCONTINUED | OUTPATIENT
Start: 2024-10-25 | End: 2024-10-27 | Stop reason: HOSPADM

## 2024-10-25 RX ORDER — VITAMIN B COMPLEX
2000 TABLET ORAL DAILY
Status: DISCONTINUED | OUTPATIENT
Start: 2024-10-25 | End: 2024-10-27 | Stop reason: HOSPADM

## 2024-10-25 RX ORDER — POTASSIUM CHLORIDE 7.45 MG/ML
10 INJECTION INTRAVENOUS PRN
Status: DISCONTINUED | OUTPATIENT
Start: 2024-10-25 | End: 2024-10-27 | Stop reason: HOSPADM

## 2024-10-25 RX ORDER — POLYETHYLENE GLYCOL 3350 17 G/17G
17 POWDER, FOR SOLUTION ORAL DAILY PRN
Status: DISCONTINUED | OUTPATIENT
Start: 2024-10-25 | End: 2024-10-26

## 2024-10-25 RX ORDER — POTASSIUM CHLORIDE 1500 MG/1
40 TABLET, EXTENDED RELEASE ORAL PRN
Status: DISCONTINUED | OUTPATIENT
Start: 2024-10-25 | End: 2024-10-27 | Stop reason: HOSPADM

## 2024-10-25 RX ORDER — ARFORMOTEROL TARTRATE 15 UG/2ML
15 SOLUTION RESPIRATORY (INHALATION) 2 TIMES DAILY
Status: DISCONTINUED | OUTPATIENT
Start: 2024-10-25 | End: 2024-10-25 | Stop reason: SDUPTHER

## 2024-10-25 RX ORDER — GLUCAGON 1 MG/ML
1 KIT INJECTION PRN
Status: DISCONTINUED | OUTPATIENT
Start: 2024-10-25 | End: 2024-10-27 | Stop reason: HOSPADM

## 2024-10-25 RX ORDER — ACETAMINOPHEN 325 MG/1
650 TABLET ORAL EVERY 6 HOURS PRN
Status: DISCONTINUED | OUTPATIENT
Start: 2024-10-25 | End: 2024-10-27 | Stop reason: HOSPADM

## 2024-10-25 RX ORDER — POLYETHYLENE GLYCOL 3350 17 G/17G
17 POWDER, FOR SOLUTION ORAL DAILY PRN
Status: DISCONTINUED | OUTPATIENT
Start: 2024-10-25 | End: 2024-10-25 | Stop reason: SDUPTHER

## 2024-10-25 RX ORDER — ONDANSETRON 2 MG/ML
4 INJECTION INTRAMUSCULAR; INTRAVENOUS EVERY 6 HOURS PRN
Status: DISCONTINUED | OUTPATIENT
Start: 2024-10-25 | End: 2024-10-27 | Stop reason: HOSPADM

## 2024-10-25 RX ORDER — DEXTROSE MONOHYDRATE 100 MG/ML
INJECTION, SOLUTION INTRAVENOUS CONTINUOUS PRN
Status: DISCONTINUED | OUTPATIENT
Start: 2024-10-25 | End: 2024-10-27 | Stop reason: HOSPADM

## 2024-10-25 RX ORDER — MEMANTINE HYDROCHLORIDE 5 MG/1
10 TABLET ORAL 2 TIMES DAILY
Status: DISCONTINUED | OUTPATIENT
Start: 2024-10-25 | End: 2024-10-27 | Stop reason: HOSPADM

## 2024-10-25 RX ORDER — MONTELUKAST SODIUM 10 MG/1
10 TABLET ORAL DAILY
Status: DISCONTINUED | OUTPATIENT
Start: 2024-10-25 | End: 2024-10-27 | Stop reason: HOSPADM

## 2024-10-25 RX ORDER — ASPIRIN 81 MG/1
81 TABLET ORAL
Status: DISCONTINUED | OUTPATIENT
Start: 2024-10-28 | End: 2024-10-27 | Stop reason: HOSPADM

## 2024-10-25 RX ORDER — INSULIN LISPRO 100 [IU]/ML
0-4 INJECTION, SOLUTION INTRAVENOUS; SUBCUTANEOUS
Status: DISCONTINUED | OUTPATIENT
Start: 2024-10-25 | End: 2024-10-27 | Stop reason: HOSPADM

## 2024-10-25 RX ORDER — SODIUM CHLORIDE 9 MG/ML
INJECTION, SOLUTION INTRAVENOUS PRN
Status: DISCONTINUED | OUTPATIENT
Start: 2024-10-25 | End: 2024-10-27 | Stop reason: HOSPADM

## 2024-10-25 RX ORDER — ONDANSETRON 4 MG/1
4 TABLET, ORALLY DISINTEGRATING ORAL EVERY 8 HOURS PRN
Status: DISCONTINUED | OUTPATIENT
Start: 2024-10-25 | End: 2024-10-27 | Stop reason: HOSPADM

## 2024-10-25 RX ORDER — DILTIAZEM HYDROCHLORIDE 240 MG/1
240 CAPSULE, COATED, EXTENDED RELEASE ORAL DAILY
Status: DISCONTINUED | OUTPATIENT
Start: 2024-10-25 | End: 2024-10-27 | Stop reason: HOSPADM

## 2024-10-25 RX ORDER — SODIUM CHLORIDE 0.9 % (FLUSH) 0.9 %
5-40 SYRINGE (ML) INJECTION PRN
Status: DISCONTINUED | OUTPATIENT
Start: 2024-10-25 | End: 2024-10-27 | Stop reason: HOSPADM

## 2024-10-25 RX ORDER — PERPHENAZINE/AMITRIPTYLINE HCL 4 MG-25 MG
1 TABLET ORAL DAILY
Status: DISCONTINUED | OUTPATIENT
Start: 2024-10-25 | End: 2024-10-25

## 2024-10-25 RX ORDER — DOCUSATE SODIUM 100 MG/1
200 CAPSULE, LIQUID FILLED ORAL 2 TIMES DAILY
Status: DISCONTINUED | OUTPATIENT
Start: 2024-10-25 | End: 2024-10-26 | Stop reason: SDUPTHER

## 2024-10-25 RX ORDER — ROSUVASTATIN CALCIUM 10 MG/1
10 TABLET, COATED ORAL DAILY
Status: DISCONTINUED | OUTPATIENT
Start: 2024-10-25 | End: 2024-10-27 | Stop reason: HOSPADM

## 2024-10-25 RX ORDER — ALBUTEROL SULFATE 0.83 MG/ML
2.5 SOLUTION RESPIRATORY (INHALATION) 4 TIMES DAILY PRN
Status: DISCONTINUED | OUTPATIENT
Start: 2024-10-25 | End: 2024-10-27 | Stop reason: HOSPADM

## 2024-10-25 RX ORDER — RANOLAZINE 500 MG/1
500 TABLET, EXTENDED RELEASE ORAL 2 TIMES DAILY
Status: DISCONTINUED | OUTPATIENT
Start: 2024-10-25 | End: 2024-10-27 | Stop reason: HOSPADM

## 2024-10-25 RX ORDER — ACETAMINOPHEN 650 MG/1
650 SUPPOSITORY RECTAL EVERY 6 HOURS PRN
Status: DISCONTINUED | OUTPATIENT
Start: 2024-10-25 | End: 2024-10-27 | Stop reason: HOSPADM

## 2024-10-25 RX ORDER — BISACODYL 5 MG/1
10 TABLET, DELAYED RELEASE ORAL DAILY
Status: DISCONTINUED | OUTPATIENT
Start: 2024-10-25 | End: 2024-10-26

## 2024-10-25 RX ORDER — ENOXAPARIN SODIUM 100 MG/ML
40 INJECTION SUBCUTANEOUS DAILY
Status: DISCONTINUED | OUTPATIENT
Start: 2024-10-25 | End: 2024-10-27 | Stop reason: HOSPADM

## 2024-10-25 RX ADMIN — DULOXETINE HYDROCHLORIDE 60 MG: 30 CAPSULE, DELAYED RELEASE ORAL at 18:06

## 2024-10-25 RX ADMIN — DILTIAZEM HYDROCHLORIDE 240 MG: 240 CAPSULE, EXTENDED RELEASE ORAL at 18:06

## 2024-10-25 RX ADMIN — MEMANTINE 10 MG: 5 TABLET ORAL at 20:36

## 2024-10-25 RX ADMIN — GABAPENTIN 300 MG: 300 CAPSULE ORAL at 20:36

## 2024-10-25 RX ADMIN — Medication 10 ML: at 20:37

## 2024-10-25 RX ADMIN — CHOLECALCIFEROL TAB 25 MCG (1000 UNIT) 2000 UNITS: 25 TAB at 18:06

## 2024-10-25 RX ADMIN — BISACODYL 10 MG: 5 TABLET, COATED ORAL at 18:06

## 2024-10-25 RX ADMIN — AMITRIPTYLINE HYDROCHLORIDE 10 MG: 10 TABLET, FILM COATED ORAL at 20:36

## 2024-10-25 RX ADMIN — FLUTICASONE PROPIONATE 2 SPRAY: 50 SPRAY, METERED NASAL at 18:06

## 2024-10-25 RX ADMIN — MONTELUKAST 10 MG: 10 TABLET, FILM COATED ORAL at 18:06

## 2024-10-25 RX ADMIN — ENOXAPARIN SODIUM 40 MG: 100 INJECTION SUBCUTANEOUS at 18:06

## 2024-10-25 RX ADMIN — ARFORMOTEROL TARTRATE: 15 SOLUTION RESPIRATORY (INHALATION) at 21:46

## 2024-10-25 RX ADMIN — DOCUSATE SODIUM 200 MG: 100 CAPSULE, LIQUID FILLED ORAL at 20:36

## 2024-10-25 RX ADMIN — ROSUVASTATIN CALCIUM 10 MG: 10 TABLET, FILM COATED ORAL at 18:06

## 2024-10-25 RX ADMIN — RANOLAZINE 500 MG: 500 TABLET, FILM COATED, EXTENDED RELEASE ORAL at 20:36

## 2024-10-25 RX ADMIN — ACETAMINOPHEN 650 MG: 325 TABLET ORAL at 20:42

## 2024-10-25 ASSESSMENT — PAIN DESCRIPTION - LOCATION
LOCATION: CHEST
LOCATION: HEAD

## 2024-10-25 ASSESSMENT — PAIN SCALES - GENERAL
PAINLEVEL_OUTOF10: 0
PAINLEVEL_OUTOF10: 0
PAINLEVEL_OUTOF10: 3
PAINLEVEL_OUTOF10: 6
PAINLEVEL_OUTOF10: 0

## 2024-10-25 ASSESSMENT — PAIN - FUNCTIONAL ASSESSMENT
PAIN_FUNCTIONAL_ASSESSMENT: ACTIVITIES ARE NOT PREVENTED
PAIN_FUNCTIONAL_ASSESSMENT: 0-10

## 2024-10-25 ASSESSMENT — PAIN SCALES - WONG BAKER: WONGBAKER_NUMERICALRESPONSE: NO HURT

## 2024-10-25 ASSESSMENT — PAIN DESCRIPTION - DESCRIPTORS: DESCRIPTORS: ACHING

## 2024-10-25 ASSESSMENT — PAIN DESCRIPTION - PAIN TYPE: TYPE: CHRONIC PAIN

## 2024-10-25 ASSESSMENT — PAIN DESCRIPTION - FREQUENCY: FREQUENCY: INTERMITTENT

## 2024-10-25 ASSESSMENT — PAIN DESCRIPTION - ONSET: ONSET: ON-GOING

## 2024-10-25 ASSESSMENT — PAIN DESCRIPTION - ORIENTATION: ORIENTATION: LEFT;RIGHT

## 2024-10-25 NOTE — PLAN OF CARE
Problem: Pain  Goal: Verbalizes/displays adequate comfort level or baseline comfort level  Outcome: Progressing   Patient displays adequate comfort level     Problem: Safety - Adult  Goal: Free from fall injury  Outcome: Progressing   Patient free from fall injury

## 2024-10-25 NOTE — ED NOTES
tablet Take 1 tablet by mouth 2 times daily 180 tablet 3    Respiratory Therapy Supplies (NEBULIZER/TUBING/MOUTHPIECE) KIT 1 kit by Does not apply route daily as needed (for shortness of breath or wheezing) 1 kit 0    amitriptyline (ELAVIL) 10 MG tablet Take 1 tablet by mouth nightly Taking at dinnertime      Plecanatide (TRULANCE) 3 MG TABS Take 1 tablet by mouth daily 90 tablet 1    nitroGLYCERIN (NITRODUR) 0.4 MG/HR Place 1 patch onto the skin daily 90 patch 3    gabapentin (NEURONTIN) 300 MG capsule Take 2 capsules by mouth at bedtime for 180 days. (Patient taking differently: Take 1 capsule by mouth at bedtime.) 180 capsule 1    diclofenac sodium (VOLTAREN) 1 % GEL APPLY 2 GRAMS TOPICALLY 4  TIMES A  g 0    pantoprazole (PROTONIX) 40 MG tablet TAKE 1 TABLET DAILY 90 tablet 1    montelukast (SINGULAIR) 10 MG tablet Take 1 tablet by mouth daily 90 tablet 3    vitamin D (VITAMIN D3) 50 MCG (2000 UT) CAPS capsule Take 1 capsule by mouth daily      dilTIAZem (CARDIZEM CD) 240 MG extended release capsule Take 1 capsule by mouth daily 90 capsule 2    nitroGLYCERIN (NITROSTAT) 0.4 MG SL tablet Place 1 tablet under the tongue every 5 minutes as needed for Chest pain up to max of 3 total doses. If no relief after 1 dose, call 911. 25 tablet 3    butalbital-acetaminophen-caffeine (FIORICET, ESGIC) -40 MG per tablet Take 1 tablet by mouth every 8 hours as needed for Headaches      memantine (NAMENDA) 10 MG tablet Take 1 tablet by mouth 2 times daily      Respiratory Therapy Supplies (NEBULIZER/TUBING/MOUTHPIECE) KIT 1 kit by Does not apply route daily as needed (for shortness of breath and wheezing) 1 kit 0    naratriptan (AMERGE) 2.5 MG tablet TAKE ONE TABLET BY MOUTH AT THE ONSET of HEADACHE, MAY REPEAT in FOUR hours, not more THAN TWO PER DAY OR FOUR PER WEEK      Blood Glucose Monitoring Suppl (TRUE METRIX GO GLUCOSE METER) w/Device KIT USE AS DIRECTED AND AS NEEDED 1 kit 0    denosumab (PROLIA) 60 MG/ML

## 2024-10-25 NOTE — ED PROVIDER NOTES
TRUEplus Lancets 30G MISC 1 each by Does not apply route daily  Qty: 100 each, Refills: 1    Associated Diagnoses: Type 2 diabetes mellitus without complications ([facility])      blood glucose test strips (GLUCOSE METER TEST) strip 100 each by In Vitro route 2 times daily Diabetes   e11.9  Qty: 100 each, Refills: 1      Lutein 40 MG CAPS Take 1 tablet by mouth daily      arformoterol tartrate (BROVANA) 15 MCG/2ML NEBU Take 1 ampule by nebulization 2 times daily  Qty: 120 mL, Refills: 3      bisacodyl (DULCOLAX) 5 MG EC tablet Take 1 tablet by mouth daily as needed for Constipation  Qty: 30 tablet, Refills: 0    Associated Diagnoses: Constipation, unspecified constipation type      albuterol sulfate HFA (PROVENTIL HFA) 108 (90 Base) MCG/ACT inhaler Inhale 2 puffs into the lungs every 6 hours as needed for Wheezing  Qty: 18 g, Refills: 3      fluticasone-salmeterol (WIXELA INHUB) 500-50 MCG/ACT AEPB diskus inhaler Inhale 1 puff into the lungs in the morning and 1 puff in the evening.  Qty: 180 each, Refills: 1    Associated Diagnoses: Moderate persistent asthma without complication      aspirin (ASPIRIN LOW DOSE) 81 MG EC tablet TAKE 1 TABLET EVERY OTHER  DAY.  Qty: 45 tablet, Refills: 1    Comments: It is ok to fill aspirin. Patient gets bruising so it was decreased from daily to every other day.      dulaglutide (TRULICITY) 0.75 MG/0.5ML SOPN SC injection INJECT 0.75 MG SUBCUTANEOUSLY ONE TIME PER WEEK  Qty: 6 mL, Refills: 1    Associated Diagnoses: Type 2 diabetes mellitus with diabetic polyneuropathy, without long-term current use of insulin ([facility])      DULoxetine (CYMBALTA) 60 MG extended release capsule Take 1 capsule by mouth daily      docusate sodium (COLACE) 100 MG capsule Take 1 capsule by mouth 2 times daily  Qty: 180 capsule, Refills: 1    Associated Diagnoses: Constipation, unspecified constipation type      rosuvastatin (CRESTOR) 10 MG tablet Take 1 tablet by mouth daily  Qty: 90 tablet, Refills: 1

## 2024-10-25 NOTE — CARE COORDINATION
-Remote Alert Monitoring Note      Date/Time:  10/25/2024 1:35 PM  Patient Current Location: Home: [address] [id number]  Verified patients name and  as identifiers.    Rpm alert to be reviewed by the provider   red alert  pulse ox reading (90)  Vitals Recheck pulse ox reading (94/95)  Additional needs to be addressed by provider: No                   ACM contacted patient by telephone regarding red alert received   Background: HTN, Asthma  Refer to 911 immediately if:  Patient unresponsive or unable to provide history  Change in cognition or sudden confusion  Patient unable to respond in complete sentences  Intense chest pain/tightness  Any concern for any clinical emergency  Red Alert: Provider response time of 1 hr required for any red alert requiring intervention  Yellow Alert: Provider response time of 3hr required for any escalated yellow alert  Patient Chief Complaint:  Pulse Ox Triage  Were your hands cold when you used your pulse oximeter today? no   Are your hands typically cold? no   Have tried warming your hands and rechecking your SpO2? (rubbing hands together, running them under warm water) NA   Did you complete an activity (ambulation, make bed, cook meal, shower) before checking your SpO2 today? no   Do you use oxygen? no   Was your oxygen on when you took today's reading? NA   Do you have nail polish on? no      Clinical Interventions: Reviewed and followed up on alerts and treatments-discussed red-alert r/t O2 sats with patient. Patient is very winded, unable to respond in full sentences without resting in between. Patient reports eating lunch and feeling so unwell that when she went to the restroom she \"saw yellow\" and lost consciousness and has been having chest tightness and palpitations since then. While on the phone with this RN the patient's doorbell rang and her OT Pat arrived. The patient did not recognize Pat. This RN spoke with Pat and Pat shares her worries

## 2024-10-25 NOTE — H&P
sister.  Soc HX:   Social History     Socioeconomic History    Marital status:      Spouse name: None    Number of children: None    Years of education: None    Highest education level: None   Tobacco Use    Smoking status: Never    Smokeless tobacco: Never   Vaping Use    Vaping status: Never Used   Substance and Sexual Activity    Alcohol use: No    Drug use: No    Sexual activity: Not Currently     Social Determinants of Health     Financial Resource Strain: Low Risk  (10/23/2024)    Overall Financial Resource Strain (CARDIA)     Difficulty of Paying Living Expenses: Not hard at all   Food Insecurity: No Food Insecurity (10/23/2024)    Hunger Vital Sign     Worried About Running Out of Food in the Last Year: Never true     Ran Out of Food in the Last Year: Never true   Transportation Needs: No Transportation Needs (10/23/2024)    PRAPARE - Transportation     Lack of Transportation (Medical): No     Lack of Transportation (Non-Medical): No   Physical Activity: Inactive (10/23/2024)    Exercise Vital Sign     Days of Exercise per Week: 0 days     Minutes of Exercise per Session: 0 min   Stress: No Stress Concern Present (10/23/2024)    [employer] - Occupational Stress Questionnaire     Feeling of Stress : Only a little   Social Connections: Socially Isolated (10/23/2024)    Social Connection and Isolation Panel [NHANES]     Frequency of Communication with Friends and Family: Once a week     Frequency of Social Gatherings with Friends and Family: Once a week     Attends Sikhism Services: Never     Active Member of Clubs or Organizations: Yes     Attends Club or Organization Meetings: Never     Marital Status:     Received from [facility], [facility]    Interpersonal Safety   Housing Stability: Low Risk  (10/23/2024)    Housing Stability Vital Sign     Unable to Pay for Housing in the Last Year: No     Number

## 2024-10-26 LAB
ANION GAP SERPL CALCULATED.3IONS-SCNC: 12 MMOL/L (ref 3–16)
BASOPHILS # BLD: 0.06 K/UL (ref 0–0.2)
BASOPHILS NFR BLD: 1 %
BUN SERPL-MCNC: 20 MG/DL (ref 7–20)
CALCIUM SERPL-MCNC: 9.3 MG/DL (ref 8.3–10.6)
CHLORIDE SERPL-SCNC: 104 MMOL/L (ref 99–110)
CHOLEST SERPL-MCNC: 162 MG/DL (ref 0–199)
CO2 SERPL-SCNC: 22 MMOL/L (ref 21–32)
CREAT SERPL-MCNC: 0.8 MG/DL (ref 0.6–1.2)
EOSINOPHIL # BLD: 0.07 K/UL (ref 0–0.6)
EOSINOPHILS RELATIVE PERCENT: 1 %
ERYTHROCYTE [DISTWIDTH] IN BLOOD BY AUTOMATED COUNT: 13.8 % (ref 12.4–15.4)
GFR, ESTIMATED: 75 ML/MIN/1.73M2
GLUCOSE BLD-MCNC: 117 MG/DL (ref 70–99)
GLUCOSE BLD-MCNC: 124 MG/DL (ref 70–99)
GLUCOSE BLD-MCNC: 130 MG/DL (ref 70–99)
GLUCOSE SERPL-MCNC: 130 MG/DL (ref 70–99)
HCT VFR BLD AUTO: 40.2 % (ref 36–48)
HDLC SERPL-MCNC: 65 MG/DL (ref 40–60)
HGB BLD-MCNC: 13.6 G/DL (ref 12–16)
IMM GRANULOCYTES # BLD AUTO: 0.16 K/UL (ref 0–0.5)
IMM GRANULOCYTES NFR BLD: 2 %
LDLC SERPL CALC-MCNC: 65 MG/DL (ref 0–99)
LYMPHOCYTES NFR BLD: 2.38 K/UL (ref 1–5.1)
LYMPHOCYTES RELATIVE PERCENT: 33 %
MCH RBC QN AUTO: 29.6 PG (ref 26–34)
MCHC RBC AUTO-ENTMCNC: 33.8 G/DL (ref 31–36)
MCV RBC AUTO: 87.4 FL (ref 80–100)
MONOCYTES NFR BLD: 0.72 K/UL (ref 0–1.3)
MONOCYTES NFR BLD: 10 %
NEUTROPHILS NFR BLD: 53 %
NEUTS SEG NFR BLD: 3.8 K/UL (ref 1.7–7.7)
PLATELET # BLD AUTO: 247 K/UL (ref 135–450)
PMV BLD AUTO: 9.9 FL
POTASSIUM SERPL-SCNC: 4.1 MMOL/L (ref 3.5–5.1)
RBC # BLD AUTO: 4.6 M/UL (ref 4–5.2)
SODIUM SERPL-SCNC: 138 MMOL/L (ref 136–145)
TRIGL SERPL-MCNC: 160 MG/DL (ref 0–149)
TROPONIN I SERPL HS-MCNC: 9 NG/L (ref 0–14)
VLDLC SERPL CALC-MCNC: 32 MG/DL
WBC OTHER # BLD: 7.2 K/UL (ref 4–11)

## 2024-10-26 PROCEDURE — 36415 COLL VENOUS BLD VENIPUNCTURE: CPT

## 2024-10-26 PROCEDURE — 6370000000 HC RX 637 (ALT 250 FOR IP): Performed by: NURSE PRACTITIONER

## 2024-10-26 PROCEDURE — 96372 THER/PROPH/DIAG INJ SC/IM: CPT

## 2024-10-26 PROCEDURE — 82962 GLUCOSE BLOOD TEST: CPT

## 2024-10-26 PROCEDURE — 6360000002 HC RX W HCPCS: Performed by: NURSE PRACTITIONER

## 2024-10-26 PROCEDURE — 85025 COMPLETE CBC W/AUTO DIFF WBC: CPT

## 2024-10-26 PROCEDURE — 2580000003 HC RX 258: Performed by: NURSE PRACTITIONER

## 2024-10-26 PROCEDURE — 80048 BASIC METABOLIC PNL TOTAL CA: CPT

## 2024-10-26 PROCEDURE — 6370000000 HC RX 637 (ALT 250 FOR IP): Performed by: INTERNAL MEDICINE

## 2024-10-26 PROCEDURE — G0378 HOSPITAL OBSERVATION PER HR: HCPCS

## 2024-10-26 PROCEDURE — 94640 AIRWAY INHALATION TREATMENT: CPT

## 2024-10-26 PROCEDURE — 80061 LIPID PANEL: CPT

## 2024-10-26 PROCEDURE — 84484 ASSAY OF TROPONIN QUANT: CPT

## 2024-10-26 RX ORDER — BUTALBITAL AND ACETAMINOPHEN 50; 300 MG/1; MG/1
1 CAPSULE ORAL EVERY 8 HOURS PRN
Status: ON HOLD | COMMUNITY
End: 2024-10-26 | Stop reason: SDUPTHER

## 2024-10-26 RX ORDER — LACTULOSE 10 G/15ML
30 SOLUTION ORAL 3 TIMES DAILY
Status: DISCONTINUED | OUTPATIENT
Start: 2024-10-26 | End: 2024-10-27 | Stop reason: HOSPADM

## 2024-10-26 RX ORDER — POLYETHYLENE GLYCOL 3350 17 G/17G
17 POWDER, FOR SOLUTION ORAL DAILY
Status: DISCONTINUED | OUTPATIENT
Start: 2024-10-26 | End: 2024-10-27 | Stop reason: HOSPADM

## 2024-10-26 RX ORDER — BUTALBITAL, ACETAMINOPHEN AND CAFFEINE 300; 40; 50 MG/1; MG/1; MG/1
1 CAPSULE ORAL EVERY 8 HOURS PRN
COMMUNITY

## 2024-10-26 RX ORDER — LANOLIN ALCOHOL/MO/W.PET/CERES
6 CREAM (GRAM) TOPICAL NIGHTLY PRN
Status: DISCONTINUED | OUTPATIENT
Start: 2024-10-26 | End: 2024-10-27 | Stop reason: HOSPADM

## 2024-10-26 RX ORDER — BUTALBITAL, ACETAMINOPHEN AND CAFFEINE 300; 40; 50 MG/1; MG/1; MG/1
1 CAPSULE ORAL EVERY 8 HOURS PRN
Status: ON HOLD | COMMUNITY
End: 2024-10-26 | Stop reason: SDUPTHER

## 2024-10-26 RX ORDER — SODIUM PHOSPHATE, DIBASIC AND SODIUM PHOSPHATE, MONOBASIC 7; 19 G/230ML; G/230ML
1 ENEMA RECTAL 3 TIMES DAILY PRN
Status: DISCONTINUED | OUTPATIENT
Start: 2024-10-26 | End: 2024-10-27 | Stop reason: HOSPADM

## 2024-10-26 RX ORDER — DOCUSATE SODIUM 100 MG/1
100 CAPSULE, LIQUID FILLED ORAL DAILY
Status: DISCONTINUED | OUTPATIENT
Start: 2024-10-26 | End: 2024-10-27 | Stop reason: HOSPADM

## 2024-10-26 RX ORDER — BISACODYL 5 MG/1
10 TABLET, DELAYED RELEASE ORAL 2 TIMES DAILY
Status: DISCONTINUED | OUTPATIENT
Start: 2024-10-26 | End: 2024-10-27 | Stop reason: HOSPADM

## 2024-10-26 RX ADMIN — ENOXAPARIN SODIUM 40 MG: 100 INJECTION SUBCUTANEOUS at 08:13

## 2024-10-26 RX ADMIN — DILTIAZEM HYDROCHLORIDE 240 MG: 240 CAPSULE, EXTENDED RELEASE ORAL at 08:11

## 2024-10-26 RX ADMIN — Medication 10 ML: at 20:30

## 2024-10-26 RX ADMIN — MEMANTINE 10 MG: 5 TABLET ORAL at 08:11

## 2024-10-26 RX ADMIN — MEMANTINE 10 MG: 5 TABLET ORAL at 20:28

## 2024-10-26 RX ADMIN — GABAPENTIN 300 MG: 300 CAPSULE ORAL at 20:28

## 2024-10-26 RX ADMIN — CHOLECALCIFEROL TAB 25 MCG (1000 UNIT) 2000 UNITS: 25 TAB at 08:11

## 2024-10-26 RX ADMIN — Medication 10 ML: at 08:13

## 2024-10-26 RX ADMIN — TIOTROPIUM BROMIDE INHALATION SPRAY 2 PUFF: 3.12 SPRAY, METERED RESPIRATORY (INHALATION) at 08:42

## 2024-10-26 RX ADMIN — ARFORMOTEROL TARTRATE: 15 SOLUTION RESPIRATORY (INHALATION) at 21:01

## 2024-10-26 RX ADMIN — ARFORMOTEROL TARTRATE: 15 SOLUTION RESPIRATORY (INHALATION) at 08:42

## 2024-10-26 RX ADMIN — MINERAL OIL 1 ENEMA: 100 ENEMA RECTAL at 13:27

## 2024-10-26 RX ADMIN — RANOLAZINE 500 MG: 500 TABLET, FILM COATED, EXTENDED RELEASE ORAL at 20:28

## 2024-10-26 RX ADMIN — BISACODYL 10 MG: 5 TABLET, COATED ORAL at 08:11

## 2024-10-26 RX ADMIN — LACTULOSE 30 G: 20 SOLUTION ORAL at 17:27

## 2024-10-26 RX ADMIN — RANOLAZINE 500 MG: 500 TABLET, FILM COATED, EXTENDED RELEASE ORAL at 08:11

## 2024-10-26 RX ADMIN — ROSUVASTATIN CALCIUM 10 MG: 10 TABLET, FILM COATED ORAL at 08:11

## 2024-10-26 RX ADMIN — DOCUSATE SODIUM 200 MG: 100 CAPSULE, LIQUID FILLED ORAL at 08:11

## 2024-10-26 RX ADMIN — ACETAMINOPHEN 650 MG: 325 TABLET ORAL at 20:28

## 2024-10-26 RX ADMIN — AMITRIPTYLINE HYDROCHLORIDE 10 MG: 10 TABLET, FILM COATED ORAL at 20:28

## 2024-10-26 RX ADMIN — Medication 6 MG: at 00:17

## 2024-10-26 RX ADMIN — MONTELUKAST 10 MG: 10 TABLET, FILM COATED ORAL at 08:11

## 2024-10-26 ASSESSMENT — PAIN - FUNCTIONAL ASSESSMENT: PAIN_FUNCTIONAL_ASSESSMENT: ACTIVITIES ARE NOT PREVENTED

## 2024-10-26 ASSESSMENT — PAIN DESCRIPTION - DESCRIPTORS
DESCRIPTORS: ACHING
DESCRIPTORS: ACHING

## 2024-10-26 ASSESSMENT — PAIN DESCRIPTION - ORIENTATION
ORIENTATION: LOWER;RIGHT
ORIENTATION: LOWER;RIGHT

## 2024-10-26 ASSESSMENT — PAIN DESCRIPTION - LOCATION
LOCATION: BACK;LEG
LOCATION: BACK;LEG

## 2024-10-26 ASSESSMENT — PAIN SCALES - GENERAL
PAINLEVEL_OUTOF10: 0
PAINLEVEL_OUTOF10: 0
PAINLEVEL_OUTOF10: 5
PAINLEVEL_OUTOF10: 0
PAINLEVEL_OUTOF10: 5

## 2024-10-26 ASSESSMENT — PAIN DESCRIPTION - PAIN TYPE: TYPE: CHRONIC PAIN

## 2024-10-26 ASSESSMENT — PAIN DESCRIPTION - FREQUENCY: FREQUENCY: CONTINUOUS

## 2024-10-26 ASSESSMENT — PAIN SCALES - WONG BAKER: WONGBAKER_NUMERICALRESPONSE: NO HURT

## 2024-10-26 ASSESSMENT — PAIN DESCRIPTION - ONSET: ONSET: ON-GOING

## 2024-10-26 NOTE — PLAN OF CARE
Problem: Chronic Conditions and Co-morbidities  Goal: Patient's chronic conditions and co-morbidity symptoms are monitored and maintained or improved  Outcome: Progressing  Flowsheets (Taken 10/25/2024 2042)  Care Plan - Patient's Chronic Conditions and Co-Morbidity Symptoms are Monitored and Maintained or Improved: Monitor and assess patient's chronic conditions and comorbid symptoms for stability, deterioration, or improvement     Problem: Discharge Planning  Goal: Discharge to home or other facility with appropriate resources  Outcome: Progressing  Flowsheets  Taken 10/25/2024 2042 by Brandin Soni RN  Discharge to home or other facility with appropriate resources: Identify barriers to discharge with patient and caregiver    Problem: Pain  Goal: Verbalizes/displays adequate comfort level or baseline comfort level  10/25/2024 2252 by Brandin Soni RN  Outcome: Progressing  Flowsheets (Taken 10/25/2024 2042)  Verbalizes/displays adequate comfort level or baseline comfort level: Encourage patient to monitor pain and request assistance     Problem: Risk for Elopement  Goal: Patient will not exit the unit/facility without proper excort  Recent Flowsheet Documentation  Taken 10/25/2024 2042 by Brandin Soni, RN  Nursing Interventions for Elopement Risk: Assist with personal care needs such as toileting, eating, dressing, as needed to reduce the risk of wandering     Problem: Cardiovascular - Adult  Goal: Maintains optimal cardiac output and hemodynamic stability  Outcome: Progressing  Flowsheets (Taken 10/25/2024 2042)  Maintains optimal cardiac output and hemodynamic stability: Monitor blood pressure and heart rate     Problem: Cardiovascular - Adult  Goal: Absence of cardiac dysrhythmias or at baseline  Outcome: Progressing  Flowsheets (Taken 10/25/2024 2042)  Absence of cardiac dysrhythmias or at baseline: Monitor cardiac rate and rhythm     Problem: Gastrointestinal - Adult  Goal: Maintains or

## 2024-10-26 NOTE — PLAN OF CARE
Problem: Chronic Conditions and Co-morbidities  Goal: Patient's chronic conditions and co-morbidity symptoms are monitored and maintained or improved  10/26/2024 1052 by Harriet Higgins RN  Outcome: Progressing     Problem: Discharge Planning  Goal: Discharge to home or other facility with appropriate resources  10/26/2024 1052 by Harriet Higgins RN  Outcome: Progressing     Problem: Pain  Goal: Verbalizes/displays adequate comfort level or baseline comfort level  10/26/2024 1052 by Harreit Higgins RN  Outcome: Progressing     Problem: Safety - Adult  Goal: Free from fall injury  10/26/2024 1052 by Harriet Higgins RN  Outcome: Progressing     Problem: Risk for Elopement  Goal: Patient will not exit the unit/facility without proper excort  Outcome: Progressing  Flowsheets  Taken 10/26/2024 0355 by Brandin Soni RN  Nursing Interventions for Elopement Risk: Assist with personal care needs such as toileting, eating, dressing, as needed to reduce the risk of wandering  Taken 10/26/2024 0000 by Brandin Soni RN  Nursing Interventions for Elopement Risk: Assist with personal care needs such as toileting, eating, dressing, as needed to reduce the risk of wandering     Problem: Cardiovascular - Adult  Goal: Maintains optimal cardiac output and hemodynamic stability  10/26/2024 1052 by Harriet Higgins RN  Outcome: Progressing     Problem: Cardiovascular - Adult  Goal: Absence of cardiac dysrhythmias or at baseline  10/26/2024 1052 by Harriet Higgins RN  Outcome: Progressing     Problem: Gastrointestinal - Adult  Goal: Maintains or returns to baseline bowel function  10/26/2024 1052 by Harriet Higgins RN  Outcome: Progressing

## 2024-10-26 NOTE — DISCHARGE INSTR - COC
Right upper quadrant abdominal pain R10.11    Medication monitoring encounter Z51.81    Diarrhea R19.7    Depression F32.A    Sensation of plugged ear on right side H93.8X1    Hearing problem of right ear H91.91    Balance disorder R26.89    Right leg pain M79.604    Otitis externa of right ear H60.91    Urinary retention R33.9    Poor appetite R63.0    Shortness of breath R06.02    SOB (shortness of breath) R06.02       Isolation/Infection:   Isolation            No Isolation          Patient Infection Status       None to display                     Nurse Assessment:  Last Vital Signs: /85   Pulse 82   Temp 97.9 °F (36.6 °C) (Oral)   Resp 16   Ht 1.575 m (5' 2\")   Wt 77.6 kg (171 lb)   SpO2 97%   BMI 31.28 kg/m²     Last documented pain score (0-10 scale): Pain Level: 0  Last Weight:   Wt Readings from Last 1 Encounters:   10/26/24 77.6 kg (171 lb)     Mental Status:  {IP PT MENTAL STATUS:20030}    IV Access:  { LINDA IV ACCESS:909556572}    Nursing Mobility/ADLs:  Walking   {CHP DME ADLs:714719715}  Transfer  {CHP DME ADLs:095264815}  Bathing  {CHP DME ADLs:800620535}  Dressing  {CHP DME ADLs:858691102}  Toileting  {CHP DME ADLs:204173585}  Feeding  {P DME ADLs:774729370}  Med Admin  {P DME ADLs:753201700}  Med Delivery   {[facility] [place] [facility] MED Delivery:764980658}    Wound Care Documentation and Therapy:        Elimination:  Continence:   Bowel: {YES / NO:19727}  Bladder: {YES / NO:19727}  Urinary Catheter: {Urinary Catheter:180070030}   Colostomy/Ileostomy/Ileal Conduit: {YES / NO:19727}       Date of Last BM: ***    Intake/Output Summary (Last 24 hours) at 10/26/2024 1531  Last data filed at 10/26/2024 1205  Gross per 24 hour   Intake 480 ml   Output --   Net 480 ml     No intake/output data recorded.    Safety Concerns:     { LINDA Safety Concerns:070725546}    Impairments/Disabilities:      {[facility] [place] [facility] Impairments/Disabilities:729696246}    Nutrition Therapy:  Current Nutrition Therapy:   { LINDA Diet

## 2024-10-26 NOTE — CONSULTS
[facility]  H+P  Consult  OP Visit  FU Visit   CC/HPI   CC New patient visit for cp.   HPI 69 y.o., female admitted with multiple complaints.  Reports years of chest pain which is substernal, squeezing, no sob, variable intensity but may last days at a time.  TTE and LHC performed last year for same symptoms and were unremarkable.  Patient also reports chronic pain and weakness in leg, pain in neck sensitive to touch and fatigue.   Cardiac Hx None   CARDIAC TESTING   EKG NSR, possible old inferior infarct, age undetermined   Troponin Lab Results   Component Value Date    TROPHS 11 10/25/2024    TROPHS 7 09/23/2024    TROPHS 7 09/23/2024      HISTORY/ALLERGY/ROS   MEDHx  has a past medical history of Abnormal involuntary movements(781.0), Allergic rhinitis, Anal fissure, Anemia, unspecified, Anxiety, Asthma, Chronic back pain, Chronic diarrhea, Depression, Diffuse cystic mastopathy, Dizziness, Encopresis(307.7), Esophageal reflux, Foot fracture, left, Headache(784.0), Hearing loss, Hepatitis, unspecified, Herpes simplex without mention of complication, Hypertension, Hypogammaglobulinaemia, unspecified, Insomnia, unspecified, Lateral epicondylitis  of elbow, Migraine, unspecified, without mention of intractable migraine without mention of status migrainosus, Mixed hyperlipidemia, Nosebleed, Osteopenia, Postmenopausal, Pure hypercholesterolemia, Recurrent upper respiratory infection (URI), Symptomatic menopausal or female climacteric states, Tinnitus, TMJ dysfunction, Type 2 diabetes mellitus without complication (HCC), Type II or unspecified type diabetes mellitus without mention of complication, not stated as uncontrolled, and Unspecified asthma(493.90).   SURGHx  has a past surgical history that includes Rectal surgery; Dilation and curettage of uterus; Hysterectomy (1998); Hand surgery (2009); Foot surgery (10/2009,11/2010); Elbow surgery (01/18/2011); Shoulder arthroscopy (Right, 07/2007); Cataract 
capsule 300 mg, 300 mg, Oral, Nightly  memantine (NAMENDA) tablet 10 mg, 10 mg, Oral, BID  montelukast (SINGULAIR) tablet 10 mg, 10 mg, Oral, Daily  ranolazine (RANEXA) extended release tablet 500 mg, 500 mg, Oral, BID  rosuvastatin (CRESTOR) tablet 10 mg, 10 mg, Oral, Daily  tiotropium (SPIRIVA RESPIMAT) 2.5 MCG/ACT inhaler 2 puff, 2 puff, Inhalation, Daily RT  Vitamin D (CHOLECALCIFEROL) tablet 2,000 Units, 2,000 Units, Oral, Daily     Social History:   Social History       Tobacco History       Smoking Status  Never      Smokeless Tobacco Use  Never              Alcohol History       Alcohol Use Status  No              Drug Use       Drug Use Status  No              Sexual Activity       Sexually Active  Not Currently                     Family History:  Family History   Problem Relation Age of Onset    Obesity Mother     Osteoporosis Mother     Stroke Father     Stroke Sister 68         from stroke    Breast Cancer Daughter     Cancer Other         Allergies:  Allergies   Allergen Reactions    Effexor [Venlafaxine Hydrochloride] Hives    Hydrocodone Hives and Itching    Ibuprofen Other (See Comments)     Unknown reaction    Methocarbamol Other (See Comments)     Unknown reaction    Propoxyphene Hives    Tramadol Other (See Comments)     Unknown reaction    Aspirin Other (See Comments)     bruising    Keflex [Cephalexin] Itching and Other (See Comments)     Face redness.    Paxil Cr [Paroxetine Hcl Er] Itching        ROS:   General: No fever or weight change  Hematologic: No unexpected submucosal bleeding or bruising  HEENT: No sore throat or facial pain  Respiratory: No cough or dyspnea  Cardiovascular: No angina or dependent edema  Gastrointestinal: See HPI  Musculoskeletal: No usual joint pain or stiffness  Skin: No skin eruptions or changing lesions  Neurologic: No focal weakness or numbness  Psychiatric: No anxiety or sleep disturbance    Physical Exam:  Vital Signs:   Vitals:    10/26/24 1714   BP:

## 2024-10-26 NOTE — CARE COORDINATION
Case Management Assessment  Initial Evaluation    Date/Time of Evaluation: 10/26/2024 11:39 AM  Assessment Completed by: Zeynep Wellington    If patient is discharged prior to next notation, then this note serves as note for discharge by case management.    Patient Name: Tatiana Shepard                   YOB: 1955  Diagnosis: Chest pain [R07.9]  Chest pain, unspecified type [R07.9]                   Date / Time: 10/25/2024  2:30 PM    Patient Admission Status: Observation   Readmission Risk (Low < 19, Mod (19-27), High > 27): Readmission Risk Score: 13.5    Current PCP: Raisa Espitia MD  PCP verified by CM? Yes    Chart Reviewed: Yes      History Provided by: Patient  Patient Orientation: Alert and Oriented    Patient Cognition: Alert    Hospitalization in the last 30 days (Readmission):  Yes    If yes, Readmission Assessment in CM Navigator will be completed.    Advance Directives:      Code Status: Full Code   Patient's Primary Decision Maker is: Named in Scanned ACP Document    Primary Decision Maker: Nydia Cabrales - Girlfriend - [phone]    Discharge Planning:    Patient lives with: Alone Type of Home: House  Primary Care Giver: Self  Patient Support Systems include: Friends/Neighbors   Current Financial resources: Medicare  Current community resources: ECF/Home Care, Transportation  Current services prior to admission: Durable Medical Equipment, Home Care            Current DME: Home Aerosol            Type of Home Care services:  Aide Services, OT, PT, Nursing Services    ADLS  Prior functional level: Independent in ADLs/IADLs  Current functional level: Independent in ADLs/IADLs    PT AM-PAC:   /24  OT AM-PAC:   /24    Family can provide assistance at DC: Yes  Would you like Case Management to discuss the discharge plan with any other family members/significant others, and if so, who? No  Plans to Return to Present Housing: Yes  Other Identified Issues/Barriers to RETURNING to current

## 2024-10-27 VITALS
HEIGHT: 62 IN | TEMPERATURE: 98.1 F | SYSTOLIC BLOOD PRESSURE: 132 MMHG | OXYGEN SATURATION: 96 % | RESPIRATION RATE: 18 BRPM | WEIGHT: 171 LBS | HEART RATE: 77 BPM | BODY MASS INDEX: 31.47 KG/M2 | DIASTOLIC BLOOD PRESSURE: 76 MMHG

## 2024-10-27 LAB
GLUCOSE BLD-MCNC: 113 MG/DL (ref 70–99)
GLUCOSE BLD-MCNC: 95 MG/DL (ref 70–99)

## 2024-10-27 PROCEDURE — 94640 AIRWAY INHALATION TREATMENT: CPT

## 2024-10-27 PROCEDURE — 6370000000 HC RX 637 (ALT 250 FOR IP): Performed by: NURSE PRACTITIONER

## 2024-10-27 PROCEDURE — G0378 HOSPITAL OBSERVATION PER HR: HCPCS

## 2024-10-27 PROCEDURE — 82962 GLUCOSE BLOOD TEST: CPT

## 2024-10-27 PROCEDURE — 6360000002 HC RX W HCPCS: Performed by: NURSE PRACTITIONER

## 2024-10-27 PROCEDURE — 99232 SBSQ HOSP IP/OBS MODERATE 35: CPT | Performed by: INTERNAL MEDICINE

## 2024-10-27 RX ADMIN — TIOTROPIUM BROMIDE INHALATION SPRAY 2 PUFF: 3.12 SPRAY, METERED RESPIRATORY (INHALATION) at 08:00

## 2024-10-27 RX ADMIN — MEMANTINE 10 MG: 5 TABLET ORAL at 09:08

## 2024-10-27 RX ADMIN — CHOLECALCIFEROL TAB 25 MCG (1000 UNIT) 2000 UNITS: 25 TAB at 09:08

## 2024-10-27 RX ADMIN — RANOLAZINE 500 MG: 500 TABLET, FILM COATED, EXTENDED RELEASE ORAL at 09:08

## 2024-10-27 RX ADMIN — DILTIAZEM HYDROCHLORIDE 240 MG: 240 CAPSULE, EXTENDED RELEASE ORAL at 09:08

## 2024-10-27 RX ADMIN — ARFORMOTEROL TARTRATE: 15 SOLUTION RESPIRATORY (INHALATION) at 07:59

## 2024-10-27 RX ADMIN — MONTELUKAST 10 MG: 10 TABLET, FILM COATED ORAL at 09:08

## 2024-10-27 RX ADMIN — ROSUVASTATIN CALCIUM 10 MG: 10 TABLET, FILM COATED ORAL at 09:08

## 2024-10-27 ASSESSMENT — PAIN SCALES - GENERAL
PAINLEVEL_OUTOF10: 0

## 2024-10-27 NOTE — DISCHARGE SUMMARY
Hospital Medicine Discharge Summary    Patient ID: Tatiana Shepard      Patient's PCP: Raisa Espitia MD    Admit Date: 10/25/2024     Discharge Date: 10/27/2024      Admitting Physician: Katie Silver MD     Discharge Physician: NIA Haro - CNP     Discharge Diagnoses  Atypical chest pain  GERD  Constipation   NIDDM  Asthma, no exacerbation    Hospital Course: Tatiana Shepard is a 69 y.o. female with pmh of hld, htn, asthma, NSTEMI, NIDDM, gerd, chronic constipation. who presents with chest pain.  Patient reports she has been having constipation issues for past couple years.  States her medications are not working anymore.  On admission date she went to lunch, and after lunch attempted a bowel movement.  She stated was very hard and she had a bear down, while bearing down she had chest pain that radiated to her jaw with diaphoresis/shortness of breath/palpitations, lightheadedness, and vision started to get yellow on both sides.  She called 911.  She does report a history of GERD, and states if she misses a dose of her Protonix her GERD is flared.  Cardiology was consulted and saw the patient, no acute coronary syndrome.  Patient admitted for further workup. She was given colace, dulcolax, lactulose, mineral oil enema, and had multiple BM's. GI was consulted, and recommended outpatient follow up    Atypical chest pain  GERD  -May be GI related, however given patient's risk factors of history of NSTEMI, diabetes, hypertension, hyperlipidemia cardiology consulted and recommended \"No ischemic evaluation warranted at this time\"  -Chest x-ray negative  -Troponins negative  -Telemonitor  -She recently had echo that was within normal limits  -Aspirin, statin  -Lipid panel and TSH stable  -most recent A1c 6.3 in September  -Continue home Protonix   -Continue home Ranexa     Constipation  -Patient reports it has been worsening for the past 2 years  -KUB with stool in the colon

## 2024-10-27 NOTE — PLAN OF CARE
Problem: Chronic Conditions and Co-morbidities  Goal: Patient's chronic conditions and co-morbidity symptoms are monitored and maintained or improved  Outcome: Progressing     Problem: Discharge Planning  Goal: Discharge to home or other facility with appropriate resources  Outcome: Progressing     Problem: Pain  Goal: Verbalizes/displays adequate comfort level or baseline comfort level  Outcome: Progressing     Problem: Safety - Adult  Goal: Free from fall injury  Outcome: Progressing     Problem: Risk for Elopement  Goal: Patient will not exit the unit/facility without proper excort  Outcome: Progressing     Problem: Cardiovascular - Adult  Goal: Maintains optimal cardiac output and hemodynamic stability  Outcome: Progressing  Goal: Absence of cardiac dysrhythmias or at baseline  Outcome: Progressing     Problem: Gastrointestinal - Adult  Goal: Maintains or returns to baseline bowel function  Outcome: Progressing

## 2024-10-27 NOTE — PROGRESS NOTES
Hospitalist Progress Note      PCP: Raisa Espitia MD    Date of Admission: 10/25/2024    Chief Complaint: chest pain    Hospital Course: Tatiana Shepard is a 69 y.o. female with pmh of hld, htn, asthma, NSTEMI, NIDDM, gerd, chronic constipation. who presents with chest pain.  Patient reports she has been having constipation issues for past couple years.  States her medications are not working anymore.  Today she went to lunch, and after lunch attempted a bowel movement.  She stated was very hard and she had a bear down, while bearing down she had chest pain that radiated to her jaw with diaphoresis/shortness of breath/palpitations, lightheadedness, and vision started to get yellow on both sides.  She called 911.  She does report a history of GERD, and states if she misses a dose of her Protonix her GERD is flared.  Cardiology was consulted and saw the patient, no acute coronary syndrome.  Patient in for further workup.    Subjective: Patient laying in bed, states she still has not had a bowel movement.  Feels as if she needs to go.  Agreeable to mineral oil enema.  Awaiting GI evaluation.  Reports she has chronic chest pain and it is at her baseline currently.  Denies worsening chest pain shortness breath palpitation abdominal pain headache lightheadedness or dizziness.  Reviewed plan of care, denied further needs questions.    Assessment/Plan:    Atypical chest pain  GERD  -May be GI related, however given patient's risk factors of history of NSTEMI, diabetes, hypertension, hyperlipidemia we will consult cardiology  -Chest x-ray negative  -Troponins negative  -Telemonitor  -She recently had echo that was within normal limits  -Aspirin, statin  -Lipid panel and TSH stable  -most recent A1c 6.3 in September  -Continue Protonix 40 mg p.o. twice daily  -Continue home Ranexa     Constipation  -Patient reports it has been worsening for the past 2 years  -KUB with stool in the colon throughout  -GI 
  [facility]  H+P  Consult  OP Visit  FU Visit   CC/INTERVAL HX   Admit 10/25/2024   CC CP   Subjective Troponins remain negative X3.  No concerning cardiac complaints or findings.     Tele Reviewed available   EXAM   VS BP (!) 110/59   Pulse 75   Temp 97.9 °F (36.6 °C) (Oral)   Resp 18   Ht 1.575 m (5' 2\")   Wt 77.6 kg (171 lb)   SpO2 95%   BMI 31.28 kg/m²    Gen Alert, coop, Ødistress Pulse 2+ and symmetric Head NC, AT, Ø abnorm   Heart RRR, no MRG Abd  Soft, NT, +BS, Ømass Eyes PER, conj/corn clr   Lung CTAB, unlab, ØDTP Nck/Thr S/s, tm, nt, Øbru/jvd, lmt Nose Nares, Ø drain, nt   Ext Ext nl, AT,  ØC/C/E Neuro Nl gross m/s exam Lymph No cerv/ax LA   Ch Wall NT, no deform Skin Col/txt/trg nl, Ørash/les Psych Nl mood/affect      MEDICATIONS   Relevant cardiac medications Reviewed in EPIC   LABS   Hgb Lab Results   Component Value Date    HGB 13.6 10/26/2024      Cr Lab Results   Component Value Date    CREATININE 0.8 10/26/2024      Trop Lab Results   Component Value Date    CKTOTAL 69 02/13/2024    CKMB 1.2 01/20/2015    TROPHS 9 10/26/2024    TROPHS 9 10/25/2024    TROPHS 9 10/25/2024       ASSESSMENT TIME   More than 35 minutes spent reviewing patient chart (including but not limited to notes, labs, imaging and other testing), interviewing patient and/or family members, performing a physical exam, documentation of my findings above and subsequent follow-up of ordered testing.  More than 50% of that time spent face to face with patient discussing clinical condition and counseling regarding treatment plan.     ASSESSMENT AND PLAN   *CP  Status     Atypical, negative troponin, no I/I changes EKG, [facility] normal last year  LHC 2/23Normal cors and EF (images reviewed)  TTE 2/23Normal EF  Plan        Atypical symptoms and no evidence for ACS, troponins remain negative                 Highly unlikely to have obstructive disease with recent normal LH                 No ischemic evaluation warranted at 
4 Eyes Admission Assessment     I agree as the admission nurse that 2 RN's have performed a thorough Head to Toe Skin Assessment on the patient. ALL assessment sites listed below have been assessed on admission.       Areas assessed by both nurses: Michelle RN  [x]   Head, Face, and Ears   [x]   Shoulders, Back, and Chest  [x]   Arms, Elbows, and Hands   [x]   Coccyx, Sacrum, and Ischium  [x]   Legs, Feet, and Heels        Does the Patient have Skin Breakdown?  No         Osei Prevention initiated:  No   Wound Care Orders initiated:  No      [facility] nurse consulted for Pressure Injury (Stage 3,4, Unstageable, DTI, NWPT, and Complex wounds) or Osei score 18 or lower:  No      Nurse 1 eSignature: Electronically signed by Michelle Quiñones RN on 10/25/24 at 5:34 PM EDT    **SHARE this note so that the co-signing nurse is able to place an eSignature**    Nurse 2 eSignature: Electronically signed by LIYA SWEENEY RN on 10/25/24 at 5:55 PM EDT    
Patient educated on discharge instructions as well as new medications use, dosage, administration and possible side effects.  Patient verified knowledge. IV removed without difficulty and dry dressing in place. Telemetry monitor removed and returned to [facility]. Pt left facility in stable condition to Home with all of their personal belongings.     
The order for lutein capsules was rejected by pharmacist.  It is considered an herbal or nutraceutical product, which our pharmacy does not carry. We cannot allow patient to use home supply as there are no markings on the tablet for identification purposes.    Cornelio Hector PharmD  [facility]   [id number]  10/25/2024   5:36 PM    
Starting Wed 7/17/2024, Disp-1 kit, R-0, Normal      amitriptyline (ELAVIL) 10 MG tablet Take 1 tablet by mouth nightly Taking at dinnertimeHistorical Med      Plecanatide (TRULANCE) 3 MG TABS Take 1 tablet by mouth daily, Disp-90 tablet, R-1Normal      nitroGLYCERIN (NITRODUR) 0.4 MG/HR Place 1 patch onto the skin daily, Disp-90 patch, R-3Normal      gabapentin (NEURONTIN) 300 MG capsule Take 2 capsules by mouth at bedtime for 180 days., Disp-180 capsule, R-1Normal      diclofenac sodium (VOLTAREN) 1 % GEL APPLY 2 GRAMS TOPICALLY 4  TIMES A DAY, Disp-300 g, R-0, Normal      pantoprazole (PROTONIX) 40 MG tablet TAKE 1 TABLET DAILY, Disp-90 tablet, R-1Normal      montelukast (SINGULAIR) 10 MG tablet Take 1 tablet by mouth daily, Disp-90 tablet, R-3Normal      vitamin D (VITAMIN D3) 50 MCG (2000 UT) CAPS capsule Take 1 capsule by mouth dailyHistorical Med      dilTIAZem (CARDIZEM CD) 240 MG extended release capsule Take 1 capsule by mouth daily, Disp-90 capsule, R-2Normal      nitroGLYCERIN (NITROSTAT) 0.4 MG SL tablet Place 1 tablet under the tongue every 5 minutes as needed for Chest pain up to max of 3 total doses. If no relief after 1 dose, call 911., Disp-25 tablet, R-3Normal      Alum Hydroxide-Mag Carbonate (GAVISCON PO) Take 1 tablet by mouth daily as needed (indigestion)Historical Med      memantine (NAMENDA) 10 MG tablet Take 1 tablet by mouth 2 times dailyHistorical Med      !! Respiratory Therapy Supplies (NEBULIZER/TUBING/MOUTHPIECE) KIT DAILY PRN Starting Tue 2/6/2024, Disp-1 kit, R-0, Normal      naratriptan (AMERGE) 2.5 MG tablet TAKE ONE TABLET BY MOUTH AT THE ONSET of HEADACHE, MAY REPEAT in FOUR hours, not more THAN TWO PER DAY OR FOUR PER WEEKHistorical Med      Blood Glucose Monitoring Suppl (TRUE METRIX GO GLUCOSE METER) w/Device KIT USE AS DIRECTED AND AS NEEDED, Disp-1 kit, R-0Normal      denosumab (PROLIA) 60 MG/ML SOSY SC injection Inject 1 mL into the skin once for 1 dose, Disp-1 mL,

## 2024-10-27 NOTE — RT PROTOCOL NOTE
RT Nebulizer Bronchodilator Protocol Note    There is a bronchodilator order in the chart from a provider indicating to follow the RT Bronchodilator Protocol and there is an “Initiate RT Bronchodilator Protocol” order as well (see protocol at bottom of note).    CXR Findings:  XR CHEST PORTABLE    Result Date: 10/25/2024  No acute disease Electronically signed by Mehul Varner      The findings from the last RT Protocol Assessment were as follows:  Smoking: Chronic pulmonary disease  Respiratory Pattern: Regular pattern and RR 12-20 bpm  Breath Sounds: Clear breath sounds  Cough: Strong, spontaneous, non-productive  Indication for Bronchodilator Therapy: None  Bronchodilator Assessment Score: 2    Aerosolized bronchodilator medication orders have been revised according to the RT Nebulizer Bronchodilator Protocol below.    Respiratory Therapist to perform RT Therapy Protocol Assessment initially then follow the protocol.  Repeat RT Therapy Protocol Assessment PRN for score 0-3 or on second treatment, BID, and PRN for scores above 3.    No Indications - adjust the frequency to every 6 hours PRN wheezing or bronchospasm, if no treatments needed after 48 hours then discontinue using Per Protocol order mode.     If indication present, adjust the RT bronchodilator orders based on the Bronchodilator Assessment Score as indicated below.  If a patient is on this medication at home then do not decrease Frequency below that used at home.    0-3 - enter or revise RT bronchodilator order(s) to equivalent RT Bronchodilator order with Frequency of every 4 hours PRN for wheezing or increased work of breathing using Per Protocol order mode.       4-6 - enter or revise RT Bronchodilator order(s) to two equivalent RT bronchodilator orders with one order with BID Frequency and one order with Frequency of every 4 hours PRN wheezing or increased work of breathing using Per Protocol order mode.         7-10 - enter or revise RT 
RT Nebulizer Bronchodilator Protocol Note    There is a bronchodilator order in the chart from a provider indicating to follow the RT Bronchodilator Protocol and there is an “Initiate RT Bronchodilator Protocol” order as well (see protocol at bottom of note).    CXR Findings:  XR CHEST PORTABLE    Result Date: 10/25/2024  No acute disease Electronically signed by Mehul Varner      The findings from the last RT Protocol Assessment were as follows:  Smoking: Chronic pulmonary disease  Respiratory Pattern: Regular pattern and RR 12-20 bpm  Breath Sounds: Clear breath sounds  Cough: Strong, spontaneous, non-productive  Indication for Bronchodilator Therapy: On home bronchodilators  Bronchodilator Assessment Score: 2    Aerosolized bronchodilator medication orders have been revised according to the RT Nebulizer Bronchodilator Protocol below.    Respiratory Therapist to perform RT Therapy Protocol Assessment initially then follow the protocol.  Repeat RT Therapy Protocol Assessment PRN for score 0-3 or on second treatment, BID, and PRN for scores above 3.    No Indications - adjust the frequency to every 6 hours PRN wheezing or bronchospasm, if no treatments needed after 48 hours then discontinue using Per Protocol order mode.     If indication present, adjust the RT bronchodilator orders based on the Bronchodilator Assessment Score as indicated below.  If a patient is on this medication at home then do not decrease Frequency below that used at home.    0-3 - enter or revise RT bronchodilator order(s) to equivalent RT Bronchodilator order with Frequency of every 4 hours PRN for wheezing or increased work of breathing using Per Protocol order mode.       4-6 - enter or revise RT Bronchodilator order(s) to two equivalent RT bronchodilator orders with one order with BID Frequency and one order with Frequency of every 4 hours PRN wheezing or increased work of breathing using Per Protocol order mode.         7-10 - enter 
    7-10 - enter or revise RT Bronchodilator order(s) to two equivalent RT bronchodilator orders with one order with TID Frequency and one order with Frequency of every 4 hours PRN wheezing or increased work of breathing using Per Protocol order mode.       11-13 - enter or revise RT Bronchodilator order(s) to one equivalent RT bronchodilator order with QID Frequency and an Albuterol order with Frequency of every 4 hours PRN wheezing or increased work of breathing using Per Protocol order mode.      Greater than 13 - enter or revise RT Bronchodilator order(s) to one equivalent RT bronchodilator order with every 4 hours Frequency and an Albuterol order with Frequency of every 2 hours PRN wheezing or increased work of breathing using Per Protocol order mode.     RT to enter RT Home Evaluation for COPD & MDI Assessment order using Per Protocol order mode.    Electronically signed by Geneva Paredes RCP on 10/27/2024 at 8:02 AM

## 2024-10-27 NOTE — PLAN OF CARE
Problem: Chronic Conditions and Co-morbidities  Goal: Patient's chronic conditions and co-morbidity symptoms are monitored and maintained or improved  10/27/2024 1011 by Ivana Gay, RN  Outcome: Adequate for Discharge

## 2024-10-28 ENCOUNTER — CARE COORDINATION (OUTPATIENT)
Dept: CARE COORDINATION | Age: 69
End: 2024-10-28

## 2024-10-28 ENCOUNTER — TELEPHONE (OUTPATIENT)
Dept: PRIMARY CARE CLINIC | Age: 69
End: 2024-10-28

## 2024-10-28 NOTE — TELEPHONE ENCOUNTER
Care Transitions Initial Follow Up Call    Outreach made within 2 business days of discharge: Yes    Patient: Tatiana Shepard Patient : 1955   MRN: [id number]  Reason for Admission: 10/25/2024  Discharge Date: 10/27/24       Spoke with: patient    Discharge department/facility:     [facility] Interactive Patient Contact:  Was patient able to fill all prescriptions:   Was patient instructed to bring all medications to the follow-up visit:   Is patient taking all medications as directed in the discharge summary? Yes  Does patient understand their discharge instructions: Yes  Does patient have questions or concerns that need addressed prior to 7-14 day follow up office visit: yes     Additional needs identified to be addressed with provider  No needs identified             Scheduled appointment with PCP within 7-14 days    Follow Up  Future Appointments   Date Time Provider Department Center   10/30/2024  1:45 PM SCHEDULE, [facility]   2024  2:00 PM Raisa Espitia MD [facility]   2/3/2025  2:00 PM Lul Thompson MD [facility]   3/19/2025  1:40 PM Ramone Leslie MD [facility]   2025 10:00 AM [facility] [employer]   2025 10:20 AM Paulo Roque MD [facility]       JOSE FORREST MA

## 2024-10-30 ENCOUNTER — NURSE ONLY (OUTPATIENT)
Dept: ENDOCRINOLOGY | Age: 69
End: 2024-10-30
Payer: MEDICARE

## 2024-10-30 ENCOUNTER — CARE COORDINATION (OUTPATIENT)
Dept: CARE COORDINATION | Age: 69
End: 2024-10-30

## 2024-10-30 DIAGNOSIS — M85.80 OSTEOPENIA WITH HIGH RISK OF FRACTURE: Primary | ICD-10-CM

## 2024-10-30 PROCEDURE — 96372 THER/PROPH/DIAG INJ SC/IM: CPT | Performed by: INTERNAL MEDICINE

## 2024-10-30 NOTE — CARE COORDINATION
Ambulatory Care Coordination Note     10/30/2024 2:59 PM     Patient Current Location:  Home: [address] [place] [id number]     ACM contacted the patient by telephone. Verified name and  with patient as identifiers.         ACM: Elizabeth Monreal RN     Challenges to be reviewed by the provider   Additional needs identified to be addressed with provider Yes  Patient is concerned because she has had some blood noted on her pillow from her right ear, denies ear pain. She is not scheduled with you until . She wants to know if she needs to see a ENT?                Method of communication with provider: chart routing.    Has the patient been seen in the ED since your last call? Yes,   Discharge Date: 10/27/24   Discharge Facility: [facility]  Reason for ED Visit: Constipation, CP after bearing down  Visit Diagnosis: Atypical CP, GERD, Constipation    Number of ED visits in the last 6 months: 3      Do you have any ongoing symptoms? No  Did you call your PCP prior to going to the ED? No, did not call the PCP office.     Review of Discharge Instructions:   [x] AVS discharge instructions  [x] Right Care, Right Place, Right Time document  [x] Medication changes  [x] Follow up appointments  [x] Referral follow up          Care Summary Note: ACM outreached patient who reported she was returning from the store.  Patient denied any CP, SOB, increased fatigue, leg or abdominal swelling, generalized weakness, vision changes, dizziness, HA, increase in thirst or urination.  This ACM's assessment is that the patient is not experiencing any exacerbation of his chronic illnesses.   Patient expressed concern that she has had a few mornings that she has woken up and noticed a small amount of blood on her pillow from her right ear.  Pt denies ear pain.  Pt asked if she needed to see an ENT? Pt reports that she was not able to get into PCP until .  [facility] agreed to sent message to Dr. Espitia.   Patient

## 2024-11-11 DIAGNOSIS — K21.00 GASTROESOPHAGEAL REFLUX DISEASE WITH ESOPHAGITIS WITHOUT HEMORRHAGE: ICD-10-CM

## 2024-11-11 RX ORDER — PANTOPRAZOLE SODIUM 40 MG/1
40 TABLET, DELAYED RELEASE ORAL DAILY
Qty: 90 TABLET | Refills: 1 | Status: SHIPPED | OUTPATIENT
Start: 2024-11-11

## 2024-11-11 NOTE — TELEPHONE ENCOUNTER
Medication:   Requested Prescriptions     Pending Prescriptions Disp Refills    pantoprazole (PROTONIX) 40 MG tablet [Pharmacy Med Name: PANTOPRAZOLE TAB 40MG DR] 90 tablet 1     Sig: TAKE 1 TABLET DAILY     Last filled: 4/24/24  Last appt: 10/2/2024   Next appt: 11/14/2024    Last OARRS:       8/29/2023     6:55 PM   RX Monitoring   Periodic Controlled Substance Monitoring No signs of potential drug abuse or diversion identified.

## 2024-11-14 ENCOUNTER — OFFICE VISIT (OUTPATIENT)
Dept: PRIMARY CARE CLINIC | Age: 69
End: 2024-11-14

## 2024-11-14 VITALS
BODY MASS INDEX: 30.77 KG/M2 | SYSTOLIC BLOOD PRESSURE: 120 MMHG | TEMPERATURE: 97.9 F | RESPIRATION RATE: 18 BRPM | HEIGHT: 62 IN | DIASTOLIC BLOOD PRESSURE: 78 MMHG | WEIGHT: 167.2 LBS | OXYGEN SATURATION: 94 % | HEART RATE: 97 BPM

## 2024-11-14 DIAGNOSIS — I10 ESSENTIAL HYPERTENSION: Chronic | ICD-10-CM

## 2024-11-14 DIAGNOSIS — F33.1 MODERATE EPISODE OF RECURRENT MAJOR DEPRESSIVE DISORDER (HCC): ICD-10-CM

## 2024-11-14 DIAGNOSIS — E11.42 TYPE 2 DIABETES MELLITUS WITH DIABETIC POLYNEUROPATHY, WITHOUT LONG-TERM CURRENT USE OF INSULIN (HCC): ICD-10-CM

## 2024-11-14 DIAGNOSIS — Z13.31 POSITIVE DEPRESSION SCREENING: ICD-10-CM

## 2024-11-14 DIAGNOSIS — Z00.00 MEDICARE ANNUAL WELLNESS VISIT, SUBSEQUENT: Primary | ICD-10-CM

## 2024-11-14 DIAGNOSIS — N28.1 CYST OF RIGHT KIDNEY: ICD-10-CM

## 2024-11-14 RX ORDER — LINACLOTIDE 290 UG/1
290 CAPSULE, GELATIN COATED ORAL
COMMUNITY
Start: 2024-10-28

## 2024-11-14 ASSESSMENT — COLUMBIA-SUICIDE SEVERITY RATING SCALE - C-SSRS
2. HAVE YOU ACTUALLY HAD ANY THOUGHTS OF KILLING YOURSELF?: NO
6. HAVE YOU EVER DONE ANYTHING, STARTED TO DO ANYTHING, OR PREPARED TO DO ANYTHING TO END YOUR LIFE?: NO
1. WITHIN THE PAST MONTH, HAVE YOU WISHED YOU WERE DEAD OR WISHED YOU COULD GO TO SLEEP AND NOT WAKE UP?: NO

## 2024-11-14 ASSESSMENT — PATIENT HEALTH QUESTIONNAIRE - PHQ9
SUM OF ALL RESPONSES TO PHQ QUESTIONS 1-9: 11
6. FEELING BAD ABOUT YOURSELF - OR THAT YOU ARE A FAILURE OR HAVE LET YOURSELF OR YOUR FAMILY DOWN: NOT AT ALL
10. IF YOU CHECKED OFF ANY PROBLEMS, HOW DIFFICULT HAVE THESE PROBLEMS MADE IT FOR YOU TO DO YOUR WORK, TAKE CARE OF THINGS AT HOME, OR GET ALONG WITH OTHER PEOPLE: NOT DIFFICULT AT ALL
5. POOR APPETITE OR OVEREATING: SEVERAL DAYS
8. MOVING OR SPEAKING SO SLOWLY THAT OTHER PEOPLE COULD HAVE NOTICED. OR THE OPPOSITE, BEING SO FIGETY OR RESTLESS THAT YOU HAVE BEEN MOVING AROUND A LOT MORE THAN USUAL: NOT AT ALL
SUM OF ALL RESPONSES TO PHQ QUESTIONS 1-9: 11
SUM OF ALL RESPONSES TO PHQ9 QUESTIONS 1 & 2: 6
SUM OF ALL RESPONSES TO PHQ QUESTIONS 1-9: 11
7. TROUBLE CONCENTRATING ON THINGS, SUCH AS READING THE NEWSPAPER OR WATCHING TELEVISION: NOT AT ALL
2. FEELING DOWN, DEPRESSED OR HOPELESS: NEARLY EVERY DAY
1. LITTLE INTEREST OR PLEASURE IN DOING THINGS: NEARLY EVERY DAY
3. TROUBLE FALLING OR STAYING ASLEEP: SEVERAL DAYS
9. THOUGHTS THAT YOU WOULD BE BETTER OFF DEAD, OR OF HURTING YOURSELF: NOT AT ALL
SUM OF ALL RESPONSES TO PHQ QUESTIONS 1-9: 11
4. FEELING TIRED OR HAVING LITTLE ENERGY: NEARLY EVERY DAY

## 2024-11-14 NOTE — PROGRESS NOTES
Medicare Annual Wellness Visit    Tatiana Shepard is here for Medicare AWV and Referral - General (Dr. Tariq Ruelas- nephrologist )    Assessment & Plan   1. Medicare annual wellness visit, subsequent  Assessment & Plan:  -Medicare AWV done   2. Moderate episode of recurrent major depressive disorder (HCC)  Assessment & Plan:  -not controlled  -continue Cymbalta 60mg once daily  -Referral to counseling   Orders:  -     Non BS - Amb External Referral To Counseling Services  3. Cyst of right kidney  Assessment & Plan:  -Referral to Nephrology   Orders:  -     [facility] - Tariq Ruelas MD, Nephrology, [facility]  4. Type 2 diabetes mellitus with diabetic polyneuropathy, without long-term current use of insulin (HCC)  Assessment & Plan:  -stable  -Continue Trulicity 0.75mg SC weekly  -continue gabapentin for neuropathy  -Limit carbohydrates to 45 grams with meals and 15 grams with snacks  -monitor blood sugars  -goal for blood sugar fasting or pre-meal  is   -goal for blood sugar 2 hours after a meal is less than 180  -goal for blood sugar at bedtime is less than 150  -Regular aerobic exercise  -referral to diabetes education     Orders:  -     Elizabeth Mack RDN, Diabetes Education (Non Care Coord Patient), [facility]  5. Essential hypertension  Assessment & Plan:  -stable  -Continue diltiazem ER 240mg once daily  -Low sodium diet  -Regular aerobic exercise     6. Positive depression screening  Assessment & Plan:  -PHQ-9 score today: (PHQ-9 Total Score: 11), additional evaluation and assessment performed, follow-up plan includes but not limited to: Medication management and Referral to /Specialist  for evaluation and management.        Recommendations for Preventive Services Due: see orders and patient instructions/AVS.  Recommended screening schedule for the next 5-10 years is provided to the patient in written form: see Patient Instructions/AVS.     Return if symptoms worsen or fail to improve.

## 2024-11-15 ENCOUNTER — OFFICE VISIT (OUTPATIENT)
Dept: PRIMARY CARE CLINIC | Age: 69
End: 2024-11-15

## 2024-11-15 ENCOUNTER — CARE COORDINATION (OUTPATIENT)
Dept: CARE COORDINATION | Age: 69
End: 2024-11-15

## 2024-11-15 VITALS
RESPIRATION RATE: 16 BRPM | DIASTOLIC BLOOD PRESSURE: 84 MMHG | SYSTOLIC BLOOD PRESSURE: 126 MMHG | OXYGEN SATURATION: 98 % | TEMPERATURE: 97.8 F | HEART RATE: 80 BPM | BODY MASS INDEX: 30.73 KG/M2 | WEIGHT: 168 LBS

## 2024-11-15 DIAGNOSIS — R07.9 CHEST PAIN, UNSPECIFIED TYPE: Primary | ICD-10-CM

## 2024-11-15 DIAGNOSIS — D72.9 WHITE BLOOD CELL ABNORMALITY: ICD-10-CM

## 2024-11-15 DIAGNOSIS — Z09 HOSPITAL DISCHARGE FOLLOW-UP: ICD-10-CM

## 2024-11-15 DIAGNOSIS — K59.00 CONSTIPATION, UNSPECIFIED CONSTIPATION TYPE: ICD-10-CM

## 2024-11-15 DIAGNOSIS — R00.2 PALPITATIONS: ICD-10-CM

## 2024-11-15 NOTE — CARE COORDINATION
Ambulatory Care Coordination Note     11/15/2024 2:17 PM     Patient Current Location:  Home: [id number] [place], [place] [id number]     ACM contacted the patient by telephone. Verified name and  with patient as identifiers.         ACM: Elizabeth Monreal RN     Challenges to be reviewed by the provider   Additional needs identified to be addressed with provider No  none               Method of communication with provider: none.    Utilization: Patient has not had any utilization since our last call.     Care Summary Note: ACM completed outreach with patient who was pleasant and engaged.  ACM discussed the following:  Left ear - patient reports that she received Tx for her ear and she is having no more problems.  Pt reports that if she has ear issues in the future she will call the ear doctor.  Patient reports that she gets her medications delivered thru [facility] pharmacy and some from [facility].  Pt reports she has all of her medications and is taking as prescribed.  DME: Patient was being evaluated by her [facility] PT for a 4WW.  A DME order was requested by the [facility] agency and order placed in [facility] on 10/25. Pt reports that she still does not have the walker.  ACM called and spoke to Gretta at [facility] to f/u on walker status and determine where the DME provider is.  Gretta placed this ACM on a three way call with Lola, pt's PT and Lola reported that she has talked with [id number] and they are waiting for the DME order, they have all other required documentation. Lola reported that she has called the office a few times requesting the DME order be faxed to [email or web address] and per EIC review it does not appear it has been faxed.  ACM pilled DME order from [employer] and faxed to [employer] ([phone]).   Patient remains active with [facility] for PT, OT, SN.  Patient reports that she has another 2 weeks of therapy then she will graduate from the therapy program.  Patient saw Dr. Espitia on  and 11/15 and the

## 2024-11-15 NOTE — PROGRESS NOTES
Post-Discharge Transitional Care Follow Up      Tatiana Shepard   YOB: 1955    Date of Office Visit:  11/15/2024  Date of Hospital Admission: 10/25/24  Date of Hospital Discharge: 10/27/24  Readmission Risk Score (high >=14%. Medium >=10%):Readmission Risk Score: 13.5      Care management risk score Rising risk (score 2-5) and Complex Care (Scores >=6): No Risk Score On File     Non face to face  following discharge, date last encounter closed (first attempt may have been earlier): *No documented post hospital discharge outreach found in the last 14 days     Call initiated 2 business days of discharge: *No response recorded in the last 14 days     Chest pain, unspecified type  Assessment & Plan:  -better  -Continue nitroglycerin patch  -Continue aspirin 81 mg 2 times weekly  -Continue Ranexa  -Continue care per Cardiology  Palpitations  Assessment & Plan:  -better  -Continue care per Cardiology   Constipation, unspecified constipation type  Assessment & Plan:  -not controlled  -Continue Linzess 290 mcg once daily  -Continue Miralax 17 grams once daily  -high fiber diet   -Follow up with GI  Hospital discharge follow-up  Assessment & Plan:  -Hospitalization 10/25/24-10/27/24 reviewed   Orders:  -     NM DISCHARGE MEDS RECONCILED W/ CURRENT OUTPATIENT MED LIST  White blood cell abnormality  Assessment & Plan:  -immature white blood cells seen on labs  -Referral to Hematology   Orders:  -     [facility] - Pepper Rob DO, Hematology/Oncology, [place]      Medical Decision Making: moderate complexity  Return in about 4 weeks (around 12/13/2024) for diabetes, hypertension, hyperlipidemia.           Subjective:   Patient was admitted to the hospital 10/25/24-10/27/24 for chest pain and palpitations. Patient states prior to hospitalization she did not have a BM for 8 days and one day after lunch as soon as she sat on the toilet to push to have a BM she started to have chest pain and couldn't breath.

## 2024-11-18 ENCOUNTER — CARE COORDINATION (OUTPATIENT)
Dept: CARE COORDINATION | Age: 69
End: 2024-11-18

## 2024-11-18 DIAGNOSIS — R06.02 SOB (SHORTNESS OF BREATH): Primary | ICD-10-CM

## 2024-11-18 RX ORDER — ALBUTEROL SULFATE 90 UG/1
2 INHALANT RESPIRATORY (INHALATION) EVERY 6 HOURS PRN
Qty: 18 G | Refills: 3 | Status: SHIPPED | OUTPATIENT
Start: 2024-11-18 | End: 2024-11-20 | Stop reason: SDUPTHER

## 2024-11-18 NOTE — CARE COORDINATION
Ambulatory Care Coordination Note     2024 12:33 PM     Patient Current Location:  Home: [address] [place], [place] [id number]     Patient contacted the [facility] by telephone. Verified name and  with patient as identifiers.         ACM: Elizabeth Monreal RN     Challenges to be reviewed by the provider   Additional needs identified to be addressed with provider No  none               Method of communication with provider: none.    Utilization: Patient has not had any utilization since our last call.     Care Summary Note: ACM received VM from patient.  ACM returned call and pt reported that she attempted to schedule a counselor with [employer] and on Friday was told they could accept her workers comp.  Today she received a call informing that they cannot accept the workers comp because it is from [facility].   Patient reported that she has been seeing Adali Logan at [facility] and had an appointment scheduled for tomorrow but she does not want to see this particular therapist anymore.  ACM advised pt to call [facility] to cancel tomorrows visit and ask to be scheduled with a different therapist. Pt VU.     Offered patient enrollment in the Remote Patient Monitoring (RPM) program for in-home monitoring: Yes, patient enrolled; current status is activated and monitoring.     Assessments Completed:   No changes since last call    Medications Reviewed:   Completed during a previous call     Advance Care Planning:   Not reviewed during this call     Care Planning:   Not completed during this call    PCP/Specialist follow up:   Future Appointments         Provider Specialty Dept Phone    2024 11:10 AM Andrea Tom MD Orthopedic Surgery [phone]    2024 2:45 PM Elizabeth Griffith RD Endocrinology [phone]    2024 2:30 PM Raisa Espitia MD Primary Care [phone]    2/3/2025 2:00 PM Lul Thompson MD Pulmonology [phone]    3/19/2025 1:40 PM Ramone Leslie

## 2024-11-20 DIAGNOSIS — R06.02 SOB (SHORTNESS OF BREATH): ICD-10-CM

## 2024-11-20 NOTE — TELEPHONE ENCOUNTER
Patient needs Albuterol Inhaler sent to [facility], Please.    Thank you

## 2024-11-21 ENCOUNTER — OFFICE VISIT (OUTPATIENT)
Dept: ORTHOPEDIC SURGERY | Age: 69
End: 2024-11-21

## 2024-11-21 VITALS — WEIGHT: 168 LBS | BODY MASS INDEX: 30.91 KG/M2 | HEIGHT: 62 IN

## 2024-11-21 DIAGNOSIS — R20.2 PARESTHESIA OF RIGHT LOWER EXTREMITY: ICD-10-CM

## 2024-11-21 DIAGNOSIS — M25.78 DEGENERATION OF INTERVERTEBRAL DISC OF LUMBAR REGION WITH OSTEOPHYTE OF LUMBAR VERTEBRA: ICD-10-CM

## 2024-11-21 DIAGNOSIS — M51.369 DEGENERATION OF INTERVERTEBRAL DISC OF LUMBAR REGION WITH OSTEOPHYTE OF LUMBAR VERTEBRA: ICD-10-CM

## 2024-11-21 DIAGNOSIS — M48.061 LUMBAR FORAMINAL STENOSIS: ICD-10-CM

## 2024-11-21 DIAGNOSIS — M47.816 LUMBAR SPONDYLOSIS: Primary | ICD-10-CM

## 2024-11-21 DIAGNOSIS — Z79.899 POLYPHARMACY: ICD-10-CM

## 2024-11-21 DIAGNOSIS — Z86.39 HISTORY OF DIABETES MELLITUS: ICD-10-CM

## 2024-11-21 RX ORDER — ALBUTEROL SULFATE 90 UG/1
2 INHALANT RESPIRATORY (INHALATION) EVERY 6 HOURS PRN
Qty: 18 G | Refills: 3 | Status: SHIPPED | OUTPATIENT
Start: 2024-11-21

## 2024-11-21 NOTE — PROGRESS NOTES
DONE 9/21/23 [facility]     Order Specific Question:   What is the sedation requirement?     Answer:   None    AFL - Garrick Griffith MD, (EMG), [facility]     Referral Priority:   Routine     Referral Type:   Eval and Treat     Referral Reason:   Specialty Services Required     Referred to Provider:   Garrick Griffith MD     Requested Specialty:   Physical Medicine and Rehab     Number of Visits Requested:   1           Disclaimer:    \"This note was dictated with voice recognition software. Though review and correction are routine, we apologize for any errors.\"

## 2024-11-25 ENCOUNTER — TELEPHONE (OUTPATIENT)
Dept: PRIMARY CARE CLINIC | Age: 69
End: 2024-11-25

## 2024-11-25 NOTE — TELEPHONE ENCOUNTER
Patient was notified paperwork should be completed by tomorrow. Office will contact patient once completed

## 2024-11-25 NOTE — TELEPHONE ENCOUNTER
Patient Requests   *Patient asking about status of completed Form ([employer])  *Patient also asking about status of walker request    Please follow up with patient: [phone]

## 2024-11-26 DIAGNOSIS — E11.42 TYPE 2 DIABETES MELLITUS WITH DIABETIC POLYNEUROPATHY, WITHOUT LONG-TERM CURRENT USE OF INSULIN (HCC): ICD-10-CM

## 2024-11-26 DIAGNOSIS — G89.29 CHRONIC PAIN OF RIGHT LOWER EXTREMITY: ICD-10-CM

## 2024-11-26 DIAGNOSIS — M79.604 CHRONIC PAIN OF RIGHT LOWER EXTREMITY: ICD-10-CM

## 2024-11-26 NOTE — TELEPHONE ENCOUNTER
Medication:   Requested Prescriptions     Pending Prescriptions Disp Refills    gabapentin (NEURONTIN) 300 MG capsule 180 capsule 1     Sig: Take 2 capsules by mouth at bedtime for 180 days.     Last Filled:  5/1/24    Last appt: 11/14/2024   Next appt: 12/16/2024    Last OARRS:       8/29/2023     6:55 PM   RX Monitoring   Periodic Controlled Substance Monitoring No signs of potential drug abuse or diversion identified.

## 2024-11-27 ENCOUNTER — TELEPHONE (OUTPATIENT)
Dept: PRIMARY CARE CLINIC | Age: 69
End: 2024-11-27

## 2024-11-27 RX ORDER — GABAPENTIN 300 MG/1
600 CAPSULE ORAL NIGHTLY
Qty: 180 CAPSULE | Refills: 1 | Status: SHIPPED | OUTPATIENT
Start: 2024-11-27 | End: 2025-05-26

## 2024-11-27 NOTE — TELEPHONE ENCOUNTER
Spoke with patient, notified her Dr. Espitia is out of the office until Monday and her paperwork is not finished yet. Once paperwork is completed the office will call her to notify her. Patient verbalized understanding.

## 2024-11-27 NOTE — TELEPHONE ENCOUNTER
Controlled Substance Monitoring:    Acute and Chronic Pain Monitoring:   RX Monitoring Periodic Controlled Substance Monitoring   11/27/2024  10:16 AM No signs of potential drug abuse or diversion identified.

## 2024-11-27 NOTE — TELEPHONE ENCOUNTER
Pt is calling for [employer] paperwork. She said she was told it would be ready for pickup Monday. I do not see any documents in her chart. Please give pt a call as soon as possible.

## 2024-11-28 PROBLEM — Z13.31 POSITIVE DEPRESSION SCREENING: Status: ACTIVE | Noted: 2024-11-28

## 2024-11-28 PROBLEM — Z00.00 MEDICARE ANNUAL WELLNESS VISIT, SUBSEQUENT: Status: ACTIVE | Noted: 2024-11-28

## 2024-11-29 ENCOUNTER — CARE COORDINATION (OUTPATIENT)
Dept: CARE COORDINATION | Age: 69
End: 2024-11-29

## 2024-11-29 NOTE — ASSESSMENT & PLAN NOTE
-PHQ-9 score today: (PHQ-9 Total Score: 11), additional evaluation and assessment performed, follow-up plan includes but not limited to: Medication management and Referral to /Specialist  for evaluation and management.

## 2024-11-29 NOTE — CARE COORDINATION
Ambulatory Care Coordination Note     2024 4:29 PM     Patient Current Location:  Home: [address] [place], [place] [id number]     ACM contacted the patient by telephone. Verified name and  with patient as identifiers.         ACM: Elizabeth Monreal RN     Challenges to be reviewed by the provider   Additional needs identified to be addressed with provider No  none               Method of communication with provider: none.    Utilization: Patient has not had any utilization since our last call.     Care Summary Note: ACM outreached patient today.  Pt was pleasant and engaged.  Patient reported that she continues to get SOB with exertion and walking longer distances and has to sit and take a brief rest brake.  Patient is waiting on a Rolater 4WW from [employer].  ACM called [facility] and spoke to Afia who reported that they needed clinical information from the face to face with the PCP.  ACM faxed the clinicals from 24 and 11/15/24 and requested a call back to confirm they have everything they need and can provide an ETA.    ACM discussed the current therapy program with [facility].  Pt called and asked for a new therapist and was informed they don't have anyone else they can change her to.  Pt met with her regular therapist, Adali Logan, and again did not feel it was of benefit.   Patient denied any CP, SOB, increased fatigue, leg or abdominal swelling, generalized weakness, vision changes, dizziness, HA, increase in thirst or urination.  This ACM's assessment is that the patient is not experiencing any exacerbation of his chronic illnesses.       Offered patient enrollment in the Remote Patient Monitoring (RPM) program for in-home monitoring: Yes, patient enrolled; current status is activated and monitoring.     Date BP Pulse Ox   2024 119/74/83  (10:20 AM) 98/85  (10:21 AM)   2024 130/76/77  (11:31 AM) 95/76  (11:31 AM)   2024 122/77/92  (8:41 AM) 96/98  (8:41 AM)   2024

## 2024-11-30 PROBLEM — D72.9 WHITE BLOOD CELL ABNORMALITY: Status: ACTIVE | Noted: 2024-11-30

## 2024-11-30 PROBLEM — Z09 HOSPITAL DISCHARGE FOLLOW-UP: Status: ACTIVE | Noted: 2024-11-30

## 2024-11-30 ASSESSMENT — ENCOUNTER SYMPTOMS
DIARRHEA: 0
ABDOMINAL PAIN: 0
BLOOD IN STOOL: 0
CONSTIPATION: 1
WHEEZING: 0
SHORTNESS OF BREATH: 0
SORE THROAT: 0
CHEST TIGHTNESS: 0
COUGH: 0
NAUSEA: 0
SINUS PRESSURE: 0
VOMITING: 0
RHINORRHEA: 0

## 2024-11-30 NOTE — ASSESSMENT & PLAN NOTE
-not controlled  -Continue Linzess 290 mcg once daily  -Continue Miralax 17 grams once daily  -high fiber diet

## 2024-11-30 NOTE — ASSESSMENT & PLAN NOTE
-better  -Continue nitroglycerin patch  -Continue aspirin 81 mg 2 times weekly  -Continue Ranexa  -Continue care per Cardiology

## 2024-12-02 ENCOUNTER — CARE COORDINATION (OUTPATIENT)
Dept: CARE COORDINATION | Age: 69
End: 2024-12-02

## 2024-12-02 NOTE — CARE COORDINATION
Ambulatory Care Coordination Note     12/2/2024 3:55 PM     [facility] placed follow-up call to [facility] to ensure they have all the required clinicals and documents to process the order for a Rolator walker.  [facility] was informed that they don't have any clinicals and all clinicals must be sent via the [employer] Portal which will go to the facilitator.  [facility] explained that [facility] was advised to fax clinicals to -2006 and does not have access to the [employer] Portal.  [facility] was advised that  a fax will not work and clinicals must be sent via [employer] link.  [facility] asked for [employer] link to be emailed so clinicals could be uploaded and this request was declined.  [facility] requested to speak to facilitator as well as have facilitators name and contact information, ACM was abruptly sent to [facility].    ACM will contact Colleen.  [facility] spoke with Pura in the intake department who reported that they just needed the DME order and supporting clinicals to be faxed to [phone].  Once received, will be processed and call placed to patient to plan for delivery date/time. DME order and supporting clinicals faxed as requested.  AC will f/u to ensure processing.       Elizabeth CASTANEDA, RN     Ambulatory Care Manager    [facility]    Phone: [phone]    [email or web address]

## 2024-12-03 NOTE — TELEPHONE ENCOUNTER
Paperwork scanned into chart. Patient notified ready for , papers placed up front in accordion file.

## 2024-12-04 ENCOUNTER — LAB (OUTPATIENT)
Dept: PRIMARY CARE CLINIC | Age: 69
End: 2024-12-04

## 2024-12-04 ENCOUNTER — CARE COORDINATION (OUTPATIENT)
Dept: CASE MANAGEMENT | Age: 69
End: 2024-12-04

## 2024-12-04 VITALS — DIASTOLIC BLOOD PRESSURE: 84 MMHG | SYSTOLIC BLOOD PRESSURE: 136 MMHG

## 2024-12-04 NOTE — CARE COORDINATION
2024 10:08 AM  *  Alert and Triage   -Remote Alert Monitoring Note      Date/Time:  2024 10:08 AM  Patient Current Location: [place]  Verified patients name and  as identifiers.    Rpm alert to be reviewed by the provider   red alert  blood pressure reading (83/62)    Additional needs to be addressed by provider:     LPN contacted pt in regard to RPM red alert for BP of 83/62.     Pt c/o extreme fatigue and weakness. Pt states that she has. Pt also advised that she is taking all meds as prescribed. Pt was in transit during call, on her way to  papers from PCP's office, per pt. Pt unable to recheck BP at this time.     Please advise, thank you.                 LPN contacted patient by telephone regarding red alert received Background: RPM for HTN, Asthma  Refer to 911 immediately if:  Patient unresponsive or unable to provide history  Change in cognition or sudden confusion  Patient unable to respond in complete sentences  Intense chest pain/tightness  Any concern for any clinical emergency  Red Alert: Provider response time of 1 hr required for any red alert requiring intervention  Yellow Alert: Provider response time of 3hr required for any escalated yellow alert     Patient Chief Complaint:  BP Triage  Are you having any Chest Pain? no   Are you having any Shortness of Breath? no   Do you have a headache or have any vision changes? no   Are you having any numbness or tingling? no   Are you having any other health concerns or issues? Yes, fatigue       Clinical Interventions: LPN contacted pt in regard to RPM red alert for BP of 83/62. Pt c/o extreme fatigue and weakness. Pt states that she has. Pt also advised that she is taking all meds as prescribed. Pt was in transit during call, on her way to  papers from PCP's office, per pt. Pt unable to recheck BP at this time. Writer advised ACM and PCP.        Plan/Follow Up: Will continue to review, monitor and address alerts with follow up based

## 2024-12-05 ENCOUNTER — CARE COORDINATION (OUTPATIENT)
Dept: CARE COORDINATION | Age: 69
End: 2024-12-05

## 2024-12-05 NOTE — CARE COORDINATION
Ambulatory Care Coordination Note     2024 3:40 PM     Patient Current Location:  Home: [address] [place], [place] [id number]     ACM contacted the patient by telephone. Verified name and  with patient as identifiers.         ACM: Elizabeth Monreal RN     Challenges to be reviewed by the provider   Additional needs identified to be addressed with provider No  none               Method of communication with provider: chart routing.    Utilization: Patient has not had any utilization since our last call.     Care Summary Note: ACM outreached patient to f/u on DME-Rolator walker status.  ACM also called [employer] [phone] and confirmed everything is ready walker to be delivered. They are waiting for a call back from patient to schedule delivery.  ACM called pt and advised her to call [employer] to schedule delivery and number provided.   Patient denied any CP, SOB, increased fatigue, leg or abdominal swelling, generalized weakness, vision changes, dizziness, HA, increase in thirst or urination.  This ACM's assessment is that the patient is not experiencing any exacerbation of his chronic illnesses.       Offered patient enrollment in the Remote Patient Monitoring (RPM) program for in-home monitoring: Yes, patient enrolled; current status is activated and monitoring.     Date BP Pulse Ox   2024 125/76/90  (9:42 AM) 95/99  (9:41 AM)   2024 120/76/101  (1:58 PM)  83/62/90  (9:22 AM) 95/89  (9:22 AM)   12/3/2024 127/82/102  (9:49 AM) 95/110  (9:49 AM)   2024 121/74/83  (9:39 AM) 97/96  (9:38 AM)   2024 121/74/88  (8:49 AM) 93/101  (8:48 AM)     Assessments Completed:   Diabetes Assessment    Medic Alert ID: No  Meal Planning: None   How often do you test your blood sugar?: Daily   Do you have barriers with adherence to non-pharmacologic self-management interventions? (Nutrition/Exercise/Self-Monitoring): No   Have you ever had to go to the ED for symptoms of low blood sugar?: No       No

## 2024-12-05 NOTE — CARE COORDINATION
Ambulatory Care Coordination Note     12/5/2024 3:58 PM     ACM received call back from patient who reported that she called [employer] and was told she had to pay $40 before she could have the walker delivered.  Pt reported that she is not going to pay then $40 and will ask her friend if she has one.  Pt explains her friend's mother had a Rolator and she had since passed away.      Patient very upset informing ACM that she went to the doctor today that she thought was a kidney doctor ut kept calling them a urologist.  Pt reports that someone from that office called Dr. Espitia's office.  The physician declined to see her, per pt understanding this doctor was a transplant surgeon and did not know why patient was there. Pt expressed that she is very upset and frustrated and at this time doesn't want to see anyone anymore about her kidneys. Pt will discuss with ACM at later time.     Referral was placed to Dr. Ruelas, nephrologist by Dr. Espitia on 11/14/24. On 11/15/24, AC spoke to pt who informed that she had called Dr. Ruelas's office, answered all their questions and they were to call her back with an appointment date/time.       Elizabeth CASTANEDA, RN     Ambulatory Care Manager    [facility]    Phone: [phone]    [email or web address]

## 2024-12-06 ENCOUNTER — TELEPHONE (OUTPATIENT)
Dept: PRIMARY CARE CLINIC | Age: 69
End: 2024-12-06

## 2024-12-06 DIAGNOSIS — M47.816 SPONDYLOSIS WITHOUT MYELOPATHY OR RADICULOPATHY, LUMBAR REGION: Primary | ICD-10-CM

## 2024-12-11 ENCOUNTER — OFFICE VISIT (OUTPATIENT)
Dept: ENDOCRINOLOGY | Age: 69
End: 2024-12-11
Payer: MEDICARE

## 2024-12-11 DIAGNOSIS — E11.42 TYPE 2 DIABETES MELLITUS WITH DIABETIC POLYNEUROPATHY, WITHOUT LONG-TERM CURRENT USE OF INSULIN (HCC): Primary | ICD-10-CM

## 2024-12-11 PROCEDURE — 97802 MEDICAL NUTRITION INDIV IN: CPT

## 2024-12-11 PROCEDURE — G8417 CALC BMI ABV UP PARAM F/U: HCPCS

## 2024-12-11 PROCEDURE — G8427 DOCREV CUR MEDS BY ELIG CLIN: HCPCS

## 2024-12-11 PROCEDURE — 3044F HG A1C LEVEL LT 7.0%: CPT

## 2024-12-11 NOTE — PROGRESS NOTES
Medical Nutrition Therapy for Diabetes  [facility] Endocrinology    Tatiana Shepard  December 11, 2024    Patient Care Team:  Raisa Espitia MD as PCP - General (Internal Medicine)  Raisa Espitia MD as PCP - Empaneled Provider  Carli Heard LSW as  (Licensed Clinical )  Elizabeth Monreal, RN as Ambulatory Care Manager    Reason for visit: 1. Type 2 diabetes mellitus with diabetic polyneuropathy, without long-term current use of insulin (HCC)     Initial     ASSESSMENT/PLAN:   NUTRITION DIAGNOSIS    #1 Problem: Altered Nutrition-Related Laboratory Values (NC-2.2)  Related to: Endocrine/Diabetes   As Evidenced by: Elevated Plasma glucose and/or HgbA1c levels         #2 Problem: Inconsistent Carbohydrate Intake (NI 5.8.4)  Related to: Varied meal timing / incorrect carbohydrate counting  As Evidenced by: fluctuation in blood glucose levels / food recall    #3 Problem: Knowledge and Beliefs-NB-3.1                       Food and nutrition deficits    NUTRITION INTERVENTION  Nutrition Prescription: 45 grams carbohydrate per meal with protein and non-starch vegetables  15 gram carbohydrate snacks     Diabetes Education/Counseling included:\    Pt having difficulty with strength, tired often, unable to get out out of bed to make food. Is utilizing MOW serviced for one meal per day. Sometimes forgets she has heated them up and will find them in microwave the next day. Is receiving care coordination. Reports she goes a few days with eating only nuts and fruit.     Carbohydrate control-ADA plate method for pairing carbohydrate and non-carbohydrate foods  Impacts of fiber, fats, and proteins on blood glucose trending   Benefit of eating protein with each meal/snack reviewed  Label reading    Explained small efforts can promote improved health results  Monitoring- Glucometer 110-125 mg/dL in morning.   Reviewed proper medication timing  Benefits of adequate hydration, quality sleep

## 2024-12-16 ENCOUNTER — OFFICE VISIT (OUTPATIENT)
Dept: PRIMARY CARE CLINIC | Age: 69
End: 2024-12-16

## 2024-12-16 VITALS
TEMPERATURE: 98.5 F | DIASTOLIC BLOOD PRESSURE: 76 MMHG | SYSTOLIC BLOOD PRESSURE: 112 MMHG | WEIGHT: 166 LBS | RESPIRATION RATE: 16 BRPM | OXYGEN SATURATION: 95 % | HEIGHT: 62 IN | HEART RATE: 102 BPM | BODY MASS INDEX: 30.55 KG/M2

## 2024-12-16 DIAGNOSIS — J10.1 INFLUENZA A: Primary | ICD-10-CM

## 2024-12-16 DIAGNOSIS — R68.89 FLU-LIKE SYMPTOMS: ICD-10-CM

## 2024-12-16 LAB
INFLUENZA A ANTIBODY: POSITIVE
INFLUENZA B ANTIBODY: NEGATIVE
Lab: 0
QC PASS/FAIL: NORMAL
SARS-COV-2 RDRP RESP QL NAA+PROBE: NEGATIVE

## 2024-12-16 RX ORDER — OSELTAMIVIR PHOSPHATE 75 MG/1
75 CAPSULE ORAL 2 TIMES DAILY
Qty: 10 CAPSULE | Refills: 0 | Status: SHIPPED | OUTPATIENT
Start: 2024-12-16 | End: 2024-12-21

## 2024-12-16 RX ORDER — BENZONATATE 100 MG/1
100 CAPSULE ORAL 3 TIMES DAILY PRN
Qty: 30 CAPSULE | Refills: 0 | Status: SHIPPED | OUTPATIENT
Start: 2024-12-16 | End: 2024-12-26 | Stop reason: SDUPTHER

## 2024-12-16 RX ORDER — GUAIFENESIN AND DEXTROMETHORPHAN HYDROBROMIDE 600; 30 MG/1; MG/1
1 TABLET, EXTENDED RELEASE ORAL
Qty: 20 TABLET | Refills: 0 | Status: SHIPPED | OUTPATIENT
Start: 2024-12-16

## 2024-12-16 SDOH — ECONOMIC STABILITY: INCOME INSECURITY: HOW HARD IS IT FOR YOU TO PAY FOR THE VERY BASICS LIKE FOOD, HOUSING, MEDICAL CARE, AND HEATING?: NOT HARD AT ALL

## 2024-12-16 SDOH — ECONOMIC STABILITY: FOOD INSECURITY: WITHIN THE PAST 12 MONTHS, YOU WORRIED THAT YOUR FOOD WOULD RUN OUT BEFORE YOU GOT MONEY TO BUY MORE.: NEVER TRUE

## 2024-12-16 SDOH — ECONOMIC STABILITY: FOOD INSECURITY: WITHIN THE PAST 12 MONTHS, THE FOOD YOU BOUGHT JUST DIDN'T LAST AND YOU DIDN'T HAVE MONEY TO GET MORE.: NEVER TRUE

## 2024-12-16 ASSESSMENT — PATIENT HEALTH QUESTIONNAIRE - PHQ9
4. FEELING TIRED OR HAVING LITTLE ENERGY: NOT AT ALL
10. IF YOU CHECKED OFF ANY PROBLEMS, HOW DIFFICULT HAVE THESE PROBLEMS MADE IT FOR YOU TO DO YOUR WORK, TAKE CARE OF THINGS AT HOME, OR GET ALONG WITH OTHER PEOPLE: NOT DIFFICULT AT ALL
1. LITTLE INTEREST OR PLEASURE IN DOING THINGS: NOT AT ALL
2. FEELING DOWN, DEPRESSED OR HOPELESS: NOT AT ALL
3. TROUBLE FALLING OR STAYING ASLEEP: NOT AT ALL
7. TROUBLE CONCENTRATING ON THINGS, SUCH AS READING THE NEWSPAPER OR WATCHING TELEVISION: NOT AT ALL
6. FEELING BAD ABOUT YOURSELF - OR THAT YOU ARE A FAILURE OR HAVE LET YOURSELF OR YOUR FAMILY DOWN: NOT AT ALL
SUM OF ALL RESPONSES TO PHQ9 QUESTIONS 1 & 2: 0
SUM OF ALL RESPONSES TO PHQ QUESTIONS 1-9: 0
8. MOVING OR SPEAKING SO SLOWLY THAT OTHER PEOPLE COULD HAVE NOTICED. OR THE OPPOSITE, BEING SO FIGETY OR RESTLESS THAT YOU HAVE BEEN MOVING AROUND A LOT MORE THAN USUAL: NOT AT ALL
5. POOR APPETITE OR OVEREATING: NOT AT ALL
9. THOUGHTS THAT YOU WOULD BE BETTER OFF DEAD, OR OF HURTING YOURSELF: NOT AT ALL
SUM OF ALL RESPONSES TO PHQ QUESTIONS 1-9: 0

## 2024-12-16 NOTE — PROGRESS NOTES
Date of Visit: 2024    Tatiana Shepard (:  1955) is a 69 y.o. female,  Established patient here for evaluation of the following chief complaint(s):  Cough, Congestion, Chest Pain, Pharyngitis, Shortness of Breath, and Ear Pain      ASSESSMENT/PLAN:    1. Influenza A  Assessment & Plan:  -Tamiflu 75mg 2 times daily for 5 days  -Mucinex DM 1 tablet 2 times daily as needed  -Flonase nasal spray 2 sprays each nostril once daily   -Tylenol 650mg 3 times daily as needed  -stay hydrated  Orders:  -     oseltamivir (TAMIFLU) 75 MG capsule; Take 1 capsule by mouth 2 times daily for 5 days, Disp-10 capsule, R-0Normal  -     dextromethorphan-guaiFENesin (MUCINEX DM)  MG per extended release tablet; Take 1 tablet by mouth in the morning and 1 tablet in the evening., Disp-20 tablet, R-0Normal  2. Flu-like symptoms  Assessment & Plan:  -POCT influenza A and B done and is positive for influenza A   -Rapid Covid test is negative  -Tamiflu for influenza A  -Mucinex DM 1 tablet 2 times daily as needed  -Flonase nasal spray 2 sprays each nostril once daily   Orders:  -     POCT COVID-19 Rapid, NAAT  -     POCT Influenza A/B  -     dextromethorphan-guaiFENesin (MUCINEX DM)  MG per extended release tablet; Take 1 tablet by mouth in the morning and 1 tablet in the evening., Disp-20 tablet, R-0Normal      Return in about 2 weeks (around 2024) for diabetes, hypertension, hyperlipidemia.    SUBJECTIVE:    Patient states Saturday night 24 after dinner she cleaned up dishes and sat down to watch TV and started sweating. Patient states she took her temperature and did not have a fever. Patient states she woke up at 5:00 AM yesterday and had a cough with slight yellow phlegm. Patient states she feels phlegm but cannot bring it up all the time. Patient states she feels hot. Patient states she has burning pain in her right ear and burning in her throat. Patient complains of postnasal drainage. Patient

## 2024-12-18 ENCOUNTER — CARE COORDINATION (OUTPATIENT)
Dept: CARE COORDINATION | Age: 69
End: 2024-12-18

## 2024-12-18 NOTE — CARE COORDINATION
confirmed that there was a very small amount of blood in the phlegm, pt verified.  [facility] advised pt to not be overly concerned with the scant amount of blood but if she were to have quarter size amount of bright red blood she needs to contact Dr. Espitia's office, if it becomes a significant amount of blood,, call 911.  [facility] encouraged pt also to call 911 if she begins to have respiratory distress.  Pt VU and thanked [facility] for calling ans asked that ACM check on her tomorrow.     Offered patient enrollment in the Remote Patient Monitoring (RPM) program for in-home monitoring: Yes, patient enrolled; current status is activated and monitoring.     Date BP Pulse Ox   12/18/2024 130/78/85  (9:43 AM) 93/92  (9:42 AM)   12/17/2024 116/67/92  (8:16 AM) 94/97  (8:15 AM)   12/16/2024 124/80/103  (8:52 AM) 96/107  (10:03 AM)  90/108  (10:02 AM)  93/107  (10:02 AM)  92/106  (9:53 AM)  95/84  (8:51 AM)   12/13/2024 117/75/82  (12:28 PM) 97/77  (12:29 PM)   12/12/2024 113/72/86  (10:03 AM) 94/89  (10:04 AM)       Assessments Completed:   General Assessment    Do you have any symptoms that are causing concern?: Yes  Progression since Onset: Unchanged  Reported Symptoms: Congestion, Cough, Fatigue, Pain (Comment: Dx with Flu A 2 days ago)          Medications Reviewed:   Completed during a previous call     Advance Care Planning:   Not reviewed during this call     Care Planning:   Not completed during this call    PCP/Specialist follow up:   Future Appointments         Provider Specialty Dept Phone    12/31/2024 10:45 AM Raisa Espitia MD Primary Care [phone]    2/3/2025 2:00 PM Lul Thompson MD Pulmonology [phone]    3/10/2025 1:30 PM Ramone Leslie MD Cardiology [phone]    5/13/2025 10:00 AM (Arrive by 9:45 AM[facility] [phone]    5/13/2025 10:20 AM Paulo Roque MD Endocrinology [phone]            Follow Up:   Plan for next ACM outreach in approximately 1 week to complete:  -

## 2024-12-19 ENCOUNTER — CARE COORDINATION (OUTPATIENT)
Dept: CARE COORDINATION | Age: 69
End: 2024-12-19

## 2024-12-19 NOTE — CARE COORDINATION
Ambulatory Care Coordination Note     2024 1:55 PM     Patient Current Location:  Home: [address] [place], [place] [id number]     ACM contacted the patient by telephone. Verified name and  with patient as identifiers.         ACM: Elizabeth Monreal RN     Challenges to be reviewed by the provider   Additional needs identified to be addressed with provider No  none               Method of communication with provider: none.    Utilization: Patient has not had any utilization since our last call.     Care Summary Note: ACM called to check in on patient today as she requested.  Pt reported that she still feels \"real bad\" but was able to sleep well.  Pt asked when the medication is going to \"kick in\" and ACM again explained that the medication she is on is to help relieve some of the symptoms while the virus runs its course. ACM ensured pt that it is typical to feel bad for up to a week thus why she should be resting, taking the medications prescribed, Tylenol or Ibuprofen for discomfort and get plenty of fluids.  Pt VU and appreciation for call.   Patient denied any CP, SOB, leg or abdominal swelling, generalized weakness, vision changes, dizziness, HA, increase in thirst or urination.  This ACM's assessment is that the patient is not experiencing any exacerbation of his chronic illnesses.     Offered patient enrollment in the Remote Patient Monitoring (RPM) program for in-home monitoring: Yes, patient enrolled; current status is activated and monitoring.     Date BP Pulse Ox   2024 124/82/81  (9:57 AM) 95/79  (9:57 AM)   2024 130/78/85  (9:43 AM) 93/92  (9:42 AM)   2024 116/67/92  (8:16 AM) 94/97  (8:15 AM)   2024 124/80/103  (8:52 AM) 96/107  (10:03 AM)  90/108  (10:02 AM)  93/107  (10:02 AM)  92/106  (9:53 AM)  95/84  (8:51 AM)       Assessments Completed:   No changes since last call    Medications Reviewed:   Completed during a previous call     Advance Care Planning:   Not

## 2024-12-26 ENCOUNTER — CARE COORDINATION (OUTPATIENT)
Dept: CARE COORDINATION | Age: 69
End: 2024-12-26

## 2024-12-26 DIAGNOSIS — J10.1 INFLUENZA A: ICD-10-CM

## 2024-12-26 DIAGNOSIS — J45.40 MODERATE PERSISTENT ASTHMA WITHOUT COMPLICATION: ICD-10-CM

## 2024-12-26 DIAGNOSIS — R68.89 FLU-LIKE SYMPTOMS: ICD-10-CM

## 2024-12-26 RX ORDER — BENZONATATE 100 MG/1
100 CAPSULE ORAL 3 TIMES DAILY PRN
Qty: 30 CAPSULE | Refills: 0 | Status: SHIPPED | OUTPATIENT
Start: 2024-12-26

## 2024-12-26 RX ORDER — TIOTROPIUM BROMIDE 18 UG/1
CAPSULE ORAL; RESPIRATORY (INHALATION)
Qty: 90 CAPSULE | Refills: 0 | Status: SHIPPED | OUTPATIENT
Start: 2024-12-26

## 2024-12-26 NOTE — TELEPHONE ENCOUNTER
I can just send in refill but if not improving may need to be seen to make sure she doesn't have bacterial pneumonia now

## 2024-12-26 NOTE — TELEPHONE ENCOUNTER
Should I try to schedule her for a VV today (would need to double book) for a refill on cough medicine?

## 2024-12-26 NOTE — CARE COORDINATION
Ambulatory Care Coordination Note     2024 11:41 AM     Patient Current Location:  Home: [address] [id number]     ACM contacted the patient by telephone. Verified name and  with patient as identifiers.         ACM: Elizabeth Monreal RN     Challenges to be reviewed by the provider   Additional needs identified to be addressed with provider Yes  Patient called ACM expressing that she still feels poorly, has finished all of her Tamaflu and is out of her cough medications.  Patient reports that she was trying to call the office and on hold over 20 minutes.  She would like to request refills on the cough medications.  She does still have a productive cough, body aches, and generally feeling ill.  She denies fever.  Could someone contact this patient via phone with update on refills and if a VV is needed?                Method of communication with provider: chart routing.    Utilization: Patient has not had any utilization since our last call.     Care Summary Note: ACM received VM from patient expressing that she didn't feel good and was unable to make contact witht he office.  ACM return call to patient who reported that she still does not feel well and has completed all of her Tamaflu and cough medications.  Pt still has productive couch, ACM able to hear on the phone.  Pt reports that she has not had a fever recently but also has not slept at all last night.  Patient is requesting ACM assistance with obtaining refills for cough medications.  ACM informed pt that PCP may want a VV before prescribing but ACM would reach out to the office clinical pool and PCP to see if they can reach out to patient to discuss further.   Patient reported that she was DC'd from home care today.  Patient denied any CP, SOB, leg or abdominal swelling, vision changes, dizziness, HA, increase in thirst or urination.  This ACM's assessment is that the patient is not experiencing any exacerbation of her chronic

## 2024-12-26 NOTE — TELEPHONE ENCOUNTER
Medication:   Requested Prescriptions     Pending Prescriptions Disp Refills    tiotropium (SPIRIVA) 18 MCG inhalation capsule [Pharmacy Med Name: TIOTROP BROM CAP 18MCG] 90 capsule 0     Sig: INHALE THE CONTENTS OF ONE CAPSULE DAILY     Last Filled:  7/23/2024    Last appt: 12/16/2024   Next appt: 12/31/2024    Last OARRS:       11/27/2024    10:16 AM   RX Monitoring   Periodic Controlled Substance Monitoring No signs of potential drug abuse or diversion identified.

## 2024-12-27 DIAGNOSIS — M19.012 ARTHRITIS OF LEFT ACROMIOCLAVICULAR JOINT: ICD-10-CM

## 2024-12-27 LAB — CREAT SERPL-MCNC: 0.9 MG/DL (ref 0.6–1.2)

## 2024-12-27 NOTE — TELEPHONE ENCOUNTER
Medication:   Requested Prescriptions     Pending Prescriptions Disp Refills    diclofenac sodium (VOLTAREN) 1 %  g 0     Last Filled:  4/26/24    Last appt: 12/16/2024   Next appt: 12/31/2024    Last OARRS:       11/27/2024    10:16 AM   RX Monitoring   Periodic Controlled Substance Monitoring No signs of potential drug abuse or diversion identified.

## 2024-12-28 PROBLEM — Z00.00 MEDICARE ANNUAL WELLNESS VISIT, SUBSEQUENT: Status: RESOLVED | Noted: 2024-11-28 | Resolved: 2024-12-28

## 2024-12-29 PROBLEM — J10.1 INFLUENZA A: Status: ACTIVE | Noted: 2024-12-29

## 2024-12-29 PROBLEM — R68.89 FLU-LIKE SYMPTOMS: Status: ACTIVE | Noted: 2024-12-29

## 2024-12-30 PROBLEM — Z09 HOSPITAL DISCHARGE FOLLOW-UP: Status: RESOLVED | Noted: 2024-11-30 | Resolved: 2024-12-30

## 2024-12-31 ENCOUNTER — TELEPHONE (OUTPATIENT)
Age: 69
End: 2024-12-31

## 2024-12-31 ENCOUNTER — OFFICE VISIT (OUTPATIENT)
Dept: PRIMARY CARE CLINIC | Age: 69
End: 2024-12-31

## 2024-12-31 ENCOUNTER — HOSPITAL ENCOUNTER (OUTPATIENT)
Dept: GENERAL RADIOLOGY | Age: 69
Discharge: HOME | End: 2024-12-31
Payer: MEDICARE

## 2024-12-31 VITALS
BODY MASS INDEX: 30.18 KG/M2 | OXYGEN SATURATION: 98 % | SYSTOLIC BLOOD PRESSURE: 126 MMHG | WEIGHT: 165 LBS | HEART RATE: 96 BPM | RESPIRATION RATE: 16 BRPM | TEMPERATURE: 97.6 F | DIASTOLIC BLOOD PRESSURE: 86 MMHG

## 2024-12-31 DIAGNOSIS — J40 BRONCHITIS: ICD-10-CM

## 2024-12-31 DIAGNOSIS — E78.2 MIXED HYPERLIPIDEMIA: Chronic | ICD-10-CM

## 2024-12-31 DIAGNOSIS — R53.83 FATIGUE, UNSPECIFIED TYPE: ICD-10-CM

## 2024-12-31 DIAGNOSIS — E11.42 TYPE 2 DIABETES MELLITUS WITH DIABETIC POLYNEUROPATHY, WITHOUT LONG-TERM CURRENT USE OF INSULIN (HCC): Primary | ICD-10-CM

## 2024-12-31 DIAGNOSIS — J06.9 UPPER RESPIRATORY TRACT INFECTION, UNSPECIFIED TYPE: ICD-10-CM

## 2024-12-31 DIAGNOSIS — I10 ESSENTIAL HYPERTENSION: ICD-10-CM

## 2024-12-31 LAB
ANION GAP SERPL CALCULATED.3IONS-SCNC: 12 MMOL/L (ref 3–16)
BASOPHILS # BLD: 0.1 K/UL (ref 0–0.2)
BASOPHILS NFR BLD: 1 %
BUN SERPL-MCNC: 18 MG/DL (ref 7–20)
CALCIUM SERPL-MCNC: 9.4 MG/DL (ref 8.3–10.6)
CHLORIDE SERPL-SCNC: 105 MMOL/L (ref 99–110)
CO2 SERPL-SCNC: 20 MMOL/L (ref 21–32)
CREAT SERPL-MCNC: 0.9 MG/DL (ref 0.6–1.2)
DEPRECATED RDW RBC AUTO: 13.6 % (ref 12.4–15.4)
EOSINOPHIL # BLD: 0.1 K/UL (ref 0–0.6)
EOSINOPHIL NFR BLD: 1.1 %
GFR SERPLBLD CREATININE-BSD FMLA CKD-EPI: 69 ML/MIN/{1.73_M2}
GLUCOSE SERPL-MCNC: 143 MG/DL (ref 70–99)
HCT VFR BLD AUTO: 41.3 % (ref 36–48)
HGB BLD-MCNC: 14 G/DL (ref 12–16)
LYMPHOCYTES # BLD: 2 K/UL (ref 1–5.1)
LYMPHOCYTES NFR BLD: 26.1 %
MCH RBC QN AUTO: 30.2 PG (ref 26–34)
MCHC RBC AUTO-ENTMCNC: 34 G/DL (ref 31–36)
MCV RBC AUTO: 88.8 FL (ref 80–100)
MONOCYTES # BLD: 0.6 K/UL (ref 0–1.3)
MONOCYTES NFR BLD: 8.3 %
NEUTROPHILS # BLD: 4.9 K/UL (ref 1.7–7.7)
NEUTROPHILS NFR BLD: 63.5 %
PLATELET # BLD AUTO: 274 K/UL (ref 135–450)
PMV BLD AUTO: 8.4 FL (ref 5–10.5)
POTASSIUM SERPL-SCNC: 4.4 MMOL/L (ref 3.5–5.1)
RBC # BLD AUTO: 4.65 M/UL (ref 4–5.2)
SODIUM SERPL-SCNC: 137 MMOL/L (ref 136–145)
WBC # BLD AUTO: 7.7 K/UL (ref 4–11)

## 2024-12-31 PROCEDURE — 71046 X-RAY EXAM CHEST 2 VIEWS: CPT

## 2024-12-31 RX ORDER — BENZONATATE 200 MG/1
200 CAPSULE ORAL 3 TIMES DAILY PRN
Qty: 30 CAPSULE | Refills: 0 | Status: SHIPPED | OUTPATIENT
Start: 2024-12-31 | End: 2025-01-10

## 2024-12-31 RX ORDER — DEXTROMETHORPHAN HYDROBROMIDE AND PROMETHAZINE HYDROCHLORIDE 15; 6.25 MG/5ML; MG/5ML
5 SYRUP ORAL 4 TIMES DAILY PRN
Qty: 120 ML | Refills: 0 | Status: SHIPPED | OUTPATIENT
Start: 2024-12-31

## 2024-12-31 RX ORDER — AZITHROMYCIN 250 MG/1
TABLET, FILM COATED ORAL
Qty: 1 PACKET | Refills: 0 | Status: SHIPPED | OUTPATIENT
Start: 2024-12-31 | End: 2025-01-10

## 2024-12-31 NOTE — PROGRESS NOTES
Date of Visit: 2024    Tatiana Shepard (:  1955) is a 69 y.o. female,  Established patient here for evaluation of the following chief complaint(s):  Diabetes, Hypertension, and Hyperlipidemia      ASSESSMENT/PLAN:    1. Type 2 diabetes mellitus with diabetic polyneuropathy, without long-term current use of insulin (Formerly [facility])  Assessment & Plan:  -Most recent Hemoglobin A1c of 6.3% shows diabetes is well controlled  -Continue Trulicity 0.75mg SC weekly  -continue gabapentin for neuropathy  -Limit carbohydrates to 45 grams with meals and 15 grams with snacks  -monitor blood sugars  -goal for blood sugar fasting or pre-meal  is   -goal for blood sugar 2 hours after a meal is less than 180  -goal for blood sugar at bedtime is less than 150  -Regular aerobic exercise  Orders:  -      DIABETES FOOT EXAM  2. Essential hypertension  Assessment & Plan:  -stable  -Continue diltiazem ER 240mg once daily  -Low sodium diet  -Regular aerobic exercise  3. Mixed hyperlipidemia  Assessment & Plan:  -Stable  -Continue rosuvastatin 10 mg once daily  -low cholesterol, low fat diet  -regular aerobic exercise  4. Bronchitis  Assessment & Plan:  -Chest xray  -Zithromax Z Pack  -Increase Tessalon Perles to 200 mg 3 times daily as needed for cough  -Promethazine DM cough syrup 5ml 4 times daily as needed  -Albuterol HFA inhaler 2 puffs every 6 hours as needed  -Continue fluticasone-salmeterol 500-50 mcg Diskus inhaler 1 puff 2 times daily  Orders:  -     XR CHEST (2 VW); Future  -     promethazine-dextromethorphan (PROMETHAZINE-DM) 6.25-15 MG/5ML syrup; Take 5 mLs by mouth 4 times daily as needed for Cough, Disp-120 mL, R-0Normal  -     azithromycin (ZITHROMAX) 250 MG tablet; Take 2 tabs (500 mg) on Day 1, and take 1 tab (250 mg) on days 2 through 5., Disp-1 packet, R-0Normal  -     benzonatate (TESSALON) 200 MG capsule; Take 1 capsule by mouth 3 times daily as needed for Cough, Disp-30 capsule, R-0Normal  5. Upper

## 2025-01-01 DIAGNOSIS — M19.012 ARTHRITIS OF LEFT ACROMIOCLAVICULAR JOINT: ICD-10-CM

## 2025-01-02 DIAGNOSIS — E78.2 MIXED HYPERLIPIDEMIA: ICD-10-CM

## 2025-01-02 DIAGNOSIS — J45.40 MODERATE PERSISTENT ASTHMA WITHOUT COMPLICATION: ICD-10-CM

## 2025-01-02 RX ORDER — TIOTROPIUM BROMIDE 18 UG/1
CAPSULE ORAL; RESPIRATORY (INHALATION)
Qty: 90 CAPSULE | Refills: 0 | OUTPATIENT
Start: 2025-01-02

## 2025-01-02 NOTE — TELEPHONE ENCOUNTER
Medication:   Requested Prescriptions     Pending Prescriptions Disp Refills    diclofenac sodium (VOLTAREN) 1 % GEL [Pharmacy Med Name: DICLOFENAC GEL 1% OTC]  2     Sig: APPLY 2 GRAMS TOPICALLY 4  TIMES A DAY AS NEEDED FOR  PAIN.     Last Filled:  12.31.24    Last appt: 12/31/2024   Next appt: 1/14/2025    Last OARRS:       11/27/2024    10:16 AM   RX Monitoring   Periodic Controlled Substance Monitoring No signs of potential drug abuse or diversion identified.

## 2025-01-02 NOTE — TELEPHONE ENCOUNTER
Medication:   Requested Prescriptions     Pending Prescriptions Disp Refills    rosuvastatin (CRESTOR) 10 MG tablet [Pharmacy Med Name: ROSUVASTATIN TAB 10MG] 90 tablet 1     Sig: TAKE 1 TABLET DAILY     Last Filled:  07/25/24    Last appt: 12/31/2024   Next appt: 1/2/2025    Last Lipid:   Lab Results   Component Value Date/Time    CHOL 162 10/26/2024 03:50 AM    TRIG 160 10/26/2024 03:50 AM    HDL 65 10/26/2024 03:50 AM    HDL 58 10/27/2011 08:44 AM

## 2025-01-03 RX ORDER — ROSUVASTATIN CALCIUM 10 MG/1
10 TABLET, COATED ORAL DAILY
Qty: 90 TABLET | Refills: 1 | Status: SHIPPED | OUTPATIENT
Start: 2025-01-03

## 2025-01-06 ENCOUNTER — CARE COORDINATION (OUTPATIENT)
Dept: CASE MANAGEMENT | Age: 70
End: 2025-01-06

## 2025-01-06 NOTE — CARE COORDINATION
Ambulatory Care Coordination Note     2025 3:30 PM     Patient Current Location:  Home: [address] [place], [place] [id number]     LPN CC contacted the patient by telephone. Verified name and  with patient as identifiers.         ACM: Padmini Madrid LPN     Challenges to be reviewed by the provider   Additional needs identified to be addressed with provider Yes  Patient completed atbx as directed. Still having some cough with c/o \"choking\" sensation & breathlessness when talking. States standing up from sitting position relieves sx. Patient became breathless & had to stand up at times during call with this nurse.                Method of communication with provider: chart routing.    Utilization: Patient has not had any utilization since our last call.     Care Summary Note: LPN CC spoke with patient. States she's doing ok. C/o fatigue. PCP treated patient with atbx & cough suppressants at visit . Patient notes most of her sx have improved. She does have SOB and \"choking\" sensation when trying to talk or when sitting. States she has to stop talking and/or stand up when sx occur. This occurred several times during our brief conversation. Patient completed atbx. She is taking cough medications. Notes she is currently using albuterol inhaler 3-4x/day. LPN CC suggested patient try nebulizer to see if this works any better. Patient agreeable to try. She does have some nonproductive cough.     Thank you,   Padmini Madrid LPN Care Coordinator   [facility]  Remote Patient Monitoring, Care Transitions, Ambulatory Care Management  [phone]    Offered patient enrollment in the Remote Patient Monitoring (RPM) program for in-home monitoring: Patient is not eligible for RPM program because: insurance coverage.     Assessments Completed:   No changes since last call    Medications Reviewed:   Completed during this call    Advance Care Planning:   Not reviewed during this call     Care Planning:   Not

## 2025-01-08 ENCOUNTER — CARE COORDINATION (OUTPATIENT)
Dept: CARE COORDINATION | Age: 70
End: 2025-01-08

## 2025-01-08 NOTE — CARE COORDINATION
Ambulatory Care Coordination Note     1/8/2025 1:45 PM     Patient outreach attempt by this ACM today to perform care management follow up . AC was unable to reach the patient by telephone today;   left voice message requesting a return phone call to this ACM.     ACM: Elizabeth Monreal RN     Care Summary Note: Outreach to f/u on call from 1/6, C/O \"choking\" feeling when talking/sitting, continued cough    PCP/Specialist follow up:   Future Appointments         Provider Specialty Dept Phone    1/14/2025 3:30 PM Raisa Espitia MD Primary Care [phone]    1/16/2025 1:10 PM Andrea Tom MD Orthopedic Surgery [phone]    1/17/2025 1:00 PM (Arrive by 12:45 PM) [facility] ([place], [place]) Radiology [phone]    1/20/2025 1:00 PM Andrea Tom MD Orthopedic Surgery [phone]    2/3/2025 2:00 PM Lul Thompson MD Pulmonology [phone]    3/10/2025 1:30 PM Ramone Leslie MD Cardiology [phone]    5/13/2025 10:00 AM (Arrive by 9:45 AM) [facility] Radiology [phone]    5/13/2025 10:20 AM Paulo Roque MD Endocrinology [phone]            Follow Up:   Plan for next AC outreach in approximately 1 week to complete:  - advance care planning   - goal progression  - education-completed with Elizabeth 12/22, f/u 2 months   - RPM  - follow up appointment with neuro? GI? MH? PCP?  - HHC completed?  - flu/bronchitis sx, choking.       Elizabeth CASTANEDA, RN     Ambulatory Care Manager    [facility]    Phone: [phone]    [email or web address]                  
[phone]    5/13/2025 10:00 AM (Arrive by 9:45 AM) [facility] Radiology [phone]    5/13/2025 10:20 AM Paulo Roque MD Endocrinology [phone]            Follow Up:   Plan for next ACM outreach in approximately 2 weeks to complete:  - goal progression  - RPM  - follow up appointment with PCP 1/14, Workers Comp f/u/Ortho 1/16  - Cough, SOB .   Patient  is agreeable to this plan.        Elizabeth DOWELLN, RN     Ambulatory Care Manager    [facility]    Phone: [phone]    [email or web address]

## 2025-01-09 ENCOUNTER — CARE COORDINATION (OUTPATIENT)
Dept: CASE MANAGEMENT | Age: 70
End: 2025-01-09

## 2025-01-09 NOTE — CARE COORDINATION
2025 12:33 PM  *  Alert and Triage   -Remote Alert Monitoring Note      Date/Time:  2025 12:33 PM  Patient Current Location: [place]  Verified patients name and  as identifiers.          Rpm alert to be reviewed by the provider                  LPN contacted patient by telephone regarding red alert received Background: RPM for HTN, Asthma  Refer to 911 immediately if:  Patient unresponsive or unable to provide history  Change in cognition or sudden confusion  Patient unable to respond in complete sentences  Intense chest pain/tightness  Any concern for any clinical emergency  Red Alert: Provider response time of 1 hr required for any red alert requiring intervention  Yellow Alert: Provider response time of 3hr required for any escalated yellow alert     Patient Chief Complaint:  O2 Triage  Are you having any Chest Pain? no   Are you having any Shortness of Breath? Yes, baseline   Swelling in your hands or feet? no   Are you having any other health concerns or issues? Yes, ongoing symptoms reported to ACM and PCP on yesterday, NOT worsening.  .............................................................................................................................................................................................  Do you use oxygen? No   Patient educated on oxygen preparedness in case of an emergency?  No     Patient/Caregiver educated on how to how to properly place pulse oximeter. Patient/Caregiver verbalizes understanding.          Clinical Interventions: LPN contacted pt in regard to RPM red alert for PO of 91%. Writer noted audible cough through out call. Pt denied any chest pain, worsening SOB, edema. Pt advises that her symptoms are not worsening. Pt spoke with ACM on yesterday, 2025. Pt advises that she has no new symptoms today. Pt using inhaler and staying hydrated. Pt will go to the ED with any severe and or worsening symptoms. Pt also reports that she is compliant with all

## 2025-01-11 ENCOUNTER — APPOINTMENT (OUTPATIENT)
Age: 70
End: 2025-01-11
Payer: MEDICARE

## 2025-01-11 ENCOUNTER — HOSPITAL ENCOUNTER (EMERGENCY)
Age: 70
Discharge: HOME | End: 2025-01-11
Attending: EMERGENCY MEDICINE
Payer: MEDICARE

## 2025-01-11 VITALS
SYSTOLIC BLOOD PRESSURE: 126 MMHG | HEART RATE: 84 BPM | OXYGEN SATURATION: 96 % | BODY MASS INDEX: 31.3 KG/M2 | HEIGHT: 62 IN | WEIGHT: 170.1 LBS | RESPIRATION RATE: 20 BRPM | TEMPERATURE: 98.3 F | DIASTOLIC BLOOD PRESSURE: 74 MMHG

## 2025-01-11 DIAGNOSIS — J40 BRONCHITIS: Primary | ICD-10-CM

## 2025-01-11 LAB
ANION GAP SERPL CALCULATED.3IONS-SCNC: 15 MMOL/L (ref 3–16)
BASOPHILS # BLD: 0.05 K/UL (ref 0–0.2)
BASOPHILS NFR BLD: 1 %
BUN SERPL-MCNC: 21 MG/DL (ref 7–20)
CALCIUM SERPL-MCNC: 9.3 MG/DL (ref 8.3–10.6)
CHLORIDE SERPL-SCNC: 103 MMOL/L (ref 99–110)
CO2 SERPL-SCNC: 21 MMOL/L (ref 21–32)
CREAT SERPL-MCNC: 1 MG/DL (ref 0.6–1.2)
EOSINOPHIL # BLD: 0.08 K/UL (ref 0–0.6)
EOSINOPHILS RELATIVE PERCENT: 1 %
ERYTHROCYTE [DISTWIDTH] IN BLOOD BY AUTOMATED COUNT: 13 % (ref 12.4–15.4)
FLUAV AG SPEC QL: NEGATIVE
FLUBV AG SPEC QL: NEGATIVE
GFR, ESTIMATED: 65 ML/MIN/1.73M2
GLUCOSE SERPL-MCNC: 131 MG/DL (ref 70–99)
HCT VFR BLD AUTO: 44.9 % (ref 36–48)
HGB BLD-MCNC: 15 G/DL (ref 12–16)
IMM GRANULOCYTES # BLD AUTO: 0.06 K/UL (ref 0–0.5)
IMM GRANULOCYTES NFR BLD: 1 %
INR PPP: 0.9 (ref 0.9–1.2)
LYMPHOCYTES NFR BLD: 1.88 K/UL (ref 1–5.1)
LYMPHOCYTES RELATIVE PERCENT: 30 %
MCH RBC QN AUTO: 29.4 PG (ref 26–34)
MCHC RBC AUTO-ENTMCNC: 33.4 G/DL (ref 31–36)
MCV RBC AUTO: 88 FL (ref 80–100)
MONOCYTES NFR BLD: 0.54 K/UL (ref 0–1.3)
MONOCYTES NFR BLD: 9 %
NEUTROPHILS NFR BLD: 58 %
NEUTS SEG NFR BLD: 3.62 K/UL (ref 1.7–7.7)
PLATELET # BLD AUTO: 232 K/UL (ref 135–450)
PMV BLD AUTO: 9.7 FL (ref 9.4–12.4)
POTASSIUM SERPL-SCNC: 4.1 MMOL/L (ref 3.5–5.1)
PROTHROMBIN TIME: 12.4 SEC (ref 11.9–14.9)
RBC # BLD AUTO: 5.1 M/UL (ref 4–5.2)
SARS-COV-2 RDRP RESP QL NAA+PROBE: NOT DETECTED
SODIUM SERPL-SCNC: 139 MMOL/L (ref 136–145)
SPECIMEN DESCRIPTION: NORMAL
TROPONIN I SERPL HS-MCNC: 12 NG/L (ref 0–14)
TROPONIN I SERPL HS-MCNC: 13 NG/L (ref 0–14)
WBC OTHER # BLD: 6.2 K/UL (ref 4–11)

## 2025-01-11 PROCEDURE — 2580000003 HC RX 258: Performed by: EMERGENCY MEDICINE

## 2025-01-11 PROCEDURE — 2500000003 HC RX 250 WO HCPCS: Performed by: EMERGENCY MEDICINE

## 2025-01-11 PROCEDURE — 87502 INFLUENZA DNA AMP PROBE: CPT

## 2025-01-11 PROCEDURE — 71045 X-RAY EXAM CHEST 1 VIEW: CPT

## 2025-01-11 PROCEDURE — 85025 COMPLETE CBC W/AUTO DIFF WBC: CPT

## 2025-01-11 PROCEDURE — 96375 TX/PRO/DX INJ NEW DRUG ADDON: CPT

## 2025-01-11 PROCEDURE — 36415 COLL VENOUS BLD VENIPUNCTURE: CPT

## 2025-01-11 PROCEDURE — 70491 CT SOFT TISSUE NECK W/DYE: CPT

## 2025-01-11 PROCEDURE — 71260 CT THORAX DX C+: CPT

## 2025-01-11 PROCEDURE — 6370000000 HC RX 637 (ALT 250 FOR IP): Performed by: EMERGENCY MEDICINE

## 2025-01-11 PROCEDURE — 6360000002 HC RX W HCPCS: Performed by: EMERGENCY MEDICINE

## 2025-01-11 PROCEDURE — 6360000004 HC RX CONTRAST MEDICATION: Performed by: EMERGENCY MEDICINE

## 2025-01-11 PROCEDURE — 87635 SARS-COV-2 COVID-19 AMP PRB: CPT

## 2025-01-11 PROCEDURE — 94640 AIRWAY INHALATION TREATMENT: CPT

## 2025-01-11 PROCEDURE — 85610 PROTHROMBIN TIME: CPT

## 2025-01-11 PROCEDURE — 96374 THER/PROPH/DIAG INJ IV PUSH: CPT

## 2025-01-11 PROCEDURE — 99285 EMERGENCY DEPT VISIT HI MDM: CPT

## 2025-01-11 PROCEDURE — 93005 ELECTROCARDIOGRAM TRACING: CPT | Performed by: EMERGENCY MEDICINE

## 2025-01-11 PROCEDURE — 80048 BASIC METABOLIC PNL TOTAL CA: CPT

## 2025-01-11 PROCEDURE — 84484 ASSAY OF TROPONIN QUANT: CPT

## 2025-01-11 RX ORDER — ONDANSETRON 4 MG/1
4 TABLET, ORALLY DISINTEGRATING ORAL ONCE
Status: COMPLETED | OUTPATIENT
Start: 2025-01-11 | End: 2025-01-11

## 2025-01-11 RX ORDER — BENZONATATE 100 MG/1
100 CAPSULE ORAL 3 TIMES DAILY PRN
Qty: 30 CAPSULE | Refills: 0 | Status: SHIPPED | OUTPATIENT
Start: 2025-01-11 | End: 2025-01-21

## 2025-01-11 RX ORDER — IOPAMIDOL 755 MG/ML
75 INJECTION, SOLUTION INTRAVASCULAR
Status: COMPLETED | OUTPATIENT
Start: 2025-01-11 | End: 2025-01-11

## 2025-01-11 RX ORDER — 0.9 % SODIUM CHLORIDE 0.9 %
500 INTRAVENOUS SOLUTION INTRAVENOUS ONCE
Status: COMPLETED | OUTPATIENT
Start: 2025-01-11 | End: 2025-01-11

## 2025-01-11 RX ORDER — MORPHINE SULFATE 4 MG/ML
4 INJECTION, SOLUTION INTRAMUSCULAR; INTRAVENOUS
Status: COMPLETED | OUTPATIENT
Start: 2025-01-11 | End: 2025-01-11

## 2025-01-11 RX ORDER — IPRATROPIUM BROMIDE AND ALBUTEROL SULFATE 2.5; .5 MG/3ML; MG/3ML
1 SOLUTION RESPIRATORY (INHALATION) ONCE
Status: COMPLETED | OUTPATIENT
Start: 2025-01-11 | End: 2025-01-11

## 2025-01-11 RX ADMIN — SODIUM CHLORIDE 500 ML: 9 INJECTION, SOLUTION INTRAVENOUS at 11:44

## 2025-01-11 RX ADMIN — IPRATROPIUM BROMIDE AND ALBUTEROL SULFATE 1 DOSE: 2.5; .5 SOLUTION RESPIRATORY (INHALATION) at 11:48

## 2025-01-11 RX ADMIN — WATER 80 MG: 1 INJECTION INTRAMUSCULAR; INTRAVENOUS; SUBCUTANEOUS at 11:45

## 2025-01-11 RX ADMIN — ONDANSETRON 4 MG: 4 TABLET, ORALLY DISINTEGRATING ORAL at 11:44

## 2025-01-11 RX ADMIN — MORPHINE SULFATE 4 MG: 4 INJECTION, SOLUTION INTRAMUSCULAR; INTRAVENOUS at 11:47

## 2025-01-11 RX ADMIN — IOPAMIDOL 75 ML: 755 INJECTION, SOLUTION INTRAVENOUS at 12:09

## 2025-01-11 ASSESSMENT — ENCOUNTER SYMPTOMS
COUGH: 1
GASTROINTESTINAL NEGATIVE: 1
SHORTNESS OF BREATH: 1

## 2025-01-11 ASSESSMENT — PAIN SCALES - GENERAL
PAINLEVEL_OUTOF10: 0
PAINLEVEL_OUTOF10: 3
PAINLEVEL_OUTOF10: 3

## 2025-01-11 ASSESSMENT — PAIN - FUNCTIONAL ASSESSMENT: PAIN_FUNCTIONAL_ASSESSMENT: 0-10

## 2025-01-11 ASSESSMENT — PAIN DESCRIPTION - LOCATION
LOCATION: CHEST
LOCATION: CHEST

## 2025-01-11 NOTE — ED PROVIDER NOTES
[facility] EMERGENCY DEPARTMENT     EMERGENCY DEPARTMENT ENCOUNTER            Pt Name: Tatiana Shepard   MRN: [id number]   Birthdate 1955   Date of evaluation: 1/11/2025   Provider: Jovon Jones MD   PCP: Raisa Espitia MD   Note Started: 11:10 AM EST 1/11/25          CHIEF COMPLAINT     Chief Complaint   Patient presents with    Shortness of Breath     Started last month with cold symptoms now having shortness of breath and cough.              HISTORY OF PRESENT ILLNESS:   History from : Patient   Limitations to history : None     Tatiana Shepard is a 69 y.o. female who presents to the emergency department for the evaluation of nonproductive cough.  She has also had cold-like symptoms for 1 month with intermittent shortness of breath.  She states last night she measured her temperature orally and it was 100.0.  Her cough was productive this morning.  No blood.  She denies pain in her chest or abdomen.  Reports no nausea or vomiting.  Patient states she is occasionally use over-the-counter cough preparations.    Nursing Notes were all reviewed and agreed with, or any disagreements were addressed in the HPI.     Review of Systems   Constitutional: Negative.    HENT: Negative.     Respiratory:  Positive for cough and shortness of breath.    Cardiovascular: Negative.    Gastrointestinal: Negative.    Genitourinary: Negative.    Neurological: Negative.    All other systems reviewed and are negative.        MEDICAL HISTORY   has a past medical history of Abnormal involuntary movements(781.0), Allergic rhinitis, Anal fissure, Anemia, unspecified, Anxiety, Asthma, Chronic back pain, Chronic diarrhea (09/23/2019), Depression, Diffuse cystic mastopathy, Dizziness, Encopresis(307.7), Esophageal reflux, Foot fracture, left (1989), Headache(784.0), Hearing loss, Hepatitis, unspecified, Herpes simplex without mention of complication, Hypertension, Hypogammaglobulinaemia, unspecified, Insomnia,

## 2025-01-12 LAB
EKG ATRIAL RATE: 92 BPM
EKG DIAGNOSIS: NORMAL
EKG P AXIS: 25 DEGREES
EKG P-R INTERVAL: 146 MS
EKG Q-T INTERVAL: 368 MS
EKG QRS DURATION: 72 MS
EKG QTC CALCULATION (BAZETT): 455 MS
EKG R AXIS: 3 DEGREES
EKG T AXIS: 14 DEGREES
EKG VENTRICULAR RATE: 92 BPM

## 2025-01-12 PROCEDURE — 93010 ELECTROCARDIOGRAM REPORT: CPT | Performed by: INTERNAL MEDICINE

## 2025-01-13 ENCOUNTER — CARE COORDINATION (OUTPATIENT)
Dept: CARE COORDINATION | Age: 70
End: 2025-01-13

## 2025-01-13 NOTE — CARE COORDINATION
Ambulatory Care Coordination Note     1/13/2025 9:37 AM     Patient outreach attempt by this ACM today to perform care management follow up . ACM was unable to reach the patient by telephone today;   left voice message requesting a return phone call to this ACM.     ACM: Elizabeth Monreal RN     Care Summary Note: ED f/u call from 1/11 for SOB, Dx Bronchitis - UTRx1    PCP/Specialist follow up:   Future Appointments         Provider Specialty Dept Phone    1/14/2025 3:30 PM Raisa Espitia MD Primary Care [phone]    1/16/2025 1:10 PM Andrea Tom MD Orthopedic Surgery [phone]    1/17/2025 1:00 PM (Arrive by 12:45 PM) [facility] Radiology [phone]    1/20/2025 1:00 PM Andrea Tom MD Orthopedic Surgery [phone]    2/3/2025 2:00 PM Lul Thompson MD Pulmonology [phone]    3/10/2025 1:30 PM Ramone Leslie MD Cardiology [phone]    5/13/2025 10:00 AM (Arrive by 9:45 AM) [facility] Radiology [phone]    5/13/2025 10:20 AM Paulo Roque MD Endocrinology [phone]            Follow Up:   Plan for next AC outreach in approximately 1-2 days  to complete:  EDFU      Elizabeth CASTANEDA, RN     Ambulatory Care Manager    [facility]    Phone: [phone]    [email or web address]

## 2025-01-14 ENCOUNTER — OFFICE VISIT (OUTPATIENT)
Dept: PRIMARY CARE CLINIC | Age: 70
End: 2025-01-14
Payer: MEDICARE

## 2025-01-14 ENCOUNTER — CARE COORDINATION (OUTPATIENT)
Dept: CARE COORDINATION | Age: 70
End: 2025-01-14

## 2025-01-14 VITALS
HEART RATE: 90 BPM | BODY MASS INDEX: 30.73 KG/M2 | SYSTOLIC BLOOD PRESSURE: 126 MMHG | OXYGEN SATURATION: 94 % | WEIGHT: 168 LBS | DIASTOLIC BLOOD PRESSURE: 68 MMHG

## 2025-01-14 DIAGNOSIS — J40 BRONCHITIS: Primary | ICD-10-CM

## 2025-01-14 DIAGNOSIS — R22.31 NODULE OF FINGER OF RIGHT HAND: ICD-10-CM

## 2025-01-14 DIAGNOSIS — N28.1 RENAL CYST: ICD-10-CM

## 2025-01-14 DIAGNOSIS — M25.511 RIGHT SHOULDER PAIN, UNSPECIFIED CHRONICITY: ICD-10-CM

## 2025-01-14 DIAGNOSIS — R53.83 FATIGUE, UNSPECIFIED TYPE: ICD-10-CM

## 2025-01-14 PROCEDURE — 3074F SYST BP LT 130 MM HG: CPT | Performed by: INTERNAL MEDICINE

## 2025-01-14 PROCEDURE — G8417 CALC BMI ABV UP PARAM F/U: HCPCS | Performed by: INTERNAL MEDICINE

## 2025-01-14 PROCEDURE — 99214 OFFICE O/P EST MOD 30 MIN: CPT | Performed by: INTERNAL MEDICINE

## 2025-01-14 PROCEDURE — 1090F PRES/ABSN URINE INCON ASSESS: CPT | Performed by: INTERNAL MEDICINE

## 2025-01-14 PROCEDURE — G8427 DOCREV CUR MEDS BY ELIG CLIN: HCPCS | Performed by: INTERNAL MEDICINE

## 2025-01-14 PROCEDURE — 3017F COLORECTAL CA SCREEN DOC REV: CPT | Performed by: INTERNAL MEDICINE

## 2025-01-14 PROCEDURE — 1036F TOBACCO NON-USER: CPT | Performed by: INTERNAL MEDICINE

## 2025-01-14 PROCEDURE — G8399 PT W/DXA RESULTS DOCUMENT: HCPCS | Performed by: INTERNAL MEDICINE

## 2025-01-14 PROCEDURE — 1123F ACP DISCUSS/DSCN MKR DOCD: CPT | Performed by: INTERNAL MEDICINE

## 2025-01-14 PROCEDURE — 3078F DIAST BP <80 MM HG: CPT | Performed by: INTERNAL MEDICINE

## 2025-01-14 RX ORDER — GUAIFENESIN 600 MG/1
600 TABLET, EXTENDED RELEASE ORAL 2 TIMES DAILY
Qty: 20 TABLET | Refills: 0 | Status: SHIPPED | OUTPATIENT
Start: 2025-01-14

## 2025-01-14 RX ORDER — DEXTROMETHORPHAN HYDROBROMIDE AND PROMETHAZINE HYDROCHLORIDE 15; 6.25 MG/5ML; MG/5ML
5 SYRUP ORAL 4 TIMES DAILY PRN
Qty: 120 ML | Refills: 0 | Status: SHIPPED | OUTPATIENT
Start: 2025-01-14

## 2025-01-14 ASSESSMENT — PATIENT HEALTH QUESTIONNAIRE - PHQ9: DEPRESSION UNABLE TO ASSESS: URGENT/EMERGENT SITUATION

## 2025-01-14 NOTE — PROGRESS NOTES
01/11/2025 92     P-R Interval 01/11/2025 146     QRS Duration 01/11/2025 72     Q-T Interval 01/11/2025 368     QTc Calculation (Bazett) 01/11/2025 455     P Axis 01/11/2025 25     R Axis 01/11/2025 3     T [place] 01/11/2025 14     Diagnosis 01/11/2025 Baseline artifactNormal sinus rhythmPossible inferior infarct Confirmed by YULI BERMAN ([id number]) on 1/12/2025 1:36:28 PM     Protime 01/11/2025 12.4     INR 01/11/2025 0.9     Specimen Description 01/11/2025 .NASOPHARYNGEAL SWAB     SARS-CoV-2, Rapid 01/11/2025 Not Detected     Flu A Antigen 01/11/2025 NEGATIVE     Flu B Antigen 01/11/2025 NEGATIVE    Orders Only on 12/31/2024   Component Date Value    Sodium 12/31/2024 137     Potassium 12/31/2024 4.4     Chloride 12/31/2024 105     CO2 12/31/2024 20 (L)     Anion Gap 12/31/2024 12     Glucose 12/31/2024 143 (H)     BUN 12/31/2024 18     Creatinine 12/31/2024 0.9     Est, Glom Filt Rate 12/31/2024 69     Calcium 12/31/2024 9.4     WBC 12/31/2024 7.7     RBC 12/31/2024 4.65     Hemoglobin 12/31/2024 14.0     Hematocrit 12/31/2024 41.3     MCV 12/31/2024 88.8     MCH 12/31/2024 30.2     MCHC 12/31/2024 34.0     RDW 12/31/2024 13.6     Platelets 12/31/2024 274     MPV 12/31/2024 8.4     Neutrophils % 12/31/2024 63.5     Lymphocytes % 12/31/2024 26.1     Monocytes % 12/31/2024 8.3     Eosinophils % 12/31/2024 1.1     Basophils % 12/31/2024 1.0     Neutrophils Absolute 12/31/2024 4.9     Lymphocytes Absolute 12/31/2024 2.0     Monocytes Absolute 12/31/2024 0.6     Eosinophils Absolute 12/31/2024 0.1     Basophils Absolute 12/31/2024 0.1    Orders Only on 12/27/2024   Component Date Value    Creatinine 12/27/2024 0.9    Office Visit on 12/16/2024   Component Date Value    SARS-COV-2, RdRp gene 12/16/2024 Negative     Lot Number 12/16/2024 0     QC Pass/Fail 12/16/2024 pass     Influenza A Ab 12/16/2024 positive     Influenza B Ab 12/16/2024 negative      EXAM: PORTABLE AP CHEST X-RAY 1/11/25     INDICATION: SOB,

## 2025-01-14 NOTE — CARE COORDINATION
Ambulatory Care Coordination Note     2025 12:02 PM     Patient Current Location:  Home: [address] [place] [id number]     ACM contacted the patient by telephone. Verified name and  with patient as identifiers.         ACM: Elizabeth Monreal RN     Challenges to be reviewed by the provider   Additional needs identified to be addressed with provider No  none               Method of communication with provider: none.    Utilization: Has the patient been seen in the ED since your last call? Yes,   Discharge Date: 25   Discharge Facility: [facility]  Reason for ED Visit: SOB  Visit Diagnosis: Viral Bronchitis    Number of ED visits in the last 6 months: 3      Do you have any ongoing symptoms? Yes, my symptoms have improved.   Current symptoms: Cough.    Did you call your PCP prior to going to the ED? No, did not call the PCP office.     Review of Discharge Instructions:   [x] AVS discharge instructions  [x] Right Care, Right Place, Right Time document  [x] Medication changes  [x] Follow up appointments      Care Summary Note: ACM outreached patient who reported feeling better since ED visit.  ACM noted patient coughing and not sounding SOB with communication as did in previous call.  Patient reported that she attempted to go outside yesterday and started coughing badly.  ACM advised pt if she needed to be out in the cold, to use a scarf to wrap around neck and cover mouth nose to prevent bronchospasms. Pt VU.  Pt is scheduled for PCP visit today.  Pt reports the ED physician gave her Tessalon Pearls 100mg and they don't work well for her as she is usually given 200mg gel tabs.  Patient is going to ask DR. Espitia today if she will prescribe more of the 200mg gels.   Patient has completed HHC with [facility].    Offered patient enrollment in the Remote Patient Monitoring (RPM) program for in-home monitoring: Yes, patient enrolled; current status is activated and monitoring.  Date BP

## 2025-01-15 ENCOUNTER — CARE COORDINATION (OUTPATIENT)
Dept: CARE COORDINATION | Age: 70
End: 2025-01-15

## 2025-01-15 ENCOUNTER — TELEPHONE (OUTPATIENT)
Dept: CARDIOLOGY CLINIC | Age: 70
End: 2025-01-15

## 2025-01-15 ENCOUNTER — TELEPHONE (OUTPATIENT)
Dept: PRIMARY CARE CLINIC | Age: 70
End: 2025-01-15

## 2025-01-15 DIAGNOSIS — R00.2 PALPITATIONS: ICD-10-CM

## 2025-01-15 DIAGNOSIS — R07.9 CHEST PAIN, UNSPECIFIED TYPE: ICD-10-CM

## 2025-01-15 DIAGNOSIS — I20.0 UNSTABLE ANGINA (HCC): ICD-10-CM

## 2025-01-15 DIAGNOSIS — I21.4 NSTEMI (NON-ST ELEVATED MYOCARDIAL INFARCTION) (HCC): ICD-10-CM

## 2025-01-15 RX ORDER — RANOLAZINE 500 MG/1
500 TABLET, EXTENDED RELEASE ORAL 2 TIMES DAILY
Qty: 180 TABLET | Refills: 3 | Status: SHIPPED | OUTPATIENT
Start: 2025-01-15

## 2025-01-15 RX ORDER — DILTIAZEM HYDROCHLORIDE 240 MG/1
240 CAPSULE, COATED, EXTENDED RELEASE ORAL DAILY
Qty: 90 CAPSULE | Refills: 3 | Status: SHIPPED | OUTPATIENT
Start: 2025-01-15

## 2025-01-15 NOTE — CARE COORDINATION
Ambulatory Care Coordination Note     1/15/2025 11:37 AM     AC received call from patient very tearful and frustrated and expressing she is very depressed.  Patient expressing that she cannot use [facility] anymore for some medications and she didn't know why.  Patient also expressed that Dr. Espitia called in a different gel (Voltaren) and it is not paid for by her insurance.  ACM asked permission to call the pharmacy on her behalf d/t language barrier, possible miscommunication, pt agreed.     ACM called [facility], [phone] and spoke to Sugey who advised that on January 1 there were changes to the insurance brands they accept therefore this is most likely why patient is unable to use them any longer.     ACM called [facility] [phone] and spoke to Harriet who reviewed patient's medications and confirmed that the dose of Diclofenac Sodium Topical Gel, 1% has not changed.  This medication is now OTC therefore not covered under the formulary plan, generic or brand and no alternatives were listed.     AC called patient back and updated on the above.  Patient reports that she cannot pay for the medication OTC. Patient agreeable to [facility] routing message to PCP requesting an alternative that is prescription strength for it to e covered by patient's insurance.     [facility] routed message to PCP.       Elizabeth CASTANEDA, RN     Ambulatory Care Manager    [facility]    Phone: [phone]    [email or web address]

## 2025-01-15 NOTE — TELEPHONE ENCOUNTER
[facility] Medication Refills:    Medication  ranolazine (RANEXA) 500 MG extended release tablet [[id number]]  ranolazine (RANEXA) 500 MG extended release tablet [[id number]]    Order Details  Dose: 500 mg Route: Oral Frequency: 2 TIMES DAILY   Dispense Quantity: 180 tablet Refills: 3          Sig: Take 1 tablet by mouth 2 times daily       Medication  dilTIAZem (CARDIZEM CD) 240 MG extended release capsule [[id number]]  dilTIAZem (CARDIZEM CD) 240 MG extended release capsule [[id number]]    Order Details  Dose: 240 mg Route: Oral Frequency: DAILY   Dispense Quantity: 90 capsule Refills: 2          Sig: Take 1 capsule by mouth daily           [facility] - P [phone] - F [phone] [[id number]]       Last Office Visit: 09/11/24    Next Office Visit: 03/10/25    Last Refill: 03/21/24    Last Labs: 01/11/25

## 2025-01-15 NOTE — TELEPHONE ENCOUNTER
Patient called and stated to inform that she is feeling weak, cannot get up from bed--wanted to speak to Dr Espitia.    She also stated that the  pharmacy where the medication sent in is not covering her insurance    Pl review and advice    Thanks

## 2025-01-16 ENCOUNTER — OFFICE VISIT (OUTPATIENT)
Dept: ORTHOPEDIC SURGERY | Age: 70
End: 2025-01-16

## 2025-01-16 VITALS — HEIGHT: 62 IN | WEIGHT: 168 LBS | BODY MASS INDEX: 30.91 KG/M2

## 2025-01-16 DIAGNOSIS — M51.27 HERNIATION OF INTERVERTEBRAL DISC BETWEEN L5 AND S1: ICD-10-CM

## 2025-01-16 DIAGNOSIS — M47.816 LUMBAR SPONDYLOSIS: ICD-10-CM

## 2025-01-16 DIAGNOSIS — M48.061 LUMBAR FORAMINAL STENOSIS: Primary | ICD-10-CM

## 2025-01-16 DIAGNOSIS — Z86.39 HISTORY OF DIABETES MELLITUS: ICD-10-CM

## 2025-01-16 DIAGNOSIS — M79.2 NEUROGENIC PAIN OF RIGHT LOWER EXTREMITY: ICD-10-CM

## 2025-01-16 NOTE — PROGRESS NOTES
LINZESS 290 MCG CAPS capsule Take 1 capsule by mouth every morning (before breakfast)      pantoprazole (PROTONIX) 40 MG tablet TAKE 1 TABLET DAILY 90 tablet 1    butalbital-APAP-caffeine -40 MG CAPS per capsule Take 1 capsule by mouth every 8 hours as needed for Headaches NOT MORE THAN 2 PER DAY AND 4 PER WEEK      polyethylene glycol (GLYCOLAX) 17 g packet Take 1 packet by mouth daily as needed for Constipation      TRUEplus Lancets 30G MISC 1 each by Does not apply route daily 100 each 1    blood glucose test strips (GLUCOSE METER TEST) strip 100 each by In Vitro route 2 times daily Diabetes   e11.9 100 each 1    Lutein 40 MG CAPS Take 1 tablet by mouth daily      arformoterol tartrate (BROVANA) 15 MCG/2ML NEBU Take 1 ampule by nebulization 2 times daily 120 mL 3    bisacodyl (DULCOLAX) 5 MG EC tablet Take 1 tablet by mouth daily as needed for Constipation 30 tablet 0    fluticasone-salmeterol (WIXELA INHUB) 500-50 MCG/ACT AEPB diskus inhaler Inhale 1 puff into the lungs in the morning and 1 puff in the evening. 180 each 1    aspirin (ASPIRIN LOW DOSE) 81 MG EC tablet TAKE 1 TABLET EVERY OTHER  DAY. (Patient taking differently: Take 1 tablet by mouth every other day Twice weekly per patient. SHOBHA Hector RN 10/28/2024) 45 tablet 1    dulaglutide (TRULICITY) 0.75 MG/0.5ML SOPN SC injection INJECT 0.75 MG SUBCUTANEOUSLY ONE TIME PER WEEK 6 mL 1    DULoxetine (CYMBALTA) 60 MG extended release capsule Take 1 capsule by mouth daily      fluticasone (FLONASE) 50 MCG/ACT nasal spray USE 2 SPRAYS NASALLY DAILY 48 g 1    albuterol (PROVENTIL) (2.5 MG/3ML) 0.083% nebulizer solution Take 3 mLs by nebulization 4 times daily as needed for Wheezing or Shortness of Breath 360 mL 3    Respiratory Therapy Supplies (NEBULIZER/TUBING/MOUTHPIECE) KIT 1 kit by Does not apply route daily as needed (for shortness of breath or wheezing) 1 kit 0    amitriptyline (ELAVIL) 10 MG tablet Take 1 tablet by mouth nightly Taking at

## 2025-01-17 ENCOUNTER — CARE COORDINATION (OUTPATIENT)
Dept: CASE MANAGEMENT | Age: 70
End: 2025-01-17

## 2025-01-17 NOTE — CARE COORDINATION
2025 2:12 PM  *  Alert and Triage   -Remote Alert Monitoring Note      Date/Time:  2025 2:12 PM  Patient Current Location: [place]  Verified patients name and  as identifiers.             Rpm alert to be reviewed by the provider                    LPN contacted patient by telephone regarding red alert received Background: RPM for HTN, Asthma  Refer to 911 immediately if:  Patient unresponsive or unable to provide history  Change in cognition or sudden confusion  Patient unable to respond in complete sentences  Intense chest pain/tightness  Any concern for any clinical emergency  Red Alert: Provider response time of 1 hr required for any red alert requiring intervention  Yellow Alert: Provider response time of 3hr required for any escalated yellow alert     Patient Chief Complaint:  O2 Triage  Are you having any Chest Pain? no   Are you having any Shortness of Breath? Yes, baseline   Swelling in your hands or feet? no   Are you having any other health concerns or issues? Yes, ongoing symptoms reported to ACM and PCP on yesterday, NOT worsening.  .............................................................................................................................................................................................  Do you use oxygen? No   Patient educated on oxygen preparedness in case of an emergency?  No      Patient/Caregiver educated on how to how to properly place pulse oximeter. Patient/Caregiver verbalizes understanding.            Clinical Interventions: LPN contacted pt in regard to RPM red alert for PO of 91%. Pt denied any new, worrisome and or worsening symptoms at this time.   Pt with chronic SOB however pt feels that she is at her baseline. Pt advises that the SOB is not worsening.    Pt is speaking in complete sentences. No apparent distress noted. No audible wheezing, labored breathing or SOB.    Pt rechecked PO while writer held the line. Pt's updated PO is now at 93%. Pt

## 2025-01-18 PROBLEM — J06.9 UPPER RESPIRATORY TRACT INFECTION: Status: ACTIVE | Noted: 2025-01-18

## 2025-01-18 PROBLEM — J40 BRONCHITIS: Status: ACTIVE | Noted: 2025-01-18

## 2025-01-18 ASSESSMENT — ENCOUNTER SYMPTOMS
SORE THROAT: 0
DIARRHEA: 0
BLOOD IN STOOL: 0
CHEST TIGHTNESS: 0
VOMITING: 0
NAUSEA: 0
COUGH: 1
WHEEZING: 1
CONSTIPATION: 1
ABDOMINAL PAIN: 0
SINUS PRESSURE: 0
SHORTNESS OF BREATH: 1
SINUS PAIN: 0
RHINORRHEA: 0
PHOTOPHOBIA: 1

## 2025-01-20 ENCOUNTER — OFFICE VISIT (OUTPATIENT)
Dept: ORTHOPEDIC SURGERY | Age: 70
End: 2025-01-20

## 2025-01-20 ENCOUNTER — HOSPITAL ENCOUNTER (OUTPATIENT)
Dept: WOMENS IMAGING | Age: 70
Discharge: HOME | End: 2025-01-20
Payer: MEDICARE

## 2025-01-20 ENCOUNTER — TELEPHONE (OUTPATIENT)
Dept: PRIMARY CARE CLINIC | Age: 70
End: 2025-01-20

## 2025-01-20 VITALS — HEIGHT: 62 IN | WEIGHT: 168 LBS | BODY MASS INDEX: 30.91 KG/M2

## 2025-01-20 DIAGNOSIS — L90.5 PAINFUL CUTANEOUS SCAR: ICD-10-CM

## 2025-01-20 DIAGNOSIS — S46.011A STRAIN OF RIGHT ROTATOR CUFF CAPSULE, INITIAL ENCOUNTER: Primary | ICD-10-CM

## 2025-01-20 DIAGNOSIS — M25.511 RIGHT SHOULDER PAIN, UNSPECIFIED CHRONICITY: ICD-10-CM

## 2025-01-20 DIAGNOSIS — R52 PAINFUL CUTANEOUS SCAR: ICD-10-CM

## 2025-01-20 DIAGNOSIS — Z12.31 VISIT FOR SCREENING MAMMOGRAM: ICD-10-CM

## 2025-01-20 PROCEDURE — 77067 SCR MAMMO BI INCL CAD: CPT

## 2025-01-20 RX ORDER — BETAMETHASONE SODIUM PHOSPHATE AND BETAMETHASONE ACETATE 3; 3 MG/ML; MG/ML
6 INJECTION, SUSPENSION INTRA-ARTICULAR; INTRALESIONAL; INTRAMUSCULAR; SOFT TISSUE ONCE
Status: COMPLETED | OUTPATIENT
Start: 2025-01-20 | End: 2025-01-20

## 2025-01-20 RX ORDER — BUPIVACAINE HYDROCHLORIDE 2.5 MG/ML
4 INJECTION, SOLUTION INFILTRATION; PERINEURAL ONCE
Status: COMPLETED | OUTPATIENT
Start: 2025-01-20 | End: 2025-01-20

## 2025-01-20 RX ORDER — LIDOCAINE 50 MG/G
OINTMENT TOPICAL
Qty: 30 G | Refills: 1 | Status: SHIPPED | OUTPATIENT
Start: 2025-01-20

## 2025-01-20 RX ADMIN — BETAMETHASONE SODIUM PHOSPHATE AND BETAMETHASONE ACETATE 6 MG: 3; 3 INJECTION, SUSPENSION INTRA-ARTICULAR; INTRALESIONAL; INTRAMUSCULAR; SOFT TISSUE at 16:27

## 2025-01-20 RX ADMIN — BUPIVACAINE HYDROCHLORIDE 10 MG: 2.5 INJECTION, SOLUTION INFILTRATION; PERINEURAL at 16:27

## 2025-01-20 NOTE — TELEPHONE ENCOUNTER
Pt came in stating she handed pcp combina forms for insurance and wanted to know the status of it. Please give pt a call as soon as possible.

## 2025-01-20 NOTE — PROGRESS NOTES
note that mammography is not 100% accurate in diagnosing breast cancer. A routine breast exam is recommended to correlate with mammographic findings. Any palpable abnormality must be assessed clinically.  If the patient has a new palpable abnormality, then a Diagnostic Mammogram and/or Breast Ultrasound is indicated if clinically appropriate. Electronically signed by Kylee Vaughan Performing Facility: [facility] [place] [address], [address] [place], [place]  [address] Phone: [phone]     XR SHOULDER RIGHT (MIN 2 VIEWS)    Result Date: 1/20/2025  Radiology exam is complete. No Radiologist dictation. Please follow up with ordering provider.              Assessment   Impression: .    Encounter Diagnoses   Name Primary?    Strain of right rotator cuff capsule, initial encounter     Right shoulder pain, unspecified chronicity     Painful cutaneous scar Yes              Plan:   Postinjection protocol  start course of PT no sooner than 1 week and continue HEP  Activity modification -rotator cuff precautions and caution against overuse  Medical pain management: NSAID: Voltaren gel and maximize local measure for symptom control  Complete ultrasound evaluation of the shoulder as needed    The nature of the finding, probable diagnosis and likely treatment was thoroughly discussed with the patient. The options, risks, complications, alternative treatment as well as some of the differential diagnosis was discussed. The patient was thoroughly informed and all questions were answered. the patient indicated understanding and satisfaction with the discussion.       On a side note she has developed a painful nodule at the flexor skin crease at a site of a skin laceration using a knife.  She elected to treat this conservatively although was advised to present to the urgent care.  The skin laceration has healed but with a firm sensitive \"nodule\".  Examination today demonstrates normal function of the extensor and flexor tendons

## 2025-01-21 ENCOUNTER — CARE COORDINATION (OUTPATIENT)
Dept: CASE MANAGEMENT | Age: 70
End: 2025-01-21

## 2025-01-21 NOTE — CARE COORDINATION
17:09 UPDATE - Writer contacted pt to obtain status update. No audible distress note. Pt speaking in complete sentences. Pt does not have chest pain at this time during call. Pt's PO is at 91%. Pt will go to the ED should symptoms persist or worsen.      Martine Moser LPN, PCC, Remote Patient Monitoring    PH: [phone]  Email: [email or web address]    
2025 2:06 PM  *  Alert and Triage   -Remote Alert Monitoring Note      Date/Time:  2025 2:06 PM  Patient Current Location: [place]  Verified patients name and  as identifiers.    Rpm alert to be reviewed by the provider   red alert  pulse ox reading (91%)  Vitals Recheck pulse ox reading (93%)  Additional needs to be addressed by provider:     LPN contacted pt in regard to RPM red alert for PO of 91%. Pt fatigue and weakness and body aches. Pt able to speak in complete sentences during call but advised of an increase in SOB intermittent chest pain earlier this morning and afternoon. Pt advises that she has used her nebulizer and took a nap. Pt notes that her breathing has improved since using nebulizer and napping, however she is still experiencing intermittent chest pain(not severe or persistent per pt).  Pt rechecked PO during call, and updated PO is now at 93%. /81 HR 95    Writer advised pt to go to the ED or to call 911 with worsening/severe SOB, chest pain and worsening/severe symptoms. Pt advised that she will call 911 if symptoms worsen/persist.      Please advise, thank you.      17:09 UPDATE - Writer contacted pt to obtain status update. No audible distress note. Pt speaking in complete sentences. Pt does not have chest pain at this time during call. Pt's PO is at 91%. Pt will go to the ED should symptoms persist or worsen.      Martine Moser LPN, PCC, Remote Patient Monitoring    PH: [phone]  Email: [email or web address]           [place] contacted patient by telephone regarding red alert received Background: RPM for HTN, Asthma  Refer to 911 immediately if:  Patient unresponsive or unable to provide history  Change in cognition or sudden confusion  Patient unable to respond in complete sentences  Intense chest pain/tightness  Any concern for any clinical emergency  Red Alert: Provider response time of 1 hr required for any red alert requiring intervention  Yellow Alert: Provider 
weight-bearing as tolerated/RLE/toe touch weight-bearing

## 2025-01-22 ENCOUNTER — CARE COORDINATION (OUTPATIENT)
Dept: CARE COORDINATION | Age: 70
End: 2025-01-22

## 2025-01-22 DIAGNOSIS — J45.40 MODERATE PERSISTENT ASTHMA WITHOUT COMPLICATION: ICD-10-CM

## 2025-01-22 NOTE — CARE COORDINATION
Ambulatory Care Coordination Note     1/22/2025 11:02 AM     ACM received VM from patient requesting call back.   ACM returned call, no answer, LVM for call back.    Tatiana returned this ACM call reporting her BP was a little elevated today, at 143/90, O2 at 98%.  Pt declines any acute distress, reported that she slept well.  Pt reported that she did take her medications this morning as directed.  ACM advised pt to recheck BP in 1 hour.  Pt reported that she had a visitor coming over to help with housework and then she planned to go to the store to purchase a sitting pedal device for exercise.  ACM encouraged pt to wear a mask or use a scarf while outside d/t extreme cold to prevent bronchospasms.  Pt VU.       Elizabeth CASTANEDA, RN     Ambulatory Care Manager    [facility]    Phone: [phone]    [email or web address]

## 2025-01-23 RX ORDER — FLUTICASONE PROPIONATE AND SALMETEROL 500; 50 UG/1; UG/1
POWDER RESPIRATORY (INHALATION)
Qty: 180 EACH | Refills: 1 | Status: SHIPPED | OUTPATIENT
Start: 2025-01-23

## 2025-01-23 NOTE — TELEPHONE ENCOUNTER
Medication:   Requested Prescriptions     Pending Prescriptions Disp Refills    WIXELA INHUB 500-50 MCG/ACT AEPB diskus inhaler [Pharmacy Med Name: WIXELA INHUB /50]  1     Sig: USE 1 INHALATION ORALLY IN THE MORNING AND IN THE     EVENING     Last Filled:  8.20.24    Last appt: 1/14/2025   Next appt: Visit date not found    Last OARRS:       11/27/2024    10:16 AM   RX Monitoring   Periodic Controlled Substance Monitoring No signs of potential drug abuse or diversion identified.     
medical evaluation

## 2025-01-24 ENCOUNTER — APPOINTMENT (OUTPATIENT)
Age: 70
End: 2025-01-24
Payer: MEDICARE

## 2025-01-24 ENCOUNTER — HOSPITAL ENCOUNTER (EMERGENCY)
Age: 70
Discharge: HOME | End: 2025-01-24
Attending: EMERGENCY MEDICINE
Payer: MEDICARE

## 2025-01-24 VITALS
DIASTOLIC BLOOD PRESSURE: 76 MMHG | HEIGHT: 62 IN | BODY MASS INDEX: 31.25 KG/M2 | OXYGEN SATURATION: 96 % | WEIGHT: 169.8 LBS | TEMPERATURE: 98.1 F | HEART RATE: 83 BPM | RESPIRATION RATE: 16 BRPM | SYSTOLIC BLOOD PRESSURE: 124 MMHG

## 2025-01-24 DIAGNOSIS — R11.2 NAUSEA AND VOMITING, UNSPECIFIED VOMITING TYPE: Primary | ICD-10-CM

## 2025-01-24 LAB
ALBUMIN SERPL-MCNC: 4.4 G/DL (ref 3.4–5)
ALBUMIN/GLOB SERPL: 1.7 {RATIO}
ALP SERPL-CCNC: 75 U/L (ref 40–129)
ALT SERPL-CCNC: 40 U/L (ref 10–40)
ANION GAP SERPL CALCULATED.3IONS-SCNC: 15 MMOL/L (ref 3–16)
AST SERPL-CCNC: 28 U/L (ref 15–37)
BASOPHILS # BLD: 0.03 K/UL (ref 0–0.2)
BASOPHILS NFR BLD: 0 %
BILIRUB SERPL-MCNC: 0.6 MG/DL (ref 0–1)
BILIRUB UR QL STRIP: NEGATIVE
BUN SERPL-MCNC: 28 MG/DL (ref 7–20)
CALCIUM SERPL-MCNC: 9.9 MG/DL (ref 8.3–10.6)
CHARACTER UR: ABNORMAL
CHLORIDE SERPL-SCNC: 102 MMOL/L (ref 99–110)
CHP ED QC CHECK: YES
CLARITY UR: CLEAR
CO2 SERPL-SCNC: 22 MMOL/L (ref 21–32)
COLOR UR: YELLOW
CREAT SERPL-MCNC: 1 MG/DL (ref 0.6–1.2)
EKG ATRIAL RATE: 74 BPM
EKG DIAGNOSIS: NORMAL
EKG P AXIS: 15 DEGREES
EKG P-R INTERVAL: 152 MS
EKG Q-T INTERVAL: 392 MS
EKG QRS DURATION: 78 MS
EKG QTC CALCULATION (BAZETT): 435 MS
EKG R AXIS: -10 DEGREES
EKG T AXIS: 36 DEGREES
EKG VENTRICULAR RATE: 74 BPM
EOSINOPHIL # BLD: 0.05 K/UL (ref 0–0.6)
EOSINOPHILS RELATIVE PERCENT: 0 %
EPI CELLS #/AREA URNS HPF: ABNORMAL /HPF
ERYTHROCYTE [DISTWIDTH] IN BLOOD BY AUTOMATED COUNT: 12.9 % (ref 12.4–15.4)
FLUAV AG SPEC QL: NEGATIVE
FLUBV AG SPEC QL: NEGATIVE
GFR, ESTIMATED: 62 ML/MIN/1.73M2
GLUCOSE BLD-MCNC: 169 MG/DL
GLUCOSE BLD-MCNC: 169 MG/DL (ref 70–99)
GLUCOSE SERPL-MCNC: 146 MG/DL (ref 70–99)
GLUCOSE UR STRIP-MCNC: NEGATIVE MG/DL
HCT VFR BLD AUTO: 44 % (ref 36–48)
HGB BLD-MCNC: 14.9 G/DL (ref 12–16)
HGB UR QL STRIP.AUTO: NEGATIVE
IMM GRANULOCYTES # BLD AUTO: 0.2 K/UL (ref 0–0.5)
IMM GRANULOCYTES NFR BLD: 2 %
KETONES UR STRIP-MCNC: NEGATIVE MG/DL
LEUKOCYTE ESTERASE UR QL STRIP: ABNORMAL
LIPASE SERPL-CCNC: 35 U/L (ref 13–60)
LYMPHOCYTES NFR BLD: 2.45 K/UL (ref 1–5.1)
LYMPHOCYTES RELATIVE PERCENT: 20 %
MCH RBC QN AUTO: 29.6 PG (ref 26–34)
MCHC RBC AUTO-ENTMCNC: 33.9 G/DL (ref 31–36)
MCV RBC AUTO: 87.5 FL (ref 80–100)
MONOCYTES NFR BLD: 0.86 K/UL (ref 0–1.3)
MONOCYTES NFR BLD: 7 %
NEUTROPHILS NFR BLD: 72 %
NEUTS SEG NFR BLD: 8.99 K/UL (ref 1.7–7.7)
NITRITE UR QL STRIP: NEGATIVE
PH UR STRIP: 5.5 [PH] (ref 5–8)
PLATELET # BLD AUTO: 237 K/UL (ref 135–450)
PMV BLD AUTO: 10.1 FL (ref 9.4–12.4)
POTASSIUM SERPL-SCNC: 3.9 MMOL/L (ref 3.5–5.1)
PROT SERPL-MCNC: 7 G/DL (ref 6.4–8.2)
PROT UR STRIP-MCNC: NEGATIVE MG/DL
RBC # BLD AUTO: 5.03 M/UL (ref 4–5.2)
RBC #/AREA URNS HPF: ABNORMAL /HPF
SARS-COV-2 RDRP RESP QL NAA+PROBE: NOT DETECTED
SODIUM SERPL-SCNC: 140 MMOL/L (ref 136–145)
SP GR UR STRIP: >1.03 (ref 1–1.03)
SPECIMEN DESCRIPTION: NORMAL
TROPONIN I SERPL HS-MCNC: 8 NG/L (ref 0–14)
TROPONIN I SERPL HS-MCNC: 8 NG/L (ref 0–14)
UROBILINOGEN UR STRIP-ACNC: 0.2 EU/DL (ref 0–1)
WBC #/AREA URNS HPF: ABNORMAL /HPF
WBC OTHER # BLD: 12.6 K/UL (ref 4–11)

## 2025-01-24 PROCEDURE — 85025 COMPLETE CBC W/AUTO DIFF WBC: CPT

## 2025-01-24 PROCEDURE — 6360000002 HC RX W HCPCS: Performed by: EMERGENCY MEDICINE

## 2025-01-24 PROCEDURE — 96366 THER/PROPH/DIAG IV INF ADDON: CPT

## 2025-01-24 PROCEDURE — 80053 COMPREHEN METABOLIC PANEL: CPT

## 2025-01-24 PROCEDURE — 81001 URINALYSIS AUTO W/SCOPE: CPT

## 2025-01-24 PROCEDURE — 87502 INFLUENZA DNA AMP PROBE: CPT

## 2025-01-24 PROCEDURE — 99285 EMERGENCY DEPT VISIT HI MDM: CPT

## 2025-01-24 PROCEDURE — 2580000003 HC RX 258: Performed by: EMERGENCY MEDICINE

## 2025-01-24 PROCEDURE — 70450 CT HEAD/BRAIN W/O DYE: CPT

## 2025-01-24 PROCEDURE — 82962 GLUCOSE BLOOD TEST: CPT

## 2025-01-24 PROCEDURE — 71045 X-RAY EXAM CHEST 1 VIEW: CPT

## 2025-01-24 PROCEDURE — 93010 ELECTROCARDIOGRAM REPORT: CPT | Performed by: INTERNAL MEDICINE

## 2025-01-24 PROCEDURE — 96365 THER/PROPH/DIAG IV INF INIT: CPT

## 2025-01-24 PROCEDURE — 87635 SARS-COV-2 COVID-19 AMP PRB: CPT

## 2025-01-24 PROCEDURE — 83690 ASSAY OF LIPASE: CPT

## 2025-01-24 PROCEDURE — 84484 ASSAY OF TROPONIN QUANT: CPT

## 2025-01-24 PROCEDURE — 96375 TX/PRO/DX INJ NEW DRUG ADDON: CPT

## 2025-01-24 PROCEDURE — 93005 ELECTROCARDIOGRAM TRACING: CPT | Performed by: EMERGENCY MEDICINE

## 2025-01-24 RX ORDER — DIPHENHYDRAMINE HYDROCHLORIDE 50 MG/ML
25 INJECTION INTRAMUSCULAR; INTRAVENOUS ONCE
Status: COMPLETED | OUTPATIENT
Start: 2025-01-24 | End: 2025-01-24

## 2025-01-24 RX ORDER — 0.9 % SODIUM CHLORIDE 0.9 %
1000 INTRAVENOUS SOLUTION INTRAVENOUS ONCE
Status: COMPLETED | OUTPATIENT
Start: 2025-01-24 | End: 2025-01-24

## 2025-01-24 RX ORDER — ONDANSETRON 4 MG/1
4 TABLET, ORALLY DISINTEGRATING ORAL 3 TIMES DAILY PRN
Qty: 21 TABLET | Refills: 0 | Status: SHIPPED | OUTPATIENT
Start: 2025-01-24

## 2025-01-24 RX ORDER — LEVOFLOXACIN 5 MG/ML
750 INJECTION, SOLUTION INTRAVENOUS ONCE
Status: COMPLETED | OUTPATIENT
Start: 2025-01-24 | End: 2025-01-24

## 2025-01-24 RX ADMIN — LEVOFLOXACIN 750 MG: 750 INJECTION, SOLUTION INTRAVENOUS at 14:57

## 2025-01-24 RX ADMIN — DIPHENHYDRAMINE HYDROCHLORIDE 25 MG: 50 INJECTION INTRAMUSCULAR; INTRAVENOUS at 15:54

## 2025-01-24 RX ADMIN — SODIUM CHLORIDE 1000 ML: 9 INJECTION, SOLUTION INTRAVENOUS at 14:54

## 2025-01-24 ASSESSMENT — ENCOUNTER SYMPTOMS
NAUSEA: 1
RESPIRATORY NEGATIVE: 1
DIARRHEA: 0
VOMITING: 1

## 2025-01-24 ASSESSMENT — PAIN DESCRIPTION - LOCATION: LOCATION: CHEST

## 2025-01-24 ASSESSMENT — PAIN - FUNCTIONAL ASSESSMENT: PAIN_FUNCTIONAL_ASSESSMENT: 0-10

## 2025-01-24 ASSESSMENT — PAIN DESCRIPTION - ORIENTATION: ORIENTATION: LEFT

## 2025-01-24 NOTE — ED NOTES
Verbal and written discharge instructions along with 1 prescription(s) discussed with patient. Patient verbally acknowledged all information provided. Patient is alert and acting appropriately for developmental age. Skin p/w/d. Respirations even and unlabored. Patient is to follow up as indicated. Encouraged patient to return to ED for any new or worsening symptoms. Patient denies any additional needs or concerns to note.

## 2025-01-24 NOTE — ED TRIAGE NOTES
Patient presents ambulatory to room 14 unaccompanied w/ c/o chest pain, vomiting, and dizziness since 10am. Patient reports \"the room is spinning when I open my eyes\". Nitro patch at W replaced this morning per pt report.

## 2025-01-24 NOTE — ED PROVIDER NOTES
[facility] EMERGENCY DEPARTMENT     EMERGENCY DEPARTMENT ENCOUNTER            Pt Name: Tatiana Shepard   MRN: [id number]   Birthdate 1955   Date of evaluation: 1/24/2025   Provider: Jovon Jones MD   PCP: Raisa Espitia MD   Note Started: 4:08 PM EST 1/24/25          CHIEF COMPLAINT     Chief Complaint   Patient presents with    Vomiting    Chest Pain             HISTORY OF PRESENT ILLNESS:   History from : Patient   Limitations to history : None     Tatiana Shepard is a 69 y.o. female who presents to the emergency department with nausea and vomiting.  She reports when she forcefully vomits she developed sharp anterior chest wall pain.  She also endorses malaise and fatigue, no radiation or pain and denies any shortness of breath today.  She reports no abdominal pain or diarrhea.  Her oral intake has been decreased and she feels dehydrated.  She knows of known ill contacts    Nursing Notes were all reviewed and agreed with, or any disagreements were addressed in the HPI.     Review of Systems   Constitutional:  Positive for fatigue. Negative for fever.   HENT: Negative.     Respiratory: Negative.     Cardiovascular:  Positive for chest pain.   Gastrointestinal:  Positive for nausea and vomiting. Negative for diarrhea.   Genitourinary: Negative.    Musculoskeletal: Negative.    Skin: Negative.    Allergic/Immunologic: Environmental allergies: .phys.   Neurological: Negative.    Hematological: Negative.    All other systems reviewed and are negative.        MEDICAL HISTORY   has a past medical history of Abnormal involuntary movements(781.0), Allergic rhinitis, Anal fissure, Anemia, unspecified, Anxiety, Asthma, Chronic back pain, Chronic diarrhea (09/23/2019), Depression, Diffuse cystic mastopathy, Dizziness, Encopresis(307.7), Esophageal reflux, Foot fracture, left (1989), Headache(784.0), Hearing loss, Hepatitis, unspecified, Herpes simplex without mention of complication, Hypertension,

## 2025-01-26 DIAGNOSIS — E11.42 TYPE 2 DIABETES MELLITUS WITH DIABETIC POLYNEUROPATHY, WITHOUT LONG-TERM CURRENT USE OF INSULIN (HCC): ICD-10-CM

## 2025-01-27 ENCOUNTER — TELEPHONE (OUTPATIENT)
Dept: PRIMARY CARE CLINIC | Age: 70
End: 2025-01-27

## 2025-01-27 ENCOUNTER — CARE COORDINATION (OUTPATIENT)
Dept: CARE COORDINATION | Age: 70
End: 2025-01-27

## 2025-01-27 NOTE — TELEPHONE ENCOUNTER
----- Message from Ma. R sent at 1/27/2025  9:37 AM EST -----  Regarding: ECC Appointment Request  ECC Appointment Request    Patient needs appointment for ECC Appointment Type: Hospital Follow Up.    Patient Requested Dates(s): Earliest possible appointment   Patient Requested Time: Earliest possible appointment   Provider Name: Raisa Espitia MD    Reason for Appointment Request: Other: Patient reached out to schedule for her hospital follow-up appointment. When trying to connect directly to practice, the call keeps on dropping from the practice resulting to unable to be transferred the patient. Please reach out to the caller regarding this. Thank you very much!   --------------------------------------------------------------------------------------------------------------------------    Relationship to Patient: Self     Call Back Information: OK to leave message on voicemail  Preferred Call Back Number: Phone [phone] (home)

## 2025-01-27 NOTE — TELEPHONE ENCOUNTER
Medication:   Requested Prescriptions     Pending Prescriptions Disp Refills    TRULICITY 0.75 MG/0.5ML SOAJ SC injection [Pharmacy Med Name: TRULICITY 0.75 MG/0.5 ML PEN]  1     Sig: INJECT 0.75 MG SUBCUTANEOUSLY ONE TIME PER WEEK     Last Filled:  8.14.24    Last appt: 1/14/2025   Next appt: 2.20.25    Last Labs DM:   Lab Results   Component Value Date/Time    LABA1C 6.3 09/24/2024 05:55 AM

## 2025-01-27 NOTE — CARE COORDINATION
Ambulatory Care Coordination Note     2025 9:57 AM     Patient Current Location:  Home: [address] [place] [address]     Patient contacted the ACM by telephone. Verified name and  with patient as identifiers.         ACM: Elizabeth Monreal RN     Challenges to be reviewed by the provider   Additional needs identified to be addressed with provider Yes  Patient was in the ED on 25 for N/V.  Patient reports that she is scheduled with you on 25 got HFU.  She would like to know of she can get in to see you sooner.  Can you have your team reach out to patient to assist with scheduling.  Patient declines N/V today, reports that she was instructed to take the Ondansetron routine for a few days.                   Method of communication with provider: chart routing.    Utilization: Has the patient been seen in the ED since your last call? Yes,   Discharge Date: 25   Discharge Facility: [facility]  Reason for ED Visit: N/V  Visit Diagnosis: N/V    Number of ED visits in the last 6 months: 3      Do you have any ongoing symptoms? No  Did you call your PCP prior to going to the ED? No, did not call the PCP office.     Review of Discharge Instructions:   [x] AVS discharge instructions  [x] Right Care, Right Place, Right Time document  [x] Medication changes  [x] Follow up appointments  [] Referral follow up          Care Summary Note: ACM attempted outreach to patient, LVM for call back.  Pt called back to discuss recent ED visit.  Pt reported that she was out doing things on Friday and got very nauseated and returned home.  Pt reported having emesis, very nauseated and dizzy at times.  Pt reported that she called a friend to take her to the ED for eval.  Pt reports that she was advised to take the Zofran routinely for a few days.  Pt states she is not taking the Zofran today, she is not feeling nauseous today.  Pt reported that she has cancelled many appointments because she didn't

## 2025-01-27 NOTE — CARE COORDINATION
Ambulatory Care Coordination Note     1/27/2025 9:45 AM     Patient outreach attempt by this ACM today to perform care management follow up . ACM was unable to reach the patient by telephone today;   left voice message requesting a return phone call to this ACM.     ACM: Elizabeth Monreal, RN     Care Summary Note: ED 1/24 N/V - PMH: HTN, Asthma, M2    PCP/Specialist follow up:   Future Appointments         Provider Specialty Dept Phone    1/28/2025 10:30 AM Robbie Ac MD Orthopedic Surgery [phone]    1/28/2025 2:40 PM Karo Gaitan PT Physical Therapy [phone]    2/3/2025 2:00 PM Lul Thompson MD Pulmonology [phone]    2/20/2025 1:10 PM Andrea Tom MD Orthopedic Surgery [phone]    3/10/2025 1:30 PM Ramone Leslie MD Cardiology [phone]    5/13/2025 10:00 AM (Arrive by 9:45 AM) DEX[facility] [phone]    5/13/2025 10:20 AM Paulo Roque MD Endocrinology [phone]          Date BP Pulse Ox   1/27/2025 127/80/76  (9:16 AM) 96/89  (9:15 AM)   1/25/2025 124/85/87  (9:50 AM) 98/93  (9:49 AM)   1/24/2025 114/80/76  (9:54 AM) 96/81  (9:53 AM)   1/23/2025 120/85/88  (10:27 AM) 97/88  (10:28 AM)   1/22/2025 126/78/88  (1:08 PM)  143/90/83  (9:25 AM) 98/81  (9:25 AM)       Follow Up:   Plan for next ACM outreach in approximately 1-2 days  to complete:  - advance care planning  - goal progression  - RPM  - EDFU      Elizabeth CASTANEDA, RN     Ambulatory Care Manager    [facility]    Phone: [phone]    [email or web address]

## 2025-01-28 ENCOUNTER — OFFICE VISIT (OUTPATIENT)
Dept: ORTHOPEDIC SURGERY | Age: 70
End: 2025-01-28

## 2025-01-28 ENCOUNTER — HOSPITAL ENCOUNTER (OUTPATIENT)
Dept: PHYSICAL THERAPY | Age: 70
Setting detail: THERAPIES SERIES
Discharge: HOME | End: 2025-01-28
Payer: MEDICARE

## 2025-01-28 VITALS — BODY MASS INDEX: 31.1 KG/M2 | WEIGHT: 169 LBS | HEIGHT: 62 IN | RESPIRATION RATE: 14 BRPM

## 2025-01-28 DIAGNOSIS — S46.011D STRAIN OF TENDON OF RIGHT ROTATOR CUFF, SUBSEQUENT ENCOUNTER: ICD-10-CM

## 2025-01-28 DIAGNOSIS — R53.1 WEAKNESS: ICD-10-CM

## 2025-01-28 DIAGNOSIS — M25.511 ACUTE PAIN OF RIGHT SHOULDER: Primary | ICD-10-CM

## 2025-01-28 DIAGNOSIS — R22.31 MASS OF FINGER OF RIGHT HAND: Primary | ICD-10-CM

## 2025-01-28 PROBLEM — J10.1 INFLUENZA A: Status: RESOLVED | Noted: 2024-12-29 | Resolved: 2025-01-28

## 2025-01-28 PROCEDURE — 97110 THERAPEUTIC EXERCISES: CPT | Performed by: PHYSICAL THERAPIST

## 2025-01-28 PROCEDURE — 99024 POSTOP FOLLOW-UP VISIT: CPT | Performed by: ORTHOPAEDIC SURGERY

## 2025-01-28 PROCEDURE — 97112 NEUROMUSCULAR REEDUCATION: CPT | Performed by: PHYSICAL THERAPIST

## 2025-01-28 PROCEDURE — 97161 PT EVAL LOW COMPLEX 20 MIN: CPT | Performed by: PHYSICAL THERAPIST
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
## 2025-01-28 NOTE — PLAN OF CARE
[facility] - [facility] Therapy: [address], [place] [address] office: [phone] fax: [phone]     Physical Therapy Initial Evaluation Certification      Dear Andrea Lazcano* ,    We had the pleasure of evaluating the following patient for physical therapy services at [facility].  A summary of our findings can be found in the initial assessment below.  This includes our plan of care.  If you have any questions or concerns regarding these findings, please do not hesitate to contact me at the office phone number listed above.  Thank you for the referral.     Physician Signature:_______________________________Date:__________________  By signing above (or electronic signature), therapist’s plan is approved by physician       Physical Therapy: TREATMENT/PROGRESS NOTE   Patient: Tatiana Shepard (69 y.o. female)   Examination Date: 2025   :  1955 MRN: [id number]   Visit #: 1   Insurance Allowable Auth Needed   Med nec []Yes    [x]No    Insurance: Payor: MEDICARE / Plan: MEDICARE PART A AND B / Product Type: *No Product type* /   Insurance ID: [id number] - (Medicare)  Secondary Insurance (if applicable): [facility] [place]   Treatment Diagnosis:     ICD-10-CM    1. Acute pain of right shoulder  M25.511       2. Weakness  R53.1       3. Strain of tendon of right rotator cuff, subsequent encounter  S46.011D          Medical Diagnosis:  No admission diagnoses are documented for this encounter.   Referring Physician: Andrea Tom  PCP: Raisa Espitia MD     Plan of care signed (Y/N): N    Date of Patient follow up with Physician: 2025     Plan of Care Report: EVAL today  POC update due: (10 visits /OR AUTH LIMITS, whichever is less)  2025                                             Medical History:  Comorbidities:  Diabetes (Type I or II)  Hypertension  Depression  Other: see chart for full

## 2025-01-29 ENCOUNTER — TELEPHONE (OUTPATIENT)
Dept: ORTHOPEDIC SURGERY | Age: 70
End: 2025-01-29

## 2025-01-29 PROBLEM — R22.31 NODULE OF FINGER OF RIGHT HAND: Status: ACTIVE | Noted: 2025-01-29

## 2025-01-29 PROBLEM — N28.1 RENAL CYST: Status: ACTIVE | Noted: 2025-01-29

## 2025-01-29 RX ORDER — DULAGLUTIDE 0.75 MG/.5ML
INJECTION, SOLUTION SUBCUTANEOUS
Qty: 6 ML | Refills: 1 | Status: SHIPPED | OUTPATIENT
Start: 2025-01-29

## 2025-01-29 ASSESSMENT — ENCOUNTER SYMPTOMS
PHOTOPHOBIA: 1
WHEEZING: 1
SINUS PAIN: 0
VOMITING: 0
SINUS PRESSURE: 0
NAUSEA: 0
ABDOMINAL PAIN: 0
COUGH: 1
CHEST TIGHTNESS: 0
DIARRHEA: 0
RHINORRHEA: 0
SORE THROAT: 0
BLOOD IN STOOL: 0
SHORTNESS OF BREATH: 1
CONSTIPATION: 1

## 2025-01-29 NOTE — TELEPHONE ENCOUNTER
General Question     Subject: MORE INFO ON PAIN OINTMENT   Patient and /or Facility Request: Tatiana Shepard   Contact Number: [phone]      PATIENT CALLED AND IS REQ TO SPEAK WITH SOMEONE     SHE STATED THE PHARMACY IS NEEDING MORE INFO BEFORE SHE IS ABLE TO RECEIVE HER PAIN OINTMENT    PLEASE CALL BACK THE ABOVE NUMBER

## 2025-01-29 NOTE — CARE COORDINATION
Call patient and schedule ER follow up. I can see patient in a same day appointment since it is not a hospital admission. If I can't see patient in the next few days it is ok to schedule with Padmini hCarles NP.

## 2025-01-29 NOTE — ASSESSMENT & PLAN NOTE
-Better but cough is not controlled  -patient has completed antibiotics  -start Mucinex 600 mg 2 times daily  -Start Promethazine DM cough syrup 5 mL 4 times daily  -patient given IV solumedrol in ER on 1/11/25  -Continue Wixela 500-50mcg inhaler 1 puff 2 times daily  -Continue Spiriva inhaler nightly  -Continue albuterol HFA inhaler 2 puffs every 6 hours as needed  -ER visit 1/11/25 reviewed

## 2025-01-29 NOTE — ASSESSMENT & PLAN NOTE
-patient declines xray  -Tylenol 650mg 3 times daily as needed  -Follow up with Orthopedics as scheduled

## 2025-01-31 ENCOUNTER — OFFICE VISIT (OUTPATIENT)
Dept: PRIMARY CARE CLINIC | Age: 70
End: 2025-01-31

## 2025-01-31 VITALS
WEIGHT: 170 LBS | HEART RATE: 84 BPM | OXYGEN SATURATION: 97 % | DIASTOLIC BLOOD PRESSURE: 86 MMHG | TEMPERATURE: 97.4 F | SYSTOLIC BLOOD PRESSURE: 128 MMHG | RESPIRATION RATE: 16 BRPM | BODY MASS INDEX: 31.09 KG/M2

## 2025-01-31 DIAGNOSIS — R11.2 NAUSEA AND VOMITING, UNSPECIFIED VOMITING TYPE: Primary | ICD-10-CM

## 2025-01-31 DIAGNOSIS — Z13.31 POSITIVE DEPRESSION SCREENING: ICD-10-CM

## 2025-01-31 DIAGNOSIS — R07.9 CHEST PAIN, UNSPECIFIED TYPE: ICD-10-CM

## 2025-01-31 DIAGNOSIS — R09.81 CHRONIC NASAL CONGESTION: ICD-10-CM

## 2025-01-31 DIAGNOSIS — R04.0 NASAL BLEEDING: ICD-10-CM

## 2025-01-31 DIAGNOSIS — R42 DIZZINESS: ICD-10-CM

## 2025-01-31 ASSESSMENT — PATIENT HEALTH QUESTIONNAIRE - PHQ9
4. FEELING TIRED OR HAVING LITTLE ENERGY: NEARLY EVERY DAY
9. THOUGHTS THAT YOU WOULD BE BETTER OFF DEAD, OR OF HURTING YOURSELF: NOT AT ALL
8. MOVING OR SPEAKING SO SLOWLY THAT OTHER PEOPLE COULD HAVE NOTICED. OR THE OPPOSITE, BEING SO FIGETY OR RESTLESS THAT YOU HAVE BEEN MOVING AROUND A LOT MORE THAN USUAL: SEVERAL DAYS
SUM OF ALL RESPONSES TO PHQ QUESTIONS 1-9: 18
6. FEELING BAD ABOUT YOURSELF - OR THAT YOU ARE A FAILURE OR HAVE LET YOURSELF OR YOUR FAMILY DOWN: SEVERAL DAYS
SUM OF ALL RESPONSES TO PHQ QUESTIONS 1-9: 18
SUM OF ALL RESPONSES TO PHQ QUESTIONS 1-9: 18
10. IF YOU CHECKED OFF ANY PROBLEMS, HOW DIFFICULT HAVE THESE PROBLEMS MADE IT FOR YOU TO DO YOUR WORK, TAKE CARE OF THINGS AT HOME, OR GET ALONG WITH OTHER PEOPLE: VERY DIFFICULT
5. POOR APPETITE OR OVEREATING: SEVERAL DAYS
1. LITTLE INTEREST OR PLEASURE IN DOING THINGS: NEARLY EVERY DAY
SUM OF ALL RESPONSES TO PHQ9 QUESTIONS 1 & 2: 6
2. FEELING DOWN, DEPRESSED OR HOPELESS: NEARLY EVERY DAY
SUM OF ALL RESPONSES TO PHQ QUESTIONS 1-9: 18
7. TROUBLE CONCENTRATING ON THINGS, SUCH AS READING THE NEWSPAPER OR WATCHING TELEVISION: NEARLY EVERY DAY
3. TROUBLE FALLING OR STAYING ASLEEP: NEARLY EVERY DAY

## 2025-01-31 ASSESSMENT — ENCOUNTER SYMPTOMS
NAUSEA: 1
SHORTNESS OF BREATH: 1
WHEEZING: 0
SINUS PAIN: 0
RHINORRHEA: 0
VOMITING: 0
CONSTIPATION: 0
ABDOMINAL PAIN: 0
SINUS PRESSURE: 0
CHEST TIGHTNESS: 0
COUGH: 1
SORE THROAT: 0
DIARRHEA: 0
BLOOD IN STOOL: 0

## 2025-01-31 NOTE — PROGRESS NOTES
Date of Visit: 2025    Tatiana Shepard (:  1955) is a 69 y.o. female,  Established patient here for evaluation of the following chief complaint(s):  Follow-up (Pt was in ED 25)      ASSESSMENT/PLAN:    Assessment & Plan  1. Nausea  - Continues to experience nausea, vomiting resolved  - Continue taking Zofran 4 mg up to three times daily as needed    2. Chest pain  - Cardiac causes ruled out in the emergency room, no evidence of acute cardiac syndrome  - Scheduled to see a pulmonologist next week  - Advised to follow up with cardiologist    3. Dizziness  - Reports slight improvement, but still occurs  - Advised to increase fluid intake  - Follow up with neurologist    4. Depression  - Positive depression screening  - Not taking Cymbalta due to side effects  - Undergoing counseling, desires to switch counselors  - Continue taking amitriptyline 10 mg nightly as prescribed by Dr. Floresita Ontiveros  - Referral to a new counselor will be provided    5. Chronic nasal congestion  - Referral to an ENT specialist will be provided    6. Nasal bleeding  - Referral to an ENT specialist will be provided        SUBJECTIVE:    History of Present Illness  The patient is a 69-year-old female who presents for an emergency room follow-up.    She was seen in the emergency room on 2025 for nausea, vomiting, dizziness, and chest pain. She was treated and sent home. She continues to experience mild dizziness and afternoon nausea, which are less severe than the symptoms that prompted her emergency room visit. She reports no further episodes of vomiting or abdominal pain. Her hydration status varies, with some days being better than others. She has been managing these symptoms with Zofran 4 mg, initially taking it three times daily for the first two days, then discontinuing it on  and Monday. However, the recurrence of symptoms on Tuesday afternoon necessitated the resumption of Zofran, which effectively

## 2025-02-03 ENCOUNTER — OFFICE VISIT (OUTPATIENT)
Age: 70
End: 2025-02-03
Payer: MEDICARE

## 2025-02-03 VITALS
HEIGHT: 62 IN | WEIGHT: 163 LBS | HEART RATE: 94 BPM | SYSTOLIC BLOOD PRESSURE: 132 MMHG | BODY MASS INDEX: 30 KG/M2 | OXYGEN SATURATION: 94 % | DIASTOLIC BLOOD PRESSURE: 80 MMHG

## 2025-02-03 DIAGNOSIS — J45.40 MODERATE PERSISTENT ASTHMA WITHOUT COMPLICATION: Primary | ICD-10-CM

## 2025-02-03 PROCEDURE — G8399 PT W/DXA RESULTS DOCUMENT: HCPCS | Performed by: INTERNAL MEDICINE

## 2025-02-03 PROCEDURE — 1036F TOBACCO NON-USER: CPT | Performed by: INTERNAL MEDICINE

## 2025-02-03 PROCEDURE — 3075F SYST BP GE 130 - 139MM HG: CPT | Performed by: INTERNAL MEDICINE

## 2025-02-03 PROCEDURE — 3079F DIAST BP 80-89 MM HG: CPT | Performed by: INTERNAL MEDICINE

## 2025-02-03 PROCEDURE — 1090F PRES/ABSN URINE INCON ASSESS: CPT | Performed by: INTERNAL MEDICINE

## 2025-02-03 PROCEDURE — 1159F MED LIST DOCD IN RCRD: CPT | Performed by: INTERNAL MEDICINE

## 2025-02-03 PROCEDURE — 1123F ACP DISCUSS/DSCN MKR DOCD: CPT | Performed by: INTERNAL MEDICINE

## 2025-02-03 PROCEDURE — 99213 OFFICE O/P EST LOW 20 MIN: CPT | Performed by: INTERNAL MEDICINE

## 2025-02-03 PROCEDURE — 3017F COLORECTAL CA SCREEN DOC REV: CPT | Performed by: INTERNAL MEDICINE

## 2025-02-03 PROCEDURE — G8427 DOCREV CUR MEDS BY ELIG CLIN: HCPCS | Performed by: INTERNAL MEDICINE

## 2025-02-03 PROCEDURE — G8417 CALC BMI ABV UP PARAM F/U: HCPCS | Performed by: INTERNAL MEDICINE

## 2025-02-03 RX ORDER — ALBUTEROL SULFATE 90 UG/1
2 INHALANT RESPIRATORY (INHALATION) EVERY 6 HOURS PRN
Qty: 6 EACH | Refills: 3 | Status: SHIPPED | OUTPATIENT
Start: 2025-02-03 | End: 2025-08-02

## 2025-02-03 NOTE — PROGRESS NOTES
[facility] Pulmonary and Critical Care    Outpatient Follow Up Note    Subjective:   CHIEF COMPLAINT / HPI:     The patient is 69 y.o. female is here for follow-up of moderate persistent asthma treated with Advair 500, Spiriva,, and rescue albuterol.  Since last visit she has been in the ER a few times complaining of nausea vomiting as well as dyspnea.  She had another CTPA that only showed basilar atelectasis.  She has had no reported hypoxemia.  Her asthma appears to be at baseline.  She has a form she does show me about mileage reimbursement in relation to Worker's Compensation that appears to be denied.  She is asking me to call the company to intervene.    10/17/2024  Tatiana is here for hospital follow-up of generalized weakness and shortness of breath at [facility] September 23 to 25.  She had a CTPA that was normal.  She was given empiric treatment of asthma exacerbation with prednisone and she states that helped.  Oxygen saturation was normal her entire hospitalization.  Currently her oxygen saturation is 94% and she denies any significant dyspnea exertion, cough, wheezing, or chest tightness.  She did mention her O2 sat dropped to 88% last week and was associated with some weakness.     8/5/2024   Tatiana is here for follow-up of asthma.  I just saw her July 22 and she has had no ER visits since then.  I am not quite sure why she is back to see me so soon.  My previous note July 22nd I mentioned a 6-month follow-up.  When I asked her what was bothering her she talked about dizziness and leg pain.  She recently saw Dr. Espitia for similar symptoms and she had her resume gabapentin.  She is under the care of neurology for this as well.  There does not appear to be any change in her respiratory complaints.  She does still talk about her oxygen saturation drops to 9192%.  Here it is 96%.  She appears to be breathing very comfortably and has no audible wheezing.    7/22/2024  Tatiana is here for

## 2025-02-06 ENCOUNTER — HOSPITAL ENCOUNTER (OUTPATIENT)
Dept: PHYSICAL THERAPY | Age: 70
Setting detail: THERAPIES SERIES
Discharge: HOME | End: 2025-02-06
Payer: MEDICARE

## 2025-02-06 ENCOUNTER — TELEPHONE (OUTPATIENT)
Dept: PULMONOLOGY | Age: 70
End: 2025-02-06

## 2025-02-06 DIAGNOSIS — M25.511 ACUTE PAIN OF RIGHT SHOULDER: Primary | ICD-10-CM

## 2025-02-06 DIAGNOSIS — S46.011D STRAIN OF TENDON OF RIGHT ROTATOR CUFF, SUBSEQUENT ENCOUNTER: ICD-10-CM

## 2025-02-06 DIAGNOSIS — R53.1 WEAKNESS: ICD-10-CM

## 2025-02-06 PROCEDURE — 97112 NEUROMUSCULAR REEDUCATION: CPT | Performed by: PHYSICAL THERAPIST

## 2025-02-06 PROCEDURE — 97110 THERAPEUTIC EXERCISES: CPT | Performed by: PHYSICAL THERAPIST

## 2025-02-06 NOTE — FLOWSHEET NOTE
[facility] - Outpatient Rehabilitation and Therapy: [address], [place] [address] office: [phone] fax: [phone]         Physical Therapy: TREATMENT/PROGRESS NOTE   Patient: Tatiana Shepard (69 y.o. female)   Examination Date: 2025   :  1955 MRN: [id number]   Visit #: 2   Insurance Allowable Auth Needed   Med nec []Yes    [x]No    Insurance: Payor: MEDICARE / Plan: MEDICARE PART A AND B / Product Type: *No Product type* /   Insurance ID: [id number] - (Medicare)  Secondary Insurance (if applicable): [facility]   Treatment Diagnosis:     ICD-10-CM    1. Acute pain of right shoulder  M25.511       2. Weakness  R53.1       3. Strain of tendon of right rotator cuff, subsequent encounter  S46.011D          Medical Diagnosis:  No admission diagnoses are documented for this encounter.   Referring Physician: Andrea Tom*  PCP: Raisa Espitia MD     Plan of care signed (Y/N): Y    Date of Patient follow up with Physician: 2025     Plan of Care Report: NO  POC update due: (10 visits /OR AUTH LIMITS, whichever is less)  2025                                             Medical History:  Comorbidities:  Diabetes (Type I or II)  Hypertension  Depression  Other: see chart for full hx  Relevant Medical History: see chart                                         Precautions/ Contra-indications:           Latex allergy:  NO  Pacemaker:    NO  Contraindications for Manipulation: unhealthy/ multiple comorbidities   Date of Surgery: n/a  Other:    Red Flags:  None    Suicide Screening:   The patient did not verbalize a primary behavioral concern, suicidal ideation, suicidal intent, or demonstrate suicidal behaviors.    Preferred Language for Healthcare:   [x] English       [] other:    SUBJECTIVE EXAMINATION     Patient stated complaint: Felisa reports that she was trying to do the table walk back/roll back on chair and ended up experiencing

## 2025-02-06 NOTE — TELEPHONE ENCOUNTER
Received call from Carmenza @ [employer], workers compensation asking what information we needed. She said that the pt called her & told her we needed info from them for her claim. I read the notes from Dr Thompson on 2/3/2025 & explained that there must be some confusion because he told the pt he does not accept WC, but we can send them pulmonary records if they request since pt signed a release of medical records.   Carmenza asked that I send records to her and she will call the patient and explain to her that she will need to find a pulm dr that will accept WC if she is going to move further with her claim.    Records were faxed to a secure email.   [email or web address]

## 2025-02-10 ENCOUNTER — CARE COORDINATION (OUTPATIENT)
Dept: CASE MANAGEMENT | Age: 70
End: 2025-02-10

## 2025-02-10 ENCOUNTER — CARE COORDINATION (OUTPATIENT)
Dept: CARE COORDINATION | Age: 70
End: 2025-02-10

## 2025-02-10 NOTE — CARE COORDINATION
Ambulatory Care Coordination Note     2/10/2025 4:08 PM     Patient Current Location:  Home: [address] [address] [address]     ACM contacted the patient by telephone. Verified name and  with patient as identifiers.         ACM: Elizabeth Monreal RN     Challenges to be reviewed by the provider   Additional needs identified to be addressed with provider Yes  Patient reports that her glucometer is not working properly and is requesting an order for the Freestyle Lite glucometer and supplies be sent to [facility] pharmacy               Method of communication with provider: chart routing.    Utilization: Patient has not had any utilization since our last call.     Care Summary Note: ACM outreached patient who was expressing frustration recent OV.  Pt expressed that everyone keeps asking her why she is there and indicating to her that nothing is wrong with her, pt feeling dismissed.  Pt recently started Outpatient PT at [facility] on , was scheduled again today but did not go, reporting \"I don't feel up to it.\" Next PT visit Thursday this week.   Pt recently seen by PCP and was referred to ENT, this is scheduled for . Pt reports that she is scheduled  at [facility] for sometime this month but unable to recall specific date at this time. Patient c/o CP that \"changes with the day\" and per pt, she keeps being told to call the cardiologist and see them sooner.  Pt askd ACM if she needed to see the cardiologist sooner than her scheduled visit on 3/10/15.  ACM encouraged pt to call and discuss need to be seen sooner with the cardiology team.  Pt reports that she \"always has chest pain.\"  ACM asked pt if she is using her nitro patches and SL tabs.  Pt reports she uses her patches daily, but does not want to use the SL tabs because \"they don't do anything.\" Pt reports that she takes all of her medications as ordered.   Pt c/o mucous-like feeling from her nose to her throat but not coughing anything up.

## 2025-02-10 NOTE — CARE COORDINATION
2/10/2025 12:14 PM  *  Alert and Triage   -Remote Alert Monitoring Note      Date/Time:  2/10/2025 12:14 PM  Patient Current Location: [place]  Verified patients name and  as identifiers.    Rpm alert to be reviewed by the provider                  LPN contacted patient by telephone regarding red alert received Background: RPM for HTN, Asthma  Refer to 911 immediately if:  Patient unresponsive or unable to provide history  Change in cognition or sudden confusion  Patient unable to respond in complete sentences  Intense chest pain/tightness  Any concern for any clinical emergency  Red Alert: Provider response time of 1 hr required for any red alert requiring intervention  Yellow Alert: Provider response time of 3hr required for any escalated yellow alert     Patient Chief Complaint:O2 Triage  Are you having any Chest Pain? Pt had intermittent chest pain this morning   Are you having any Shortness of Breath? Yes, increase in SOB this morning.   Swelling in your hands or feet? no   Are you having any other health concerns or issues? Yes, body aches  .............................................................................................................................................................................................  Do you use oxygen? No   Patient educated on oxygen preparedness in case of an emergency?  No      Patient/Caregiver educated on how to how to properly place pulse oximeter. Patient/Caregiver verbalizes understanding.            Clinical Interventions: LPN contacted pt in regard to RPM red alert for PO of 91%. Pt denied any new, worrisome and or worsening symptoms at this time.     Pt is speaking in complete sentences. No apparent distress noted. No audible wheezing, labored breathing or SOB.    Pt advised that she used her albuterol this morning along with the spirometer. Pt advised of mild to moderate dyspnea/SOB this morning but advises that the albuterol and the spirometer has helped

## 2025-02-11 DIAGNOSIS — E11.42 TYPE 2 DIABETES MELLITUS WITH DIABETIC POLYNEUROPATHY, WITHOUT LONG-TERM CURRENT USE OF INSULIN (HCC): Primary | ICD-10-CM

## 2025-02-11 RX ORDER — BLOOD-GLUCOSE METER
1 KIT MISCELLANEOUS DAILY
Qty: 1 KIT | Refills: 0 | Status: CANCELLED | OUTPATIENT
Start: 2025-02-11

## 2025-02-11 NOTE — TELEPHONE ENCOUNTER
Elizabeth Monreal, Raisa Flores MD; [facility] Clinical Staff19 hours ago (4:37 PM)     AA  Tatiana Giraldo is requesting an order for the FreeStyle Lite Glucometer and supplies be sent to [facility].  She reports her current glucometer is not working properly and is old.    Thanks,  Elizabeth

## 2025-02-12 ENCOUNTER — TELEPHONE (OUTPATIENT)
Dept: PRIMARY CARE CLINIC | Age: 70
End: 2025-02-12

## 2025-02-13 ENCOUNTER — OFFICE VISIT (OUTPATIENT)
Dept: CARDIOLOGY CLINIC | Age: 70
End: 2025-02-13
Payer: MEDICARE

## 2025-02-13 ENCOUNTER — APPOINTMENT (OUTPATIENT)
Dept: PHYSICAL THERAPY | Age: 70
End: 2025-02-13
Payer: MEDICARE

## 2025-02-13 ENCOUNTER — OFFICE VISIT (OUTPATIENT)
Dept: PRIMARY CARE CLINIC | Age: 70
End: 2025-02-13
Payer: MEDICARE

## 2025-02-13 VITALS
WEIGHT: 172.6 LBS | DIASTOLIC BLOOD PRESSURE: 78 MMHG | SYSTOLIC BLOOD PRESSURE: 140 MMHG | HEART RATE: 82 BPM | BODY MASS INDEX: 31.57 KG/M2

## 2025-02-13 VITALS
TEMPERATURE: 96.9 F | HEART RATE: 85 BPM | BODY MASS INDEX: 31.28 KG/M2 | WEIGHT: 170 LBS | DIASTOLIC BLOOD PRESSURE: 76 MMHG | SYSTOLIC BLOOD PRESSURE: 120 MMHG | HEIGHT: 62 IN | OXYGEN SATURATION: 95 % | RESPIRATION RATE: 16 BRPM

## 2025-02-13 DIAGNOSIS — R07.9 CHEST PAIN, UNSPECIFIED TYPE: Primary | ICD-10-CM

## 2025-02-13 DIAGNOSIS — R53.83 FATIGUE, UNSPECIFIED TYPE: Primary | ICD-10-CM

## 2025-02-13 DIAGNOSIS — R06.02 SHORTNESS OF BREATH: ICD-10-CM

## 2025-02-13 DIAGNOSIS — R07.9 CHEST PAIN, UNSPECIFIED TYPE: ICD-10-CM

## 2025-02-13 PROBLEM — I21.4 NSTEMI (NON-ST ELEVATED MYOCARDIAL INFARCTION) (HCC): Status: RESOLVED | Noted: 2023-02-23 | Resolved: 2025-02-13

## 2025-02-13 PROBLEM — R00.2 PALPITATIONS: Status: RESOLVED | Noted: 2023-03-09 | Resolved: 2025-02-13

## 2025-02-13 PROCEDURE — G8417 CALC BMI ABV UP PARAM F/U: HCPCS | Performed by: INTERNAL MEDICINE

## 2025-02-13 PROCEDURE — G8399 PT W/DXA RESULTS DOCUMENT: HCPCS | Performed by: INTERNAL MEDICINE

## 2025-02-13 PROCEDURE — 3078F DIAST BP <80 MM HG: CPT | Performed by: INTERNAL MEDICINE

## 2025-02-13 PROCEDURE — 1036F TOBACCO NON-USER: CPT | Performed by: INTERNAL MEDICINE

## 2025-02-13 PROCEDURE — G8427 DOCREV CUR MEDS BY ELIG CLIN: HCPCS | Performed by: INTERNAL MEDICINE

## 2025-02-13 PROCEDURE — 99214 OFFICE O/P EST MOD 30 MIN: CPT | Performed by: INTERNAL MEDICINE

## 2025-02-13 PROCEDURE — 3017F COLORECTAL CA SCREEN DOC REV: CPT | Performed by: INTERNAL MEDICINE

## 2025-02-13 PROCEDURE — 1090F PRES/ABSN URINE INCON ASSESS: CPT | Performed by: INTERNAL MEDICINE

## 2025-02-13 PROCEDURE — 1123F ACP DISCUSS/DSCN MKR DOCD: CPT | Performed by: INTERNAL MEDICINE

## 2025-02-13 PROCEDURE — 3077F SYST BP >= 140 MM HG: CPT | Performed by: INTERNAL MEDICINE

## 2025-02-13 PROCEDURE — G2211 COMPLEX E/M VISIT ADD ON: HCPCS | Performed by: INTERNAL MEDICINE

## 2025-02-13 PROCEDURE — 99215 OFFICE O/P EST HI 40 MIN: CPT | Performed by: INTERNAL MEDICINE

## 2025-02-13 PROCEDURE — 1159F MED LIST DOCD IN RCRD: CPT | Performed by: INTERNAL MEDICINE

## 2025-02-13 PROCEDURE — 3074F SYST BP LT 130 MM HG: CPT | Performed by: INTERNAL MEDICINE

## 2025-02-13 RX ORDER — BLOOD-GLUCOSE METER
KIT MISCELLANEOUS
Qty: 1 KIT | Refills: 0 | Status: SHIPPED | OUTPATIENT
Start: 2025-02-13

## 2025-02-13 RX ORDER — BLOOD-GLUCOSE METER
1 KIT MISCELLANEOUS 2 TIMES DAILY
Qty: 200 EACH | Refills: 3 | Status: SHIPPED | OUTPATIENT
Start: 2025-02-13

## 2025-02-13 RX ORDER — LANCETS 30 GAUGE
1 EACH MISCELLANEOUS 2 TIMES DAILY
Qty: 200 EACH | Refills: 3 | Status: SHIPPED | OUTPATIENT
Start: 2025-02-13

## 2025-02-13 SDOH — ECONOMIC STABILITY: FOOD INSECURITY: WITHIN THE PAST 12 MONTHS, YOU WORRIED THAT YOUR FOOD WOULD RUN OUT BEFORE YOU GOT MONEY TO BUY MORE.: NEVER TRUE

## 2025-02-13 SDOH — ECONOMIC STABILITY: FOOD INSECURITY: WITHIN THE PAST 12 MONTHS, THE FOOD YOU BOUGHT JUST DIDN'T LAST AND YOU DIDN'T HAVE MONEY TO GET MORE.: NEVER TRUE

## 2025-02-13 ASSESSMENT — PATIENT HEALTH QUESTIONNAIRE - PHQ9
2. FEELING DOWN, DEPRESSED OR HOPELESS: NOT AT ALL
3. TROUBLE FALLING OR STAYING ASLEEP: NOT AT ALL
1. LITTLE INTEREST OR PLEASURE IN DOING THINGS: NOT AT ALL
10. IF YOU CHECKED OFF ANY PROBLEMS, HOW DIFFICULT HAVE THESE PROBLEMS MADE IT FOR YOU TO DO YOUR WORK, TAKE CARE OF THINGS AT HOME, OR GET ALONG WITH OTHER PEOPLE: NOT DIFFICULT AT ALL
6. FEELING BAD ABOUT YOURSELF - OR THAT YOU ARE A FAILURE OR HAVE LET YOURSELF OR YOUR FAMILY DOWN: NOT AT ALL
SUM OF ALL RESPONSES TO PHQ QUESTIONS 1-9: 0
SUM OF ALL RESPONSES TO PHQ9 QUESTIONS 1 & 2: 0
SUM OF ALL RESPONSES TO PHQ QUESTIONS 1-9: 0
4. FEELING TIRED OR HAVING LITTLE ENERGY: NOT AT ALL
9. THOUGHTS THAT YOU WOULD BE BETTER OFF DEAD, OR OF HURTING YOURSELF: NOT AT ALL
SUM OF ALL RESPONSES TO PHQ QUESTIONS 1-9: 0
5. POOR APPETITE OR OVEREATING: NOT AT ALL
8. MOVING OR SPEAKING SO SLOWLY THAT OTHER PEOPLE COULD HAVE NOTICED. OR THE OPPOSITE, BEING SO FIGETY OR RESTLESS THAT YOU HAVE BEEN MOVING AROUND A LOT MORE THAN USUAL: NOT AT ALL
7. TROUBLE CONCENTRATING ON THINGS, SUCH AS READING THE NEWSPAPER OR WATCHING TELEVISION: NOT AT ALL
SUM OF ALL RESPONSES TO PHQ QUESTIONS 1-9: 0

## 2025-02-13 NOTE — PROGRESS NOTES
(2009); Foot surgery (10/2009,11/2010); Elbow surgery (01/18/2011); Shoulder arthroscopy (Right, 07/2007); Cataract removal (Bilateral); Breast biopsy; Partial hysterectomy; and Tonsillectomy.     Social History:   reports that she has never smoked. She has never been exposed to tobacco smoke. She has never used smokeless tobacco. She reports that she does not drink alcohol and does not use drugs.     Family History:  No evidence for sudden cardiac death or premature CAD      Medications:     Home medications were reviewed and are listed below    Prior to Admission medications    Medication Sig Start Date End Date Taking? Authorizing Provider   albuterol sulfate HFA (PROVENTIL HFA) 108 (90 Base) MCG/ACT inhaler Inhale 2 puffs into the lungs every 6 hours as needed for Wheezing 2/3/25 8/2/25 Yes Lul Thompson MD   TRULICITY 0.75 MG/0.5ML SOAJ SC injection INJECT 0.75 MG SUBCUTANEOUSLY ONE TIME PER WEEK 1/29/25  Yes Raisa Espitia MD   ondansetron (ZOFRAN-ODT) 4 MG disintegrating tablet Take 1 tablet by mouth 3 times daily as needed for Nausea or Vomiting 1/24/25  Yes Jovon Jones MD   fluticasone-salmeterol (WIXELA INHUB) 500-50 MCG/ACT AEPB diskus inhaler USE 1 INHALATION ORALLY IN THE MORNING AND IN THE     EVENING 1/23/25  Yes Raisa Espitia MD   lidocaine (XYLOCAINE) 5 % ointment Apply to affected area of finger twice daily to tid as needed.  Use smallest effective amount for shortest effective treatment duration. 1/20/25  Yes Andrea Tom MD   ranolazine (RANEXA) 500 MG extended release tablet Take 1 tablet by mouth 2 times daily 1/15/25  Yes Ramone Leslie MD   dilTIAZem (CARDIZEM CD) 240 MG extended release capsule Take 1 capsule by mouth daily 1/15/25  Yes Ramone Leslie MD   guaiFENesin (MUCINEX) 600 MG extended release tablet Take 1 tablet by mouth 2 times daily 1/14/25  Yes Raisa Espitia MD   promethazine-dextromethorphan (PROMETHAZINE-DM) 6.25-15 MG/5ML syrup

## 2025-02-13 NOTE — PROGRESS NOTES
Date of Visit: 2025    Tatiana Shepard (:  1955) is a 69 y.o. female,  Established patient here for evaluation of the following chief complaint(s):  Fatigue, Chest Pain, Shortness of Breath, and Other (paperwork)      ASSESSMENT/PLAN:    1. Fatigue, unspecified type  Assessment & Plan:  -same  -labs unremarkable  -patient has had recent illness  -start multivitamin once daily  -regular exercise as tolerated  2. Shortness of breath  Assessment & Plan:  -same  -likely due to asthma  -Continue Wixela inhaler 500-50 mcg 1 puff 2 times daily  -Continue albuterol HFA inhaler 2 puffs every 6 hours as needed  -Continue Spiriva inhaler 18 mcg 1 puff daily  -Continue Singulair 10 mg nightly  -Continue care per Pulmonary  3. Chest pain, unspecified type  Assessment & Plan:  -not controlled  -Continue aspirin 81 mg 2 times weekly  -Continue Ranexa  -Follow up with Cardiology as scheduled      Return in about 2 months (around 2025) for diabetes, hypertension, hyperlipidemia, and neuropathy.    SUBJECTIVE:    Patient has fatigue. Patient states she is not taking her multivitamin. Patient states she takes vitamin D. Patient states she is not able to exercise. Patient has shortness of breath. Patient denies cough or wheezing. Patient saw pulmonary on 2025 for moderate persistent asthma. Patient takes Wixela 500-50 mcg inhaler 1 puff 2 times daily, Spiriva inhaler nightly, and albuterol inhaler as needed. Patient to continue same medications. Patient states she still has chest pain and it has been terrible since Tuesday patient has an appointment with cardiology today. Patient needs paperwork sent to combined insurance company. Paperwork will be completed. ER visits and consult notes have been reviewed.      Review of Systems   Constitutional:  Positive for fatigue. Negative for chills and fever.   HENT:  Negative for congestion, ear pain, postnasal drip, rhinorrhea, sinus pressure, sinus pain, sneezing

## 2025-02-14 NOTE — TELEPHONE ENCOUNTER
Prescriptions for Freestyle lite glucometer, test strips, and lancets sent to [facility] in [facility] pharmacy.

## 2025-02-14 NOTE — TELEPHONE ENCOUNTER
Call patient. Prescriptions for Freestyle lite glucometer, test strips, and lancets sent to [facility] in [facility] pharmacy.

## 2025-02-14 NOTE — CARE COORDINATION
Prescriptions for Freestyle lite glucometer, test strips, and lancets sent to [facility] in [facility] pharmacy.

## 2025-02-18 ENCOUNTER — HOSPITAL ENCOUNTER (OUTPATIENT)
Dept: PHYSICAL THERAPY | Age: 70
Setting detail: THERAPIES SERIES
Discharge: HOME | End: 2025-02-18
Payer: MEDICARE

## 2025-02-18 DIAGNOSIS — S46.011D STRAIN OF TENDON OF RIGHT ROTATOR CUFF, SUBSEQUENT ENCOUNTER: ICD-10-CM

## 2025-02-18 DIAGNOSIS — R53.1 WEAKNESS: ICD-10-CM

## 2025-02-18 DIAGNOSIS — M25.511 ACUTE PAIN OF RIGHT SHOULDER: Primary | ICD-10-CM

## 2025-02-18 PROCEDURE — 97110 THERAPEUTIC EXERCISES: CPT | Performed by: PHYSICAL THERAPIST

## 2025-02-18 NOTE — PLAN OF CARE
[facility] - Outpatient Rehabilitation and Therapy: [address], [place] [address] office: [phone] fax: [phone]      Physical Therapy Discharge Summary    Dear Andrea Lazcano*   ,    We had the pleasure of treating the following patient for physical therapy services at [facility] Outpatient Physical Therapy.  A summary of our findings can be found in the discharge summary below.  If you have any questions or concerns regarding these findings, please do not hesitate to contact me at the office phone number checked above.  Thank you for the referral.         Total Visits: 3     Recommendation:    [] Continue PT x / wk for  weeks.   [] Hold PT, pending MD visit   [x] Discharge to [facility]. Follow up with PT or MD PRN.     Reason for Discharge:   Patient has requested discharge secondary to feeling overwhelmed with other health concerns. She is self discharging today.           Physical Therapy: TREATMENT/PROGRESS NOTE   Patient: Tatiana Shepard (69 y.o. female)   Examination Date: 2025   :  1955 MRN: [id number]   Visit #: 3   Insurance Allowable Auth Needed   Med nec []Yes    [x]No    Insurance: Payor: MEDICARE / Plan: MEDICARE PART A AND B / Product Type: *No Product type* /   Insurance ID: [id number] - (Medicare)  Secondary Insurance (if applicable): [facility]   Treatment Diagnosis:     ICD-10-CM    1. Acute pain of right shoulder  M25.511       2. Weakness  R53.1       3. Strain of tendon of right rotator cuff, subsequent encounter  S46.011D          Medical Diagnosis:  No admission diagnoses are documented for this encounter.   Referring Physician: Andrea Tom*  PCP: Raisa Espitia MD     Plan of care signed (Y/N): Y    Date of Patient follow up with Physician: 2025     Plan of Care Report: NO  POC update due: (10 visits /OR AUTH LIMITS, whichever is less)  2025                                             Medical

## 2025-02-19 ENCOUNTER — TELEPHONE (OUTPATIENT)
Dept: PRIMARY CARE CLINIC | Age: 70
End: 2025-02-19

## 2025-02-19 DIAGNOSIS — E11.42 TYPE 2 DIABETES MELLITUS WITH DIABETIC POLYNEUROPATHY, WITHOUT LONG-TERM CURRENT USE OF INSULIN (HCC): ICD-10-CM

## 2025-02-19 RX ORDER — BLOOD-GLUCOSE METER
1 KIT MISCELLANEOUS DAILY
Qty: 100 EACH | Refills: 3 | Status: SHIPPED | OUTPATIENT
Start: 2025-02-19 | End: 2025-02-19 | Stop reason: CLARIF

## 2025-02-19 RX ORDER — BLOOD-GLUCOSE METER
1 KIT MISCELLANEOUS DAILY
Qty: 100 EACH | Refills: 3 | Status: SHIPPED | OUTPATIENT
Start: 2025-02-19

## 2025-02-19 NOTE — TELEPHONE ENCOUNTER
Medication:   Requested Prescriptions     Pending Prescriptions Disp Refills    blood glucose test strips (FREESTYLE LITE) strip 200 each 3     Si each by In Vitro route 2 times daily As needed.     Last Filled:  2025    Last appt: 2025   Next appt: 2025    Last OARRS:       2024    10:16 AM   RX Monitoring   Periodic Controlled Substance Monitoring No signs of potential drug abuse or diversion identified.     Please change test strip to 1 time daily

## 2025-02-19 NOTE — TELEPHONE ENCOUNTER
The referral was a generic referral to [employer] [facility] for counseling [phone] which is stated on the referral. I am not sure how the referral changed to a Provider at [facility]. I will follow up with patient regarding the correct phone number to contact [facility] for counseling.

## 2025-02-19 NOTE — TELEPHONE ENCOUNTER
[facility] psychology department called - they do not take patients that are her age.  Please send a new referral.

## 2025-02-19 NOTE — TELEPHONE ENCOUNTER
Call patient. I recommend referral to [facility] for counseling [phone]. She can call [facility] to schedule.

## 2025-02-20 ENCOUNTER — OFFICE VISIT (OUTPATIENT)
Dept: ORTHOPEDIC SURGERY | Age: 70
End: 2025-02-20

## 2025-02-20 ENCOUNTER — OFFICE VISIT (OUTPATIENT)
Age: 70
End: 2025-02-20

## 2025-02-20 VITALS — BODY MASS INDEX: 30.91 KG/M2 | WEIGHT: 168 LBS | HEIGHT: 62 IN

## 2025-02-20 VITALS
WEIGHT: 168.87 LBS | TEMPERATURE: 99.3 F | HEART RATE: 92 BPM | SYSTOLIC BLOOD PRESSURE: 147 MMHG | HEIGHT: 62 IN | OXYGEN SATURATION: 97 % | BODY MASS INDEX: 31.08 KG/M2 | DIASTOLIC BLOOD PRESSURE: 72 MMHG

## 2025-02-20 DIAGNOSIS — R04.0 EPISTAXIS: ICD-10-CM

## 2025-02-20 DIAGNOSIS — G89.29 CHRONIC RIGHT SHOULDER PAIN: ICD-10-CM

## 2025-02-20 DIAGNOSIS — J32.3 CHRONIC SPHENOIDAL SINUSITIS: Primary | ICD-10-CM

## 2025-02-20 DIAGNOSIS — S46.011D STRAIN OF RIGHT ROTATOR CUFF CAPSULE, SUBSEQUENT ENCOUNTER: Primary | ICD-10-CM

## 2025-02-20 DIAGNOSIS — J34.89 NASAL OBSTRUCTION: ICD-10-CM

## 2025-02-20 DIAGNOSIS — Z86.39 HISTORY OF DIABETES MELLITUS: ICD-10-CM

## 2025-02-20 DIAGNOSIS — J34.3 NASAL TURBINATE HYPERTROPHY: ICD-10-CM

## 2025-02-20 DIAGNOSIS — J34.2 DNS (DEVIATED NASAL SEPTUM): ICD-10-CM

## 2025-02-20 DIAGNOSIS — M25.511 CHRONIC RIGHT SHOULDER PAIN: ICD-10-CM

## 2025-02-20 RX ORDER — MOMETASONE FUROATE MONOHYDRATE 50 UG/1
2 SPRAY, METERED NASAL DAILY
Qty: 1 EACH | Refills: 3 | Status: SHIPPED | OUTPATIENT
Start: 2025-02-20

## 2025-02-20 RX ORDER — MUPIROCIN 20 MG/G
OINTMENT TOPICAL
Qty: 22 G | Refills: 0 | Status: SHIPPED | OUTPATIENT
Start: 2025-02-20

## 2025-02-20 ASSESSMENT — ENCOUNTER SYMPTOMS
NAUSEA: 0
CHEST TIGHTNESS: 0
SHORTNESS OF BREATH: 0
TROUBLE SWALLOWING: 0
SINUS PRESSURE: 0
DIARRHEA: 0
RHINORRHEA: 0
VOMITING: 0
PHOTOPHOBIA: 0
EYE ITCHING: 0
SINUS PRESSURE: 1
CHOKING: 0
SORE THROAT: 0
DIARRHEA: 0
SINUS PAIN: 0
CONSTIPATION: 1
EYE PAIN: 0
FACIAL SWELLING: 0
WHEEZING: 0
ABDOMINAL PAIN: 0
EYE REDNESS: 0
SHORTNESS OF BREATH: 1
STRIDOR: 0
BLOOD IN STOOL: 0
COLOR CHANGE: 0
SINUS PAIN: 0
RHINORRHEA: 0
SORE THROAT: 0
COUGH: 0
NAUSEA: 0
COUGH: 0
VOICE CHANGE: 0

## 2025-02-20 NOTE — PROGRESS NOTES
sprays by Nasal route daily  Dispense: 1 each; Refill: 3    3. Epistaxis  The patient has some bleeding likely from nasal vestibulitis and dry nasal mucosa.  I recommend trial of mupirocin ointment.  Proper administration risk discussed.  - mometasone (NASONEX) 50 MCG/ACT nasal spray; 2 sprays by Nasal route daily  Dispense: 1 each; Refill: 3  - mupirocin (BACTROBAN) 2 % ointment; Using a q-tip apply a pea sized amount to each nostril three times daily for three weeks.  Dispense: 22 g; Refill: 0    4. DNS (deviated nasal septum)      5. Nasal turbinate hypertrophy          Return if symptoms worsen or fail to improve.      [ ] Review/order radiology tests   [ ] Independent interpretation of diagnostic test by another provider  [ ] Discussed case with another provider  [ ] High risk of loss of major body function  [ ] Elective major surgery with risk factors    Portions of this note were dictated using Dragon. There may be linguistic errors secondary to the use of this program.

## 2025-02-20 NOTE — PROGRESS NOTES
Chief Complaint:   Chief Complaint   Patient presents with    Shoulder Pain     Right Shoulder - PT x 2. She stopped going because it's just too much. She would prefer to do things at home. 25% relief from injection.           History of Present Illness:       Patient is a 69 y.o. female returns follow up for the above complaint. The patient was last seen approximately 1 monthsago. The symptoms remain problematic since the last visit. The patient has had no further testing for the problem.      Status post ultrasound-guided SA CSI 1/20/2025    She estimates 25% improvement overall in the shoulder remains problematic and continues to follow rotator cuff provocative pattern    She denies any new onset or progressive weakness of the upper extremity.  She denies any correlation of pain with head and neck range of motion    She denies mechanical symptoms    She transition out of physical therapy prematurely related to medical decompensation with respect to increasing generalized pain and fatigue.     Past Medical History:        Past Medical History:   Diagnosis Date    Abnormal involuntary movements(781.0)     Allergic rhinitis     Anal fissure     Anemia, unspecified     Anxiety     Asthma     Chronic back pain     Chronic diarrhea 09/23/2019    Depression     Diffuse cystic mastopathy     Dizziness     Encopresis(307.7)     Esophageal reflux     Foot fracture, left 1989    drop something on foot    Headache(784.0)     Hearing loss     Hepatitis, unspecified     Herpes simplex without mention of complication     Hypertension     Hypogammaglobulinaemia, unspecified     Insomnia, unspecified     Lateral epicondylitis  of elbow     Migraine, unspecified, without mention of intractable migraine without mention of status migrainosus     Mixed hyperlipidemia 07/30/2010    Nosebleed     Osteopenia     Postmenopausal     Pure hypercholesterolemia     Recurrent upper respiratory infection (URI)     Symptomatic menopausal or

## 2025-02-20 NOTE — ASSESSMENT & PLAN NOTE
-same  -labs unremarkable  -patient has had recent illness  -start multivitamin once daily  -regular exercise as tolerated

## 2025-02-20 NOTE — ASSESSMENT & PLAN NOTE
-same  -likely due to asthma  -Continue Wixela inhaler 500-50 mcg 1 puff 2 times daily  -Continue albuterol HFA inhaler 2 puffs every 6 hours as needed  -Continue Spiriva inhaler 18 mcg 1 puff daily  -Continue Singulair 10 mg nightly  -Continue care per Pulmonary

## 2025-02-20 NOTE — ASSESSMENT & PLAN NOTE
-not controlled  -Continue aspirin 81 mg 2 times weekly  -Continue Ranexa  -Follow up with Cardiology as scheduled

## 2025-02-24 ENCOUNTER — CARE COORDINATION (OUTPATIENT)
Dept: CARE COORDINATION | Age: 70
End: 2025-02-24

## 2025-02-24 ENCOUNTER — CARE COORDINATION (OUTPATIENT)
Dept: PRIMARY CARE CLINIC | Age: 70
End: 2025-02-24

## 2025-02-24 DIAGNOSIS — J45.909 UNCOMPLICATED ASTHMA, UNSPECIFIED ASTHMA SEVERITY, UNSPECIFIED WHETHER PERSISTENT: ICD-10-CM

## 2025-02-24 DIAGNOSIS — I10 ESSENTIAL HYPERTENSION: Primary | Chronic | ICD-10-CM

## 2025-02-24 RX ORDER — M-VIT,TX,IRON,MINS/CALC/FOLIC 27MG-0.4MG
1 TABLET ORAL DAILY
COMMUNITY

## 2025-02-24 NOTE — CARE COORDINATION
RPM Kit Return    Tatiana Shepard  2/24/25    Care Coordination  placed call to Patient  to arrange RPM kit  through [employer].     CCSS spoke to Patient reviewed with Patient how to pack equipment in original packing. [employer] will  equipment in 2-4 days.   All questions and concerns answered.

## 2025-02-24 NOTE — CARE COORDINATION
Ambulatory Care Coordination Note     2025 12:56 PM     Patient Current Location:  Home: [id number] [place], [place] [id number]     ACM contacted the patient by telephone. Verified name and  with patient as identifiers.         ACM: Elizabeth Monreal RN     Challenges to be reviewed by the provider   Additional needs identified to be addressed with provider No  none               Method of communication with provider: none.    Utilization: Patient has not had any utilization since our last call.     Care Summary Note: ACM received VM from patient requesting call back.  ACM spoke to pt who reported she has been having cramping to her feet, hands and neck/back.  ACM asked pt interventions she has tried.  Pt reported none other than using warm water in a water bottle d/t not having a heating pad.  ACM encouraged pt to stretch lightly each day to prevent muscle stiffness.  Pt is very passive and states she can't or not willing to do therapeutic actions.  Pt told ACM to call Dr. Espitia but when asked why, pt stated she didn't want anything. Pt reported that she quit Outpatient PT because it was too much work for her. ACM asked if they provided her with home exercised to do, pt reported they did.  ACM encouraged pt to utilize the stretching exercises provided to her. ACM advised pt she can utilize PRN Tylenol arthritis for comfort as well.  Pt VU.  ACM asked pt if she had contacted [employer] as recommended by PCP?  Pt reported that she did and they need a C-9 form for worker's comp. Pt reported that she talked to a lady in [place] (Worker's Comp) who is to be working on the C-9 form. ACM advised pt to keep calling to ensure she gets the C-9 form.  Pt states that she does not want to bother people.  ACM advised pt that she needs to call and be an advocate for herself in order to ensure she receives the services she needs. Pt vu.    ACM reviewed recent medical visits and medication changes made:    PCP -

## 2025-02-24 NOTE — PROGRESS NOTES
Remote Patient Order Discontinued    Received request from Elizabeth Monreal, RN to discontinue order for remote patient monitoring of HTN and Asthma and order completed.

## 2025-02-27 ENCOUNTER — OFFICE VISIT (OUTPATIENT)
Dept: ORTHOPEDIC SURGERY | Age: 70
End: 2025-02-27

## 2025-02-27 VITALS — WEIGHT: 167.99 LBS | HEIGHT: 62 IN | BODY MASS INDEX: 30.91 KG/M2

## 2025-02-27 DIAGNOSIS — Z86.39 HISTORY OF DIABETES MELLITUS: ICD-10-CM

## 2025-02-27 DIAGNOSIS — M79.2 NEUROGENIC PAIN OF RIGHT LOWER EXTREMITY: ICD-10-CM

## 2025-02-27 DIAGNOSIS — M48.061 LUMBAR FORAMINAL STENOSIS: ICD-10-CM

## 2025-02-27 DIAGNOSIS — M51.27 HERNIATION OF INTERVERTEBRAL DISC BETWEEN L5 AND S1: Primary | ICD-10-CM

## 2025-02-27 RX ORDER — KETOROLAC TROMETHAMINE 30 MG/ML
30 INJECTION, SOLUTION INTRAMUSCULAR; INTRAVENOUS ONCE
Status: COMPLETED | OUTPATIENT
Start: 2025-02-27 | End: 2025-02-27

## 2025-02-27 RX ORDER — OXYCODONE AND ACETAMINOPHEN 5; 325 MG/1; MG/1
1 TABLET ORAL EVERY 6 HOURS PRN
Qty: 20 TABLET | Refills: 0 | Status: SHIPPED | OUTPATIENT
Start: 2025-02-27 | End: 2025-02-27 | Stop reason: SDUPTHER

## 2025-02-27 RX ORDER — HYDROXYZINE HYDROCHLORIDE 25 MG/1
25 TABLET, FILM COATED ORAL EVERY 8 HOURS PRN
Qty: 30 TABLET | Refills: 0 | Status: SHIPPED | OUTPATIENT
Start: 2025-02-27 | End: 2025-03-09

## 2025-02-27 RX ORDER — OXYCODONE AND ACETAMINOPHEN 5; 325 MG/1; MG/1
1 TABLET ORAL EVERY 6 HOURS PRN
Qty: 20 TABLET | Refills: 0 | Status: SHIPPED | OUTPATIENT
Start: 2025-02-27 | End: 2025-03-04

## 2025-02-27 RX ORDER — HYDROXYZINE HYDROCHLORIDE 25 MG/1
25 TABLET, FILM COATED ORAL EVERY 8 HOURS PRN
Qty: 30 TABLET | Refills: 0 | Status: SHIPPED | OUTPATIENT
Start: 2025-02-27 | End: 2025-02-27 | Stop reason: SDUPTHER

## 2025-02-27 RX ADMIN — KETOROLAC TROMETHAMINE 30 MG: 30 INJECTION, SOLUTION INTRAMUSCULAR; INTRAVENOUS at 16:36

## 2025-02-27 NOTE — PROGRESS NOTES
Chief Complaint:   Chief Complaint   Patient presents with    Lower Back Pain     pt c/o worsening pain since waking last Friday, no new injuries or falls, but pain is so bad in low back and buttocks it makes her scream, driving is terrible if hitting any bumps, LESI was denied by  and appeal is necessary.          History of Present Illness:       Patient is a 69 y.o. female returns follow up for the above complaint. The patient was last seen approximately 5 weeksago. The symptoms suddenly worsened on Friday of this past week since the last visit unprompted by any specific injury or event. The patient has had no further testing for the problem.       [id number] DOI 8- LUMBAR SI-[place] WORKABILITY NOTE  [facility]MA [facility]/ PATTI SERVIN PH: [phone] FAX: [phone]  POR: NOT ESTABLISHED  LUMBAR: [place] DOESN'T USE DX CODES       L JENNY was denied by [facility] in the interim.      Back:Rightleg pain 90:10 . Pain back and leg is aching or burning in quality.  Lower limb pain follows an L5 dermatomal distribution radiating to the foot and ankle    The lower limb symptoms follows a neurogenic provocative pattern.    Pain levels: 9-10 .  Low back pain is constant    The patient claimsrogressive weakness of the lower extremities.  The patient denies now onset bowel or bladder function.    She continues on medical pain management as per previous  inclusive of acetaminophen-Tylenol, gabapentin                Past Medical History:        Past Medical History:   Diagnosis Date    Abnormal involuntary movements(781.0)     Allergic rhinitis     Anal fissure     Anemia, unspecified     Anxiety     Asthma     Chronic back pain     Chronic diarrhea 09/23/2019    Depression     Diffuse cystic mastopathy     Dizziness     Encopresis(307.7)     Esophageal reflux     Foot fracture, left 1989    drop something on foot    Headache(784.0)     Hearing loss     Hepatitis, unspecified     Herpes simplex without mention of complication

## 2025-03-03 ENCOUNTER — OFFICE VISIT (OUTPATIENT)
Dept: PRIMARY CARE CLINIC | Age: 70
End: 2025-03-03
Payer: MEDICARE

## 2025-03-03 ENCOUNTER — TELEPHONE (OUTPATIENT)
Age: 70
End: 2025-03-03

## 2025-03-03 VITALS
WEIGHT: 171 LBS | TEMPERATURE: 96.8 F | RESPIRATION RATE: 16 BRPM | HEART RATE: 88 BPM | OXYGEN SATURATION: 98 % | DIASTOLIC BLOOD PRESSURE: 86 MMHG | HEIGHT: 62 IN | BODY MASS INDEX: 31.47 KG/M2 | SYSTOLIC BLOOD PRESSURE: 138 MMHG

## 2025-03-03 DIAGNOSIS — J45.40 MODERATE PERSISTENT ASTHMA WITHOUT COMPLICATION: Primary | ICD-10-CM

## 2025-03-03 PROCEDURE — G8399 PT W/DXA RESULTS DOCUMENT: HCPCS | Performed by: INTERNAL MEDICINE

## 2025-03-03 PROCEDURE — 1090F PRES/ABSN URINE INCON ASSESS: CPT | Performed by: INTERNAL MEDICINE

## 2025-03-03 PROCEDURE — 3017F COLORECTAL CA SCREEN DOC REV: CPT | Performed by: INTERNAL MEDICINE

## 2025-03-03 PROCEDURE — 3079F DIAST BP 80-89 MM HG: CPT | Performed by: INTERNAL MEDICINE

## 2025-03-03 PROCEDURE — G8417 CALC BMI ABV UP PARAM F/U: HCPCS | Performed by: INTERNAL MEDICINE

## 2025-03-03 PROCEDURE — G8427 DOCREV CUR MEDS BY ELIG CLIN: HCPCS | Performed by: INTERNAL MEDICINE

## 2025-03-03 PROCEDURE — 1036F TOBACCO NON-USER: CPT | Performed by: INTERNAL MEDICINE

## 2025-03-03 PROCEDURE — 1123F ACP DISCUSS/DSCN MKR DOCD: CPT | Performed by: INTERNAL MEDICINE

## 2025-03-03 PROCEDURE — 3075F SYST BP GE 130 - 139MM HG: CPT | Performed by: INTERNAL MEDICINE

## 2025-03-03 PROCEDURE — 99213 OFFICE O/P EST LOW 20 MIN: CPT | Performed by: INTERNAL MEDICINE

## 2025-03-03 SDOH — ECONOMIC STABILITY: FOOD INSECURITY: WITHIN THE PAST 12 MONTHS, YOU WORRIED THAT YOUR FOOD WOULD RUN OUT BEFORE YOU GOT MONEY TO BUY MORE.: NEVER TRUE

## 2025-03-03 SDOH — ECONOMIC STABILITY: FOOD INSECURITY: WITHIN THE PAST 12 MONTHS, THE FOOD YOU BOUGHT JUST DIDN'T LAST AND YOU DIDN'T HAVE MONEY TO GET MORE.: NEVER TRUE

## 2025-03-03 ASSESSMENT — PATIENT HEALTH QUESTIONNAIRE - PHQ9
SUM OF ALL RESPONSES TO PHQ QUESTIONS 1-9: 0
7. TROUBLE CONCENTRATING ON THINGS, SUCH AS READING THE NEWSPAPER OR WATCHING TELEVISION: NOT AT ALL
SUM OF ALL RESPONSES TO PHQ QUESTIONS 1-9: 0
2. FEELING DOWN, DEPRESSED OR HOPELESS: NOT AT ALL
SUM OF ALL RESPONSES TO PHQ QUESTIONS 1-9: 0
3. TROUBLE FALLING OR STAYING ASLEEP: NOT AT ALL
6. FEELING BAD ABOUT YOURSELF - OR THAT YOU ARE A FAILURE OR HAVE LET YOURSELF OR YOUR FAMILY DOWN: NOT AT ALL
8. MOVING OR SPEAKING SO SLOWLY THAT OTHER PEOPLE COULD HAVE NOTICED. OR THE OPPOSITE, BEING SO FIGETY OR RESTLESS THAT YOU HAVE BEEN MOVING AROUND A LOT MORE THAN USUAL: NOT AT ALL
4. FEELING TIRED OR HAVING LITTLE ENERGY: NOT AT ALL
9. THOUGHTS THAT YOU WOULD BE BETTER OFF DEAD, OR OF HURTING YOURSELF: NOT AT ALL
SUM OF ALL RESPONSES TO PHQ QUESTIONS 1-9: 0
10. IF YOU CHECKED OFF ANY PROBLEMS, HOW DIFFICULT HAVE THESE PROBLEMS MADE IT FOR YOU TO DO YOUR WORK, TAKE CARE OF THINGS AT HOME, OR GET ALONG WITH OTHER PEOPLE: NOT DIFFICULT AT ALL
5. POOR APPETITE OR OVEREATING: NOT AT ALL
1. LITTLE INTEREST OR PLEASURE IN DOING THINGS: NOT AT ALL

## 2025-03-03 NOTE — TELEPHONE ENCOUNTER
Pt called to update that the Mometasone and Mupirocin worked well.    Pt was notified that she has refills of Mometasone at her pharmacy     No further action required.

## 2025-03-03 NOTE — PROGRESS NOTES
Date of Visit: 3/3/2025    Tatiana Shepard (:  1955) is a 69 y.o. female,  Established patient here for evaluation of the following chief complaint(s):  Referral - General, Other (forms), and Asthma      ASSESSMENT/PLAN:    1. Moderate persistent asthma without complication  Assessment & Plan:  -not controlled  -continue Wixela inhaler 500-50 mcg 1 puff 2 times daily  -Continue Spiriva inhaler 18 mcg 1 puff nightly  -Continue albuterol as needed  -Referral to [facility] pulmonary  Orders:  -     Non [facility] - External Referral To Pulmonology      Return if symptoms worsen or fail to improve.    SUBJECTIVE:    Patient has asthma. Patient states she needs a referral to a new Pulmonary doctor that takes Workers Comp. Patient needs a referral to [facility] but doesn't have a Provider's name. Patient has SOB and wheezing. Patient takes Wixela inhaler 500-50 mcg 1 puff 2 times daily, Spiriva inhaler 1 puff nightly, and albuterol inhaler. Patient states I also needed to make a correction on a form I submitted for her to [employer]. Patient provided a copy of submitted paperwork. I needed to put an end date for disability and refax it. I made the addendum correction and the form was faxed and patient given copy.        Review of Systems   Constitutional:  Negative for chills and fever.   HENT:  Negative for congestion, ear pain, postnasal drip, rhinorrhea, sinus pressure, sinus pain, sneezing and sore throat.    Respiratory:  Positive for shortness of breath and wheezing. Negative for cough and chest tightness.    Neurological:  Negative for headaches.       Allergies   Allergen Reactions    Hydrocodone Hives and Itching    Ibuprofen Other (See Comments)     Unknown reaction    Other Reaction(s): Bleeding from nose, Bruising    Methocarbamol Other (See Comments)     Unknown reaction    Propoxyphene Hives, Itching and Rash     \"my body turns bright red\"    Tramadol Other (See Comments)     Unknown

## 2025-03-05 DIAGNOSIS — J45.40 MODERATE PERSISTENT ASTHMA WITHOUT COMPLICATION: ICD-10-CM

## 2025-03-05 DIAGNOSIS — J32.3 CHRONIC SPHENOIDAL SINUSITIS: Primary | ICD-10-CM

## 2025-03-05 RX ORDER — TIOTROPIUM BROMIDE 18 UG/1
18 CAPSULE ORAL; RESPIRATORY (INHALATION) DAILY
Qty: 90 CAPSULE | Refills: 1 | Status: SHIPPED | OUTPATIENT
Start: 2025-03-05

## 2025-03-05 RX ORDER — MOMETASONE FUROATE MONOHYDRATE 50 UG/1
2 SPRAY, METERED NASAL DAILY
Qty: 1 EACH | Refills: 3 | Status: SHIPPED | OUTPATIENT
Start: 2025-03-05

## 2025-03-05 NOTE — TELEPHONE ENCOUNTER
Pt called asking for a refill on Nasonex for 3 months. She wants it sent to [facility]  [phone]  Please advise

## 2025-03-05 NOTE — TELEPHONE ENCOUNTER
Last OV: 25  Next OV: None scheduled    mometasone (NASONEX) 50 MCG/ACT nasal spray      Refugio Tyler R, DO         Summary: 2 sprays by Nasal route daily, Nasal, DAILY Starting Thu 2025, Disp-1 each, R-3, Normal  Guidelines: Dose, Route, Frequency: 2 spray, Nasal, DAILYMed Note: Dx Associated: Start: 2025Ord/Sold: 2025 (O)Ordered On: 2025ReportPharmacy: [facility] [id number] [place] [address] [facility] P [phone] - F [phone]             Patient Si sprays by Nasal route daily  Ordering Department: [facility]  Dispense: 1 each  Refills: 3 ordered

## 2025-03-05 NOTE — TELEPHONE ENCOUNTER
Medication:   Requested Prescriptions     Pending Prescriptions Disp Refills    tiotropium (SPIRIVA) 18 MCG inhalation capsule 90 capsule 0     Last Filled:  12/26/2024    Last appt: 3/3/2025   Next appt: 4/14/2025    Last OARRS:       11/27/2024    10:16 AM   RX Monitoring   Periodic Controlled Substance Monitoring No signs of potential drug abuse or diversion identified.

## 2025-03-10 ENCOUNTER — TELEPHONE (OUTPATIENT)
Dept: PRIMARY CARE CLINIC | Age: 70
End: 2025-03-10

## 2025-03-10 ASSESSMENT — ENCOUNTER SYMPTOMS
SORE THROAT: 0
CHEST TIGHTNESS: 0
SHORTNESS OF BREATH: 1
COUGH: 0
WHEEZING: 1
SINUS PAIN: 0
SINUS PRESSURE: 0
RHINORRHEA: 0

## 2025-03-10 NOTE — ASSESSMENT & PLAN NOTE
-not controlled  -continue Wixela inhaler 500-50 mcg 1 puff 2 times daily  -Continue Spiriva inhaler 18 mcg 1 puff nightly  -Continue albuterol as needed  -Referral to [facility] pulmonary

## 2025-03-10 NOTE — TELEPHONE ENCOUNTER
Spoke with patient, she stated that she started taking Oxycodone Friday and by Sunday her legs were turning black and blue and by Monday her hands stated burning and turning black and blue, please advise

## 2025-03-13 ENCOUNTER — OFFICE VISIT (OUTPATIENT)
Dept: ORTHOPEDIC SURGERY | Age: 70
End: 2025-03-13

## 2025-03-13 DIAGNOSIS — Z78.9 MEDICATION INTOLERANCE: Primary | ICD-10-CM

## 2025-03-13 DIAGNOSIS — Z79.899 POLYPHARMACY: ICD-10-CM

## 2025-03-13 DIAGNOSIS — M25.511 CHRONIC RIGHT SHOULDER PAIN: ICD-10-CM

## 2025-03-13 DIAGNOSIS — M51.27 HERNIATION OF INTERVERTEBRAL DISC BETWEEN L5 AND S1: ICD-10-CM

## 2025-03-13 DIAGNOSIS — M48.061 LUMBAR FORAMINAL STENOSIS: ICD-10-CM

## 2025-03-13 DIAGNOSIS — Z86.39 HISTORY OF DIABETES MELLITUS: ICD-10-CM

## 2025-03-13 DIAGNOSIS — M79.2 NEUROGENIC PAIN OF RIGHT LOWER EXTREMITY: ICD-10-CM

## 2025-03-13 DIAGNOSIS — M47.816 LUMBAR FACET ARTHROPATHY: ICD-10-CM

## 2025-03-13 DIAGNOSIS — M51.362 DEGENERATION OF INTERVERTEBRAL DISC OF LUMBAR REGION WITH DISCOGENIC BACK PAIN AND LOWER EXTREMITY PAIN: ICD-10-CM

## 2025-03-13 DIAGNOSIS — M48.061 LUMBAR FORAMINAL STENOSIS: Primary | ICD-10-CM

## 2025-03-13 DIAGNOSIS — M47.816 LUMBAR SPONDYLOSIS: ICD-10-CM

## 2025-03-13 DIAGNOSIS — Z79.02 LONG TERM CURRENT USE OF ANTITHROMBOTICS/ANTIPLATELETS: ICD-10-CM

## 2025-03-13 DIAGNOSIS — G89.29 CHRONIC RIGHT SHOULDER PAIN: ICD-10-CM

## 2025-03-13 NOTE — PROGRESS NOTES
Propoxyphene Hives, Itching and Rash     \"my body turns bright red\"    Tramadol Other (See Comments)     Unknown reaction    Venlafaxine Hydrochloride Hives, Itching and Rash     \"my body turns bright red\"    Aspirin Other (See Comments)     bruising    Other Reaction(s): Bleeding from nose, Bruising, Other (See Comments)    bruising      \"I get a lot of bruises\"      bruising Bruising easily bruising      Bruising easily    Cephalexin Itching and Other (See Comments)     Face redness.    Other Reaction(s): Other (See Comments)    Face redness.      Face redness.  Patient states she is able to tolerate penicillin without problems.    Paxil Cr [Paroxetine Hcl Er] Itching           Review of Systems:    Pertinent items are noted in HPI     Vital Signs:    There were no vitals filed for this visit.     General Exam:     Constitutional: Patient is adequately groomed with no evidence of malnutrition    Physical Exam: lower back      Primary Exam:    Inspection: No deformity atrophy appreciable curvature      Palpation: No focal trigger point tenderness      Range of Motion: 50/0 pain with extension greater than extension phase      Strength: Normal lower extremity      Special Tests: SLR positive right      Skin: There are no rashes, ulcerations or lesions.      Gait: Cane assisted      Neurovascular -hyperesthesia to light touch anterior thigh and mild symmetric decreased light touch sensation L5 distribution right lower extremity otherwise non focal and intact       Additional Comments:        Additional Examinations:                Office Imaging Results/Procedures PerformedToday:          Office Procedures:   No orders of the defined types were placed in this encounter.          Other Outside Imaging and Testing Personally Reviewed:    MRI Upper Extremity W JT WO Contrast  Result Date: 3/12/2025  Exam Performed at: [facility] Patient Name: Tatiana Shepard Case ID: [id number] Patient : 1955

## 2025-03-20 ENCOUNTER — OFFICE VISIT (OUTPATIENT)
Dept: ORTHOPEDIC SURGERY | Age: 70
End: 2025-03-20

## 2025-03-20 VITALS — HEIGHT: 62 IN | BODY MASS INDEX: 31.48 KG/M2 | WEIGHT: 171.08 LBS

## 2025-03-20 DIAGNOSIS — M75.111 PARTIAL NONTRAUMATIC TEAR OF ROTATOR CUFF, RIGHT: Primary | ICD-10-CM

## 2025-03-20 DIAGNOSIS — S46.011S: ICD-10-CM

## 2025-03-25 ENCOUNTER — CARE COORDINATION (OUTPATIENT)
Dept: CARE COORDINATION | Age: 70
End: 2025-03-25

## 2025-03-25 NOTE — CARE COORDINATION
Ambulatory Care Coordination Note     3/25/2025 10:36 AM     Patient Current Location:  Home: [address] [id number]     ACM contacted the patient by telephone. Verified name and  with patient as identifiers.         ACM: Elizabeth Monreal RN     Challenges to be reviewed by the provider   Additional needs identified to be addressed with provider No  none               Method of communication with provider: none.    Utilization: Patient has not had any utilization since our last call.     Care Summary Note: ACM placed outreach call to patient who stated she \"is ok today.\" Patient noted to be querulous on outreach calls with no pro-active approach to resolving concerns.  Pt was seen by PCP on 3/3 at which time patient received a referral to  pulmonology.  Pt reported that she does have an appointment in \"May sometime, I don't know.\" Pt reported that if  Pul does not accept her workman's comp she will stay with Britta Allen, Dr. Thompson.   Patient was seen by Dr. Tyler (otolaryngology) on  at which time we was prescribed Nasonex nasal spray and Bactroban ointment.  Pt reports the treatment has been good, her nose is no longer bleeding and feels moist. Pt reports that she called Dr. Tyler's office requesting refills for the Nasonex, specifically for a 90-day supply d/t cost and per pt, they did not listen to her and called in three, one-month supplies.  Pt expressed frustration and that she will discuss with Dr. Espitia because \"Dr. Espitia Knows how to do it.\"    Patient had f/u visit with Dr. Tom (Ortho) on 3/20 at which time he recommended she start outpatient PT at [facility].  Pt is scheduled for 3/27.  Pt states \"I don't know if I can do it, I am always uncomfortable and things hurt.\" ACM encouraged pt to be open to the therapy and plan to take Ibuprofen afterward to prevent any increased discomfort following PT.  Pt reports that she has tried medications and they don't work.

## 2025-03-27 ENCOUNTER — HOSPITAL ENCOUNTER (OUTPATIENT)
Dept: PHYSICAL THERAPY | Age: 70
Setting detail: THERAPIES SERIES
Discharge: HOME | End: 2025-03-27
Payer: COMMERCIAL

## 2025-03-27 DIAGNOSIS — M48.061 SPINAL STENOSIS, LUMBAR REGION, WITHOUT NEUROGENIC CLAUDICATION: Primary | ICD-10-CM

## 2025-03-27 PROCEDURE — 97161 PT EVAL LOW COMPLEX 20 MIN: CPT

## 2025-03-27 PROCEDURE — 97110 THERAPEUTIC EXERCISES: CPT

## 2025-03-27 PROCEDURE — 97162 PT EVAL MOD COMPLEX 30 MIN: CPT

## 2025-03-27 NOTE — PLAN OF CARE
[facility] - Outpatient Rehabilitation and Therapy: [address], [place] [address] office: [phone] fax: [phone]     Physical Therapy Initial Evaluation Certification      Dear Andrea Lazcano* ,    We had the pleasure of evaluating the following patient for physical therapy services at [facility].  A summary of our findings can be found in the initial assessment below.  This includes our plan of care.  If you have any questions or concerns regarding these findings, please do not hesitate to contact me at the office phone number listed above.  Thank you for the referral.     Physician Signature:_______________________________Date:__________________  By signing above (or electronic signature), therapist’s plan is approved by physician       Physical Therapy: TREATMENT/PROGRESS NOTE   Patient: Tatiana Shepard (69 y.o. female)   Examination Date: 2025   :  1955 MRN: [id number]   Visit #: 1   Insurance Allowable Auth Needed   6 []Yes    []No    Insurance: Payor: GENERIC SELF-INSURED / Plan: GENERIC SELF-INSURED WC / Product Type: *No Product type* /   Insurance ID: [id number] - (Worker's Comp)  Secondary Insurance (if applicable): [email or web address] [facility]   Treatment Diagnosis:     ICD-10-CM    1. Spinal stenosis, lumbar region, without neurogenic claudication  M48.061          Medical Diagnosis:  Spinal stenosis, lumbar region without neurogenic claudication [M48.061]   Referring Physician: Andrea Tom  PCP: Raisa Espitia MD     Plan of care signed (Y/N):     Date of Patient follow up with Physician:      Plan of Care Report: EVAL today  POC update due: (10 visits /OR AUTH LIMITS, whichever is less)  2025                                             Medical History:  Comorbidities:  Diabetes (Type I or II)  Hypertension  Osteoarthritis  Anxiety  Depression  Relevant Medical History:

## 2025-04-03 ENCOUNTER — CARE COORDINATION (OUTPATIENT)
Dept: CARE COORDINATION | Age: 70
End: 2025-04-03

## 2025-04-03 ENCOUNTER — HOSPITAL ENCOUNTER (OUTPATIENT)
Dept: PHYSICAL THERAPY | Age: 70
Setting detail: THERAPIES SERIES
Discharge: HOME | End: 2025-04-03
Payer: COMMERCIAL

## 2025-04-03 PROCEDURE — 97110 THERAPEUTIC EXERCISES: CPT

## 2025-04-03 PROCEDURE — 97112 NEUROMUSCULAR REEDUCATION: CPT

## 2025-04-03 NOTE — CARE COORDINATION
Spoke with patient, advised patient that Dr moreira didn't have any availabilities that we could to schedule her with another provided. Patient decided to keep the same appointment   
declined to cover it. Pt presents as seeking comfort, reassurance and empathy. ACM provided active listening and encouragement.    Patient denied cp, sob, cough, dizziness, headache, n/v, diarrhea, abdominal pains, fever, or chills. Patient report that appetite and fluid intake is good and denied any problems with bowel or bladder. Patient reported that she is taking all medications as ordered. Patient denied any other needs at this time. Patient instructed to continue to monitor s/s, reporting any that may present to MD immediately for early intervention. Patient is agreeable to f/u calls.  Educated on the use of urgent care or physician’s 24 hr access line if assistance is needed after hours.       Offered patient enrollment in the Remote Patient Monitoring (RPM) program for in-home monitoring: Graduated & returned    Assessments Completed:   Diabetes Assessment    Medic Alert ID: No  Meal Planning: None   How often do you test your blood sugar?: Daily   Do you have barriers with adherence to non-pharmacologic self-management interventions? (Nutrition/Exercise/Self-Monitoring): No   Have you ever had to go to the ED for symptoms of low blood sugar?: No       No patient-reported symptoms   Do you have hyperglycemia symptoms?: No   Do you have hypoglycemia symptoms?: No   Last Blood Sugar Value: 116         ,   Hypertension - Encounter Level          ,   General Assessment    Do you have any symptoms that are causing concern?: Yes  Reported Symptoms: Fatigue          Medications Reviewed:   Completed during a previous call     Advance Care Planning:   Not on file; education provided    Primary Decision Maker: Nydia Cabrales - Girlfriend - [phone]      Care Planning:    Goals Addressed    None          PCP/Specialist follow up:   Future Appointments         Provider Specialty Dept Phone    4/10/2025 4:40 PM Kelin Rodriges, PT Physical Therapy [phone]    4/14/2025 3:30 PM Raisa Espitia MD Primary Care

## 2025-04-03 NOTE — PLAN OF CARE
evaluation, reassessments and prior notes for documentation efficiency.    Note: If patient does not return for scheduled/recommended follow up visits, this note will serve as a discharge from care along with the most recent update on progress.

## 2025-04-10 ENCOUNTER — APPOINTMENT (OUTPATIENT)
Dept: PHYSICAL THERAPY | Age: 70
End: 2025-04-10
Payer: COMMERCIAL

## 2025-04-14 ENCOUNTER — OFFICE VISIT (OUTPATIENT)
Dept: PRIMARY CARE CLINIC | Age: 70
End: 2025-04-14
Payer: MEDICARE

## 2025-04-14 VITALS
HEIGHT: 62 IN | DIASTOLIC BLOOD PRESSURE: 80 MMHG | WEIGHT: 169 LBS | TEMPERATURE: 97 F | HEART RATE: 93 BPM | SYSTOLIC BLOOD PRESSURE: 130 MMHG | RESPIRATION RATE: 18 BRPM | BODY MASS INDEX: 31.1 KG/M2 | OXYGEN SATURATION: 95 %

## 2025-04-14 DIAGNOSIS — E78.2 MIXED HYPERLIPIDEMIA: Chronic | ICD-10-CM

## 2025-04-14 DIAGNOSIS — I10 ESSENTIAL HYPERTENSION: Chronic | ICD-10-CM

## 2025-04-14 DIAGNOSIS — J32.3 CHRONIC SPHENOIDAL SINUSITIS: ICD-10-CM

## 2025-04-14 DIAGNOSIS — K21.9 GASTROESOPHAGEAL REFLUX DISEASE, UNSPECIFIED WHETHER ESOPHAGITIS PRESENT: ICD-10-CM

## 2025-04-14 DIAGNOSIS — E11.42 TYPE 2 DIABETES MELLITUS WITH DIABETIC POLYNEUROPATHY, WITHOUT LONG-TERM CURRENT USE OF INSULIN (HCC): Primary | ICD-10-CM

## 2025-04-14 DIAGNOSIS — F33.1 MODERATE EPISODE OF RECURRENT MAJOR DEPRESSIVE DISORDER (HCC): ICD-10-CM

## 2025-04-14 LAB — HBA1C MFR BLD: 6.9 %

## 2025-04-14 PROCEDURE — 1090F PRES/ABSN URINE INCON ASSESS: CPT | Performed by: INTERNAL MEDICINE

## 2025-04-14 PROCEDURE — G2211 COMPLEX E/M VISIT ADD ON: HCPCS | Performed by: INTERNAL MEDICINE

## 2025-04-14 PROCEDURE — 3017F COLORECTAL CA SCREEN DOC REV: CPT | Performed by: INTERNAL MEDICINE

## 2025-04-14 PROCEDURE — G8399 PT W/DXA RESULTS DOCUMENT: HCPCS | Performed by: INTERNAL MEDICINE

## 2025-04-14 PROCEDURE — 3079F DIAST BP 80-89 MM HG: CPT | Performed by: INTERNAL MEDICINE

## 2025-04-14 PROCEDURE — 1036F TOBACCO NON-USER: CPT | Performed by: INTERNAL MEDICINE

## 2025-04-14 PROCEDURE — 1123F ACP DISCUSS/DSCN MKR DOCD: CPT | Performed by: INTERNAL MEDICINE

## 2025-04-14 PROCEDURE — 2022F DILAT RTA XM EVC RTNOPTHY: CPT | Performed by: INTERNAL MEDICINE

## 2025-04-14 PROCEDURE — 3075F SYST BP GE 130 - 139MM HG: CPT | Performed by: INTERNAL MEDICINE

## 2025-04-14 PROCEDURE — G8427 DOCREV CUR MEDS BY ELIG CLIN: HCPCS | Performed by: INTERNAL MEDICINE

## 2025-04-14 PROCEDURE — G8417 CALC BMI ABV UP PARAM F/U: HCPCS | Performed by: INTERNAL MEDICINE

## 2025-04-14 PROCEDURE — 3044F HG A1C LEVEL LT 7.0%: CPT | Performed by: INTERNAL MEDICINE

## 2025-04-14 PROCEDURE — 83036 HEMOGLOBIN GLYCOSYLATED A1C: CPT | Performed by: INTERNAL MEDICINE

## 2025-04-14 PROCEDURE — 99214 OFFICE O/P EST MOD 30 MIN: CPT | Performed by: INTERNAL MEDICINE

## 2025-04-14 RX ORDER — MOMETASONE FUROATE MONOHYDRATE 50 UG/1
2 SPRAY, METERED NASAL DAILY
Qty: 51 G | Refills: 1 | Status: SHIPPED | OUTPATIENT
Start: 2025-04-14

## 2025-04-14 SDOH — ECONOMIC STABILITY: FOOD INSECURITY: WITHIN THE PAST 12 MONTHS, YOU WORRIED THAT YOUR FOOD WOULD RUN OUT BEFORE YOU GOT MONEY TO BUY MORE.: NEVER TRUE

## 2025-04-14 SDOH — ECONOMIC STABILITY: FOOD INSECURITY: WITHIN THE PAST 12 MONTHS, THE FOOD YOU BOUGHT JUST DIDN'T LAST AND YOU DIDN'T HAVE MONEY TO GET MORE.: NEVER TRUE

## 2025-04-14 ASSESSMENT — PATIENT HEALTH QUESTIONNAIRE - PHQ9: DEPRESSION UNABLE TO ASSESS: PT REFUSES

## 2025-04-14 NOTE — PROGRESS NOTES
Date of Visit: 2025    Tatiana Shepard (:  1955) is a 69 y.o. female,  Established patient here for evaluation of the following chief complaint(s):  Diabetes, Hypertension, Hyperlipidemia, and Peripheral Neuropathy      ASSESSMENT/PLAN:    Assessment & Plan  1. Type 2 diabetes mellitus with diabetic polyneuropathy without long-term current use of insulin  - Hemoglobin A1c level is currently at 6.9%, indicating that diabetes is under control  - Continue Trulicity 0.75 mg subcutaneously weekly  - continue Gabapentin nightly for peripheral neuropathy  - Limit carbohydrates to 45 grams with meals and 15 grams with snacks  - Advised to monitor blood glucose levels:    - Target ranges:  for fasting or premeal readings    - <180 for postprandial readings (2 hours after meals)    - <150 at bedtime  - Fasting labs will be ordered; fast for 8-10 hours before the test  - Comprehensive metabolic panel    2. Essential hypertension  - Hypertension is stable  - Continue diltiazem  mg once daily  - Low-sodium diet  - Regular aerobic exercise  - comprehensive metabolic panel    3. Mixed hyperlipidemia  - Mixed hyperlipidemia is stable  - Continue rosuvastatin 10 mg nightly  - Low-fat, low-cholesterol diet  - Regular aerobic exercise  - comprehensive metabolic panel  - lipid panel     4. Moderate episode of major recurrent major depressive disorder  - Not controlled  - Continue duloxetine 60 mg once daily   - She is currently in the process of securing a psychiatrist or counselor through her insurance    5. Gastroesophageal reflux disease  - Gastroesophageal reflux disease is stable  - Continue pantoprazole 40 mg once daily  - Decrease caffeine, avoid spicy foods, avoid tomato based foods  - Eat small meals instead of large meals  - Wait 2-3 hours after eating before lying down    6. Chronic sphenoidal sinusitis  - Prescription for mometasone nasal spray has been refilled and sent to [facility]

## 2025-04-15 ENCOUNTER — TELEPHONE (OUTPATIENT)
Dept: FAMILY MEDICINE CLINIC | Age: 70
End: 2025-04-15

## 2025-04-15 ENCOUNTER — TELEPHONE (OUTPATIENT)
Dept: ENDOCRINOLOGY | Age: 70
End: 2025-04-15

## 2025-04-15 DIAGNOSIS — E11.42 TYPE 2 DIABETES MELLITUS WITH DIABETIC POLYNEUROPATHY, WITHOUT LONG-TERM CURRENT USE OF INSULIN (HCC): ICD-10-CM

## 2025-04-15 DIAGNOSIS — E78.2 MIXED HYPERLIPIDEMIA: Chronic | ICD-10-CM

## 2025-04-15 DIAGNOSIS — I10 ESSENTIAL HYPERTENSION: Chronic | ICD-10-CM

## 2025-04-15 LAB
ALBUMIN SERPL-MCNC: 4.3 G/DL (ref 3.4–5)
ALBUMIN/GLOB SERPL: 2.3 {RATIO} (ref 1.1–2.2)
ALP SERPL-CCNC: 72 U/L (ref 40–129)
ALT SERPL-CCNC: 68 U/L (ref 10–40)
ANION GAP SERPL CALCULATED.3IONS-SCNC: 12 MMOL/L (ref 3–16)
AST SERPL-CCNC: 45 U/L (ref 15–37)
BILIRUB SERPL-MCNC: 0.5 MG/DL (ref 0–1)
BUN SERPL-MCNC: 16 MG/DL (ref 7–20)
CALCIUM SERPL-MCNC: 9.3 MG/DL (ref 8.3–10.6)
CHLORIDE SERPL-SCNC: 102 MMOL/L (ref 99–110)
CHOLEST SERPL-MCNC: 150 MG/DL (ref 0–199)
CO2 SERPL-SCNC: 23 MMOL/L (ref 21–32)
CREAT SERPL-MCNC: 0.7 MG/DL (ref 0.6–1.2)
GFR SERPLBLD CREATININE-BSD FMLA CKD-EPI: >90 ML/MIN/{1.73_M2}
GLUCOSE SERPL-MCNC: 137 MG/DL (ref 70–99)
HDLC SERPL-MCNC: 59 MG/DL (ref 40–60)
LDLC SERPL CALC-MCNC: 68 MG/DL
POTASSIUM SERPL-SCNC: 4.2 MMOL/L (ref 3.5–5.1)
PROT SERPL-MCNC: 6.2 G/DL (ref 6.4–8.2)
SODIUM SERPL-SCNC: 137 MMOL/L (ref 136–145)
TRIGL SERPL-MCNC: 113 MG/DL (ref 0–150)
VLDLC SERPL CALC-MCNC: 23 MG/DL

## 2025-04-15 NOTE — TELEPHONE ENCOUNTER
After-hours call communication:    Patient concern: patient called worried about her lab results that were done- she is worried about some lab values being abnormal  Advice given: told patient that it doesn't look like her provider has seen the labs and that I am sure someone will result them within the next day or so.

## 2025-04-16 ENCOUNTER — TELEPHONE (OUTPATIENT)
Dept: PRIMARY CARE CLINIC | Age: 70
End: 2025-04-16

## 2025-04-16 ENCOUNTER — RESULTS FOLLOW-UP (OUTPATIENT)
Dept: PRIMARY CARE CLINIC | Age: 70
End: 2025-04-16

## 2025-04-16 DIAGNOSIS — R74.8 ELEVATED LIVER ENZYMES: Primary | ICD-10-CM

## 2025-04-16 NOTE — TELEPHONE ENCOUNTER
Yesterday Pt has some questions about her blood work from yesterday 4/15. Please call PT when possible

## 2025-04-17 ENCOUNTER — CARE COORDINATION (OUTPATIENT)
Dept: CARE COORDINATION | Age: 70
End: 2025-04-17

## 2025-04-17 NOTE — CARE COORDINATION
Ambulatory Care Coordination Note     2025 3:33 PM     Patient Current Location:  Home: [id number] [place] [id number]     ACM contacted the patient by telephone. Verified name and  with patient as identifiers.         ACM: Elizabeth Monreal RN     Challenges to be reviewed by the provider   Additional needs identified to be addressed with provider No  none               Method of communication with provider: none.    Utilization: Patient has not had any utilization since our last call.     Care Summary Note: ACM outreached patient who was pleasant and engaged.  Pt reported that she is \"ok, I don't know.\"  Pt reported that she was recently at PCP, had blood work and now has to have more blood work and an ABD US.  Pt reported that she \"just cannot schedule today or tomorrow, she just can't.\"  Patient agreeable to calling Monday to schedule US.  ACM advised pt she does not need an appointment for the blood work, pt VU.  Pt reports that she is always fatigued and don't want to do anything  Pt reports that she has things to do, she went out to do one errand and could not complete the rest, she put on her Pjs for the remainder of the day. ACM asked for update on search for therapist as it is evident pt's depression is impacting her daily life and enjoyment.  Pt states that she spoke to someone at [facility] who sent her something to fill out online.  Pt states \"I can't do that, that's too much.\"  Pt reports that she will call [facility] back next week and and tell them that she cannot fill out the online form and needs to have documents mailed.  ACM asked pt to call tomorrow and pt quickly stated, \"I can't, I just can't.\"  Patient reports that she is checking her BS multiple times pr day but forgot today.  Pt reports that she is checking her BP hen she feels CP. ACM encouraged pt to check BP daily and log as previously discussed.  ACM reviewed goal fasting BS of 70/110, pt reports her FBS anywhere from 120-170.  Most

## 2025-04-21 ENCOUNTER — HOSPITAL ENCOUNTER (OUTPATIENT)
Age: 70
Discharge: HOME | End: 2025-04-21
Payer: MEDICARE

## 2025-04-21 ENCOUNTER — RESULTS FOLLOW-UP (OUTPATIENT)
Dept: PRIMARY CARE CLINIC | Age: 70
End: 2025-04-21

## 2025-04-21 DIAGNOSIS — R74.8 ELEVATED LIVER ENZYMES: ICD-10-CM

## 2025-04-21 LAB
ALBUMIN SERPL-MCNC: 4.4 G/DL (ref 3.4–5)
ALP SERPL-CCNC: 81 U/L (ref 40–129)
ALT SERPL-CCNC: 63 U/L (ref 10–40)
AST SERPL-CCNC: 52 U/L (ref 15–37)
BILIRUB DIRECT SERPL-MCNC: 0.3 MG/DL (ref 0–0.3)
BILIRUB INDIRECT SERPL-MCNC: 0.3 MG/DL (ref 0–1)
BILIRUB SERPL-MCNC: 0.6 MG/DL (ref 0–1)
PROT SERPL-MCNC: 6.4 G/DL (ref 6.4–8.2)

## 2025-04-21 PROCEDURE — 76700 US EXAM ABDOM COMPLETE: CPT

## 2025-04-22 ENCOUNTER — CARE COORDINATION (OUTPATIENT)
Dept: CARE COORDINATION | Age: 70
End: 2025-04-22

## 2025-04-22 NOTE — CARE COORDINATION
Ambulatory Care Coordination Note     2025 2:28 PM     Patient Current Location:  Home: [address] [address] [facility] [address] [address]     Patient contacted the ACM by telephone. Verified name and  with patient as identifiers.         ACM: Elizabeth Monreal RN     Challenges to be reviewed by the provider   Additional needs identified to be addressed with provider No  none               Method of communication with provider: none.    Utilization: Patient has not had any utilization since our last call.     Care Summary Note: ACM received VM from patient requesting call back.  ACM returned call to patient who reported that she received a list of local therapists from the [facility] ([place] ACM advised patient to call one, see if they are accepting new patients and ask if they need a referral.  ACM advised pt if they need a referral ACM can assist with obtaining. If they do not need a referral, go ahead and schedule for earliest available appointment.  Pt VU and agreeable.   Patient also informed ACM that she was recently told that she has fatty liver and unsure why.  ACM discussed recommendation for low fat diet.  Patient reports that she gets one meal per day from [employer], usually is her lunch.  Pt reports that if she doesn't feel too bad or tired, she will cook something for dinner.  ACM asked if she purchased pre-made meals at the store, pt declined.  Pt reported that she does cook with special oil from farm in [place].  ACM offered another referral to RD to discuss low fat foods.  Pt declined RD referral. Pt reported that she has all int information the RD provided her.  ACM encourage pt to spend some time and re-read the dietary information and identify areas where she can make dietary changes.  Pt BU and agreeable.   Patient reported that she has been doing well, her fasting glucose was 91 today, /60.  Patient denied cp, sob, cough, dizziness, headache, n/v, diarrhea, abdominal pains, fever, or chills. Patient report

## 2025-04-23 ENCOUNTER — RESULTS FOLLOW-UP (OUTPATIENT)
Dept: PRIMARY CARE CLINIC | Age: 70
End: 2025-04-23

## 2025-04-23 DIAGNOSIS — R74.8 ELEVATED LIVER ENZYMES: Primary | ICD-10-CM

## 2025-04-23 PROBLEM — J32.3 CHRONIC SPHENOIDAL SINUSITIS: Status: ACTIVE | Noted: 2025-04-23

## 2025-04-23 ASSESSMENT — ENCOUNTER SYMPTOMS
CHEST TIGHTNESS: 0
COUGH: 0
SINUS PRESSURE: 0
SORE THROAT: 0
SHORTNESS OF BREATH: 1
ABDOMINAL PAIN: 0
RHINORRHEA: 0
SINUS PAIN: 0
BLOOD IN STOOL: 0
CONSTIPATION: 0
WHEEZING: 1
DIARRHEA: 0
NAUSEA: 1
VOMITING: 0

## 2025-04-28 RX ORDER — MONTELUKAST SODIUM 10 MG/1
10 TABLET ORAL DAILY
Qty: 90 TABLET | Refills: 3 | Status: SHIPPED | OUTPATIENT
Start: 2025-04-28

## 2025-04-30 ENCOUNTER — TELEPHONE (OUTPATIENT)
Dept: ADMINISTRATIVE | Age: 70
End: 2025-04-30

## 2025-04-30 NOTE — TELEPHONE ENCOUNTER
Submitted PA for Trulance 3MG tablets   Via CMM Key: [id number]  STATUS: not sent     Request received thru cmm no active script in epic..  Please place a active script if this medication is needed.     Please advise     Thank you     If this requires a response please respond to the pool ( P [facility]X PSC MEDICATION PRE-AUTH).      Thank you please advise patient.

## 2025-05-01 LAB
HBV SURFACE AB SERPL IA-ACNC: 84.1 MIU/ML
HCV AB SERPL QL IA: NORMAL

## 2025-05-02 LAB — CERULOPLASMIN SERPL-MCNC: 28 MG/DL (ref 16–45)

## 2025-05-02 NOTE — TELEPHONE ENCOUNTER
Request received thru cmm no active script in epic..  Please place a active script if this medication is needed.      Please advise      Thank you      If this requires a response please respond to the pool ( P MHCX PSC MEDICATION PRE-AUTH).       Thank you please advise patient.

## 2025-05-03 LAB — SMA IGG SER-ACNC: 6 UNITS (ref 0–19)

## 2025-05-04 LAB
HAV AB SER QL IA: POSITIVE
HBV SURFACE AG SERPL QL IA: NORMAL
MITOCHONDRIA M2 AB SER IA-ACNC: <0.5 U/ML (ref 0–4)

## 2025-05-07 ENCOUNTER — CARE COORDINATION (OUTPATIENT)
Dept: CARE COORDINATION | Age: 70
End: 2025-05-07

## 2025-05-07 LAB — A1AT SERPL-MCNC: 169 MG/DL (ref 90–200)

## 2025-05-07 NOTE — PROGRESS NOTES
Tatiana Araujoone    Age 69 y.o.    female    1955    N [id number]    5/15/2025  Arrival Time_____________  OR Time____________30 Min     Procedure(s):  ESOPHAGOGASTRODUODENOSCOPY                      Monitor Anesthesia Care    Surgeon(s):  Mary Hunt, MD       Phone [phone] (home)     [facility]  Date  Info Source  Home  Cell         Work  _____________________________________________________________________  _____________________________________________________________________  _____________________________________________________________________  _____________________________________________________________________  _____________________________________________________________________    PCP _____________________________ Phone_________________     H&P  ________________  Bringing      Chart              Epic      DOS      Called________  EKG ________________   Bringing      Chart              Epic      DOS      Called________  LABS________________   Bringing     Chart              Epic      DOS      Called________  Cardiac Clearance ______ Bringing      Chart              Epic      DOS      Called________  Pulmonary Clearance____ Bringing      Chart              Epic      DOS      Called________    Cardiologist________________________ Phone___________________________  Pulmonologist_______________________Phone___________________________    ? Advance Directives   ? Protestant concerns / Waiver on Chart            PAT Communications________________  ? Pre-op Instructions Given /Understood          _________________________________  ? Directions to Surgery Center                          _________________________________  ? Transportation Home_______________      __________________________________  ? Crutches/Walker__________________        __________________________________    Orders: Hard copy/ EPIC                 Transcribed/ EPIC              _______Wt.    ________Pharmacy

## 2025-05-07 NOTE — CARE COORDINATION
Ambulatory Care Coordination Note     2025 2:08 PM     Patient Current Location:  Home: [address] [facility] [address]     ACM contacted the patient by telephone. Verified name and  with patient as identifiers.         ACM: Elizabeth Monreal RN     Challenges to be reviewed by the provider   Additional needs identified to be addressed with provider No  none               Method of communication with provider: none.    Utilization: Patient has not had any utilization since our last call.     Care Summary Note: ACM received VM from patient.  ACM called patient back and pt reported being very frustrated because she has made many calls over the past few weeks to secure therapy services from the list of providers.  She reported many are not local or they are not nice.  She did like the group PCP referred her to ([employer]) but that there was an issue with the insurance and she couldn't understand.  Pt asked ACM to call and help figure it out.  ACM called [employer] who reported that they did not have correct insurance information.  ACM provided Medicare information.  ACM confirmed that patient does have worker's comp as well.  ACM was advised that pt can schedule under her Medicare.  ACM called pt back and explained that to her.  Pt to call [email or web address] to schedule.   Pt reported that she saw a new pulmonologist Anita Henning CNP at [facility] and she really liked her.  Pt is scheduled to have PFT on  at [facility].   Patient denied cp, sob, cough, dizziness, headache, n/v, diarrhea, abdominal pains, fever, or chills. Patient report that appetite and fluid intake is good and denied any problems with bowel or bladder. Patient reported that she is taking all medications as ordered. Patient denied any other needs at this time. Patient instructed to continue to monitor s/s, reporting any that may present to MD immediately for early intervention. Patient is agreeable to f/u calls.  Educated on the use of

## 2025-05-12 NOTE — PROGRESS NOTES
Date and time of surgery :  05/15/2015 10:00 am            Arrival Time:  9:am     Bring Picture ID and insurance card.  Please wear simple, loose fitting clothing to the hospital.   Do not bring valuables (money, credit cards, checkbooks, etc.)   Do not wear any makeup (including  eye makeup) and no nail polish or artificial nails on your fingers or toes.  DO NOT wear any jewelry or piercings on day of surgery.  All body piercing jewelry must be removed.  If you have dentures, they will be removed before going to the OR; we will provide you a container.  If you wear contact lenses or glasses, they will be removed; please bring a case for them.  Shower the evening before or morning of surgery with antibacterial soap.  Nothing to eat or drink after midnight the day before surgery.   You may brush your teeth and gargle the morning of surgery.  DO NOT SWALLOW WATER.   Ok to take pantoprazole with a sip of water morning of procedure.  Ok to take inhalers, nasal spray, and nebulizer treatment morning of procedure.   Aspirin, Ibuprofen, Advil, Naproxen, Vitamin E and other Anti-inflammatory products and supplements should be stopped for 5 -7days before surgery or as directed by your physician.  Do not smoke or drink any alcoholic beverages 24 hours prior to surgery.  This includes NA Beer. Refrain from the usage of any recreational drugs, including non-prescribed prescription drugs.   You MUST plan for a responsible adult to stay on site while you are here and take you home after your surgery. You will not be allowed to leave alone or drive yourself home. It is strongly suggested someone stay with you the first 24 hrs. Your surgery will be cancelled if you do not have a ride home.  To help prevent infection, change your sheets the night before surgery.   If you  have a Living Will and Durable Power of  for Healthcare, please bring in a copy.  Notify your Surgeon if you develop any illness between now and time of

## 2025-05-13 ENCOUNTER — OFFICE VISIT (OUTPATIENT)
Dept: ENDOCRINOLOGY | Age: 70
End: 2025-05-13
Payer: MEDICARE

## 2025-05-13 ENCOUNTER — HOSPITAL ENCOUNTER (OUTPATIENT)
Dept: GENERAL RADIOLOGY | Age: 70
Discharge: HOME | End: 2025-05-13
Payer: MEDICARE

## 2025-05-13 DIAGNOSIS — M85.80 OSTEOPENIA WITH HIGH RISK OF FRACTURE: ICD-10-CM

## 2025-05-13 DIAGNOSIS — M89.9 DISEASE OF SKELETAL SYSTEM: ICD-10-CM

## 2025-05-13 DIAGNOSIS — Z51.81 MEDICATION MONITORING ENCOUNTER: ICD-10-CM

## 2025-05-13 DIAGNOSIS — M85.80 OSTEOPENIA WITH HIGH RISK OF FRACTURE: Primary | ICD-10-CM

## 2025-05-13 PROCEDURE — 96372 THER/PROPH/DIAG INJ SC/IM: CPT | Performed by: INTERNAL MEDICINE

## 2025-05-13 PROCEDURE — 77080 DXA BONE DENSITY AXIAL: CPT | Performed by: INTERNAL MEDICINE

## 2025-05-13 PROCEDURE — 77080 DXA BONE DENSITY AXIAL: CPT

## 2025-05-13 PROCEDURE — 1036F TOBACCO NON-USER: CPT | Performed by: INTERNAL MEDICINE

## 2025-05-13 PROCEDURE — 1159F MED LIST DOCD IN RCRD: CPT | Performed by: INTERNAL MEDICINE

## 2025-05-13 PROCEDURE — G8399 PT W/DXA RESULTS DOCUMENT: HCPCS | Performed by: INTERNAL MEDICINE

## 2025-05-13 PROCEDURE — G8417 CALC BMI ABV UP PARAM F/U: HCPCS | Performed by: INTERNAL MEDICINE

## 2025-05-13 PROCEDURE — G8427 DOCREV CUR MEDS BY ELIG CLIN: HCPCS | Performed by: INTERNAL MEDICINE

## 2025-05-13 PROCEDURE — 1123F ACP DISCUSS/DSCN MKR DOCD: CPT | Performed by: INTERNAL MEDICINE

## 2025-05-13 PROCEDURE — 1090F PRES/ABSN URINE INCON ASSESS: CPT | Performed by: INTERNAL MEDICINE

## 2025-05-13 PROCEDURE — 3017F COLORECTAL CA SCREEN DOC REV: CPT | Performed by: INTERNAL MEDICINE

## 2025-05-13 PROCEDURE — 99214 OFFICE O/P EST MOD 30 MIN: CPT | Performed by: INTERNAL MEDICINE

## 2025-05-13 NOTE — PROGRESS NOTES
[facility]  [address] [place] [address] [address] [place] [address]  Phone [phone]  Fax [phone]    NAME:  RODY QUEZADA  :  1955  CONSULT DATE:    2023  MOST RECENT VISIT:  2024  TODAY'S DATE:  2025    Labs @ [facility] 2025    PROBLEMS.  Low bone density by DXA 2013, T-scores -2.2 in the spine, -2.1 in the left femoral neck    Family history of osteoporosis, mother with arm and rib fractures    BMD decreased [id number], T-scores -2.2 in the spine, -2.4 in the left femoral neck    1989, foot fracture  Surgical menopause age 43  Vitamin D deficiency (desirable 25-OH D is 30-60 ng/mL)     23 ng/ml 20148 ng/mL 2023    57 ng/mL 2017    19 ng/mL 2023    48 ng/mL 2023  Asthma  Hypercholesterolemia  Dental implant   Coronary artery disease, NSTEMI 2023    CURRENT MANAGEMENT FOR BONE HEALTH/OSTEOPOROSIS.  Calcium, 300 mg from low calcium foods, 300 mg cheese   diet MVI Ca+D other    Calcium 600   600 mg/d   Vitamin D    2000 IU/d   Exercise, nothing regular  Pharmacologic therapy: Prolia 60 mg SQ twice yearly started 2023    PREVIOUS BONE-ACTIVE MEDICATIONS.   Alendronate 7009-3523, not sure why she stopped     OTHER CURRENT MEDICATIONS (SELECTED): Trulicity, Truemetrix, atorvastatin, gabapentin, pantoprazole, clonidine, budesonide, fluticasone  diclofenac  OTC MEDICATIONS (SELECTED): none    CHIEF COMPLAINT.  Here for f/u of low bone density, high fracture risk vitamin D deficiency, monitoring treatment. No new related signs or symptoms.     INTERVAL HISTORY:  See problem list for chronic/inactive conditions.  She received Prolia without side effects. No falls, near-falls or fractures.  Dealing with asthma.    FOR FULL DETAILS OF FAMILY HISTORY, PAST MEDICAL AND SURGICAL HISTORY, SOCIAL HISTORY, SEE PATIENT QUESTIONNAIRE OF TODAY'S DATE.    PHYSICAL EXAMINATION.   GENERAL.  Well-nourished, well-developed, normally

## 2025-05-14 ENCOUNTER — TELEPHONE (OUTPATIENT)
Dept: PRIMARY CARE CLINIC | Age: 70
End: 2025-05-14

## 2025-05-14 DIAGNOSIS — K21.00 GASTROESOPHAGEAL REFLUX DISEASE WITH ESOPHAGITIS WITHOUT HEMORRHAGE: ICD-10-CM

## 2025-05-14 RX ORDER — SODIUM CHLORIDE 0.9 % (FLUSH) 0.9 %
5-40 SYRINGE (ML) INJECTION PRN
Status: CANCELLED | OUTPATIENT
Start: 2025-05-14

## 2025-05-14 RX ORDER — LIDOCAINE HYDROCHLORIDE 10 MG/ML
0.3 INJECTION, SOLUTION EPIDURAL; INFILTRATION; INTRACAUDAL; PERINEURAL
Status: CANCELLED | OUTPATIENT
Start: 2025-05-14

## 2025-05-14 RX ORDER — SODIUM CHLORIDE, SODIUM LACTATE, POTASSIUM CHLORIDE, CALCIUM CHLORIDE 600; 310; 30; 20 MG/100ML; MG/100ML; MG/100ML; MG/100ML
INJECTION, SOLUTION INTRAVENOUS CONTINUOUS
Status: CANCELLED | OUTPATIENT
Start: 2025-05-14

## 2025-05-14 RX ORDER — PANTOPRAZOLE SODIUM 40 MG/1
40 TABLET, DELAYED RELEASE ORAL DAILY
Qty: 90 TABLET | Refills: 1 | Status: SHIPPED | OUTPATIENT
Start: 2025-05-14

## 2025-05-14 RX ORDER — SODIUM CHLORIDE 9 MG/ML
INJECTION, SOLUTION INTRAVENOUS PRN
Status: CANCELLED | OUTPATIENT
Start: 2025-05-14

## 2025-05-14 RX ORDER — SODIUM CHLORIDE 0.9 % (FLUSH) 0.9 %
5-40 SYRINGE (ML) INJECTION EVERY 12 HOURS SCHEDULED
Status: CANCELLED | OUTPATIENT
Start: 2025-05-14

## 2025-05-14 NOTE — TELEPHONE ENCOUNTER
Medication:   Requested Prescriptions     Pending Prescriptions Disp Refills    pantoprazole (PROTONIX) 40 MG tablet [Pharmacy Med Name: PANTOPRAZOLE TAB 40MG] 90 tablet 1     Sig: TAKE 1 TABLET DAILY     Last Filled:  11/11/2024    Last appt: 4/14/2025   Next appt: 5/30/2025    Last OARRS:       11/27/2024    10:16 AM   RX Monitoring   Periodic Controlled Substance Monitoring No signs of potential drug abuse or diversion identified.

## 2025-05-14 NOTE — TELEPHONE ENCOUNTER
Patient called and requested to refill the following medication partially as she is out of the medication    Mail delivery takes few days to reach.     pantoprazole (PROTONIX) 40 MG tablet     The Pharmacy    [facility]     [address] [place] [address]      --[phone]    thanks

## 2025-05-14 NOTE — TELEPHONE ENCOUNTER
Pt Called back in checking on when her medication would be called in. I advised her that the request has been received and is being processed.     She wants Doctor to know she will be out of medication after today.

## 2025-05-15 ENCOUNTER — HOSPITAL ENCOUNTER (OUTPATIENT)
Age: 70
Setting detail: OUTPATIENT SURGERY
Discharge: HOME | End: 2025-05-15
Attending: INTERNAL MEDICINE | Admitting: INTERNAL MEDICINE
Payer: MEDICARE

## 2025-05-15 ENCOUNTER — ANESTHESIA (OUTPATIENT)
Dept: ENDOSCOPY | Age: 70
End: 2025-05-15
Payer: MEDICARE

## 2025-05-15 ENCOUNTER — ANESTHESIA EVENT (OUTPATIENT)
Dept: ENDOSCOPY | Age: 70
End: 2025-05-15
Payer: MEDICARE

## 2025-05-15 VITALS
RESPIRATION RATE: 15 BRPM | HEART RATE: 72 BPM | SYSTOLIC BLOOD PRESSURE: 142 MMHG | DIASTOLIC BLOOD PRESSURE: 82 MMHG | BODY MASS INDEX: 30.91 KG/M2 | OXYGEN SATURATION: 94 % | TEMPERATURE: 98.2 F | HEIGHT: 62 IN | WEIGHT: 168 LBS

## 2025-05-15 DIAGNOSIS — K21.9 GASTROESOPHAGEAL REFLUX DISEASE, UNSPECIFIED WHETHER ESOPHAGITIS PRESENT: ICD-10-CM

## 2025-05-15 LAB
GLUCOSE BLD-MCNC: 148 MG/DL (ref 70–99)
PERFORMED ON: ABNORMAL

## 2025-05-15 PROCEDURE — 2580000003 HC RX 258: Performed by: NURSE ANESTHETIST, CERTIFIED REGISTERED

## 2025-05-15 PROCEDURE — 88305 TISSUE EXAM BY PATHOLOGIST: CPT

## 2025-05-15 PROCEDURE — 3700000001 HC ADD 15 MINUTES (ANESTHESIA): Performed by: INTERNAL MEDICINE

## 2025-05-15 PROCEDURE — 7100000011 HC PHASE II RECOVERY - ADDTL 15 MIN: Performed by: INTERNAL MEDICINE

## 2025-05-15 PROCEDURE — 7100000010 HC PHASE II RECOVERY - FIRST 15 MIN: Performed by: INTERNAL MEDICINE

## 2025-05-15 PROCEDURE — 3609012400 HC EGD TRANSORAL BIOPSY SINGLE/MULTIPLE: Performed by: INTERNAL MEDICINE

## 2025-05-15 PROCEDURE — 2709999900 HC NON-CHARGEABLE SUPPLY: Performed by: INTERNAL MEDICINE

## 2025-05-15 PROCEDURE — 3609013500 HC EGD REMOVAL TUMOR POLYP/OTHER LESION SNARE TECH: Performed by: INTERNAL MEDICINE

## 2025-05-15 PROCEDURE — 3700000000 HC ANESTHESIA ATTENDED CARE: Performed by: INTERNAL MEDICINE

## 2025-05-15 PROCEDURE — 6360000002 HC RX W HCPCS: Performed by: NURSE ANESTHETIST, CERTIFIED REGISTERED

## 2025-05-15 RX ORDER — LIDOCAINE HYDROCHLORIDE 20 MG/ML
INJECTION, SOLUTION EPIDURAL; INFILTRATION; INTRACAUDAL; PERINEURAL
Status: DISCONTINUED | OUTPATIENT
Start: 2025-05-15 | End: 2025-05-15 | Stop reason: SDUPTHER

## 2025-05-15 RX ORDER — PROPOFOL 10 MG/ML
INJECTION, EMULSION INTRAVENOUS
Status: DISCONTINUED | OUTPATIENT
Start: 2025-05-15 | End: 2025-05-15 | Stop reason: SDUPTHER

## 2025-05-15 RX ORDER — SODIUM CHLORIDE 9 MG/ML
INJECTION, SOLUTION INTRAVENOUS
Status: DISCONTINUED | OUTPATIENT
Start: 2025-05-15 | End: 2025-05-15 | Stop reason: SDUPTHER

## 2025-05-15 RX ORDER — PANTOPRAZOLE SODIUM 40 MG/1
40 TABLET, DELAYED RELEASE ORAL
Qty: 90 TABLET | Refills: 1 | Status: SHIPPED | OUTPATIENT
Start: 2025-05-15

## 2025-05-15 RX ADMIN — PROPOFOL 20 MG: 10 INJECTION, EMULSION INTRAVENOUS at 10:57

## 2025-05-15 RX ADMIN — PROPOFOL 30 MG: 10 INJECTION, EMULSION INTRAVENOUS at 11:03

## 2025-05-15 RX ADMIN — PROPOFOL 80 MG: 10 INJECTION, EMULSION INTRAVENOUS at 10:53

## 2025-05-15 RX ADMIN — SODIUM CHLORIDE: 9 INJECTION, SOLUTION INTRAVENOUS at 10:48

## 2025-05-15 RX ADMIN — PROPOFOL 20 MG: 10 INJECTION, EMULSION INTRAVENOUS at 11:00

## 2025-05-15 RX ADMIN — LIDOCAINE HYDROCHLORIDE 60 MG: 20 INJECTION, SOLUTION EPIDURAL; INFILTRATION; INTRACAUDAL; PERINEURAL at 10:53

## 2025-05-15 ASSESSMENT — ENCOUNTER SYMPTOMS: SHORTNESS OF BREATH: 1

## 2025-05-15 ASSESSMENT — PAIN - FUNCTIONAL ASSESSMENT
PAIN_FUNCTIONAL_ASSESSMENT: 0-10
PAIN_FUNCTIONAL_ASSESSMENT: NONE - DENIES PAIN

## 2025-05-15 NOTE — PROGRESS NOTES
Patient admitted to pre-op bay 11 in preparation for endoscopy, VSS. Consent confirmed. IV inserted into right wrist, NS infusing. Belongings under stretcher. NPO since 1900. Aide in waiting room, call light within reach.

## 2025-05-15 NOTE — ANESTHESIA POSTPROCEDURE EVALUATION
Department of Anesthesiology  Postprocedure Note    Patient: Tatiana Shepard  MRN: [id number]  YOB: 1955  Date of evaluation: 5/15/2025    Procedure Summary       Date: 05/15/25 Room / Location: [place] [address] / [facility]    Anesthesia Start: 1048 Anesthesia Stop: 1112    Procedures:       ESOPHAGOGASTRODUODENOSCOPY BIOPSY      ESOPHAGOGASTRODUODENOSCOPY POLYP SNARE Diagnosis:       Gastroesophageal reflux disease, unspecified whether esophagitis present      (Gastroesophageal reflux disease, unspecified whether esophagitis present [K21.9])    Surgeons: Mary Hunt MD Responsible Provider: Michael Hearn MD    Anesthesia Type: MAC ASA Status: 3            Anesthesia Type: MAC    Seven Phase I: Seven Score: 10    Seven Phase II: Seven Score: 10    Anesthesia Post Evaluation    Patient location during evaluation: bedside  Patient participation: complete - patient participated  Level of consciousness: awake and alert  Airway patency: patent  Nausea & Vomiting: no nausea  Cardiovascular status: hemodynamically stable  Respiratory status: acceptable  Hydration status: euvolemic  Pain management: adequate      Vitals:    05/15/25 0914 05/15/25 1110 05/15/25 1120 05/15/25 1130   BP: 124/79 136/66 138/73 (!) 142/82   Pulse: 75 80 74 72   Resp: 16 15 16 15   Temp: 98.2 °F (36.8 °C)      TempSrc: Oral      SpO2: 96% 95% 95% 94%   Weight:       Height:          No notable events documented.

## 2025-05-15 NOTE — DISCHARGE INSTRUCTIONS
sign in to your [facility] account. Enter J454 in the Search Health Information box to learn more about \"Upper GI Endoscopy: What to Expect at Home.\"     If you do not have an account, please click on the \"Sign Up Now\" link.  Current as of: May 12, 2017  Content Version: 11.6  © 5218-5300 [employer]. Care instructions adapted under license by [employer]. If you have questions about a medical condition or this instruction, always ask your healthcare professional. [employer] disclaims any warranty or liability for your use of this information.

## 2025-05-15 NOTE — H&P
Gastroenterology Note             Pre-operative History and Physical    Patient: Tatiana Shepard  : 1955  CSN:     History Obtained From:  patient and/or guardian.     HISTORY OF PRESENT ILLNESS:    The patient is a 69 y.o. female  here for EGD    Past Medical History:    Past Medical History:   Diagnosis Date    Abnormal involuntary movements(781.0)     Allergic rhinitis     Anal fissure     Anemia, unspecified     Anxiety     Asthma     Chronic back pain     Chronic diarrhea 2019    Depression     Diffuse cystic mastopathy     Dizziness     Encopresis(307.7)     Esophageal reflux     Foot fracture, left 1989    drop something on foot    Headache(784.0)     Hearing loss     Hepatitis, unspecified     Herpes simplex without mention of complication     Hypertension     Hypogammaglobulinaemia, unspecified     Insomnia, unspecified     Lateral epicondylitis  of elbow     Migraine, unspecified, without mention of intractable migraine without mention of status migrainosus     Mixed hyperlipidemia 2010    Nosebleed     Osteopenia     Postmenopausal     Pure hypercholesterolemia     Recurrent upper respiratory infection (URI)     Symptomatic menopausal or female climacteric states     Tinnitus     TMJ dysfunction     Type 2 diabetes mellitus without complication (HCC)     Type II or unspecified type diabetes mellitus without mention of complication, not stated as uncontrolled     Unspecified asthma(493.90)      Past Surgical History:    Past Surgical History:   Procedure Laterality Date    BREAST BIOPSY Right     CATARACT REMOVAL Bilateral     DILATION AND CURETTAGE OF UTERUS      x2    ELBOW SURGERY  2011    right    FOOT SURGERY  10/2009,2010    right foot    HAND SURGERY  2009    left hand    HYSTERECTOMY (CERVIX STATUS UNKNOWN)  1998    PARTIAL HYSTERECTOMY (CERVIX NOT REMOVED)      RECTAL SURGERY      SHOULDER ARTHROSCOPY Right 2007    by Dr. Guzman    TONSILLECTOMY

## 2025-05-15 NOTE — PROGRESS NOTES
Discharge instructions reviewed with patient and responsible adult. Discharge instructions signed and copy given with no additional questions. Patient to be discharged home with belongings.

## 2025-05-15 NOTE — PROGRESS NOTES
Patient awake alert waiting to be updated per Md. Discharged in no distress accompanied to passenger side of car with family or significant other driving car. Assessment unchanged. Patient denies pain.

## 2025-05-15 NOTE — ANESTHESIA PRE PROCEDURE
tablet by mouth nightly Taking at dinnertime 5/3/24  Yes Kelsey Reynolds MD   vitamin D (VITAMIN D3) 50 MCG (2000 UT) CAPS capsule Take 1 capsule by mouth daily   Yes Kelsey Reynolds MD   nitroGLYCERIN (NITROSTAT) 0.4 MG SL tablet Place 1 tablet under the tongue every 5 minutes as needed for Chest pain up to max of 3 total doses. If no relief after 1 dose, call 911. 3/21/24  Yes Derian Dukes MD   Alum Hydroxide-Mag Carbonate (GAVISCON PO) Take 1 tablet by mouth daily as needed (indigestion)   Yes Kelsey Reynolds MD   memantine (NAMENDA) 10 MG tablet Take 1 tablet by mouth 2 times daily   Yes ProviderKelsey MD   naratriptan (AMERGE) 2.5 MG tablet TAKE ONE TABLET BY MOUTH AT THE ONSET of HEADACHE, MAY REPEAT in FOUR hours, not more THAN TWO PER DAY OR FOUR PER WEEK 3/28/23  Yes Kelsey Reynolds MD   blood glucose test strips (FREESTYLE LITE) strip 1 each by In Vitro route daily 2/19/25   Raisa Espitia MD   Blood Glucose Monitoring Suppl (FREESTYLE LITE) w/Device KIT Use to monitor blood sugar daily 2/13/25   Raisa Espitia MD   Lancets MISC 1 each by Does not apply route 2 times daily 2/13/25   Raisa Espitia MD   TRUEplus Lancets 30G MISC 1 each by Does not apply route daily 10/10/24   Raisa Espitia MD   blood glucose test strips (GLUCOSE METER TEST) strip 100 each by In Vitro route 2 times daily Diabetes   e11.9 10/10/24   Raisa Espitia MD   bisacodyl (DULCOLAX) 5 MG EC tablet Take 1 tablet by mouth daily as needed for Constipation 9/17/24   Raisa Espitia MD   Respiratory Therapy Supplies (NEBULIZER/TUBING/MOUTHPIECE) KIT 1 kit by Does not apply route daily as needed (for shortness of breath or wheezing) 7/17/24   Raisa Espitia MD   Respiratory Therapy Supplies (NEBULIZER/TUBING/MOUTHPIECE) KIT 1 kit by Does not apply route daily as needed (for shortness of breath and wheezing) 2/6/24   Padmini Charles, APRN - CNP   Blood Glucose Monitoring

## 2025-05-22 ENCOUNTER — RESULTS FOLLOW-UP (OUTPATIENT)
Dept: GASTROENTEROLOGY | Age: 70
End: 2025-05-22

## 2025-05-24 DIAGNOSIS — E11.42 TYPE 2 DIABETES MELLITUS WITH DIABETIC POLYNEUROPATHY, WITHOUT LONG-TERM CURRENT USE OF INSULIN (HCC): ICD-10-CM

## 2025-05-24 NOTE — DISCHARGE INSTRUCTIONS
You were seen in the Emergency Department on 2/1/24 with shortness of breath.     Your evaluation here today was reassuring. Your blood work and chest x-ray did not show any obvious cause for your symptoms. Your oxygen levels here were normal even while walking.    Continue to follow up with your medical teams, including your cardiologist and your pulmonologist.     Return to the Emergency Department if you have worsening symptoms including fevers, severely worsening chest pain, severely worsening shortness of breath, or you have any other new concerns.    No

## 2025-05-27 DIAGNOSIS — E78.2 MIXED HYPERLIPIDEMIA: ICD-10-CM

## 2025-05-27 NOTE — TELEPHONE ENCOUNTER
Medication:   Requested Prescriptions     Pending Prescriptions Disp Refills    TRULICITY 0.75 MG/0.5ML SOAJ SC injection [Pharmacy Med Name: TRULICITY 0.75 MG/0.5 ML PEN]  1     Sig: INJECT 0.75 MG SUBCUTANEOUSLY ONE TIME PER WEEK     Last Filled:  1/29/25    Last appt: 4/14/2025   Next appt: 5/30/2025    Last Labs DM:   Lab Results   Component Value Date/Time    LABA1C 6.9 04/14/2025 04:40 PM

## 2025-05-27 NOTE — TELEPHONE ENCOUNTER
Pt called needs refill sent to local pharmacy the to mail order    rosuvastatin (CRESTOR) 10 MG tablet   Pharmacy    [facility] [address] [address] [employer] - [place], [place] - [address] - P [phone] - F [phone] [address] [facility] [address]  Phone: [phone]  Fax: [phone]

## 2025-05-28 DIAGNOSIS — K21.00 GASTROESOPHAGEAL REFLUX DISEASE WITH ESOPHAGITIS WITHOUT HEMORRHAGE: ICD-10-CM

## 2025-05-28 DIAGNOSIS — J45.40 MODERATE PERSISTENT ASTHMA WITHOUT COMPLICATION: ICD-10-CM

## 2025-05-28 RX ORDER — DULAGLUTIDE 0.75 MG/.5ML
INJECTION, SOLUTION SUBCUTANEOUS
Qty: 6 ML | Refills: 1 | Status: SHIPPED | OUTPATIENT
Start: 2025-05-28 | End: 2025-05-30 | Stop reason: DRUGHIGH

## 2025-05-28 NOTE — TELEPHONE ENCOUNTER
Medication:   Requested Prescriptions     Pending Prescriptions Disp Refills    pantoprazole (PROTONIX) 40 MG tablet 90 tablet 1     Sig: Take 1 tablet by mouth daily    fluticasone-salmeterol (WIXELA INHUB) 500-50 MCG/ACT AEPB diskus inhaler 180 each 1     Last Filled:  05/14/2025, 01/23/2025    Last appt: 4/14/2025   Next appt: 5/30/2025    Last OARRS:       11/27/2024    10:16 AM   RX Monitoring   Periodic Controlled Substance Monitoring No signs of potential drug abuse or diversion identified.

## 2025-05-28 NOTE — TELEPHONE ENCOUNTER
Patient called and requested to refill the following medications    TRULICITY 0.75 MG/0.5ML SOAJ SC injection       fluticasone-salmeterol (WIXELA INHUB) 500-50 MCG/ACT AEPB diskus inhaler     pantoprazole (PROTONIX) 40 MG tablet     The pharmacy    [facility] [address] [place] [address] [address] - P [phone] - F [phone]     Pl review    thanks

## 2025-05-29 RX ORDER — ROSUVASTATIN CALCIUM 10 MG/1
10 TABLET, COATED ORAL DAILY
Qty: 90 TABLET | Refills: 1 | Status: SHIPPED | OUTPATIENT
Start: 2025-05-29

## 2025-05-29 RX ORDER — PANTOPRAZOLE SODIUM 40 MG/1
40 TABLET, DELAYED RELEASE ORAL DAILY
Qty: 90 TABLET | Refills: 1 | OUTPATIENT
Start: 2025-05-29

## 2025-05-29 RX ORDER — FLUTICASONE PROPIONATE AND SALMETEROL 500; 50 UG/1; UG/1
1 POWDER RESPIRATORY (INHALATION) 2 TIMES DAILY
Qty: 180 EACH | Refills: 1 | Status: SHIPPED | OUTPATIENT
Start: 2025-05-29

## 2025-05-29 NOTE — TELEPHONE ENCOUNTER
Spoke with patient. Pantoprazole already refilled on 5/14/25. Patient states she does not need Pantoprazole. Patient wants Wixela inhaler sent to [facility] in [facility] because she plans to travel next week. Prescription for Wixela inhaler sent to [facility]. Informed patient I sent Trulicity to [facility] [employer] yesterday.

## 2025-05-30 ENCOUNTER — CARE COORDINATION (OUTPATIENT)
Dept: CARE COORDINATION | Age: 70
End: 2025-05-30

## 2025-05-30 ENCOUNTER — OFFICE VISIT (OUTPATIENT)
Dept: PRIMARY CARE CLINIC | Age: 70
End: 2025-05-30
Payer: COMMERCIAL

## 2025-05-30 VITALS
HEIGHT: 62 IN | BODY MASS INDEX: 30.73 KG/M2 | HEART RATE: 96 BPM | DIASTOLIC BLOOD PRESSURE: 86 MMHG | TEMPERATURE: 96.8 F | WEIGHT: 167 LBS | RESPIRATION RATE: 16 BRPM | SYSTOLIC BLOOD PRESSURE: 138 MMHG | OXYGEN SATURATION: 95 %

## 2025-05-30 DIAGNOSIS — K21.9 GASTROESOPHAGEAL REFLUX DISEASE, UNSPECIFIED WHETHER ESOPHAGITIS PRESENT: ICD-10-CM

## 2025-05-30 DIAGNOSIS — R42 DIZZINESS: ICD-10-CM

## 2025-05-30 DIAGNOSIS — J45.40 MODERATE PERSISTENT ASTHMA WITHOUT COMPLICATION: ICD-10-CM

## 2025-05-30 DIAGNOSIS — E11.42 TYPE 2 DIABETES MELLITUS WITH DIABETIC POLYNEUROPATHY, WITHOUT LONG-TERM CURRENT USE OF INSULIN (HCC): Primary | ICD-10-CM

## 2025-05-30 DIAGNOSIS — F33.1 MODERATE EPISODE OF RECURRENT MAJOR DEPRESSIVE DISORDER (HCC): ICD-10-CM

## 2025-05-30 DIAGNOSIS — I10 ESSENTIAL HYPERTENSION: ICD-10-CM

## 2025-05-30 DIAGNOSIS — E78.2 MIXED HYPERLIPIDEMIA: ICD-10-CM

## 2025-05-30 LAB
ANION GAP SERPL CALCULATED.3IONS-SCNC: 15 MMOL/L (ref 3–16)
BILIRUBIN, POC: NORMAL
BLOOD URINE, POC: NORMAL
BUN SERPL-MCNC: 21 MG/DL (ref 7–20)
CALCIUM SERPL-MCNC: 10.5 MG/DL (ref 8.3–10.6)
CHLORIDE SERPL-SCNC: 100 MMOL/L (ref 99–110)
CLARITY, POC: CLEAR
CO2 SERPL-SCNC: 25 MMOL/L (ref 21–32)
COLOR, POC: YELLOW
CREAT SERPL-MCNC: 0.9 MG/DL (ref 0.6–1.2)
CREAT UR-MCNC: 155 MG/DL (ref 28–259)
GFR SERPLBLD CREATININE-BSD FMLA CKD-EPI: 69 ML/MIN/{1.73_M2}
GLUCOSE SERPL-MCNC: 109 MG/DL (ref 70–99)
GLUCOSE URINE, POC: NORMAL MG/DL
KETONES, POC: NORMAL MG/DL
LEUKOCYTE EST, POC: NORMAL
MICROALBUMIN UR DL<=1MG/L-MCNC: <1.2 MG/DL
MICROALBUMIN/CREAT UR: NORMAL MG/G (ref 0–30)
NITRITE, POC: NORMAL
PH, POC: 5.5
POTASSIUM SERPL-SCNC: 4.6 MMOL/L (ref 3.5–5.1)
PROTEIN, POC: NORMAL MG/DL
SODIUM SERPL-SCNC: 140 MMOL/L (ref 136–145)
SPECIFIC GRAVITY, POC: 1.02
UROBILINOGEN, POC: 2 MG/DL

## 2025-05-30 PROCEDURE — 3017F COLORECTAL CA SCREEN DOC REV: CPT | Performed by: INTERNAL MEDICINE

## 2025-05-30 PROCEDURE — 99214 OFFICE O/P EST MOD 30 MIN: CPT | Performed by: INTERNAL MEDICINE

## 2025-05-30 PROCEDURE — 1036F TOBACCO NON-USER: CPT | Performed by: INTERNAL MEDICINE

## 2025-05-30 PROCEDURE — 3075F SYST BP GE 130 - 139MM HG: CPT | Performed by: INTERNAL MEDICINE

## 2025-05-30 PROCEDURE — G8417 CALC BMI ABV UP PARAM F/U: HCPCS | Performed by: INTERNAL MEDICINE

## 2025-05-30 PROCEDURE — G2211 COMPLEX E/M VISIT ADD ON: HCPCS | Performed by: INTERNAL MEDICINE

## 2025-05-30 PROCEDURE — 81002 URINALYSIS NONAUTO W/O SCOPE: CPT | Performed by: INTERNAL MEDICINE

## 2025-05-30 PROCEDURE — 2022F DILAT RTA XM EVC RTNOPTHY: CPT | Performed by: INTERNAL MEDICINE

## 2025-05-30 PROCEDURE — G8427 DOCREV CUR MEDS BY ELIG CLIN: HCPCS | Performed by: INTERNAL MEDICINE

## 2025-05-30 PROCEDURE — 3079F DIAST BP 80-89 MM HG: CPT | Performed by: INTERNAL MEDICINE

## 2025-05-30 PROCEDURE — 3044F HG A1C LEVEL LT 7.0%: CPT | Performed by: INTERNAL MEDICINE

## 2025-05-30 PROCEDURE — 1090F PRES/ABSN URINE INCON ASSESS: CPT | Performed by: INTERNAL MEDICINE

## 2025-05-30 PROCEDURE — G8399 PT W/DXA RESULTS DOCUMENT: HCPCS | Performed by: INTERNAL MEDICINE

## 2025-05-30 PROCEDURE — 1123F ACP DISCUSS/DSCN MKR DOCD: CPT | Performed by: INTERNAL MEDICINE

## 2025-05-30 SDOH — ECONOMIC STABILITY: FOOD INSECURITY: WITHIN THE PAST 12 MONTHS, THE FOOD YOU BOUGHT JUST DIDN'T LAST AND YOU DIDN'T HAVE MONEY TO GET MORE.: NEVER TRUE

## 2025-05-30 SDOH — ECONOMIC STABILITY: FOOD INSECURITY: WITHIN THE PAST 12 MONTHS, YOU WORRIED THAT YOUR FOOD WOULD RUN OUT BEFORE YOU GOT MONEY TO BUY MORE.: NEVER TRUE

## 2025-05-30 ASSESSMENT — PATIENT HEALTH QUESTIONNAIRE - PHQ9
5. POOR APPETITE OR OVEREATING: NOT AT ALL
6. FEELING BAD ABOUT YOURSELF - OR THAT YOU ARE A FAILURE OR HAVE LET YOURSELF OR YOUR FAMILY DOWN: NOT AT ALL
SUM OF ALL RESPONSES TO PHQ QUESTIONS 1-9: 0
4. FEELING TIRED OR HAVING LITTLE ENERGY: NOT AT ALL
7. TROUBLE CONCENTRATING ON THINGS, SUCH AS READING THE NEWSPAPER OR WATCHING TELEVISION: NOT AT ALL
9. THOUGHTS THAT YOU WOULD BE BETTER OFF DEAD, OR OF HURTING YOURSELF: NOT AT ALL
SUM OF ALL RESPONSES TO PHQ QUESTIONS 1-9: 0
8. MOVING OR SPEAKING SO SLOWLY THAT OTHER PEOPLE COULD HAVE NOTICED. OR THE OPPOSITE, BEING SO FIGETY OR RESTLESS THAT YOU HAVE BEEN MOVING AROUND A LOT MORE THAN USUAL: NOT AT ALL
3. TROUBLE FALLING OR STAYING ASLEEP: NOT AT ALL
2. FEELING DOWN, DEPRESSED OR HOPELESS: NOT AT ALL
1. LITTLE INTEREST OR PLEASURE IN DOING THINGS: NOT AT ALL
10. IF YOU CHECKED OFF ANY PROBLEMS, HOW DIFFICULT HAVE THESE PROBLEMS MADE IT FOR YOU TO DO YOUR WORK, TAKE CARE OF THINGS AT HOME, OR GET ALONG WITH OTHER PEOPLE: NOT DIFFICULT AT ALL

## 2025-05-30 NOTE — PROGRESS NOTES
Hydrocodone Hives and Itching    Ibuprofen Other (See Comments)     Unknown reaction    Other Reaction(s): Bleeding from nose, Bruising    Methocarbamol Other (See Comments)     Unknown reaction    Propoxyphene Hives, Itching and Rash     \"my body turns bright red\"    Tramadol Other (See Comments)     Unknown reaction    Venlafaxine Hydrochloride Hives, Itching and Rash     \"my body turns bright red\"    Aspirin Other (See Comments)     bruising    Other Reaction(s): Bleeding from nose, Bruising, Other (See Comments)    bruising      \"I get a lot of bruises\"      bruising Bruising easily bruising      Bruising easily    Cephalexin Itching and Other (See Comments)     Face redness.    Other Reaction(s): Other (See Comments)    Face redness.      Face redness.  Patient states she is able to tolerate penicillin without problems.    Paxil Cr [Paroxetine Hcl Er] Itching       Prior to Visit Medications    Medication Sig Taking? Authorizing Provider   rosuvastatin (CRESTOR) 10 MG tablet Take 1 tablet by mouth daily Yes Raisa Espitia MD   fluticasone-salmeterol (WIXELA INHUB) 500-50 MCG/ACT AEPB diskus inhaler Inhale 1 puff into the lungs 2 times daily Yes Raisa Espitia MD   TRULICITY 0.75 MG/0.5ML SOAJ SC injection INJECT 0.75 MG SUBCUTANEOUSLY ONE TIME PER WEEK Yes Raisa Espitia MD   pantoprazole (PROTONIX) 40 MG tablet Take 1 tablet by mouth every morning (before breakfast) Yes Mary Hunt MD   pantoprazole (PROTONIX) 40 MG tablet TAKE 1 TABLET DAILY Yes Raisa Espitia MD   melatonin 5 MG TABS tablet Take 1 tablet by mouth every evening Yes Provider, MD Kelsey   montelukast (SINGULAIR) 10 MG tablet TAKE 1 TABLET DAILY Yes Lul Thompson MD   mometasone (NASONEX) 50 MCG/ACT nasal spray 2 sprays by Nasal route daily Yes Raisa Espitia MD   tiotropium (SPIRIVA) 18 MCG inhalation capsule Inhale 1 capsule into the lungs daily Yes Raisa Espitia MD   Multiple Vitamins-Minerals

## 2025-05-30 NOTE — CARE COORDINATION
Ambulatory Care Coordination Note     2025 2:44 PM     Patient Current Location:  Home: [address] [id number]     ACM contacted the patient by telephone. Verified name and  with patient as identifiers.     Patient graduated from the High Risk Care Management program on 2025.  Patient verbalizes confidence in the ability to self-manage at this time. progressing towards self-management. .  Care management goals have been completed. No further Ambulatory Care Manager follow up scheduled.      ACM: Elizabeth Monreal RN     Challenges to be reviewed by the provider   Additional needs identified to be addressed with provider No  none               Method of communication with provider: none.    Utilization: Patient has not had any utilization since our last call.     Care Summary Note: ACM outreached patient who reported she is doing well.  Pt had f/u with PCP earlier today.  There is concerns for her elevated fasting BS.  The Trulicity dose was increased today.  Pt reported this morning fasting as 201.  ACM reiterated PCP instructions for Low Na, fat, cholesterol, carb.  We reviewed carb limits as well as goal BS for pre-meal, post-meal and HS. Pt vu and said she usually eats a lot of pasta and rice but understands that she will need to reduce the frequency of these meals.  ACM discussed moderation.   Patient reported that she has been able to work with the [facility] [employer] in [place] and has secured a new therapist with [facility].  Pt reported that she has had one virtual visit thus far, it was 1 hour long and she likes the man she met with.  She is scheduled to see again.   Pt informed ACM that she is leaving next week for [place] where she has family.  Pt reported that she will be gone about 4 weeks.  Pt expressed that she was scared to go.  ACM asked what she is scared of and pt expressed she hopes that she does not need any medical care while there because the EMS and hospitals

## 2025-05-30 NOTE — PATIENT INSTRUCTIONS
-low sodium diet  -low fat, low cholesterol diet  -Limit carbohydrates to 45 grams with meals and 15 grams with snacks  -monitor blood sugars  -goal for blood sugar fasting or pre-meal  is   -goal for blood sugar 2 hours after a meal is less than 180  -goal for blood sugar at bedtime is less than 150  -Regular aerobic exercise    -Follow up with dietician

## 2025-05-31 LAB
DEPRECATED RDW RBC AUTO: 14.2 % (ref 12.4–15.4)
HCT VFR BLD AUTO: 46 % (ref 36–48)
HGB BLD-MCNC: 15.4 G/DL (ref 12–16)
MCH RBC QN AUTO: 28.8 PG (ref 26–34)
MCHC RBC AUTO-ENTMCNC: 33.6 G/DL (ref 31–36)
MCV RBC AUTO: 85.8 FL (ref 80–100)
PLATELET # BLD AUTO: 231 K/UL (ref 135–450)
PMV BLD AUTO: 9.6 FL (ref 5–10.5)
RBC # BLD AUTO: 5.36 M/UL (ref 4–5.2)
WBC # BLD AUTO: 8.3 K/UL (ref 4–11)

## 2025-06-02 ENCOUNTER — RESULTS FOLLOW-UP (OUTPATIENT)
Dept: PRIMARY CARE CLINIC | Age: 70
End: 2025-06-02

## 2025-06-02 DIAGNOSIS — J45.40 MODERATE PERSISTENT ASTHMA WITHOUT COMPLICATION: ICD-10-CM

## 2025-06-02 RX ORDER — FLUTICASONE PROPIONATE AND SALMETEROL 500; 50 UG/1; UG/1
1 POWDER RESPIRATORY (INHALATION) 2 TIMES DAILY
Qty: 180 EACH | Refills: 1 | Status: SHIPPED | OUTPATIENT
Start: 2025-06-02

## 2025-06-02 NOTE — TELEPHONE ENCOUNTER
Medication:   Requested Prescriptions     Pending Prescriptions Disp Refills    fluticasone-salmeterol (WIXELA INHUB) 500-50 MCG/ACT AEPB diskus inhaler 180 each 1     Sig: Inhale 1 puff into the lungs 2 times daily     Last Filled:  Pharmacy never received     Last appt: 5/30/2025   Next appt: 9/2/2025    Last OARRS:       11/27/2024    10:16 AM   RX Monitoring   Periodic Controlled Substance Monitoring No signs of potential drug abuse or diversion identified.

## 2025-06-09 ASSESSMENT — ENCOUNTER SYMPTOMS
VOMITING: 0
ABDOMINAL PAIN: 0
SHORTNESS OF BREATH: 1
COUGH: 0
SORE THROAT: 0
CHEST TIGHTNESS: 0
RHINORRHEA: 0
DIARRHEA: 0
BLOOD IN STOOL: 0
NAUSEA: 1
CONSTIPATION: 1
WHEEZING: 0
SINUS PAIN: 0
SINUS PRESSURE: 0

## 2025-07-04 NOTE — TELEPHONE ENCOUNTER
Medication:   Requested Prescriptions     Pending Prescriptions Disp Refills    [facility] [Pharmacy Med Name: TRULICITY 5.41 LILY/8.6 ML PEN]  1     Sig: INJECT 0.5ML (=0.75 MG) SUBCUTANEOUSLY ONCE WEEKLY     Last Filled:  6.8.23    Last appt: 6/8/2023   Next appt: 9/1/2023    Last Labs DM:   Lab Results   Component Value Date/Time    LABA1C 6.0 06/23/2023 09:34 AM
minimal

## 2025-07-23 ENCOUNTER — TELEPHONE (OUTPATIENT)
Dept: PRIMARY CARE CLINIC | Age: 70
End: 2025-07-23

## 2025-07-23 NOTE — TELEPHONE ENCOUNTER
Orders Form - Pharmacy Requesting Signed  Beatrice lerner - she will re-fax  Provided Office fax #  Please look for incoming fax    [facility] Pharmacy [facility] - [place], [place] - [facility] CHARAN Sweet  -  [phone] - F [phone]   Beatrice lerner

## 2025-08-26 DIAGNOSIS — J32.3 CHRONIC SPHENOIDAL SINUSITIS: ICD-10-CM

## 2025-08-27 ENCOUNTER — OFFICE VISIT (OUTPATIENT)
Dept: PAIN MANAGEMENT | Age: 70
End: 2025-08-27
Payer: MEDICARE

## 2025-08-27 ENCOUNTER — TELEPHONE (OUTPATIENT)
Dept: PAIN MANAGEMENT | Age: 70
End: 2025-08-27

## 2025-08-27 VITALS
HEART RATE: 89 BPM | OXYGEN SATURATION: 94 % | HEIGHT: 62 IN | WEIGHT: 159 LBS | BODY MASS INDEX: 29.26 KG/M2 | DIASTOLIC BLOOD PRESSURE: 80 MMHG | SYSTOLIC BLOOD PRESSURE: 138 MMHG

## 2025-08-27 DIAGNOSIS — Z51.81 ENCOUNTER FOR THERAPEUTIC DRUG MONITORING: ICD-10-CM

## 2025-08-27 DIAGNOSIS — G89.4 CHRONIC PAIN SYNDROME: Primary | ICD-10-CM

## 2025-08-27 DIAGNOSIS — M54.12 CERVICAL RADICULOPATHY: ICD-10-CM

## 2025-08-27 DIAGNOSIS — M47.812 CERVICAL SPONDYLOSIS: ICD-10-CM

## 2025-08-27 PROCEDURE — 1090F PRES/ABSN URINE INCON ASSESS: CPT | Performed by: NURSE PRACTITIONER

## 2025-08-27 PROCEDURE — 99204 OFFICE O/P NEW MOD 45 MIN: CPT | Performed by: NURSE PRACTITIONER

## 2025-08-27 PROCEDURE — 1159F MED LIST DOCD IN RCRD: CPT | Performed by: NURSE PRACTITIONER

## 2025-08-27 PROCEDURE — G8399 PT W/DXA RESULTS DOCUMENT: HCPCS | Performed by: NURSE PRACTITIONER

## 2025-08-27 PROCEDURE — G8417 CALC BMI ABV UP PARAM F/U: HCPCS | Performed by: NURSE PRACTITIONER

## 2025-08-27 PROCEDURE — 3017F COLORECTAL CA SCREEN DOC REV: CPT | Performed by: NURSE PRACTITIONER

## 2025-08-27 PROCEDURE — G8427 DOCREV CUR MEDS BY ELIG CLIN: HCPCS | Performed by: NURSE PRACTITIONER

## 2025-08-27 PROCEDURE — 3075F SYST BP GE 130 - 139MM HG: CPT | Performed by: NURSE PRACTITIONER

## 2025-08-27 PROCEDURE — 1036F TOBACCO NON-USER: CPT | Performed by: NURSE PRACTITIONER

## 2025-08-27 PROCEDURE — 1160F RVW MEDS BY RX/DR IN RCRD: CPT | Performed by: NURSE PRACTITIONER

## 2025-08-27 PROCEDURE — 3079F DIAST BP 80-89 MM HG: CPT | Performed by: NURSE PRACTITIONER

## 2025-08-27 PROCEDURE — 1123F ACP DISCUSS/DSCN MKR DOCD: CPT | Performed by: NURSE PRACTITIONER

## 2025-08-27 RX ORDER — MOMETASONE FUROATE MONOHYDRATE 50 UG/1
2 SPRAY, METERED NASAL DAILY
Qty: 51 G | Refills: 1 | Status: SHIPPED | OUTPATIENT
Start: 2025-08-27

## 2025-08-27 RX ORDER — LIDOCAINE 50 MG/G
1 PATCH TOPICAL DAILY
Qty: 30 PATCH | Refills: 0 | Status: SHIPPED | OUTPATIENT
Start: 2025-08-27

## 2025-08-27 RX ORDER — PREDNISONE 20 MG/1
40 TABLET ORAL DAILY
COMMUNITY
Start: 2025-08-25

## 2025-08-29 ASSESSMENT — ENCOUNTER SYMPTOMS
WHEEZING: 0
SHORTNESS OF BREATH: 0
ABDOMINAL PAIN: 0
BACK PAIN: 1

## 2025-09-01 LAB
6-ACETYLMORPHINE, SALIVA: NOT DETECTED NG/ML
6MAM SAL QL CFM: NEGATIVE
ALPRAZOLAM, SALIVA: NOT DETECTED NG/ML
AMPHETAMINE, SALIVA: NOT DETECTED NG/ML
AMPHETAMINES SCREEN, SALIVA: NEGATIVE
ANTICONVULSANTS SCREEN, SALIVA: NEGATIVE
BENZODIAZEPINES SCREEN, SALIVA: NEGATIVE
BENZOYLECGONINE, SALIVA: NOT DETECTED NG/ML
BUPRENORPHINE SCREEN, SALIVA: NEGATIVE
BUPRENORPHINE, SALIVA: NOT DETECTED NG/ML
CANNABINOIDS SCREEN, SALIVA: NEGATIVE
CARISOPRODOL, SALIVA: NOT DETECTED NG/ML
CLONAZEPAM, SALIVA: NOT DETECTED NG/ML
COCAINE + METABOLITE, SALIVA: NEGATIVE
COCAINE, SALIVA: NOT DETECTED NG/ML
CODEINE, SALIVA: NOT DETECTED NG/ML
COTININE, SALIVA: NOT DETECTED NG/ML
CYCLOBENZAPRINE, SALIVA: NOT DETECTED NG/ML
DESMETHYLDIAZEPAM, SALIVA: NOT DETECTED NG/ML
DIAZEPAM, SALIVA: NOT DETECTED NG/ML
DIHYDROCODEINE, SALIVA: NOT DETECTED NG/ML
DULOXETINE SCREEN, SALIVA: NEGATIVE
DULOXETINE, SALIVA: NOT DETECTED NG/ML
FENTANYL SCREEN, SALIVA: NEGATIVE
FENTANYL, SALIVA: NOT DETECTED NG/ML
FLUNITRAZEPAM, SALIVA: NOT DETECTED NG/ML
FLURAZEPAM, SALIVA: NOT DETECTED NG/ML
GABAPENTIN, SALIVA: NOT DETECTED UG/ML
HYDROCODONE SAL CFM-MCNC: NOT DETECTED NG/ML
HYDROMORPHONE, SALIVA: NOT DETECTED NG/ML
LORAZEPAM: NOT DETECTED NG/ML
MDMA, SALIVA: NOT DETECTED NG/ML
MEPERIDINE, SALIVA: NOT DETECTED NG/ML
MEPROBAMATE, SALIVA: NOT DETECTED NG/ML
METHADONE METABOLITE, SALIVA: NOT DETECTED NG/ML
METHADONE SCREEN, SALIVA: NEGATIVE
METHADONE, SALIVA: NOT DETECTED NG/ML
METHAMPHETAMINE, SALIVA: NOT DETECTED NG/ML
MIDAZOLAM, SALIVA: NOT DETECTED NG/ML
MORPHINE, SALIVA: NOT DETECTED NG/ML
MUSCLE RELAXANTS SCREEN, SALIVA: NEGATIVE
NICOTINE METABOLITE SCREEN, SALIVA: NEGATIVE
NORBUPRENORPHINE, SALIVA: NOT DETECTED NG/ML
NORDIAZEPAM IN SALIVA: NOT DETECTED NG/ML
NORHYDROCODONE, SALIVA: NOT DETECTED NG/ML
NOROXYCODONE, SALIVA: NOT DETECTED NG/ML
OPIATES SAL QL CFM: NEGATIVE
OTHER OPIOIDS SCREEN, SALIVA: NEGATIVE
OXYCODONE+OXYMORPHONE SCREEN, SALIVA: NEGATIVE
OXYCODONE, SALIVA: NOT DETECTED NG/ML
OXYMORPHONE, SALIVA: NOT DETECTED NG/ML
PHENCYCLIDINE SCREEN, SALIVA: NEGATIVE
PHENCYCLIDINE, SALIVA: NOT DETECTED NG/ML
PREGABALIN, SALIVA: NOT DETECTED UG/ML
PROPOXYPHENE, SALIVA: NOT DETECTED NG/ML
SEDATIVE/HYPNOTICS SCREEN, SALIVA: NEGATIVE
TAPENTADOL SCREEN, SALIVA: NEGATIVE
TAPENTADOL, SALIVA: NOT DETECTED NG/ML
TEMAZEPAM, SALIVA: NOT DETECTED NG/ML
TETRAHYDROCANNABINOL, SALIVA: NOT DETECTED NG/ML
TRAMADOL, SALIVA: NOT DETECTED NG/ML
TRIAZOLAM, SALIVA: NOT DETECTED NG/ML
ZOLPIDEM, SALIVA: NOT DETECTED NG/ML

## 2025-09-04 ENCOUNTER — TELEPHONE (OUTPATIENT)
Dept: PRIMARY CARE CLINIC | Age: 70
End: 2025-09-04

## 2025-09-05 DIAGNOSIS — E78.2 MIXED HYPERLIPIDEMIA: ICD-10-CM

## 2025-09-05 RX ORDER — ROSUVASTATIN CALCIUM 10 MG/1
10 TABLET, COATED ORAL DAILY
Qty: 90 TABLET | Refills: 1 | Status: SHIPPED | OUTPATIENT
Start: 2025-09-05

## (undated) DEVICE — SNARE ENDOSCP POLYP 2.4 MM 240 CM 10 MM 2.8 MM CAPTIVATOR

## (undated) DEVICE — CONMED SCOPE SAVER BITE BLOCK, 20X27 MM: Brand: SCOPE SAVER

## (undated) DEVICE — FORCEPS BX L240CM JAW DIA2.8MM L CAP W/ NDL MIC MESH TOOTH

## (undated) DEVICE — CANNULA NSL AD TBNG L7FT PVC STR NONFLARED PRNG O2 DEL W STD

## (undated) DEVICE — ENDOSCOPIC KIT 2 12 FT OP4 DE2 GWN SYR

## (undated) DEVICE — ELECTRODE,ECG,STRESS,FOAM,3PK: Brand: MEDLINE